# Patient Record
Sex: FEMALE | Race: WHITE | NOT HISPANIC OR LATINO | Employment: OTHER | URBAN - METROPOLITAN AREA
[De-identification: names, ages, dates, MRNs, and addresses within clinical notes are randomized per-mention and may not be internally consistent; named-entity substitution may affect disease eponyms.]

---

## 2017-02-21 ENCOUNTER — HOSPITAL ENCOUNTER (OUTPATIENT)
Dept: RADIOLOGY | Facility: MEDICAL CENTER | Age: 77
End: 2017-02-21
Attending: SURGERY
Payer: MEDICARE

## 2017-02-21 DIAGNOSIS — Z12.31 SCREENING MAMMOGRAM, ENCOUNTER FOR: ICD-10-CM

## 2017-02-21 PROCEDURE — 77063 BREAST TOMOSYNTHESIS BI: CPT

## 2017-02-23 ENCOUNTER — HOSPITAL ENCOUNTER (OUTPATIENT)
Dept: LAB | Facility: MEDICAL CENTER | Age: 77
End: 2017-02-23
Attending: INTERNAL MEDICINE
Payer: MEDICARE

## 2017-02-23 ENCOUNTER — HOSPITAL ENCOUNTER (OUTPATIENT)
Dept: LAB | Facility: MEDICAL CENTER | Age: 77
End: 2017-02-23
Attending: PHYSICIAN ASSISTANT
Payer: MEDICARE

## 2017-02-23 LAB
25(OH)D3 SERPL-MCNC: 32 NG/ML (ref 30–100)
ALBUMIN SERPL BCP-MCNC: 3.5 G/DL (ref 3.2–4.9)
ALBUMIN SERPL BCP-MCNC: 3.5 G/DL (ref 3.2–4.9)
ALBUMIN/GLOB SERPL: 1.4 G/DL
ALBUMIN/GLOB SERPL: 1.5 G/DL
ALP SERPL-CCNC: 55 U/L (ref 30–99)
ALP SERPL-CCNC: 55 U/L (ref 30–99)
ALT SERPL-CCNC: 12 U/L (ref 2–50)
ALT SERPL-CCNC: 12 U/L (ref 2–50)
ANION GAP SERPL CALC-SCNC: 6 MMOL/L (ref 0–11.9)
ANION GAP SERPL CALC-SCNC: 8 MMOL/L (ref 0–11.9)
AST SERPL-CCNC: 15 U/L (ref 12–45)
AST SERPL-CCNC: 17 U/L (ref 12–45)
BASOPHILS # BLD AUTO: 0.6 % (ref 0–1.8)
BASOPHILS # BLD AUTO: 0.8 % (ref 0–1.8)
BASOPHILS # BLD: 0.03 K/UL (ref 0–0.12)
BASOPHILS # BLD: 0.04 K/UL (ref 0–0.12)
BILIRUB SERPL-MCNC: 1 MG/DL (ref 0.1–1.5)
BILIRUB SERPL-MCNC: 1 MG/DL (ref 0.1–1.5)
BUN SERPL-MCNC: 13 MG/DL (ref 8–22)
BUN SERPL-MCNC: 14 MG/DL (ref 8–22)
CALCIUM SERPL-MCNC: 9.6 MG/DL (ref 8.4–10.2)
CALCIUM SERPL-MCNC: 9.6 MG/DL (ref 8.4–10.2)
CHLORIDE SERPL-SCNC: 105 MMOL/L (ref 96–112)
CHLORIDE SERPL-SCNC: 105 MMOL/L (ref 96–112)
CHOLEST SERPL-MCNC: 257 MG/DL (ref 100–199)
CO2 SERPL-SCNC: 27 MMOL/L (ref 20–33)
CO2 SERPL-SCNC: 28 MMOL/L (ref 20–33)
CREAT SERPL-MCNC: 0.66 MG/DL (ref 0.5–1.4)
CREAT SERPL-MCNC: 0.71 MG/DL (ref 0.5–1.4)
EOSINOPHIL # BLD AUTO: 0.08 K/UL (ref 0–0.51)
EOSINOPHIL # BLD AUTO: 0.11 K/UL (ref 0–0.51)
EOSINOPHIL NFR BLD: 1.7 % (ref 0–6.9)
EOSINOPHIL NFR BLD: 2.2 % (ref 0–6.9)
ERYTHROCYTE [DISTWIDTH] IN BLOOD BY AUTOMATED COUNT: 41.2 FL (ref 35.9–50)
ERYTHROCYTE [DISTWIDTH] IN BLOOD BY AUTOMATED COUNT: 42.7 FL (ref 35.9–50)
FOLATE SERPL-MCNC: 10.1 NG/ML
GFR SERPL CREATININE-BSD FRML MDRD: >60 ML/MIN/1.73 M 2
GFR SERPL CREATININE-BSD FRML MDRD: >60 ML/MIN/1.73 M 2
GLOBULIN SER CALC-MCNC: 2.4 G/DL (ref 1.9–3.5)
GLOBULIN SER CALC-MCNC: 2.5 G/DL (ref 1.9–3.5)
GLUCOSE SERPL-MCNC: 95 MG/DL (ref 65–99)
GLUCOSE SERPL-MCNC: 97 MG/DL (ref 65–99)
HCT VFR BLD AUTO: 43.9 % (ref 37–47)
HCT VFR BLD AUTO: 44.5 % (ref 37–47)
HDLC SERPL-MCNC: 56 MG/DL
HGB BLD-MCNC: 14.9 G/DL (ref 12–16)
HGB BLD-MCNC: 15.1 G/DL (ref 12–16)
IMM GRANULOCYTES # BLD AUTO: 0.01 K/UL (ref 0–0.11)
IMM GRANULOCYTES # BLD AUTO: 0.01 K/UL (ref 0–0.11)
IMM GRANULOCYTES NFR BLD AUTO: 0.2 % (ref 0–0.9)
IMM GRANULOCYTES NFR BLD AUTO: 0.2 % (ref 0–0.9)
IRON SATN MFR SERPL: 28 % (ref 15–55)
IRON SERPL-MCNC: 107 UG/DL (ref 40–170)
LDLC SERPL CALC-MCNC: 167 MG/DL
LYMPHOCYTES # BLD AUTO: 1.93 K/UL (ref 1–4.8)
LYMPHOCYTES # BLD AUTO: 2.01 K/UL (ref 1–4.8)
LYMPHOCYTES NFR BLD: 40 % (ref 22–41)
LYMPHOCYTES NFR BLD: 40.3 % (ref 22–41)
MCH RBC QN AUTO: 29.7 PG (ref 27–33)
MCH RBC QN AUTO: 29.9 PG (ref 27–33)
MCHC RBC AUTO-ENTMCNC: 33.9 G/DL (ref 33.6–35)
MCHC RBC AUTO-ENTMCNC: 33.9 G/DL (ref 33.6–35)
MCV RBC AUTO: 87.5 FL (ref 81.4–97.8)
MCV RBC AUTO: 88.1 FL (ref 81.4–97.8)
MONOCYTES # BLD AUTO: 0.35 K/UL (ref 0–0.85)
MONOCYTES # BLD AUTO: 0.38 K/UL (ref 0–0.85)
MONOCYTES NFR BLD AUTO: 7 % (ref 0–13.4)
MONOCYTES NFR BLD AUTO: 7.9 % (ref 0–13.4)
NEUTROPHILS # BLD AUTO: 2.4 K/UL (ref 2–7.15)
NEUTROPHILS # BLD AUTO: 2.47 K/UL (ref 2–7.15)
NEUTROPHILS NFR BLD: 49.5 % (ref 44–72)
NEUTROPHILS NFR BLD: 49.6 % (ref 44–72)
NRBC # BLD AUTO: 0 K/UL
NRBC # BLD AUTO: 0 K/UL
NRBC BLD AUTO-RTO: 0 /100 WBC
NRBC BLD AUTO-RTO: 0 /100 WBC
PLATELET # BLD AUTO: 218 K/UL (ref 164–446)
PLATELET # BLD AUTO: 219 K/UL (ref 164–446)
PMV BLD AUTO: 10 FL (ref 9–12.9)
PMV BLD AUTO: 10 FL (ref 9–12.9)
POTASSIUM SERPL-SCNC: 3.5 MMOL/L (ref 3.6–5.5)
POTASSIUM SERPL-SCNC: 3.6 MMOL/L (ref 3.6–5.5)
PREALB SERPL-MCNC: 22 MG/DL (ref 18–38)
PROT SERPL-MCNC: 5.9 G/DL (ref 6–8.2)
PROT SERPL-MCNC: 6 G/DL (ref 6–8.2)
RBC # BLD AUTO: 5.02 M/UL (ref 4.2–5.4)
RBC # BLD AUTO: 5.05 M/UL (ref 4.2–5.4)
SODIUM SERPL-SCNC: 139 MMOL/L (ref 135–145)
SODIUM SERPL-SCNC: 140 MMOL/L (ref 135–145)
TIBC SERPL-MCNC: 388 UG/DL (ref 250–450)
TRANSFERRIN SERPL-MCNC: 283 MG/DL (ref 200–370)
TRIGL SERPL-MCNC: 171 MG/DL (ref 0–149)
VIT B12 SERPL-MCNC: 362 PG/ML (ref 211–911)
WBC # BLD AUTO: 4.8 K/UL (ref 4.8–10.8)
WBC # BLD AUTO: 5 K/UL (ref 4.8–10.8)

## 2017-02-23 PROCEDURE — 36415 COLL VENOUS BLD VENIPUNCTURE: CPT

## 2017-02-23 PROCEDURE — 80053 COMPREHEN METABOLIC PANEL: CPT

## 2017-02-23 PROCEDURE — 85025 COMPLETE CBC W/AUTO DIFF WBC: CPT

## 2017-02-26 LAB — VIT B1 BLD-MCNC: 86 NMOL/L (ref 70–180)

## 2017-04-10 ENCOUNTER — OFFICE VISIT (OUTPATIENT)
Dept: URGENT CARE | Facility: CLINIC | Age: 77
End: 2017-04-10
Payer: MEDICARE

## 2017-04-10 VITALS
WEIGHT: 177 LBS | OXYGEN SATURATION: 93 % | HEART RATE: 72 BPM | DIASTOLIC BLOOD PRESSURE: 80 MMHG | SYSTOLIC BLOOD PRESSURE: 130 MMHG | BODY MASS INDEX: 30.37 KG/M2 | TEMPERATURE: 98.6 F | RESPIRATION RATE: 20 BRPM

## 2017-04-10 DIAGNOSIS — B34.9 VIRAL ILLNESS: ICD-10-CM

## 2017-04-10 PROCEDURE — 1036F TOBACCO NON-USER: CPT | Performed by: NURSE PRACTITIONER

## 2017-04-10 PROCEDURE — G8432 DEP SCR NOT DOC, RNG: HCPCS | Performed by: NURSE PRACTITIONER

## 2017-04-10 PROCEDURE — 3017F COLORECTAL CA SCREEN DOC REV: CPT | Mod: 8P | Performed by: NURSE PRACTITIONER

## 2017-04-10 PROCEDURE — 1101F PT FALLS ASSESS-DOCD LE1/YR: CPT | Mod: 8P | Performed by: NURSE PRACTITIONER

## 2017-04-10 PROCEDURE — 99213 OFFICE O/P EST LOW 20 MIN: CPT | Performed by: NURSE PRACTITIONER

## 2017-04-10 PROCEDURE — 4040F PNEUMOC VAC/ADMIN/RCVD: CPT | Performed by: NURSE PRACTITIONER

## 2017-04-10 PROCEDURE — G8419 CALC BMI OUT NRM PARAM NOF/U: HCPCS | Performed by: NURSE PRACTITIONER

## 2017-04-10 NOTE — PROGRESS NOTES
Chief Complaint   Patient presents with   • Cough     X 4 days dry cough , few days fever , severe headache       HISTORY OF PRESENT ILLNESS: Patient is a 77 y.o. female who presents today due to symptoms which started four with sudden onset. Pt reports a fever, chills, body aches. Reports associated mild cough, nausea, headache, neck pain, and fatigue. Denies sore throat, abdominal pain, changes in urination, ear pain, blood in sputum, chest pain, shortness of breath, wheezing, vomiting, or diarrhea. Admits to history of asthma and PNA in the past, has not needed her inhaler more often since onset of symptoms. Has tried OTC cold/flu medications without significant relief of symptoms. She took some left over cipro for two doses without improvement. No other aggravating or alleviating factors. Did not receive a flu vaccination this year. Overall her symptoms are improving. She reports that she just returned from [place] where she was for 2 weeks, states she felt fine there. Denies any known ill contacts.    Patient Active Problem List    Diagnosis Date Noted   • Dizziness 03/11/2013     Priority: High   • HTN (hypertension) 03/12/2013     Priority: Medium   • Hypokalemia 03/12/2013     Priority: Medium   • Hyperlipidemia 03/12/2013     Priority: Low   • ULCERATIVE COLITIS 03/12/2013     Priority: Low   • Asthma 07/08/2016   • ROBYN (obstructive sleep apnea) 07/08/2016   • Morbid obesity (CMS-HCC) 05/18/2016   • Murmur 05/09/2013   • Sciatica 05/09/2013   • Vertigo 03/11/2013       Allergies:Sulfa drugs    Current Outpatient Prescriptions Ordered in Psychiatric   Medication Sig Dispense Refill   • B Complex Vitamins (VITAMIN B COMPLEX PO) Take  by mouth.     • methylPREDNISolone (MEDROL, TITI,) 4 MG tablet Take as directed 21 Tab 0   • azithromycin (ZITHROMAX) 250 MG Tab Take as directed 6 Tab 0   • Mesalamine (APRISO PO) Take  by mouth.     • budesonide-formoterol (SYMBICORT) 80-4.5 MCG/ACT Aerosol Inhale 2 Puffs by mouth 2  Times a Day. Use spacer. Rinse mouth after each use. 1 Inhaler 11   • levalbuterol (XOPENEX) 1.25 MG/3ML Nebu Soln 1.25 mg by Nebulization route every four hours as needed for Shortness of Breath.     • valsartan-hydrochlorothiazide (DIOVAN-HCT) 160-25 MG per tablet Take 1 Tab by mouth every day.     • anastrozole (ARIMIDEX) 1 MG Tab Take 1 mg by mouth every day.     • pantoprazole (PROTONIX) 40 MG Tablet Delayed Response Take 40 mg by mouth every day.     • MAGNESIUM PO Take 1 Tab by mouth every day.     • vitamin D (CHOLECALCIFEROL) 1000 UNIT Tab Take 1,000 Units by mouth every day.     • POTASSIUM CITRATE ER PO Take 1 Tab by mouth every day. OTC     • ibuprofen (MOTRIN) 200 MG TABS Take 400 mg by mouth every 6 hours as needed.     • Misc Natural Products (OSTEO BI-FLEX ADV JOINT SHIELD PO) Take 1 Tab by mouth every day.     • gabapentin (NEURONTIN) 100 MG CAPS Take 100 mg by mouth every bedtime. Indications: Hot Flash     • metoprolol SR (TOPROL XL) 25 MG TB24 Take 1 Tab by mouth every day. 90 Tab 3   • aspirin EC (ECOTRIN) 81 MG TBEC Take 162 mg by mouth every day.       • paroxetine (PAXIL) 30 MG TABS Take 30 mg by mouth every day. Take with food      • atorvastatin (LIPITOR) 20 MG TABS Take 20 mg by mouth every evening.       • docosahexanoic acid (FISH OIL) 1000 MG CAPS Take 1,000 mg by mouth every day.       No current [facility]-ordered facility-administered medications on file.       Past Medical History   Diagnosis Date   • Psychiatric disorder      depression   • Hypertension    • Hypercholesterolemia    • Dizziness 3/11/2013   • HTN (hypertension) 3/12/2013   • Hypokalemia 3/12/2013   • Vertigo 3/11/2013   • Hyperlipidemia 3/12/2013   • ULCERATIVE COLITIS 3/12/2013   • Breast cancer (CMS-HCC)    • Asthma    • Arthritis    • Heart murmur    • Indigestion    • Hiatus hernia syndrome    • Snoring    • Bronchitis 2011   • Sleep apnea      uses CPAP   • Pain      back, hips, knees   • Heart burn    • Cancer  (CMS-HCC) 12/2012     right breast       Social History   Substance Use Topics   • Smoking status: Former Smoker -- 1.00 packs/day for 20 years     Types: Cigarettes     Quit date: 05/22/1986   • Smokeless tobacco: Never Used   • Alcohol Use: Yes      Comment: 3 drinks weekly       No family status information on file.     Family History   Problem Relation Age of Onset   • Heart Disease Mother    • Cancer Mother    • Cancer Sister        ROS:  Review of Systems   Constitutional: Positive for subjective fever, chills, fatigue, malaise. Negative for weight loss.  HENT: Positive for neck pain. Negative for ear pain, nosebleeds, and sore throat.    Eyes: Negative for vision changes.   Neuro: Positive for headache.   Cardiovascular: Negative for chest pain, palpitations, orthopnea and leg swelling.   Respiratory: Positive for mild cough without sputum production. Negative for shortness of breath and wheezing.   Gastrointestinal: Positive for nausea. Negative for abdominal pain, vomiting or diarrhea.   : Negative for dysuria, hematuria, urinary frequency or urgency. Negative for flank pain.  Skin: Negative for rash, diaphoresis.     Exam:  Blood pressure 130/80, pulse 72, temperature 37 °C (98.6 °F), resp. rate 20, weight 80.287 kg (177 lb), SpO2 93 %.  General: well-nourished, well-developed female, appears uncomfortable, non-toxic in appearance.   Head: normocephalic, atraumatic.  Eyes: PERRLA, EOM within normal limits, no conjunctival injection, no scleral icterus, visual fields and acuity grossly intact.  Ears: normal shape and symmetry, no tenderness, no discharge. External canals are without any significant edema or erythema. Tympanic membranes are without any inflammation, no effusion. Gross auditory acuity is intact.  Nose: symmetrical without tenderness, mild discharge, erythema present bilateral nares.  Mouth/Throat: reasonable hygiene, no exudates or tonsillar enlargement. Erythema present.   Neck: no masses,  range of motion within normal limits, no tracheal deviation.  Lymph: mild cervical adenopathy. No supraclavicular adenopathy.   Neuro: alert and oriented. Cranial nerves 1-12 grossly intact. No nuchal rigidity, negative Kernig and Brudzinski signs  Cardiovascular: regular rate and rhythm without murmurs, rubs, or gallops. No edema.   Pulmonary: no distress. Chest is symmetrical with respiration, no wheezes, crackles, or rhonchi.   Musculoskeletal: appropriate muscle tone, gait is stable.  Skin: warm, dry, intact, no clubbing, no cyanosis.   Psych: appropriate mood, affect, judgement.         Assessment/Plan:  1. Viral illness  POCT Influenza A/B    POCT Mononucleosis (Mono)       POC flu negative  POC mono negative          The patient is nontoxic in appearance, her vital signs are stable, she is negative meningeal signs, I suspect viral illness. Nonetheless, I'm concerned about her fever, headache, and neck pain. I have discussed this with the patient and have advised ER evaluation within 12 hours for worsening. Rest, increase fluids, hand and respiratory hygiene. May take OTC medications as directed for symptom relief. Honey for cough.   Supportive care, differential diagnoses, and indications for immediate follow-up discussed with patient.   Pathogenesis of diagnosis discussed including typical length and natural progression.  Instructed to return to clinic or nearest emergency department for any change in condition, further concerns, or worsening of symptoms.  Patient states understanding of the plan of care and discharge instructions.  Instructed to make an appointment with their primary care provider in the next 3-7 days if not significantly improving and for further care.         Please note that this dictation was created using voice recognition software. I have made every reasonable attempt to correct obvious errors, but I expect that there are errors of grammar and possibly content that I did not discover  before finalizing the note.      ARMIN Lin.

## 2017-04-11 ENCOUNTER — HOSPITAL ENCOUNTER (EMERGENCY)
Facility: MEDICAL CENTER | Age: 77
End: 2017-04-11
Attending: EMERGENCY MEDICINE
Payer: MEDICARE

## 2017-04-11 ENCOUNTER — APPOINTMENT (OUTPATIENT)
Dept: RADIOLOGY | Facility: MEDICAL CENTER | Age: 77
End: 2017-04-11
Attending: EMERGENCY MEDICINE
Payer: MEDICARE

## 2017-04-11 ENCOUNTER — OFFICE VISIT (OUTPATIENT)
Dept: PULMONOLOGY | Facility: HOSPICE | Age: 77
End: 2017-04-11
Payer: MEDICARE

## 2017-04-11 VITALS
BODY MASS INDEX: 30.75 KG/M2 | WEIGHT: 180.12 LBS | TEMPERATURE: 97.5 F | HEART RATE: 65 BPM | OXYGEN SATURATION: 95 % | DIASTOLIC BLOOD PRESSURE: 91 MMHG | SYSTOLIC BLOOD PRESSURE: 178 MMHG | RESPIRATION RATE: 16 BRPM | HEIGHT: 64 IN

## 2017-04-11 VITALS
TEMPERATURE: 98.2 F | DIASTOLIC BLOOD PRESSURE: 90 MMHG | RESPIRATION RATE: 14 BRPM | BODY MASS INDEX: 30.56 KG/M2 | HEART RATE: 79 BPM | WEIGHT: 179 LBS | HEIGHT: 64 IN | SYSTOLIC BLOOD PRESSURE: 140 MMHG

## 2017-04-11 DIAGNOSIS — G47.33 OSA (OBSTRUCTIVE SLEEP APNEA): ICD-10-CM

## 2017-04-11 DIAGNOSIS — R50.9 FEVER, UNSPECIFIED FEVER CAUSE: ICD-10-CM

## 2017-04-11 DIAGNOSIS — R42 VERTIGO: ICD-10-CM

## 2017-04-11 DIAGNOSIS — R01.1 MURMUR: ICD-10-CM

## 2017-04-11 DIAGNOSIS — R53.83 FATIGUE, UNSPECIFIED TYPE: ICD-10-CM

## 2017-04-11 DIAGNOSIS — B34.9 ACUTE VIRAL SYNDROME: ICD-10-CM

## 2017-04-11 LAB
ALBUMIN SERPL BCP-MCNC: 3.5 G/DL (ref 3.2–4.9)
ALBUMIN/GLOB SERPL: 1 G/DL
ALP SERPL-CCNC: 62 U/L (ref 30–99)
ALT SERPL-CCNC: 15 U/L (ref 2–50)
ANION GAP SERPL CALC-SCNC: 6 MMOL/L (ref 0–11.9)
APPEARANCE UR: CLEAR
AST SERPL-CCNC: 17 U/L (ref 12–45)
BACTERIA #/AREA URNS HPF: ABNORMAL /HPF
BASOPHILS # BLD AUTO: 0.7 % (ref 0–1.8)
BASOPHILS # BLD: 0.03 K/UL (ref 0–0.12)
BILIRUB SERPL-MCNC: 1.2 MG/DL (ref 0.1–1.5)
BILIRUB UR QL STRIP.AUTO: NEGATIVE
BUN SERPL-MCNC: 13 MG/DL (ref 8–22)
CALCIUM SERPL-MCNC: 9.4 MG/DL (ref 8.4–10.2)
CHLORIDE SERPL-SCNC: 102 MMOL/L (ref 96–112)
CO2 SERPL-SCNC: 30 MMOL/L (ref 20–33)
COLOR UR: YELLOW
CREAT SERPL-MCNC: 0.7 MG/DL (ref 0.5–1.4)
CULTURE IF INDICATED INDCX: NO UA CULTURE
EOSINOPHIL # BLD AUTO: 0.09 K/UL (ref 0–0.51)
EOSINOPHIL NFR BLD: 2.1 % (ref 0–6.9)
EPI CELLS #/AREA URNS HPF: ABNORMAL /HPF
ERYTHROCYTE [DISTWIDTH] IN BLOOD BY AUTOMATED COUNT: 40.7 FL (ref 35.9–50)
GFR SERPL CREATININE-BSD FRML MDRD: >60 ML/MIN/1.73 M 2
GLOBULIN SER CALC-MCNC: 3.4 G/DL (ref 1.9–3.5)
GLUCOSE SERPL-MCNC: 113 MG/DL (ref 65–99)
GLUCOSE UR STRIP.AUTO-MCNC: NEGATIVE MG/DL
HCT VFR BLD AUTO: 44.4 % (ref 37–47)
HGB BLD-MCNC: 15.2 G/DL (ref 12–16)
IMM GRANULOCYTES # BLD AUTO: 0.01 K/UL (ref 0–0.11)
IMM GRANULOCYTES NFR BLD AUTO: 0.2 % (ref 0–0.9)
KETONES UR STRIP.AUTO-MCNC: ABNORMAL MG/DL
LEUKOCYTE ESTERASE UR QL STRIP.AUTO: NEGATIVE
LYMPHOCYTES # BLD AUTO: 1.27 K/UL (ref 1–4.8)
LYMPHOCYTES NFR BLD: 29 % (ref 22–41)
MCH RBC QN AUTO: 29.7 PG (ref 27–33)
MCHC RBC AUTO-ENTMCNC: 34.2 G/DL (ref 33.6–35)
MCV RBC AUTO: 86.9 FL (ref 81.4–97.8)
MICRO URNS: ABNORMAL
MONOCYTES # BLD AUTO: 0.5 K/UL (ref 0–0.85)
MONOCYTES NFR BLD AUTO: 11.4 % (ref 0–13.4)
MUCOUS THREADS #/AREA URNS HPF: ABNORMAL /HPF
NEUTROPHILS # BLD AUTO: 2.48 K/UL (ref 2–7.15)
NEUTROPHILS NFR BLD: 56.6 % (ref 44–72)
NITRITE UR QL STRIP.AUTO: NEGATIVE
NRBC # BLD AUTO: 0 K/UL
NRBC BLD AUTO-RTO: 0 /100 WBC
PH UR STRIP.AUTO: 6 [PH]
PLATELET # BLD AUTO: 195 K/UL (ref 164–446)
PMV BLD AUTO: 9.5 FL (ref 9–12.9)
POTASSIUM SERPL-SCNC: 3.8 MMOL/L (ref 3.6–5.5)
PROT SERPL-MCNC: 6.9 G/DL (ref 6–8.2)
PROT UR QL STRIP: NEGATIVE MG/DL
RBC # BLD AUTO: 5.11 M/UL (ref 4.2–5.4)
RBC # URNS HPF: ABNORMAL /HPF
RBC UR QL AUTO: ABNORMAL
SODIUM SERPL-SCNC: 138 MMOL/L (ref 135–145)
SP GR UR STRIP.AUTO: 1.02
UNIDENT CRYS URNS QL MICRO: ABNORMAL /HPF
WBC # BLD AUTO: 4.4 K/UL (ref 4.8–10.8)
WBC #/AREA URNS HPF: ABNORMAL /HPF

## 2017-04-11 PROCEDURE — 96374 THER/PROPH/DIAG INJ IV PUSH: CPT

## 2017-04-11 PROCEDURE — 93005 ELECTROCARDIOGRAM TRACING: CPT | Performed by: EMERGENCY MEDICINE

## 2017-04-11 PROCEDURE — 96361 HYDRATE IV INFUSION ADD-ON: CPT

## 2017-04-11 PROCEDURE — 96375 TX/PRO/DX INJ NEW DRUG ADDON: CPT

## 2017-04-11 PROCEDURE — 700105 HCHG RX REV CODE 258: Performed by: EMERGENCY MEDICINE

## 2017-04-11 PROCEDURE — 85025 COMPLETE CBC W/AUTO DIFF WBC: CPT

## 2017-04-11 PROCEDURE — 71010 DX-CHEST-PORTABLE (1 VIEW): CPT

## 2017-04-11 PROCEDURE — 99284 EMERGENCY DEPT VISIT MOD MDM: CPT

## 2017-04-11 PROCEDURE — 81001 URINALYSIS AUTO W/SCOPE: CPT

## 2017-04-11 PROCEDURE — 96376 TX/PRO/DX INJ SAME DRUG ADON: CPT

## 2017-04-11 PROCEDURE — 80053 COMPREHEN METABOLIC PANEL: CPT

## 2017-04-11 PROCEDURE — 36415 COLL VENOUS BLD VENIPUNCTURE: CPT

## 2017-04-11 PROCEDURE — 700111 HCHG RX REV CODE 636 W/ 250 OVERRIDE (IP): Performed by: EMERGENCY MEDICINE

## 2017-04-11 RX ORDER — HYDROCODONE BITARTRATE AND ACETAMINOPHEN 5; 325 MG/1; MG/1
1-2 TABLET ORAL EVERY 4 HOURS PRN
Qty: 20 TAB | Refills: 0 | Status: SHIPPED | OUTPATIENT
Start: 2017-04-11 | End: 2017-07-18

## 2017-04-11 RX ORDER — ONDANSETRON 4 MG/1
4 TABLET, ORALLY DISINTEGRATING ORAL EVERY 8 HOURS PRN
Qty: 10 TAB | Refills: 0 | Status: SHIPPED | OUTPATIENT
Start: 2017-04-11 | End: 2017-07-18

## 2017-04-11 RX ORDER — SODIUM CHLORIDE 9 MG/ML
INJECTION, SOLUTION INTRAVENOUS CONTINUOUS
Status: DISCONTINUED | OUTPATIENT
Start: 2017-04-11 | End: 2017-04-11 | Stop reason: HOSPADM

## 2017-04-11 RX ORDER — ONDANSETRON 2 MG/ML
4 INJECTION INTRAMUSCULAR; INTRAVENOUS ONCE
Status: COMPLETED | OUTPATIENT
Start: 2017-04-11 | End: 2017-04-11

## 2017-04-11 RX ORDER — MESALAMINE 0.38 G/1
2 CAPSULE, EXTENDED RELEASE ORAL 2 TIMES DAILY
Status: SHIPPED | COMMUNITY
End: 2021-05-10

## 2017-04-11 RX ADMIN — HYDROMORPHONE HYDROCHLORIDE 0.5 MG: 1 INJECTION, SOLUTION INTRAMUSCULAR; INTRAVENOUS; SUBCUTANEOUS at 15:55

## 2017-04-11 RX ADMIN — ONDANSETRON 4 MG: 2 INJECTION, SOLUTION INTRAMUSCULAR; INTRAVENOUS at 15:55

## 2017-04-11 RX ADMIN — ONDANSETRON 4 MG: 2 INJECTION, SOLUTION INTRAMUSCULAR; INTRAVENOUS at 17:14

## 2017-04-11 RX ADMIN — SODIUM CHLORIDE: 9 INJECTION, SOLUTION INTRAVENOUS at 15:55

## 2017-04-11 ASSESSMENT — PAIN SCALES - GENERAL: PAINLEVEL_OUTOF10: 4

## 2017-04-11 NOTE — PATIENT INSTRUCTIONS
Patient presents today for follow-up . She was seen in urgent care complaining of fever, chills, head and neck ache. Recently returned from a long trip to [place]. She was instructed to be seen in the ER for any worsening symptoms. Comes in today complaining of significant fatigue and nausea she had brief episode of jaw pain yesterday which resolved on its own, she feels more short of breath, room air saturations 88% today. I advised follow-up in the ER to rule out cardiac cause for her symptoms. CTA may also be considered given mild hypoxemia, increased dyspnea after return from [place]. She denies pain or swelling in her lower extremities. She declines emergency transport to the ER. She declines assistance to her car. All vital signs are stable. She will follow up after ER.   She would like HSS completed. Patient has lost weight and wants to know if she needs to continue with CPAP for ROBYN. This will be ordered and reviewed at her follow-up visit.

## 2017-04-11 NOTE — ED AVS SNAPSHOT
Home Care Instructions                                                                                                                Erin Hess   MRN: [id number]    Department:  [facility], Emergency Dept   Date of Visit:  4/11/2017            [facility], Emergency Dept    [address]    [place] [address]    Phone:  [phone]      You were seen by     Markell Elizondo M.D.      Your Diagnosis Was     Acute viral syndrome     B34.9       These are the medications you received during your hospitalization from 04/11/2017 1501 to 04/11/2017 1725     Date/Time Order Dose Route Action    04/11/2017 1555 NS infusion   Intravenous New Bag    04/11/2017 1555 HYDROmorphone (DILAUDID) injection 0.5 mg 0.5 mg Intravenous Given    04/11/2017 1555 ondansetron (ZOFRAN) syringe/vial injection 4 mg 4 mg Intravenous Given    04/11/2017 1714 ondansetron (ZOFRAN) syringe/vial injection 4 mg 4 mg Intravenous Given      Follow-up Information     1. Follow up with Terra Zambrano M.D..    Specialty:  Family Medicine    Contact information    [address] [place] [address] [address]  [address]  [place] [address]  [phone]        Medication Information     Review all of your home medications and newly ordered medications with your primary doctor and/or pharmacist as soon as possible. Follow medication instructions as directed by your doctor and/or pharmacist.     Please keep your complete medication list with you and share with your physician. Update the information when medications are discontinued, doses are changed, or new medications (including over-the-counter products) are added; and carry medication information at all times in the event of emergency situations.               Medication List      START taking these medications        Instructions    Morning Afternoon Evening Bedtime    * hydrocodone-acetaminophen 5-325 MG Tabs per tablet   Commonly known as:  NORCO        Take 1-2 Tabs by mouth  every four hours as needed.   Dose:  1-2 Tab                        * hydrocodone-acetaminophen 5-325 MG Tabs per tablet   Commonly known as:  NORCO        Take 1-2 Tabs by mouth every four hours as needed.   Dose:  1-2 Tab                        * ondansetron 4 MG Tbdp   Commonly known as:  ZOFRAN ODT        Take 1 Tab by mouth every 8 hours as needed.   Dose:  4 mg                        * ondansetron 4 MG Tbdp   Commonly known as:  ZOFRAN ODT        Take 1 Tab by mouth every 8 hours as needed.   Dose:  4 mg                        * Notice:  This list has 4 medication(s) that are the same as other medications prescribed for you. Read the directions carefully, and ask your doctor or other care provider to review them with you.      ASK your doctor about these medications        Instructions    Morning Afternoon Evening Bedtime    anastrozole 1 MG Tabs   Commonly known as:  ARIMIDEX        Take 1 mg by mouth every morning.   Dose:  1 mg                        APRISO 0.375 GM Cp24   Generic drug:  Mesalamine        Take 1 Cap by mouth every morning.   Dose:  1 Cap                        aspirin EC 81 MG Tbec   Commonly known as:  ECOTRIN        Take 162 mg by mouth every morning.   Dose:  162 mg                        atorvastatin 20 MG Tabs   Commonly known as:  LIPITOR        Take 20 mg by mouth every morning.   Dose:  20 mg                        docosahexanoic acid 1000 MG Caps   Commonly known as:  OMEGA 3 FA        Take 1,000 mg by mouth every morning.   Dose:  1000 mg                        MAGNESIUM PO        Take 1 Tab by mouth every morning.   Dose:  1 Tab                        metoprolol SR 25 MG Tb24   Commonly known as:  TOPROL XL        Take 1 Tab by mouth every day.   Dose:  25 mg                        pantoprazole 40 MG Tbec   Commonly known as:  PROTONIX        Take 40 mg by mouth every morning.   Dose:  40 mg                        paroxetine 30 MG Tabs   Commonly known as:  PAXIL        Take 30  "mg by mouth every morning. Take with food   Dose:  30 mg                        POTASSIUM PO        Take 1 Tab by mouth every morning. OTC   Dose:  1 Tab                        valsartan-hydrochlorothiazide 160-25 MG per tablet   Commonly known as:  DIOVAN-HCT        Take 1 Tab by mouth every morning.   Dose:  1 Tab                        VITAMIN B COMPLEX PO        Take 1 Tab by mouth every morning.   Dose:  1 Tab                        vitamin D 1000 UNIT Tabs   Commonly known as:  cholecalciferol        Take 1,000 Units by mouth every morning.   Dose:  1000 Units                             Where to Get Your Medications      You can get these medications from any pharmacy     Bring a paper prescription for each of these medications    - hydrocodone-acetaminophen 5-325 MG Tabs per tablet  - hydrocodone-acetaminophen 5-325 MG Tabs per tablet  - ondansetron 4 MG Tbdp  - ondansetron 4 MG Tbdp            Procedures and tests performed during your visit     CBC WITH DIFFERENTIAL    COMP METABOLIC PANEL    DX-CHEST-PORTABLE (1 VIEW)    EKG (NOW)    ESTIMATED GFR    IV Saline Lock    URINALYSIS CULTURE, IF INDICATED    URINE MICROSCOPIC (W/UA)        Discharge Instructions       Antibiotic Nonuse   Your caregiver felt that the infection or problem was not one that would be helped with an antibiotic.  Infections may be caused by viruses or bacteria. Only a caregiver can tell which one of these is the likely cause of an illness. A cold is the most common cause of infection in both adults and children. A cold is a virus. Antibiotic treatment will have no effect on a viral infection. Viruses can lead to many lost days of work caring for sick children and many missed days of school. Children may catch as many as 10 \"colds\" or \"flus\" per year during which they can be tearful, cranky, and uncomfortable. The goal of treating a virus is aimed at keeping the ill person comfortable.  Antibiotics are medications used to help the " body fight bacterial infections. There are relatively few types of bacteria that cause infections but there are hundreds of viruses. While both viruses and bacteria cause infection they are very different types of germs. A viral infection will typically go away by itself within 7 to 10 days. Bacterial infections may spread or get worse without antibiotic treatment.  Examples of bacterial infections are:  · Sore throats (like strep throat or tonsillitis).  · Infection in the lung (pneumonia).  · Ear and skin infections.  Examples of viral infections are:  · Colds or flus.  · Most coughs and bronchitis.  · Sore throats not caused by Strep.  · Runny noses.  It is often best not to take an antibiotic when a viral infection is the cause of the problem. Antibiotics can kill off the helpful bacteria that we have inside our body and allow harmful bacteria to start growing. Antibiotics can cause side effects such as allergies, nausea, and diarrhea without helping to improve the symptoms of the viral infection. Additionally, repeated uses of antibiotics can cause bacteria inside of our body to become resistant. That resistance can be passed onto harmful bacterial. The next time you have an infection it may be harder to treat if antibiotics are used when they are not needed. Not treating with antibiotics allows our own immune system to develop and take care of infections more efficiently. Also, antibiotics will work better for us when they are prescribed for bacterial infections.  Treatments for a child that is ill may include:  · Give extra fluids throughout the day to stay hydrated.  · Get plenty of rest.  · Only give your child over-the-counter or prescription medicines for pain, discomfort, or fever as directed by your caregiver.  · The use of a cool mist humidifier may help stuffy noses.  · Cold medications if suggested by your caregiver.  Your caregiver may decide to start you on an antibiotic if:  · The problem you were  "seen for today continues for a longer length of time than expected.  · You develop a secondary bacterial infection.  SEEK MEDICAL CARE IF:  · Fever lasts longer than 5 days.  · Symptoms continue to get worse after 5 to 7 days or become severe.  · Difficulty in breathing develops.  · Signs of dehydration develop (poor drinking, rare urinating, dark colored urine).  · Changes in behavior or worsening tiredness (listlessness or lethargy).  Document Released: 02/26/2003 Document Revised: 03/11/2013 Document Reviewed: 08/25/2010  [employer]® Patient Information ©2014 [employer].    Viral Syndrome  You or your child has Viral Syndrome. It is the most common infection causing \"colds\" and infections in the nose, throat, sinuses, and breathing tubes. Sometimes the infection causes nausea, vomiting, or diarrhea. The germ that causes the infection is a virus. No antibiotic or other medicine will kill it. There are medicines that you can take to make you or your child more comfortable.   HOME CARE INSTRUCTIONS   · Rest in bed until you start to feel better.   · If you have diarrhea or vomiting, eat small amounts of crackers and toast. Soup is helpful.   · Do not give aspirin or medicine that contains aspirin to children.   · Only take over-the-counter or prescription medicines for pain, discomfort, or fever as directed by your caregiver.   SEEK IMMEDIATE MEDICAL CARE IF:   · You or your child has not improved within one week.   · You or your child has pain that is not at least partially relieved by over-the-counter medicine.   · Thick, colored mucus or blood is coughed up.   · Discharge from the nose becomes thick yellow or green.   · Diarrhea or vomiting gets worse.   · There is any major change in your or your child's condition.   · You or your child develops a skin rash, stiff neck, severe headache, or are unable to hold down food or fluid.   · You or your child has an oral temperature above 102° F (38.9° C), not controlled " by medicine.   · Your baby is older than 3 months with a rectal temperature of 102° F (38.9° C) or higher.   · Your baby is 3 months old or younger with a rectal temperature of 100.4° F (38° C) or higher.   Document Released: 12/03/2007 Document Revised: 03/11/2013 Document Reviewed: 12/03/2008  [employer]® Patient Information ©2013 [employer].          Patient Information     Patient Information    Following emergency treatment: all patient requiring follow-up care must return either to a private physician or a clinic if your condition worsens before you are able to obtain further medical attention, please return to the emergency room.     Billing Information    At [facility], we work to make the billing process streamlined for our patients.  Our Representatives are here to answer any questions you may have regarding your hospital bill.  If you have insurance coverage and have supplied your insurance information to us, we will submit a claim to your insurer on your behalf.  Should you have any questions regarding your bill, we can be reached online or by phone as follows:  Online: You are able pay your bills online or live chat with our representatives about any billing questions you may have. We are here to help Monday - Friday from 8:00am to 7:30pm and 9:00am - 12:00pm on Saturdays.  Please visit [email or web address]  for more information.   Phone:  [phone] or [phone]    Please note that your emergency physician, surgeon, pathologist, radiologist, anesthesiologist, and other specialists are not employed by [facility] and will therefore bill separately for their services.  Please contact them directly for any questions concerning their bills at the numbers below:     Emergency Physician Services:  [phone]  [facility]:  [phone]  [facility]:  [phone]  [facility]:  [phone]    1. Your final bill may  vary from the amount quoted upon discharge if all procedures are not complete at that time, or if your doctor has additional procedures of which we are not aware. You will receive an additional bill if you return to the Emergency Department at [facility] for suture removal regardless of the facility of which the sutures were placed.     2. Please arrange for settlement of this account at the emergency registration.    3. All self-pay accounts are due in full at the time of treatment.  If you are unable to meet this obligation then payment is expected within 4-5 days.     4. If you have had radiology studies (CT, X-ray, Ultrasound, MRI), you have received a preliminary result during your emergency department visit. Please contact the radiology department [phone] to receive a copy of your final result. Please discuss the Final result with your primary physician or with the follow up physician provided.     Crisis Hotline:  [place] Crisis Hotline:  8-543-RCWPOTP or 1-960.279.5918  [place] Crisis Hotline:    1-581.467.7907 or 707-020-1334         ED Discharge Follow Up Questions    1. In order to provide you with very good care, we would like to follow up with a phone call in the next few days.  May we have your permission to contact you?     YES /  NO    2. What is the best phone number to call you? (       )_____-__________    3. What is the best time to call you?      Morning  /  Afternoon  /  Evening                   Patient Signature:  ____________________________________________________________    Date:  ____________________________________________________________

## 2017-04-11 NOTE — PROGRESS NOTES
Patient presents today for follow-up . She was seen in urgent care yesterday complaining of fever, chills, head and neck ache. Recently returned from a long trip to [place]. She was instructed to be seen in the ER for any worsening symptoms. Comes in today complaining of significant fatigue and nausea she had brief episode of jaw pain yesterday which resolved on its own, she feels more short of breath, room air saturations 88% today. I advised follow-up in the ER to rule out cardiac cause for her symptoms. CTA may also be considered given mild hypoxemia, increased dyspnea after return from [place]. She denies pain or swelling in her lower extremities. She has a history of cardiac murmur, heard on exam today. Lung sounds are clear. She declines emergency transport to the ER. She declines assistance to her car. All vital signs are stable.

## 2017-04-11 NOTE — ED PROVIDER NOTES
ED Provider Note    CHIEF COMPLAINT  Chief Complaint   Patient presents with   • Head Ache   • Chills   • Fever       HPI  Erin Hess is a 77 y.o. female who presents for evaluation of headache and fever. The patient is been sick over the past 4 days. She's been having nausea but no vomiting. She's had no cough or sputum production. She's had no chest pain. She denies any abdominal pain she's had no urinary symptoms. She went to urgent care the other day and they tested her for influenza and mononucleosis and they were negative. She is also having generalized body aches and fatigue.    REVIEW OF SYSTEMS  See HPI for further details. All other systems are negative.     PAST MEDICAL HISTORY  Past Medical History   Diagnosis Date   • Psychiatric disorder      depression   • Hypertension    • Hypercholesterolemia    • Dizziness 3/11/2013   • HTN (hypertension) 3/12/2013   • Hypokalemia 3/12/2013   • Vertigo 3/11/2013   • Hyperlipidemia 3/12/2013   • ULCERATIVE COLITIS 3/12/2013   • Breast cancer (CMS-HCC)    • Asthma    • Arthritis    • Heart murmur    • Indigestion    • Hiatus hernia syndrome    • Snoring    • Bronchitis 2011   • Sleep apnea      uses CPAP   • Pain      back, hips, knees   • Heart burn    • Cancer (CMS-[facility]) 12/2012     right breast       FAMILY HISTORY  Family History   Problem Relation Age of Onset   • Heart Disease Mother    • Cancer Mother    • Cancer Sister        SOCIAL HISTORY  Social History     Social History   • Marital Status:      Spouse Name: N/A   • Number of Children: N/A   • Years of Education: N/A     Social History Main Topics   • Smoking status: Former Smoker -- 1.00 packs/day for 20 years     Types: Cigarettes     Quit date: 05/22/1986   • Smokeless tobacco: Never Used   • Alcohol Use: Yes      Comment: 3 drinks weekly   • Drug Use: No   • Sexual Activity: Not on file     Other Topics Concern   • Not on file     Social History Narrative       SURGICAL HISTORY  Past  "Surgical History   Procedure Laterality Date   • Us-needle core bx-breast panel  2013     rt breast, with lumpectomy    • Lumpectomy  left   • Gyn surgery       vag hyster 1990   • Gyn surgery       vaginal cyst removal    • Other        R breast bx X 2& lipoma removal   • Gastric sleeve laparoscopy N/A 5/18/2016     Procedure: GASTRIC SLEEVE LAPAROSCOPY, HIATAL HERNIA AND LIVER BIOPSY;  Surgeon: Mauricio Montes M.D.;  Location: SURGERY [facility];  Service:        CURRENT MEDICATIONS  Home Medications     **Home medications have not yet been reviewed for this encounter**          ALLERGIES  Allergies   Allergen Reactions   • Sulfa Drugs Rash and Swelling     Rash, joint swelling  [id number]       PHYSICAL EXAM  VITAL SIGNS: /91 mmHg  Pulse 65  Temp(Src) 36.4 °C (97.5 °F)  Resp 16  Ht 1.626 m (5' 4\")  Wt 81.7 kg (180 lb 1.9 oz)  BMI 30.90 kg/m2  SpO2 95%    Constitutional: Well developed, Well nourished, No acute distress, Non-toxic appearance.   HENT: Normocephalic, Atraumatic.   Eyes:  EOMI, Conjunctiva normal, No discharge.   Neck: Normal range of motion. No meningismus.  Cardiovascular: Normal heart rate, Normal rhythm, 3/6 systolic murmurs, No rubs, No gallops.   Thorax & Lungs: Lungs clear to auscultation bilaterally without wheezes, rales or rhonchi. No respiratory distress.    Abdomen: Soft nontender. No masses..  Skin: Warm, Dry.   Extremities:  No edema, No cyanosis. No calf tenderness or swelling.  Musculoskeletal: Good range of motion in all major joints.  Neurologic: Awake alert, No focal deficits noted.        EKG  EKG Interpretation    Interpreted by emergency department physician    Rhythm: normal sinus   Rate: normal  Axis: normal  Ectopy: none  Conduction: normal  ST Segments: no acute change  T Waves: no acute change  Q Waves: none    Clinical Impression: no acute changes        RADIOLOGY/PROCEDURES  DX-CHEST-PORTABLE (1 VIEW)   Final Result      Enlarged cardiac silhouette with " vascular congestion without overt pulmonary edema.      Left basilar atelectasis or scarring.            COURSE & MEDICAL DECISION MAKING  Pertinent Labs & Imaging studies reviewed. (See chart for details)  This is a 77-year-old here for evaluation of fevers, headache, and nausea. He is afebrile here. I see no evidence for focal bacterial infection on exam. She has no meningismus. IV is established and she's hydrated with normal saline. Laboratories include urine which is positive for some ketones but negative for evidence of infection. She does have blood in her urine. When discussing that with her she states that she always has blood in her urine. Chemistries are normal with exception of glucose 113. Liver function studies are normal. CBC shows a white count which is slightly low at 4.4 but is otherwise a normal study. Chest x-ray shows no evidence of an acute cardiopulmonary process. Patient is hydrated with normal saline. She is treated with Dilaudid and Zofran. On repeat evaluation she's feeling better she still has some slight nausea and will be given another dose of Zofran. I discussed results of all the tests with her. Based on her symptoms and laboratory findings to include a slightly low WBC I believe this all represents a viral illness. I explained to her that antibiotics will be no benefit. She is fine for discharge home at this point. I'll provide her prescription for Norco and Zofran. I will have her follow-up with Dr. Zambrano her primary care provider. She is given a discharge instruction sheet on viral syndromes. She is discharged home in stable condition in the care of her sister.    FINAL IMPRESSION  1. Acute viral syndrome  2.   3.         Electronically signed by: Markell Elizondo, 4/11/2017 3:27 PM

## 2017-04-11 NOTE — ED NOTES
Ambulatory to BR to attempt urine sample.  Clean catch instructions given and understanding verbalized.

## 2017-04-11 NOTE — ED AVS SNAPSHOT
Gamestaq Access Code: Activation code not generated  Current Gamestaq Status: Active    Striped Sailhart  A secure, online tool to manage your health information     [employer]’s Gamestaq® is a secure, online tool that connects you to your personalized health information from the privacy of your home -- day or night - making it very easy for you to manage your healthcare. Once the activation process is completed, you can even access your medical information using the Gamestaq larisa, which is available for free in the Apple Larisa store or Google Play store.     Gamestaq provides the following levels of access (as shown below):   My Chart Features   [facility] Primary Care Doctor [facility]  Specialists [facility]  Urgent  Care Non-[facility]  Primary Care  Doctor   Email your healthcare team securely and privately 24/7 X X X X   Manage appointments: schedule your next appointment; view details of past/upcoming appointments X      Request prescription refills. X      View recent personal medical records, including lab and immunizations X X X X   View health record, including health history, allergies, medications X X X X   Read reports about your outpatient visits, procedures, consult and ER notes X X X X   See your discharge summary, which is a recap of your hospital and/or ER visit that includes your diagnosis, lab results, and care plan. X X       How to register for Gamestaq:  1. Go to  [email or web address].  2. Click on the Sign Up Now box, which takes you to the New Member Sign Up page. You will need to provide the following information:  a. Enter your Gamestaq Access Code exactly as it appears at the top of this page. (You will not need to use this code after you’ve completed the sign-up process. If you do not sign up before the expiration date, you must request a new code.)   b. Enter your date of birth.   c. Enter your home email address.   d. Click Submit, and follow the next screen’s instructions.  3. Create a Gamestaq ID. This will  be your Lingoda login ID and cannot be changed, so think of one that is secure and easy to remember.  4. Create a Lingoda password. You can change your password at any time.  5. Enter your Password Reset Question and Answer. This can be used at a later time if you forget your password.   6. Enter your e-mail address. This allows you to receive e-mail notifications when new information is available in Lingoda.  7. Click Sign Up. You can now view your health information.    For assistance activating your Lingoda account, call [phone]

## 2017-04-11 NOTE — ED AVS SNAPSHOT
4/11/2017          Erin Hess  [address]  [place] [address]    Dear Erin:    Critical access hospital wants to ensure your discharge home is safe and you or your loved ones have had all your questions answered regarding your care after you leave the hospital.    You may receive a telephone call within two days of your discharge.  This call is to make certain you understand your discharge instructions as well as ensure we provided you with the best care possible during your stay with us.     The call will only last approximately 3-5 minutes and will be done by a nurse.    Once again, we want to ensure your discharge home is safe and that you have a clear understanding of any next steps in your care.  If you have any questions or concerns, please do not hesitate to contact us, we are here for you.  Thank you for choosing [facility] for your healthcare needs.    Sincerely,    Jonh Brooks    [facility]

## 2017-04-11 NOTE — MR AVS SNAPSHOT
"Erin Hess   2017 2:00 PM   Office Visit   MRN: [id number]    Department:  [facility]   Dept Phone:  [phone]    Description:  Female : 1940   Provider:  ESTHER Osborn           Reason for Visit     Follow-Up 6M      Allergies as of 2017     Allergen Noted Reactions    Sulfa Drugs 2017   Rash, Swelling    Rash, joint swelling  [id number]    Codeine 2017   Vomiting, Nausea      Vital Signs     Blood Pressure Pulse Temperature Respirations Height Weight    140/90 mmHg 79 36.8 °C (98.2 °F) (Oral) 14 1.626 m (5' 4.02\") 81.194 kg (179 lb)    Body Mass Index Smoking Status                30.71 kg/m2 Former Smoker          Basic Information     Date Of Birth Sex Race Ethnicity Preferred Language    1940 Female White Non- English      Problem List              ICD-10-CM Priority Class Noted - Resolved    Vertigo R42   3/11/2013 - Present    Dizziness R42 High  3/11/2013 - Present    HTN (hypertension) I10 Medium  3/12/2013 - Present    Hyperlipidemia E78.5 Low  3/12/2013 - Present    Hypokalemia E87.6 Medium  3/12/2013 - Present    ULCERATIVE COLITIS  Low  3/12/2013 - Present    Murmur R01.1   2013 - Present    Sciatica M54.30   2013 - Present    Morbid obesity (CMS-HCC) E66.01   2016 - Present    Asthma J45.909   2016 - Present    ROBYN (obstructive sleep apnea) G47.33   2016 - Present      Health Maintenance        Date Due Completion Dates    IMM DTaP/Tdap/Td Vaccine (1 - Tdap) 1961 ---    PAP SMEAR 1963 ---    COLONOSCOPY 1992 ---    IMM ZOSTER VACCINE 2002 ---    IMM PNEUMOCOCCAL 65+ (ADULT) LOW/MEDIUM RISK SERIES (2 of 2 - PPSV23) 2017    MAMMOGRAM 2018, 2016, 2015, 2014, 2014, 2014, 2014, 2014, 2014, 2014, 2014, 2014, 2/10/2004    BONE DENSITY 2021            Current Immunizations     13-VALENT PCV PREVNAR " 5/19/2016  4:57 PM    Influenza Vaccine Quad Inj (Pf) 2/1/2016    Pneumococcal Vaccine (PCV7) Historical Data 10/1/2011      Below and/or attached are the medications your provider expects you to take. Review all of your home medications and newly ordered medications with your provider and/or pharmacist. Follow medication instructions as directed by your provider and/or pharmacist. Please keep your medication list with you and share with your provider. Update the information when medications are discontinued, doses are changed, or new medications (including over-the-counter products) are added; and carry medication information at all times in the event of emergency situations     Allergies:  SULFA DRUGS - Rash,Swelling     CODEINE - Vomiting,Nausea               Medications  Valid as of: April 11, 2017 -  3:36 PM    Generic Name Brand Name Tablet Size Instructions for use    Anastrozole (Tab) ARIMIDEX 1 MG Take 1 mg by mouth every morning.        Aspirin (Tablet Delayed Response) ECOTRIN 81 MG Take 162 mg by mouth every morning.        Atorvastatin Calcium (Tab) LIPITOR 20 MG Take 20 mg by mouth every morning.        B Complex Vitamins   Take 1 Tab by mouth every morning.        Cholecalciferol (Tab) cholecalciferol 1000 UNIT Take 1,000 Units by mouth every morning.        Magnesium   Take 1 Tab by mouth every morning.        Mesalamine (CAPSULE SR 24 HR) Mesalamine 0.375 GM Take 1 Cap by mouth every morning.        Metoprolol Succinate (TABLET SR 24 HR) TOPROL XL 25 MG Take 1 Tab by mouth every day.        Omega-3 Fatty Acids (Cap) OMEGA 3 FA 1000 MG Take 1,000 mg by mouth every morning.        Pantoprazole Sodium (Tablet Delayed Response) PROTONIX 40 MG Take 40 mg by mouth every morning.        PARoxetine HCl (Tab) PAXIL 30 MG Take 30 mg by mouth every morning. Take with food        Potassium   Take 1 Tab by mouth every morning. OTC        Valsartan-Hydrochlorothiazide (Tab) DIOVAN--25 MG Take 1 Tab by  mouth every morning.        .                 Medicines prescribed today were sent to:     [facility] #[id number] - [place], [place] - [address]    [address] [place] [address]    Phone: [phone] Fax: [phone]    Open 24 Hours?: No    [facility] - [address]    [address] [facility] [address] [facility] [address]    Phone: [phone] Fax: [phone]    Open 24 Hours?: No      Medication refill instructions:       If your prescription bottle indicates you have medication refills left, it is not necessary to call your provider’s office. Please contact your pharmacy and they will refill your medication.    If your prescription bottle indicates you do not have any refills left, you may request refills at any time through one of the following ways: The online [email or web address] system (except Urgent Care), by calling your provider’s office, or by asking your pharmacy to contact your provider’s office with a refill request. Medication refills are processed only during regular business hours and may not be available until the next business day. Your provider may request additional information or to have a follow-up visit with you prior to refilling your medication.   *Please Note: Medication refills are assigned a new Rx number when refilled electronically. Your pharmacy may indicate that no refills were authorized even though a new prescription for the same medication is available at the pharmacy. Please request the medicine by name with the pharmacy before contacting your provider for a refill.           [email or web address] Access Code: Activation code not generated  Current [email or web address] Status: Active

## 2017-04-11 NOTE — ED NOTES
Blood drawn and to lab.  Urine to lab.  Medicated as ordered.  IVF infusing.  EDT at BS for EKG.  Awaits imaging w/ sister at BS and call light in reach.

## 2017-04-12 NOTE — DISCHARGE INSTRUCTIONS
"Antibiotic Nonuse   Your caregiver felt that the infection or problem was not one that would be helped with an antibiotic.  Infections may be caused by viruses or bacteria. Only a caregiver can tell which one of these is the likely cause of an illness. A cold is the most common cause of infection in both adults and children. A cold is a virus. Antibiotic treatment will have no effect on a viral infection. Viruses can lead to many lost days of work caring for sick children and many missed days of school. Children may catch as many as 10 \"colds\" or \"flus\" per year during which they can be tearful, cranky, and uncomfortable. The goal of treating a virus is aimed at keeping the ill person comfortable.  Antibiotics are medications used to help the body fight bacterial infections. There are relatively few types of bacteria that cause infections but there are hundreds of viruses. While both viruses and bacteria cause infection they are very different types of germs. A viral infection will typically go away by itself within 7 to 10 days. Bacterial infections may spread or get worse without antibiotic treatment.  Examples of bacterial infections are:  · Sore throats (like strep throat or tonsillitis).  · Infection in the lung (pneumonia).  · Ear and skin infections.  Examples of viral infections are:  · Colds or flus.  · Most coughs and bronchitis.  · Sore throats not caused by Strep.  · Runny noses.  It is often best not to take an antibiotic when a viral infection is the cause of the problem. Antibiotics can kill off the helpful bacteria that we have inside our body and allow harmful bacteria to start growing. Antibiotics can cause side effects such as allergies, nausea, and diarrhea without helping to improve the symptoms of the viral infection. Additionally, repeated uses of antibiotics can cause bacteria inside of our body to become resistant. That resistance can be passed onto harmful bacterial. The next time you have " "an infection it may be harder to treat if antibiotics are used when they are not needed. Not treating with antibiotics allows our own immune system to develop and take care of infections more efficiently. Also, antibiotics will work better for us when they are prescribed for bacterial infections.  Treatments for a child that is ill may include:  · Give extra fluids throughout the day to stay hydrated.  · Get plenty of rest.  · Only give your child over-the-counter or prescription medicines for pain, discomfort, or fever as directed by your caregiver.  · The use of a cool mist humidifier may help stuffy noses.  · Cold medications if suggested by your caregiver.  Your caregiver may decide to start you on an antibiotic if:  · The problem you were seen for today continues for a longer length of time than expected.  · You develop a secondary bacterial infection.  SEEK MEDICAL CARE IF:  · Fever lasts longer than 5 days.  · Symptoms continue to get worse after 5 to 7 days or become severe.  · Difficulty in breathing develops.  · Signs of dehydration develop (poor drinking, rare urinating, dark colored urine).  · Changes in behavior or worsening tiredness (listlessness or lethargy).  Document Released: 02/26/2003 Document Revised: 03/11/2013 Document Reviewed: 08/25/2010  WeStudy.In® Patient Information ©2014 [employer].    Viral Syndrome  You or your child has Viral Syndrome. It is the most common infection causing \"colds\" and infections in the nose, throat, sinuses, and breathing tubes. Sometimes the infection causes nausea, vomiting, or diarrhea. The germ that causes the infection is a virus. No antibiotic or other medicine will kill it. There are medicines that you can take to make you or your child more comfortable.   HOME CARE INSTRUCTIONS   · Rest in bed until you start to feel better.   · If you have diarrhea or vomiting, eat small amounts of crackers and toast. Soup is helpful.   · Do not give aspirin or medicine " that contains aspirin to children.   · Only take over-the-counter or prescription medicines for pain, discomfort, or fever as directed by your caregiver.   SEEK IMMEDIATE MEDICAL CARE IF:   · You or your child has not improved within one week.   · You or your child has pain that is not at least partially relieved by over-the-counter medicine.   · Thick, colored mucus or blood is coughed up.   · Discharge from the nose becomes thick yellow or green.   · Diarrhea or vomiting gets worse.   · There is any major change in your or your child's condition.   · You or your child develops a skin rash, stiff neck, severe headache, or are unable to hold down food or fluid.   · You or your child has an oral temperature above 102° F (38.9° C), not controlled by medicine.   · Your baby is older than 3 months with a rectal temperature of 102° F (38.9° C) or higher.   · Your baby is 3 months old or younger with a rectal temperature of 100.4° F (38° C) or higher.   Document Released: 12/03/2007 Document Revised: 03/11/2013 Document Reviewed: 12/03/2008  [employer]® Patient Information ©2013 [employer], [employer].

## 2017-05-17 ENCOUNTER — HOSPITAL ENCOUNTER (OUTPATIENT)
Dept: RADIOLOGY | Facility: MEDICAL CENTER | Age: 77
End: 2017-05-17
Attending: FAMILY MEDICINE
Payer: MEDICARE

## 2017-05-17 DIAGNOSIS — R05.9 COUGH: ICD-10-CM

## 2017-05-17 PROCEDURE — 71020 DX-CHEST-2 VIEWS: CPT

## 2017-05-30 ENCOUNTER — OFFICE VISIT (OUTPATIENT)
Dept: CARDIOLOGY | Facility: MEDICAL CENTER | Age: 77
End: 2017-05-30
Payer: MEDICARE

## 2017-05-30 VITALS
BODY MASS INDEX: 30.22 KG/M2 | DIASTOLIC BLOOD PRESSURE: 80 MMHG | WEIGHT: 177 LBS | SYSTOLIC BLOOD PRESSURE: 130 MMHG | HEART RATE: 58 BPM | HEIGHT: 64 IN | OXYGEN SATURATION: 93 %

## 2017-05-30 DIAGNOSIS — I10 ESSENTIAL HYPERTENSION: ICD-10-CM

## 2017-05-30 DIAGNOSIS — E78.2 MIXED HYPERLIPIDEMIA: ICD-10-CM

## 2017-05-30 DIAGNOSIS — R01.1 UNDIAGNOSED CARDIAC MURMURS: ICD-10-CM

## 2017-05-30 PROCEDURE — G8432 DEP SCR NOT DOC, RNG: HCPCS | Performed by: INTERNAL MEDICINE

## 2017-05-30 PROCEDURE — G8419 CALC BMI OUT NRM PARAM NOF/U: HCPCS | Performed by: INTERNAL MEDICINE

## 2017-05-30 PROCEDURE — 1101F PT FALLS ASSESS-DOCD LE1/YR: CPT | Mod: 8P | Performed by: INTERNAL MEDICINE

## 2017-05-30 PROCEDURE — 99204 OFFICE O/P NEW MOD 45 MIN: CPT | Performed by: INTERNAL MEDICINE

## 2017-05-30 PROCEDURE — 93000 ELECTROCARDIOGRAM COMPLETE: CPT | Performed by: INTERNAL MEDICINE

## 2017-05-30 PROCEDURE — 3017F COLORECTAL CA SCREEN DOC REV: CPT | Performed by: INTERNAL MEDICINE

## 2017-05-30 PROCEDURE — 4040F PNEUMOC VAC/ADMIN/RCVD: CPT | Performed by: INTERNAL MEDICINE

## 2017-05-30 PROCEDURE — 1036F TOBACCO NON-USER: CPT | Performed by: INTERNAL MEDICINE

## 2017-05-30 RX ORDER — ROSUVASTATIN CALCIUM 20 MG/1
20 TABLET, COATED ORAL EVERY EVENING
Qty: 30 TAB | Refills: 11 | Status: SHIPPED | OUTPATIENT
Start: 2017-05-30 | End: 2022-08-31 | Stop reason: SDUPTHER

## 2017-05-30 ASSESSMENT — ENCOUNTER SYMPTOMS
COUGH: 1
PALPITATIONS: 0
ORTHOPNEA: 0
BRUISES/BLEEDS EASILY: 0
SHORTNESS OF BREATH: 1
HEADACHES: 0
NERVOUS/ANXIOUS: 0
MYALGIAS: 0
DEPRESSION: 1
ABDOMINAL PAIN: 0
DIZZINESS: 0
PND: 0
SPUTUM PRODUCTION: 1
LOSS OF CONSCIOUSNESS: 0
BLOOD IN STOOL: 0
DOUBLE VISION: 0
CLAUDICATION: 0
FEVER: 0
BLURRED VISION: 0

## 2017-05-30 NOTE — MR AVS SNAPSHOT
"Erin Hess   2017 9:15 AM   Office Visit   MRN: [id number]    Department:  [facility]   Dept Phone:  [phone]    Description:  Female : 1940   Provider:  Jeanine Salmon M.D.           Reason for Visit     New Patient           Allergies as of 2017     Allergen Noted Reactions    Sulfa Drugs 2017   Rash, Swelling    Rash, joint swelling  [id number]    Codeine 2017   Vomiting, Nausea      You were diagnosed with     Essential hypertension   [2670964]       Mixed hyperlipidemia   [272.2.ICD-9-CM]       Undiagnosed cardiac murmurs   [785.2.ICD-9-CM]         Vital Signs     Blood Pressure Pulse Height Weight Body Mass Index Oxygen Saturation    130/80 mmHg 58 1.626 m (5' 4.02\") 80.287 kg (177 lb) 30.37 kg/m2 93%    Smoking Status                   Former Smoker           Basic Information     Date Of Birth Sex Race Ethnicity Preferred Language    1940 Female White Non- English      Your appointments     Jul 10, 2017 10:15 AM   ECHO with [facility]CHO [facility]   ECHOCARDIOLOGY [facility])    [address]  [place] [address]   [phone]           No prep            2017 10:30 AM   FOLLOW UP with Jeanine Salmon M.D.   [facility]    [address]  [address]  [place] [id number] [phone]              Problem List              ICD-10-CM Priority Class Noted - Resolved    Vertigo R42   3/11/2013 - Present    Dizziness R42 High  3/11/2013 - Present    HTN (hypertension) I10 Medium  3/12/2013 - Present    Hyperlipidemia E78.5 Low  3/12/2013 - Present    Hypokalemia E87.6 Medium  3/12/2013 - Present    ULCERATIVE COLITIS  Low  3/12/2013 - Present    Murmur R01.1   2013 - Present    Sciatica M54.30   2013 - Present    Morbid obesity (CMS-HCC) E66.01   2016 - Present    Asthma J45.909   2016 - Present    ROBYN (obstructive sleep apnea) G47.33   2016 - Present   " Fatigue R53.83   4/11/2017 - Present    Fever R50.9   4/11/2017 - Present      Health Maintenance        Date Due Completion Dates    IMM DTaP/Tdap/Td Vaccine (1 - Tdap) 2/28/1961 ---    PAP SMEAR 2/28/1963 ---    COLONOSCOPY 2/28/1992 ---    IMM ZOSTER VACCINE 2/28/2002 ---    IMM PNEUMOCOCCAL 65+ (ADULT) LOW/MEDIUM RISK SERIES (2 of 2 - PPSV23) 5/19/2017 5/19/2016    MAMMOGRAM 2/21/2018 2/21/2017, 2/18/2016, 6/9/2015, 12/18/2014, 9/18/2014, 4/18/2014, 2/10/2004    BONE DENSITY 4/4/2021 4/4/2016            Results       Current Immunizations     13-VALENT PCV PREVNAR 5/19/2016  4:57 PM    Influenza Vaccine Quad Inj (Pf) 2/1/2016    Pneumococcal Vaccine (PCV7) Historical Data 10/1/2011      Below and/or attached are the medications your provider expects you to take. Review all of your home medications and newly ordered medications with your provider and/or pharmacist. Follow medication instructions as directed by your provider and/or pharmacist. Please keep your medication list with you and share with your provider. Update the information when medications are discontinued, doses are changed, or new medications (including over-the-counter products) are added; and carry medication information at all times in the event of emergency situations     Allergies:  SULFA DRUGS - Rash,Swelling     CODEINE - Vomiting,Nausea               Medications  Valid as of: May 30, 2017 -  9:52 AM    Generic Name Brand Name Tablet Size Instructions for use    Anastrozole (Tab) ARIMIDEX 1 MG Take 1 mg by mouth every morning.        Aspirin (Tablet Delayed Response) ECOTRIN 81 MG Take 162 mg by mouth every morning.        B Complex Vitamins   Take 1 Tab by mouth every morning.        Cholecalciferol (Tab) cholecalciferol 1000 UNIT Take 1,000 Units by mouth every morning.        Hydrocodone-Acetaminophen (Tab) NORCO 5-325 MG Take 1-2 Tabs by mouth every four hours as needed.        Hydrocodone-Acetaminophen (Tab) NORCO 5-325 MG Take 1-2 Tabs  by mouth every four hours as needed.        Magnesium   Take 1 Tab by mouth every morning.        Mesalamine (CAPSULE SR 24 HR) Mesalamine 0.375 GM Take 1 Cap by mouth every morning.        Metoprolol Succinate (TABLET SR 24 HR) TOPROL XL 25 MG Take 1 Tab by mouth every day.        Omega-3 Fatty Acids (Cap) OMEGA 3 FA 1000 MG Take 1,000 mg by mouth every morning.        Ondansetron (TABLET DISPERSIBLE) ZOFRAN ODT 4 MG Take 1 Tab by mouth every 8 hours as needed.        Ondansetron (TABLET DISPERSIBLE) ZOFRAN ODT 4 MG Take 1 Tab by mouth every 8 hours as needed.        Ondansetron (TABLET DISPERSIBLE) ZOFRAN ODT 4 MG Take 1 Tab by mouth every 8 hours as needed.        Pantoprazole Sodium (Tablet Delayed Response) PROTONIX 40 MG Take 40 mg by mouth every morning.        PARoxetine HCl (Tab) PAXIL 30 MG Take 10 mg by mouth every morning. Take with food        Potassium   Take 1 Tab by mouth every morning. OTC        Rosuvastatin Calcium (Tab) CRESTOR 20 MG Take 1 Tab by mouth every evening.        Valsartan-Hydrochlorothiazide (Tab) DIOVAN--25 MG Take 1 Tab by mouth every morning.        .                 Medicines prescribed today were sent to:     [facility] #[id number] - [place] - [address]    [address] [place] [address]    Phone: [phone] Fax: [phone]    Open 24 Hours?: No    [facility] - [address] [facility] [address]    Phone: [phone] Fax: [phone]    Open 24 Hours?: No      Medication refill instructions:       If your prescription bottle indicates you have medication refills left, it is not necessary to call your provider’s office. Please contact your pharmacy and they will refill your medication.    If your prescription bottle indicates you do not have any refills left, you may request refills at any time through one of the following ways: The online [employer] system (except Urgent Care), by calling  your provider’s office, or by asking your pharmacy to contact your provider’s office with a refill request. Medication refills are processed only during regular business hours and may not be available until the next business day. Your provider may request additional information or to have a follow-up visit with you prior to refilling your medication.   *Please Note: Medication refills are assigned a new Rx number when refilled electronically. Your pharmacy may indicate that no refills were authorized even though a new prescription for the same medication is available at the pharmacy. Please request the medicine by name with the pharmacy before contacting your provider for a refill.        Your To Do List     Future Labs/Procedures Complete By Expires    COMP METABOLIC PANEL  As directed 5/30/2018    Echocardiogram Comp w/o Cont  As directed 5/30/2018    LIPID PROFILE  As directed 5/30/2018         MyChart Access Code: Activation code not generated  Current [employer] Status: Active

## 2017-05-30 NOTE — PROGRESS NOTES
Subjective:   Erin Hess is a 77 y.o. female with known history of hypertension, dyslipidemia currently not on any statins, breast cancer status post lumpectomy currently on Arimidex (right any history of chemotherapy or radiation), recently had a chest x-ray for follow-up evaluation of pneumonia which showed enlarged heart now here to establish care in our office.    Patient was diagnosed with pneumonia in April 2017 as a follow-up underwent a chest x-ray which showed an enlarged heart. Patient denied any complaints of orthopnea or PND. No complaints of lower extremity edema. Did complain of dyspnea on exertion which is new since pneumonia. Denied any complaints of palpitations orthopnea or PND. No complaints of dizziness lightheadedness or LOC. Prior to [place] trip patient was able to walk about a mile but now since pneumonia minimal exertion is causing her to get short of breath.  Past Medical History   Diagnosis Date   • Psychiatric disorder      depression   • Hypertension    • Hypercholesterolemia    • Dizziness 3/11/2013   • HTN (hypertension) 3/12/2013   • Hypokalemia 3/12/2013   • Vertigo 3/11/2013   • Hyperlipidemia 3/12/2013   • ULCERATIVE COLITIS 3/12/2013   • Breast cancer (CMS-HCC)    • Asthma    • Arthritis    • Heart murmur    • Indigestion    • Hiatus hernia syndrome    • Snoring    • Bronchitis 2011   • Sleep apnea      uses CPAP   • Pain      back, hips, knees   • Heart burn    • Cancer (CMS-HCC) 12/2012     right breast     Past Surgical History   Procedure Laterality Date   • Us-needle core bx-breast panel  2013     rt breast, with lumpectomy    • Lumpectomy  left   • Gyn surgery       vag hyster 1990   • Gyn surgery       vaginal cyst removal    • Other        R breast bx X 2& lipoma removal   • Gastric sleeve laparoscopy N/A 5/18/2016     Procedure: GASTRIC SLEEVE LAPAROSCOPY, HIATAL HERNIA AND LIVER BIOPSY;  Surgeon: Mauricio Montes M.D.;  Location: SURGERY [facility];  Service:       Family History   Problem Relation Age of Onset   • Heart Disease Mother    • Cancer Mother    • Cancer Sister      History   Smoking status   • Former Smoker -- 1.00 packs/day for 20 years   • Types: Cigarettes   • Quit date: 05/22/1986   Smokeless tobacco   • Never Used     Allergies   Allergen Reactions   • Sulfa Drugs Rash and Swelling     Rash, joint swelling  HOS=[id number]   • Codeine Vomiting and Nausea     Outpatient Encounter Prescriptions as of 5/30/2017   Medication Sig Dispense Refill   • Mesalamine (APRISO) 0.375 GM CAPSULE SR 24 HR Take 1 Cap by mouth every morning.     • POTASSIUM PO Take 1 Tab by mouth every morning. OTC     • B Complex Vitamins (VITAMIN B COMPLEX PO) Take 1 Tab by mouth every morning.     • valsartan-hydrochlorothiazide (DIOVAN-HCT) 160-25 MG per tablet Take 1 Tab by mouth every morning.     • anastrozole (ARIMIDEX) 1 MG Tab Take 1 mg by mouth every morning.     • pantoprazole (PROTONIX) 40 MG Tablet Delayed Response Take 40 mg by mouth every morning.     • MAGNESIUM PO Take 1 Tab by mouth every morning.     • vitamin D (CHOLECALCIFEROL) 1000 UNIT Tab Take 1,000 Units by mouth every morning.     • metoprolol SR (TOPROL XL) 25 MG TB24 Take 1 Tab by mouth every day. 90 Tab 3   • aspirin EC (ECOTRIN) 81 MG TBEC Take 162 mg by mouth every morning.     • paroxetine (PAXIL) 30 MG TABS Take 30 mg by mouth every morning. Take with food     • atorvastatin (LIPITOR) 20 MG TABS Take 20 mg by mouth every morning. Indications: not taking     • docosahexanoic acid (FISH OIL) 1000 MG CAPS Take 1,000 mg by mouth every morning.     • hydrocodone-acetaminophen (NORCO) 5-325 MG Tab per tablet Take 1-2 Tabs by mouth every four hours as needed. (Patient not taking: Reported on 5/30/2017) 20 Tab 0   • ondansetron (ZOFRAN ODT) 4 MG TABLET DISPERSIBLE Take 1 Tab by mouth every 8 hours as needed. (Patient not taking: Reported on 5/30/2017) 10 Tab 0   • hydrocodone-acetaminophen (NORCO) 5-325 MG Tab per tablet  "Take 1-2 Tabs by mouth every four hours as needed. 20 Tab 0   • ondansetron (ZOFRAN ODT) 4 MG TABLET DISPERSIBLE Take 1 Tab by mouth every 8 hours as needed. (Patient not taking: Reported on 5/30/2017) 10 Tab 0   • ondansetron (ZOFRAN ODT) 4 MG TABLET DISPERSIBLE Take 1 Tab by mouth every 8 hours as needed. (Patient not taking: Reported on 5/30/2017) 10 Tab 0     No facility-administered encounter medications on file as of 5/30/2017.     Review of Systems   Constitutional: Negative for fever.   Eyes: Negative for blurred vision and double vision.   Respiratory: Positive for cough, sputum production and shortness of breath.    Cardiovascular: Negative for chest pain, palpitations, orthopnea, claudication, leg swelling and PND.   Gastrointestinal: Negative for abdominal pain, blood in stool and melena.   Genitourinary: Negative for dysuria and hematuria.   Musculoskeletal: Positive for joint pain. Negative for myalgias.        History of sciatica   Skin: Negative for rash.   Neurological: Negative for dizziness, loss of consciousness and headaches.   Endo/Heme/Allergies: Does not bruise/bleed easily.   Psychiatric/Behavioral: Positive for depression. The patient is not nervous/anxious.         Objective:   /80 mmHg  Pulse 58  Ht 1.626 m (5' 4.02\")  Wt 80.287 kg (177 lb)  BMI 30.37 kg/m2  SpO2 93%    Physical Exam   Constitutional: She is oriented to person, place, and time. She appears well-developed. No distress.   HENT:   Mouth/Throat: Mucous membranes are normal.   Eyes: Conjunctivae and EOM are normal.   Neck: No JVD present. No tracheal deviation present. No thyroid mass present.   Cardiovascular: Normal rate, regular rhythm and intact distal pulses.    Murmur heard.   Systolic murmur is present with a grade of 2/6   Pulses:       Carotid pulses are 2+ on the right side, and 2+ on the left side with bruit.       Radial pulses are 2+ on the right side, and 2+ on the left side.        Dorsalis pedis " pulses are 2+ on the right side, and 2+ on the left side.        Posterior tibial pulses are 2+ on the right side, and 2+ on the left side.   Early peaking soft ejection systolic murmur best heard in the aortic area   Pulmonary/Chest: Effort normal and breath sounds normal. No respiratory distress. She exhibits no tenderness.   Abdominal: Soft. There is no tenderness.   Musculoskeletal: Normal range of motion. She exhibits no edema.   Neurological: She is alert and oriented to person, place, and time. She has normal strength. She displays no tremor.   Skin: Skin is warm and dry. She is not diaphoretic.   Psychiatric: She has a normal mood and affect. Her behavior is normal.   Vitals reviewed.    Results for ROSIO SHAFER (MRN [id number]) as of 5/30/2017 09:20   2/23/2017 2/23/2017 4/11/2017    Sodium 139 140 138   Potassium 3.5 (L) 3.6 3.8   Chloride 105 105 102   Co2 28 27 30   Anion Gap 6.0 8.0 6.0   Glucose 97 95 113 (H)   Bun 13 14 13   Creatinine 0.66 0.71 0.70   GFR If Non  >60 >60 >60   Calcium 9.6 9.6 9.4   AST(SGOT) 17 15 17   ALT(SGPT) 12 12 15   Alkaline Phosphatase 55 55 62   Cholesterol,Tot 257 (H)     Triglycerides 171 (H)     HDL 56      (H)       Nuclear stress test: 5/23/11   1. The calculated left ventricular ejection fraction is greater than 70%.  2.  No wall motion abnormalities.  3.  No evidence of infarct or reversible ischemia    Assessment:     1. Essential hypertension  EKG   2. Dyslipidemia  3. Atherosclerosis-aorta and coronary arteries  4. Murmur  5. Enlarged heart on chest x-ray    Medical Decision Making:  Today's Assessment / Status / Plan:     1. Discontinue metoprolol secondary to bradycardia and also history of depression.  Continue valsartan/HCTZ 160/25 mg daily if systolic blood pressure goes up more than 140 will titrate valsartan up.  2. Start Crestor 20 mg qhs.  Check labs prior to next visit.  3. Patient will be started on statins.  4. On exam mild  aortic stenosis. Echo to assess gradients across aortic valve.  5. Echo to assess LV function and size.    Follow-up in 6 weeks.  Echo prior to next visit.  Labs prior to next visit.    Thank you for allowing me to participate in taking care of patient.    Jeanine Andrews MD.

## 2017-05-30 NOTE — Clinical Note
[facility]   [address]   [address]  [place] [address]  Phone: [phone]  Fax: [phone]              Erin Hess  1940    Encounter Date: 5/30/2017    Jeanine Salmon M.D.          PROGRESS NOTE:  Subjective:   Erin Hess is a 77 y.o. female with known history of hypertension, dyslipidemia currently not on any statins, breast cancer status post lumpectomy currently on Arimidex (right any history of chemotherapy or radiation), recently had a chest x-ray for follow-up evaluation of pneumonia which showed enlarged heart now here to establish care in our office.    Patient was diagnosed with pneumonia in April 2017 as a follow-up underwent a chest x-ray which showed an enlarged heart. Patient denied any complaints of orthopnea or PND. No complaints of lower extremity edema. Did complain of dyspnea on exertion which is new since pneumonia. Denied any complaints of palpitations orthopnea or PND. No complaints of dizziness lightheadedness or LOC. Prior to [place] trip patient was able to walk about a mile but now since pneumonia minimal exertion is causing her to get short of breath.  Past Medical History   Diagnosis Date   • Psychiatric disorder      depression   • Hypertension    • Hypercholesterolemia    • Dizziness 3/11/2013   • HTN (hypertension) 3/12/2013   • Hypokalemia 3/12/2013   • Vertigo 3/11/2013   • Hyperlipidemia 3/12/2013   • ULCERATIVE COLITIS 3/12/2013   • Breast cancer (CMS-HCC)    • Asthma    • Arthritis    • Heart murmur    • Indigestion    • Hiatus hernia syndrome    • Snoring    • Bronchitis 2011   • Sleep apnea      uses CPAP   • Pain      back, hips, knees   • Heart burn    • Cancer (CMS-HCC) 12/2012     right breast     Past Surgical History   Procedure Laterality Date   • Us-needle core bx-breast panel  2013     rt breast, with lumpectomy    • Lumpectomy  left   • Gyn surgery       vag hyster 1990   •  Gyn surgery       vaginal cyst removal    • Other        R breast bx X 2& lipoma removal   • Gastric sleeve laparoscopy N/A 5/18/2016     Procedure: GASTRIC SLEEVE LAPAROSCOPY, HIATAL HERNIA AND LIVER BIOPSY;  Surgeon: Mauricio Montes M.D.;  Location: SURGERY [facility];  Service:      Family History   Problem Relation Age of Onset   • Heart Disease Mother    • Cancer Mother    • Cancer Sister      History   Smoking status   • Former Smoker -- 1.00 packs/day for 20 years   • Types: Cigarettes   • Quit date: 05/22/1986   Smokeless tobacco   • Never Used     Allergies   Allergen Reactions   • Sulfa Drugs Rash and Swelling     Rash, joint swelling  FOI=[id number]   • Codeine Vomiting and Nausea     Outpatient Encounter Prescriptions as of 5/30/2017   Medication Sig Dispense Refill   • Mesalamine (APRISO) 0.375 GM CAPSULE SR 24 HR Take 1 Cap by mouth every morning.     • POTASSIUM PO Take 1 Tab by mouth every morning. OTC     • B Complex Vitamins (VITAMIN B COMPLEX PO) Take 1 Tab by mouth every morning.     • valsartan-hydrochlorothiazide (DIOVAN-HCT) 160-25 MG per tablet Take 1 Tab by mouth every morning.     • anastrozole (ARIMIDEX) 1 MG Tab Take 1 mg by mouth every morning.     • pantoprazole (PROTONIX) 40 MG Tablet Delayed Response Take 40 mg by mouth every morning.     • MAGNESIUM PO Take 1 Tab by mouth every morning.     • vitamin D (CHOLECALCIFEROL) 1000 UNIT Tab Take 1,000 Units by mouth every morning.     • metoprolol SR (TOPROL XL) 25 MG TB24 Take 1 Tab by mouth every day. 90 Tab 3   • aspirin EC (ECOTRIN) 81 MG TBEC Take 162 mg by mouth every morning.     • paroxetine (PAXIL) 30 MG TABS Take 30 mg by mouth every morning. Take with food     • atorvastatin (LIPITOR) 20 MG TABS Take 20 mg by mouth every morning. Indications: not taking     • docosahexanoic acid (FISH OIL) 1000 MG CAPS Take 1,000 mg by mouth every morning.     • hydrocodone-acetaminophen (NORCO) 5-325 MG Tab per tablet Take 1-2 Tabs by mouth every  "four hours as needed. (Patient not taking: Reported on 5/30/2017) 20 Tab 0   • ondansetron (ZOFRAN ODT) 4 MG TABLET DISPERSIBLE Take 1 Tab by mouth every 8 hours as needed. (Patient not taking: Reported on 5/30/2017) 10 Tab 0   • hydrocodone-acetaminophen (NORCO) 5-325 MG Tab per tablet Take 1-2 Tabs by mouth every four hours as needed. 20 Tab 0   • ondansetron (ZOFRAN ODT) 4 MG TABLET DISPERSIBLE Take 1 Tab by mouth every 8 hours as needed. (Patient not taking: Reported on 5/30/2017) 10 Tab 0   • ondansetron (ZOFRAN ODT) 4 MG TABLET DISPERSIBLE Take 1 Tab by mouth every 8 hours as needed. (Patient not taking: Reported on 5/30/2017) 10 Tab 0     No facility-administered encounter medications on file as of 5/30/2017.     Review of Systems   Constitutional: Negative for fever.   Eyes: Negative for blurred vision and double vision.   Respiratory: Positive for cough, sputum production and shortness of breath.    Cardiovascular: Negative for chest pain, palpitations, orthopnea, claudication, leg swelling and PND.   Gastrointestinal: Negative for abdominal pain, blood in stool and melena.   Genitourinary: Negative for dysuria and hematuria.   Musculoskeletal: Positive for joint pain. Negative for myalgias.        History of sciatica   Skin: Negative for rash.   Neurological: Negative for dizziness, loss of consciousness and headaches.   Endo/Heme/Allergies: Does not bruise/bleed easily.   Psychiatric/Behavioral: Positive for depression. The patient is not nervous/anxious.         Objective:   /80 mmHg  Pulse 58  Ht 1.626 m (5' 4.02\")  Wt 80.287 kg (177 lb)  BMI 30.37 kg/m2  SpO2 93%    Physical Exam   Constitutional: She is oriented to person, place, and time. She appears well-developed. No distress.   HENT:   Mouth/Throat: Mucous membranes are normal.   Eyes: Conjunctivae and EOM are normal.   Neck: No JVD present. No tracheal deviation present. No thyroid mass present.   Cardiovascular: Normal rate, regular " rhythm and intact distal pulses.    Murmur heard.   Systolic murmur is present with a grade of 2/6   Pulses:       Carotid pulses are 2+ on the right side, and 2+ on the left side with bruit.       Radial pulses are 2+ on the right side, and 2+ on the left side.        Dorsalis pedis pulses are 2+ on the right side, and 2+ on the left side.        Posterior tibial pulses are 2+ on the right side, and 2+ on the left side.   Early peaking soft ejection systolic murmur best heard in the aortic area   Pulmonary/Chest: Effort normal and breath sounds normal. No respiratory distress. She exhibits no tenderness.   Abdominal: Soft. There is no tenderness.   Musculoskeletal: Normal range of motion. She exhibits no edema.   Neurological: She is alert and oriented to person, place, and time. She has normal strength. She displays no tremor.   Skin: Skin is warm and dry. She is not diaphoretic.   Psychiatric: She has a normal mood and affect. Her behavior is normal.   Vitals reviewed.    Results for ROSIO SHAFER (MRN [id number]) as of 5/30/2017 09:20   2/23/2017 2/23/2017 4/11/2017    Sodium 139 140 138   Potassium 3.5 (L) 3.6 3.8   Chloride 105 105 102   Co2 28 27 30   Anion Gap 6.0 8.0 6.0   Glucose 97 95 113 (H)   Bun 13 14 13   Creatinine 0.66 0.71 0.70   GFR If Non  >60 >60 >60   Calcium 9.6 9.6 9.4   AST(SGOT) 17 15 17   ALT(SGPT) 12 12 15   Alkaline Phosphatase 55 55 62   Cholesterol,Tot 257 (H)     Triglycerides 171 (H)     HDL 56      (H)       Nuclear stress test: 5/23/11   1. The calculated left ventricular ejection fraction is greater than 70%.  2.  No wall motion abnormalities.  3.  No evidence of infarct or reversible ischemia    Assessment:     1. Essential hypertension  EKG   2. Dyslipidemia  3. Atherosclerosis-aorta and coronary arteries  4. Murmur  5. Enlarged heart on chest x-ray    Medical Decision Making:  Today's Assessment / Status / Plan:     1. Discontinue metoprolol secondary  to bradycardia and also history of depression.  Continue valsartan/HCTZ 160/25 mg daily if systolic blood pressure goes up more than 140 will titrate valsartan up.  2. Start Crestor 20 mg qhs.  Check labs prior to next visit.  3. Patient will be started on statins.  4. On exam mild aortic stenosis. Echo to assess gradients across aortic valve.  5. Echo to assess LV function and size.    Follow-up in 6 weeks.  Echo prior to next visit.  Labs prior to next visit.    Thank you for allowing me to participate in taking care of patient.    Jeanine Salmon. MD.      Terra Zambrano M.D.  [address]  [address]  [place] [address]  VIA Facsimile: [phone]

## 2017-05-31 LAB — EKG IMPRESSION: NORMAL

## 2017-06-14 ENCOUNTER — HOSPITAL ENCOUNTER (OUTPATIENT)
Dept: LAB | Facility: MEDICAL CENTER | Age: 77
End: 2017-06-14
Attending: PHYSICIAN ASSISTANT
Payer: MEDICARE

## 2017-06-14 LAB
25(OH)D3 SERPL-MCNC: 26 NG/ML (ref 30–100)
ALBUMIN SERPL BCP-MCNC: 4.1 G/DL (ref 3.2–4.9)
ALBUMIN/GLOB SERPL: 1.4 G/DL
ALP SERPL-CCNC: 64 U/L (ref 30–99)
ALT SERPL-CCNC: 12 U/L (ref 2–50)
ANION GAP SERPL CALC-SCNC: 7 MMOL/L (ref 0–11.9)
AST SERPL-CCNC: 13 U/L (ref 12–45)
BASOPHILS # BLD AUTO: 0.9 % (ref 0–1.8)
BASOPHILS # BLD: 0.05 K/UL (ref 0–0.12)
BILIRUB SERPL-MCNC: 0.7 MG/DL (ref 0.1–1.5)
BUN SERPL-MCNC: 20 MG/DL (ref 8–22)
CALCIUM SERPL-MCNC: 9.8 MG/DL (ref 8.5–10.5)
CHLORIDE SERPL-SCNC: 105 MMOL/L (ref 96–112)
CHOLEST SERPL-MCNC: 233 MG/DL (ref 100–199)
CO2 SERPL-SCNC: 30 MMOL/L (ref 20–33)
CREAT SERPL-MCNC: 0.77 MG/DL (ref 0.5–1.4)
EOSINOPHIL # BLD AUTO: 0.1 K/UL (ref 0–0.51)
EOSINOPHIL NFR BLD: 1.7 % (ref 0–6.9)
ERYTHROCYTE [DISTWIDTH] IN BLOOD BY AUTOMATED COUNT: 42.5 FL (ref 35.9–50)
FOLATE SERPL-MCNC: 7.5 NG/ML
GFR SERPL CREATININE-BSD FRML MDRD: >60 ML/MIN/1.73 M 2
GLOBULIN SER CALC-MCNC: 2.9 G/DL (ref 1.9–3.5)
GLUCOSE SERPL-MCNC: 91 MG/DL (ref 65–99)
HCT VFR BLD AUTO: 45.4 % (ref 37–47)
HDLC SERPL-MCNC: 63 MG/DL
HGB BLD-MCNC: 15.3 G/DL (ref 12–16)
IMM GRANULOCYTES # BLD AUTO: 0.01 K/UL (ref 0–0.11)
IMM GRANULOCYTES NFR BLD AUTO: 0.2 % (ref 0–0.9)
IRON SATN MFR SERPL: 29 % (ref 15–55)
IRON SERPL-MCNC: 121 UG/DL (ref 40–170)
LDLC SERPL CALC-MCNC: 138 MG/DL
LYMPHOCYTES # BLD AUTO: 2.61 K/UL (ref 1–4.8)
LYMPHOCYTES NFR BLD: 44.9 % (ref 22–41)
MCH RBC QN AUTO: 30 PG (ref 27–33)
MCHC RBC AUTO-ENTMCNC: 33.7 G/DL (ref 33.6–35)
MCV RBC AUTO: 89 FL (ref 81.4–97.8)
MONOCYTES # BLD AUTO: 0.44 K/UL (ref 0–0.85)
MONOCYTES NFR BLD AUTO: 7.6 % (ref 0–13.4)
NEUTROPHILS # BLD AUTO: 2.6 K/UL (ref 2–7.15)
NEUTROPHILS NFR BLD: 44.7 % (ref 44–72)
NRBC # BLD AUTO: 0 K/UL
NRBC BLD AUTO-RTO: 0 /100 WBC
PLATELET # BLD AUTO: 238 K/UL (ref 164–446)
PMV BLD AUTO: 10.2 FL (ref 9–12.9)
POTASSIUM SERPL-SCNC: 3.4 MMOL/L (ref 3.6–5.5)
PREALB SERPL-MCNC: 23 MG/DL (ref 18–38)
PROT SERPL-MCNC: 7 G/DL (ref 6–8.2)
RBC # BLD AUTO: 5.1 M/UL (ref 4.2–5.4)
SODIUM SERPL-SCNC: 142 MMOL/L (ref 135–145)
TIBC SERPL-MCNC: 423 UG/DL (ref 250–450)
TRANSFERRIN SERPL-MCNC: 296 MG/DL (ref 200–370)
TRIGL SERPL-MCNC: 161 MG/DL (ref 0–149)
VIT B12 SERPL-MCNC: 322 PG/ML (ref 211–911)
WBC # BLD AUTO: 5.8 K/UL (ref 4.8–10.8)

## 2017-06-14 PROCEDURE — 82746 ASSAY OF FOLIC ACID SERUM: CPT

## 2017-06-14 PROCEDURE — 82607 VITAMIN B-12: CPT

## 2017-06-14 PROCEDURE — 85025 COMPLETE CBC W/AUTO DIFF WBC: CPT

## 2017-06-14 PROCEDURE — 80061 LIPID PANEL: CPT

## 2017-06-14 PROCEDURE — 36415 COLL VENOUS BLD VENIPUNCTURE: CPT

## 2017-06-14 PROCEDURE — 84425 ASSAY OF VITAMIN B-1: CPT

## 2017-06-14 PROCEDURE — 80053 COMPREHEN METABOLIC PANEL: CPT

## 2017-06-14 PROCEDURE — 82306 VITAMIN D 25 HYDROXY: CPT

## 2017-06-14 PROCEDURE — 83550 IRON BINDING TEST: CPT

## 2017-06-14 PROCEDURE — 84134 ASSAY OF PREALBUMIN: CPT

## 2017-06-14 PROCEDURE — 84466 ASSAY OF TRANSFERRIN: CPT

## 2017-06-14 PROCEDURE — 83540 ASSAY OF IRON: CPT

## 2017-06-16 LAB — VIT B1 BLD-MCNC: 63 NMOL/L (ref 70–180)

## 2017-06-22 LAB — EKG IMPRESSION: NORMAL

## 2017-06-26 ENCOUNTER — TELEPHONE (OUTPATIENT)
Dept: CARDIOLOGY | Facility: MEDICAL CENTER | Age: 77
End: 2017-06-26

## 2017-06-26 DIAGNOSIS — I10 ESSENTIAL HYPERTENSION: ICD-10-CM

## 2017-06-26 RX ORDER — VALSARTAN 80 MG/1
80 TABLET ORAL EVERY MORNING
Qty: 30 TAB | Refills: 0 | OUTPATIENT
Start: 2017-06-26 | End: 2017-07-18 | Stop reason: SDUPTHER

## 2017-06-26 NOTE — TELEPHONE ENCOUNTER
Spoke with patient regarding recent BP readings she dropped off for AM to review - per AM instructed patient to take 80mg diovan in addition to the 160/25 diovan/HCTZ she already takes.  She verbalized understanding and will keep track of BP and bring to her appt on 7/13 - only called in 30 day supply because if it works well patient will want 90 day sent to [facility] - she will let us know at that appt.

## 2017-07-10 ENCOUNTER — HOSPITAL ENCOUNTER (OUTPATIENT)
Dept: CARDIOLOGY | Facility: MEDICAL CENTER | Age: 77
End: 2017-07-10
Attending: INTERNAL MEDICINE
Payer: MEDICARE

## 2017-07-10 ENCOUNTER — HOSPITAL ENCOUNTER (OUTPATIENT)
Dept: LAB | Facility: MEDICAL CENTER | Age: 77
End: 2017-07-10
Attending: INTERNAL MEDICINE
Payer: MEDICARE

## 2017-07-10 DIAGNOSIS — R01.1 UNDIAGNOSED CARDIAC MURMURS: ICD-10-CM

## 2017-07-10 DIAGNOSIS — E78.2 MIXED HYPERLIPIDEMIA: ICD-10-CM

## 2017-07-10 LAB
ALBUMIN SERPL BCP-MCNC: 4.1 G/DL (ref 3.2–4.9)
ALBUMIN/GLOB SERPL: 1.7 G/DL
ALP SERPL-CCNC: 55 U/L (ref 30–99)
ALT SERPL-CCNC: 15 U/L (ref 2–50)
ANION GAP SERPL CALC-SCNC: 6 MMOL/L (ref 0–11.9)
AST SERPL-CCNC: 15 U/L (ref 12–45)
BILIRUB SERPL-MCNC: 0.8 MG/DL (ref 0.1–1.5)
BUN SERPL-MCNC: 12 MG/DL (ref 8–22)
CALCIUM SERPL-MCNC: 9.6 MG/DL (ref 8.5–10.5)
CHLORIDE SERPL-SCNC: 105 MMOL/L (ref 96–112)
CHOLEST SERPL-MCNC: 170 MG/DL (ref 100–199)
CO2 SERPL-SCNC: 31 MMOL/L (ref 20–33)
CREAT SERPL-MCNC: 0.69 MG/DL (ref 0.5–1.4)
GFR SERPL CREATININE-BSD FRML MDRD: >60 ML/MIN/1.73 M 2
GLOBULIN SER CALC-MCNC: 2.4 G/DL (ref 1.9–3.5)
GLUCOSE SERPL-MCNC: 93 MG/DL (ref 65–99)
HDLC SERPL-MCNC: 63 MG/DL
LDLC SERPL CALC-MCNC: 77 MG/DL
POTASSIUM SERPL-SCNC: 4.1 MMOL/L (ref 3.6–5.5)
PROT SERPL-MCNC: 6.5 G/DL (ref 6–8.2)
SODIUM SERPL-SCNC: 142 MMOL/L (ref 135–145)
TRIGL SERPL-MCNC: 149 MG/DL (ref 0–149)

## 2017-07-10 PROCEDURE — 93306 TTE W/DOPPLER COMPLETE: CPT

## 2017-07-11 LAB
LV EJECT FRACT MOD 2C 99903: 63.65
LV EJECT FRACT MOD 4C 99902: 62.18
LV EJECT FRACT MOD BP 99901: 61.66

## 2017-07-13 ENCOUNTER — OFFICE VISIT (OUTPATIENT)
Dept: CARDIOLOGY | Facility: MEDICAL CENTER | Age: 77
End: 2017-07-13
Payer: MEDICARE

## 2017-07-13 VITALS
DIASTOLIC BLOOD PRESSURE: 80 MMHG | BODY MASS INDEX: 30.9 KG/M2 | HEIGHT: 64 IN | OXYGEN SATURATION: 91 % | HEART RATE: 66 BPM | SYSTOLIC BLOOD PRESSURE: 152 MMHG | WEIGHT: 181 LBS

## 2017-07-13 DIAGNOSIS — I10 ESSENTIAL HYPERTENSION: ICD-10-CM

## 2017-07-13 DIAGNOSIS — E78.5 DYSLIPIDEMIA: ICD-10-CM

## 2017-07-13 DIAGNOSIS — I25.810 ATHEROSCLEROSIS OF CORONARY ARTERY BYPASS GRAFT OF NATIVE HEART WITHOUT ANGINA PECTORIS: ICD-10-CM

## 2017-07-13 DIAGNOSIS — I35.0 NONRHEUMATIC AORTIC VALVE STENOSIS: ICD-10-CM

## 2017-07-13 PROCEDURE — 99214 OFFICE O/P EST MOD 30 MIN: CPT | Performed by: INTERNAL MEDICINE

## 2017-07-13 ASSESSMENT — ENCOUNTER SYMPTOMS
PALPITATIONS: 0
MYALGIAS: 0
SHORTNESS OF BREATH: 0
ORTHOPNEA: 0
HEADACHES: 0
DIZZINESS: 0
BRUISES/BLEEDS EASILY: 0
CLAUDICATION: 0
FEVER: 0
BLOOD IN STOOL: 0
ABDOMINAL PAIN: 0
NERVOUS/ANXIOUS: 0
PND: 0
BLURRED VISION: 0
DEPRESSION: 1
LOSS OF CONSCIOUSNESS: 0

## 2017-07-13 NOTE — PROGRESS NOTES
Subjective:   Erin Hess is a 77 y.o. female with known history of hypertension, dyslipidemia currently not on any statins, breast cancer status post lumpectomy currently on Arimidex (right any history of chemotherapy or radiation), recently had a chest x-ray for follow-up evaluation of pneumonia which showed enlarged heart now here to review the echo results with me..    Patient denied any complaints of exertional chest pain pressure or tightness. Since beta blockers were discontinued her blood pressure has been elevated. In spite of restarting beta blockers her systolic blood pressure is high running between 150s to 170s. She is currently on valsartan to 40 mg along with hydrochlorothiazide 12.5 mg daily. No complaints of palpitations orthopnea or PND. Denied any complaints of dizziness lightheadedness or loss of consciousness. No completes of lower extremity edema or claudication.  Past Medical History   Diagnosis Date   • Psychiatric disorder      depression   • Hypertension    • Hypercholesterolemia    • Dizziness 3/11/2013   • HTN (hypertension) 3/12/2013   • Hypokalemia 3/12/2013   • Vertigo 3/11/2013   • Hyperlipidemia 3/12/2013   • ULCERATIVE COLITIS 3/12/2013   • Breast cancer (CMS-HCC)    • Asthma    • Arthritis    • Heart murmur    • Indigestion    • Hiatus hernia syndrome    • Snoring    • Bronchitis 2011   • Pain      back, hips, knees   • Heart burn    • Cancer (CMS-HCC) 12/2012     right breast   • Sleep apnea      h/o gastric sleeve lost 40 lb now not on CPAP     Past Surgical History   Procedure Laterality Date   • Us-needle core bx-breast panel  2013     rt breast, with lumpectomy    • Lumpectomy  left   • Gyn surgery       vag hyster 1990   • Gyn surgery       vaginal cyst removal    • Other        R breast bx X 2& lipoma removal   • Gastric sleeve laparoscopy N/A 5/18/2016     Procedure: GASTRIC SLEEVE LAPAROSCOPY, HIATAL HERNIA AND LIVER BIOPSY;  Surgeon: Mauricio Montes M.D.;  Location:  SURGERY [facility];  Service:      Family History   Problem Relation Age of Onset   • Heart Disease Mother      CAD MI at age 72   • Cancer Mother    • Cancer Sister    • Lung Disease Father      Lung cancer   • Cancer Maternal Grandmother      Pancreatic cancer   • Heart Attack Maternal Grandfather      MI at age 70   • Heart Disease Sister      Nadya cfever- herat murmur     History   Smoking status   • Former Smoker -- 1.00 packs/day for 20 years   • Types: Cigarettes   • Quit date: 05/22/1986   Smokeless tobacco   • Never Used     Allergies   Allergen Reactions   • Sulfa Drugs Rash and Swelling     Rash, joint swelling  [id number]   • Codeine Vomiting and Nausea     Outpatient Encounter Prescriptions as of 7/13/2017   Medication Sig Dispense Refill   • rosuvastatin (CRESTOR) 20 MG Tab Take 1 Tab by mouth every evening. 30 Tab 11   • Mesalamine (APRISO) 0.375 GM CAPSULE SR 24 HR Take 1 Cap by mouth every morning.     • POTASSIUM PO Take 1 Tab by mouth every morning. OTC     • B Complex Vitamins (VITAMIN B COMPLEX PO) Take 1 Tab by mouth every morning.     • valsartan-hydrochlorothiazide (DIOVAN-HCT) 160-25 MG per tablet Take 1 Tab by mouth every morning.     • anastrozole (ARIMIDEX) 1 MG Tab Take 1 mg by mouth every morning.     • pantoprazole (PROTONIX) 40 MG Tablet Delayed Response Take 40 mg by mouth every morning.     • MAGNESIUM PO Take 1 Tab by mouth every morning.     • vitamin D (CHOLECALCIFEROL) 1000 UNIT Tab Take 1,000 Units by mouth every morning.     • metoprolol SR (TOPROL XL) 25 MG TB24 Take 1 Tab by mouth every day. 90 Tab 3   • aspirin EC (ECOTRIN) 81 MG TBEC Take 162 mg by mouth every morning.     • paroxetine (PAXIL) 30 MG TABS Take 10 mg by mouth every morning. Take with food     • docosahexanoic acid (FISH OIL) 1000 MG CAPS Take 1,000 mg by mouth every morning.     • valsartan (DIOVAN) 80 MG Tab Take 1 Tab by mouth every morning. (Patient not taking: Reported on 7/13/2017) 30 Tab 0   •  "hydrocodone-acetaminophen (NORCO) 5-325 MG Tab per tablet Take 1-2 Tabs by mouth every four hours as needed. (Patient not taking: Reported on 5/30/2017) 20 Tab 0   • ondansetron (ZOFRAN ODT) 4 MG TABLET DISPERSIBLE Take 1 Tab by mouth every 8 hours as needed. (Patient not taking: Reported on 5/30/2017) 10 Tab 0   • hydrocodone-acetaminophen (NORCO) 5-325 MG Tab per tablet Take 1-2 Tabs by mouth every four hours as needed. (Patient not taking: Reported on 7/13/2017) 20 Tab 0   • ondansetron (ZOFRAN ODT) 4 MG TABLET DISPERSIBLE Take 1 Tab by mouth every 8 hours as needed. (Patient not taking: Reported on 5/30/2017) 10 Tab 0   • ondansetron (ZOFRAN ODT) 4 MG TABLET DISPERSIBLE Take 1 Tab by mouth every 8 hours as needed. (Patient not taking: Reported on 5/30/2017) 10 Tab 0     No facility-administered encounter medications on file as of 7/13/2017.     Review of Systems   Constitutional: Negative for fever.   Eyes: Negative for blurred vision.   Respiratory: Negative for shortness of breath.    Cardiovascular: Negative for chest pain, palpitations, orthopnea, claudication, leg swelling and PND.   Gastrointestinal: Negative for abdominal pain, blood in stool and melena.   Genitourinary: Negative for dysuria and hematuria.   Musculoskeletal: Positive for joint pain. Negative for myalgias.        History of sciatica   Skin: Negative for rash.   Neurological: Negative for dizziness, loss of consciousness and headaches.   Endo/Heme/Allergies: Does not bruise/bleed easily.   Psychiatric/Behavioral: Positive for depression. The patient is not nervous/anxious.         Objective:   /80 mmHg  Pulse 66  Ht 1.626 m (5' 4.02\")  Wt 82.101 kg (181 lb)  BMI 31.05 kg/m2  SpO2 91%    Physical Exam   Constitutional: She is oriented to person, place, and time. She appears well-developed. No distress.   HENT:   Mouth/Throat: Mucous membranes are normal.   Eyes: Conjunctivae and EOM are normal.   Neck: No JVD present. No tracheal " deviation present. No thyroid mass present.   Cardiovascular: Normal rate, regular rhythm and intact distal pulses.    Murmur heard.   Systolic murmur is present with a grade of 2/6   Pulses:       Carotid pulses are 2+ on the right side, and 2+ on the left side with bruit.       Radial pulses are 2+ on the right side, and 2+ on the left side.        Dorsalis pedis pulses are 2+ on the right side, and 2+ on the left side.        Posterior tibial pulses are 2+ on the right side, and 2+ on the left side.   Early peaking soft ejection systolic murmur best heard in the aortic area   Pulmonary/Chest: Effort normal and breath sounds normal. No respiratory distress. She exhibits no tenderness.   Abdominal: Soft. There is no tenderness.   Musculoskeletal: Normal range of motion. She exhibits no edema.   Neurological: She is alert and oriented to person, place, and time. She has normal strength. She displays no tremor.   Skin: Skin is warm and dry. She is not diaphoretic.   Psychiatric: She has a normal mood and affect. Her behavior is normal.   Vitals reviewed.  Results for ROSIO SHAFER (MRN [id number]) as of 7/13/2017 12:17   6/14/2017  7/10/2017    Sodium 142 142   Potassium 3.4 (L) 4.1   Chloride 105 105   Co2 30 31   Anion Gap 7.0 6.0   Glucose 91 93   Bun 20 12   Creatinine 0.77 0.69   GFR If Non African American >60 >60   Calcium 9.8 9.6   AST(SGOT) 13 15   ALT(SGPT) 12 15   Alkaline Phosphatase 64 55   Cholesterol,Tot 233 (H) 170   Triglycerides 161 (H) 149   HDL 63 63    (H) 77       TTE: 7/10/15  No prior study is available for comparison.   Left ventricular ejection fraction is visually estimated to be 60%.   Normal regional wall motion. Grade I diastolic dysfunction.  Calcification on posterior mitral valve leaflet.   Trace mitral regurgitation.  Moderate aortic stenosis.   Estimated right ventricular systolic pressure is 30 mmHg    Nuclear stress test: 5/23/11   1. The calculated left ventricular  ejection fraction is greater than 70%.  2.  No wall motion abnormalities.  3.  No evidence of infarct or reversible ischemia    Assessment:   1. Hypertension  2. Dyslipidemia  3. Atherosclerosis-aorta and coronary arteries  4. Murmur  5. Enlarged heart on chest x-ray    Medical Decision Making:  Today's Assessment / Status / Plan:     1. Continue metoprolol 25 mg BID.  Increase valsartan to 320 mg along with hydrochlorothiazide 25 mg daily  Call us back in one week with blood pressure recordings.  2. Continue Crestor 20 mg qhs.   LDL less than 100.  3. Continue statins.  4. Repeat echocardiogram in one year  5. Echo showed normal LV function    Follow-up in 6 weeks.  Echo in one year    Thank you for allowing me to participate in taking care of patient.    Jeanine Andrews MD.

## 2017-07-13 NOTE — Clinical Note
[facility]   [address]   [address]  [place] [address]  Phone: [phone]  Fax: [phone]              Erin Hess  1940    Encounter Date: 7/13/2017    Jeanine Salmon M.D.          PROGRESS NOTE:  Subjective:   Erin Hess is a 77 y.o. female with known history of hypertension, dyslipidemia currently not on any statins, breast cancer status post lumpectomy currently on Arimidex (right any history of chemotherapy or radiation), recently had a chest x-ray for follow-up evaluation of pneumonia which showed enlarged heart now here to review the echo results with me..    Patient denied any complaints of exertional chest pain pressure or tightness. Since beta blockers were discontinued her blood pressure has been elevated. In spite of restarting beta blockers her systolic blood pressure is high running between 150s to 170s. She is currently on valsartan to 40 mg along with hydrochlorothiazide 12.5 mg daily. No complaints of palpitations orthopnea or PND. Denied any complaints of dizziness lightheadedness or loss of consciousness. No completes of lower extremity edema or claudication.  Past Medical History   Diagnosis Date   • Psychiatric disorder      depression   • Hypertension    • Hypercholesterolemia    • Dizziness 3/11/2013   • HTN (hypertension) 3/12/2013   • Hypokalemia 3/12/2013   • Vertigo 3/11/2013   • Hyperlipidemia 3/12/2013   • ULCERATIVE COLITIS 3/12/2013   • Breast cancer (CMS-[facility])    • Asthma    • Arthritis    • Heart murmur    • Indigestion    • Hiatus hernia syndrome    • Snoring    • Bronchitis 2011   • Pain      back, hips, knees   • Heart burn    • Cancer (CMS-HCC) 12/2012     right breast   • Sleep apnea      h/o gastric sleeve lost 40 lb now not on CPAP     Past Surgical History   Procedure Laterality Date   • Us-needle core bx-breast panel  2013     rt breast, with lumpectomy    • Lumpectomy  left   • Gyn  surgery       vag hyster 1990   • Gyn surgery       vaginal cyst removal    • Other        R breast bx X 2& lipoma removal   • Gastric sleeve laparoscopy N/A 5/18/2016     Procedure: GASTRIC SLEEVE LAPAROSCOPY, HIATAL HERNIA AND LIVER BIOPSY;  Surgeon: Mauricio Montes M.D.;  Location: SURGERY [facility];  Service:      Family History   Problem Relation Age of Onset   • Heart Disease Mother      CAD MI at age 72   • Cancer Mother    • Cancer Sister    • Lung Disease Father      Lung cancer   • Cancer Maternal Grandmother      Pancreatic cancer   • Heart Attack Maternal Grandfather      MI at age 70   • Heart Disease Sister      Rhuemati cfever- herat murmur     History   Smoking status   • Former Smoker -- 1.00 packs/day for 20 years   • Types: Cigarettes   • Quit date: 05/22/1986   Smokeless tobacco   • Never Used     Allergies   Allergen Reactions   • Sulfa Drugs Rash and Swelling     Rash, joint swelling  [id number]   • Codeine Vomiting and Nausea     Outpatient Encounter Prescriptions as of 7/13/2017   Medication Sig Dispense Refill   • rosuvastatin (CRESTOR) 20 MG Tab Take 1 Tab by mouth every evening. 30 Tab 11   • Mesalamine (APRISO) 0.375 GM CAPSULE SR 24 HR Take 1 Cap by mouth every morning.     • POTASSIUM PO Take 1 Tab by mouth every morning. OTC     • B Complex Vitamins (VITAMIN B COMPLEX PO) Take 1 Tab by mouth every morning.     • valsartan-hydrochlorothiazide (DIOVAN-HCT) 160-25 MG per tablet Take 1 Tab by mouth every morning.     • anastrozole (ARIMIDEX) 1 MG Tab Take 1 mg by mouth every morning.     • pantoprazole (PROTONIX) 40 MG Tablet Delayed Response Take 40 mg by mouth every morning.     • MAGNESIUM PO Take 1 Tab by mouth every morning.     • vitamin D (CHOLECALCIFEROL) 1000 UNIT Tab Take 1,000 Units by mouth every morning.     • metoprolol SR (TOPROL XL) 25 MG TB24 Take 1 Tab by mouth every day. 90 Tab 3   • aspirin EC (ECOTRIN) 81 MG TBEC Take 162 mg by mouth every morning.     • paroxetine  "(PAXIL) 30 MG TABS Take 10 mg by mouth every morning. Take with food     • docosahexanoic acid (FISH OIL) 1000 MG CAPS Take 1,000 mg by mouth every morning.     • valsartan (DIOVAN) 80 MG Tab Take 1 Tab by mouth every morning. (Patient not taking: Reported on 7/13/2017) 30 Tab 0   • hydrocodone-acetaminophen (NORCO) 5-325 MG Tab per tablet Take 1-2 Tabs by mouth every four hours as needed. (Patient not taking: Reported on 5/30/2017) 20 Tab 0   • ondansetron (ZOFRAN ODT) 4 MG TABLET DISPERSIBLE Take 1 Tab by mouth every 8 hours as needed. (Patient not taking: Reported on 5/30/2017) 10 Tab 0   • hydrocodone-acetaminophen (NORCO) 5-325 MG Tab per tablet Take 1-2 Tabs by mouth every four hours as needed. (Patient not taking: Reported on 7/13/2017) 20 Tab 0   • ondansetron (ZOFRAN ODT) 4 MG TABLET DISPERSIBLE Take 1 Tab by mouth every 8 hours as needed. (Patient not taking: Reported on 5/30/2017) 10 Tab 0   • ondansetron (ZOFRAN ODT) 4 MG TABLET DISPERSIBLE Take 1 Tab by mouth every 8 hours as needed. (Patient not taking: Reported on 5/30/2017) 10 Tab 0     No facility-administered encounter medications on file as of 7/13/2017.     Review of Systems   Constitutional: Negative for fever.   Eyes: Negative for blurred vision.   Respiratory: Negative for shortness of breath.    Cardiovascular: Negative for chest pain, palpitations, orthopnea, claudication, leg swelling and PND.   Gastrointestinal: Negative for abdominal pain, blood in stool and melena.   Genitourinary: Negative for dysuria and hematuria.   Musculoskeletal: Positive for joint pain. Negative for myalgias.        History of sciatica   Skin: Negative for rash.   Neurological: Negative for dizziness, loss of consciousness and headaches.   Endo/Heme/Allergies: Does not bruise/bleed easily.   Psychiatric/Behavioral: Positive for depression. The patient is not nervous/anxious.         Objective:   /80 mmHg  Pulse 66  Ht 1.626 m (5' 4.02\")  Wt 82.101 kg (181 " lb)  BMI 31.05 kg/m2  SpO2 91%    Physical Exam   Constitutional: She is oriented to person, place, and time. She appears well-developed. No distress.   HENT:   Mouth/Throat: Mucous membranes are normal.   Eyes: Conjunctivae and EOM are normal.   Neck: No JVD present. No tracheal deviation present. No thyroid mass present.   Cardiovascular: Normal rate, regular rhythm and intact distal pulses.    Murmur heard.   Systolic murmur is present with a grade of 2/6   Pulses:       Carotid pulses are 2+ on the right side, and 2+ on the left side with bruit.       Radial pulses are 2+ on the right side, and 2+ on the left side.        Dorsalis pedis pulses are 2+ on the right side, and 2+ on the left side.        Posterior tibial pulses are 2+ on the right side, and 2+ on the left side.   Early peaking soft ejection systolic murmur best heard in the aortic area   Pulmonary/Chest: Effort normal and breath sounds normal. No respiratory distress. She exhibits no tenderness.   Abdominal: Soft. There is no tenderness.   Musculoskeletal: Normal range of motion. She exhibits no edema.   Neurological: She is alert and oriented to person, place, and time. She has normal strength. She displays no tremor.   Skin: Skin is warm and dry. She is not diaphoretic.   Psychiatric: She has a normal mood and affect. Her behavior is normal.   Vitals reviewed.  Results for ROSIO SHAFER (MRN [id number]) as of 7/13/2017 12:17   6/14/2017  7/10/2017    Sodium 142 142   Potassium 3.4 (L) 4.1   Chloride 105 105   Co2 30 31   Anion Gap 7.0 6.0   Glucose 91 93   Bun 20 12   Creatinine 0.77 0.69   GFR If Non African American >60 >60   Calcium 9.8 9.6   AST(SGOT) 13 15   ALT(SGPT) 12 15   Alkaline Phosphatase 64 55   Cholesterol,Tot 233 (H) 170   Triglycerides 161 (H) 149   HDL 63 63    (H) 77       TTE: 7/10/15  No prior study is available for comparison.   Left ventricular ejection fraction is visually estimated to be 60%.   Normal regional  wall motion. Grade I diastolic dysfunction.  Calcification on posterior mitral valve leaflet.   Trace mitral regurgitation.  Moderate aortic stenosis.   Estimated right ventricular systolic pressure is 30 mmHg    Nuclear stress test: 5/23/11   1. The calculated left ventricular ejection fraction is greater than 70%.  2.  No wall motion abnormalities.  3.  No evidence of infarct or reversible ischemia    Assessment:   1. Hypertension  2. Dyslipidemia  3. Atherosclerosis-aorta and coronary arteries  4. Murmur  5. Enlarged heart on chest x-ray    Medical Decision Making:  Today's Assessment / Status / Plan:     1. Continue metoprolol 25 mg BID.  Increase valsartan to 320 mg along with hydrochlorothiazide 25 mg daily  Call us back in one week with blood pressure recordings.  2. Continue Crestor 20 mg qhs.   LDL less than 100.  3. Continue statins.  4. Repeat echocardiogram in one year  5. Echo showed normal LV function    Follow-up in 6 weeks.  Echo in one year    Thank you for allowing me to participate in taking care of patient.    Jeanine Salmon. MD.      Terra Zambrano M.D.  [address]  [address]  [place] [address]  VIA Facsimile: [phone]

## 2017-07-13 NOTE — MR AVS SNAPSHOT
"Erin Rosa Hess   2017 10:30 AM   Office Visit   MRN: [id number]    Department:  Heart [facility]   Dept Phone:  [phone]    Description:  Female : 1940   Provider:  Jeanine Salmon M.D.           Reason for Visit     Follow-Up           Allergies as of 2017     Allergen Noted Reactions    Sulfa Drugs 2017   Rash, Swelling    Rash, joint swelling  [id number]    Codeine 2017   Vomiting, Nausea      You were diagnosed with     Essential hypertension   [9981698]       Dyslipidemia   [698637]       Nonrheumatic aortic valve stenosis   [053585]         Vital Signs     Blood Pressure Pulse Height Weight Body Mass Index Oxygen Saturation    152/80 mmHg 66 1.626 m (5' 4.02\") 82.101 kg (181 lb) 31.05 kg/m2 91%    Smoking Status                   Former Smoker           Basic Information     Date Of Birth Sex Race Ethnicity Preferred Language    1940 Female White Non- English      Your appointments     Aug 18, 2017 11:45 AM   FOLLOW UP with Jeanine Salmon M.D.   [facility] (--)    [address]  [address]  [facility] [id number] [phone]              Problem List              ICD-10-CM Priority Class Noted - Resolved    Vertigo R42   3/11/2013 - Present    Dizziness R42 High  3/11/2013 - Present    HTN (hypertension) I10 Medium  3/12/2013 - Present    Hyperlipidemia E78.5 Low  3/12/2013 - Present    Hypokalemia E87.6 Medium  3/12/2013 - Present    ULCERATIVE COLITIS  Low  3/12/2013 - Present    Murmur R01.1   2013 - Present    Sciatica M54.30   2013 - Present    Morbid obesity (CMS-HCC) E66.01   2016 - Present    Asthma J45.909   2016 - Present    ROBYN (obstructive sleep apnea) G47.33   2016 - Present    Fatigue R53.83   2017 - Present    Fever R50.9   2017 - Present      Health Maintenance        Date Due Completion Dates    IMM DTaP/Tdap/Td Vaccine (1 - Tdap) 1961 ---   " PAP SMEAR 2/28/1963 ---    COLONOSCOPY 2/28/1992 ---    IMM ZOSTER VACCINE 2/28/2002 ---    IMM PNEUMOCOCCAL 65+ (ADULT) LOW/MEDIUM RISK SERIES (2 of 2 - PPSV23) 5/19/2017 5/19/2016    IMM INFLUENZA (1) 9/1/2017 2/1/2016    MAMMOGRAM 2/21/2018 2/21/2017, 2/18/2016, 6/9/2015, 12/18/2014, 9/18/2014, 4/18/2014, 2/10/2004    BONE DENSITY 4/4/2021 4/4/2016            Current Immunizations     13-VALENT PCV PREVNAR 5/19/2016  4:57 PM    Influenza Vaccine Quad Inj (Pf) 2/1/2016    Pneumococcal Vaccine (PCV7) Historical Data 10/1/2011      Below and/or attached are the medications your provider expects you to take. Review all of your home medications and newly ordered medications with your provider and/or pharmacist. Follow medication instructions as directed by your provider and/or pharmacist. Please keep your medication list with you and share with your provider. Update the information when medications are discontinued, doses are changed, or new medications (including over-the-counter products) are added; and carry medication information at all times in the event of emergency situations     Allergies:  SULFA DRUGS - Rash,Swelling     CODEINE - Vomiting,Nausea               Medications  Valid as of: July 13, 2017 - 10:53 AM    Generic Name Brand Name Tablet Size Instructions for use    Anastrozole (Tab) ARIMIDEX 1 MG Take 1 mg by mouth every morning.        Aspirin (Tablet Delayed Response) ECOTRIN 81 MG Take 162 mg by mouth every morning.        B Complex Vitamins   Take 1 Tab by mouth every morning.        Cholecalciferol (Tab) cholecalciferol 1000 UNIT Take 1,000 Units by mouth every morning.        Hydrocodone-Acetaminophen (Tab) NORCO 5-325 MG Take 1-2 Tabs by mouth every four hours as needed.        Hydrocodone-Acetaminophen (Tab) NORCO 5-325 MG Take 1-2 Tabs by mouth every four hours as needed.        Magnesium   Take 1 Tab by mouth every morning.        Mesalamine (CAPSULE SR 24 HR) Mesalamine 0.375 GM Take 1 Cap  by mouth every morning.        Metoprolol Succinate (TABLET SR 24 HR) TOPROL XL 25 MG Take 1 Tab by mouth every day.        Omega-3 Fatty Acids (Cap) OMEGA 3 FA 1000 MG Take 1,000 mg by mouth every morning.        Ondansetron (TABLET DISPERSIBLE) ZOFRAN ODT 4 MG Take 1 Tab by mouth every 8 hours as needed.        Ondansetron (TABLET DISPERSIBLE) ZOFRAN ODT 4 MG Take 1 Tab by mouth every 8 hours as needed.        Ondansetron (TABLET DISPERSIBLE) ZOFRAN ODT 4 MG Take 1 Tab by mouth every 8 hours as needed.        Pantoprazole Sodium (Tablet Delayed Response) PROTONIX 40 MG Take 40 mg by mouth every morning.        PARoxetine HCl (Tab) PAXIL 30 MG Take 10 mg by mouth every morning. Take with food        Potassium   Take 1 Tab by mouth every morning. OTC        Rosuvastatin Calcium (Tab) CRESTOR 20 MG Take 1 Tab by mouth every evening.        Valsartan (Tab) DIOVAN 80 MG Take 1 Tab by mouth every morning.        Valsartan-Hydrochlorothiazide (Tab) DIOVAN--25 MG Take 1 Tab by mouth every morning.        .                 Medicines prescribed today were sent to:     [facility]/PHARMACY #[id number] - [place], [place] - [address]    [address] [place] [address]    Phone: [phone] Fax: [phone]    Open 24 Hours?: No    [facility] - [address] [facility] [address]    Phone: [phone] Fax: [phone]    Open 24 Hours?: No      Medication refill instructions:       If your prescription bottle indicates you have medication refills left, it is not necessary to call your provider’s office. Please contact your pharmacy and they will refill your medication.    If your prescription bottle indicates you do not have any refills left, you may request refills at any time through one of the following ways: The online [employer] system (except Urgent Care), by calling your provider’s office, or by asking your pharmacy to contact your provider’s office  with a refill request. Medication refills are processed only during regular business hours and may not be available until the next business day. Your provider may request additional information or to have a follow-up visit with you prior to refilling your medication.   *Please Note: Medication refills are assigned a new Rx number when refilled electronically. Your pharmacy may indicate that no refills were authorized even though a new prescription for the same medication is available at the pharmacy. Please request the medicine by name with the pharmacy before contacting your provider for a refill.        Your To Do List     Future Labs/Procedures Complete By Expires    COMP METABOLIC PANEL  As directed 7/13/2018    LIPID PROFILE  As directed 7/13/2018         [employer] Access Code: Activation code not generated  Current [employer] Status: Active

## 2017-07-17 RX ORDER — VALSARTAN 80 MG/1
TABLET ORAL
Refills: 0 | OUTPATIENT
Start: 2017-07-17

## 2017-07-18 DIAGNOSIS — I10 ESSENTIAL HYPERTENSION: ICD-10-CM

## 2017-07-18 RX ORDER — VALSARTAN 80 MG/1
160 TABLET ORAL EVERY MORNING
Qty: 60 TAB | Refills: 0 | OUTPATIENT
Start: 2017-07-18 | End: 2017-07-18 | Stop reason: SDUPTHER

## 2017-07-18 RX ORDER — VALSARTAN 80 MG/1
160 TABLET ORAL EVERY MORNING
Qty: 60 TAB | Refills: 0 | OUTPATIENT
Start: 2017-07-18 | End: 2017-08-10 | Stop reason: SDUPTHER

## 2017-07-18 RX ORDER — VALSARTAN 80 MG/1
TABLET ORAL
Refills: 0 | Status: CANCELLED | OUTPATIENT
Start: 2017-07-18

## 2017-08-10 ENCOUNTER — TELEPHONE (OUTPATIENT)
Dept: CARDIOLOGY | Facility: MEDICAL CENTER | Age: 77
End: 2017-08-10

## 2017-08-10 DIAGNOSIS — I10 ESSENTIAL HYPERTENSION: ICD-10-CM

## 2017-08-10 RX ORDER — VALSARTAN 80 MG/1
160 TABLET ORAL EVERY MORNING
Qty: 180 TAB | Refills: 0 | Status: SHIPPED | OUTPATIENT
Start: 2017-08-10 | End: 2017-09-06 | Stop reason: SDUPTHER

## 2017-08-10 NOTE — TELEPHONE ENCOUNTER
----- Message from Bernarda Quinones, Med Ass't sent at 8/10/2017  4:09 PM PDT -----  Regarding: new RX request  New RX request for a 90 day supply of valsartan  Pharmacy is:  [facility] [address] [place]

## 2017-08-15 ENCOUNTER — HOME STUDY (OUTPATIENT)
Dept: SLEEP MEDICINE | Facility: MEDICAL CENTER | Age: 77
End: 2017-08-15
Attending: NURSE PRACTITIONER
Payer: MEDICARE

## 2017-08-15 DIAGNOSIS — G47.33 OSA (OBSTRUCTIVE SLEEP APNEA): ICD-10-CM

## 2017-08-15 PROCEDURE — G0399 HOME SLEEP TEST/TYPE 3 PORTA: HCPCS | Performed by: INTERNAL MEDICINE

## 2017-08-15 NOTE — MR AVS SNAPSHOT
Erin Rosa Hess   8/15/2017 2:50 PM   Appointment   MRN: [id number]    Department:  [facility]   Dept Phone:  [phone]    Description:  Female : 1940   Provider:  SLEEP CLINIC           Allergies as of 8/15/2017     Allergen Noted Reactions    Sulfa Drugs 2017   Rash, Swelling    Rash, joint swelling  [id number]    Codeine 2017   Vomiting, Nausea      You were diagnosed with     ROBYN (obstructive sleep apnea)   [[id number]]         Vital Signs     Smoking Status                   Former Smoker           Basic Information     Date Of Birth Sex Race Ethnicity Preferred Language    1940 Female White Non- English      Your appointments     Aug 15, 2017  2:50 PM   Sleep Study Home with SLEEP CLINIC   [facility] (--)    [address]  [address]  [place] [id number] [phone]            Aug 18, 2017 11:45 AM   FOLLOW UP with Jeanine Salmon M.D.   [facility] (--)    [address]  [address]  [place] [id number] [phone]            Aug 22, 2017  3:40 PM   Established Patient Pul with ESTHER Osborn   [facility] (--)    [address]  [address]  [place] [id number] [phone]              Problem List              ICD-10-CM Priority Class Noted - Resolved    Vertigo R42   3/11/2013 - Present    Dizziness R42 High  3/11/2013 - Present    HTN (hypertension) I10 Medium  3/12/2013 - Present    Hyperlipidemia E78.5 Low  3/12/2013 - Present    Hypokalemia E87.6 Medium  3/12/2013 - Present    ULCERATIVE COLITIS  Low  3/12/2013 - Present    Murmur R01.1   2013 - Present    Sciatica M54.30   2013 - Present    Morbid obesity (CMS-HCC) E66.01   2016 - Present    Asthma J45.909   2016 - Present    ROBYN (obstructive sleep apnea) G47.33   2016 - Present    Fatigue R53.83   2017 - Present    Fever R50.9   2017 - Present    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.810   7/13/2017 - Present    Nonrheumatic aortic valve stenosis I35.0   7/13/2017 - Present    Dyslipidemia E78.5   7/13/2017 - Present      Health Maintenance        Date Due Completion Dates    IMM DTaP/Tdap/Td Vaccine (1 - Tdap) 2/28/1961 ---    PAP SMEAR 2/28/1963 ---    COLONOSCOPY 2/28/1992 ---    IMM ZOSTER VACCINE 2/28/2002 ---    IMM PNEUMOCOCCAL 65+ (ADULT) LOW/MEDIUM RISK SERIES (2 of 2 - PPSV23) 5/19/2017 5/19/2016    IMM INFLUENZA (1) 9/1/2017 2/1/2016    MAMMOGRAM 2/21/2018 2/21/2017, 2/18/2016, 6/9/2015, 12/18/2014, 9/18/2014, 4/18/2014, 2/10/2004    BONE DENSITY 4/4/2021 4/4/2016            Current Immunizations     13-VALENT PCV PREVNAR 5/19/2016  4:57 PM    Influenza Vaccine Quad Inj (Pf) 2/1/2016    Pneumococcal Vaccine (PCV7) Historical Data 10/1/2011      Below and/or attached are the medications your provider expects you to take. Review all of your home medications and newly ordered medications with your provider and/or pharmacist. Follow medication instructions as directed by your provider and/or pharmacist. Please keep your medication list with you and share with your provider. Update the information when medications are discontinued, doses are changed, or new medications (including over-the-counter products) are added; and carry medication information at all times in the event of emergency situations     Allergies:  SULFA DRUGS - Rash,Swelling     CODEINE - Vomiting,Nausea               Medications  Valid as of: August 15, 2017 -  2:24 PM    Generic Name Brand Name Tablet Size Instructions for use    Anastrozole (Tab) ARIMIDEX 1 MG Take 1 mg by mouth every morning.        Aspirin (Tablet Delayed Response) ECOTRIN 81 MG Take 162 mg by mouth every morning.        B Complex Vitamins   Take 1 Tab by mouth every morning.        Cholecalciferol (Tab) cholecalciferol 1000 UNIT Take 1,000 Units by mouth every  morning.        Magnesium   Take 1 Tab by mouth every morning.        Mesalamine (CAPSULE SR 24 HR) Mesalamine 0.375 GM Take 1 Cap by mouth every morning.        Metoprolol Succinate (TABLET SR 24 HR) TOPROL XL 25 MG Take 1 Tab by mouth every day.        Omega-3 Fatty Acids (Cap) OMEGA 3 FA 1000 MG Take 1,000 mg by mouth every morning.        Pantoprazole Sodium (Tablet Delayed Response) PROTONIX 40 MG Take 40 mg by mouth every morning.        PARoxetine HCl (Tab) PAXIL 30 MG Take 10 mg by mouth every morning. Take with food        Potassium   Take 1 Tab by mouth every morning. OTC        Rosuvastatin Calcium (Tab) CRESTOR 20 MG Take 1 Tab by mouth every evening.        Valsartan (Tab) DIOVAN 80 MG Take 2 Tabs by mouth every morning.        Valsartan-Hydrochlorothiazide (Tab) DIOVAN--25 MG Take 1 Tab by mouth every morning.        .                 Medicines prescribed today were sent to:     [facility]/PHARMACY #[id number] - [place], [place] - [address]    [address] [place] [address]    Phone: [phone] Fax: [phone]    Open 24 Hours?: No    [facility] [place], [place] - [address] [place] [address]    [address] [facility] [address]    Phone: [phone] Fax: [phone]    Open 24 Hours?: No      Medication refill instructions:       If your prescription bottle indicates you have medication refills left, it is not necessary to call your provider’s office. Please contact your pharmacy and they will refill your medication.    If your prescription bottle indicates you do not have any refills left, you may request refills at any time through one of the following ways: The online [employer] system (except Urgent Care), by calling your provider’s office, or by asking your pharmacy to contact your provider’s office with a refill request. Medication refills are processed only during regular business hours and may not be available until the next business day. Your provider may  request additional information or to have a follow-up visit with you prior to refilling your medication.   *Please Note: Medication refills are assigned a new Rx number when refilled electronically. Your pharmacy may indicate that no refills were authorized even though a new prescription for the same medication is available at the pharmacy. Please request the medicine by name with the pharmacy before contacting your provider for a refill.           [employer] Access Code: Activation code not generated  Current [employer] Status: Active

## 2017-08-16 NOTE — PROCEDURES
Ms. Hess is a history of obstructive sleep apnea hypopnea syndrome. Previous polysomnography demonstrated an apnea hypopnea index of 14.9 events per hour. She has been treated with auto titrating CPAP at 8-14 cm water pressure. She recently underwent bariatric surgery and has lost about 35 pounds. This study was designed to restage her sleep-disordered breathing. She does not have medical or sleep diagnoses would contraindicate a home sleep test.    9 hours and 37 minutes of data are available for review and the information appears to be of good quality for analysis.    The apnea hypopnea index is 17.9 events per hour, consisting also exclusively of hypopnea events. The lowest arterial oxygen saturation is 74% on room air with an oxygen desaturation index of 16.9 events per hour. She spends 85% of the study time, or more than 8 hours in total, with a saturation less than 89%.    Assessment:  Moderate obstructive sleep apnea hypopnea with an apnea hypopnea index of 17.9 events per hour. Severe nocturnal hypoxemia with a lowest arterial oxygen saturation of 74% on room air, related in part to the sleep-disordered breathing. The low basal arterial oxygen saturation raises the possibility of cardiac or pulmonary impairment, or a hypoventilation syndrome.    Recommendations:  Continue treatment with auto titrating CPAP pending further weight loss.

## 2017-08-18 ENCOUNTER — OFFICE VISIT (OUTPATIENT)
Dept: CARDIOLOGY | Facility: MEDICAL CENTER | Age: 77
End: 2017-08-18
Payer: MEDICARE

## 2017-08-18 VITALS
SYSTOLIC BLOOD PRESSURE: 180 MMHG | DIASTOLIC BLOOD PRESSURE: 90 MMHG | BODY MASS INDEX: 30.56 KG/M2 | HEIGHT: 64 IN | OXYGEN SATURATION: 91 % | HEART RATE: 54 BPM | WEIGHT: 179 LBS

## 2017-08-18 DIAGNOSIS — I10 ESSENTIAL HYPERTENSION: ICD-10-CM

## 2017-08-18 PROCEDURE — 99214 OFFICE O/P EST MOD 30 MIN: CPT | Performed by: INTERNAL MEDICINE

## 2017-08-18 RX ORDER — SPIRONOLACTONE 50 MG/1
50 TABLET, FILM COATED ORAL DAILY
Qty: 30 TAB | Refills: 3 | Status: SHIPPED | OUTPATIENT
Start: 2017-08-18 | End: 2017-12-14 | Stop reason: SDUPTHER

## 2017-08-18 ASSESSMENT — ENCOUNTER SYMPTOMS
HEADACHES: 0
PND: 0
BLOOD IN STOOL: 0
ABDOMINAL PAIN: 0
PALPITATIONS: 0
SHORTNESS OF BREATH: 0
NERVOUS/ANXIOUS: 0
ORTHOPNEA: 0
FEVER: 0
LOSS OF CONSCIOUSNESS: 0
DIZZINESS: 0
CLAUDICATION: 0
BLURRED VISION: 0
BRUISES/BLEEDS EASILY: 0
DEPRESSION: 1
MYALGIAS: 0

## 2017-08-18 NOTE — PROGRESS NOTES
Subjective:   Erin Hess is a 77 y.o. female with known history of hypertension, dyslipidemia currently not on any statins, breast cancer status post lumpectomy currently on Arimidex (right any history of chemotherapy or radiation), presenting today for follow-up evaluation of hypertension..    In spite of increasing Diovan to 320 mg patient blood pressure persistently in 150s to 170s range. She also has sleep apnea which is being evaluated now. Strongly encourage patient to increase physical activity. Salt intake less than 2 g per day. Denied any complaints of exertional chest pain pressure or tightness. No complaints of palpitations orthopnea or PND. No complaints of lower extremity edema or claudication. Denied any myalgias while on statins  Past Medical History   Diagnosis Date   • Psychiatric disorder      depression   • Hypertension    • Hypercholesterolemia    • Dizziness 3/11/2013   • HTN (hypertension) 3/12/2013   • Hypokalemia 3/12/2013   • Vertigo 3/11/2013   • Hyperlipidemia 3/12/2013   • ULCERATIVE COLITIS 3/12/2013   • Breast cancer (CMS-HCC)    • Asthma    • Arthritis    • Heart murmur    • Indigestion    • Hiatus hernia syndrome    • Snoring    • Bronchitis 2011   • Pain      back, hips, knees   • Heart burn    • Cancer (CMS-HCC) 12/2012     right breast   • Sleep apnea      h/o gastric sleeve lost 40 lb now not on CPAP     Past Surgical History   Procedure Laterality Date   • Us-needle core bx-breast panel  2013     rt breast, with lumpectomy    • Lumpectomy  left   • Gyn surgery       vag hyster 1990   • Gyn surgery       vaginal cyst removal    • Other        R breast bx X 2& lipoma removal   • Gastric sleeve laparoscopy N/A 5/18/2016     Procedure: GASTRIC SLEEVE LAPAROSCOPY, HIATAL HERNIA AND LIVER BIOPSY;  Surgeon: Mauricio Montes M.D.;  [facility]: SURGERY [facility];  Service:      Family History   Problem Relation Age of Onset   • Heart Disease Mother      CAD MI at age 72   • Cancer  Mother    • Cancer Sister    • Lung Disease Father      Lung cancer   • Cancer Maternal Grandmother      Pancreatic cancer   • Heart Attack Maternal Grandfather      MI at age 70   • Heart Disease Sister      Rhestefany cfever- herat murmur     History   Smoking status   • Former Smoker -- 1.00 packs/day for 20 years   • Types: Cigarettes   • Quit date: 05/22/1986   Smokeless tobacco   • Never Used     Allergies   Allergen Reactions   • Sulfa Drugs Rash and Swelling     Rash, joint swelling  [id number]   • Codeine Vomiting and Nausea     Outpatient Encounter Prescriptions as of 8/18/2017   Medication Sig Dispense Refill   • Multiple Vitamins-Minerals (OCUVITE PO) Take  by mouth.     • valsartan (DIOVAN) 80 MG Tab Take 2 Tabs by mouth every morning. 180 Tab 0   • rosuvastatin (CRESTOR) 20 MG Tab Take 1 Tab by mouth every evening. 30 Tab 11   • Mesalamine (APRISO) 0.375 GM CAPSULE SR 24 HR Take 1 Cap by mouth every morning.     • POTASSIUM PO Take 1 Tab by mouth every morning. OTC     • B Complex Vitamins (VITAMIN B COMPLEX PO) Take 1 Tab by mouth every morning.     • valsartan-hydrochlorothiazide (DIOVAN-HCT) 160-25 MG per tablet Take 1 Tab by mouth every morning.     • anastrozole (ARIMIDEX) 1 MG Tab Take 1 mg by mouth every morning.     • pantoprazole (PROTONIX) 40 MG Tablet Delayed Response Take 40 mg by mouth every morning.     • MAGNESIUM PO Take 1 Tab by mouth every morning.     • vitamin D (CHOLECALCIFEROL) 1000 UNIT Tab Take 1,000 Units by mouth every morning.     • metoprolol SR (TOPROL XL) 25 MG TB24 Take 1 Tab by mouth every day. 90 Tab 3   • aspirin EC (ECOTRIN) 81 MG TBEC Take 162 mg by mouth every morning.     • paroxetine (PAXIL) 30 MG TABS Take 10 mg by mouth every morning. Take with food     • docosahexanoic acid (FISH OIL) 1000 MG CAPS Take 1,000 mg by mouth every morning.       No facility-administered encounter medications on file as of 8/18/2017.     Review of Systems   Constitutional: Negative for  "fever.   Eyes: Negative for blurred vision.   Respiratory: Negative for shortness of breath.    Cardiovascular: Negative for chest pain, palpitations, orthopnea, claudication, leg swelling and PND.   Gastrointestinal: Negative for abdominal pain, blood in stool and melena.   Genitourinary: Negative for dysuria and hematuria.   Musculoskeletal: Positive for joint pain. Negative for myalgias.        History of sciatica   Skin: Negative for rash.   Neurological: Negative for dizziness, loss of consciousness and headaches.   Endo/Heme/Allergies: Does not bruise/bleed easily.   Psychiatric/Behavioral: Positive for depression. The patient is not nervous/anxious.         Objective:   /90 mmHg  Pulse 54  Ht 1.638 m (5' 4.49\")  Wt 81.194 kg (179 lb)  BMI 30.26 kg/m2  SpO2 91%    Physical Exam   Constitutional: She is oriented to person, place, and time. She appears well-developed. No distress.   HENT:   Mouth/Throat: Mucous membranes are normal.   Eyes: Conjunctivae and EOM are normal.   Neck: No JVD present. No tracheal deviation present. No thyroid mass present.   Cardiovascular: Normal rate, regular rhythm and intact distal pulses.    Murmur heard.   Systolic murmur is present with a grade of 2/6   Pulses:       Carotid pulses are 2+ on the right side, and 2+ on the left side with bruit.       Radial pulses are 2+ on the right side, and 2+ on the left side.        Dorsalis pedis pulses are 2+ on the right side, and 2+ on the left side.        Posterior tibial pulses are 2+ on the right side, and 2+ on the left side.   Early peaking soft ejection systolic murmur best heard in the aortic area   Pulmonary/Chest: Effort normal and breath sounds normal. No respiratory distress. She exhibits no tenderness.   Abdominal: Soft. There is no tenderness.   Musculoskeletal: Normal range of motion. She exhibits no edema.   Neurological: She is alert and oriented to person, place, and time. She has normal strength. She " displays no tremor.   Skin: Skin is warm and dry. She is not diaphoretic.   Psychiatric: She has a normal mood and affect. Her behavior is normal.   Vitals reviewed.  Results for ROSIO SHAFER (MRN [id number]) as of 7/13/2017 12:17   6/14/2017  7/10/2017    Sodium 142 142   Potassium 3.4 (L) 4.1   Chloride 105 105   Co2 30 31   Anion Gap 7.0 6.0   Glucose 91 93   Bun 20 12   Creatinine 0.77 0.69   GFR If Non African American >60 >60   Calcium 9.8 9.6   AST(SGOT) 13 15   ALT(SGPT) 12 15   Alkaline Phosphatase 64 55   Cholesterol,Tot 233 (H) 170   Triglycerides 161 (H) 149   HDL 63 63    (H) 77       TTE: 7/10/17  No prior study is available for comparison.   Left ventricular ejection fraction is visually estimated to be 60%.   Normal regional wall motion. Grade I diastolic dysfunction.  Calcification on posterior mitral valve leaflet.   Trace mitral regurgitation.  Moderate aortic stenosis.   Estimated right ventricular systolic pressure is 30 mmHg    Nuclear stress test: 5/23/11   1. The calculated left ventricular ejection fraction is greater than 70%.  2.  No wall motion abnormalities.  3.  No evidence of infarct or reversible ischemia    Assessment:   1. Hypertension  2. Dyslipidemia  3. Atherosclerosis-aorta and coronary arteries  4. Aortic stenosis    Medical Decision Making:  Today's Assessment / Status / Plan:     1. Continue metoprolol 25 mg BID.  Continue valsartan to 320 mg along with hydrochlorothiazide 25 mg daily  Start spironolactone 25 mg daily if blood pressure persistently more than 150 increase spironolactone to 50 mg daily.  Check basic metabolic panel in one week and then in 3 weeks.  2. LDL less than 100.  Continue Crestor 20 mg qhs.   3. Continue statins.  4. Repeat echocardiogram in one year    Follow-up in 3 month.  Basic metabolic panel in one week and then in 3 weeks.  Echocardiogram in one year.    Thank you for allowing me to participate in taking care of patient.    Jeanine  Juliana DOHERTY.

## 2017-08-18 NOTE — Clinical Note
[facility]   [address]   [address]  [place] [address]  Phone: [phone]  Fax: [phone]              Erin Hess  1940    Encounter Date: 8/18/2017    Jeanine Salmon M.D.          PROGRESS NOTE:  Subjective:   Erin Hess is a 77 y.o. female with known history of hypertension, dyslipidemia currently not on any statins, breast cancer status post lumpectomy currently on Arimidex (right any history of chemotherapy or radiation), presenting today for follow-up evaluation of hypertension..    In spite of increasing Diovan to 320 mg patient blood pressure persistently in 150s to 170s range. She also has sleep apnea which is being evaluated now. Strongly encourage patient to increase physical activity. Salt intake less than 2 g per day. Denied any complaints of exertional chest pain pressure or tightness. No complaints of palpitations orthopnea or PND. No complaints of lower extremity edema or claudication. Denied any myalgias while on statins  Past Medical History   Diagnosis Date   • Psychiatric disorder      depression   • Hypertension    • Hypercholesterolemia    • Dizziness 3/11/2013   • HTN (hypertension) 3/12/2013   • Hypokalemia 3/12/2013   • Vertigo 3/11/2013   • Hyperlipidemia 3/12/2013   • ULCERATIVE COLITIS 3/12/2013   • Breast cancer (CMS-[facility], [facility])    • Asthma    • Arthritis    • Heart murmur    • Indigestion    • Hiatus hernia syndrome    • Snoring    • Bronchitis 2011   • Pain      back, hips, knees   • Heart burn    • Cancer (CMS-HCC) 12/2012     right breast   • Sleep apnea      h/o gastric sleeve lost 40 lb now not on CPAP     Past Surgical History   Procedure Laterality Date   • Us-needle core bx-breast panel  2013     rt breast, with lumpectomy    • Lumpectomy  left   • Gyn surgery       vag hyster 1990   • Gyn surgery       vaginal cyst removal    • Other        R breast bx X 2& lipoma removal   • Gastric sleeve  laparoscopy N/A 5/18/2016     Procedure: GASTRIC SLEEVE LAPAROSCOPY, HIATAL HERNIA AND LIVER BIOPSY;  Surgeon: Mauricio Montes M.D.;  Location: SURGERY [facility];  Service:      Family History   Problem Relation Age of Onset   • Heart Disease Mother      CAD MI at age 72   • Cancer Mother    • Cancer Sister    • Lung Disease Father      Lung cancer   • Cancer Maternal Grandmother      Pancreatic cancer   • Heart Attack Maternal Grandfather      MI at age 70   • Heart Disease Sister      Rhuemati cfever- herat murmur     History   Smoking status   • Former Smoker -- 1.00 packs/day for 20 years   • Types: Cigarettes   • Quit date: 05/22/1986   Smokeless tobacco   • Never Used     Allergies   Allergen Reactions   • Sulfa Drugs Rash and Swelling     Rash, joint swelling  [id number]   • Codeine Vomiting and Nausea     Outpatient Encounter Prescriptions as of 8/18/2017   Medication Sig Dispense Refill   • Multiple Vitamins-Minerals (OCUVITE PO) Take  by mouth.     • valsartan (DIOVAN) 80 MG Tab Take 2 Tabs by mouth every morning. 180 Tab 0   • rosuvastatin (CRESTOR) 20 MG Tab Take 1 Tab by mouth every evening. 30 Tab 11   • Mesalamine (APRISO) 0.375 GM CAPSULE SR 24 HR Take 1 Cap by mouth every morning.     • POTASSIUM PO Take 1 Tab by mouth every morning. OTC     • B Complex Vitamins (VITAMIN B COMPLEX PO) Take 1 Tab by mouth every morning.     • valsartan-hydrochlorothiazide (DIOVAN-HCT) 160-25 MG per tablet Take 1 Tab by mouth every morning.     • anastrozole (ARIMIDEX) 1 MG Tab Take 1 mg by mouth every morning.     • pantoprazole (PROTONIX) 40 MG Tablet Delayed Response Take 40 mg by mouth every morning.     • MAGNESIUM PO Take 1 Tab by mouth every morning.     • vitamin D (CHOLECALCIFEROL) 1000 UNIT Tab Take 1,000 Units by mouth every morning.     • metoprolol SR (TOPROL XL) 25 MG TB24 Take 1 Tab by mouth every day. 90 Tab 3   • aspirin EC (ECOTRIN) 81 MG TBEC Take 162 mg by mouth every morning.     • paroxetine  "(PAXIL) 30 MG TABS Take 10 mg by mouth every morning. Take with food     • docosahexanoic acid (FISH OIL) 1000 MG CAPS Take 1,000 mg by mouth every morning.       No facility-administered encounter medications on file as of 8/18/2017.     Review of Systems   Constitutional: Negative for fever.   Eyes: Negative for blurred vision.   Respiratory: Negative for shortness of breath.    Cardiovascular: Negative for chest pain, palpitations, orthopnea, claudication, leg swelling and PND.   Gastrointestinal: Negative for abdominal pain, blood in stool and melena.   Genitourinary: Negative for dysuria and hematuria.   Musculoskeletal: Positive for joint pain. Negative for myalgias.        History of sciatica   Skin: Negative for rash.   Neurological: Negative for dizziness, loss of consciousness and headaches.   Endo/Heme/Allergies: Does not bruise/bleed easily.   Psychiatric/Behavioral: Positive for depression. The patient is not nervous/anxious.         Objective:   /90 mmHg  Pulse 54  Ht 1.638 m (5' 4.49\")  Wt 81.194 kg (179 lb)  BMI 30.26 kg/m2  SpO2 91%    Physical Exam   Constitutional: She is oriented to person, place, and time. She appears well-developed. No distress.   HENT:   Mouth/Throat: Mucous membranes are normal.   Eyes: Conjunctivae and EOM are normal.   Neck: No JVD present. No tracheal deviation present. No thyroid mass present.   Cardiovascular: Normal rate, regular rhythm and intact distal pulses.    Murmur heard.   Systolic murmur is present with a grade of 2/6   Pulses:       Carotid pulses are 2+ on the right side, and 2+ on the left side with bruit.       Radial pulses are 2+ on the right side, and 2+ on the left side.        Dorsalis pedis pulses are 2+ on the right side, and 2+ on the left side.        Posterior tibial pulses are 2+ on the right side, and 2+ on the left side.   Early peaking soft ejection systolic murmur best heard in the aortic area   Pulmonary/Chest: Effort normal and " breath sounds normal. No respiratory distress. She exhibits no tenderness.   Abdominal: Soft. There is no tenderness.   Musculoskeletal: Normal range of motion. She exhibits no edema.   Neurological: She is alert and oriented to person, place, and time. She has normal strength. She displays no tremor.   Skin: Skin is warm and dry. She is not diaphoretic.   Psychiatric: She has a normal mood and affect. Her behavior is normal.   Vitals reviewed.  Results for ROSIO SHAFER (MRN [id number]) as of 7/13/2017 12:17   6/14/2017  7/10/2017    Sodium 142 142   Potassium 3.4 (L) 4.1   Chloride 105 105   Co2 30 31   Anion Gap 7.0 6.0   Glucose 91 93   Bun 20 12   Creatinine 0.77 0.69   GFR If Non African American >60 >60   Calcium 9.8 9.6   AST(SGOT) 13 15   ALT(SGPT) 12 15   Alkaline Phosphatase 64 55   Cholesterol,Tot 233 (H) 170   Triglycerides 161 (H) 149   HDL 63 63    (H) 77       TTE: 7/10/17  No prior study is available for comparison.   Left ventricular ejection fraction is visually estimated to be 60%.   Normal regional wall motion. Grade I diastolic dysfunction.  Calcification on posterior mitral valve leaflet.   Trace mitral regurgitation.  Moderate aortic stenosis.   Estimated right ventricular systolic pressure is 30 mmHg    Nuclear stress test: 5/23/11   1. The calculated left ventricular ejection fraction is greater than 70%.  2.  No wall motion abnormalities.  3.  No evidence of infarct or reversible ischemia    Assessment:   1. Hypertension  2. Dyslipidemia  3. Atherosclerosis-aorta and coronary arteries  4. Aortic stenosis    Medical Decision Making:  Today's Assessment / Status / Plan:     1. Continue metoprolol 25 mg BID.  Continue valsartan to 320 mg along with hydrochlorothiazide 25 mg daily  Start spironolactone 25 mg daily if blood pressure persistently more than 150 increase spironolactone to 50 mg daily.  Check basic metabolic panel in one week and then in 3 weeks.  2. LDL less than  100.  Continue Crestor 20 mg qhs.   3. Continue statins.  4. Repeat echocardiogram in one year    Follow-up in 3 month.  Basic metabolic panel in one week and then in 3 weeks.  Echocardiogram in one year.    Thank you for allowing me to participate in taking care of patient.    Jeanine Andrews MD.      Terra Zambrano M.D.  [address] [place] [address] [address]  [address]  [place] [address]  VIA Facsimile: [phone]

## 2017-08-18 NOTE — MR AVS SNAPSHOT
"Erin Hess   2017 11:45 AM   Office Visit   MRN: [id number]    Department:  [facility]   Dept Phone:  [phone]    Description:  Female : 1940   Provider:  Jeanine Salmon M.D.           Allergies as of 2017     Allergen Noted Reactions    Sulfa Drugs 2017   Rash, Swelling    Rash, joint swelling  [id number]    Codeine 2017   Vomiting, Nausea      You were diagnosed with     Essential hypertension   [2453557]         Vital Signs     Blood Pressure Pulse Height Weight Body Mass Index Oxygen Saturation    180/90 mmHg 54 1.638 m (5' 4.49\") 81.194 kg (179 lb) 30.26 kg/m2 91%    Smoking Status                   Former Smoker           Basic Information     Date Of Birth Sex Race Ethnicity Preferred Language    1940 Female White Non- English      Your appointments     Aug 22, 2017  3:40 PM   Established Patient Pul with ESTHER Osborn   [facility] (--)    [address]  [address]  [place] [id number] [phone]            2017 10:30 AM   FOLLOW UP with Jeanine Salmon M.D.   [facility]    [address]  [address]  [place] [id number] [phone]              Problem List              ICD-10-CM Priority Class Noted - Resolved    Vertigo R42   3/11/2013 - Present    Dizziness R42 High  3/11/2013 - Present    HTN (hypertension) I10 Medium  3/12/2013 - Present    Hyperlipidemia E78.5 Low  3/12/2013 - Present    Hypokalemia E87.6 Medium  3/12/2013 - Present    ULCERATIVE COLITIS  Low  3/12/2013 - Present    Murmur R01.1   2013 - Present    Sciatica M54.30   2013 - Present    Morbid obesity (CMS-HCC) E66.01   2016 - Present    Asthma J45.909   2016 - Present    ROBYN (obstructive sleep apnea) G47.33   2016 - Present    Fatigue R53.83   2017 - Present    Fever R50.9   2017 - Present    Atherosclerosis of coronary " artery bypass graft of native heart without angina pectoris I25.810   7/13/2017 - Present    Nonrheumatic aortic valve stenosis I35.0   7/13/2017 - Present    Dyslipidemia E78.5   7/13/2017 - Present      Health Maintenance        Date Due Completion Dates    IMM DTaP/Tdap/Td Vaccine (1 - Tdap) 2/28/1961 ---    PAP SMEAR 2/28/1963 ---    COLONOSCOPY 2/28/1992 ---    IMM ZOSTER VACCINE 2/28/2002 ---    IMM PNEUMOCOCCAL 65+ (ADULT) LOW/MEDIUM RISK SERIES (2 of 2 - PPSV23) 5/19/2017 5/19/2016    IMM INFLUENZA (1) 9/1/2017 2/1/2016    MAMMOGRAM 2/21/2018 2/21/2017, 2/18/2016, 6/9/2015, 12/18/2014, 9/18/2014, 4/18/2014, 2/10/2004    BONE DENSITY 4/4/2021 4/4/2016            Current Immunizations     13-VALENT PCV PREVNAR 5/19/2016  4:57 PM    Influenza Vaccine Quad Inj (Pf) 2/1/2016    Pneumococcal Vaccine (PCV7) Historical Data 10/1/2011      Below and/or attached are the medications your provider expects you to take. Review all of your home medications and newly ordered medications with your provider and/or pharmacist. Follow medication instructions as directed by your provider and/or pharmacist. Please keep your medication list with you and share with your provider. Update the information when medications are discontinued, doses are changed, or new medications (including over-the-counter products) are added; and carry medication information at all times in the event of emergency situations     Allergies:  SULFA DRUGS - Rash,Swelling     CODEINE - Vomiting,Nausea               Medications  Valid as of: August 18, 2017 - 12:14 PM    Generic Name Brand Name Tablet Size Instructions for use    Anastrozole (Tab) ARIMIDEX 1 MG Take 1 mg by mouth every morning.        Aspirin (Tablet Delayed Response) ECOTRIN 81 MG Take 162 mg by mouth every morning.        B Complex Vitamins   Take 1 Tab by mouth every morning.        Cholecalciferol (Tab) cholecalciferol 1000 UNIT Take 1,000 Units by mouth every morning.        Magnesium    Take 1 Tab by mouth every morning.        Mesalamine (CAPSULE SR 24 HR) Mesalamine 0.375 GM Take 1 Cap by mouth every morning.        Metoprolol Succinate (TABLET SR 24 HR) TOPROL XL 25 MG Take 1 Tab by mouth every day.        Multiple Vitamins-Minerals   Take  by mouth.        Omega-3 Fatty Acids (Cap) OMEGA 3 FA 1000 MG Take 1,000 mg by mouth every morning.        Pantoprazole Sodium (Tablet Delayed Response) PROTONIX 40 MG Take 40 mg by mouth every morning.        PARoxetine HCl (Tab) PAXIL 30 MG Take 10 mg by mouth every morning. Take with food        Potassium   Take 1 Tab by mouth every morning. OTC        Rosuvastatin Calcium (Tab) CRESTOR 20 MG Take 1 Tab by mouth every evening.        Spironolactone (Tab) ALDACTONE 50 MG Take 1 Tab by mouth every day.        Valsartan (Tab) DIOVAN 80 MG Take 2 Tabs by mouth every morning.        Valsartan-Hydrochlorothiazide (Tab) DIOVAN--25 MG Take 1 Tab by mouth every morning.        .                 Medicines prescribed today were sent to:     [facility]/PHARMACY #[id number] - [place], [place] - [address]    [address] [place] [address]    Phone: [phone] Fax: [phone]    Open 24 Hours?: No    [facility] [place], [place] - [address] [place] [address]    Phone: [phone] Fax: [phone]    Open 24 Hours?: No      Medication refill instructions:       If your prescription bottle indicates you have medication refills left, it is not necessary to call your provider’s office. Please contact your pharmacy and they will refill your medication.    If your prescription bottle indicates you do not have any refills left, you may request refills at any time through one of the following ways: The online [employer] system (except Urgent Care), by calling your provider’s office, or by asking your pharmacy to contact your provider’s office with a refill request. Medication refills are processed only during  regular business hours and may not be available until the next business day. Your provider may request additional information or to have a follow-up visit with you prior to refilling your medication.   *Please Note: Medication refills are assigned a new Rx number when refilled electronically. Your pharmacy may indicate that no refills were authorized even though a new prescription for the same medication is available at the pharmacy. Please request the medicine by name with the pharmacy before contacting your provider for a refill.        Your To Do List     Future Labs/Procedures Complete By Expires    BASIC METABOLIC PANEL  As directed 8/18/2018    BASIC METABOLIC PANEL  As directed 8/18/2018    US-RENAL ARTERY DUPLEX  As directed 8/18/2018         MyChart Access Code: Activation code not generated  Current [employer] Status: Active

## 2017-08-22 ENCOUNTER — OFFICE VISIT (OUTPATIENT)
Dept: PULMONOLOGY | Facility: HOSPICE | Age: 77
End: 2017-08-22
Payer: MEDICARE

## 2017-08-22 VITALS
DIASTOLIC BLOOD PRESSURE: 72 MMHG | WEIGHT: 178 LBS | RESPIRATION RATE: 15 BRPM | OXYGEN SATURATION: 94 % | SYSTOLIC BLOOD PRESSURE: 132 MMHG | BODY MASS INDEX: 30.39 KG/M2 | HEART RATE: 62 BPM | HEIGHT: 64 IN

## 2017-08-22 DIAGNOSIS — I10 ESSENTIAL HYPERTENSION: ICD-10-CM

## 2017-08-22 DIAGNOSIS — J45.909 UNCOMPLICATED ASTHMA, UNSPECIFIED ASTHMA SEVERITY: ICD-10-CM

## 2017-08-22 DIAGNOSIS — G47.33 OSA (OBSTRUCTIVE SLEEP APNEA): ICD-10-CM

## 2017-08-22 PROCEDURE — 99213 OFFICE O/P EST LOW 20 MIN: CPT | Performed by: NURSE PRACTITIONER

## 2017-08-22 NOTE — MR AVS SNAPSHOT
"Erin Rosa Hess   2017 3:40 PM   Office Visit   MRN: [id number]    Department:  [facility]   Dept Phone:  [phone]    Description:  Female : 1940   Provider:  ESTHER Osborn           Reason for Visit     Results HST      Allergies as of 2017     Allergen Noted Reactions    Sulfa Drugs 2017   Rash, Swelling    Rash, joint swelling  [id number]    Codeine 2017   Vomiting, Nausea      You were diagnosed with     Essential hypertension   [5137316]       Morbid obesity, unspecified obesity type (CMS-[facility])   [1623453]       Uncomplicated asthma, unspecified asthma severity   [5180451]       ROBYN (obstructive sleep apnea)   [[id number]]         Vital Signs     Blood Pressure Pulse Respirations Height Weight Body Mass Index    132/72 mmHg 62 15 1.626 m (5' 4\") 80.74 kg (178 lb) 30.54 kg/m2    Oxygen Saturation Smoking Status                94% Former Smoker          Basic Information     Date Of Birth Sex Race Ethnicity Preferred Language    1940 Female White Non- English      Your appointments     Aug 22, 2017  3:40 PM   Established Patient Pul with ESTHER Osborn   [facility] (--)    [address]  [address]  [place] [id number] [phone]            Aug 30, 2017  8:30 AM   US GEN 90 with [address] US 2   [facility] - ULTRASOUND - [address]    [address]  [place] [address] [phone]            2017 10:30 AM   FOLLOW UP with Jeanine Salmon M.D.   [facility]    [address]  [address]  [place] [address] [phone]              Problem List              ICD-10-CM Priority Class Noted - Resolved    Vertigo R42   3/11/2013 - Present    Dizziness R42 High  3/11/2013 - Present    HTN (hypertension) I10 Medium  3/12/2013 - Present    Hyperlipidemia E78.5 Low  3/12/2013 - Present    Hypokalemia E87.6 Medium  " 3/12/2013 - Present    ULCERATIVE COLITIS  Low  3/12/2013 - Present    Murmur R01.1   5/9/2013 - Present    Sciatica M54.30   5/9/2013 - Present    Morbid obesity (CMS-[facility]) E66.01   5/18/2016 - Present    Asthma J45.909   7/8/2016 - Present    ROBYN (obstructive sleep apnea) G47.33   7/8/2016 - Present    Fatigue R53.83   4/11/2017 - Present    Fever R50.9   4/11/2017 - Present    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.810   7/13/2017 - Present    Nonrheumatic aortic valve stenosis I35.0   7/13/2017 - Present    Dyslipidemia E78.5   7/13/2017 - Present      Health Maintenance        Date Due Completion Dates    IMM DTaP/Tdap/Td Vaccine (1 - Tdap) 2/28/1961 ---    PAP SMEAR 2/28/1963 ---    COLONOSCOPY 2/28/1992 ---    IMM ZOSTER VACCINE 2/28/2002 ---    IMM PNEUMOCOCCAL 65+ (ADULT) LOW/MEDIUM RISK SERIES (2 of 2 - PPSV23) 5/19/2017 5/19/2016    IMM INFLUENZA (1) 9/1/2017 2/1/2016    MAMMOGRAM 2/21/2018 2/21/2017, 2/18/2016, 6/9/2015, 12/18/2014, 9/18/2014, 4/18/2014, 2/10/2004    BONE DENSITY 4/4/2021 4/4/2016            Current Immunizations     13-VALENT PCV PREVNAR 5/19/2016  4:57 PM    Influenza Vaccine Quad Inj (Pf) 2/1/2016    Pneumococcal Vaccine (PCV7) Historical Data 10/1/2011      Below and/or attached are the medications your provider expects you to take. Review all of your home medications and newly ordered medications with your provider and/or pharmacist. Follow medication instructions as directed by your provider and/or pharmacist. Please keep your medication list with you and share with your provider. Update the information when medications are discontinued, doses are changed, or new medications (including over-the-counter products) are added; and carry medication information at all times in the event of emergency situations     Allergies:  SULFA DRUGS - Rash,Swelling     CODEINE - Vomiting,Nausea               Medications  Valid as of: August 22,  2017 -  3:07 PM    Generic Name Brand Name Tablet Size Instructions for use    Anastrozole (Tab) ARIMIDEX 1 MG Take 1 mg by mouth every morning.        Aspirin (Tablet Delayed Response) ECOTRIN 81 MG Take 162 mg by mouth every morning.        B Complex Vitamins   Take 1 Tab by mouth every morning.        Cholecalciferol (Tab) cholecalciferol 1000 UNIT Take 1,000 Units by mouth every morning.        Magnesium   Take 1 Tab by mouth every morning.        Mesalamine (CAPSULE SR 24 HR) Mesalamine 0.375 GM Take 1 Cap by mouth every morning.        Metoprolol Succinate (TABLET SR 24 HR) TOPROL XL 25 MG Take 1 Tab by mouth every day.        Multiple Vitamins-Minerals   Take  by mouth.        Omega-3 Fatty Acids (Cap) OMEGA 3 FA 1000 MG Take 1,000 mg by mouth every morning.        Pantoprazole Sodium (Tablet Delayed Response) PROTONIX 40 MG Take 40 mg by mouth every morning.        PARoxetine HCl (Tab) PAXIL 30 MG Take 10 mg by mouth every morning. Take with food        Potassium   Take 1 Tab by mouth every morning. OTC        Rosuvastatin Calcium (Tab) CRESTOR 20 MG Take 1 Tab by mouth every evening.        Spironolactone (Tab) ALDACTONE 50 MG Take 1 Tab by mouth every day.        Valsartan (Tab) DIOVAN 80 MG Take 2 Tabs by mouth every morning.        Valsartan-Hydrochlorothiazide (Tab) DIOVAN--25 MG Take 1 Tab by mouth every morning.        .                 Medicines prescribed today were sent to:     [facility]/PHARMACY #[id number] - [place], [place] - [address]    [address] [place] [address]    Phone: [phone] Fax: [phone]    Open 24 Hours?: No    [facility] - [place], [place] - [address] [facility] [address]    [address] [facility] [address]    Phone: [phone] Fax: [phone]    Open 24 Hours?: No      Medication refill instructions:       If your prescription bottle indicates you have medication refills left, it is not necessary to call your provider’s office. Please  contact your pharmacy and they will refill your medication.    If your prescription bottle indicates you do not have any refills left, you may request refills at any time through one of the following ways: The online [employer] system (except Urgent Care), by calling your provider’s office, or by asking your pharmacy to contact your provider’s office with a refill request. Medication refills are processed only during regular business hours and may not be available until the next business day. Your provider may request additional information or to have a follow-up visit with you prior to refilling your medication.   *Please Note: Medication refills are assigned a new Rx number when refilled electronically. Your pharmacy may indicate that no refills were authorized even though a new prescription for the same medication is available at the pharmacy. Please request the medicine by name with the pharmacy before contacting your provider for a refill.           [employer] Access Code: Activation code not generated  Current [employer] Status: Active

## 2017-08-22 NOTE — PROGRESS NOTES
Chief Complaint   Patient presents with   • Results     HST         HPI: This patient is a 77 y.o. female, who presents for follow-up asthma and ROBYN. She was seen in April sent to the ER for fever, chills, head and neck pain after a long trip to [place]. Diagnosed with viral syndrome. Other history includes HTN, HLD, cardiac murmur, breast cancer S/P lumpectomy maintained on Arimidex. She is followed by cardiology Dr. Salmon. Gastric sleeve earlier this year with significant weight loss.    In regards to ROBYN, Prior sleep study indicated an AHI of 14.9. She's been on CPAP for many years. New machine was obtained earlier this year. Patient has lost a significant amount of weight since her gastric sleeve procedure and wanted to reassess need for CPAP. Her insurance changed and she had to return her CPAP machine to [facility]. Home sleep study 8/15/17 indicates moderate sleep apnea with AHI of 17.9, minimum saturation 74%. Continued treatment with Auto CPAP is recommended. I will reorder CPAP through [facility]. She's noticed worsening fatigue and restless sleep since discontinuing CPAP earlier this year.    Prior PFT's indicated an FEV1 of 1.90 L 87% predicted, FEV1 FVC ratio 66, DLCO 126% predicted with significant bronchodilator response. She's used Qvar in the past with no subjective benefit. She was started on Symbicort but does not use this consistently. She has albuterol but rarely requires use. She notes occasional wheeze but she is recovering from pneumonia. She denies fever, chills, night sweats, hemoptysis.    Past Medical History   Diagnosis Date   • Psychiatric disorder      depression   • Hypertension    • Hypercholesterolemia    • Dizziness 3/11/2013   • HTN (hypertension) 3/12/2013   • Hypokalemia 3/12/2013   • Vertigo 3/11/2013   • Hyperlipidemia 3/12/2013   • ULCERATIVE COLITIS 3/12/2013   • Breast cancer (CMS-HCC)    • Asthma    • Arthritis    • Heart murmur    • Indigestion    • Hiatus  hernia syndrome    • Snoring    • Bronchitis 2011   • Pain      back, hips, knees   • Heart burn    • Cancer (CMS-[facility]) 12/2012     right breast   • Sleep apnea      h/o gastric sleeve lost 40 lb now not on CPAP       Social History   Substance Use Topics   • Smoking status: Former Smoker -- 1.00 packs/day for 20 years     Types: Cigarettes     Quit date: 05/22/1986   • Smokeless tobacco: Never Used   • Alcohol Use: Yes      Comment: 3 drinks weekly       Family History   Problem Relation Age of Onset   • Heart Disease Mother      CAD MI at age 72   • Cancer Mother    • Cancer Sister    • Lung Disease Father      Lung cancer   • Cancer Maternal Grandmother      Pancreatic cancer   • Heart Attack Maternal Grandfather      MI at age 70   • Heart Disease Sister      Nadya cfever- herat murmur       Current medications as of today   Current Outpatient Prescriptions   Medication Sig Dispense Refill   • Multiple Vitamins-Minerals (OCUVITE PO) Take  by mouth.     • spironolactone (ALDACTONE) 50 MG Tab Take 1 Tab by mouth every day. 30 Tab 3   • valsartan (DIOVAN) 80 MG Tab Take 2 Tabs by mouth every morning. 180 Tab 0   • rosuvastatin (CRESTOR) 20 MG Tab Take 1 Tab by mouth every evening. 30 Tab 11   • Mesalamine (APRISO) 0.375 GM CAPSULE SR 24 HR Take 1 Cap by mouth every morning.     • POTASSIUM PO Take 1 Tab by mouth every morning. OTC     • B Complex Vitamins (VITAMIN B COMPLEX PO) Take 1 Tab by mouth every morning.     • valsartan-hydrochlorothiazide (DIOVAN-HCT) 160-25 MG per tablet Take 1 Tab by mouth every morning.     • anastrozole (ARIMIDEX) 1 MG Tab Take 1 mg by mouth every morning.     • pantoprazole (PROTONIX) 40 MG Tablet Delayed Response Take 40 mg by mouth every morning.     • MAGNESIUM PO Take 1 Tab by mouth every morning.     • vitamin D (CHOLECALCIFEROL) 1000 UNIT Tab Take 1,000 Units by mouth every morning.     • metoprolol SR (TOPROL XL) 25 MG TB24 Take 1 Tab by mouth every day. 90 Tab 3   • aspirin  "EC (ECOTRIN) 81 MG TBEC Take 162 mg by mouth every morning.     • paroxetine (PAXIL) 30 MG TABS Take 10 mg by mouth every morning. Take with food     • docosahexanoic acid (FISH OIL) 1000 MG CAPS Take 1,000 mg by mouth every morning.       No current facility-administered medications for this visit.       Allergies: Sulfa drugs and Codeine    Blood pressure 132/72, pulse 62, resp. rate 15, height 1.626 m (5' 4\"), weight 80.74 kg (178 lb), SpO2 94 %.      ROS:   Constitutional: Denies fevers, chills, night sweats, weight loss or fatigue  Eyes: Denies pain, discharge/drainage  ENT: Denies tinnitus, hearing loss, sinusitis, hoarseness, epistaxis  Allergic: Denies Allergic rhinitis or hayfever  Respiratory: See HPI  Cardiovascular: Denies chest pain, tightness, palpitations, orthopnea or edema  Sleep: Denies snoring, resuscitative gasps, frequent nocturnal awakenings, insomnia  GI/: Denies heartburn, nausea, vomiting, urinary incontinence, hematuria  Musculoskeletal: Denies back pain, painful joints, sore muscles  Neurological: Denies vertigo or headaches  Skin: Denies rashes, lesions  Psychiatric: Denies depression or anxiety      Physical exam:   Constitutional: Well-nourished, well-developed, in no acute distress  Eyes: PERRLA  Neck: supple, no masses  Respiratory: no intercostal retractions or accessory muscle use   Lungs auscultation: Clear to auscultation bilaterally  Cardiovascular: Regular rate rhythm no murmurs, rubs or gallops  Musculoskeletal: Normal gait, no clubbing or cyanosis  Skin: No rashes or lesions  Neuro: No focal deficit, cranial nerves grossly intact  Psychiatric: Oriented to time, person and place.     Diagnosis:  1. Essential hypertension     2. Uncomplicated asthma, unspecified asthma severity     3. ROBYN (obstructive sleep apnea)  DME CPAP   4. BMI 30.0-30.9,adult         Plan:  1. Rx for auto CPAP, new mask and supplies to [facility]  2. Follow-up in 6 weeks to review compliance " download, sooner if needed

## 2017-08-22 NOTE — PATIENT INSTRUCTIONS
1. Initiate auto CPAP, order to [facility]  2. Follow-up in 6 weeks to review compliance download, sooner if needed  3. Follow with PCP and cardiology regularly.

## 2017-08-30 ENCOUNTER — HOSPITAL ENCOUNTER (OUTPATIENT)
Dept: RADIOLOGY | Facility: MEDICAL CENTER | Age: 77
End: 2017-08-30
Attending: INTERNAL MEDICINE
Payer: MEDICARE

## 2017-08-30 DIAGNOSIS — I10 ESSENTIAL HYPERTENSION: ICD-10-CM

## 2017-08-30 PROCEDURE — 93975 VASCULAR STUDY: CPT

## 2017-09-04 NOTE — PROGRESS NOTES
Renal artery doppler: 8/30/17  1. No sonographic evidence of renal artery stenosis.  2. Mild prominence of bilateral renal pelves.  3. Elevated resistive indices, in keeping with medical renal disease.  4. Large post void residual.  5. Mildly complicated right renal cyst

## 2017-09-05 ENCOUNTER — HOSPITAL ENCOUNTER (OUTPATIENT)
Dept: LAB | Facility: MEDICAL CENTER | Age: 77
End: 2017-09-05
Attending: INTERNAL MEDICINE
Payer: MEDICARE

## 2017-09-05 DIAGNOSIS — I10 ESSENTIAL HYPERTENSION: ICD-10-CM

## 2017-09-05 LAB
ANION GAP SERPL CALC-SCNC: 11 MMOL/L (ref 0–11.9)
BUN SERPL-MCNC: 29 MG/DL (ref 8–22)
CALCIUM SERPL-MCNC: 10.4 MG/DL (ref 8.5–10.5)
CHLORIDE SERPL-SCNC: 101 MMOL/L (ref 96–112)
CO2 SERPL-SCNC: 23 MMOL/L (ref 20–33)
CREAT SERPL-MCNC: 1 MG/DL (ref 0.5–1.4)
GFR SERPL CREATININE-BSD FRML MDRD: 54 ML/MIN/1.73 M 2
GLUCOSE SERPL-MCNC: 125 MG/DL (ref 65–99)
POTASSIUM SERPL-SCNC: 3.9 MMOL/L (ref 3.6–5.5)
SODIUM SERPL-SCNC: 135 MMOL/L (ref 135–145)

## 2017-09-05 PROCEDURE — 36415 COLL VENOUS BLD VENIPUNCTURE: CPT

## 2017-09-05 PROCEDURE — 80048 BASIC METABOLIC PNL TOTAL CA: CPT

## 2017-09-06 DIAGNOSIS — I10 ESSENTIAL HYPERTENSION: ICD-10-CM

## 2017-09-06 RX ORDER — VALSARTAN 80 MG/1
160 TABLET ORAL EVERY MORNING
Qty: 180 TAB | Refills: 2 | Status: ON HOLD | OUTPATIENT
Start: 2017-09-06 | End: 2017-10-04

## 2017-09-26 ENCOUNTER — OFFICE VISIT (OUTPATIENT)
Dept: PULMONOLOGY | Facility: HOSPICE | Age: 77
End: 2017-09-26
Payer: MEDICARE

## 2017-09-26 VITALS
DIASTOLIC BLOOD PRESSURE: 70 MMHG | OXYGEN SATURATION: 97 % | RESPIRATION RATE: 15 BRPM | HEIGHT: 64 IN | HEART RATE: 54 BPM | SYSTOLIC BLOOD PRESSURE: 129 MMHG

## 2017-09-26 DIAGNOSIS — J45.909 UNCOMPLICATED ASTHMA, UNSPECIFIED ASTHMA SEVERITY: ICD-10-CM

## 2017-09-26 DIAGNOSIS — G47.33 OSA (OBSTRUCTIVE SLEEP APNEA): ICD-10-CM

## 2017-10-02 ENCOUNTER — RESOLUTE PROFESSIONAL BILLING HOSPITAL PROF FEE (OUTPATIENT)
Dept: HOSPITALIST | Facility: MEDICAL CENTER | Age: 77
End: 2017-10-02
Payer: MEDICARE

## 2017-10-02 ENCOUNTER — HOSPITAL ENCOUNTER (OUTPATIENT)
Facility: MEDICAL CENTER | Age: 77
End: 2017-10-04
Attending: EMERGENCY MEDICINE | Admitting: HOSPITALIST
Payer: MEDICARE

## 2017-10-02 DIAGNOSIS — L03.818 CELLULITIS OF OTHER SPECIFIED SITE: ICD-10-CM

## 2017-10-02 DIAGNOSIS — R55 NEAR SYNCOPE: ICD-10-CM

## 2017-10-02 LAB
ALBUMIN SERPL BCP-MCNC: 4 G/DL (ref 3.2–4.9)
ALBUMIN/GLOB SERPL: 1.3 G/DL
ALP SERPL-CCNC: 57 U/L (ref 30–99)
ALT SERPL-CCNC: 10 U/L (ref 2–50)
ANION GAP SERPL CALC-SCNC: 9 MMOL/L (ref 0–11.9)
AST SERPL-CCNC: 12 U/L (ref 12–45)
BASOPHILS # BLD AUTO: 0.4 % (ref 0–1.8)
BASOPHILS # BLD: 0.03 K/UL (ref 0–0.12)
BILIRUB SERPL-MCNC: 0.6 MG/DL (ref 0.1–1.5)
BUN SERPL-MCNC: 14 MG/DL (ref 8–22)
CALCIUM SERPL-MCNC: 10.3 MG/DL (ref 8.5–10.5)
CHLORIDE SERPL-SCNC: 103 MMOL/L (ref 96–112)
CO2 SERPL-SCNC: 25 MMOL/L (ref 20–33)
CREAT SERPL-MCNC: 0.66 MG/DL (ref 0.5–1.4)
EKG IMPRESSION: NORMAL
EOSINOPHIL # BLD AUTO: 0.11 K/UL (ref 0–0.51)
EOSINOPHIL NFR BLD: 1.4 % (ref 0–6.9)
ERYTHROCYTE [DISTWIDTH] IN BLOOD BY AUTOMATED COUNT: 40.5 FL (ref 35.9–50)
GFR SERPL CREATININE-BSD FRML MDRD: >60 ML/MIN/1.73 M 2
GLOBULIN SER CALC-MCNC: 3 G/DL (ref 1.9–3.5)
GLUCOSE BLD-MCNC: 103 MG/DL (ref 65–99)
GLUCOSE SERPL-MCNC: 91 MG/DL (ref 65–99)
HCT VFR BLD AUTO: 44.1 % (ref 37–47)
HGB BLD-MCNC: 15 G/DL (ref 12–16)
IMM GRANULOCYTES # BLD AUTO: 0.01 K/UL (ref 0–0.11)
IMM GRANULOCYTES NFR BLD AUTO: 0.1 % (ref 0–0.9)
LIPASE SERPL-CCNC: 17 U/L (ref 11–82)
LYMPHOCYTES # BLD AUTO: 2.72 K/UL (ref 1–4.8)
LYMPHOCYTES NFR BLD: 35.2 % (ref 22–41)
MAGNESIUM SERPL-MCNC: 1.8 MG/DL (ref 1.5–2.5)
MCH RBC QN AUTO: 29.5 PG (ref 27–33)
MCHC RBC AUTO-ENTMCNC: 34 G/DL (ref 33.6–35)
MCV RBC AUTO: 86.6 FL (ref 81.4–97.8)
MONOCYTES # BLD AUTO: 0.49 K/UL (ref 0–0.85)
MONOCYTES NFR BLD AUTO: 6.3 % (ref 0–13.4)
NEUTROPHILS # BLD AUTO: 4.36 K/UL (ref 2–7.15)
NEUTROPHILS NFR BLD: 56.6 % (ref 44–72)
NRBC # BLD AUTO: 0 K/UL
NRBC BLD AUTO-RTO: 0 /100 WBC
PLATELET # BLD AUTO: 209 K/UL (ref 164–446)
PMV BLD AUTO: 10.2 FL (ref 9–12.9)
POTASSIUM SERPL-SCNC: 3.8 MMOL/L (ref 3.6–5.5)
PROT SERPL-MCNC: 7 G/DL (ref 6–8.2)
RBC # BLD AUTO: 5.09 M/UL (ref 4.2–5.4)
SODIUM SERPL-SCNC: 137 MMOL/L (ref 135–145)
TROPONIN I SERPL-MCNC: <0.01 NG/ML (ref 0–0.04)
TROPONIN I SERPL-MCNC: <0.01 NG/ML (ref 0–0.04)
WBC # BLD AUTO: 7.7 K/UL (ref 4.8–10.8)

## 2017-10-02 PROCEDURE — G0378 HOSPITAL OBSERVATION PER HR: HCPCS

## 2017-10-02 PROCEDURE — 96365 THER/PROPH/DIAG IV INF INIT: CPT

## 2017-10-02 PROCEDURE — A9270 NON-COVERED ITEM OR SERVICE: HCPCS | Performed by: HOSPITALIST

## 2017-10-02 PROCEDURE — 302128 INFUSION PUMP: Performed by: EMERGENCY MEDICINE

## 2017-10-02 PROCEDURE — 80053 COMPREHEN METABOLIC PANEL: CPT

## 2017-10-02 PROCEDURE — 96367 TX/PROPH/DG ADDL SEQ IV INF: CPT

## 2017-10-02 PROCEDURE — 93005 ELECTROCARDIOGRAM TRACING: CPT

## 2017-10-02 PROCEDURE — 83690 ASSAY OF LIPASE: CPT

## 2017-10-02 PROCEDURE — 99220 PR INITIAL OBSERVATION CARE,LEVL III: CPT | Performed by: HOSPITALIST

## 2017-10-02 PROCEDURE — 82962 GLUCOSE BLOOD TEST: CPT

## 2017-10-02 PROCEDURE — 700102 HCHG RX REV CODE 250 W/ 637 OVERRIDE(OP): Performed by: HOSPITALIST

## 2017-10-02 PROCEDURE — 85025 COMPLETE CBC W/AUTO DIFF WBC: CPT

## 2017-10-02 PROCEDURE — 700111 HCHG RX REV CODE 636 W/ 250 OVERRIDE (IP): Performed by: EMERGENCY MEDICINE

## 2017-10-02 PROCEDURE — 96366 THER/PROPH/DIAG IV INF ADDON: CPT

## 2017-10-02 PROCEDURE — 700111 HCHG RX REV CODE 636 W/ 250 OVERRIDE (IP)

## 2017-10-02 PROCEDURE — 93005 ELECTROCARDIOGRAM TRACING: CPT | Performed by: EMERGENCY MEDICINE

## 2017-10-02 PROCEDURE — 36415 COLL VENOUS BLD VENIPUNCTURE: CPT

## 2017-10-02 PROCEDURE — 700101 HCHG RX REV CODE 250: Performed by: HOSPITALIST

## 2017-10-02 PROCEDURE — 84484 ASSAY OF TROPONIN QUANT: CPT

## 2017-10-02 PROCEDURE — 99285 EMERGENCY DEPT VISIT HI MDM: CPT

## 2017-10-02 PROCEDURE — 83735 ASSAY OF MAGNESIUM: CPT

## 2017-10-02 PROCEDURE — 700105 HCHG RX REV CODE 258

## 2017-10-02 RX ORDER — CHLORAL HYDRATE 500 MG
1000 CAPSULE ORAL EVERY MORNING
Status: DISCONTINUED | OUTPATIENT
Start: 2017-10-03 | End: 2017-10-04 | Stop reason: HOSPADM

## 2017-10-02 RX ORDER — ONDANSETRON 2 MG/ML
4 INJECTION INTRAMUSCULAR; INTRAVENOUS EVERY 4 HOURS PRN
Status: DISCONTINUED | OUTPATIENT
Start: 2017-10-02 | End: 2017-10-04 | Stop reason: HOSPADM

## 2017-10-02 RX ORDER — SPIRONOLACTONE 25 MG/1
50 TABLET ORAL DAILY
Status: DISCONTINUED | OUTPATIENT
Start: 2017-10-03 | End: 2017-10-04 | Stop reason: HOSPADM

## 2017-10-02 RX ORDER — OMEPRAZOLE 20 MG/1
20 CAPSULE, DELAYED RELEASE ORAL DAILY
Status: DISCONTINUED | OUTPATIENT
Start: 2017-10-03 | End: 2017-10-04 | Stop reason: HOSPADM

## 2017-10-02 RX ORDER — DIPHENHYDRAMINE HCL 25 MG
25 TABLET ORAL ONCE
Status: COMPLETED | OUTPATIENT
Start: 2017-10-02 | End: 2017-10-02

## 2017-10-02 RX ORDER — POLYETHYLENE GLYCOL 3350 17 G/17G
1 POWDER, FOR SOLUTION ORAL
Status: DISCONTINUED | OUTPATIENT
Start: 2017-10-02 | End: 2017-10-04 | Stop reason: HOSPADM

## 2017-10-02 RX ORDER — ACETAMINOPHEN 325 MG/1
650 TABLET ORAL EVERY 6 HOURS PRN
Status: DISCONTINUED | OUTPATIENT
Start: 2017-10-02 | End: 2017-10-04 | Stop reason: HOSPADM

## 2017-10-02 RX ORDER — ROSUVASTATIN CALCIUM 20 MG/1
20 TABLET, COATED ORAL EVERY EVENING
Status: DISCONTINUED | OUTPATIENT
Start: 2017-10-02 | End: 2017-10-04 | Stop reason: HOSPADM

## 2017-10-02 RX ORDER — ONDANSETRON 4 MG/1
4 TABLET, ORALLY DISINTEGRATING ORAL EVERY 4 HOURS PRN
Status: DISCONTINUED | OUTPATIENT
Start: 2017-10-02 | End: 2017-10-04 | Stop reason: HOSPADM

## 2017-10-02 RX ORDER — ANASTROZOLE 1 MG/1
1 TABLET ORAL EVERY MORNING
Status: DISCONTINUED | OUTPATIENT
Start: 2017-10-03 | End: 2017-10-04 | Stop reason: HOSPADM

## 2017-10-02 RX ORDER — VALSARTAN 80 MG/1
160 TABLET ORAL
Status: DISCONTINUED | OUTPATIENT
Start: 2017-10-03 | End: 2017-10-03

## 2017-10-02 RX ORDER — AMOXICILLIN 250 MG
2 CAPSULE ORAL 2 TIMES DAILY
Status: DISCONTINUED | OUTPATIENT
Start: 2017-10-02 | End: 2017-10-04 | Stop reason: HOSPADM

## 2017-10-02 RX ORDER — CEFTRIAXONE 1 G/1
1 INJECTION, POWDER, FOR SOLUTION INTRAMUSCULAR; INTRAVENOUS ONCE
Status: COMPLETED | OUTPATIENT
Start: 2017-10-02 | End: 2017-10-02

## 2017-10-02 RX ORDER — PANTOPRAZOLE SODIUM 40 MG/1
40 TABLET, DELAYED RELEASE ORAL EVERY MORNING
Status: DISCONTINUED | OUTPATIENT
Start: 2017-10-02 | End: 2017-10-02

## 2017-10-02 RX ORDER — VALSARTAN AND HYDROCHLOROTHIAZIDE 160; 25 MG/1; MG/1
1 TABLET ORAL EVERY MORNING
Status: DISCONTINUED | OUTPATIENT
Start: 2017-10-02 | End: 2017-10-02

## 2017-10-02 RX ORDER — BISACODYL 10 MG
10 SUPPOSITORY, RECTAL RECTAL
Status: DISCONTINUED | OUTPATIENT
Start: 2017-10-02 | End: 2017-10-04 | Stop reason: HOSPADM

## 2017-10-02 RX ORDER — HYDROCHLOROTHIAZIDE 25 MG/1
25 TABLET ORAL
Status: DISCONTINUED | OUTPATIENT
Start: 2017-10-03 | End: 2017-10-03

## 2017-10-02 RX ORDER — ENALAPRILAT 1.25 MG/ML
1.25 INJECTION INTRAVENOUS EVERY 6 HOURS PRN
Status: DISCONTINUED | OUTPATIENT
Start: 2017-10-02 | End: 2017-10-04 | Stop reason: HOSPADM

## 2017-10-02 RX ORDER — MESALAMINE 0.38 G/1
2 CAPSULE, EXTENDED RELEASE ORAL 2 TIMES DAILY
Status: DISCONTINUED | OUTPATIENT
Start: 2017-10-02 | End: 2017-10-02

## 2017-10-02 RX ORDER — PAROXETINE 10 MG/1
10 TABLET, FILM COATED ORAL DAILY
Status: DISCONTINUED | OUTPATIENT
Start: 2017-10-03 | End: 2017-10-04 | Stop reason: HOSPADM

## 2017-10-02 RX ORDER — HALOPERIDOL 5 MG/ML
5 INJECTION INTRAMUSCULAR EVERY 4 HOURS PRN
Status: DISCONTINUED | OUTPATIENT
Start: 2017-10-02 | End: 2017-10-04 | Stop reason: HOSPADM

## 2017-10-02 RX ORDER — PAROXETINE 10 MG/1
10 TABLET, FILM COATED ORAL DAILY
Status: SHIPPED | COMMUNITY
End: 2022-08-31 | Stop reason: SDUPTHER

## 2017-10-02 RX ORDER — MAGNESIUM OXIDE 400 MG/1
400 TABLET ORAL DAILY
COMMUNITY
End: 2019-02-05

## 2017-10-02 RX ORDER — VALSARTAN 80 MG/1
160 TABLET ORAL EVERY MORNING
Status: DISCONTINUED | OUTPATIENT
Start: 2017-10-03 | End: 2017-10-03

## 2017-10-02 RX ADMIN — ROSUVASTATIN CALCIUM 20 MG: 20 TABLET, FILM COATED ORAL at 22:59

## 2017-10-02 RX ADMIN — CEFTRIAXONE SODIUM 1 G: 1 INJECTION, POWDER, FOR SOLUTION INTRAMUSCULAR; INTRAVENOUS at 17:15

## 2017-10-02 RX ADMIN — METRONIDAZOLE 500 MG: 500 INJECTION, SOLUTION INTRAVENOUS at 23:00

## 2017-10-02 RX ADMIN — DIPHENHYDRAMINE HCL 25 MG: 25 TABLET ORAL at 20:52

## 2017-10-02 RX ADMIN — VANCOMYCIN HYDROCHLORIDE 2000 MG: 100 INJECTION, POWDER, LYOPHILIZED, FOR SOLUTION INTRAVENOUS at 18:11

## 2017-10-02 ASSESSMENT — LIFESTYLE VARIABLES
TOTAL SCORE: 0
EVER FELT BAD OR GUILTY ABOUT YOUR DRINKING: NO
HAVE PEOPLE ANNOYED YOU BY CRITICIZING YOUR DRINKING: NO
TOTAL SCORE: 0
TOTAL SCORE: 0
EVER_SMOKED: YES
AVERAGE NUMBER OF DAYS PER WEEK YOU HAVE A DRINK CONTAINING ALCOHOL: 7
HOW MANY TIMES IN THE PAST YEAR HAVE YOU HAD 5 OR MORE DRINKS IN A DAY: 0
EVER HAD A DRINK FIRST THING IN THE MORNING TO STEADY YOUR NERVES TO GET RID OF A HANGOVER: NO
CONSUMPTION TOTAL: NEGATIVE
ALCOHOL_USE: YES
ON A TYPICAL DAY WHEN YOU DRINK ALCOHOL HOW MANY DRINKS DO YOU HAVE: 1
HAVE YOU EVER FELT YOU SHOULD CUT DOWN ON YOUR DRINKING: NO

## 2017-10-02 ASSESSMENT — COPD QUESTIONNAIRES
COPD SCREENING SCORE: 3
DO YOU EVER COUGH UP ANY MUCUS OR PHLEGM?: NO/ONLY WITH OCCASIONAL COLDS OR INFECTIONS
DURING THE PAST 4 WEEKS HOW MUCH DID YOU FEEL SHORT OF BREATH: SOME OF THE TIME
HAVE YOU SMOKED AT LEAST 100 CIGARETTES IN YOUR ENTIRE LIFE: NO/DON'T KNOW

## 2017-10-02 ASSESSMENT — PAIN SCALES - GENERAL
PAINLEVEL_OUTOF10: 5
PAINLEVEL_OUTOF10: 0

## 2017-10-02 NOTE — ED NOTES
"Pt brought back to room from [facility], ambulatory with steady gait.    Pt states earlier this AM she had sudden onset of dizziness with nausea and feeling like \"I was going to pass out.\" pt denies syncope, pain. Pt states during this time she also felt clammy. Pt states dizziness has decreased and denies nausea at this time but states \"I still don't feel well.\"     Pt also states a \"hard lump\" near vaginal opening that she noticed last night. Denies any drainage from site. MD Burciaga at bedside for exam. Site appears pink. Painful with palpation. Per MD Mariscal site feels \"hard.\"     Pt a/o x4, speaking in full sentences.   "

## 2017-10-02 NOTE — ED NOTES
Chief Complaint   Patient presents with   • Dizziness     onset 20minutes   • Nausea     Pt c/o sudden onset of dizziness ,nausea,feeling cold 20minutes ago. She said she had episodes of Vertigo before but not like this.  Pt denies chest pain or sob.   Called for ekg

## 2017-10-03 PROBLEM — K62.9 LESION OF PERIANAL AREA: Status: ACTIVE | Noted: 2017-10-03

## 2017-10-03 LAB
ANION GAP SERPL CALC-SCNC: 7 MMOL/L (ref 0–11.9)
BUN SERPL-MCNC: 12 MG/DL (ref 8–22)
CALCIUM SERPL-MCNC: 9.4 MG/DL (ref 8.5–10.5)
CHLORIDE SERPL-SCNC: 105 MMOL/L (ref 96–112)
CHOLEST SERPL-MCNC: 208 MG/DL (ref 100–199)
CO2 SERPL-SCNC: 26 MMOL/L (ref 20–33)
CREAT SERPL-MCNC: 0.6 MG/DL (ref 0.5–1.4)
ERYTHROCYTE [DISTWIDTH] IN BLOOD BY AUTOMATED COUNT: 40.5 FL (ref 35.9–50)
GFR SERPL CREATININE-BSD FRML MDRD: >60 ML/MIN/1.73 M 2
GLUCOSE SERPL-MCNC: 93 MG/DL (ref 65–99)
HCT VFR BLD AUTO: 40.7 % (ref 37–47)
HDLC SERPL-MCNC: 48 MG/DL
HGB BLD-MCNC: 13.7 G/DL (ref 12–16)
LDLC SERPL CALC-MCNC: 115 MG/DL
LV EJECT FRACT  99904: 70
LV EJECT FRACT MOD 2C 99903: 69.73
LV EJECT FRACT MOD 4C 99902: 79.37
LV EJECT FRACT MOD BP 99901: 76.49
MCH RBC QN AUTO: 29.3 PG (ref 27–33)
MCHC RBC AUTO-ENTMCNC: 33.7 G/DL (ref 33.6–35)
MCV RBC AUTO: 87 FL (ref 81.4–97.8)
PLATELET # BLD AUTO: 192 K/UL (ref 164–446)
PMV BLD AUTO: 9.7 FL (ref 9–12.9)
POTASSIUM SERPL-SCNC: 3.4 MMOL/L (ref 3.6–5.5)
RBC # BLD AUTO: 4.68 M/UL (ref 4.2–5.4)
SODIUM SERPL-SCNC: 138 MMOL/L (ref 135–145)
TRIGL SERPL-MCNC: 225 MG/DL (ref 0–149)
TSH SERPL DL<=0.005 MIU/L-ACNC: 2.44 UIU/ML (ref 0.3–3.7)
WBC # BLD AUTO: 7.2 K/UL (ref 4.8–10.8)

## 2017-10-03 PROCEDURE — 96372 THER/PROPH/DIAG INJ SC/IM: CPT

## 2017-10-03 PROCEDURE — A9270 NON-COVERED ITEM OR SERVICE: HCPCS

## 2017-10-03 PROCEDURE — 85027 COMPLETE CBC AUTOMATED: CPT

## 2017-10-03 PROCEDURE — 700102 HCHG RX REV CODE 250 W/ 637 OVERRIDE(OP): Performed by: HOSPITALIST

## 2017-10-03 PROCEDURE — 96367 TX/PROPH/DG ADDL SEQ IV INF: CPT

## 2017-10-03 PROCEDURE — 96366 THER/PROPH/DIAG IV INF ADDON: CPT

## 2017-10-03 PROCEDURE — 93880 EXTRACRANIAL BILAT STUDY: CPT

## 2017-10-03 PROCEDURE — A9270 NON-COVERED ITEM OR SERVICE: HCPCS | Performed by: HOSPITALIST

## 2017-10-03 PROCEDURE — 80061 LIPID PANEL: CPT

## 2017-10-03 PROCEDURE — 36415 COLL VENOUS BLD VENIPUNCTURE: CPT

## 2017-10-03 PROCEDURE — 93306 TTE W/DOPPLER COMPLETE: CPT | Mod: 26 | Performed by: INTERNAL MEDICINE

## 2017-10-03 PROCEDURE — 80048 BASIC METABOLIC PNL TOTAL CA: CPT

## 2017-10-03 PROCEDURE — 700102 HCHG RX REV CODE 250 W/ 637 OVERRIDE(OP)

## 2017-10-03 PROCEDURE — 84443 ASSAY THYROID STIM HORMONE: CPT

## 2017-10-03 PROCEDURE — 700101 HCHG RX REV CODE 250: Performed by: HOSPITALIST

## 2017-10-03 PROCEDURE — 700111 HCHG RX REV CODE 636 W/ 250 OVERRIDE (IP): Performed by: HOSPITALIST

## 2017-10-03 PROCEDURE — 93306 TTE W/DOPPLER COMPLETE: CPT

## 2017-10-03 PROCEDURE — 99226 PR SUBSEQUENT OBSERVATION CARE,LEVEL III: CPT | Performed by: HOSPITALIST

## 2017-10-03 PROCEDURE — G0378 HOSPITAL OBSERVATION PER HR: HCPCS

## 2017-10-03 RX ORDER — METRONIDAZOLE 500 MG/1
500 TABLET ORAL EVERY 8 HOURS
Status: DISCONTINUED | OUTPATIENT
Start: 2017-10-03 | End: 2017-10-04 | Stop reason: HOSPADM

## 2017-10-03 RX ORDER — VALSARTAN 80 MG/1
80 TABLET ORAL
Status: DISCONTINUED | OUTPATIENT
Start: 2017-10-04 | End: 2017-10-03

## 2017-10-03 RX ORDER — CALCIUM CARBONATE 500 MG/1
1000 TABLET, CHEWABLE ORAL EVERY 6 HOURS PRN
Status: DISCONTINUED | OUTPATIENT
Start: 2017-10-03 | End: 2017-10-04 | Stop reason: HOSPADM

## 2017-10-03 RX ORDER — ISOSORBIDE DINITRATE 10 MG/1
10 TABLET ORAL 3 TIMES DAILY
Status: DISCONTINUED | OUTPATIENT
Start: 2017-10-03 | End: 2017-10-04

## 2017-10-03 RX ORDER — MAGNESIUM SULFATE HEPTAHYDRATE 40 MG/ML
2 INJECTION, SOLUTION INTRAVENOUS ONCE
Status: COMPLETED | OUTPATIENT
Start: 2017-10-03 | End: 2017-10-03

## 2017-10-03 RX ORDER — VALSARTAN 80 MG/1
160 TABLET ORAL TWICE DAILY
Status: DISCONTINUED | OUTPATIENT
Start: 2017-10-03 | End: 2017-10-04 | Stop reason: HOSPADM

## 2017-10-03 RX ADMIN — SPIRONOLACTONE 50 MG: 25 TABLET, FILM COATED ORAL at 09:03

## 2017-10-03 RX ADMIN — CEFTRIAXONE 1 G: 1 INJECTION, SOLUTION INTRAVENOUS at 00:18

## 2017-10-03 RX ADMIN — MESALAMINE 500 MG: 250 CAPSULE ORAL at 14:37

## 2017-10-03 RX ADMIN — VALSARTAN 160 MG: 80 TABLET ORAL at 09:05

## 2017-10-03 RX ADMIN — ASPIRIN 81 MG: 81 TABLET ORAL at 09:03

## 2017-10-03 RX ADMIN — MESALAMINE 500 MG: 250 CAPSULE ORAL at 22:43

## 2017-10-03 RX ADMIN — MAGNESIUM SULFATE IN WATER 2 G: 40 INJECTION, SOLUTION INTRAVENOUS at 02:21

## 2017-10-03 RX ADMIN — ROSUVASTATIN CALCIUM 20 MG: 20 TABLET, FILM COATED ORAL at 22:44

## 2017-10-03 RX ADMIN — ISOSORBIDE DINITRATE 10 MG: 10 TABLET ORAL at 22:42

## 2017-10-03 RX ADMIN — STANDARDIZED SENNA CONCENTRATE AND DOCUSATE SODIUM 2 TABLET: 8.6; 5 TABLET, FILM COATED ORAL at 09:02

## 2017-10-03 RX ADMIN — CEFTRIAXONE 2 G: 2 INJECTION, SOLUTION INTRAVENOUS at 12:52

## 2017-10-03 RX ADMIN — ENOXAPARIN SODIUM 40 MG: 100 INJECTION SUBCUTANEOUS at 10:58

## 2017-10-03 RX ADMIN — ANTACID TABLETS 1000 MG: 500 TABLET, CHEWABLE ORAL at 22:45

## 2017-10-03 RX ADMIN — MAGNESIUM GLUCONATE 500 MG ORAL TABLET 400 MG: 500 TABLET ORAL at 09:03

## 2017-10-03 RX ADMIN — VALSARTAN 80 MG: 80 TABLET ORAL at 09:05

## 2017-10-03 RX ADMIN — PAROXETINE HYDROCHLORIDE 10 MG: 10 TABLET, FILM COATED ORAL at 09:00

## 2017-10-03 RX ADMIN — OMEPRAZOLE 20 MG: 20 CAPSULE, DELAYED RELEASE ORAL at 09:03

## 2017-10-03 RX ADMIN — METRONIDAZOLE 500 MG: 500 TABLET ORAL at 14:37

## 2017-10-03 RX ADMIN — MESALAMINE 500 MG: 250 CAPSULE ORAL at 09:03

## 2017-10-03 RX ADMIN — OMEGA-3 FATTY ACIDS CAP 1000 MG 1000 MG: 1000 CAP at 09:03

## 2017-10-03 RX ADMIN — VITAMIN D, TAB 1000IU (100/BT) 1000 UNITS: 25 TAB at 09:02

## 2017-10-03 RX ADMIN — ANASTROZOLE 1 MG: 1 TABLET, COATED ORAL at 09:03

## 2017-10-03 RX ADMIN — METRONIDAZOLE 500 MG: 500 TABLET ORAL at 22:44

## 2017-10-03 RX ADMIN — METRONIDAZOLE 500 MG: 500 INJECTION, SOLUTION INTRAVENOUS at 05:48

## 2017-10-03 ASSESSMENT — ENCOUNTER SYMPTOMS
NECK PAIN: 0
SPEECH CHANGE: 0
BACK PAIN: 0
COUGH: 0
MYALGIAS: 0
LOSS OF CONSCIOUSNESS: 0
HEARTBURN: 0
DIZZINESS: 0
ABDOMINAL PAIN: 0
CHILLS: 0
SENSORY CHANGE: 0
NERVOUS/ANXIOUS: 0
PALPITATIONS: 0
DIARRHEA: 0
FOCAL WEAKNESS: 0
HEMOPTYSIS: 0
BLURRED VISION: 0
EYE PAIN: 0
TREMORS: 0
FEVER: 0
SPUTUM PRODUCTION: 0
HEADACHES: 0
MEMORY LOSS: 0
STRIDOR: 0
ORTHOPNEA: 0
CONSTIPATION: 0
CLAUDICATION: 0
PHOTOPHOBIA: 0
DOUBLE VISION: 0
WEAKNESS: 1
SORE THROAT: 0
TINGLING: 0
WEIGHT LOSS: 0
NAUSEA: 0
DEPRESSION: 0
VOMITING: 0
SHORTNESS OF BREATH: 0
WEAKNESS: 0
DIZZINESS: 1
DIAPHORESIS: 0
BLOOD IN STOOL: 0
PND: 0

## 2017-10-03 ASSESSMENT — PAIN SCALES - GENERAL
PAINLEVEL_OUTOF10: 0

## 2017-10-03 ASSESSMENT — PATIENT HEALTH QUESTIONNAIRE - PHQ9
2. FEELING DOWN, DEPRESSED, IRRITABLE, OR HOPELESS: NOT AT ALL
SUM OF ALL RESPONSES TO PHQ9 QUESTIONS 1 AND 2: 0
SUM OF ALL RESPONSES TO PHQ QUESTIONS 1-9: 0
1. LITTLE INTEREST OR PLEASURE IN DOING THINGS: NOT AT ALL

## 2017-10-03 NOTE — PROGRESS NOTES
Pt arrived to floor via gurney, ambulated to bed, with initial c/o itching to head, IV abx stopped. Oriented pt to room, bed, call light, floor. POC discussed, pt verbalized understanding and agreement. A&Ox4, SB on tele monitor, HR-54,  denies pain at this time. Pt's personal belongings and call light within reach.

## 2017-10-03 NOTE — PROGRESS NOTES
Pt resting in bed, watching TV, denies pain, dizziness at rest, or needs at this time. Call light and personal possessions within reach, will continue to monitor.

## 2017-10-03 NOTE — ASSESSMENT & PLAN NOTE
Possibly 2/2 to bartholin cyst, not consolidated .  Continue IV antibiotics, will continue to follow clinically,  Outpatient vs inpatient surgical I&D.

## 2017-10-03 NOTE — ASSESSMENT & PLAN NOTE
Uncontrolled, 180s systolic  Increased Valsartan to 320 mg.  Added Isodril will determine response.

## 2017-10-03 NOTE — PROGRESS NOTES
Pt resting, respirations are regular and non labored.  Slight dizziness at times with movement,  improved when back to bed. Blood pressure much better this afternoon.

## 2017-10-03 NOTE — PROGRESS NOTES
Dr Molina updated r/g pt's c/o itching, order received to stop IX abx Vacomyacin, give benadryl, see MAR

## 2017-10-03 NOTE — ED NOTES
Med rec updated and complete.  Allergies reviewed.  Met with pt at bedside.  Dicussed current medications and   Last doses taken.  Pt is currently taking valsartan/hctz.  Pt also takes additional valsartan.  Total dose of valsartan is 320 mg daily.  Pt denies antibiotic use in last 30 days.

## 2017-10-03 NOTE — H&P
Hospital Medicine History and Physical    Date of Service  Patient seen and examined 10/2/2017    Chief Complaint  Chief Complaint   Patient presents with   • Dizziness     onset 20minutes   • Nausea       History of Presenting Illness  77 y.o. female who presented 10/2/2017 with episode of near syncope at noon while at a meeting.  She became diaphoretic, nauseated, and feeling hot.  She didn't vomit nor did she lose consciousness.  Her speech was clear and she had no loss of neuromuscular function.  She is oriented. She denied palpitations.  Some dizziness upon standing.  She states her complaints started when she was looking toward her left while sitting.  She was not coughing nor was she wearing any tight neck clothing.  She has not had any recent diarrhea nor excessive urination.  She ate earlier that morning and states she was not dehydrated.  She does have hx of gastric sleeve in past few years but no gross weight change recently.  She also mentions an area of irritation by wearing undewear between her anus and vagina that has been present for the past 2-3 days but has not had any discharge nor fevers.    Primary Care Physician  Terra Zambrano M.D.    Code Status  FULL    Review of Systems  Review of Systems   Constitutional: Negative for chills, diaphoresis, fever and weight loss.   Eyes: Negative for blurred vision and double vision.   Respiratory: Negative for shortness of breath.    Cardiovascular: Negative for chest pain, palpitations and leg swelling.   Gastrointestinal: Negative for abdominal pain, blood in stool, diarrhea, nausea and vomiting.   Genitourinary: Negative for dysuria and frequency.   Musculoskeletal: Negative for back pain, myalgias and neck pain.   Skin: Positive for itching (on he head when getting vancomycin infused). Negative for rash.   Neurological: Positive for dizziness. Negative for speech change, focal weakness, loss of consciousness, weakness and headaches.    Psychiatric/Behavioral: Negative for depression. The patient is not nervous/anxious.           Past Medical History  Past Medical History:   Diagnosis Date   • HTN (hypertension) 3/12/2013   • Hypokalemia 3/12/2013   • Hyperlipidemia 3/12/2013   • ULCERATIVE COLITIS 3/12/2013   • Dizziness 3/11/2013   • Vertigo 3/11/2013   • Cancer (CMS-HCC) 12/2012    right breast   • Bronchitis 2011   • Arthritis    • Asthma    • Breast cancer (CMS-HCC)    • Heart burn    • Heart murmur    • Hiatus hernia syndrome    • Hypercholesterolemia    • Hypertension    • Indigestion    • Pain     back, hips, knees   • Psychiatric disorder     depression   • Sleep apnea     h/o gastric sleeve lost 40 lb now not on CPAP   • Snoring        Surgical History  Past Surgical History:   Procedure Laterality Date   • GASTRIC SLEEVE LAPAROSCOPY N/A 5/18/2016    Procedure: GASTRIC SLEEVE LAPAROSCOPY, HIATAL HERNIA AND LIVER BIOPSY;  Surgeon: Mauricio Montes M.D.;  Location: SURGERY [facility];  Service:    • US-NEEDLE CORE BX-BREAST PANEL  2013    rt breast, with lumpectomy    • GYN SURGERY      vag hyster 1990   • GYN SURGERY      vaginal cyst removal    • LUMPECTOMY  left   • OTHER       R breast bx X 2& lipoma removal       Medications  No current facility-administered medications on file prior to encounter.      Current Outpatient Prescriptions on File Prior to Encounter   Medication Sig Dispense Refill   • valsartan (DIOVAN) 80 MG Tab Take 2 Tabs by mouth every morning. 180 Tab 2   • spironolactone (ALDACTONE) 50 MG Tab Take 1 Tab by mouth every day. 30 Tab 3   • rosuvastatin (CRESTOR) 20 MG Tab Take 1 Tab by mouth every evening. 30 Tab 11   • Mesalamine (APRISO) 0.375 GM CAPSULE SR 24 HR Take 2 Caps by mouth 2 Times a Day.     • B Complex Vitamins (VITAMIN B COMPLEX PO) Take 1 Tab by mouth every morning.     • valsartan-hydrochlorothiazide (DIOVAN-HCT) 160-25 MG per tablet Take 1 Tab by mouth every morning.     • anastrozole (ARIMIDEX) 1  MG Tab Take 1 mg by mouth every morning.     • pantoprazole (PROTONIX) 40 MG Tablet Delayed Response Take 40 mg by mouth every morning.     • vitamin D (CHOLECALCIFEROL) 1000 UNIT Tab Take 1,000 Units by mouth every morning.     • metoprolol SR (TOPROL XL) 25 MG TB24 Take 1 Tab by mouth every day. 90 Tab 3   • aspirin EC (ECOTRIN) 81 MG TBEC Take 81 mg by mouth every morning.     • docosahexanoic acid (FISH OIL) 1000 MG CAPS Take 1,000 mg by mouth every morning.         Family History  Family History   Problem Relation Age of Onset   • Heart Disease Mother      CAD MI at age 72   • Cancer Mother      breast cancer  at age 83   • Cancer Sister    • Lung Disease Father 75     Lung cancer   • Cancer Maternal Grandmother      Pancreatic cancer   • Heart Attack Maternal Grandfather      MI at age 70   • Heart Disease Sister      Rhuemati cfever- herat murmur       Social History  Social History   Substance Use Topics   • Smoking status: Former Smoker     Packs/day: 1.00     Years: 20.00     Types: Cigarettes     Quit date: 1986   • Smokeless tobacco: Never Used   • Alcohol use Yes      Comment: 3 drinks weekly       Allergies  Allergies   Allergen Reactions   • Sulfa Drugs Rash and Swelling     Rash, joint swelling  [id number]   • Vancomycin Itching   • Codeine Vomiting and Nausea        Physical Exam  Laboratory   Hemodynamics  Temp (24hrs), Av.4 °C (97.6 °F), Min:36 °C (96.8 °F), Max:37.1 °C (98.8 °F)   Temperature: 36 °C (96.8 °F)  Pulse  Av  Min: 49  Max: 99 Heart Rate (Monitored): (!) 57  Blood Pressure : 155/63, NIBP: (!) 163/60      Respiratory      Respiration: 16, Pulse Oximetry: 96 %        RUL Breath Sounds: Expiratory Wheezes, RML Breath Sounds: Expiratory Wheezes, RLL Breath Sounds: Expiratory Wheezes, KUNAL Breath Sounds: Expiratory Wheezes, LLL Breath Sounds: Expiratory Wheezes    Physical Exam   Constitutional: She is oriented to person, place, and time. She appears well-developed and  well-nourished. No distress.   HENT:   Head: Normocephalic and atraumatic.   Nose: Nose normal.   Mouth/Throat: No oropharyngeal exudate.   Eyes: Conjunctivae and EOM are normal. Right eye exhibits no discharge. Left eye exhibits no discharge. No scleral icterus.   Neck: Carotid bruit is present (louder on left than right but could be cardiac murmur). No tracheal deviation present.   Cardiovascular: Normal rate, regular rhythm and intact distal pulses.    Murmur heard.  Pulses:       Radial pulses are 2+ on the right side, and 2+ on the left side.        Dorsalis pedis pulses are 2+ on the right side, and 2+ on the left side.   Pulmonary/Chest: Effort normal. No stridor. No respiratory distress. She has no wheezes. She has no rales.   Abdominal: Soft. She exhibits no distension. There is no tenderness.   Genitourinary:   Genitourinary Comments: Area between anus and vagina with 1cm area of erythema and induration w/o discharge.  Minimally tender   Musculoskeletal: She exhibits no edema.   Neurological: She is alert and oriented to person, place, and time. No cranial nerve deficit.   Skin: Skin is warm and dry. She is not diaphoretic.   Psychiatric: She has a normal mood and affect. Her behavior is normal. Thought content normal.       Recent Labs      10/02/17   1639   WBC  7.7   RBC  5.09   HEMOGLOBIN  15.0   HEMATOCRIT  44.1   MCV  86.6   MCH  29.5   MCHC  34.0   RDW  40.5   PLATELETCT  209   MPV  10.2     Recent Labs      10/02/17   1639   SODIUM  137   POTASSIUM  3.8   CHLORIDE  103   CO2  25   GLUCOSE  91   BUN  14   CREATININE  0.66   CALCIUM  10.3                   Imaging  No results found.   Assessment/Plan     I anticipate this patient will require at least two midnights for appropriate medical management, necessitating inpatient admission.    * Near syncope   Assessment & Plan    Check echocardiogram  Check US carotids as questionable bruits on exam.  ASA  Possible bradycardia as etiology as EKG showed HR  in the 40's.  Stop metoprolol.    Check TSH  Check orthostatic BP  Do not suspect vertigo        Lesion of perianal area   Assessment & Plan    Initiated on rocephin and vanco in ED but had itching after vancomycin given even after vanco rate was slowed.  Start flagyl and continue rocephin  Question of possible Bartholin cysts.  May need surgical excision.  Monitor vitals.  I do not believe this is etiology of near syncope.        Nonrheumatic aortic valve stenosis- (present on admission)   Assessment & Plan    Last Echo showed moderate stenosis.  F/U with cardiology, Dr Salmon outpatient.        HTN (hypertension)- (present on admission)   Assessment & Plan    Monitor vitals.        Dyslipidemia- (present on admission)   Assessment & Plan    Statin therapy        ROBYN (obstructive sleep apnea)- (present on admission)   Assessment & Plan    Weight loss s/p her gastric sleeve  States she has an APAP at home            VTE prophylaxis lovenox.

## 2017-10-03 NOTE — DISCHARGE PLANNING
Discussed with bedside RN and hospitalist during POC rounds,  pt assessed for any discharge needs.  Pt has support at home and verbalized discharge plans to bedside RN.  No needs identified at this time.  Will continue to follow for any further needs. Appt for f/u made by schedulers.

## 2017-10-03 NOTE — ED NOTES
Received report from Marce CHAWLA. Pt care responsibilities assumed. Pt resting in bed, Monitors in place, VSS, will continue to monitor.

## 2017-10-03 NOTE — ASSESSMENT & PLAN NOTE
Echocardiogram unchanged, preserved LVEF.  Carotid Duplex: grossly normal.  HR normalized after cessation of metoprolol.  TSH, Free T4, WNL  Orthostatic BP +, elevated 180s.  Patient is ambulatory, symptoms resolved

## 2017-10-03 NOTE — ASSESSMENT & PLAN NOTE
Last Echo showed moderate stenosis.  F/U with cardiology, Dr Salmon outpatient.  Repeat echo unchanged

## 2017-10-03 NOTE — ED NOTES
Pt on call light stating her head is itching. Pt has vancomycin infusing. Vanco stopped, no noticeable hives to body. Pt face/chest does NOT appear flushed, maintaining airway.     Spoke to ED pharmacist, states to keep vanco off for 10-15 minutes and than reevaluate. If itching has stopped ok to restart at 71 ml/hr. If symptoms continue notify pharmacist.

## 2017-10-03 NOTE — ED NOTES
Called report to CDU RN. Pt is aware of POC. Pt belongings are on bed. Pt is ready for transport.

## 2017-10-03 NOTE — ED PROVIDER NOTES
ED Provider Note    CHIEF COMPLAINT  Chief Complaint   Patient presents with   • Dizziness     onset 20minutes   • Nausea       HPI  Erin Hess is a 77 y.o. female who presentsTo the emergency department complaining of dizziness, generalized weakness and an episode today where she nearly passed out. The patient says that she had been sitting for about one hour in a meeting when she suddenly started to feel very dizzy and lightheaded and she became very diaphoretic and nauseous and thought that she would pass out so she got up and left the meeting and had to hang onto the wall to present herself from falling. The patient was subsequently brought in by a family member for evaluation. The patient also complains that over the last couple of days she has noted a pimple that is getting larger located in the perineum. There is been no pus or discharge from the area. The patient does have a history of vertigo but she says this does not feel like her usual vertigo and she does not usually become diaphoretic or nauseous with her vertigo.    REVIEW OF SYSTEMS no fever or chills no chest pain no hemoptysis no shortness of breath. All other systems negative    PAST MEDICAL HISTORY  Past Medical History:   Diagnosis Date   • HTN (hypertension) 3/12/2013   • Hypokalemia 3/12/2013   • Hyperlipidemia 3/12/2013   • ULCERATIVE COLITIS 3/12/2013   • Dizziness 3/11/2013   • Vertigo 3/11/2013   • Cancer (CMS-[facility]) 12/2012    right breast   • Bronchitis 2011   • Arthritis    • Asthma    • Breast cancer (CMS-HCC)    • Heart burn    • Heart murmur    • Hiatus hernia syndrome    • Hypercholesterolemia    • Hypertension    • Indigestion    • Pain     back, hips, knees   • Psychiatric disorder     depression   • Sleep apnea     h/o gastric sleeve lost 40 lb now not on CPAP   • Snoring        FAMILY HISTORY  Family History   Problem Relation Age of Onset   • Heart Disease Mother      CAD MI at age 72   • Cancer Mother      breast cancer   at age 83   • Cancer Sister    • Lung Disease Father 75     Lung cancer   • Cancer Maternal Grandmother      Pancreatic cancer   • Heart Attack Maternal Grandfather      MI at age 70   • Heart Disease Sister      Nadya cfever- herat murmur       SOCIAL HISTORY  Social History     Social History   • Marital status:      Spouse name: N/A   • Number of children: N/A   • Years of education: N/A     Social History Main Topics   • Smoking status: Former Smoker     Packs/day: 1.00     Years: 20.00     Types: Cigarettes     Quit date: 1986   • Smokeless tobacco: Never Used   • Alcohol use Yes      Comment: 3 drinks weekly   • Drug use: No   • Sexual activity: Not on file     Other Topics Concern   • Not on file     Social History Narrative   • No narrative on file       SURGICAL HISTORY  Past Surgical History:   Procedure Laterality Date   • GASTRIC SLEEVE LAPAROSCOPY N/A 2016    Procedure: GASTRIC SLEEVE LAPAROSCOPY, HIATAL HERNIA AND LIVER BIOPSY;  Surgeon: Mauricio Montes M.D.;  Location: SURGERY [facility];  Service:    • US-NEEDLE CORE BX-BREAST PANEL      rt breast, with lumpectomy    • GYN SURGERY      vag hyster    • GYN SURGERY      vaginal cyst removal    • LUMPECTOMY  left   • OTHER       R breast bx X 2& lipoma removal       CURRENT MEDICATIONS  Home Medications     Reviewed by Shanice Pryor R.N. (Registered Nurse) on 10/02/17 at 2019  Med List Status: Complete   Medication Last Dose Status   anastrozole (ARIMIDEX) 1 MG Tab 10/2/2017 Active   aspirin EC (ECOTRIN) 81 MG TBEC 10/2/2017 Active   B Complex Vitamins (VITAMIN B COMPLEX PO) 10/2/2017 Active   docosahexanoic acid (FISH OIL) 1000 MG CAPS 10/2/2017 Active   magnesium oxide (MAG-OX) 400 MG Tab 10/2/2017 Active   Mesalamine (APRISO) 0.375 GM CAPSULE SR 24 HR 10/2/2017 Active   metoprolol SR (TOPROL XL) 25 MG TB24 10/2/2017 Active   Multiple Vitamins-Minerals (ICAPS AREDS 2) Cap 10/2/2017 Active   pantoprazole  "(PROTONIX) 40 MG Tablet Delayed Response 10/2/2017 Active   paroxetine (PAXIL) 10 MG Tab 10/2/2017 Active   rosuvastatin (CRESTOR) 20 MG Tab 10/1/2017 Active   spironolactone (ALDACTONE) 50 MG Tab 10/2/2017 Active   valsartan (DIOVAN) 80 MG Tab 10/2/2017 Active   valsartan-hydrochlorothiazide (DIOVAN-HCT) 160-25 MG per tablet 10/2/2017 Active   vitamin D (CHOLECALCIFEROL) 1000 UNIT Tab 10/2/2017 Active                ALLERGIES  Allergies   Allergen Reactions   • Sulfa Drugs Rash and Swelling     Rash, joint swelling  [id number]   • Codeine Vomiting and Nausea       PHYSICAL EXAM  VITAL SIGNS: BP (!) 192/86   Pulse (!) 54   Temp 37.1 °C (98.8 °F)   Resp 16   Ht 1.626 m (5' 4\")   Wt 81.1 kg (178 lb 12.7 oz)   SpO2 96%   BMI 30.69 kg/m²    Oxygen saturation is interpreted asAdequate  Constitutional: Awake and nontoxic appearing  HENT: Mucous membranes are dry  Eyes: Erythema or discharge or jaundice  Neck: Trachea midline no JVD  Cardiovascular: Regular bradycardia   Lungs: Clear and equal bilaterally with no apparent difficulty breathing  Abdomen/Back: Soft nontender nondistended no peritoneal findings, examination of the perineum was done with a female nurse chaperone in the room and there is a area of erythema and induration with a firm nodule about 1 cm in diameter in the area of the perineum. I don't see any vesicles or pustules there is no fluctuance.  Skin: Otherwise warm and dry  Musculoskeletal: No acute bony deformity  Neurologic: Awake verbal moving all extremities    Laboratory  A CBC shows white blood cell count of 7.7 with hemoglobin and hematocrit of 15, complete metabolic panel and lipase are unremarkable troponin is normal less than 0.01    EKG interpretation  A 12-lead EKG shows sinus bradycardia 49 bpm Q waves seen in lead 3 there is no ST elevation or depression findings are similar to a cardiogram from April 11, 2017    MEDICAL DECISION MAKING and DISPOSITION  In the emergency department an IV " was established and the patient was given intravenous ceftriaxone and vancomycin. I reviewed all the findings with the patient and I reviewed the case with the hospitalist and the patient is admitted to the hospitalist service for further evaluation and further treatment    IMPRESSION  1. Cellulitis of the perineum  2. Near syncopal episode         Electronically signed by: Lonny Burciaga, 10/2/2017 9:01 PM

## 2017-10-03 NOTE — PROGRESS NOTES
Pt a&o x4, denies pain and no distress noted.  Respirations regular and non labored.  coratid completed this morning and having echo at this time.  Denies dizziness at this time, remains bradycardic 50's.  Updated with plan of care, call light in reach and encourage to call for assistance

## 2017-10-04 ENCOUNTER — PATIENT OUTREACH (OUTPATIENT)
Dept: HEALTH INFORMATION MANAGEMENT | Facility: OTHER | Age: 77
End: 2017-10-04

## 2017-10-04 VITALS
WEIGHT: 178.79 LBS | RESPIRATION RATE: 16 BRPM | BODY MASS INDEX: 30.52 KG/M2 | DIASTOLIC BLOOD PRESSURE: 55 MMHG | TEMPERATURE: 98.5 F | SYSTOLIC BLOOD PRESSURE: 102 MMHG | HEIGHT: 64 IN | OXYGEN SATURATION: 93 % | HEART RATE: 81 BPM

## 2017-10-04 PROBLEM — K62.9 LESION OF PERIANAL AREA: Status: RESOLVED | Noted: 2017-10-03 | Resolved: 2017-10-04

## 2017-10-04 PROBLEM — R55 NEAR SYNCOPE: Status: RESOLVED | Noted: 2017-10-02 | Resolved: 2017-10-04

## 2017-10-04 LAB
ALBUMIN SERPL BCP-MCNC: 3.5 G/DL (ref 3.2–4.9)
ALBUMIN/GLOB SERPL: 1.3 G/DL
ALP SERPL-CCNC: 52 U/L (ref 30–99)
ALT SERPL-CCNC: 8 U/L (ref 2–50)
ANION GAP SERPL CALC-SCNC: 8 MMOL/L (ref 0–11.9)
AST SERPL-CCNC: 10 U/L (ref 12–45)
BASOPHILS # BLD AUTO: 0.3 % (ref 0–1.8)
BASOPHILS # BLD: 0.02 K/UL (ref 0–0.12)
BILIRUB SERPL-MCNC: 0.5 MG/DL (ref 0.1–1.5)
BUN SERPL-MCNC: 12 MG/DL (ref 8–22)
CALCIUM SERPL-MCNC: 9.5 MG/DL (ref 8.5–10.5)
CHLORIDE SERPL-SCNC: 106 MMOL/L (ref 96–112)
CO2 SERPL-SCNC: 24 MMOL/L (ref 20–33)
CREAT SERPL-MCNC: 0.56 MG/DL (ref 0.5–1.4)
EOSINOPHIL # BLD AUTO: 0.17 K/UL (ref 0–0.51)
EOSINOPHIL NFR BLD: 2.6 % (ref 0–6.9)
ERYTHROCYTE [DISTWIDTH] IN BLOOD BY AUTOMATED COUNT: 42.3 FL (ref 35.9–50)
GFR SERPL CREATININE-BSD FRML MDRD: >60 ML/MIN/1.73 M 2
GLOBULIN SER CALC-MCNC: 2.7 G/DL (ref 1.9–3.5)
GLUCOSE SERPL-MCNC: 105 MG/DL (ref 65–99)
HCT VFR BLD AUTO: 41.8 % (ref 37–47)
HGB BLD-MCNC: 14.1 G/DL (ref 12–16)
IMM GRANULOCYTES # BLD AUTO: 0.01 K/UL (ref 0–0.11)
IMM GRANULOCYTES NFR BLD AUTO: 0.2 % (ref 0–0.9)
LYMPHOCYTES # BLD AUTO: 1.65 K/UL (ref 1–4.8)
LYMPHOCYTES NFR BLD: 25.7 % (ref 22–41)
MAGNESIUM SERPL-MCNC: 1.9 MG/DL (ref 1.5–2.5)
MCH RBC QN AUTO: 30.1 PG (ref 27–33)
MCHC RBC AUTO-ENTMCNC: 33.7 G/DL (ref 33.6–35)
MCV RBC AUTO: 89.1 FL (ref 81.4–97.8)
MONOCYTES # BLD AUTO: 0.59 K/UL (ref 0–0.85)
MONOCYTES NFR BLD AUTO: 9.2 % (ref 0–13.4)
NEUTROPHILS # BLD AUTO: 3.99 K/UL (ref 2–7.15)
NEUTROPHILS NFR BLD: 62 % (ref 44–72)
NRBC # BLD AUTO: 0 K/UL
NRBC BLD AUTO-RTO: 0 /100 WBC
PLATELET # BLD AUTO: 184 K/UL (ref 164–446)
PMV BLD AUTO: 9.8 FL (ref 9–12.9)
POTASSIUM SERPL-SCNC: 3.9 MMOL/L (ref 3.6–5.5)
PROT SERPL-MCNC: 6.2 G/DL (ref 6–8.2)
RBC # BLD AUTO: 4.69 M/UL (ref 4.2–5.4)
SODIUM SERPL-SCNC: 138 MMOL/L (ref 135–145)
WBC # BLD AUTO: 6.4 K/UL (ref 4.8–10.8)

## 2017-10-04 PROCEDURE — 80053 COMPREHEN METABOLIC PANEL: CPT

## 2017-10-04 PROCEDURE — 96372 THER/PROPH/DIAG INJ SC/IM: CPT

## 2017-10-04 PROCEDURE — A9270 NON-COVERED ITEM OR SERVICE: HCPCS | Performed by: FAMILY MEDICINE

## 2017-10-04 PROCEDURE — G0378 HOSPITAL OBSERVATION PER HR: HCPCS

## 2017-10-04 PROCEDURE — A9270 NON-COVERED ITEM OR SERVICE: HCPCS | Performed by: INTERNAL MEDICINE

## 2017-10-04 PROCEDURE — A9270 NON-COVERED ITEM OR SERVICE: HCPCS

## 2017-10-04 PROCEDURE — 700102 HCHG RX REV CODE 250 W/ 637 OVERRIDE(OP): Performed by: HOSPITALIST

## 2017-10-04 PROCEDURE — 700102 HCHG RX REV CODE 250 W/ 637 OVERRIDE(OP)

## 2017-10-04 PROCEDURE — 700102 HCHG RX REV CODE 250 W/ 637 OVERRIDE(OP): Performed by: FAMILY MEDICINE

## 2017-10-04 PROCEDURE — 96366 THER/PROPH/DIAG IV INF ADDON: CPT

## 2017-10-04 PROCEDURE — 700102 HCHG RX REV CODE 250 W/ 637 OVERRIDE(OP): Performed by: INTERNAL MEDICINE

## 2017-10-04 PROCEDURE — 83735 ASSAY OF MAGNESIUM: CPT

## 2017-10-04 PROCEDURE — 85025 COMPLETE CBC W/AUTO DIFF WBC: CPT

## 2017-10-04 PROCEDURE — A9270 NON-COVERED ITEM OR SERVICE: HCPCS | Performed by: HOSPITALIST

## 2017-10-04 PROCEDURE — 700111 HCHG RX REV CODE 636 W/ 250 OVERRIDE (IP): Performed by: HOSPITALIST

## 2017-10-04 PROCEDURE — 99217 PR OBSERVATION CARE DISCHARGE: CPT | Performed by: HOSPITALIST

## 2017-10-04 RX ORDER — VALSARTAN 160 MG/1
160 TABLET ORAL 2 TIMES DAILY
Qty: 30 TAB | Refills: 3 | Status: SHIPPED | OUTPATIENT
Start: 2017-10-04 | End: 2017-10-20

## 2017-10-04 RX ORDER — HYDRALAZINE HYDROCHLORIDE 25 MG/1
25 TABLET, FILM COATED ORAL 2 TIMES DAILY
Qty: 60 TAB | Refills: 1 | Status: SHIPPED | OUTPATIENT
Start: 2017-10-04 | End: 2017-11-21

## 2017-10-04 RX ORDER — HYDRALAZINE HYDROCHLORIDE 25 MG/1
25 TABLET, FILM COATED ORAL 2 TIMES DAILY
Status: DISCONTINUED | OUTPATIENT
Start: 2017-10-04 | End: 2017-10-04 | Stop reason: HOSPADM

## 2017-10-04 RX ORDER — AMOXICILLIN AND CLAVULANATE POTASSIUM 875; 125 MG/1; MG/1
1 TABLET, FILM COATED ORAL 2 TIMES DAILY
Qty: 14 TAB | Refills: 0 | Status: SHIPPED | OUTPATIENT
Start: 2017-10-04 | End: 2017-10-11

## 2017-10-04 RX ORDER — ISOSORBIDE MONONITRATE 30 MG/1
30 TABLET, EXTENDED RELEASE ORAL
Status: DISCONTINUED | OUTPATIENT
Start: 2017-10-04 | End: 2017-10-04

## 2017-10-04 RX ORDER — DIPHENHYDRAMINE HCL 25 MG
25 TABLET ORAL ONCE
Status: COMPLETED | OUTPATIENT
Start: 2017-10-04 | End: 2017-10-04

## 2017-10-04 RX ADMIN — OMEGA-3 FATTY ACIDS CAP 1000 MG 1000 MG: 1000 CAP at 08:15

## 2017-10-04 RX ADMIN — CEFTRIAXONE 2 G: 2 INJECTION, SOLUTION INTRAVENOUS at 12:00

## 2017-10-04 RX ADMIN — ENOXAPARIN SODIUM 40 MG: 100 INJECTION SUBCUTANEOUS at 08:23

## 2017-10-04 RX ADMIN — VALSARTAN 160 MG: 80 TABLET ORAL at 08:15

## 2017-10-04 RX ADMIN — ANASTROZOLE 1 MG: 1 TABLET, COATED ORAL at 08:18

## 2017-10-04 RX ADMIN — ASPIRIN 81 MG: 81 TABLET ORAL at 08:15

## 2017-10-04 RX ADMIN — OMEPRAZOLE 20 MG: 20 CAPSULE, DELAYED RELEASE ORAL at 08:16

## 2017-10-04 RX ADMIN — SPIRONOLACTONE 50 MG: 25 TABLET, FILM COATED ORAL at 08:17

## 2017-10-04 RX ADMIN — PAROXETINE HYDROCHLORIDE 10 MG: 10 TABLET, FILM COATED ORAL at 09:00

## 2017-10-04 RX ADMIN — METRONIDAZOLE 500 MG: 500 TABLET ORAL at 08:15

## 2017-10-04 RX ADMIN — MESALAMINE 500 MG: 250 CAPSULE ORAL at 08:17

## 2017-10-04 RX ADMIN — HYDRALAZINE HYDROCHLORIDE 25 MG: 25 TABLET, FILM COATED ORAL at 13:55

## 2017-10-04 RX ADMIN — MAGNESIUM GLUCONATE 500 MG ORAL TABLET 400 MG: 500 TABLET ORAL at 08:17

## 2017-10-04 RX ADMIN — DIPHENHYDRAMINE HCL 25 MG: 25 TABLET ORAL at 00:26

## 2017-10-04 RX ADMIN — ISOSORBIDE DINITRATE 10 MG: 10 TABLET ORAL at 08:18

## 2017-10-04 RX ADMIN — VITAMIN D, TAB 1000IU (100/BT) 1000 UNITS: 25 TAB at 08:16

## 2017-10-04 ASSESSMENT — PAIN SCALES - GENERAL: PAINLEVEL_OUTOF10: 0

## 2017-10-04 NOTE — DISCHARGE INSTRUCTIONS
Discharge Instructions    Discharged to home by car with relative. Discharged via wheelchair, hospital escort: Yes.  Special equipment needed: Not Applicable    Be sure to schedule a follow-up appointment with your primary care doctor or any specialists as instructed.     Discharge Plan:   Diet Plan: Discussed  Activity Level: Discussed  Confirmed Follow up Appointment: Patient to Call and Schedule Appointment  Confirmed Symptoms Management: Discussed  Medication Reconciliation Updated: Yes  Influenza Vaccine Indication: Not indicated: Previously immunized this influenza season and > 8 years of age    I understand that a diet low in cholesterol, fat, and sodium is recommended for good health. Unless I have been given specific instructions below for another diet, I accept this instruction as my diet prescription.   Other diet: Heart Healthy    Special Instructions: None    · Is patient discharged on Warfarin / Coumadin?   No     · Is patient Post Blood Transfusion?  No    Depression / Suicide Risk    As you are discharged from this [facility] facility, it is important to learn how to keep safe from harming yourself.    Recognize the warning signs:  · Abrupt changes in personality, positive or negative- including increase in energy   · Giving away possessions  · Change in eating patterns- significant weight changes-  positive or negative  · Change in sleeping patterns- unable to sleep or sleeping all the time   · Unwillingness or inability to communicate  · Depression  · Unusual sadness, discouragement and loneliness  · Talk of wanting to die  · Neglect of personal appearance   · Rebelliousness- reckless behavior  · Withdrawal from people/activities they love  · Confusion- inability to concentrate     If you or a loved one observes any of these behaviors or has concerns about self-harm, here's what you can do:  · Talk about it- your feelings and reasons for harming yourself  · Remove any means that you might use to  hurt yourself (examples: pills, rope, extension cords, firearm)  · Get professional help from the community (Mental Health, Substance Abuse, psychological counseling)  · Do not be alone:Call your Safe Contact- someone whom you trust who will be there for you.  · Call your local CRISIS HOTLINE 144-1242 or 908-417-9886  · Call your local Children's Mobile Crisis Response Team [place] (915) 261-7462 or www.[employer].com  · Call the toll free National Suicide Prevention Hotlines   · National Suicide Prevention Lifeline 243-284-TQGX (7361)  · National Hope Line Network 800-SUICIDE (782-0708)        Hypertension  Hypertension, commonly called high blood pressure, is when the force of blood pumping through your arteries is too strong. Your arteries are the blood vessels that carry blood from your heart throughout your body. A blood pressure reading consists of a higher number over a lower number, such as 110/72. The higher number (systolic) is the pressure inside your arteries when your heart pumps. The lower number (diastolic) is the pressure inside your arteries when your heart relaxes. Ideally you want your blood pressure below 120/80.  Hypertension forces your heart to work harder to pump blood. Your arteries may become narrow or stiff. Having untreated or uncontrolled hypertension can cause heart attack, stroke, kidney disease, and other problems.  RISK FACTORS  Some risk factors for high blood pressure are controllable. Others are not.   Risk factors you cannot control include:   · Race. You may be at higher risk if you are .  · Age. Risk increases with age.  · Gender. Men are at higher risk than women before age 45 years. After age 65, women are at higher risk than men.  Risk factors you can control include:  · Not getting enough exercise or physical activity.  · Being overweight.  · Getting too much fat, sugar, calories, or salt in your diet.  · Drinking too much alcohol.  SIGNS AND  SYMPTOMS  Hypertension does not usually cause signs or symptoms. Extremely high blood pressure (hypertensive crisis) may cause headache, anxiety, shortness of breath, and nosebleed.  DIAGNOSIS  To check if you have hypertension, your health care provider will measure your blood pressure while you are seated, with your arm held at the level of your heart. It should be measured at least twice using the same arm. Certain conditions can cause a difference in blood pressure between your right and left arms. A blood pressure reading that is higher than normal on one occasion does not mean that you need treatment. If it is not clear whether you have high blood pressure, you may be asked to return on a different day to have your blood pressure checked again. Or, you may be asked to monitor your blood pressure at home for 1 or more weeks.  TREATMENT  Treating high blood pressure includes making lifestyle changes and possibly taking medicine. Living a healthy lifestyle can help lower high blood pressure. You may need to change some of your habits.  Lifestyle changes may include:  · Following the DASH diet. This diet is high in fruits, vegetables, and whole grains. It is low in salt, red meat, and added sugars.  · Keep your sodium intake below 2,300 mg per day.  · Getting at least 30-45 minutes of aerobic exercise at least 4 times per week.  · Losing weight if necessary.  · Not smoking.  · Limiting alcoholic beverages.  · Learning ways to reduce stress.  Your health care provider may prescribe medicine if lifestyle changes are not enough to get your blood pressure under control, and if one of the following is true:  · You are 18-59 years of age and your systolic blood pressure is above 140.  · You are 60 years of age or older, and your systolic blood pressure is above 150.  · Your diastolic blood pressure is above 90.  · You have diabetes, and your systolic blood pressure is over 140 or your diastolic blood pressure is over  90.  · You have kidney disease and your blood pressure is above 140/90.  · You have heart disease and your blood pressure is above 140/90.  Your personal target blood pressure may vary depending on your medical conditions, your age, and other factors.  HOME CARE INSTRUCTIONS  · Have your blood pressure rechecked as directed by your health care provider.    · Take medicines only as directed by your health care provider. Follow the directions carefully. Blood pressure medicines must be taken as prescribed. The medicine does not work as well when you skip doses. Skipping doses also puts you at risk for problems.  · Do not smoke.    · Monitor your blood pressure at home as directed by your health care provider.   SEEK MEDICAL CARE IF:   · You think you are having a reaction to medicines taken.  · You have recurrent headaches or feel dizzy.  · You have swelling in your ankles.  · You have trouble with your vision.  SEEK IMMEDIATE MEDICAL CARE IF:  · You develop a severe headache or confusion.  · You have unusual weakness, numbness, or feel faint.  · You have severe chest or abdominal pain.  · You vomit repeatedly.  · You have trouble breathing.  MAKE SURE YOU:   · Understand these instructions.  · Will watch your condition.  · Will get help right away if you are not doing well or get worse.     This information is not intended to replace advice given to you by your health care provider. Make sure you discuss any questions you have with your health care provider.     Document Released: 12/18/2006 Document Revised: 05/03/2016 Document Reviewed: 10/10/2014  [employer] Interactive Patient Education ©2016 [employer]        Hydralazine tablets  What is this medicine?  HYDRALAZINE (piter sewell) is a type of vasodilator. It relaxes blood vessels, increasing the blood and oxygen supply to your heart. This medicine is used to treat high blood pressure.  This medicine may be used for other purposes; ask your health care  provider or pharmacist if you have questions.  COMMON BRAND NAME(S): Apresoline  What should I tell my health care provider before I take this medicine?  They need to know if you have any of these conditions:  -blood vessel disease  -heart disease including angina or history of heart attack  -kidney or liver disease  -systemic lupus erythematosus (SLE)  -an unusual or allergic reaction to hydralazine, tartrazine dye, other medicines, foods, dyes, or preservatives  -pregnant or trying to get pregnant  -breast-feeding  How should I use this medicine?  Take this medicine by mouth with a glass of water. Follow the directions on the prescription label. Take your doses at regular intervals. Do not take your medicine more often than directed. Do not stop taking except on the advice of your doctor or health care professional.  Talk to your pediatrician regarding the use of this medicine in children. Special care may be needed. While this drug may be prescribed for children for selected conditions, precautions do apply.  Overdosage: If you think you have taken too much of this medicine contact a poison control center or emergency room at once.  NOTE: This medicine is only for you. Do not share this medicine with others.  What if I miss a dose?  If you miss a dose, take it as soon as you can. If it is almost time for your next dose, take only that dose. Do not take double or extra doses.  What may interact with this medicine?  -medicines for high blood pressure  -medicines for mental depression  This list may not describe all possible interactions. Give your health care provider a list of all the medicines, herbs, non-prescription drugs, or dietary supplements you use. Also tell them if you smoke, drink alcohol, or use illegal drugs. Some items may interact with your medicine.  What should I watch for while using this medicine?  Visit your doctor or health care professional for regular checks on your progress. Check your  blood pressure and pulse rate regularly. Ask your doctor or health care professional what your blood pressure and pulse rate should be and when you should contact him or her.  You may get drowsy or dizzy. Do not drive, use machinery, or do anything that needs mental alertness until you know how this medicine affects you. Do not stand or sit up quickly, especially if you are an older patient. This reduces the risk of dizzy or fainting spells. Alcohol may interfere with the effect of this medicine. Avoid alcoholic drinks.  Do not treat yourself for coughs, colds, or pain while you are taking this medicine without asking your doctor or health care professional for advice. Some ingredients may increase your blood pressure.  What side effects may I notice from receiving this medicine?  Side effects that you should report to your doctor or health care professional as soon as possible:  -chest pain, or fast or irregular heartbeat  -fever, chills, or sore throat  -numbness or tingling in the hands or feet  -shortness of breath  -skin rash, redness, blisters or itching  -stiff or swollen joints  -sudden weight gain  -swelling of the feet or legs  -swollen lymph glands  -unusual weakness  Side effects that usually do not require medical attention (report to your doctor or health care professional if they continue or are bothersome):  -diarrhea, or constipation  -headache  -loss of appetite  -nausea, vomiting  This list may not describe all possible side effects. Call your doctor for medical advice about side effects. You may report side effects to FDA at 6-902-FDA-1250.  Where should I keep my medicine?  Keep out of the reach of children.  Store at room temperature between 15 and 30 degrees C (59 and 86 degrees F). Throw away any unused medicine after the expiration date.  NOTE: This sheet is a summary. It may not cover all possible information. If you have questions about this medicine, talk to your doctor, pharmacist, or  health care provider.  © 2014, [employer]. (5/1/2009 3:44:58 PM)

## 2017-10-04 NOTE — CONSULTS
DATE OF SERVICE:  10/04/2017    PERSON ASKING FOR CONSULTATION:  Irnia Gurrola MD    INDICATION:  Labile blood pressure.    HISTORY OF PRESENT ILLNESS:  The patient is a very pleasant 77-year-old woman   who follows longitudinally with my partner, Dr. Jeanine Salmon.  She lives   locally in [place].  She considers herself healthy and active despite her   multiple medical problems.  She has been dealing with colitis and has had a   very painful cyst on her outer vaginal area.    However, she has been taking her medications as directed.  She has recently   not only been getting a workup for sleep apnea with [facility], but also following with her primary care doctor.    She last saw my partner, Dr. Jeanine Salmon in mid August when her medications   were reviewed.  Her blood pressure in the office was over 160.  She continued   on her metoprolol, valsartan 320 total daily, hydrochlorothiazide 25 daily.    Spironolactone 25 was added in and she has been compliant on this.    In any event, yesterday, she admitted to feeling somewhat woozy, she was tired   all day and began to feel dizzy.  At about lunchtime, she had a more sudden   onset of dizziness associated with nausea.  She admits to having pain in her   perivaginal area at this point as well.  She came to the emergency room.  Her   workup is low risk and unrevealing.  She is admitted to observation.  She had   no recurrent feelings of dizziness.  In the emergency room, when she arrived,   her blood pressure was 192/86, pulse rate was in the low 50s, sinus   bradycardia.  She had no high-grade heart block.    She denies chest pain, current dizziness, any trauma or change in her   emotional stress.  She has been compliant with her medication.  She has not   had fevers, chills, or rashes or difficulty with breathing.    PAST MEDICAL HISTORY:  1.  Labile hypertension, on multiple medications.  2.  Colitis.  3.  Obstructive sleep apnea, with a new  CPAP machine.  4.  Aortic stenosis, mild-to-moderate.  5.  Obesity.  6.  Breast cancer, on chemotherapy.  7.  Hyperlipidemia.    CURRENT MEDICATIONS:  Arimidex 1 mg a day, aspirin 81 a day, ceftriaxone 2 g   x1, enoxaparin 40 mg b.i.d., fish oil 1000 a day, isosorbide dinitrate 10 mg   t.i.d., Pentasa 500 t.i.d., Flagyl 500 t.i.d., omeprazole 20 mg a day, Paxil   10 mg a day, rosuvastatin 20 mg a day, spironolactone 50 mg a day, valsartan   160 mg twice daily.    ALLERGIES AND DRUG INTOLERANCES:  SULFA DRUGS, VANCOMYCIN, CODEINE,   AMLODIPINE, FELODIPINE, CAUSE ANKLE SWELLING.    SOCIAL HISTORY AND FAMILY HISTORY:  Her children are grown.  Her son is an   alcoholic.  She has no first-degree family relations with premature coronary   artery disease or sudden cardiac death.    She is a nonsmoker and she does not drink alcohol.  She does her daily   activities without assist or help.    PHYSICAL EXAMINATION:  VITAL SIGNS:  Blood pressure currently is 90/55, heart rate is in the 80s and   sinus in mechanism.  GENERAL:  Healthy-appearing woman, in no acute distress, joking with the   examiner.  NEUROLOGIC:  Cranial nerves II-XII are grossly intact bilaterally.  No   nystagmus or tremor.  NECK:  Carotid upstrokes are brisk.  There are no audible bruits.  HEART:  She has a harsh, mid-peaking systolic ejection murmur, audible across   the sternum towards the left neck.  There are no audible rubs or diastolic   sounds.  LUNGS:  Breath sounds are clear.  There is no wheezing or coarse crackles   audible.  ABDOMEN:  Soft and nontender.  EXTREMITIES:  Warm and dry.  There is no pitting edema appreciated.  Radial   and dorsalis pedis pulses are 2/2 bilaterally.    DIAGNOSTIC DATA:  A 12-lead ECG done yesterday and reviewed by me shows sinus   mechanism, no ischemic changes.  The OK interval is 180 milliseconds.    Echocardiogram images reviewed by me as well shows normal systolic function of   the left ventricle with ejection  fraction more than 60%, mild left   ventricular hypertrophy.  There is mild-to-moderate aortic stenosis with a   mean gradient of no more than 20 mmHg.  White count is 6.4 with a normal   differential, hemoglobin 14.1 with an MCV of 89, platelets 184.  Potassium is   3.9, sodium 138, creatinine 0.6.  LFTs are normal.  Albumin is 3.5.  Troponin   is negative x2.    ASSESSMENT AND RECOMMENDATIONS:  A 77-year-old woman with labile hypertension.    I do think she had some dizziness and this may well in part been related to   her bradycardia, on metoprolol.  She has no evidence of significant heart   block on her EKG and none has transpired overnight.  She is still sinus in   mechanism on the monitor.  She is very sensitive to the isosorbide and I think   this would not be a reasonable medication to give her as an outpatient.  1.  Hypertension, excessive bradycardia with negative chronotropes.  We will stop these   and initiate low-dose hydralazine 25 mg b.i.d.  This is in addition to the   detailed medications from Dr. Samlon, which are listed at discharge and   include a total of 320 mg of valsartan daily, 50 mg of spironolactone daily.    I do not think she has an intrinsic cardiac pathology that is exacerbating   this.  2.  She will stay hydrated.  I did recommend compression stockings.  3.  She will follow up with her primary care doctor and I will make sure she   has a visit offered to her in our office in the next 2-3 weeks.  This was   discussed with Dr. Gurrola.  I see no evidence of an indication for   pacemaker at this point or additional workup.    Thank you kindly.       ____________________________________     MD TASHA LUI / NURY    DD:  10/04/2017 12:37:42  DT:  10/04/2017 14:06:39    D#:  [id number]  Job#:  [id number]

## 2017-10-04 NOTE — PROGRESS NOTES
Bedside report received 0700. POC discussed with pt; Pt denies pain, dizziness,neuro check intact; No overnight events; Ambulating without assistance;  all questions answered at this time.

## 2017-10-04 NOTE — PROGRESS NOTES
Assumed care at 1900.  Report received from Monalisa CHAWLA. Assessment complete, plan of care discussed and understood. SR on heart monitor.  VS stable.   Pt denies pain at this time.  Pt educated on call light pt verbalizes understanding.  Pt denies any additional needs at this time.  Call light and phone within reach. Will continue to monitor.

## 2017-10-04 NOTE — PROGRESS NOTES
[facility]ist Progress Note    Date of Service: 10/3/2017    Chief Complaint  77 y.o. female admitted 10/2/2017 with dizziness, nausea and hypertensive urgency.    Interval Problem Update  Patient feeling better but still has headache, and BP still not controlled despite titration of medications.   Increased Valsartan to 320 mg.  Added Isodril, will titrate, she cannot tolerate b-blockers or calcium channel blockers due to lower extremity edema.    Consultants/Specialty  None    Disposition  TBD        Review of Systems   Constitutional: Positive for malaise/fatigue. Negative for chills and fever.   HENT: Negative for congestion, hearing loss, sore throat and tinnitus.    Eyes: Negative for blurred vision, double vision, photophobia and pain.   Respiratory: Negative for cough, hemoptysis, sputum production, shortness of breath and stridor.    Cardiovascular: Negative for chest pain, palpitations, orthopnea, claudication and PND.   Gastrointestinal: Negative for blood in stool, constipation, heartburn, melena, nausea and vomiting.   Genitourinary: Negative for dysuria, frequency and urgency.   Musculoskeletal: Negative for back pain, myalgias and neck pain.   Neurological: Positive for weakness. Negative for dizziness, tingling, tremors, sensory change, speech change and headaches.   Psychiatric/Behavioral: Negative for depression, memory loss and suicidal ideas. The patient is not nervous/anxious.       Physical Exam  Laboratory/Imaging   Hemodynamics  Temp (24hrs), Av.5 °C (97.7 °F), Min:36 °C (96.8 °F), Max:37.1 °C (98.8 °F)   Temperature: 36.8 °C (98.2 °F)  Pulse  Av.4  Min: 49  Max: 99 Heart Rate (Monitored): (!) 57  Blood Pressure : (!) 183/94, NIBP: (!) 163/60      Respiratory      Respiration: 14, Pulse Oximetry: 95 %        RML Breath Sounds: Diminished, RLL Breath Sounds: Diminished, LLL Breath Sounds: Diminished    Fluids  No intake or output data in the 24 hours ending 10/03/17  1842    Nutrition  Orders Placed This Encounter   Procedures   • Diet Order     Standing Status:   Standing     Number of Occurrences:   1     Order Specific Question:   Diet:     Answer:   Cardiac [6]     Physical Exam   Constitutional: She is oriented to person, place, and time. She appears well-developed and well-nourished.   HENT:   Head: Normocephalic and atraumatic.   Mouth/Throat: No oropharyngeal exudate.   Eyes: Conjunctivae are normal. Pupils are equal, round, and reactive to light. Right eye exhibits no discharge. No scleral icterus.   Neck: Neck supple. No JVD present. No thyromegaly present.   Cardiovascular: Intact distal pulses.    No murmur heard.  Pulmonary/Chest: Effort normal and breath sounds normal. No stridor. No respiratory distress. She has no wheezes. She has no rales.   Abdominal: Soft. Bowel sounds are normal. She exhibits no distension. There is no tenderness. There is no rebound.   Genitourinary:   Genitourinary Comments: Right 3pm vaginal pimple noted, hard, no erythema or discharge    Musculoskeletal: Normal range of motion.   Neurological: She is alert and oriented to person, place, and time.   Skin: Skin is warm. No erythema.   Psychiatric: She has a normal mood and affect. Her behavior is normal. Thought content normal.       Recent Labs      10/02/17   1639  10/03/17   0220   WBC  7.7  7.2   RBC  5.09  4.68   HEMOGLOBIN  15.0  13.7   HEMATOCRIT  44.1  40.7   MCV  86.6  87.0   MCH  29.5  29.3   MCHC  34.0  33.7   RDW  40.5  40.5   PLATELETCT  209  192   MPV  10.2  9.7     Recent Labs      10/02/17   1639  10/03/17   0220   SODIUM  137  138   POTASSIUM  3.8  3.4*   CHLORIDE  103  105   CO2  25  26   GLUCOSE  91  93   BUN  14  12   CREATININE  0.66  0.60   CALCIUM  10.3  9.4             Recent Labs      10/03/17   0220   TRIGLYCERIDE  225*   HDL  48   LDL  115*          Assessment/Plan     * Near syncope   Assessment & Plan    Echocardiogram unchanged, preserved LVEF.  Carotid Duplex:  grossly normal.  HR normalized after cessation of metoprolol.  TSH, Free T4, WNL  Orthostatic BP +, elevated 180s.  Patient is ambulatory, symptoms resolved         Lesion of perianal area   Assessment & Plan    Possibly 2/2 to bartholin cyst, not consolidated .  Continue IV antibiotics, will continue to follow clinically,  Outpatient vs inpatient surgical I&D.            Nonrheumatic aortic valve stenosis- (present on admission)   Assessment & Plan    Last Echo showed moderate stenosis.  F/U with cardiology, Dr Salmon outpatient.  Repeat echo unchanged         HTN (hypertension)- (present on admission)   Assessment & Plan    Uncontrolled, 180s systolic  Increased Valsartan to 320 mg.  Added Isodril will determine response.         Dyslipidemia- (present on admission)   Assessment & Plan    Continue Crestor        ROBYN (obstructive sleep apnea)- (present on admission)   Assessment & Plan    Continue apap         Hyperlipidemia- (present on admission)   Assessment & Plan    Continue crestor            Reviewed items::  Labs reviewed, Medications reviewed and Radiology images reviewed  Kennedy catheter::  No Kennedy  DVT prophylaxis pharmacological::  Enoxaparin (Lovenox)  DVT prophylaxis - mechanical:  SCDs

## 2017-10-11 ENCOUNTER — TELEPHONE (OUTPATIENT)
Dept: CARDIOLOGY | Facility: MEDICAL CENTER | Age: 77
End: 2017-10-11

## 2017-10-11 NOTE — TELEPHONE ENCOUNTER
Called patient and she states BP has been erratic still and as high as 160 - she says she is following up with her PCP tomorrow and she was agreeable to move her appt up to next week to see AM regarding her recent visit to the ER for uncontrolled HTN and was put on new meds. She will call back if she has any other questions or concerns

## 2017-10-11 NOTE — TELEPHONE ENCOUNTER
----- Message from Jeanine Salmon M.D. sent at 10/11/2017  9:52 AM PDT -----  Pt was in Er recently can u find how her BP is now  Jeanine

## 2017-10-20 ENCOUNTER — OFFICE VISIT (OUTPATIENT)
Dept: CARDIOLOGY | Facility: MEDICAL CENTER | Age: 77
End: 2017-10-20
Payer: MEDICARE

## 2017-10-20 VITALS
DIASTOLIC BLOOD PRESSURE: 60 MMHG | HEIGHT: 64 IN | SYSTOLIC BLOOD PRESSURE: 102 MMHG | BODY MASS INDEX: 30.05 KG/M2 | OXYGEN SATURATION: 91 % | WEIGHT: 176 LBS | HEART RATE: 84 BPM

## 2017-10-20 DIAGNOSIS — M79.602 LEFT ARM PAIN: ICD-10-CM

## 2017-10-20 DIAGNOSIS — I35.0 NONRHEUMATIC AORTIC VALVE STENOSIS: ICD-10-CM

## 2017-10-20 DIAGNOSIS — I10 ESSENTIAL HYPERTENSION: ICD-10-CM

## 2017-10-20 DIAGNOSIS — E78.2 MIXED HYPERLIPIDEMIA: ICD-10-CM

## 2017-10-20 DIAGNOSIS — E78.5 DYSLIPIDEMIA: ICD-10-CM

## 2017-10-20 PROCEDURE — 99214 OFFICE O/P EST MOD 30 MIN: CPT | Performed by: INTERNAL MEDICINE

## 2017-10-20 RX ORDER — VALSARTAN AND HYDROCHLOROTHIAZIDE 160; 12.5 MG/1; MG/1
1 TABLET, FILM COATED ORAL EVERY 12 HOURS
COMMUNITY
End: 2017-11-14 | Stop reason: SDUPTHER

## 2017-10-20 RX ORDER — VALSARTAN AND HYDROCHLOROTHIAZIDE 320; 12.5 MG/1; MG/1
1 TABLET, FILM COATED ORAL DAILY
COMMUNITY
End: 2017-11-14

## 2017-10-20 ASSESSMENT — ENCOUNTER SYMPTOMS
SHORTNESS OF BREATH: 0
BRUISES/BLEEDS EASILY: 0
MYALGIAS: 0
PALPITATIONS: 0
ABDOMINAL PAIN: 0
BLURRED VISION: 1
FEVER: 0
HEADACHES: 0
DIZZINESS: 1
PND: 0
LOSS OF CONSCIOUSNESS: 0
ORTHOPNEA: 0
BLOOD IN STOOL: 0
CLAUDICATION: 0

## 2017-10-20 NOTE — PROGRESS NOTES
Subjective:   Erin Hess is a 77 y.o. female with known history of   1. Hypertension  2. Dyslipidemia currently not on any statins  3. Breast cancer status post lumpectomy currently on Arimidex (right any history of chemotherapy or radiation)  Presenting today for follow-up evaluation of hypertension..    The past couple of days patient is having more episodes of dizziness. A.m. blood pressures are good but p.m. blood pressures are dropping to 70s and 80s. Denied any LOC. No complaints of exertional chest pain pressure or tightness. Denied any complaints of lower extremity edema or claudication. No complaints of orthopnea or PND.Patient is complaining of having intermittent episodes of left arm pain  Past Medical History:   Diagnosis Date   • Arthritis    • Asthma    • Breast cancer (CMS-HCC)    • Bronchitis    • Cancer (CMS-HCC) 2012    right breast   • Dizziness 3/11/2013   • Heart burn    • Heart murmur    • Hiatus hernia syndrome    • HTN (hypertension) 3/12/2013   • Hypercholesterolemia    • Hyperlipidemia 3/12/2013   • Hypertension    • Hypokalemia 3/12/2013   • Indigestion    • Pain     back, hips, knees   • Psychiatric disorder     depression   • Sleep apnea     h/o gastric sleeve lost 40 lb now not on CPAP   • Snoring    • ULCERATIVE COLITIS 3/12/2013   • Vertigo 3/11/2013     Past Surgical History:   Procedure Laterality Date   • GASTRIC SLEEVE LAPAROSCOPY N/A 2016    Procedure: GASTRIC SLEEVE LAPAROSCOPY, HIATAL HERNIA AND LIVER BIOPSY;  Surgeon: Mauricio Montes M.D.;  Location: SURGERY [facility];  Service:    • US-NEEDLE CORE BX-BREAST PANEL      rt breast, with lumpectomy    • GYN SURGERY      vag hyster    • GYN SURGERY      vaginal cyst removal    • LUMPECTOMY  left   • OTHER       R breast bx X 2& lipoma removal     Family History   Problem Relation Age of Onset   • Heart Disease Mother      CAD MI at age 72   • Cancer Mother      breast cancer  at age 83   • Cancer  Sister    • Lung Disease Father 75     Lung cancer   • Cancer Maternal Grandmother      Pancreatic cancer   • Heart Attack Maternal Grandfather      MI at age 70   • Heart Disease Sister      Nadya cfever- herat murmur     History   Smoking Status   • Former Smoker   • Packs/day: 1.00   • Years: 20.00   • Types: Cigarettes   • Quit date: 5/22/1986   Smokeless Tobacco   • Never Used     Allergies   Allergen Reactions   • Sulfa Drugs Rash and Swelling     Rash, joint swelling  [id number]   • Vancomycin Itching   • Codeine Vomiting and Nausea     Outpatient Encounter Prescriptions as of 10/20/2017   Medication Sig Dispense Refill   • valsartan-hydrochlorothiazide (DIOVAN-HCT) 320-12.5 MG per tablet Take 1 Tab by mouth every day.     • valsartan-hydrochlorothiazide (DIOVAN-HCT) 160-12.5 MG per tablet Take 1 Tab by mouth every 12 hours.     • magnesium oxide (MAG-OX) 400 MG Tab Take 400 mg by mouth every day.     • Multiple Vitamins-Minerals (ICAPS AREDS 2) Cap Take 1 Cap by mouth every day.     • paroxetine (PAXIL) 10 MG Tab Take 10 mg by mouth every day.     • spironolactone (ALDACTONE) 50 MG Tab Take 1 Tab by mouth every day. 30 Tab 3   • rosuvastatin (CRESTOR) 20 MG Tab Take 1 Tab by mouth every evening. 30 Tab 11   • Mesalamine (APRISO) 0.375 GM CAPSULE SR 24 HR Take 2 Caps by mouth 2 Times a Day.     • B Complex Vitamins (VITAMIN B COMPLEX PO) Take 1 Tab by mouth every morning.     • anastrozole (ARIMIDEX) 1 MG Tab Take 1 mg by mouth every morning.     • pantoprazole (PROTONIX) 40 MG Tablet Delayed Response Take 40 mg by mouth every morning.     • vitamin D (CHOLECALCIFEROL) 1000 UNIT Tab Take 1,000 Units by mouth every morning.     • aspirin EC (ECOTRIN) 81 MG TBEC Take 81 mg by mouth every morning.     • docosahexanoic acid (FISH OIL) 1000 MG CAPS Take 1,000 mg by mouth every morning.     • hydrALAZINE (APRESOLINE) 25 MG Tab Take 1 Tab by mouth 2 Times a Day. 60 Tab 1   • [DISCONTINUED] valsartan (DIOVAN) 160 MG  "Tab Take 1 Tab by mouth 2 Times a Day. 30 Tab 3     No facility-administered encounter medications on file as of 10/20/2017.      Review of Systems   Constitutional: Negative for fever.   HENT: Positive for tinnitus.    Eyes: Positive for blurred vision.   Respiratory: Negative for shortness of breath.    Cardiovascular: Negative for chest pain, palpitations, orthopnea, claudication, leg swelling and PND.        Left shoulder pain     Gastrointestinal: Negative for abdominal pain, blood in stool and melena.   Genitourinary: Negative for dysuria and hematuria.   Musculoskeletal: Positive for joint pain. Negative for myalgias.        History of sciatica   Skin: Negative for rash.   Neurological: Positive for dizziness (most probably due to low blood pressure). Negative for loss of consciousness and headaches.   Endo/Heme/Allergies: Does not bruise/bleed easily.        Objective:   /60   Pulse 84   Ht 1.626 m (5' 4\")   Wt 79.8 kg (176 lb)   SpO2 91%   BMI 30.21 kg/m²     Physical Exam   Constitutional: She is oriented to person, place, and time. She appears well-developed. No distress.   HENT:   Mouth/Throat: Mucous membranes are normal.   Eyes: Conjunctivae and EOM are normal.   Neck: No JVD present. No tracheal deviation present. No thyroid mass present.   Cardiovascular: Normal rate, regular rhythm and intact distal pulses.    Murmur heard.   Systolic murmur is present with a grade of 2/6   Pulses:       Carotid pulses are 2+ on the right side, and 2+ on the left side with bruit.       Radial pulses are 2+ on the right side, and 2+ on the left side.        Dorsalis pedis pulses are 2+ on the right side, and 2+ on the left side.        Posterior tibial pulses are 2+ on the right side, and 2+ on the left side.   Early peaking soft ejection systolic murmur best heard in the aortic area   Pulmonary/Chest: Effort normal and breath sounds normal. No respiratory distress. She exhibits no tenderness.   Abdominal: " Soft. There is no tenderness.   Musculoskeletal: Normal range of motion. She exhibits no edema.   Neurological: She is alert and oriented to person, place, and time. She has normal strength. She displays no tremor.   Skin: Skin is warm and dry. She is not diaphoretic.   Psychiatric: She has a normal mood and affect. Her behavior is normal.   Vitals reviewed.  Results for ROSIO SHAFER (MRN [id number]) as of 10/20/2017 09:37   10/3/2017  10/4/2017    Sodium 138 138   Potassium 3.4 (L) 3.9   Chloride 105 106   Co2 26 24   Anion Gap 7.0 8.0   Glucose 93 105 (H)   Bun 12 12   Creatinine 0.60 0.56   GFR If Non African American >60 >60   Calcium 9.4 9.5   AST(SGOT)  10 (L)   ALT(SGPT)  8   Alkaline Phosphatase  52   Cholesterol,Tot 208 (H)    Triglycerides 225 (H)    HDL 48     (H)      TTE: 7/10/17  No prior study is available for comparison.   Left ventricular ejection fraction is visually estimated to be 60%.   Normal regional wall motion. Grade I diastolic dysfunction.  Calcification on posterior mitral valve leaflet.   Trace mitral regurgitation.  Moderate aortic stenosis.   Estimated right ventricular systolic pressure is 30 mmHg    Nuclear stress test: 5/23/11   1. The calculated left ventricular ejection fraction is greater than 70%.  2.  No wall motion abnormalities.  3.  No evidence of infarct or reversible ischemia    Assessment:   1. Hypotension associated with dizziness  2. Dyslipidemia  3. Atherosclerosis-aorta and coronary arteries  4. Moderate Aortic stenosis  5. Left arm pain  Medical Decision Making:  Today's Assessment / Status / Plan:     1.Take Diovan HCT half a tablet (160/12.5 mg) in the morning half a tablet in the evening.  Take spironolactone 25 mg in the morning and 25 mg in the evening.  Call me back in one week with blood pressure recordings  2. Continue Crestor 20 mg qhs.   3. Continue statins.  4. Less likely valvular heart disease leading to dizziness personally think it is  hypotension leading to dizziness.   Repeat echocardiogram in one year.  5. Treadmill stress test prior to next visit.    Follow-up in 3 month.  Basic metabolic panel in one week and then in 3 weeks.  Echocardiogram in one year.    Thank you for allowing me to participate in taking care of patient.    Jeanine Andrews MD.

## 2017-10-20 NOTE — LETTER
[facility]   [address]   [address]  [place] [address]  Phone: [phone]  Fax: [phone]              Erin Hess  1940    Encounter Date: 10/20/2017    Jeanine Salmon M.D.          PROGRESS NOTE:  Subjective:   Erin Hess is a 77 y.o. female with known history of   1. Hypertension  2. Dyslipidemia currently not on any statins  3. Breast cancer status post lumpectomy currently on Arimidex (right any history of chemotherapy or radiation)  Presenting today for follow-up evaluation of hypertension..    The past couple of days patient is having more episodes of dizziness. A.m. blood pressures are good but p.m. blood pressures are dropping to 70s and 80s. Denied any LOC. No complaints of exertional chest pain pressure or tightness. Denied any complaints of lower extremity edema or claudication. No complaints of orthopnea or PND.  Past Medical History:   Diagnosis Date   • Arthritis    • Asthma    • Breast cancer (CMS-HCC)    • Bronchitis 2011   • Cancer (CMS-HCC) 12/2012    right breast   • Dizziness 3/11/2013   • Heart burn    • Heart murmur    • Hiatus hernia syndrome    • HTN (hypertension) 3/12/2013   • Hypercholesterolemia    • Hyperlipidemia 3/12/2013   • Hypertension    • Hypokalemia 3/12/2013   • Indigestion    • Pain     back, hips, knees   • Psychiatric disorder     depression   • Sleep apnea     h/o gastric sleeve lost 40 lb now not on CPAP   • Snoring    • ULCERATIVE COLITIS 3/12/2013   • Vertigo 3/11/2013     Past Surgical History:   Procedure Laterality Date   • GASTRIC SLEEVE LAPAROSCOPY N/A 5/18/2016    Procedure: GASTRIC SLEEVE LAPAROSCOPY, HIATAL HERNIA AND LIVER BIOPSY;  Surgeon: Mauricio Montes M.D.;  Location: SURGERY [facility];  Service:    • US-NEEDLE CORE BX-BREAST PANEL  2013    rt breast, with lumpectomy    • GYN SURGERY      vag hyster 1990   • GYN SURGERY      vaginal cyst removal    •  LUMPECTOMY  left   • OTHER       R breast bx X 2& lipoma removal     Family History   Problem Relation Age of Onset   • Heart Disease Mother      CAD MI at age 72   • Cancer Mother      breast cancer  at age 83   • Cancer Sister    • Lung Disease Father 75     Lung cancer   • Cancer Maternal Grandmother      Pancreatic cancer   • Heart Attack Maternal Grandfather      MI at age 70   • Heart Disease Sister      Nadya cfever- herat murmur     History   Smoking Status   • Former Smoker   • Packs/day: 1.00   • Years: 20.00   • Types: Cigarettes   • Quit date: 1986   Smokeless Tobacco   • Never Used     Allergies   Allergen Reactions   • Sulfa Drugs Rash and Swelling     Rash, joint swelling  [id number]   • Vancomycin Itching   • Codeine Vomiting and Nausea     Outpatient Encounter Prescriptions as of 10/20/2017   Medication Sig Dispense Refill   • valsartan-hydrochlorothiazide (DIOVAN-HCT) 320-12.5 MG per tablet Take 1 Tab by mouth every day.     • valsartan-hydrochlorothiazide (DIOVAN-HCT) 160-12.5 MG per tablet Take 1 Tab by mouth every 12 hours.     • magnesium oxide (MAG-OX) 400 MG Tab Take 400 mg by mouth every day.     • Multiple Vitamins-Minerals (ICAPS AREDS 2) Cap Take 1 Cap by mouth every day.     • paroxetine (PAXIL) 10 MG Tab Take 10 mg by mouth every day.     • spironolactone (ALDACTONE) 50 MG Tab Take 1 Tab by mouth every day. 30 Tab 3   • rosuvastatin (CRESTOR) 20 MG Tab Take 1 Tab by mouth every evening. 30 Tab 11   • Mesalamine (APRISO) 0.375 GM CAPSULE SR 24 HR Take 2 Caps by mouth 2 Times a Day.     • B Complex Vitamins (VITAMIN B COMPLEX PO) Take 1 Tab by mouth every morning.     • anastrozole (ARIMIDEX) 1 MG Tab Take 1 mg by mouth every morning.     • pantoprazole (PROTONIX) 40 MG Tablet Delayed Response Take 40 mg by mouth every morning.     • vitamin D (CHOLECALCIFEROL) 1000 UNIT Tab Take 1,000 Units by mouth every morning.     • aspirin EC (ECOTRIN) 81 MG TBEC Take 81 mg by mouth every  "morning.     • docosahexanoic acid (FISH OIL) 1000 MG CAPS Take 1,000 mg by mouth every morning.     • hydrALAZINE (APRESOLINE) 25 MG Tab Take 1 Tab by mouth 2 Times a Day. 60 Tab 1   • [DISCONTINUED] valsartan (DIOVAN) 160 MG Tab Take 1 Tab by mouth 2 Times a Day. 30 Tab 3     No facility-administered encounter medications on file as of 10/20/2017.      Review of Systems   Constitutional: Negative for fever.   HENT: Positive for tinnitus.    Eyes: Positive for blurred vision.   Respiratory: Negative for shortness of breath.    Cardiovascular: Negative for chest pain, palpitations, orthopnea, claudication, leg swelling and PND.        Left shoulder pain     Gastrointestinal: Negative for abdominal pain, blood in stool and melena.   Genitourinary: Negative for dysuria and hematuria.   Musculoskeletal: Positive for joint pain. Negative for myalgias.        History of sciatica   Skin: Negative for rash.   Neurological: Positive for dizziness (most probably due to low blood pressure). Negative for loss of consciousness and headaches.   Endo/Heme/Allergies: Does not bruise/bleed easily.        Objective:   /60   Pulse 84   Ht 1.626 m (5' 4\")   Wt 79.8 kg (176 lb)   SpO2 91%   BMI 30.21 kg/m²      Physical Exam   Constitutional: She is oriented to person, place, and time. She appears well-developed. No distress.   HENT:   Mouth/Throat: Mucous membranes are normal.   Eyes: Conjunctivae and EOM are normal.   Neck: No JVD present. No tracheal deviation present. No thyroid mass present.   Cardiovascular: Normal rate, regular rhythm and intact distal pulses.    Murmur heard.   Systolic murmur is present with a grade of 2/6   Pulses:       Carotid pulses are 2+ on the right side, and 2+ on the left side with bruit.       Radial pulses are 2+ on the right side, and 2+ on the left side.        Dorsalis pedis pulses are 2+ on the right side, and 2+ on the left side.        Posterior tibial pulses are 2+ on the right " side, and 2+ on the left side.   Early peaking soft ejection systolic murmur best heard in the aortic area   Pulmonary/Chest: Effort normal and breath sounds normal. No respiratory distress. She exhibits no tenderness.   Abdominal: Soft. There is no tenderness.   Musculoskeletal: Normal range of motion. She exhibits no edema.   Neurological: She is alert and oriented to person, place, and time. She has normal strength. She displays no tremor.   Skin: Skin is warm and dry. She is not diaphoretic.   Psychiatric: She has a normal mood and affect. Her behavior is normal.   Vitals reviewed.  Results for ROSIO SHAFER (MRN [id number]) as of 10/20/2017 09:37   10/3/2017  10/4/2017    Sodium 138 138   Potassium 3.4 (L) 3.9   Chloride 105 106   Co2 26 24   Anion Gap 7.0 8.0   Glucose 93 105 (H)   Bun 12 12   Creatinine 0.60 0.56   GFR If Non African American >60 >60   Calcium 9.4 9.5   AST(SGOT)  10 (L)   ALT(SGPT)  8   Alkaline Phosphatase  52   Cholesterol,Tot 208 (H)    Triglycerides 225 (H)    HDL 48     (H)      TTE: 7/10/17  No prior study is available for comparison.   Left ventricular ejection fraction is visually estimated to be 60%.   Normal regional wall motion. Grade I diastolic dysfunction.  Calcification on posterior mitral valve leaflet.   Trace mitral regurgitation.  Moderate aortic stenosis.   Estimated right ventricular systolic pressure is 30 mmHg    Nuclear stress test: 5/23/11   1. The calculated left ventricular ejection fraction is greater than 70%.  2.  No wall motion abnormalities.  3.  No evidence of infarct or reversible ischemia    Assessment:   1. Hypotension associated with dizziness  2. Dyslipidemia  3. Atherosclerosis-aorta and coronary arteries  4. Moderate Aortic stenosis    Medical Decision Making:  Today's Assessment / Status / Plan:     1.Take Diovan HCT half a tablet (160/12.5 mg) in the morning half a tablet in the evening.  Take spironolactone 25 mg in the morning and 25 mg in  the evening.  Call me back in one week with blood pressure recordings  2. Continue Crestor 20 mg qhs.   3. Continue statins.  4. Less likely valvular heart disease leading to dizziness personally think it is hypotension leading to dizziness.   Repeat echocardiogram in one year    Follow-up in 3 month.  Basic metabolic panel in one week and then in 3 weeks.  Echocardiogram in one year.    Thank you for allowing me to participate in taking care of patient.    Jeanine Andrews MD.      Terra Zambrano M.D.  [address] [place] [address] [address]  [address] [address]  VIA Facsimile: [phone]

## 2017-10-25 ENCOUNTER — TELEPHONE (OUTPATIENT)
Dept: CARDIOLOGY | Facility: MEDICAL CENTER | Age: 77
End: 2017-10-25

## 2017-10-25 ENCOUNTER — NON-PROVIDER VISIT (OUTPATIENT)
Dept: CARDIOLOGY | Facility: MEDICAL CENTER | Age: 77
End: 2017-10-25
Payer: MEDICARE

## 2017-10-25 VITALS — DIASTOLIC BLOOD PRESSURE: 62 MMHG | SYSTOLIC BLOOD PRESSURE: 112 MMHG

## 2017-10-25 NOTE — PROGRESS NOTES
Subjective:   Erin Hess is a 77 y.o. female who presents today     Past Medical History:   Diagnosis Date   • Arthritis    • Asthma    • Breast cancer (CMS-HCC)    • Bronchitis    • Cancer (CMS-HCC) 2012    right breast   • Dizziness 3/11/2013   • Heart burn    • Heart murmur    • Hiatus hernia syndrome    • HTN (hypertension) 3/12/2013   • Hypercholesterolemia    • Hyperlipidemia 3/12/2013   • Hypertension    • Hypokalemia 3/12/2013   • Indigestion    • Pain     back, hips, knees   • Psychiatric disorder     depression   • Sleep apnea     h/o gastric sleeve lost 40 lb now not on CPAP   • Snoring    • ULCERATIVE COLITIS 3/12/2013   • Vertigo 3/11/2013     Past Surgical History:   Procedure Laterality Date   • GASTRIC SLEEVE LAPAROSCOPY N/A 2016    Procedure: GASTRIC SLEEVE LAPAROSCOPY, HIATAL HERNIA AND LIVER BIOPSY;  Surgeon: Mauricio Montes M.D.;  Location: SURGERY [facility];  Service:    • US-NEEDLE CORE BX-BREAST PANEL      rt breast, with lumpectomy    • GYN SURGERY      vag hyster    • GYN SURGERY      vaginal cyst removal    • LUMPECTOMY  left   • OTHER       R breast bx X 2& lipoma removal     Family History   Problem Relation Age of Onset   • Heart Disease Mother      CAD MI at age 72   • Cancer Mother      breast cancer  at age 83   • Cancer Sister    • Lung Disease Father 75     Lung cancer   • Cancer Maternal Grandmother      Pancreatic cancer   • Heart Attack Maternal Grandfather      MI at age 70   • Heart Disease Sister      Rhuemati cfever- herat murmur     History   Smoking Status   • Former Smoker   • Packs/day: 1.00   • Years: 20.00   • Types: Cigarettes   • Quit date: 1986   Smokeless Tobacco   • Never Used     Allergies   Allergen Reactions   • Sulfa Drugs Rash and Swelling     Rash, joint swelling  [id number]   • Vancomycin Itching   • Codeine Vomiting and Nausea     Outpatient Encounter Prescriptions as of 10/25/2017   Medication Sig Dispense Refill   •  valsartan-hydrochlorothiazide (DIOVAN-HCT) 320-12.5 MG per tablet Take 1 Tab by mouth every day.     • valsartan-hydrochlorothiazide (DIOVAN-HCT) 160-12.5 MG per tablet Take 1 Tab by mouth every 12 hours.     • hydrALAZINE (APRESOLINE) 25 MG Tab Take 1 Tab by mouth 2 Times a Day. 60 Tab 1   • magnesium oxide (MAG-OX) 400 MG Tab Take 400 mg by mouth every day.     • Multiple Vitamins-Minerals (ICAPS AREDS 2) Cap Take 1 Cap by mouth every day.     • paroxetine (PAXIL) 10 MG Tab Take 10 mg by mouth every day.     • spironolactone (ALDACTONE) 50 MG Tab Take 1 Tab by mouth every day. 30 Tab 3   • rosuvastatin (CRESTOR) 20 MG Tab Take 1 Tab by mouth every evening. 30 Tab 11   • Mesalamine (APRISO) 0.375 GM CAPSULE SR 24 HR Take 2 Caps by mouth 2 Times a Day.     • B Complex Vitamins (VITAMIN B COMPLEX PO) Take 1 Tab by mouth every morning.     • anastrozole (ARIMIDEX) 1 MG Tab Take 1 mg by mouth every morning.     • pantoprazole (PROTONIX) 40 MG Tablet Delayed Response Take 40 mg by mouth every morning.     • vitamin D (CHOLECALCIFEROL) 1000 UNIT Tab Take 1,000 Units by mouth every morning.     • aspirin EC (ECOTRIN) 81 MG TBEC Take 81 mg by mouth every morning.     • docosahexanoic acid (FISH OIL) 1000 MG CAPS Take 1,000 mg by mouth every morning.       No facility-administered encounter medications on file as of 10/25/2017.      ROS     Objective:   There were no vitals taken for this visit.    Physical Exam    Assessment:   No diagnosis found.    Medical Decision Making:  Today's Assessment / Status / Plan:

## 2017-10-25 NOTE — TELEPHONE ENCOUNTER
Per Dr. Salmon, pt. came in today for BP check & to check her home devices. Office BP = 110/62, then 112/60. One home device was accurate, the other off by 20-40 points on DBP. She will no longer use that device.

## 2017-11-03 ENCOUNTER — NON-PROVIDER VISIT (OUTPATIENT)
Dept: CARDIOLOGY | Facility: MEDICAL CENTER | Age: 77
End: 2017-11-03
Payer: MEDICARE

## 2017-11-03 VITALS
WEIGHT: 176 LBS | OXYGEN SATURATION: 92 % | BODY MASS INDEX: 30.05 KG/M2 | HEIGHT: 64 IN | SYSTOLIC BLOOD PRESSURE: 136 MMHG | DIASTOLIC BLOOD PRESSURE: 72 MMHG | HEART RATE: 80 BPM

## 2017-11-03 DIAGNOSIS — M79.602 LEFT ARM PAIN: ICD-10-CM

## 2017-11-03 LAB — TREADMILL STRESS: NORMAL

## 2017-11-03 PROCEDURE — 93015 CV STRESS TEST SUPVJ I&R: CPT | Performed by: INTERNAL MEDICINE

## 2017-11-07 ENCOUNTER — OFFICE VISIT (OUTPATIENT)
Dept: PULMONOLOGY | Facility: HOSPICE | Age: 77
End: 2017-11-07
Payer: MEDICARE

## 2017-11-07 VITALS
SYSTOLIC BLOOD PRESSURE: 127 MMHG | RESPIRATION RATE: 15 BRPM | HEIGHT: 64 IN | BODY MASS INDEX: 30.05 KG/M2 | DIASTOLIC BLOOD PRESSURE: 70 MMHG | WEIGHT: 176 LBS | OXYGEN SATURATION: 94 % | HEART RATE: 91 BPM

## 2017-11-07 DIAGNOSIS — J45.909 UNCOMPLICATED ASTHMA, UNSPECIFIED ASTHMA SEVERITY, UNSPECIFIED WHETHER PERSISTENT: ICD-10-CM

## 2017-11-07 DIAGNOSIS — J30.1 CHRONIC ALLERGIC RHINITIS DUE TO POLLEN, UNSPECIFIED SEASONALITY: ICD-10-CM

## 2017-11-07 DIAGNOSIS — G47.33 OSA (OBSTRUCTIVE SLEEP APNEA): ICD-10-CM

## 2017-11-07 PROBLEM — J31.0 CHRONIC RHINITIS: Status: ACTIVE | Noted: 2017-11-07

## 2017-11-07 PROCEDURE — 99214 OFFICE O/P EST MOD 30 MIN: CPT | Performed by: NURSE PRACTITIONER

## 2017-11-07 RX ORDER — FLUTICASONE PROPIONATE 50 MCG
1-2 SPRAY, SUSPENSION (ML) NASAL DAILY
Qty: 1 BOTTLE | Refills: 6 | Status: SHIPPED | OUTPATIENT
Start: 2017-11-07 | End: 2022-12-21

## 2017-11-07 NOTE — PATIENT INSTRUCTIONS
1. Continue CPAP nightly, follow-up with DME company regarding mask fit. [facility]  2. Trial of fluticasone nasal spray, Rx to pharmacy  3. Follow-up in 6 months, sooner if needed

## 2017-11-07 NOTE — PROGRESS NOTES
Chief Complaint   Patient presents with   • Follow-Up     Complience DL         HPI: This patient is a 77 y.o. female, who presents forCompliance download. is a 77 y.o. female, who presents for follow-up asthma and ROBYN. Other history includes HTN, HLD, cardiac murmur, breast cancer S/P lumpectomy maintained on Arimidex. She is followed by cardiology Dr. Salmon. Gastric sleeve earlier this year with significant weight loss. She was admitted to [facility] early October for hypertensive urgency and bradycardia.     In regards to ROBYN, Prior sleep study indicated an AHI of 14.9. She's been on CPAP for many years. New machine was obtained earlier this year. Patient has lost a significant amount of weight since her gastric sleeve procedure and wanted to reassess need for CPAP. Her insurance changed and she had to return her CPAP machine to [facility]. Home sleep study 8/15/17 indicates moderate sleep apnea with AHI of 17.9, minimum saturation 74%. Continued treatment with Auto CPAP is recommended. Compliance report shows 70% compliance over the past 30 days, average use of 5-1/2 hours per night, AHI of 3.4. Patient reports benefiting from therapy. Mask will occasionally cause soreness around her nares. She has dry mouth. Fatigue is improved. She feels she sleeps well through the night, denies a.m. headache.     Prior PFT's indicated an FEV1 of 1.90 L 87% predicted, FEV1 FVC ratio 66, DLCO 126% predicted with significant bronchodilator response. She's used Qvar in the past with no subjective benefit. She was started on Symbicort but admits to inconsistent use. She rarely requires albuterol. She does have a chronic cough productive of yellow sputum. She reports seasonal allergies and chronic rhinitis. She says her nose drips all day. Denies wheeze, dyspnea, fever, chills, night sweats.    Past Medical History:   Diagnosis Date   • Arthritis    • Asthma    • Breast cancer (CMS-HCC)    • Bronchitis 2011   • Cancer (CMS-HCC)  2012    right breast   • Dizziness 3/11/2013   • Heart burn    • Heart murmur    • Hiatus hernia syndrome    • HTN (hypertension) 3/12/2013   • Hypercholesterolemia    • Hyperlipidemia 3/12/2013   • Hypertension    • Hypokalemia 3/12/2013   • Indigestion    • Pain     back, hips, knees   • Psychiatric disorder     depression   • Sleep apnea     h/o gastric sleeve lost 40 lb now not on CPAP   • Snoring    • ULCERATIVE COLITIS 3/12/2013   • Vertigo 3/11/2013       Social History   Substance Use Topics   • Smoking status: Former Smoker     Packs/day: 1.00     Years: 20.00     Types: Cigarettes     Quit date: 1986   • Smokeless tobacco: Never Used   • Alcohol use Yes      Comment: 3 drinks weekly       Family History   Problem Relation Age of Onset   • Heart Disease Mother      CAD MI at age 72   • Cancer Mother      breast cancer  at age 83   • Cancer Sister    • Lung Disease Father 75     Lung cancer   • Cancer Maternal Grandmother      Pancreatic cancer   • Heart Attack Maternal Grandfather      MI at age 70   • Heart Disease Sister      Rhuemati cfever- herat murmur       Current medications as of today   Current Outpatient Prescriptions   Medication Sig Dispense Refill   • fluticasone (FLONASE) 50 MCG/ACT nasal spray Spray 1-2 Sprays in nose every day. Each nostril. 1 Bottle 6   • valsartan-hydrochlorothiazide (DIOVAN-HCT) 320-12.5 MG per tablet Take 1 Tab by mouth every day.     • valsartan-hydrochlorothiazide (DIOVAN-HCT) 160-12.5 MG per tablet Take 1 Tab by mouth every 12 hours.     • hydrALAZINE (APRESOLINE) 25 MG Tab Take 1 Tab by mouth 2 Times a Day. 60 Tab 1   • magnesium oxide (MAG-OX) 400 MG Tab Take 400 mg by mouth every day.     • Multiple Vitamins-Minerals (ICAPS AREDS 2) Cap Take 1 Cap by mouth every day.     • paroxetine (PAXIL) 10 MG Tab Take 10 mg by mouth every day.     • spironolactone (ALDACTONE) 50 MG Tab Take 1 Tab by mouth every day. 30 Tab 3   • rosuvastatin (CRESTOR) 20 MG Tab  "Take 1 Tab by mouth every evening. 30 Tab 11   • Mesalamine (APRISO) 0.375 GM CAPSULE SR 24 HR Take 2 Caps by mouth 2 Times a Day.     • B Complex Vitamins (VITAMIN B COMPLEX PO) Take 1 Tab by mouth every morning.     • anastrozole (ARIMIDEX) 1 MG Tab Take 1 mg by mouth every morning.     • pantoprazole (PROTONIX) 40 MG Tablet Delayed Response Take 40 mg by mouth every morning.     • vitamin D (CHOLECALCIFEROL) 1000 UNIT Tab Take 1,000 Units by mouth every morning.     • aspirin EC (ECOTRIN) 81 MG TBEC Take 81 mg by mouth every morning.     • docosahexanoic acid (FISH OIL) 1000 MG CAPS Take 1,000 mg by mouth every morning.       No current facility-administered medications for this visit.        Allergies: Sulfa drugs; Vancomycin; and Codeine    Blood pressure 127/70, pulse 91, resp. rate 15, height 1.626 m (5' 4\"), weight 79.8 kg (176 lb), SpO2 94 %.      ROS:   Constitutional: Denies fevers, chills, night sweats, weight loss or fatigue  Eyes: Denies pain, discharge/drainage  ENT: Denies tinnitus, hearing loss, sinusitis, hoarseness, epistaxis  Allergic: Positive rhinitis and hayfever  Respiratory: See HPI  Cardiovascular: Denies chest pain, tightness, palpitations, orthopnea or edema  Sleep: Denies snoring, resuscitative gasps, frequent nocturnal awakenings, insomnia  Neurological: Denies vertigo or headaches  Skin: Denies rashes, lesions  Psychiatric: Denies depression or anxiety      Physical exam:   Constitutional: Well-nourished, well-developed, in no acute distress  Eyes: PERRL  Mouth/Throat: Oropharynx is moist, clear, no lesions  Neck: supple, no masses  Respiratory: no intercostal retractions or accessory muscle use   Lungs auscultation: Clear to auscultation bilaterally  Cardiovascular: Regular rate rhythm no murmurs, rubs or gallops  Musculoskeletal: Normal gait, no clubbing or cyanosis  Skin: No rashes or lesions  Neuro: No focal deficit, cranial nerves grossly intact  Psychiatric: Oriented to time, " person and place.     Diagnosis:  1. Uncomplicated asthma, unspecified asthma severity, unspecified whether persistent     2. BMI 30.0-30.9,adult     3. ROBYN (obstructive sleep apnea)     4. Chronic allergic rhinitis due to pollen, unspecified seasonality  fluticasone (FLONASE) 50 MCG/ACT nasal spray       Plan:  1. Continue CPAP nightly, follow-up with [employer] company regarding mask fit. [facility]  2. Trial of fluticasone nasal spray, for rhinitis. Rx to pharmacy  3. Follow-up in 6 months, sooner if cough not resolved

## 2017-11-09 NOTE — PROGRESS NOTES
RTMST: 11/3/17  Patient exercised on Joaquín protocol for 5 minutes of age 9 METs achieved 104% MPHR.  No abnormal ST or T-wave changes suggestive of ischemia.  No property for clinically significant CAD.

## 2017-11-14 DIAGNOSIS — I10 ESSENTIAL HYPERTENSION: ICD-10-CM

## 2017-11-14 RX ORDER — VALSARTAN AND HYDROCHLOROTHIAZIDE 160; 12.5 MG/1; MG/1
0.5 TABLET, FILM COATED ORAL 2 TIMES DAILY
Qty: 90 TAB | Refills: 3 | Status: SHIPPED | OUTPATIENT
Start: 2017-11-14 | End: 2022-08-31 | Stop reason: SDUPTHER

## 2017-11-21 ENCOUNTER — OFFICE VISIT (OUTPATIENT)
Dept: CARDIOLOGY | Facility: MEDICAL CENTER | Age: 77
End: 2017-11-21
Payer: MEDICARE

## 2017-11-21 VITALS
HEIGHT: 64 IN | HEART RATE: 68 BPM | WEIGHT: 181 LBS | BODY MASS INDEX: 30.9 KG/M2 | SYSTOLIC BLOOD PRESSURE: 122 MMHG | OXYGEN SATURATION: 93 % | DIASTOLIC BLOOD PRESSURE: 60 MMHG

## 2017-11-21 DIAGNOSIS — I10 ESSENTIAL HYPERTENSION: ICD-10-CM

## 2017-11-21 DIAGNOSIS — I25.10 ATHEROSCLEROSIS OF NATIVE CORONARY ARTERY OF NATIVE HEART WITHOUT ANGINA PECTORIS: ICD-10-CM

## 2017-11-21 DIAGNOSIS — E78.5 DYSLIPIDEMIA: ICD-10-CM

## 2017-11-21 DIAGNOSIS — I35.0 NONRHEUMATIC AORTIC VALVE STENOSIS: ICD-10-CM

## 2017-11-21 PROCEDURE — 99214 OFFICE O/P EST MOD 30 MIN: CPT | Performed by: INTERNAL MEDICINE

## 2017-11-21 RX ORDER — VALSARTAN 160 MG/1
160 TABLET ORAL
COMMUNITY
End: 2022-03-10

## 2017-11-21 ASSESSMENT — ENCOUNTER SYMPTOMS
CLAUDICATION: 0
BLOOD IN STOOL: 0
LOSS OF CONSCIOUSNESS: 0
BLURRED VISION: 0
PND: 0
DIZZINESS: 0
MYALGIAS: 0
ABDOMINAL PAIN: 0
ORTHOPNEA: 0
PALPITATIONS: 0
FEVER: 0
HEADACHES: 0
BRUISES/BLEEDS EASILY: 0
SHORTNESS OF BREATH: 0

## 2017-11-21 NOTE — LETTER
[facility]   [address]   [address]  [place] [address]  Phone: [phone]  Fax: [phone]              Erin Hess  1940    Encounter Date: 11/21/2017    Jeanine Salmon M.D.          PROGRESS NOTE:  Subjective:   Erin Hess is a 77 y.o. female with known history of   1. Hypertension  2. Dyslipidemia currently not on any statins  3. Breast cancer status post lumpectomy currently on Arimidex   4. Atherosclerosis of aorta and coronary arteries  Presenting today for follow-up evaluation of hypertension, dyslipidemia and atherosclerosis of coronary arteries.    Reviewed patient's home blood pressure records most of the times patient blood pressure is around 120s to 130s systolic intermittently blood pressure is going up to 150 systolic. Denied any complaints of exertional chest pain pressure or tightness. Still continues to have left arm pain. No complaints of dizziness lightheadedness or LOC. No complaints of lower extremity edema or claudication.    Past Medical History:   Diagnosis Date   • Arthritis    • Asthma    • Breast cancer (CMS-HCC)    • Bronchitis 2011   • Cancer (CMS-HCC) 12/2012    right breast   • Dizziness 3/11/2013   • Heart burn    • Heart murmur    • Hiatus hernia syndrome    • HTN (hypertension) 3/12/2013   • Hypercholesterolemia    • Hyperlipidemia 3/12/2013   • Hypertension    • Hypokalemia 3/12/2013   • Indigestion    • Pain     back, hips, knees   • Psychiatric disorder     depression   • Sleep apnea     h/o gastric sleeve lost 40 lb now not on CPAP   • Snoring    • ULCERATIVE COLITIS 3/12/2013   • Vertigo 3/11/2013     Past Surgical History:   Procedure Laterality Date   • GASTRIC SLEEVE LAPAROSCOPY N/A 5/18/2016    Procedure: GASTRIC SLEEVE LAPAROSCOPY, HIATAL HERNIA AND LIVER BIOPSY;  Surgeon: Mauricio Montes M.D.;  Location: SURGERY [facility];  Service:    • US-NEEDLE CORE BX-BREAST PANEL  2013       rt breast, with lumpectomy    • GYN SURGERY      vag hyster    • GYN SURGERY      vaginal cyst removal    • LUMPECTOMY  left   • OTHER       R breast bx X 2& lipoma removal     Family History   Problem Relation Age of Onset   • Heart Disease Mother      CAD MI at age 72   • Cancer Mother      breast cancer  at age 83   • Cancer Sister    • Lung Disease Father 75     Lung cancer   • Cancer Maternal Grandmother      Pancreatic cancer   • Heart Attack Maternal Grandfather      MI at age 70   • Heart Disease Sister      Rhuemati cfever- herat murmur     History   Smoking Status   • Former Smoker   • Packs/day: 1.00   • Years: 20.00   • Types: Cigarettes   • Quit date: 1986   Smokeless Tobacco   • Never Used     Allergies   Allergen Reactions   • Sulfa Drugs Rash and Swelling     Rash, joint swelling  [id number]   • Vancomycin Itching   • Codeine Vomiting and Nausea     Outpatient Encounter Prescriptions as of 2017   Medication Sig Dispense Refill   • valsartan (DIOVAN) 160 MG Tab Take 160 mg by mouth every day.     • valsartan-hydrochlorothiazide (DIOVAN-HCT) 160-12.5 MG per tablet Take 0.5 Tabs by mouth 2 Times a Day. 90 Tab 3   • fluticasone (FLONASE) 50 MCG/ACT nasal spray Spray 1-2 Sprays in nose every day. Each nostril. 1 Bottle 6   • hydrALAZINE (APRESOLINE) 25 MG Tab Take 1 Tab by mouth 2 Times a Day. 60 Tab 1   • magnesium oxide (MAG-OX) 400 MG Tab Take 400 mg by mouth every day.     • Multiple Vitamins-Minerals (ICAPS AREDS 2) Cap Take 1 Cap by mouth every day.     • paroxetine (PAXIL) 10 MG Tab Take 10 mg by mouth every day.     • spironolactone (ALDACTONE) 50 MG Tab Take 1 Tab by mouth every day. 30 Tab 3   • rosuvastatin (CRESTOR) 20 MG Tab Take 1 Tab by mouth every evening. 30 Tab 11   • Mesalamine (APRISO) 0.375 GM CAPSULE SR 24 HR Take 2 Caps by mouth 2 Times a Day.     • B Complex Vitamins (VITAMIN B COMPLEX PO) Take 1 Tab by mouth every morning.     • anastrozole (ARIMIDEX) 1 MG Tab  "Take 1 mg by mouth every morning.     • pantoprazole (PROTONIX) 40 MG Tablet Delayed Response Take 40 mg by mouth every morning.     • vitamin D (CHOLECALCIFEROL) 1000 UNIT Tab Take 1,000 Units by mouth every morning.     • aspirin EC (ECOTRIN) 81 MG TBEC Take 81 mg by mouth every morning.     • docosahexanoic acid (FISH OIL) 1000 MG CAPS Take 1,000 mg by mouth every morning.       No facility-administered encounter medications on file as of 11/21/2017.      Review of Systems   Constitutional: Negative for fever.   HENT: Positive for tinnitus.    Eyes: Negative for blurred vision.   Respiratory: Negative for shortness of breath.    Cardiovascular: Negative for chest pain, palpitations, orthopnea, claudication, leg swelling and PND.        Left arm pain      Gastrointestinal: Negative for abdominal pain, blood in stool and melena.   Genitourinary: Negative for dysuria and hematuria.   Musculoskeletal: Positive for joint pain. Negative for myalgias.        History of sciatica   Skin: Negative for rash.   Neurological: Negative for dizziness (most probably due to low blood pressure), loss of consciousness and headaches.   Endo/Heme/Allergies: Does not bruise/bleed easily.        Objective:   /60   Pulse 68   Ht 1.626 m (5' 4\")   Wt 82.1 kg (181 lb)   SpO2 93%   BMI 31.07 kg/m²      Physical Exam   Constitutional: She is oriented to person, place, and time. She appears well-developed. No distress.   HENT:   Mouth/Throat: Mucous membranes are normal.   Eyes: Conjunctivae and EOM are normal.   Neck: No JVD present. No tracheal deviation present. No thyroid mass present.   Cardiovascular: Normal rate, regular rhythm and intact distal pulses.    Murmur heard.   Systolic murmur is present with a grade of 2/6   Pulses:       Carotid pulses are 2+ on the right side, and 2+ on the left side with bruit.       Radial pulses are 2+ on the right side, and 2+ on the left side.        Dorsalis pedis pulses are 2+ on the " right side, and 2+ on the left side.        Posterior tibial pulses are 2+ on the right side, and 2+ on the left side.   Early peaking soft ejection systolic murmur best heard in the aortic area   Pulmonary/Chest: Effort normal and breath sounds normal. No respiratory distress. She exhibits no tenderness.   Abdominal: Soft. There is no tenderness.   Musculoskeletal: Normal range of motion. She exhibits no edema.   Neurological: She is alert and oriented to person, place, and time. She has normal strength. She displays no tremor.   Skin: Skin is warm and dry. She is not diaphoretic.   Psychiatric: She has a normal mood and affect. Her behavior is normal.   Vitals reviewed.  RTMST: 11/3/17  Patient exercised on Joaquín protocol for 5 minutes of age 9 METs achieved 104% MPHR.  No abnormal ST or T-wave changes suggestive of ischemia.  No property for clinically significant CAD.    Results for ROSIO SHAFER (MRN [id number]) as of 10/20/2017 09:37   10/3/2017  10/4/2017    Sodium 138 138   Potassium 3.4 (L) 3.9   Chloride 105 106   Co2 26 24   Anion Gap 7.0 8.0   Glucose 93 105 (H)   Bun 12 12   Creatinine 0.60 0.56   GFR If Non African American >60 >60   Calcium 9.4 9.5   AST(SGOT)  10 (L)   ALT(SGPT)  8   Alkaline Phosphatase  52   Cholesterol,Tot 208 (H)    Triglycerides 225 (H)    HDL 48     (H)      TTE: 7/10/17  No prior study is available for comparison.   Left ventricular ejection fraction is visually estimated to be 60%.   Normal regional wall motion. Grade I diastolic dysfunction.  Calcification on posterior mitral valve leaflet.   Trace mitral regurgitation.  Moderate aortic stenosis.   Estimated right ventricular systolic pressure is 30 mmHg    Nuclear stress test: 5/23/11   1. The calculated left ventricular ejection fraction is greater than 70%.  2.  No wall motion abnormalities.  3.  No evidence of infarct or reversible ischemia    Assessment:   1. Hypertension  2. Dyslipidemia  3.  Atherosclerosis-aorta and coronary arteries  4. Moderate Aortic stenosis  5. Left arm pain  Medical Decision Making:  Today's Assessment / Status / Plan:     1.Most of the time patient blood pressure is well controlled.   Intermittently her blood pressures going up to 150 systolic advised her to monitor salt intake.   Continue Diovan/HCT (160/12.5 mg) in the morning   Continue Diovan 160 mg in evening  Continue spironolactone 25 mg BID.  2. Patient is scheduled for blood work will review the results when available.  Interim continue Crestor 20 mg qhs.   3. Continue statins.  Regular treadmill stress test negative for ischemia.  4. Repeat echocardiogram in one year.  5. Stress test negative for ischemia noncardiac left arm pain.    Follow-up in one year.  Labs and echo prior to next visit.    Thank you for allowing me to participate in taking care of patient.    Jeanine Salmon. MD.      Terra Zambrano M.D.  [address] [place] [address] [address]  [address]  [place] [id number]  VIA Facsimile: [phone]

## 2017-11-21 NOTE — PROGRESS NOTES
Subjective:   Erin Hess is a 77 y.o. female with known history of   1. Hypertension  2. Dyslipidemia currently not on any statins  3. Breast cancer status post lumpectomy currently on Arimidex   4. Atherosclerosis of aorta and coronary arteries  Presenting today for follow-up evaluation of hypertension, dyslipidemia and atherosclerosis of coronary arteries.    Reviewed patient's home blood pressure records most of the times patient blood pressure is around 120s to 130s systolic intermittently blood pressure is going up to 150 systolic. Denied any complaints of exertional chest pain pressure or tightness. Still continues to have left arm pain. No complaints of dizziness lightheadedness or LOC. No complaints of lower extremity edema or claudication.    Past Medical History:   Diagnosis Date   • Arthritis    • Asthma    • Breast cancer (CMS-HCC)    • Bronchitis 2011   • Cancer (CMS-HCC) 12/2012    right breast   • Dizziness 3/11/2013   • Heart burn    • Heart murmur    • Hiatus hernia syndrome    • HTN (hypertension) 3/12/2013   • Hypercholesterolemia    • Hyperlipidemia 3/12/2013   • Hypertension    • Hypokalemia 3/12/2013   • Indigestion    • Pain     back, hips, knees   • Psychiatric disorder     depression   • Sleep apnea     h/o gastric sleeve lost 40 lb now not on CPAP   • Snoring    • ULCERATIVE COLITIS 3/12/2013   • Vertigo 3/11/2013     Past Surgical History:   Procedure Laterality Date   • GASTRIC SLEEVE LAPAROSCOPY N/A 5/18/2016    Procedure: GASTRIC SLEEVE LAPAROSCOPY, HIATAL HERNIA AND LIVER BIOPSY;  Surgeon: Mauricio Montes M.D.;  Location: SURGERY [facility];  Service:    • US-NEEDLE CORE BX-BREAST PANEL  2013    rt breast, with lumpectomy    • GYN SURGERY      vag hyster 1990   • GYN SURGERY      vaginal cyst removal    • LUMPECTOMY  left   • OTHER       R breast bx X 2& lipoma removal     Family History   Problem Relation Age of Onset   • Heart Disease Mother      CAD MI at age 72   • Cancer  Mother      breast cancer  at age 83   • Cancer Sister    • Lung Disease Father 75     Lung cancer   • Cancer Maternal Grandmother      Pancreatic cancer   • Heart Attack Maternal Grandfather      MI at age 70   • Heart Disease Sister      Nadya cfever- herat murmur     History   Smoking Status   • Former Smoker   • Packs/day: 1.00   • Years: 20.00   • Types: Cigarettes   • Quit date: 1986   Smokeless Tobacco   • Never Used     Allergies   Allergen Reactions   • Sulfa Drugs Rash and Swelling     Rash, joint swelling  [id number]   • Vancomycin Itching   • Codeine Vomiting and Nausea     Outpatient Encounter Prescriptions as of 2017   Medication Sig Dispense Refill   • valsartan (DIOVAN) 160 MG Tab Take 160 mg by mouth every day.     • valsartan-hydrochlorothiazide (DIOVAN-HCT) 160-12.5 MG per tablet Take 0.5 Tabs by mouth 2 Times a Day. 90 Tab 3   • fluticasone (FLONASE) 50 MCG/ACT nasal spray Spray 1-2 Sprays in nose every day. Each nostril. 1 Bottle 6   • hydrALAZINE (APRESOLINE) 25 MG Tab Take 1 Tab by mouth 2 Times a Day. 60 Tab 1   • magnesium oxide (MAG-OX) 400 MG Tab Take 400 mg by mouth every day.     • Multiple Vitamins-Minerals (ICAPS AREDS 2) Cap Take 1 Cap by mouth every day.     • paroxetine (PAXIL) 10 MG Tab Take 10 mg by mouth every day.     • spironolactone (ALDACTONE) 50 MG Tab Take 1 Tab by mouth every day. 30 Tab 3   • rosuvastatin (CRESTOR) 20 MG Tab Take 1 Tab by mouth every evening. 30 Tab 11   • Mesalamine (APRISO) 0.375 GM CAPSULE SR 24 HR Take 2 Caps by mouth 2 Times a Day.     • B Complex Vitamins (VITAMIN B COMPLEX PO) Take 1 Tab by mouth every morning.     • anastrozole (ARIMIDEX) 1 MG Tab Take 1 mg by mouth every morning.     • pantoprazole (PROTONIX) 40 MG Tablet Delayed Response Take 40 mg by mouth every morning.     • vitamin D (CHOLECALCIFEROL) 1000 UNIT Tab Take 1,000 Units by mouth every morning.     • aspirin EC (ECOTRIN) 81 MG TBEC Take 81 mg by mouth every  "morning.     • docosahexanoic acid (FISH OIL) 1000 MG CAPS Take 1,000 mg by mouth every morning.       No facility-administered encounter medications on file as of 11/21/2017.      Review of Systems   Constitutional: Negative for fever.   HENT: Positive for tinnitus.    Eyes: Negative for blurred vision.   Respiratory: Negative for shortness of breath.    Cardiovascular: Negative for chest pain, palpitations, orthopnea, claudication, leg swelling and PND.        Left arm pain      Gastrointestinal: Negative for abdominal pain, blood in stool and melena.   Genitourinary: Negative for dysuria and hematuria.   Musculoskeletal: Positive for joint pain. Negative for myalgias.        History of sciatica   Skin: Negative for rash.   Neurological: Negative for dizziness (most probably due to low blood pressure), loss of consciousness and headaches.   Endo/Heme/Allergies: Does not bruise/bleed easily.        Objective:   /60   Pulse 68   Ht 1.626 m (5' 4\")   Wt 82.1 kg (181 lb)   SpO2 93%   BMI 31.07 kg/m²     Physical Exam   Constitutional: She is oriented to person, place, and time. She appears well-developed. No distress.   HENT:   Mouth/Throat: Mucous membranes are normal.   Eyes: Conjunctivae and EOM are normal.   Neck: No JVD present. No tracheal deviation present. No thyroid mass present.   Cardiovascular: Normal rate, regular rhythm and intact distal pulses.    Murmur heard.   Systolic murmur is present with a grade of 2/6   Pulses:       Carotid pulses are 2+ on the right side, and 2+ on the left side with bruit.       Radial pulses are 2+ on the right side, and 2+ on the left side.        Dorsalis pedis pulses are 2+ on the right side, and 2+ on the left side.        Posterior tibial pulses are 2+ on the right side, and 2+ on the left side.   Early peaking soft ejection systolic murmur best heard in the aortic area   Pulmonary/Chest: Effort normal and breath sounds normal. No respiratory distress. She " exhibits no tenderness.   Abdominal: Soft. There is no tenderness.   Musculoskeletal: Normal range of motion. She exhibits no edema.   Neurological: She is alert and oriented to person, place, and time. She has normal strength. She displays no tremor.   Skin: Skin is warm and dry. She is not diaphoretic.   Psychiatric: She has a normal mood and affect. Her behavior is normal.   Vitals reviewed.  RTMST: 11/3/17  Patient exercised on Joaquín protocol for 5 minutes of age 9 METs achieved 104% MPHR.  No abnormal ST or T-wave changes suggestive of ischemia.  No property for clinically significant CAD.    Results for ROSIO SHAFER (MRN [id number]) as of 10/20/2017 09:37   10/3/2017  10/4/2017    Sodium 138 138   Potassium 3.4 (L) 3.9   Chloride 105 106   Co2 26 24   Anion Gap 7.0 8.0   Glucose 93 105 (H)   Bun 12 12   Creatinine 0.60 0.56   GFR If Non African American >60 >60   Calcium 9.4 9.5   AST(SGOT)  10 (L)   ALT(SGPT)  8   Alkaline Phosphatase  52   Cholesterol,Tot 208 (H)    Triglycerides 225 (H)    HDL 48     (H)      TTE: 7/10/17  No prior study is available for comparison.   Left ventricular ejection fraction is visually estimated to be 60%.   Normal regional wall motion. Grade I diastolic dysfunction.  Calcification on posterior mitral valve leaflet.   Trace mitral regurgitation.  Moderate aortic stenosis.   Estimated right ventricular systolic pressure is 30 mmHg    Nuclear stress test: 5/23/11   1. The calculated left ventricular ejection fraction is greater than 70%.  2.  No wall motion abnormalities.  3.  No evidence of infarct or reversible ischemia    Assessment:   1. Hypertension  2. Dyslipidemia  3. Atherosclerosis-aorta and coronary arteries  4. Moderate Aortic stenosis  5. Left arm pain  Medical Decision Making:  Today's Assessment / Status / Plan:     1.Most of the time patient blood pressure is well controlled.   Intermittently her blood pressures going up to 150 systolic advised her to  monitor salt intake.   Continue Diovan/HCT (160/12.5 mg) in the morning   Continue Diovan 160 mg in evening  Continue spironolactone 25 mg BID.  2. Patient is scheduled for blood work will review the results when available.  Interim continue Crestor 20 mg qhs.   3. Continue statins.  Regular treadmill stress test negative for ischemia.  4. Repeat echocardiogram in one year.  5. Stress test negative for ischemia noncardiac left arm pain.    Follow-up in one year.  Labs and echo prior to next visit.    Thank you for allowing me to participate in taking care of patient.    Jeanine Andrews MD.

## 2017-11-28 ENCOUNTER — HOSPITAL ENCOUNTER (OUTPATIENT)
Dept: LAB | Facility: MEDICAL CENTER | Age: 77
End: 2017-11-28
Attending: COLON & RECTAL SURGERY
Payer: MEDICARE

## 2017-11-28 LAB
25(OH)D3 SERPL-MCNC: 32 NG/ML (ref 30–100)
ALBUMIN SERPL BCP-MCNC: 4 G/DL (ref 3.2–4.9)
ALBUMIN/GLOB SERPL: 1.5 G/DL
ALP SERPL-CCNC: 53 U/L (ref 30–99)
ALT SERPL-CCNC: 16 U/L (ref 2–50)
ANION GAP SERPL CALC-SCNC: 4 MMOL/L (ref 0–11.9)
AST SERPL-CCNC: 16 U/L (ref 12–45)
BASOPHILS # BLD AUTO: 0.6 % (ref 0–1.8)
BASOPHILS # BLD: 0.03 K/UL (ref 0–0.12)
BILIRUB SERPL-MCNC: 0.7 MG/DL (ref 0.1–1.5)
BUN SERPL-MCNC: 19 MG/DL (ref 8–22)
CALCIUM SERPL-MCNC: 10 MG/DL (ref 8.5–10.5)
CHLORIDE SERPL-SCNC: 107 MMOL/L (ref 96–112)
CHOLEST SERPL-MCNC: 154 MG/DL (ref 100–199)
CO2 SERPL-SCNC: 27 MMOL/L (ref 20–33)
CREAT SERPL-MCNC: 0.72 MG/DL (ref 0.5–1.4)
EOSINOPHIL # BLD AUTO: 0.12 K/UL (ref 0–0.51)
EOSINOPHIL NFR BLD: 2.6 % (ref 0–6.9)
ERYTHROCYTE [DISTWIDTH] IN BLOOD BY AUTOMATED COUNT: 46 FL (ref 35.9–50)
FOLATE SERPL-MCNC: 8.3 NG/ML
GFR SERPL CREATININE-BSD FRML MDRD: >60 ML/MIN/1.73 M 2
GLOBULIN SER CALC-MCNC: 2.7 G/DL (ref 1.9–3.5)
GLUCOSE SERPL-MCNC: 91 MG/DL (ref 65–99)
HCT VFR BLD AUTO: 43.2 % (ref 37–47)
HDLC SERPL-MCNC: 62 MG/DL
HGB BLD-MCNC: 14.4 G/DL (ref 12–16)
IMM GRANULOCYTES # BLD AUTO: 0.01 K/UL (ref 0–0.11)
IMM GRANULOCYTES NFR BLD AUTO: 0.2 % (ref 0–0.9)
IRON SATN MFR SERPL: 28 % (ref 15–55)
IRON SERPL-MCNC: 124 UG/DL (ref 40–170)
LDLC SERPL CALC-MCNC: 73 MG/DL
LYMPHOCYTES # BLD AUTO: 1.67 K/UL (ref 1–4.8)
LYMPHOCYTES NFR BLD: 35.5 % (ref 22–41)
MCH RBC QN AUTO: 30.4 PG (ref 27–33)
MCHC RBC AUTO-ENTMCNC: 33.3 G/DL (ref 33.6–35)
MCV RBC AUTO: 91.1 FL (ref 81.4–97.8)
MONOCYTES # BLD AUTO: 0.38 K/UL (ref 0–0.85)
MONOCYTES NFR BLD AUTO: 8.1 % (ref 0–13.4)
NEUTROPHILS # BLD AUTO: 2.49 K/UL (ref 2–7.15)
NEUTROPHILS NFR BLD: 53 % (ref 44–72)
NRBC # BLD AUTO: 0 K/UL
NRBC BLD AUTO-RTO: 0 /100 WBC
PLATELET # BLD AUTO: 189 K/UL (ref 164–446)
PMV BLD AUTO: 10.3 FL (ref 9–12.9)
POTASSIUM SERPL-SCNC: 4 MMOL/L (ref 3.6–5.5)
PREALB SERPL-MCNC: 27 MG/DL (ref 18–38)
PROT SERPL-MCNC: 6.7 G/DL (ref 6–8.2)
PTH-INTACT SERPL-MCNC: 45.1 PG/ML (ref 14–72)
RBC # BLD AUTO: 4.74 M/UL (ref 4.2–5.4)
SODIUM SERPL-SCNC: 138 MMOL/L (ref 135–145)
TIBC SERPL-MCNC: 448 UG/DL (ref 250–450)
TRANSFERRIN SERPL-MCNC: 318 MG/DL (ref 200–370)
TRIGL SERPL-MCNC: 94 MG/DL (ref 0–149)
VIT B12 SERPL-MCNC: 410 PG/ML (ref 211–911)
WBC # BLD AUTO: 4.7 K/UL (ref 4.8–10.8)

## 2017-11-28 PROCEDURE — 82607 VITAMIN B-12: CPT

## 2017-11-28 PROCEDURE — 84134 ASSAY OF PREALBUMIN: CPT

## 2017-11-28 PROCEDURE — 80061 LIPID PANEL: CPT

## 2017-11-28 PROCEDURE — 82746 ASSAY OF FOLIC ACID SERUM: CPT

## 2017-11-28 PROCEDURE — 83540 ASSAY OF IRON: CPT

## 2017-11-28 PROCEDURE — 85025 COMPLETE CBC W/AUTO DIFF WBC: CPT

## 2017-11-28 PROCEDURE — 36415 COLL VENOUS BLD VENIPUNCTURE: CPT

## 2017-11-28 PROCEDURE — 80053 COMPREHEN METABOLIC PANEL: CPT

## 2017-11-28 PROCEDURE — 84425 ASSAY OF VITAMIN B-1: CPT

## 2017-11-28 PROCEDURE — 84466 ASSAY OF TRANSFERRIN: CPT

## 2017-11-28 PROCEDURE — 83550 IRON BINDING TEST: CPT

## 2017-11-28 PROCEDURE — 83970 ASSAY OF PARATHORMONE: CPT

## 2017-11-28 PROCEDURE — 82306 VITAMIN D 25 HYDROXY: CPT

## 2017-11-30 LAB — VIT B1 BLD-MCNC: 129 NMOL/L (ref 70–180)

## 2017-12-14 DIAGNOSIS — I10 ESSENTIAL HYPERTENSION: ICD-10-CM

## 2017-12-15 RX ORDER — SPIRONOLACTONE 50 MG/1
50 TABLET, FILM COATED ORAL DAILY
Qty: 90 TAB | Refills: 3 | Status: SHIPPED | OUTPATIENT
Start: 2017-12-15 | End: 2017-12-22 | Stop reason: SDUPTHER

## 2017-12-22 DIAGNOSIS — I10 ESSENTIAL HYPERTENSION: ICD-10-CM

## 2017-12-22 RX ORDER — SPIRONOLACTONE 50 MG/1
50 TABLET, FILM COATED ORAL DAILY
Qty: 90 TAB | Refills: 3 | Status: CANCELLED | OUTPATIENT
Start: 2017-12-22

## 2017-12-22 RX ORDER — SPIRONOLACTONE 50 MG/1
50 TABLET, FILM COATED ORAL DAILY
Qty: 90 TAB | Refills: 3 | Status: SHIPPED | OUTPATIENT
Start: 2017-12-22 | End: 2022-08-31 | Stop reason: SDUPTHER

## 2017-12-29 ENCOUNTER — TELEPHONE (OUTPATIENT)
Dept: CARDIOLOGY | Facility: MEDICAL CENTER | Age: 77
End: 2017-12-29

## 2017-12-29 NOTE — TELEPHONE ENCOUNTER
I personally spoken with patient she is taking spironolactone 25 mg in the morning and 25 mg in the evening. Pharmacy can go ahead and fill the prescription for 50 mg a day and patient is going to break it into half a tablet in the morning and half in the evening

## 2017-12-29 NOTE — TELEPHONE ENCOUNTER
----- Message from Silvia Roy sent at 12/29/2017  8:45 AM PST -----  Regarding: Question about prescription for Spironolactone  DEBORAH/Cyn    Please call [facility] at [phone]. She said that a different doctor sent in a prescription for Spironolactone, but it has different directions.

## 2017-12-29 NOTE — TELEPHONE ENCOUNTER
Pharmacy needs clarification on spironolactone.  50mg daily was ordered, but chart note says continue 25mg BID and patient's PCP has patient on 50mg BID. To AM MD.   Do you want her on 50mg BID as well?  Thanks.

## 2018-01-04 ENCOUNTER — TELEPHONE (OUTPATIENT)
Dept: CARDIOLOGY | Facility: MEDICAL CENTER | Age: 78
End: 2018-01-04

## 2018-01-04 NOTE — TELEPHONE ENCOUNTER
Patient's wife states he was admitted following a ICR session after which he fainted.  She was told this is most likely due to dehydration.  Reassured that our associates will see him if cardiology is needed during hospitalization and that he should keep his FV next month with KOTA DOHERTY.  LIZET to [facility]OTA DOHERTY.

## 2018-01-04 NOTE — TELEPHONE ENCOUNTER
----- Message from Silvia Roy sent at 12/29/2017  8:45 AM PST -----  Regarding: Question about prescription for Spironolactone  DEBORAH/Cyn    Please call Laura [facility] at [phone]. She said that a different doctor sent in a prescription for Spironolactone, but it has different directions.

## 2018-01-10 ENCOUNTER — OFFICE VISIT (OUTPATIENT)
Dept: NEPHROLOGY | Facility: MEDICAL CENTER | Age: 78
End: 2018-01-10
Payer: MEDICARE

## 2018-01-10 VITALS
SYSTOLIC BLOOD PRESSURE: 112 MMHG | OXYGEN SATURATION: 93 % | DIASTOLIC BLOOD PRESSURE: 50 MMHG | HEIGHT: 64 IN | WEIGHT: 182 LBS | HEART RATE: 93 BPM | BODY MASS INDEX: 31.07 KG/M2 | TEMPERATURE: 99.2 F | RESPIRATION RATE: 14 BRPM

## 2018-01-10 DIAGNOSIS — N28.1 COMPLEX RENAL CYST: ICD-10-CM

## 2018-01-10 DIAGNOSIS — N18.2 CKD (CHRONIC KIDNEY DISEASE), STAGE II: ICD-10-CM

## 2018-01-10 DIAGNOSIS — I10 ESSENTIAL HYPERTENSION: ICD-10-CM

## 2018-01-10 PROCEDURE — 99203 OFFICE O/P NEW LOW 30 MIN: CPT | Performed by: INTERNAL MEDICINE

## 2018-01-10 RX ORDER — MELATONIN
1000 DAILY
COMMUNITY
End: 2024-02-09

## 2018-01-10 ASSESSMENT — ENCOUNTER SYMPTOMS
HEMOPTYSIS: 0
EYES NEGATIVE: 1
MYALGIAS: 0
PALPITATIONS: 0
SHORTNESS OF BREATH: 0
WEIGHT LOSS: 0
FOCAL WEAKNESS: 0
COUGH: 0
SENSORY CHANGE: 0
DIZZINESS: 0
CHILLS: 0
FEVER: 0
NAUSEA: 0
ORTHOPNEA: 0
WHEEZING: 0
BACK PAIN: 0
ABDOMINAL PAIN: 0
SPUTUM PRODUCTION: 0
VOMITING: 0
HEADACHES: 0
FLANK PAIN: 0

## 2018-01-11 NOTE — PROGRESS NOTES
"Subjective:      Erin Hess is a 77 y.o. female who presents with New Patient and Chronic Kidney Disease            HPI  78 y/o female with CKD II, stable creat level at 0.7, HTN -well controlled  Found on renal doppler US large PVR, right renal complex cyst  Already referred to Urology.  Doing well , no complaints  No dysuria/hematuria  BP well controlled    Review of Systems   Constitutional: Negative for chills, fever, malaise/fatigue and weight loss.   HENT: Negative.    Eyes: Negative.    Respiratory: Negative for cough, hemoptysis, sputum production, shortness of breath and wheezing.    Cardiovascular: Negative for chest pain, palpitations, orthopnea and leg swelling.   Gastrointestinal: Negative for abdominal pain, nausea and vomiting.   Genitourinary: Negative for dysuria, flank pain, frequency, hematuria and urgency.   Musculoskeletal: Negative for back pain, joint pain and myalgias.   Skin: Negative.    Neurological: Negative for dizziness, sensory change, focal weakness and headaches.          Objective:     /50   Pulse 93   Temp 37.3 °C (99.2 °F)   Resp 14   Ht 1.626 m (5' 4\")   Wt 82.6 kg (182 lb)   SpO2 93%   BMI 31.24 kg/m²      Physical Exam   Constitutional: She is oriented to person, place, and time. She appears well-developed and well-nourished.   HENT:   Head: Normocephalic and atraumatic.   Nose: Nose normal.   Eyes: Conjunctivae and EOM are normal. Pupils are equal, round, and reactive to light.   Neck: Normal range of motion. Neck supple.   Cardiovascular: Normal rate, regular rhythm and normal heart sounds.  Exam reveals no gallop and no friction rub.    Pulmonary/Chest: Effort normal and breath sounds normal. No respiratory distress. She has no wheezes. She has no rales.   Abdominal: Soft. Bowel sounds are normal. She exhibits no distension. There is no tenderness.   Musculoskeletal: She exhibits no edema.   Neurological: She is alert and oriented to person, place, and " time. No cranial nerve deficit. Coordination normal.   Skin: Skin is warm. No rash noted. No erythema.   Nursing note and vitals reviewed.            Laboratory and US  results reviewed: d/w Pt  Lab Results   Component Value Date/Time    CREATININE 0.72 11/28/2017 10:36 AM    POTASSIUM 4.0 11/28/2017 10:36 AM       Assessment/Plan:     1. Complex renal cyst      F/u with Urology    2. Essential hypertension      BP well controlled    3. CKD (chronic kidney disease), stage II      Stable    Recs: f/u in Urology Clinic              Continue current treatment             F/u here as needed    THank you for the consult

## 2018-01-31 DIAGNOSIS — J45.909 UNCOMPLICATED ASTHMA, UNSPECIFIED ASTHMA SEVERITY, UNSPECIFIED WHETHER PERSISTENT: ICD-10-CM

## 2018-02-01 RX ORDER — DILTIAZEM HYDROCHLORIDE 60 MG/1
TABLET, FILM COATED ORAL
Qty: 1 INHALER | Refills: 5 | Status: SHIPPED | OUTPATIENT
Start: 2018-02-01 | End: 2022-08-17

## 2018-02-01 NOTE — TELEPHONE ENCOUNTER
Pt is requesting an rx for Symbicort be sent to the pharmacy on file.    Last OV: 11/7/17- Olivia    Next OV: 5/1/18    DX: Asthma

## 2018-03-03 ENCOUNTER — HOSPITAL ENCOUNTER (OUTPATIENT)
Dept: RADIOLOGY | Facility: MEDICAL CENTER | Age: 78
End: 2018-03-03
Attending: FAMILY MEDICINE
Payer: MEDICARE

## 2018-03-03 DIAGNOSIS — Z12.31 VISIT FOR SCREENING MAMMOGRAM: ICD-10-CM

## 2018-03-03 PROCEDURE — 77063 BREAST TOMOSYNTHESIS BI: CPT

## 2018-09-12 ENCOUNTER — APPOINTMENT (RX ONLY)
Dept: URBAN - METROPOLITAN AREA CLINIC 4 | Facility: CLINIC | Age: 78
Setting detail: DERMATOLOGY
End: 2018-09-12

## 2018-09-12 DIAGNOSIS — L81.4 OTHER MELANIN HYPERPIGMENTATION: ICD-10-CM

## 2018-09-12 DIAGNOSIS — L91.8 OTHER HYPERTROPHIC DISORDERS OF THE SKIN: ICD-10-CM

## 2018-09-12 DIAGNOSIS — L82.1 OTHER SEBORRHEIC KERATOSIS: ICD-10-CM

## 2018-09-12 DIAGNOSIS — Z71.89 OTHER SPECIFIED COUNSELING: ICD-10-CM

## 2018-09-12 DIAGNOSIS — L57.0 ACTINIC KERATOSIS: ICD-10-CM

## 2018-09-12 DIAGNOSIS — D18.0 HEMANGIOMA: ICD-10-CM

## 2018-09-12 DIAGNOSIS — D22 MELANOCYTIC NEVI: ICD-10-CM

## 2018-09-12 PROBLEM — D22.5 MELANOCYTIC NEVI OF TRUNK: Status: ACTIVE | Noted: 2018-09-12

## 2018-09-12 PROBLEM — D18.01 HEMANGIOMA OF SKIN AND SUBCUTANEOUS TISSUE: Status: ACTIVE | Noted: 2018-09-12

## 2018-09-12 PROBLEM — I10 ESSENTIAL (PRIMARY) HYPERTENSION: Status: ACTIVE | Noted: 2018-09-12

## 2018-09-12 PROCEDURE — ? LIQUID NITROGEN

## 2018-09-12 PROCEDURE — 99214 OFFICE O/P EST MOD 30 MIN: CPT | Mod: 25

## 2018-09-12 PROCEDURE — 17003 DESTRUCT PREMALG LES 2-14: CPT

## 2018-09-12 PROCEDURE — 17000 DESTRUCT PREMALG LESION: CPT

## 2018-09-12 PROCEDURE — ? COUNSELING

## 2018-09-12 ASSESSMENT — LOCATION DETAILED DESCRIPTION DERM
LOCATION DETAILED: RIGHT INGUINAL CREASE
LOCATION DETAILED: LEFT SUPERIOR VERMILION BORDER
LOCATION DETAILED: LEFT MEDIAL SUPERIOR CHEST
LOCATION DETAILED: LEFT LATERAL SUPERIOR CHEST
LOCATION DETAILED: PHILTRUM
LOCATION DETAILED: MONS PUBIS
LOCATION DETAILED: GENITALIA
LOCATION DETAILED: LEFT MEDIAL BREAST 10-11:00 REGION
LOCATION DETAILED: EPIGASTRIC SKIN
LOCATION DETAILED: LEFT MEDIAL BREAST 7-8:00 REGION

## 2018-09-12 ASSESSMENT — LOCATION SIMPLE DESCRIPTION DERM
LOCATION SIMPLE: GROIN
LOCATION SIMPLE: UPPER LIP
LOCATION SIMPLE: LEFT BREAST
LOCATION SIMPLE: CHEST
LOCATION SIMPLE: ABDOMEN
LOCATION SIMPLE: GENITALIA
LOCATION SIMPLE: LEFT UPPER LIP

## 2018-09-12 ASSESSMENT — LOCATION ZONE DERM
LOCATION ZONE: LIP
LOCATION ZONE: TRUNK
LOCATION ZONE: VULVA

## 2018-09-12 NOTE — PROCEDURE: LIQUID NITROGEN
Bill Insurance (You Assume Risk Of Denial Or Audit By Selecting Yes): No
Post-Care Instructions: I reviewed with the patient in detail post-care instructions. Patient is to wear sunprotection, and avoid picking at any of the treated lesions. Pt may apply Vaseline to crusted or scabbing areas.
Consent: The patient's consent was obtained including but not limited to risks of crusting, scabbing, blistering, scarring, darker or lighter pigmentary change, recurrence, incomplete removal and infection.
Medical Necessity Information: It is in your best interest to select a reason for this procedure from the list below. All of these items fulfill various CMS LCD requirements except the new and changing color options.
Duration Of Freeze Thaw-Cycle (Seconds): 0
Medical Necessity Clause: This procedure was medically necessary because the lesions that were treated were:
Detail Level: Detailed
Duration Of Freeze Thaw-Cycle (Seconds): 3
Number Of Freeze-Thaw Cycles: 2 freeze-thaw cycles

## 2019-01-08 ENCOUNTER — HOSPITAL ENCOUNTER (OUTPATIENT)
Dept: LAB | Facility: MEDICAL CENTER | Age: 79
End: 2019-01-08
Attending: PHYSICIAN ASSISTANT
Payer: MEDICARE

## 2019-01-08 LAB
25(OH)D3 SERPL-MCNC: 39 NG/ML (ref 30–100)
ALBUMIN SERPL BCP-MCNC: 4.1 G/DL (ref 3.2–4.9)
ALBUMIN/GLOB SERPL: 1.4 G/DL
ALP SERPL-CCNC: 44 U/L (ref 30–99)
ALT SERPL-CCNC: 13 U/L (ref 2–50)
ANION GAP SERPL CALC-SCNC: 8 MMOL/L (ref 0–11.9)
AST SERPL-CCNC: 15 U/L (ref 12–45)
BASOPHILS # BLD AUTO: 0.5 % (ref 0–1.8)
BASOPHILS # BLD: 0.03 K/UL (ref 0–0.12)
BILIRUB SERPL-MCNC: 0.6 MG/DL (ref 0.1–1.5)
BUN SERPL-MCNC: 20 MG/DL (ref 8–22)
CALCIUM SERPL-MCNC: 10 MG/DL (ref 8.5–10.5)
CHLORIDE SERPL-SCNC: 103 MMOL/L (ref 96–112)
CHOLEST SERPL-MCNC: 185 MG/DL (ref 100–199)
CO2 SERPL-SCNC: 28 MMOL/L (ref 20–33)
CREAT SERPL-MCNC: 0.93 MG/DL (ref 0.5–1.4)
EOSINOPHIL # BLD AUTO: 0.13 K/UL (ref 0–0.51)
EOSINOPHIL NFR BLD: 2.3 % (ref 0–6.9)
ERYTHROCYTE [DISTWIDTH] IN BLOOD BY AUTOMATED COUNT: 40.3 FL (ref 35.9–50)
FASTING STATUS PATIENT QL REPORTED: NORMAL
FOLATE SERPL-MCNC: 12.5 NG/ML
GLOBULIN SER CALC-MCNC: 3 G/DL (ref 1.9–3.5)
GLUCOSE SERPL-MCNC: 89 MG/DL (ref 65–99)
HCT VFR BLD AUTO: 45.4 % (ref 37–47)
HDLC SERPL-MCNC: 57 MG/DL
HGB BLD-MCNC: 15.1 G/DL (ref 12–16)
IMM GRANULOCYTES # BLD AUTO: 0.01 K/UL (ref 0–0.11)
IMM GRANULOCYTES NFR BLD AUTO: 0.2 % (ref 0–0.9)
IRON SATN MFR SERPL: 25 % (ref 15–55)
IRON SERPL-MCNC: 117 UG/DL (ref 40–170)
LDLC SERPL CALC-MCNC: 86 MG/DL
LYMPHOCYTES # BLD AUTO: 1.83 K/UL (ref 1–4.8)
LYMPHOCYTES NFR BLD: 31.9 % (ref 22–41)
MCH RBC QN AUTO: 30.3 PG (ref 27–33)
MCHC RBC AUTO-ENTMCNC: 33.3 G/DL (ref 33.6–35)
MCV RBC AUTO: 91 FL (ref 81.4–97.8)
MONOCYTES # BLD AUTO: 0.42 K/UL (ref 0–0.85)
MONOCYTES NFR BLD AUTO: 7.3 % (ref 0–13.4)
NEUTROPHILS # BLD AUTO: 3.31 K/UL (ref 2–7.15)
NEUTROPHILS NFR BLD: 57.8 % (ref 44–72)
NRBC # BLD AUTO: 0 K/UL
NRBC BLD-RTO: 0 /100 WBC
PLATELET # BLD AUTO: 163 K/UL (ref 164–446)
PMV BLD AUTO: 10.4 FL (ref 9–12.9)
POTASSIUM SERPL-SCNC: 3.8 MMOL/L (ref 3.6–5.5)
PREALB SERPL-MCNC: 29 MG/DL (ref 18–38)
PROT SERPL-MCNC: 7.1 G/DL (ref 6–8.2)
RBC # BLD AUTO: 4.99 M/UL (ref 4.2–5.4)
SODIUM SERPL-SCNC: 139 MMOL/L (ref 135–145)
TIBC SERPL-MCNC: 463 UG/DL (ref 250–450)
TRANSFERRIN SERPL-MCNC: 335 MG/DL (ref 200–370)
TRIGL SERPL-MCNC: 211 MG/DL (ref 0–149)
VIT B12 SERPL-MCNC: 695 PG/ML (ref 211–911)
WBC # BLD AUTO: 5.7 K/UL (ref 4.8–10.8)

## 2019-01-08 PROCEDURE — 83540 ASSAY OF IRON: CPT

## 2019-01-08 PROCEDURE — 83550 IRON BINDING TEST: CPT

## 2019-01-08 PROCEDURE — 80053 COMPREHEN METABOLIC PANEL: CPT

## 2019-01-08 PROCEDURE — 82607 VITAMIN B-12: CPT

## 2019-01-08 PROCEDURE — 85025 COMPLETE CBC W/AUTO DIFF WBC: CPT

## 2019-01-08 PROCEDURE — 84466 ASSAY OF TRANSFERRIN: CPT

## 2019-01-08 PROCEDURE — 84425 ASSAY OF VITAMIN B-1: CPT

## 2019-01-08 PROCEDURE — 82306 VITAMIN D 25 HYDROXY: CPT

## 2019-01-08 PROCEDURE — 84134 ASSAY OF PREALBUMIN: CPT

## 2019-01-08 PROCEDURE — 80061 LIPID PANEL: CPT

## 2019-01-08 PROCEDURE — 36415 COLL VENOUS BLD VENIPUNCTURE: CPT

## 2019-01-08 PROCEDURE — 82746 ASSAY OF FOLIC ACID SERUM: CPT

## 2019-01-11 LAB — VIT B1 BLD-MCNC: 122 NMOL/L (ref 70–180)

## 2019-02-05 ENCOUNTER — OFFICE VISIT (OUTPATIENT)
Dept: CARDIOLOGY | Facility: MEDICAL CENTER | Age: 79
End: 2019-02-05
Payer: MEDICARE

## 2019-02-05 VITALS
WEIGHT: 189 LBS | HEIGHT: 64 IN | BODY MASS INDEX: 32.27 KG/M2 | HEART RATE: 94 BPM | OXYGEN SATURATION: 92 % | DIASTOLIC BLOOD PRESSURE: 58 MMHG | SYSTOLIC BLOOD PRESSURE: 100 MMHG

## 2019-02-05 DIAGNOSIS — E78.5 DYSLIPIDEMIA: ICD-10-CM

## 2019-02-05 DIAGNOSIS — I10 ESSENTIAL HYPERTENSION, BENIGN: ICD-10-CM

## 2019-02-05 DIAGNOSIS — I25.83 CORONARY ARTERY DISEASE DUE TO LIPID RICH PLAQUE: ICD-10-CM

## 2019-02-05 DIAGNOSIS — I25.10 CORONARY ARTERY DISEASE DUE TO LIPID RICH PLAQUE: ICD-10-CM

## 2019-02-05 DIAGNOSIS — E66.9 OBESITY (BMI 30.0-34.9): ICD-10-CM

## 2019-02-05 DIAGNOSIS — I35.0 MODERATE AORTIC STENOSIS: ICD-10-CM

## 2019-02-05 LAB — EKG IMPRESSION: NORMAL

## 2019-02-05 PROCEDURE — 99214 OFFICE O/P EST MOD 30 MIN: CPT | Performed by: INTERNAL MEDICINE

## 2019-02-05 PROCEDURE — 93000 ELECTROCARDIOGRAM COMPLETE: CPT | Performed by: INTERNAL MEDICINE

## 2019-02-05 RX ORDER — PAROXETINE HYDROCHLORIDE 20 MG/1
20 TABLET, FILM COATED ORAL
Refills: 1 | COMMUNITY
Start: 2018-12-24 | End: 2019-02-05

## 2019-02-05 RX ORDER — ATORVASTATIN CALCIUM 10 MG/1
10 TABLET, FILM COATED ORAL
Refills: 3 | COMMUNITY
Start: 2018-12-12 | End: 2019-02-05

## 2019-02-05 ASSESSMENT — ENCOUNTER SYMPTOMS
FEVER: 0
DIZZINESS: 0
FALLS: 0
WEAKNESS: 0
CHILLS: 0
SORE THROAT: 0
PND: 0
BLURRED VISION: 0
PALPITATIONS: 0
FOCAL WEAKNESS: 0
ABDOMINAL PAIN: 0
SHORTNESS OF BREATH: 0
NAUSEA: 0
COUGH: 0
CLAUDICATION: 0
BRUISES/BLEEDS EASILY: 0

## 2019-02-05 NOTE — PROGRESS NOTES
Chief Complaint   Patient presents with   • Hyperlipidemia       Subjective:   Erin Hess is a 78 y.o. female who presents today for follow-up of her history of moderate aortic stenosis hypertension previous gastric sleeve with significant weight loss    She is been doing well no new cardiac symptoms she is tolerating her medications well    She has had mild weight gain after losing significant weight in 2016 from the surgery she is monitoring closely    Past Medical History:   Diagnosis Date   • Arthritis    • Asthma    • Breast cancer (HCC)    • Bronchitis    • Cancer (HCC) 2012    right breast   • Dizziness 3/11/2013   • Heart burn    • Heart murmur    • Hiatus hernia syndrome    • HTN (hypertension) 3/12/2013   • Hypercholesterolemia    • Hyperlipidemia 3/12/2013   • Hypertension    • Hypokalemia 3/12/2013   • Indigestion    • Pain     back, hips, knees   • Psychiatric disorder     depression   • Sleep apnea     h/o gastric sleeve lost 40 lb now not on CPAP   • Snoring    • ULCERATIVE COLITIS 3/12/2013   • Vertigo 3/11/2013     Past Surgical History:   Procedure Laterality Date   • GASTRIC SLEEVE LAPAROSCOPY N/A 2016    Procedure: GASTRIC SLEEVE LAPAROSCOPY, HIATAL HERNIA AND LIVER BIOPSY;  Surgeon: Mauricio Montes M.D.;  Location: SURGERY [facility];  Service:    • US-NEEDLE CORE BX-BREAST PANEL      rt breast, with lumpectomy    • GYN SURGERY      vag hyster    • GYN SURGERY      vaginal cyst removal    • LUMPECTOMY  left   • OTHER       R breast bx X 2& lipoma removal     Family History   Problem Relation Age of Onset   • Heart Disease Mother         CAD MI at age 72   • Cancer Mother         breast cancer  at age 83   • Cancer Sister    • Lung Disease Father 75        Lung cancer   • Cancer Maternal Grandmother         Pancreatic cancer   • Heart Attack Maternal Grandfather         MI at age 70   • Heart Disease Sister         Rhuemati cfever- herat murmur     Social History      Social History   • Marital status:      Spouse name: N/A   • Number of children: N/A   • Years of education: N/A     Occupational History   • Not on file.     Social History Main Topics   • Smoking status: Former Smoker     Packs/day: 1.00     Years: 20.00     Types: Cigarettes     Quit date: 5/22/1986   • Smokeless tobacco: Never Used   • Alcohol use Yes      Comment: 4 drinks weekly   • Drug use: No   • Sexual activity: Not on file     Other Topics Concern   • Not on file     Social History Narrative   • No narrative on file     Allergies   Allergen Reactions   • Sulfa Drugs Rash and Swelling     Rash, joint swelling  [id number]   • Vancomycin Itching   • Codeine Vomiting and Nausea     Outpatient Encounter Prescriptions as of 2/5/2019   Medication Sig Dispense Refill   • SYMBICORT 80-4.5 MCG/ACT Aerosol INHALE 2 PUFFS ORALLY TWICE A DAY.USE SPACER AND RINSE MOUTH AFTER EACH USE 1 Inhaler 5   • Potassium 99 MG Tab Take  by mouth.     • Cholecalciferol (VITAMIN D3) 58803 UNIT Cap Take  by mouth.     • spironolactone (ALDACTONE) 50 MG Tab Take 1 Tab by mouth every day. 90 Tab 3   • valsartan (DIOVAN) 160 MG Tab Take 160 mg by mouth every bedtime.     • valsartan-hydrochlorothiazide (DIOVAN-HCT) 160-12.5 MG per tablet Take 0.5 Tabs by mouth 2 Times a Day. (Patient taking differently: Take 0.5 Tabs by mouth every day.) 90 Tab 3   • fluticasone (FLONASE) 50 MCG/ACT nasal spray Spray 1-2 Sprays in nose every day. Each nostril. 1 Bottle 6   • paroxetine (PAXIL) 10 MG Tab Take 10 mg by mouth every day.     • rosuvastatin (CRESTOR) 20 MG Tab Take 1 Tab by mouth every evening. 30 Tab 11   • Mesalamine (APRISO) 0.375 GM CAPSULE SR 24 HR Take 2 Caps by mouth 2 Times a Day.     • B Complex Vitamins (VITAMIN B COMPLEX PO) Take 1 Tab by mouth every morning.     • anastrozole (ARIMIDEX) 1 MG Tab Take 1 mg by mouth every morning.     • pantoprazole (PROTONIX) 40 MG Tablet Delayed Response Take 40 mg by mouth every  "morning.     • aspirin EC (ECOTRIN) 81 MG TBEC Take 81 mg by mouth every morning.     • docosahexanoic acid (FISH OIL) 1000 MG CAPS Take 1,000 mg by mouth every morning.     • [DISCONTINUED] atorvastatin (LIPITOR) 10 MG Tab Take 10 mg by mouth every day.  3   • [DISCONTINUED] PARoxetine (PAXIL) 20 MG Tab Take 20 mg by mouth every day. TAKE 1 TAB BY MOUTH EVERY DAY.  1   • [DISCONTINUED] magnesium oxide (MAG-OX) 400 MG Tab Take 400 mg by mouth every day.     • [DISCONTINUED] Multiple Vitamins-Minerals (ICAPS AREDS 2) Cap Take 1 Cap by mouth every day.     • [DISCONTINUED] vitamin D (CHOLECALCIFEROL) 1000 UNIT Tab Take 1,000 Units by mouth every morning.       No facility-administered encounter medications on file as of 2/5/2019.      Review of Systems   Constitutional: Negative for chills and fever.   HENT: Negative for sore throat.    Eyes: Negative for blurred vision.   Respiratory: Negative for cough and shortness of breath.    Cardiovascular: Negative for chest pain, palpitations, claudication, leg swelling and PND.   Gastrointestinal: Negative for abdominal pain and nausea.   Musculoskeletal: Negative for falls and joint pain.   Skin: Negative for rash.   Neurological: Negative for dizziness, focal weakness and weakness.   Endo/Heme/Allergies: Does not bruise/bleed easily.        Objective:   /58 (BP Location: Right arm, Patient Position: Sitting, BP Cuff Size: Adult)   Pulse 94   Ht 1.626 m (5' 4\")   Wt 85.7 kg (189 lb)   SpO2 92%   BMI 32.44 kg/m²     Physical Exam   Constitutional: No distress.   HENT:   Mouth/Throat: Oropharynx is clear and moist. No oropharyngeal exudate.   Eyes: No scleral icterus.   Neck: No JVD present.   Cardiovascular: Normal rate.  Exam reveals no gallop and no friction rub.    Murmur heard.  Pulmonary/Chest: No respiratory distress. She has no wheezes. She has no rales.   Abdominal: Soft. Bowel sounds are normal.   Musculoskeletal: She exhibits no edema.   Neurological: " She is alert.   Skin: No rash noted. She is not diaphoretic.   Psychiatric: She has a normal mood and affect.     We reviewed in person the recent labs  Recent Results (from the past 4032 hour(s))   FASTING STATUS    Collection Time: 01/08/19 10:22 AM   Result Value Ref Range    Fasting Status Fasting    CBC WITH DIFFERENTIAL    Collection Time: 01/08/19 10:22 AM   Result Value Ref Range    WBC 5.7 4.8 - 10.8 K/uL    RBC 4.99 4.20 - 5.40 M/uL    Hemoglobin 15.1 12.0 - 16.0 g/dL    Hematocrit 45.4 37.0 - 47.0 %    MCV 91.0 81.4 - 97.8 fL    MCH 30.3 27.0 - 33.0 pg    MCHC 33.3 (L) 33.6 - 35.0 g/dL    RDW 40.3 35.9 - 50.0 fL    Platelet Count 163 (L) 164 - 446 K/uL    MPV 10.4 9.0 - 12.9 fL    Neutrophils-Polys 57.80 44.00 - 72.00 %    Lymphocytes 31.90 22.00 - 41.00 %    Monocytes 7.30 0.00 - 13.40 %    Eosinophils 2.30 0.00 - 6.90 %    Basophils 0.50 0.00 - 1.80 %    Immature Granulocytes 0.20 0.00 - 0.90 %    Nucleated RBC 0.00 /100 WBC    Neutrophils (Absolute) 3.31 2.00 - 7.15 K/uL    Lymphs (Absolute) 1.83 1.00 - 4.80 K/uL    Monos (Absolute) 0.42 0.00 - 0.85 K/uL    Eos (Absolute) 0.13 0.00 - 0.51 K/uL    Baso (Absolute) 0.03 0.00 - 0.12 K/uL    Immature Granulocytes (abs) 0.01 0.00 - 0.11 K/uL    NRBC (Absolute) 0.00 K/uL   IRON/TOTAL IRON BIND    Collection Time: 01/08/19 10:22 AM   Result Value Ref Range    Iron 117 40 - 170 ug/dL    Total Iron Binding 463 (H) 250 - 450 ug/dL    % Saturation 25 15 - 55 %   VITAMIN D,25 HYDROXY    Collection Time: 01/08/19 10:22 AM   Result Value Ref Range    25-Hydroxy   Vitamin D 25 39 30 - 100 ng/mL   ESTIMATED GFR    Collection Time: 01/08/19 10:22 AM   Result Value Ref Range    GFR If African American >60 >60 mL/min/1.73 m 2    GFR If Non African American 58 (A) >60 mL/min/1.73 m 2   Lipid Profile    Collection Time: 01/08/19 10:22 AM   Result Value Ref Range    Cholesterol,Tot 185 100 - 199 mg/dL    Triglycerides 211 (H) 0 - 149 mg/dL    HDL 57 >=40 mg/dL    LDL 86 <100  mg/dL   TRANSFERRIN    Collection Time: 19 10:22 AM   Result Value Ref Range    Transferrin 335 200 - 370 mg/dL   COMP METABOLIC PANEL    Collection Time: 19 10:22 AM   Result Value Ref Range    Sodium 139 135 - 145 mmol/L    Potassium 3.8 3.6 - 5.5 mmol/L    Chloride 103 96 - 112 mmol/L    Co2 28 20 - 33 mmol/L    Anion Gap 8.0 0.0 - 11.9    Glucose 89 65 - 99 mg/dL    Bun 20 8 - 22 mg/dL    Creatinine 0.93 0.50 - 1.40 mg/dL    Calcium 10.0 8.5 - 10.5 mg/dL    AST(SGOT) 15 12 - 45 U/L    ALT(SGPT) 13 2 - 50 U/L    Alkaline Phosphatase 44 30 - 99 U/L    Total Bilirubin 0.6 0.1 - 1.5 mg/dL    Albumin 4.1 3.2 - 4.9 g/dL    Total Protein 7.1 6.0 - 8.2 g/dL    Globulin 3.0 1.9 - 3.5 g/dL    A-G Ratio 1.4 g/dL   PREALBUMIN    Collection Time: 19 10:22 AM   Result Value Ref Range    Pre-Albumin 29.0 18.0 - 38.0 mg/dL   VITAMIN B12    Collection Time: 19 10:22 AM   Result Value Ref Range    Vitamin B12 -True Cobalamin 695 211 - 911 pg/mL   FOLATE    Collection Time: 19 10:22 AM   Result Value Ref Range    Folate -Folic Acid 12.5 >4.0 ng/mL   VITAMIN B1    Collection Time: 19 10:22 AM   Result Value Ref Range    Vitamin B1 122 70 - 180 nmol/L   EKG    Collection Time: 19 10:44 AM   Result Value Ref Range    Report       [facility] Cardiology [facility]    Test Date:  2019  Pt Name:    ROSIO SHAFER                  Department: [facility]  MRN:        [id number]                      Room:  Gender:     Female                       Technician: ÁLVARO  :        1940                   Requested By:BROOKE MERINO  Order #:    [id number]                    Reading MD:    Measurements  Intervals                                Axis  Rate:       82                           P:          74  DE:         168                          QRS:        53  QRSD:       84                           T:          33  QT:         360  QTc:        421    Interpretive Statements  SINUS RHYTHM  Compared to  ECG 10/02/2017 11:42:32  Sinus bradycardia no longer present       Reviewed the report of her echocardiogram from 2017  Assessment:     1. Essential hypertension, benign  EKG   2. Dyslipidemia     3. Coronary artery disease due to lipid rich plaque     4. Moderate aortic stenosis     5. Obesity (BMI 30.0-34.9)         Medical Decision Making:  Today's Assessment / Status / Plan:     It was my pleasure to meet with Ms. Hess.    Blood pressure is well controlled.      She is on appropriate statin.    We started the conversation about Celia for aortic stenosis likely check an echocardiogram in 2 years or so    I will see Ms. Hess back in 1 year time and encouraged her to follow up with us over the phone or e-mail using my MyChart as issues arise.    It is my pleasure to participate in the care of Ms. Hess.  Please do not hesitate to contact me with questions or concerns.    Sharan Tran MD PhD FACC  Cardiologist [facility]

## 2019-02-05 NOTE — LETTER
[facility]   [address]   [address]  [place] [address]  Phone: [phone]  Fax: [phone]              Erin Hess  1940    Encounter Date: 2/5/2019    Sharan Tran M.D.          PROGRESS NOTE:  Chief Complaint   Patient presents with   • Hyperlipidemia       Subjective:   Erin Hess is a 78 y.o. female who presents today for follow-up of her history of moderate aortic stenosis hypertension previous gastric sleeve with significant weight loss    She is been doing well no new cardiac symptoms she is tolerating her medications well    She has had mild weight gain after losing significant weight in 2016 from the surgery she is monitoring closely    Past Medical History:   Diagnosis Date   • Arthritis    • Asthma    • Breast cancer (HCC)    • Bronchitis 2011   • Cancer (HCC) 12/2012    right breast   • Dizziness 3/11/2013   • Heart burn    • Heart murmur    • Hiatus hernia syndrome    • HTN (hypertension) 3/12/2013   • Hypercholesterolemia    • Hyperlipidemia 3/12/2013   • Hypertension    • Hypokalemia 3/12/2013   • Indigestion    • Pain     back, hips, knees   • Psychiatric disorder     depression   • Sleep apnea     h/o gastric sleeve lost 40 lb now not on CPAP   • Snoring    • ULCERATIVE COLITIS 3/12/2013   • Vertigo 3/11/2013     Past Surgical History:   Procedure Laterality Date   • GASTRIC SLEEVE LAPAROSCOPY N/A 5/18/2016    Procedure: GASTRIC SLEEVE LAPAROSCOPY, HIATAL HERNIA AND LIVER BIOPSY;  Surgeon: Mauricio Montes M.D.;  Location: SURGERY [facility];  Service:    • US-NEEDLE CORE BX-BREAST PANEL  2013    rt breast, with lumpectomy    • GYN SURGERY      vag hyster 1990   • GYN SURGERY      vaginal cyst removal    • LUMPECTOMY  left   • OTHER       R breast bx X 2& lipoma removal     Family History   Problem Relation Age of Onset   • Heart Disease Mother         CAD MI at age 72   • Cancer Mother         breast  cancer  at age 83   • Cancer Sister    • Lung Disease Father 75        Lung cancer   • Cancer Maternal Grandmother         Pancreatic cancer   • Heart Attack Maternal Grandfather         MI at age 70   • Heart Disease Sister         Nadya weinsteinever- herat murmur     Social History     Social History   • Marital status:      Spouse name: N/A   • Number of children: N/A   • Years of education: N/A     Occupational History   • Not on file.     Social History Main Topics   • Smoking status: Former Smoker     Packs/day: 1.00     Years: 20.00     Types: Cigarettes     Quit date: 1986   • Smokeless tobacco: Never Used   • Alcohol use Yes      Comment: 4 drinks weekly   • Drug use: No   • Sexual activity: Not on file     Other Topics Concern   • Not on file     Social History Narrative   • No narrative on file     Allergies   Allergen Reactions   • Sulfa Drugs Rash and Swelling     Rash, joint swelling  [id number]   • Vancomycin Itching   • Codeine Vomiting and Nausea     Outpatient Encounter Prescriptions as of 2019   Medication Sig Dispense Refill   • SYMBICORT 80-4.5 MCG/ACT Aerosol INHALE 2 PUFFS ORALLY TWICE A DAY.USE SPACER AND RINSE MOUTH AFTER EACH USE 1 Inhaler 5   • Potassium 99 MG Tab Take  by mouth.     • Cholecalciferol (VITAMIN D3) 17445 UNIT Cap Take  by mouth.     • spironolactone (ALDACTONE) 50 MG Tab Take 1 Tab by mouth every day. 90 Tab 3   • valsartan (DIOVAN) 160 MG Tab Take 160 mg by mouth every bedtime.     • valsartan-hydrochlorothiazide (DIOVAN-HCT) 160-12.5 MG per tablet Take 0.5 Tabs by mouth 2 Times a Day. (Patient taking differently: Take 0.5 Tabs by mouth every day.) 90 Tab 3   • fluticasone (FLONASE) 50 MCG/ACT nasal spray Spray 1-2 Sprays in nose every day. Each nostril. 1 Bottle 6   • paroxetine (PAXIL) 10 MG Tab Take 10 mg by mouth every day.     • rosuvastatin (CRESTOR) 20 MG Tab Take 1 Tab by mouth every evening. 30 Tab 11   • Mesalamine (APRISO) 0.375 GM CAPSULE SR 24  "HR Take 2 Caps by mouth 2 Times a Day.     • B Complex Vitamins (VITAMIN B COMPLEX PO) Take 1 Tab by mouth every morning.     • anastrozole (ARIMIDEX) 1 MG Tab Take 1 mg by mouth every morning.     • pantoprazole (PROTONIX) 40 MG Tablet Delayed Response Take 40 mg by mouth every morning.     • aspirin EC (ECOTRIN) 81 MG TBEC Take 81 mg by mouth every morning.     • docosahexanoic acid (FISH OIL) 1000 MG CAPS Take 1,000 mg by mouth every morning.     • [DISCONTINUED] atorvastatin (LIPITOR) 10 MG Tab Take 10 mg by mouth every day.  3   • [DISCONTINUED] PARoxetine (PAXIL) 20 MG Tab Take 20 mg by mouth every day. TAKE 1 TAB BY MOUTH EVERY DAY.  1   • [DISCONTINUED] magnesium oxide (MAG-OX) 400 MG Tab Take 400 mg by mouth every day.     • [DISCONTINUED] Multiple Vitamins-Minerals (ICAPS AREDS 2) Cap Take 1 Cap by mouth every day.     • [DISCONTINUED] vitamin D (CHOLECALCIFEROL) 1000 UNIT Tab Take 1,000 Units by mouth every morning.       No facility-administered encounter medications on file as of 2/5/2019.      Review of Systems   Constitutional: Negative for chills and fever.   HENT: Negative for sore throat.    Eyes: Negative for blurred vision.   Respiratory: Negative for cough and shortness of breath.    Cardiovascular: Negative for chest pain, palpitations, claudication, leg swelling and PND.   Gastrointestinal: Negative for abdominal pain and nausea.   Musculoskeletal: Negative for falls and joint pain.   Skin: Negative for rash.   Neurological: Negative for dizziness, focal weakness and weakness.   Endo/Heme/Allergies: Does not bruise/bleed easily.        Objective:   /58 (BP Location: Right arm, Patient Position: Sitting, BP Cuff Size: Adult)   Pulse 94   Ht 1.626 m (5' 4\")   Wt 85.7 kg (189 lb)   SpO2 92%   BMI 32.44 kg/m²      Physical Exam   Constitutional: No distress.   HENT:   Mouth/Throat: Oropharynx is clear and moist. No oropharyngeal exudate.   Eyes: No scleral icterus.   Neck: No JVD " present.   Cardiovascular: Normal rate.  Exam reveals no gallop and no friction rub.    Murmur heard.  Pulmonary/Chest: No respiratory distress. She has no wheezes. She has no rales.   Abdominal: Soft. Bowel sounds are normal.   Musculoskeletal: She exhibits no edema.   Neurological: She is alert.   Skin: No rash noted. She is not diaphoretic.   Psychiatric: She has a normal mood and affect.     We reviewed in person the recent labs  Recent Results (from the past 4032 hour(s))   FASTING STATUS    Collection Time: 01/08/19 10:22 AM   Result Value Ref Range    Fasting Status Fasting    CBC WITH DIFFERENTIAL    Collection Time: 01/08/19 10:22 AM   Result Value Ref Range    WBC 5.7 4.8 - 10.8 K/uL    RBC 4.99 4.20 - 5.40 M/uL    Hemoglobin 15.1 12.0 - 16.0 g/dL    Hematocrit 45.4 37.0 - 47.0 %    MCV 91.0 81.4 - 97.8 fL    MCH 30.3 27.0 - 33.0 pg    MCHC 33.3 (L) 33.6 - 35.0 g/dL    RDW 40.3 35.9 - 50.0 fL    Platelet Count 163 (L) 164 - 446 K/uL    MPV 10.4 9.0 - 12.9 fL    Neutrophils-Polys 57.80 44.00 - 72.00 %    Lymphocytes 31.90 22.00 - 41.00 %    Monocytes 7.30 0.00 - 13.40 %    Eosinophils 2.30 0.00 - 6.90 %    Basophils 0.50 0.00 - 1.80 %    Immature Granulocytes 0.20 0.00 - 0.90 %    Nucleated RBC 0.00 /100 WBC    Neutrophils (Absolute) 3.31 2.00 - 7.15 K/uL    Lymphs (Absolute) 1.83 1.00 - 4.80 K/uL    Monos (Absolute) 0.42 0.00 - 0.85 K/uL    Eos (Absolute) 0.13 0.00 - 0.51 K/uL    Baso (Absolute) 0.03 0.00 - 0.12 K/uL    Immature Granulocytes (abs) 0.01 0.00 - 0.11 K/uL    NRBC (Absolute) 0.00 K/uL   IRON/TOTAL IRON BIND    Collection Time: 01/08/19 10:22 AM   Result Value Ref Range    Iron 117 40 - 170 ug/dL    Total Iron Binding 463 (H) 250 - 450 ug/dL    % Saturation 25 15 - 55 %   VITAMIN D,25 HYDROXY    Collection Time: 01/08/19 10:22 AM   Result Value Ref Range    25-Hydroxy   Vitamin D 25 39 30 - 100 ng/mL   ESTIMATED GFR    Collection Time: 01/08/19 10:22 AM   Result Value Ref Range    GFR If  African American >60 >60 mL/min/1.73 m 2    GFR If Non African American 58 (A) >60 mL/min/1.73 m 2   Lipid Profile    Collection Time: 19 10:22 AM   Result Value Ref Range    Cholesterol,Tot 185 100 - 199 mg/dL    Triglycerides 211 (H) 0 - 149 mg/dL    HDL 57 >=40 mg/dL    LDL 86 <100 mg/dL   TRANSFERRIN    Collection Time: 19 10:22 AM   Result Value Ref Range    Transferrin 335 200 - 370 mg/dL   COMP METABOLIC PANEL    Collection Time: 19 10:22 AM   Result Value Ref Range    Sodium 139 135 - 145 mmol/L    Potassium 3.8 3.6 - 5.5 mmol/L    Chloride 103 96 - 112 mmol/L    Co2 28 20 - 33 mmol/L    Anion Gap 8.0 0.0 - 11.9    Glucose 89 65 - 99 mg/dL    Bun 20 8 - 22 mg/dL    Creatinine 0.93 0.50 - 1.40 mg/dL    Calcium 10.0 8.5 - 10.5 mg/dL    AST(SGOT) 15 12 - 45 U/L    ALT(SGPT) 13 2 - 50 U/L    Alkaline Phosphatase 44 30 - 99 U/L    Total Bilirubin 0.6 0.1 - 1.5 mg/dL    Albumin 4.1 3.2 - 4.9 g/dL    Total Protein 7.1 6.0 - 8.2 g/dL    Globulin 3.0 1.9 - 3.5 g/dL    A-G Ratio 1.4 g/dL   PREALBUMIN    Collection Time: 19 10:22 AM   Result Value Ref Range    Pre-Albumin 29.0 18.0 - 38.0 mg/dL   VITAMIN B12    Collection Time: 19 10:22 AM   Result Value Ref Range    Vitamin B12 -True Cobalamin 695 211 - 911 pg/mL   FOLATE    Collection Time: 19 10:22 AM   Result Value Ref Range    Folate -Folic Acid 12.5 >4.0 ng/mL   VITAMIN B1    Collection Time: 19 10:22 AM   Result Value Ref Range    Vitamin B1 122 70 - 180 nmol/L   EKG    Collection Time: 19 10:44 AM   Result Value Ref Range    Report       [facility]    Test Date:  2019  Pt Name:    ROSIO SHAFER                  Department: [facility]  MRN:        [id number]                      Room:  Gender:     Female                       Technician: ÁLVARO  :        1940                   Requested By:BROOKE MERINO  Order #:    [id number]                    Mariel DOHERTY:    Measurements  Intervals                                 Axis  Rate:       82                           P:          74  SD:         168                          QRS:        53  QRSD:       84                           T:          33  QT:         360  QTc:        421    Interpretive Statements  SINUS RHYTHM  Compared to ECG 10/02/2017 11:42:32  Sinus bradycardia no longer present       Reviewed the report of her echocardiogram from 2017  Assessment:     1. Essential hypertension, benign  EKG   2. Dyslipidemia     3. Coronary artery disease due to lipid rich plaque     4. Moderate aortic stenosis     5. Obesity (BMI 30.0-34.9)         Medical Decision Making:  Today's Assessment / Status / Plan:     It was my pleasure to meet with Ms. Hess.    Blood pressure is well controlled.      She is on appropriate statin.    We started the conversation about Celia for aortic stenosis likely check an echocardiogram in 2 years or so    I will see Ms. Hess back in 1 year time and encouraged her to follow up with us over the phone or e-mail using my MyChart as issues arise.    It is my pleasure to participate in the care of Ms. Hess.  Please do not hesitate to contact me with questions or concerns.    Sharan Tran MD PhD FAC  Cardiologist [facility]        Terra Zambrano M.D.  [address]  [place] [address]  VIA Facsimile: [phone]     Mauricio Montes M.D.  [address]  [place] [address]  VIA Facsimile: [phone]

## 2019-03-05 ENCOUNTER — HOSPITAL ENCOUNTER (OUTPATIENT)
Dept: RADIOLOGY | Facility: MEDICAL CENTER | Age: 79
End: 2019-03-05
Attending: SURGERY
Payer: MEDICARE

## 2019-03-05 DIAGNOSIS — Z12.31 VISIT FOR SCREENING MAMMOGRAM: ICD-10-CM

## 2019-03-05 PROCEDURE — 77063 BREAST TOMOSYNTHESIS BI: CPT

## 2019-04-05 ENCOUNTER — APPOINTMENT (OUTPATIENT)
Dept: DERMATOLOGY | Facility: IMAGING CENTER | Age: 79
End: 2019-04-05

## 2019-04-10 ENCOUNTER — APPOINTMENT (OUTPATIENT)
Dept: DERMATOLOGY | Facility: IMAGING CENTER | Age: 79
End: 2019-04-10

## 2019-06-05 ENCOUNTER — OFFICE VISIT (OUTPATIENT)
Dept: DERMATOLOGY | Facility: IMAGING CENTER | Age: 79
End: 2019-06-05

## 2019-06-05 DIAGNOSIS — L73.8 SEBACEOUS HYPERPLASIA: ICD-10-CM

## 2019-09-02 ENCOUNTER — OFFICE VISIT (OUTPATIENT)
Dept: URGENT CARE | Facility: MEDICAL CENTER | Age: 79
End: 2019-09-02
Payer: MEDICARE

## 2019-09-02 VITALS
BODY MASS INDEX: 30.39 KG/M2 | HEIGHT: 64 IN | DIASTOLIC BLOOD PRESSURE: 66 MMHG | TEMPERATURE: 97.8 F | SYSTOLIC BLOOD PRESSURE: 108 MMHG | OXYGEN SATURATION: 94 % | HEART RATE: 75 BPM | WEIGHT: 178 LBS
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
## 2019-09-02 DIAGNOSIS — T07.XXXA MULTIPLE ABRASIONS: ICD-10-CM

## 2019-09-02 PROCEDURE — 90471 IMMUNIZATION ADMIN: CPT | Performed by: FAMILY MEDICINE

## 2019-09-02 PROCEDURE — 99214 OFFICE O/P EST MOD 30 MIN: CPT | Mod: 25 | Performed by: FAMILY MEDICINE

## 2019-09-02 PROCEDURE — 90715 TDAP VACCINE 7 YRS/> IM: CPT | Performed by: FAMILY MEDICINE

## 2019-09-02 RX ORDER — DOXYCYCLINE HYCLATE 100 MG
100 TABLET ORAL EVERY 12 HOURS
Qty: 10 TAB | Refills: 0 | Status: SHIPPED | OUTPATIENT
Start: 2019-09-02 | End: 2019-09-07

## 2019-09-02 ASSESSMENT — ENCOUNTER SYMPTOMS
FALLS: 1
ROS SKIN COMMENTS: WOUNDS

## 2019-09-02 NOTE — PROGRESS NOTES
Subjective:      Erin Hess is a 79 y.o. female who presents with Laceration (fell and cut right thumb around 4pm yesterday, other cuts not as bad)      - This is a pleasant and non toxic appearing 79 y.o. female with c/o trip/fall yesterday and suffered some superficial abrasions to hands (mainly a deeper small lac on Rt thumb she is worried about). Minimal if any pain, unsure of last tetanus booster. No head trauma/LOC.             ALLERGIES:  Sulfa drugs; Vancomycin; and Codeine     PMH:  Past Medical History:   Diagnosis Date   • Arthritis    • Asthma    • Breast cancer (HCC)    • Bronchitis 2011   • Cancer (HCC) 12/2012    right breast   • Dizziness 3/11/2013   • Heart burn    • Heart murmur    • Hiatus hernia syndrome    • HTN (hypertension) 3/12/2013   • Hypercholesterolemia    • Hyperlipidemia 3/12/2013   • Hypertension    • Hypokalemia 3/12/2013   • Indigestion    • Pain     back, hips, knees   • Psychiatric disorder     depression   • Sleep apnea     h/o gastric sleeve lost 40 lb now not on CPAP   • Snoring    • ULCERATIVE COLITIS 3/12/2013   • Vertigo 3/11/2013        PSH:  Past Surgical History:   Procedure Laterality Date   • GASTRIC SLEEVE LAPAROSCOPY N/A 5/18/2016    Procedure: GASTRIC SLEEVE LAPAROSCOPY, HIATAL HERNIA AND LIVER BIOPSY;  Surgeon: Mauricio Montes M.D.;  Location: SURGERY [facility];  Service:    • US-NEEDLE CORE BX-BREAST PANEL  2013    rt breast, with lumpectomy    • GYN SURGERY      vag hyster 1990   • GYN SURGERY      vaginal cyst removal    • LUMPECTOMY  left   • OTHER       R breast bx X 2& lipoma removal       MEDS:    Current Outpatient Medications:   •  mupirocin (BACTROBAN) 2 % Ointment, Apply 1 Application to affected area(s) 2 times a day for 7 days., Disp: 30 g, Rfl: 1  •  doxycycline (VIBRAMYCIN) 100 MG Tab, Take 1 Tab by mouth every 12 hours for 5 days., Disp: 10 Tab, Rfl: 0  •  SYMBICORT 80-4.5 MCG/ACT Aerosol, INHALE 2 PUFFS ORALLY TWICE A DAY.USE SPACER AND  "RINSE MOUTH AFTER EACH USE, Disp: 1 Inhaler, Rfl: 5  •  Potassium 99 MG Tab, Take  by mouth., Disp: , Rfl:   •  Cholecalciferol (VITAMIN D3) 87173 UNIT Cap, Take  by mouth., Disp: , Rfl:   •  spironolactone (ALDACTONE) 50 MG Tab, Take 1 Tab by mouth every day., Disp: 90 Tab, Rfl: 3  •  valsartan (DIOVAN) 160 MG Tab, Take 160 mg by mouth every bedtime., Disp: , Rfl:   •  valsartan-hydrochlorothiazide (DIOVAN-HCT) 160-12.5 MG per tablet, Take 0.5 Tabs by mouth 2 Times a Day. (Patient taking differently: Take 0.5 Tabs by mouth every day.), Disp: 90 Tab, Rfl: 3  •  fluticasone (FLONASE) 50 MCG/ACT nasal spray, Spray 1-2 Sprays in nose every day. Each nostril., Disp: 1 Bottle, Rfl: 6  •  paroxetine (PAXIL) 10 MG Tab, Take 10 mg by mouth every day., Disp: , Rfl:   •  rosuvastatin (CRESTOR) 20 MG Tab, Take 1 Tab by mouth every evening., Disp: 30 Tab, Rfl: 11  •  Mesalamine (APRISO) 0.375 GM CAPSULE SR 24 HR, Take 2 Caps by mouth 2 Times a Day., Disp: , Rfl:   •  B Complex Vitamins (VITAMIN B COMPLEX PO), Take 1 Tab by mouth every morning., Disp: , Rfl:   •  anastrozole (ARIMIDEX) 1 MG Tab, Take 1 mg by mouth every morning., Disp: , Rfl:   •  pantoprazole (PROTONIX) 40 MG Tablet Delayed Response, Take 40 mg by mouth every morning., Disp: , Rfl:   •  aspirin EC (ECOTRIN) 81 MG TBEC, Take 81 mg by mouth every morning., Disp: , Rfl:   •  docosahexanoic acid (FISH OIL) 1000 MG CAPS, Take 1,000 mg by mouth every morning., Disp: , Rfl:     ** I have documented what I find to be significant in regards to past medical, social, family and surgical history  in my HPI or under PMH/PSH/FH review section, otherwise it is contributory **           HPI    Review of Systems   Musculoskeletal: Positive for falls.   Skin:        Wounds    All other systems reviewed and are negative.         Objective:     /66   Pulse 75   Temp 36.6 °C (97.8 °F) (Temporal)   Ht 1.626 m (5' 4\")   Wt 80.7 kg (178 lb)   SpO2 94%   BMI 30.55 kg/m²  "     Physical Exam   Constitutional: She appears well-developed and well-nourished. No distress.   HENT:   Head: Normocephalic and atraumatic.   Eyes: Conjunctivae are normal.   Cardiovascular: Normal heart sounds.   No murmur heard.  Pulmonary/Chest: Effort normal and breath sounds normal. No respiratory distress.   Neurological: She is alert. She exhibits normal muscle tone.   Skin: Skin is warm and dry. She is not diaphoretic.   Psychiatric: She has a normal mood and affect. Judgment normal.   Nursing note and vitals reviewed.    Hands w/ a few superficial abrasions, a deeper 0.5cm well approximated non bleeding lac Rt thumb, good srom thumb, no bone TTP            Assessment/Plan:         1. Fall from ground level     2. Multiple abrasions  mupirocin (BACTROBAN) 2 % Ointment    doxycycline (VIBRAMYCIN) 100 MG Tab    Tdap =>6yo IM       - rest/hydrate   - wound care discussed  - 2-3 day wound check advised       Dx & d/c instructions discussed w/ patient and/or family members.     Follow up with PCP (or here if PCP unavailable) in 2-3 days, ER if feeling/getting worse.    Any realistic and/or common medication side effects that may have been given today(i.e. Rash, GI upset/constipation, sedation, elevation of BP or blood sugars) reviewed.     Patient left in stable condition

## 2020-03-13 ENCOUNTER — HOSPITAL ENCOUNTER (OUTPATIENT)
Dept: RADIOLOGY | Facility: MEDICAL CENTER | Age: 80
End: 2020-03-13
Attending: FAMILY MEDICINE
Payer: MEDICARE

## 2020-03-13 DIAGNOSIS — Z12.31 VISIT FOR SCREENING MAMMOGRAM: ICD-10-CM

## 2020-03-13 PROCEDURE — 77067 SCR MAMMO BI INCL CAD: CPT

## 2020-05-13 ENCOUNTER — HOSPITAL ENCOUNTER (OUTPATIENT)
Dept: LAB | Facility: MEDICAL CENTER | Age: 80
End: 2020-05-13
Attending: FAMILY MEDICINE
Payer: MEDICARE

## 2020-05-20 ENCOUNTER — HOSPITAL ENCOUNTER (OUTPATIENT)
Dept: LAB | Facility: MEDICAL CENTER | Age: 80
End: 2020-05-20
Attending: FAMILY MEDICINE
Payer: MEDICARE

## 2020-05-20 LAB
ALBUMIN SERPL BCP-MCNC: 4.5 G/DL (ref 3.2–4.9)
ALBUMIN/GLOB SERPL: 1.7 G/DL
ALP SERPL-CCNC: 49 U/L (ref 30–99)
ALT SERPL-CCNC: 14 U/L (ref 2–50)
ANION GAP SERPL CALC-SCNC: 13 MMOL/L (ref 7–16)
APPEARANCE UR: CLEAR
AST SERPL-CCNC: 17 U/L (ref 12–45)
BACTERIA #/AREA URNS HPF: ABNORMAL /HPF
BASOPHILS # BLD AUTO: 0.4 % (ref 0–1.8)
BASOPHILS # BLD: 0.02 K/UL (ref 0–0.12)
BILIRUB SERPL-MCNC: 0.6 MG/DL (ref 0.1–1.5)
BILIRUB UR QL STRIP.AUTO: NEGATIVE
BUN SERPL-MCNC: 28 MG/DL (ref 8–22)
CALCIUM SERPL-MCNC: 10.1 MG/DL (ref 8.4–10.2)
CHLORIDE SERPL-SCNC: 100 MMOL/L (ref 96–112)
CHOLEST SERPL-MCNC: 154 MG/DL (ref 100–199)
CO2 SERPL-SCNC: 25 MMOL/L (ref 20–33)
COLOR UR: YELLOW
CREAT SERPL-MCNC: 1.26 MG/DL (ref 0.5–1.4)
EOSINOPHIL # BLD AUTO: 0.11 K/UL (ref 0–0.51)
EOSINOPHIL NFR BLD: 2.4 % (ref 0–6.9)
EPI CELLS #/AREA URNS HPF: ABNORMAL /HPF
ERYTHROCYTE [DISTWIDTH] IN BLOOD BY AUTOMATED COUNT: 45.8 FL (ref 35.9–50)
EST. AVERAGE GLUCOSE BLD GHB EST-MCNC: 114 MG/DL
FASTING STATUS PATIENT QL REPORTED: NORMAL
GLOBULIN SER CALC-MCNC: 2.6 G/DL (ref 1.9–3.5)
GLUCOSE SERPL-MCNC: 105 MG/DL (ref 65–99)
GLUCOSE UR STRIP.AUTO-MCNC: NEGATIVE MG/DL
HBA1C MFR BLD: 5.6 % (ref 0–5.6)
HCT VFR BLD AUTO: 42.9 % (ref 37–47)
HDLC SERPL-MCNC: 63 MG/DL
HGB BLD-MCNC: 14.3 G/DL (ref 12–16)
IMM GRANULOCYTES # BLD AUTO: 0.01 K/UL (ref 0–0.11)
IMM GRANULOCYTES NFR BLD AUTO: 0.2 % (ref 0–0.9)
KETONES UR STRIP.AUTO-MCNC: NEGATIVE MG/DL
LDLC SERPL CALC-MCNC: 64 MG/DL
LEUKOCYTE ESTERASE UR QL STRIP.AUTO: ABNORMAL
LYMPHOCYTES # BLD AUTO: 1.48 K/UL (ref 1–4.8)
LYMPHOCYTES NFR BLD: 31.7 % (ref 22–41)
MCH RBC QN AUTO: 30.4 PG (ref 27–33)
MCHC RBC AUTO-ENTMCNC: 33.3 G/DL (ref 33.6–35)
MCV RBC AUTO: 91.3 FL (ref 81.4–97.8)
MICRO URNS: ABNORMAL
MONOCYTES # BLD AUTO: 0.36 K/UL (ref 0–0.85)
MONOCYTES NFR BLD AUTO: 7.7 % (ref 0–13.4)
NEUTROPHILS # BLD AUTO: 2.69 K/UL (ref 2–7.15)
NEUTROPHILS NFR BLD: 57.6 % (ref 44–72)
NITRITE UR QL STRIP.AUTO: NEGATIVE
NRBC # BLD AUTO: 0 K/UL
NRBC BLD-RTO: 0 /100 WBC
PH UR STRIP.AUTO: 5 [PH] (ref 5–8)
PLATELET # BLD AUTO: 194 K/UL (ref 164–446)
PMV BLD AUTO: 9.5 FL (ref 9–12.9)
POTASSIUM SERPL-SCNC: 4 MMOL/L (ref 3.6–5.5)
PROT SERPL-MCNC: 7.1 G/DL (ref 6–8.2)
PROT UR QL STRIP: NEGATIVE MG/DL
RBC # BLD AUTO: 4.7 M/UL (ref 4.2–5.4)
RBC # URNS HPF: ABNORMAL /HPF
RBC UR QL AUTO: NEGATIVE
SODIUM SERPL-SCNC: 138 MMOL/L (ref 135–145)
SP GR UR STRIP.AUTO: 1.02
TRIGL SERPL-MCNC: 134 MG/DL (ref 0–149)
WBC # BLD AUTO: 4.7 K/UL (ref 4.8–10.8)
WBC #/AREA URNS HPF: ABNORMAL /HPF

## 2020-05-20 PROCEDURE — 36415 COLL VENOUS BLD VENIPUNCTURE: CPT

## 2020-05-20 PROCEDURE — 80053 COMPREHEN METABOLIC PANEL: CPT

## 2020-05-20 PROCEDURE — 81001 URINALYSIS AUTO W/SCOPE: CPT

## 2020-05-20 PROCEDURE — 80061 LIPID PANEL: CPT

## 2020-05-20 PROCEDURE — 85025 COMPLETE CBC W/AUTO DIFF WBC: CPT

## 2020-05-20 PROCEDURE — 83036 HEMOGLOBIN GLYCOSYLATED A1C: CPT

## 2020-06-01 ENCOUNTER — OFFICE VISIT (OUTPATIENT)
Dept: CARDIOLOGY | Facility: MEDICAL CENTER | Age: 80
End: 2020-06-01
Payer: MEDICARE

## 2020-06-01 VITALS
BODY MASS INDEX: 31.92 KG/M2 | WEIGHT: 187 LBS | DIASTOLIC BLOOD PRESSURE: 70 MMHG | HEART RATE: 72 BPM | SYSTOLIC BLOOD PRESSURE: 112 MMHG | HEIGHT: 64 IN | OXYGEN SATURATION: 92 %

## 2020-06-01 DIAGNOSIS — I25.83 CORONARY ARTERY DISEASE DUE TO LIPID RICH PLAQUE: ICD-10-CM

## 2020-06-01 DIAGNOSIS — I25.10 CORONARY ARTERY DISEASE DUE TO LIPID RICH PLAQUE: ICD-10-CM

## 2020-06-01 DIAGNOSIS — E78.5 DYSLIPIDEMIA: ICD-10-CM

## 2020-06-01 DIAGNOSIS — I10 ESSENTIAL HYPERTENSION: ICD-10-CM

## 2020-06-01 DIAGNOSIS — I35.0 MODERATE AORTIC STENOSIS: ICD-10-CM

## 2020-06-01 PROCEDURE — 99214 OFFICE O/P EST MOD 30 MIN: CPT | Performed by: INTERNAL MEDICINE

## 2020-06-01 ASSESSMENT — ENCOUNTER SYMPTOMS
FALLS: 0
COUGH: 0
SHORTNESS OF BREATH: 0
NAUSEA: 0
FEVER: 0
CLAUDICATION: 0
SORE THROAT: 0
PND: 0
FOCAL WEAKNESS: 0
DIZZINESS: 0
WEAKNESS: 0
BRUISES/BLEEDS EASILY: 0
ABDOMINAL PAIN: 0
CHILLS: 0
PALPITATIONS: 0
BLURRED VISION: 0

## 2020-06-01 ASSESSMENT — FIBROSIS 4 INDEX: FIB4 SCORE: 1.83

## 2020-06-01 NOTE — PROGRESS NOTES
Chief Complaint   Patient presents with   • HTN (Controlled)       Subjective:   Erin Hess is a 80 y.o. female who presents today for follow-up of her history of aortic stenosis    She is been doing well over the past year with no major issues she opened her cabin at [place]    Past Medical History:   Diagnosis Date   • Arthritis    • Asthma    • Breast cancer (HCC)    • Bronchitis    • Cancer (HCC) 2012    right breast   • Dizziness 3/11/2013   • Heart burn    • Heart murmur    • Hiatus hernia syndrome    • HTN (hypertension) 3/12/2013   • Hypercholesterolemia    • Hyperlipidemia 3/12/2013   • Hypertension    • Hypokalemia 3/12/2013   • Indigestion    • Pain     back, hips, knees   • Psychiatric disorder     depression   • Sleep apnea     h/o gastric sleeve lost 40 lb now not on CPAP   • Snoring    • ULCERATIVE COLITIS 3/12/2013   • Vertigo 3/11/2013     Past Surgical History:   Procedure Laterality Date   • GASTRIC SLEEVE LAPAROSCOPY N/A 2016    Procedure: GASTRIC SLEEVE LAPAROSCOPY, HIATAL HERNIA AND LIVER BIOPSY;  Surgeon: Mauricio Montes M.D.;  Location: SURGERY [facility];  Service:    • US-NEEDLE CORE BX-BREAST PANEL      rt breast, with lumpectomy    • GYN SURGERY      vag hyster    • GYN SURGERY      vaginal cyst removal    • LUMPECTOMY  left   • OTHER       R breast bx X 2& lipoma removal     Family History   Problem Relation Age of Onset   • Heart Disease Mother         CAD MI at age 72   • Cancer Mother         breast cancer  at age 83   • Cancer Sister    • Lung Disease Father 75        Lung cancer   • Cancer Maternal Grandmother         Pancreatic cancer   • Heart Attack Maternal Grandfather         MI at age 70   • Heart Disease Sister         Rhuemati cfever- herat murmur     Social History     Socioeconomic History   • Marital status:      Spouse name: Not on file   • Number of children: Not on file   • Years of education: Not on file   • Highest  education level: Not on file   Occupational History   • Not on file   Social Needs   • Financial resource strain: Not on file   • Food insecurity     Worry: Not on file     Inability: Not on file   • Transportation needs     Medical: Not on file     Non-medical: Not on file   Tobacco Use   • Smoking status: Former Smoker     Packs/day: 1.00     Years: 20.00     Pack years: 20.00     Types: Cigarettes     Last attempt to quit: 1986     Years since quittin.0   • Smokeless tobacco: Never Used   Substance and Sexual Activity   • Alcohol use: Yes     Comment: 4 drinks weekly   • Drug use: No   • Sexual activity: Not on file   Lifestyle   • Physical activity     Days per week: Not on file     Minutes per session: Not on file   • Stress: Not on file   Relationships   • Social connections     Talks on phone: Not on file     Gets together: Not on file     Attends L[employer]-day service: Not on file     Active member of club or organization: Not on file     Attends meetings of clubs or organizations: Not on file     Relationship status: Not on file   • Intimate partner violence     Fear of current or ex partner: Not on file     Emotionally abused: Not on file     Physically abused: Not on file     Forced sexual activity: Not on file   Other Topics Concern   • Not on file   Social History Narrative   • Not on file     Allergies   Allergen Reactions   • Sulfa Drugs Rash and Swelling     Rash, joint swelling  [id number]   • Vancomycin Itching   • Codeine Vomiting and Nausea     Outpatient Encounter Medications as of 2020   Medication Sig Dispense Refill   • SYMBICORT 80-4.5 MCG/ACT Aerosol INHALE 2 PUFFS ORALLY TWICE A DAY.USE SPACER AND RINSE MOUTH AFTER EACH USE 1 Inhaler 5   • Potassium 99 MG Tab Take  by mouth.     • Cholecalciferol (VITAMIN D3) 55497 UNIT Cap Take  by mouth.     • spironolactone (ALDACTONE) 50 MG Tab Take 1 Tab by mouth every day. 90 Tab 3   • valsartan (DIOVAN) 160 MG Tab Take 160 mg by mouth every  "bedtime.     • valsartan-hydrochlorothiazide (DIOVAN-HCT) 160-12.5 MG per tablet Take 0.5 Tabs by mouth 2 Times a Day. (Patient taking differently: Take 0.5 Tabs by mouth every day.) 90 Tab 3   • fluticasone (FLONASE) 50 MCG/ACT nasal spray Spray 1-2 Sprays in nose every day. Each nostril. 1 Bottle 6   • paroxetine (PAXIL) 10 MG Tab Take 10 mg by mouth every day.     • rosuvastatin (CRESTOR) 20 MG Tab Take 1 Tab by mouth every evening. 30 Tab 11   • Mesalamine (APRISO) 0.375 GM CAPSULE SR 24 HR Take 2 Caps by mouth 2 Times a Day.     • anastrozole (ARIMIDEX) 1 MG Tab Take 1 mg by mouth every morning.     • pantoprazole (PROTONIX) 40 MG Tablet Delayed Response Take 40 mg by mouth every morning.     • aspirin EC (ECOTRIN) 81 MG TBEC Take 81 mg by mouth every morning.     • [DISCONTINUED] B Complex Vitamins (VITAMIN B COMPLEX PO) Take 1 Tab by mouth every morning.     • [DISCONTINUED] docosahexanoic acid (FISH OIL) 1000 MG CAPS Take 1,000 mg by mouth every morning.       No facility-administered encounter medications on file as of 6/1/2020.      Review of Systems   Constitutional: Negative for chills and fever.   HENT: Negative for sore throat.    Eyes: Negative for blurred vision.   Respiratory: Negative for cough and shortness of breath.    Cardiovascular: Negative for chest pain, palpitations, claudication, leg swelling and PND.   Gastrointestinal: Negative for abdominal pain and nausea.   Musculoskeletal: Negative for falls and joint pain.   Skin: Negative for rash.   Neurological: Negative for dizziness, focal weakness and weakness.   Endo/Heme/Allergies: Does not bruise/bleed easily.        Objective:   /70 (BP Location: Left arm, Patient Position: Sitting, BP Cuff Size: Adult)   Pulse 72   Ht 1.626 m (5' 4\")   Wt 84.8 kg (187 lb)   SpO2 92%   BMI 32.10 kg/m²     Physical Exam   Constitutional: No distress.   HENT:   Mouth/Throat: Oropharynx is clear and moist. No oropharyngeal exudate.   Eyes: No " scleral icterus.   Neck: No JVD present.   Cardiovascular: Normal rate. Exam reveals no gallop and no friction rub.   Murmur heard.  Pulmonary/Chest: No respiratory distress. She has no wheezes. She has no rales.   Abdominal: Soft. Bowel sounds are normal.   Musculoskeletal:         General: No edema.   Neurological: She is alert.   Skin: No rash noted. She is not diaphoretic.   Psychiatric: She has a normal mood and affect.     We reviewed in person the most recent labs  Recent Results (from the past 4032 hour(s))   CBC WITH DIFFERENTIAL    Collection Time: 05/20/20  9:28 AM   Result Value Ref Range    WBC 4.7 (L) 4.8 - 10.8 K/uL    RBC 4.70 4.20 - 5.40 M/uL    Hemoglobin 14.3 12.0 - 16.0 g/dL    Hematocrit 42.9 37.0 - 47.0 %    MCV 91.3 81.4 - 97.8 fL    MCH 30.4 27.0 - 33.0 pg    MCHC 33.3 (L) 33.6 - 35.0 g/dL    RDW 45.8 35.9 - 50.0 fL    Platelet Count 194 164 - 446 K/uL    MPV 9.5 9.0 - 12.9 fL    Neutrophils-Polys 57.60 44.00 - 72.00 %    Lymphocytes 31.70 22.00 - 41.00 %    Monocytes 7.70 0.00 - 13.40 %    Eosinophils 2.40 0.00 - 6.90 %    Basophils 0.40 0.00 - 1.80 %    Immature Granulocytes 0.20 0.00 - 0.90 %    Nucleated RBC 0.00 /100 WBC    Neutrophils (Absolute) 2.69 2.00 - 7.15 K/uL    Lymphs (Absolute) 1.48 1.00 - 4.80 K/uL    Monos (Absolute) 0.36 0.00 - 0.85 K/uL    Eos (Absolute) 0.11 0.00 - 0.51 K/uL    Baso (Absolute) 0.02 0.00 - 0.12 K/uL    Immature Granulocytes (abs) 0.01 0.00 - 0.11 K/uL    NRBC (Absolute) 0.00 K/uL   ESTIMATED GFR    Collection Time: 05/20/20  9:28 AM   Result Value Ref Range    GFR If African American 50 (A) >60 mL/min/1.73 m 2    GFR If Non  41 (A) >60 mL/min/1.73 m 2   Comp Metabolic Panel    Collection Time: 05/20/20  9:28 AM   Result Value Ref Range    Sodium 138 135 - 145 mmol/L    Potassium 4.0 3.6 - 5.5 mmol/L    Chloride 100 96 - 112 mmol/L    Co2 25 20 - 33 mmol/L    Anion Gap 13.0 7.0 - 16.0    Glucose 105 (H) 65 - 99 mg/dL    Bun 28 (H) 8 - 22  mg/dL    Creatinine 1.26 0.50 - 1.40 mg/dL    Calcium 10.1 8.4 - 10.2 mg/dL    AST(SGOT) 17 12 - 45 U/L    ALT(SGPT) 14 2 - 50 U/L    Alkaline Phosphatase 49 30 - 99 U/L    Total Bilirubin 0.6 0.1 - 1.5 mg/dL    Albumin 4.5 3.2 - 4.9 g/dL    Total Protein 7.1 6.0 - 8.2 g/dL    Globulin 2.6 1.9 - 3.5 g/dL    A-G Ratio 1.7 g/dL   Lipid Profile    Collection Time: 05/20/20  9:28 AM   Result Value Ref Range    Cholesterol,Tot 154 100 - 199 mg/dL    Triglycerides 134 0 - 149 mg/dL    HDL 63 >=40 mg/dL    LDL 64 <100 mg/dL   URINE MICROSCOPIC (W/UA)    Collection Time: 05/20/20  9:28 AM   Result Value Ref Range    WBC 5-10 (A) /hpf    RBC 5-10 (A) /hpf    Bacteria Few (A) None /hpf    Epithelial Cells Moderate (A) Few /hpf   URINALYSIS    Collection Time: 05/20/20  9:28 AM   Result Value Ref Range    Color Yellow     Character Clear     Specific Gravity 1.025 <1.035    Ph 5.0 5.0 - 8.0    Glucose Negative Negative mg/dL    Ketones Negative Negative mg/dL    Protein Negative Negative mg/dL    Bilirubin Negative Negative    Nitrite Negative Negative    Leukocyte Esterase Small (A) Negative    Occult Blood Negative Negative    Micro Urine Req Microscopic    HEMOGLOBIN A1C    Collection Time: 05/20/20  9:28 AM   Result Value Ref Range    Glycohemoglobin 5.6 0.0 - 5.6 %    Est Avg Glucose 114 mg/dL   FASTING STATUS    Collection Time: 05/20/20  9:30 AM   Result Value Ref Range    Fasting Status Fasting          Assessment:     1. Essential hypertension     2. Moderate aortic stenosis  EC-ECHOCARDIOGRAM COMPLETE W/O CONT   3. Coronary artery disease due to lipid rich plaque     4. Dyslipidemia         Medical Decision Making:  Today's Assessment / Status / Plan:     It was my pleasure to meet with Ms. Hess.    Blood pressure is well controlled.  We specifically assessed the labs on hypertension treatment    She is on appropriate statin.      We will check her echocardiogram before next appointment    I will see Ms. Hess back  in 1 year time and encouraged her to follow up with us over the phone or electronically using my MyChart as issues arise.    It is my pleasure to participate in the care of Ms. Hess.  Please do not hesitate to contact me with questions or concerns.    Sharan Tran MD PhD [facility]  Cardiologist [facility]    Please note that this dictation was created using voice recognition software. There may be errors I did not discover before finalizing the note.

## 2020-11-19 ENCOUNTER — APPOINTMENT (RX ONLY)
Dept: URBAN - METROPOLITAN AREA CLINIC 4 | Facility: CLINIC | Age: 80
Setting detail: DERMATOLOGY
End: 2020-11-19

## 2020-11-19 DIAGNOSIS — L30.4 ERYTHEMA INTERTRIGO: ICD-10-CM

## 2020-11-19 DIAGNOSIS — L82.1 OTHER SEBORRHEIC KERATOSIS: ICD-10-CM

## 2020-11-19 DIAGNOSIS — D22 MELANOCYTIC NEVI: ICD-10-CM

## 2020-11-19 DIAGNOSIS — L73.8 OTHER SPECIFIED FOLLICULAR DISORDERS: ICD-10-CM

## 2020-11-19 DIAGNOSIS — D18.0 HEMANGIOMA: ICD-10-CM

## 2020-11-19 DIAGNOSIS — Z71.89 OTHER SPECIFIED COUNSELING: ICD-10-CM

## 2020-11-19 DIAGNOSIS — L81.4 OTHER MELANIN HYPERPIGMENTATION: ICD-10-CM

## 2020-11-19 DIAGNOSIS — L21.8 OTHER SEBORRHEIC DERMATITIS: ICD-10-CM

## 2020-11-19 PROBLEM — D22.72 MELANOCYTIC NEVI OF LEFT LOWER LIMB, INCLUDING HIP: Status: ACTIVE | Noted: 2020-11-19

## 2020-11-19 PROBLEM — D22.61 MELANOCYTIC NEVI OF RIGHT UPPER LIMB, INCLUDING SHOULDER: Status: ACTIVE | Noted: 2020-11-19

## 2020-11-19 PROBLEM — D22.62 MELANOCYTIC NEVI OF LEFT UPPER LIMB, INCLUDING SHOULDER: Status: ACTIVE | Noted: 2020-11-19

## 2020-11-19 PROBLEM — D22.39 MELANOCYTIC NEVI OF OTHER PARTS OF FACE: Status: ACTIVE | Noted: 2020-11-19

## 2020-11-19 PROBLEM — D22.5 MELANOCYTIC NEVI OF TRUNK: Status: ACTIVE | Noted: 2020-11-19

## 2020-11-19 PROBLEM — D18.01 HEMANGIOMA OF SKIN AND SUBCUTANEOUS TISSUE: Status: ACTIVE | Noted: 2020-11-19

## 2020-11-19 PROBLEM — D22.71 MELANOCYTIC NEVI OF RIGHT LOWER LIMB, INCLUDING HIP: Status: ACTIVE | Noted: 2020-11-19

## 2020-11-19 PROCEDURE — ? ADDITIONAL NOTES

## 2020-11-19 PROCEDURE — ? PRESCRIPTION

## 2020-11-19 PROCEDURE — ? PHOTO-DOCUMENTATION

## 2020-11-19 PROCEDURE — 99214 OFFICE O/P EST MOD 30 MIN: CPT

## 2020-11-19 PROCEDURE — ? BENIGN DESTRUCTION COSMETIC

## 2020-11-19 PROCEDURE — ? COUNSELING

## 2020-11-19 RX ORDER — KETOCONAZOLE 20 MG/ML
SHAMPOO, SUSPENSION TOPICAL BIW
Qty: 1 | Refills: 6 | Status: ERX | COMMUNITY
Start: 2020-11-19

## 2020-11-19 RX ADMIN — KETOCONAZOLE: 20 SHAMPOO, SUSPENSION TOPICAL at 00:00

## 2020-11-19 ASSESSMENT — LOCATION DETAILED DESCRIPTION DERM
LOCATION DETAILED: LEFT ANTERIOR DISTAL UPPER ARM
LOCATION DETAILED: LEFT INFERIOR CENTRAL MALAR CHEEK
LOCATION DETAILED: INFERIOR THORACIC SPINE
LOCATION DETAILED: LEFT MEDIAL UPPER BACK
LOCATION DETAILED: EPIGASTRIC SKIN
LOCATION DETAILED: SUPERIOR THORACIC SPINE
LOCATION DETAILED: LEFT CENTRAL MALAR CHEEK
LOCATION DETAILED: LEFT SUPERIOR MEDIAL UPPER BACK
LOCATION DETAILED: LOWER STERNUM
LOCATION DETAILED: NASAL DORSUM
LOCATION DETAILED: LEFT DISTAL ULNAR DORSAL FOREARM
LOCATION DETAILED: INFERIOR MID FOREHEAD
LOCATION DETAILED: RIGHT ANTERIOR PROXIMAL THIGH
LOCATION DETAILED: LEFT ANTERIOR PROXIMAL THIGH
LOCATION DETAILED: LEFT MEDIAL BREAST 7-8:00 REGION
LOCATION DETAILED: RIGHT MEDIAL MALAR CHEEK
LOCATION DETAILED: LEFT INFERIOR MEDIAL FOREHEAD
LOCATION DETAILED: RIGHT MEDIAL BREAST 5-6:00 REGION
LOCATION DETAILED: LEFT PROXIMAL DORSAL FOREARM
LOCATION DETAILED: MIDDLE STERNUM
LOCATION DETAILED: RIGHT ANTERIOR PROXIMAL UPPER ARM
LOCATION DETAILED: RIGHT INFERIOR MEDIAL MALAR CHEEK
LOCATION DETAILED: RIGHT ANTERIOR DISTAL UPPER ARM
LOCATION DETAILED: LEFT MEDIAL FRONTAL SCALP

## 2020-11-19 ASSESSMENT — LOCATION SIMPLE DESCRIPTION DERM
LOCATION SIMPLE: RIGHT UPPER ARM
LOCATION SIMPLE: LEFT BREAST
LOCATION SIMPLE: UPPER BACK
LOCATION SIMPLE: LEFT SCALP
LOCATION SIMPLE: LEFT CHEEK
LOCATION SIMPLE: LEFT UPPER BACK
LOCATION SIMPLE: NOSE
LOCATION SIMPLE: RIGHT BREAST
LOCATION SIMPLE: RIGHT THIGH
LOCATION SIMPLE: LEFT THIGH
LOCATION SIMPLE: LEFT UPPER ARM
LOCATION SIMPLE: INFERIOR FOREHEAD
LOCATION SIMPLE: CHEST
LOCATION SIMPLE: RIGHT CHEEK
LOCATION SIMPLE: LEFT FOREARM
LOCATION SIMPLE: ABDOMEN
LOCATION SIMPLE: LEFT FOREHEAD

## 2020-11-19 ASSESSMENT — LOCATION ZONE DERM
LOCATION ZONE: SCALP
LOCATION ZONE: NOSE
LOCATION ZONE: LEG
LOCATION ZONE: TRUNK
LOCATION ZONE: ARM
LOCATION ZONE: FACE

## 2020-11-19 NOTE — HPI: SKIN LESION
Is This A New Presentation, Or A Follow-Up?: Skin Lesion
What Type Of Note Output Would You Prefer (Optional)?: Bullet Format
How Severe Is Your Skin Lesion?: mild
Has Your Skin Lesion Been Treated?: not been treated
Which Family Member (Optional)?: Sisters

## 2020-11-19 NOTE — PROCEDURE: ADDITIONAL NOTES
Additional Notes: Lesion of concern on the nose is clinically consistent with Lentigo.
Detail Level: Simple
Additional Notes: Discussed all treatment options including referral to our cosmetic department vs retinol \\nRisk vs benefits were discussed
Additional Notes: No active rash. Recommend OTC zinc oxide barrier cream

## 2020-12-12 ENCOUNTER — HOSPITAL ENCOUNTER (OUTPATIENT)
Dept: LAB | Facility: MEDICAL CENTER | Age: 80
End: 2020-12-12
Attending: FAMILY MEDICINE
Payer: MEDICARE

## 2020-12-12 PROCEDURE — U0003 INFECTIOUS AGENT DETECTION BY NUCLEIC ACID (DNA OR RNA); SEVERE ACUTE RESPIRATORY SYNDROME CORONAVIRUS 2 (SARS-COV-2) (CORONAVIRUS DISEASE [COVID-19]), AMPLIFIED PROBE TECHNIQUE, MAKING USE OF HIGH THROUGHPUT TECHNOLOGIES AS DESCRIBED BY CMS-2020-01-R: HCPCS

## 2020-12-12 PROCEDURE — C9803 HOPD COVID-19 SPEC COLLECT: HCPCS

## 2020-12-13 LAB
COVID ORDER STATUS COVID19: NORMAL
SARS-COV-2 RNA RESP QL NAA+PROBE: DETECTED
SPECIMEN SOURCE: ABNORMAL

## 2021-01-11 DIAGNOSIS — Z23 NEED FOR VACCINATION: ICD-10-CM

## 2021-02-06 ENCOUNTER — IMMUNIZATION (OUTPATIENT)
Dept: FAMILY PLANNING/WOMEN'S HEALTH CLINIC | Facility: IMMUNIZATION CENTER | Age: 81
End: 2021-02-06
Attending: INTERNAL MEDICINE
Payer: MEDICARE

## 2021-02-06 DIAGNOSIS — Z23 NEED FOR VACCINATION: ICD-10-CM

## 2021-02-06 DIAGNOSIS — Z23 ENCOUNTER FOR VACCINATION: Primary | ICD-10-CM

## 2021-02-06 PROCEDURE — 91301 MODERNA SARS-COV-2 VACCINE: CPT

## 2021-02-06 PROCEDURE — 0011A MODERNA SARS-COV-2 VACCINE: CPT

## 2021-03-05 ENCOUNTER — IMMUNIZATION (OUTPATIENT)
Dept: FAMILY PLANNING/WOMEN'S HEALTH CLINIC | Facility: IMMUNIZATION CENTER | Age: 81
End: 2021-03-05
Attending: INTERNAL MEDICINE
Payer: MEDICARE

## 2021-03-05 DIAGNOSIS — Z23 ENCOUNTER FOR VACCINATION: Primary | ICD-10-CM

## 2021-03-05 PROCEDURE — 91301 MODERNA SARS-COV-2 VACCINE: CPT

## 2021-03-05 PROCEDURE — 0012A MODERNA SARS-COV-2 VACCINE: CPT

## 2021-03-23 ENCOUNTER — HOSPITAL ENCOUNTER (OUTPATIENT)
Dept: RADIOLOGY | Facility: MEDICAL CENTER | Age: 81
End: 2021-03-23
Attending: FAMILY MEDICINE
Payer: MEDICARE

## 2021-03-23 DIAGNOSIS — Z12.31 VISIT FOR SCREENING MAMMOGRAM: ICD-10-CM

## 2021-03-23 PROCEDURE — 77063 BREAST TOMOSYNTHESIS BI: CPT

## 2021-03-24 ENCOUNTER — APPOINTMENT (OUTPATIENT)
Dept: RADIOLOGY | Facility: MEDICAL CENTER | Age: 81
End: 2021-03-24
Attending: FAMILY MEDICINE
Payer: MEDICARE

## 2021-03-24 DIAGNOSIS — Z12.31 VISIT FOR SCREENING MAMMOGRAM: ICD-10-CM

## 2021-03-30 ENCOUNTER — HOSPITAL ENCOUNTER (OUTPATIENT)
Dept: RADIOLOGY | Facility: MEDICAL CENTER | Age: 81
End: 2021-03-30
Attending: PHYSICIAN ASSISTANT
Payer: MEDICARE

## 2021-03-30 DIAGNOSIS — K21.9 GASTROESOPHAGEAL REFLUX DISEASE, UNSPECIFIED WHETHER ESOPHAGITIS PRESENT: ICD-10-CM

## 2021-03-30 DIAGNOSIS — R14.0 BLOATING: ICD-10-CM

## 2021-03-30 DIAGNOSIS — R10.13 ABDOMINAL PAIN, EPIGASTRIC: ICD-10-CM

## 2021-03-30 PROCEDURE — 76700 US EXAM ABDOM COMPLETE: CPT

## 2021-04-05 ENCOUNTER — HOSPITAL ENCOUNTER (OUTPATIENT)
Dept: LAB | Facility: MEDICAL CENTER | Age: 81
End: 2021-04-05
Attending: PHYSICIAN ASSISTANT
Payer: MEDICARE

## 2021-04-05 LAB
ALBUMIN SERPL BCP-MCNC: 4.3 G/DL (ref 3.2–4.9)
ALBUMIN/GLOB SERPL: 1.7 G/DL
ALP SERPL-CCNC: 59 U/L (ref 30–99)
ALT SERPL-CCNC: 11 U/L (ref 2–50)
ANION GAP SERPL CALC-SCNC: 9 MMOL/L (ref 7–16)
AST SERPL-CCNC: 14 U/L (ref 12–45)
BASOPHILS # BLD AUTO: 1 % (ref 0–1.8)
BASOPHILS # BLD: 0.05 K/UL (ref 0–0.12)
BILIRUB SERPL-MCNC: 0.5 MG/DL (ref 0.1–1.5)
BUN SERPL-MCNC: 23 MG/DL (ref 8–22)
CALCIUM SERPL-MCNC: 10 MG/DL (ref 8.4–10.2)
CHLORIDE SERPL-SCNC: 103 MMOL/L (ref 96–112)
CHOLEST SERPL-MCNC: 165 MG/DL (ref 100–199)
CO2 SERPL-SCNC: 29 MMOL/L (ref 20–33)
CREAT SERPL-MCNC: 1.04 MG/DL (ref 0.5–1.4)
EOSINOPHIL # BLD AUTO: 0.16 K/UL (ref 0–0.51)
EOSINOPHIL NFR BLD: 3.3 % (ref 0–6.9)
ERYTHROCYTE [DISTWIDTH] IN BLOOD BY AUTOMATED COUNT: 44.1 FL (ref 35.9–50)
FERRITIN SERPL-MCNC: 13.6 NG/ML (ref 10–291)
FOLATE SERPL-MCNC: 4 NG/ML
GLOBULIN SER CALC-MCNC: 2.6 G/DL (ref 1.9–3.5)
GLUCOSE SERPL-MCNC: 95 MG/DL (ref 65–99)
HCT VFR BLD AUTO: 43.6 % (ref 37–47)
HDLC SERPL-MCNC: 72 MG/DL
HGB BLD-MCNC: 13.9 G/DL (ref 12–16)
IMM GRANULOCYTES # BLD AUTO: 0.01 K/UL (ref 0–0.11)
IMM GRANULOCYTES NFR BLD AUTO: 0.2 % (ref 0–0.9)
IRON SATN MFR SERPL: 23 % (ref 15–55)
IRON SERPL-MCNC: 90 UG/DL (ref 40–170)
LDLC SERPL CALC-MCNC: 67 MG/DL
LYMPHOCYTES # BLD AUTO: 1.52 K/UL (ref 1–4.8)
LYMPHOCYTES NFR BLD: 31.1 % (ref 22–41)
MCH RBC QN AUTO: 29.4 PG (ref 27–33)
MCHC RBC AUTO-ENTMCNC: 31.9 G/DL (ref 33.6–35)
MCV RBC AUTO: 92.4 FL (ref 81.4–97.8)
MONOCYTES # BLD AUTO: 0.37 K/UL (ref 0–0.85)
MONOCYTES NFR BLD AUTO: 7.6 % (ref 0–13.4)
NEUTROPHILS # BLD AUTO: 2.77 K/UL (ref 2–7.15)
NEUTROPHILS NFR BLD: 56.8 % (ref 44–72)
NRBC # BLD AUTO: 0 K/UL
NRBC BLD-RTO: 0 /100 WBC
PLATELET # BLD AUTO: 178 K/UL (ref 164–446)
PMV BLD AUTO: 10.1 FL (ref 9–12.9)
POTASSIUM SERPL-SCNC: 4.1 MMOL/L (ref 3.6–5.5)
PREALB SERPL-MCNC: 28.3 MG/DL (ref 18–38)
PROT SERPL-MCNC: 6.9 G/DL (ref 6–8.2)
RBC # BLD AUTO: 4.72 M/UL (ref 4.2–5.4)
SODIUM SERPL-SCNC: 141 MMOL/L (ref 135–145)
TIBC SERPL-MCNC: 386 UG/DL (ref 250–450)
TRANSFERRIN SERPL-MCNC: 322 MG/DL (ref 200–370)
TRIGL SERPL-MCNC: 132 MG/DL (ref 0–149)
UIBC SERPL-MCNC: 296 UG/DL (ref 110–370)
VIT B12 SERPL-MCNC: 348 PG/ML (ref 211–911)
WBC # BLD AUTO: 4.9 K/UL (ref 4.8–10.8)

## 2021-04-05 PROCEDURE — 80053 COMPREHEN METABOLIC PANEL: CPT

## 2021-04-05 PROCEDURE — 82607 VITAMIN B-12: CPT

## 2021-04-05 PROCEDURE — 84134 ASSAY OF PREALBUMIN: CPT

## 2021-04-05 PROCEDURE — 80061 LIPID PANEL: CPT

## 2021-04-05 PROCEDURE — 84425 ASSAY OF VITAMIN B-1: CPT

## 2021-04-05 PROCEDURE — 83550 IRON BINDING TEST: CPT

## 2021-04-05 PROCEDURE — 84630 ASSAY OF ZINC: CPT

## 2021-04-05 PROCEDURE — 36415 COLL VENOUS BLD VENIPUNCTURE: CPT

## 2021-04-05 PROCEDURE — 82306 VITAMIN D 25 HYDROXY: CPT

## 2021-04-05 PROCEDURE — 85025 COMPLETE CBC W/AUTO DIFF WBC: CPT

## 2021-04-05 PROCEDURE — 84252 ASSAY OF VITAMIN B-2: CPT

## 2021-04-05 PROCEDURE — 82728 ASSAY OF FERRITIN: CPT

## 2021-04-05 PROCEDURE — 84466 ASSAY OF TRANSFERRIN: CPT

## 2021-04-05 PROCEDURE — 84207 ASSAY OF VITAMIN B-6: CPT

## 2021-04-05 PROCEDURE — 83540 ASSAY OF IRON: CPT

## 2021-04-05 PROCEDURE — 82746 ASSAY OF FOLIC ACID SERUM: CPT

## 2021-04-07 LAB
25(OH)D3 SERPL-MCNC: 36 NG/ML (ref 30–80)
ZINC BLD-MCNC: 768.2 UG/DL (ref 440–860)

## 2021-04-08 LAB
VIT B1 BLD-MCNC: 126 NMOL/L (ref 70–180)
VIT B2 SERPL-SCNC: 4 NMOL/L (ref 5–50)
VIT B6 SERPL-MCNC: 26.3 NMOL/L (ref 20–125)

## 2021-04-15 ENCOUNTER — HOSPITAL ENCOUNTER (OUTPATIENT)
Dept: CARDIOLOGY | Facility: MEDICAL CENTER | Age: 81
End: 2021-04-15
Attending: INTERNAL MEDICINE
Payer: MEDICARE

## 2021-04-15 DIAGNOSIS — I35.0 MODERATE AORTIC STENOSIS: ICD-10-CM

## 2021-04-15 LAB
LV EJECT FRACT  99904: 70
LV EJECT FRACT MOD 2C 99903: 54.74
LV EJECT FRACT MOD 4C 99902: 63.53
LV EJECT FRACT MOD BP 99901: 61.38

## 2021-04-15 PROCEDURE — 93306 TTE W/DOPPLER COMPLETE: CPT

## 2021-04-15 PROCEDURE — 93306 TTE W/DOPPLER COMPLETE: CPT | Mod: 26 | Performed by: INTERNAL MEDICINE

## 2021-04-27 ENCOUNTER — PATIENT MESSAGE (OUTPATIENT)
Dept: HEALTH INFORMATION MANAGEMENT | Facility: OTHER | Age: 81
End: 2021-04-27

## 2021-04-27 ENCOUNTER — PATIENT MESSAGE (OUTPATIENT)
Dept: CARDIOLOGY | Facility: MEDICAL CENTER | Age: 81
End: 2021-04-27

## 2021-04-27 NOTE — TELEPHONE ENCOUNTER
----- Message from Sharan Tran M.D. sent at 4/15/2021  2:23 PM PDT -----  The echo looks stable which is good, please let her know     Thank you     History of Present Illness:  47 year old male here today for complete physical and discuss stopping drinking and smoking.  Patient has been long-term drinker and smoker.  One pack per day for over 30 years, has never tried to quit in the past and has never had any success quitting.  He has friends who abuse patches and gum and he also has friends of use medication.  He is more concerned about his drinking which has escalated recently.  He has been drinking for years, however now drinks about 16 beers to a 24 every other day.  He has stopped for 6 months in the past, but has had difficulty trying to stop on his own recently.  He denies any tremors or seizures when stopping for prolonged periods of time.  His last drink was yesterday has never had withdrawals in the past.  Otherwise health has been good, strong family history of high blood pressure but does not interested in taking medication at this time as he wants to try lifestyle adjustments.  Her interested in getting back into shape and exercising more regularly to try and lower his blood pressure.  He denies any depressed mood or lack of enjoyment things.  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  He believes he had his tetanus shot within 10 years as he has taken stay on top of it for work and he will get his records and let us know.  He does not want flu shot or pneumonia shot this time.    History reviewed. No pertinent past medical history.  Past Surgical History:   Procedure Laterality Date   • Ankle guard     • Appendectomy  2003     Family History   Problem Relation Age of Onset   • Hypertension Father      Social History     Tobacco Use   • Smoking status: Current Every Day Smoker     Packs/day: 0.30     Years: 25.00     Pack years: 7.50   • Smokeless tobacco: Never Used   Substance Use Topics   • Alcohol use: Yes     Alcohol/week: 12.0 - 18.0 standard drinks     Types: 12 - 18 Cans of beer per week      Frequency: 2-3 times a week     Drinks per session: 10 or more     Binge frequency: Weekly   • Drug use: Not Currently       Patient's medications were reviewed and updated as noted in the chart.    Review of Systems:  Review of Systems   Constitutional: Negative for chills, diaphoresis, fatigue and fever.   HENT: Negative for ear pain, hearing loss, rhinorrhea, sore throat and voice change.    Eyes: Negative for pain and redness.   Respiratory: Negative for cough and shortness of breath.    Cardiovascular: Negative for chest pain, palpitations and leg swelling.   Gastrointestinal: Negative for abdominal pain, constipation, diarrhea, nausea and vomiting.   Endocrine: Negative for cold intolerance and heat intolerance.   Genitourinary: Negative for dysuria, frequency and urgency.   Musculoskeletal: Negative for joint swelling.   Skin: Negative for rash.        Small areas of concern   Allergic/Immunologic: Negative for environmental allergies.   Neurological: Negative for dizziness, light-headedness and headaches.   Hematological: Does not bruise/bleed easily.   Psychiatric/Behavioral: Negative for agitation and confusion.   All other systems reviewed and are negative.      Objective:  Vital Signs Reviewed per chart  Physical Exam  Vitals signs reviewed.   Constitutional:       General: He is not in acute distress.     Appearance: Normal appearance. He is well-developed. He is not ill-appearing.   HENT:      Head: Normocephalic and atraumatic.      Right Ear: Tympanic membrane, ear canal and external ear normal. There is no impacted cerumen.      Left Ear: Tympanic membrane, ear canal and external ear normal. There is no impacted cerumen.      Nose: Nose normal. No congestion or rhinorrhea.      Mouth/Throat:      Mouth: Mucous membranes are moist.      Pharynx: Oropharynx is clear. No oropharyngeal exudate or posterior oropharyngeal erythema.   Eyes:      General: No scleral icterus.        Right eye: No discharge.          Left eye: No discharge.      Extraocular Movements: Extraocular movements intact.      Conjunctiva/sclera: Conjunctivae normal.      Pupils: Pupils are equal, round, and reactive to light.   Neck:      Musculoskeletal: Normal range of motion and neck supple. No neck rigidity.      Thyroid: No thyromegaly.   Cardiovascular:      Rate and Rhythm: Normal rate and regular rhythm.      Heart sounds: Normal heart sounds. No murmur. No friction rub. No gallop.    Pulmonary:      Effort: Pulmonary effort is normal. No respiratory distress.      Breath sounds: Normal breath sounds. No wheezing or rales.   Abdominal:      General: Bowel sounds are normal. There is no distension.      Palpations: Abdomen is soft. There is no mass.      Tenderness: There is no tenderness. There is no guarding.   Musculoskeletal: Normal range of motion.         General: No swelling, tenderness or deformity.      Right lower leg: No edema.      Left lower leg: No edema.   Lymphadenopathy:      Cervical: No cervical adenopathy.   Skin:     General: Skin is warm and dry.      Capillary Refill: Capillary refill takes less than 2 seconds.      Findings: No erythema or rash.             Comments: Area on R arm, assymetrical with irregular borders, small amount of dark pigment, diameter >5mm, but no elevation   Neurological:      General: No focal deficit present.      Mental Status: He is alert and oriented to person, place, and time.      Cranial Nerves: No cranial nerve deficit.      Sensory: No sensory deficit.      Motor: No weakness or abnormal muscle tone.      Coordination: Coordination normal.      Gait: Gait normal.      Deep Tendon Reflexes: Reflexes normal.   Psychiatric:         Mood and Affect: Mood normal.         Behavior: Behavior normal.         Thought Content: Thought content normal.         Judgment: Judgment normal.         Health Maintenance Summary     Pneumococcal Vaccine 0-64 (1 of 1 - PPSV23)  Overdue since 12/1/1980     DTaP/Tdap/Td Vaccine (1 - Tdap)  Overdue since 12/1/1993    Influenza Vaccine (1)  Overdue since 9/1/2019    Depression Screening (Yearly)  Next due on 12/6/2020    Meningococcal Vaccine   Aged Out          Assessment/Plan:  Problem List Items Addressed This Visit     Health care maintenance     Up to date on screenings as above. Immunizations declined.  Discussed maintaining healthy diet and exercise (150 mins/week)  No difficulty with stress or sleep, mood stable  Smoking and drinking cessation as below  Screening labs ordered  No age appropriate cancer screening, fam hx reviewed, discussed PSA and without symptoms and only risk factor of smoking patient agrees to defer at this time             Tobacco dependence     Discussed smoking cessation and patient wanting to stop. Has not tried things in the past. Wants to focus on quitting drinking first and medication prescribed. Will consider addiction specialist or psych for addiction. While starting naltrexone, will try cold turkey         Alcoholism /alcohol abuse (CMS/HCC)     Worsening, discussed options including therapy and counseling which the patient declined. Will look into addiction specialist in the area. In the interim, patient to start naltrexone 50mg daily for cravings and try to stop drinking. Follow-up in 3 months         Relevant Medications    nalTREXone (REVIA) 50 MG tablet    Elevated blood pressure reading without diagnosis of hypertension     Discussed medications and patient not interested in taking maintenance medication. Wants to try and get in shape, cut out cigarettes and drinking and then if still high will consider. Follow-up in 3 months           Other Visit Diagnoses     Annual physical exam    -  Primary    Relevant Orders    CBC WITH DIFFERENTIAL    COMPREHENSIVE METABOLIC PANEL    LIPID PANEL WITH REFLEX    THYROID STIMULATING HORMONE REFLEX    Skin lesion of right arm        Low suspicion of melanoma, but will send to Derm for  potential biopsy    Relevant Orders    SERVICE TO DERMATOLOGY        Recent Labs and Imaging reviewed with patient to ensure understanding.  Patient educated on medications; timing and dosages reviewed.    Arthur Gray DO

## 2021-05-10 ENCOUNTER — OFFICE VISIT (OUTPATIENT)
Dept: CARDIOLOGY | Facility: MEDICAL CENTER | Age: 81
End: 2021-05-10
Payer: MEDICARE

## 2021-05-10 VITALS
SYSTOLIC BLOOD PRESSURE: 112 MMHG | OXYGEN SATURATION: 94 % | DIASTOLIC BLOOD PRESSURE: 52 MMHG | WEIGHT: 187 LBS | HEART RATE: 78 BPM | HEIGHT: 64 IN | BODY MASS INDEX: 31.92 KG/M2

## 2021-05-10 DIAGNOSIS — I25.10 CORONARY ARTERY DISEASE DUE TO LIPID RICH PLAQUE: ICD-10-CM

## 2021-05-10 DIAGNOSIS — I35.0 MODERATE AORTIC STENOSIS: ICD-10-CM

## 2021-05-10 DIAGNOSIS — I10 ESSENTIAL HYPERTENSION: ICD-10-CM

## 2021-05-10 DIAGNOSIS — I34.81 MITRAL ANNULAR CALCIFICATION: Chronic | ICD-10-CM

## 2021-05-10 DIAGNOSIS — I25.83 CORONARY ARTERY DISEASE DUE TO LIPID RICH PLAQUE: ICD-10-CM

## 2021-05-10 PROCEDURE — 99214 OFFICE O/P EST MOD 30 MIN: CPT | Performed by: INTERNAL MEDICINE

## 2021-05-10 RX ORDER — MESALAMINE 400 MG/1
CAPSULE, DELAYED RELEASE ORAL
COMMUNITY
Start: 2021-05-10 | End: 2022-03-10

## 2021-05-10 ASSESSMENT — ENCOUNTER SYMPTOMS
NAUSEA: 0
WEAKNESS: 0
BLURRED VISION: 0
ABDOMINAL PAIN: 0
CHILLS: 0
CLAUDICATION: 0
FOCAL WEAKNESS: 0
DIZZINESS: 0
SHORTNESS OF BREATH: 0
BRUISES/BLEEDS EASILY: 0
FALLS: 0
SORE THROAT: 0
PALPITATIONS: 0
PND: 0
FEVER: 0
COUGH: 0

## 2021-05-10 ASSESSMENT — FIBROSIS 4 INDEX: FIB4 SCORE: 1.87

## 2021-05-10 NOTE — PROGRESS NOTES
Chief Complaint   Patient presents with   • Coronary Artery Disease     F/V Dx: Coronary artery disease due to lipid rich plaque   • HTN (Controlled)   • Dyslipidemia       Subjective:   Erin Hess is a 81 y.o. female who presents today for follow-up of her history of moderate aortic stenosis    Doing well going to [place] for wedding        Past Medical History:   Diagnosis Date   • Arthritis    • Asthma    • Breast cancer (HCC)    • Bronchitis    • Cancer (HCC) 2012    right breast   • Dizziness 3/11/2013   • Heart burn    • Heart murmur    • Hiatus hernia syndrome    • HTN (hypertension) 3/12/2013   • Hypercholesterolemia    • Hyperlipidemia 3/12/2013   • Hypertension    • Hypokalemia 3/12/2013   • Indigestion    • Pain     back, hips, knees   • Psychiatric disorder     depression   • Sleep apnea     h/o gastric sleeve lost 40 lb now not on CPAP   • Snoring    • ULCERATIVE COLITIS 3/12/2013   • Vertigo 3/11/2013     Past Surgical History:   Procedure Laterality Date   • GASTRIC SLEEVE LAPAROSCOPY N/A 2016    Procedure: GASTRIC SLEEVE LAPAROSCOPY, HIATAL HERNIA AND LIVER BIOPSY;  Surgeon: Mauricio Montes M.D.;  Location: SURGERY [facility];  Service:    • US-NEEDLE CORE BX-BREAST PANEL      rt breast, with lumpectomy    • GYN SURGERY      vag hyster    • GYN SURGERY      vaginal cyst removal    • LUMPECTOMY  left   • OTHER       R breast bx X 2& lipoma removal     Family History   Problem Relation Age of Onset   • Heart Disease Mother         CAD MI at age 72   • Cancer Mother         breast cancer  at age 83   • Cancer Sister    • Lung Disease Father 75        Lung cancer   • Cancer Maternal Grandmother         Pancreatic cancer   • Heart Attack Maternal Grandfather         MI at age 70   • Heart Disease Sister         Rhuemati cfever- herat murmur     Social History     Socioeconomic History   • Marital status:      Spouse name: Not on file   • Number of children: Not on file    • Years of education: Not on file   • Highest education level: Not on file   Occupational History   • Not on file   Tobacco Use   • Smoking status: Former Smoker     Packs/day: 1.00     Years: 20.00     Pack years: 20.00     Types: Cigarettes     Quit date: 1986     Years since quittin.9   • Smokeless tobacco: Never Used   Substance and Sexual Activity   • Alcohol use: Yes     Alcohol/week: 4.2 oz     Types: 7 Standard drinks or equivalent per week     Comment: 1 drink a night   • Drug use: No   • Sexual activity: Not on file   Other Topics Concern   • Not on file   Social History Narrative   • Not on file     Social Determinants of Health     Financial Resource Strain:    • Difficulty of Paying Living Expenses:    Food Insecurity:    • Worried About Running Out of Food in the Last Year:    • Ran Out of Food in the Last Year:    Transportation Needs:    • Lack of Transportation (Medical):    • Lack of Transportation (Non-Medical):    Physical Activity:    • Days of Exercise per Week:    • Minutes of Exercise per Session:    Stress:    • Feeling of Stress :    Social Connections:    • Frequency of Communication with Friends and Family:    • Frequency of Social Gatherings with Friends and Family:    • Attends Gn[employer] Services:    • Active Member of Clubs or Organizations:    • Attends Club or Organization Meetings:    • Marital Status:    Intimate Partner Violence:    • Fear of Current or Ex-Partner:    • Emotionally Abused:    • Physically Abused:    • Sexually Abused:      Allergies   Allergen Reactions   • Sulfa Drugs Rash and Swelling     Rash, joint swelling  [id number]   • Vancomycin Itching   • Codeine Vomiting and Nausea     Outpatient Encounter Medications as of 5/10/2021   Medication Sig Dispense Refill   • cyanocobalamin (VITAMIN B-12) 100 MCG Tab Take 100 mcg by mouth every day.     • SYMBICORT 80-4.5 MCG/ACT Aerosol INHALE 2 PUFFS ORALLY TWICE A DAY.USE SPACER AND RINSE MOUTH AFTER EACH USE 1  "Inhaler 5   • Cholecalciferol (VITAMIN D3) 01933 UNIT Cap Take  by mouth.     • spironolactone (ALDACTONE) 50 MG Tab Take 1 Tab by mouth every day. 90 Tab 3   • valsartan (DIOVAN) 160 MG Tab Take 160 mg by mouth every bedtime.     • valsartan-hydrochlorothiazide (DIOVAN-HCT) 160-12.5 MG per tablet Take 0.5 Tabs by mouth 2 Times a Day. (Patient taking differently: Take 0.5 Tabs by mouth every day.) 90 Tab 3   • fluticasone (FLONASE) 50 MCG/ACT nasal spray Spray 1-2 Sprays in nose every day. Each nostril. 1 Bottle 6   • paroxetine (PAXIL) 10 MG Tab Take 10 mg by mouth every day.     • rosuvastatin (CRESTOR) 20 MG Tab Take 1 Tab by mouth every evening. 30 Tab 11   • anastrozole (ARIMIDEX) 1 MG Tab Take 1 mg by mouth every morning.     • pantoprazole (PROTONIX) 40 MG Tablet Delayed Response Take 40 mg by mouth every morning.     • aspirin EC (ECOTRIN) 81 MG TBEC Take 81 mg by mouth every morning.     • Potassium 99 MG Tab Take  by mouth.     • Mesalamine (APRISO) 0.375 GM CAPSULE SR 24 HR Take 2 Caps by mouth 2 Times a Day.       No facility-administered encounter medications on file as of 5/10/2021.     Review of Systems   Constitutional: Negative for chills and fever.   HENT: Negative for sore throat.    Eyes: Negative for blurred vision.   Respiratory: Negative for cough and shortness of breath.    Cardiovascular: Negative for chest pain, palpitations, claudication, leg swelling and PND.   Gastrointestinal: Negative for abdominal pain and nausea.   Musculoskeletal: Negative for falls and joint pain.   Skin: Negative for rash.   Neurological: Negative for dizziness, focal weakness and weakness.   Endo/Heme/Allergies: Does not bruise/bleed easily.        Objective:   /52 (BP Location: Left arm, Patient Position: Sitting, BP Cuff Size: Adult)   Pulse 78   Ht 1.626 m (5' 4\")   Wt 84.8 kg (187 lb)   SpO2 94%   BMI 32.10 kg/m²     Physical Exam   Constitutional: No distress.   HENT:   Patient wearing a mask due " to COVID precautions   Eyes: No scleral icterus.   Neck: No JVD present.   Cardiovascular: Normal rate. Exam reveals no gallop and no friction rub.   Murmur heard.  Pulmonary/Chest: No respiratory distress. She has no wheezes. She has no rales.   Abdominal: Soft. Bowel sounds are normal.   Musculoskeletal:         General: No edema.   Neurological: She is alert.   Skin: No rash noted. She is not diaphoretic.   Psychiatric: She has a normal mood and affect.     We reviewed the images of her echocardiogram moderate aortic stenosis    We reviewed in person the most recent labs  Recent Results (from the past 4032 hour(s))   COVID/SARS CoV-2 PCR    Collection Time: 12/12/20  9:32 AM   Result Value Ref Range    COVID Order Status Received    SARS-CoV-2, PCR (In-House)    Collection Time: 12/12/20  9:32 AM   Result Value Ref Range    SARS-CoV-2 Source NP Swab     SARS-CoV-2 by PCR DETECTED (AA)    VITAMIN B1    Collection Time: 04/05/21 10:00 AM   Result Value Ref Range    Vitamin B1 126 70 - 180 nmol/L   TRANSFERRIN    Collection Time: 04/05/21 10:00 AM   Result Value Ref Range    Transferrin 322 200 - 370 mg/dL   PREALBUMIN    Collection Time: 04/05/21 10:00 AM   Result Value Ref Range    Pre-Albumin 28.3 18.0 - 38.0 mg/dL   VITAMIN B2 (RIBOFLAVIN)    Collection Time: 04/05/21 10:00 AM   Result Value Ref Range    Vitamin B2 (Riboflavin) 4 (L) 5 - 50 nmol/L   Comp Metabolic Panel    Collection Time: 04/05/21 10:00 AM   Result Value Ref Range    Sodium 141 135 - 145 mmol/L    Potassium 4.1 3.6 - 5.5 mmol/L    Chloride 103 96 - 112 mmol/L    Co2 29 20 - 33 mmol/L    Anion Gap 9.0 7.0 - 16.0    Glucose 95 65 - 99 mg/dL    Bun 23 (H) 8 - 22 mg/dL    Creatinine 1.04 0.50 - 1.40 mg/dL    Calcium 10.0 8.4 - 10.2 mg/dL    AST(SGOT) 14 12 - 45 U/L    ALT(SGPT) 11 2 - 50 U/L    Alkaline Phosphatase 59 30 - 99 U/L    Total Bilirubin 0.5 0.1 - 1.5 mg/dL    Albumin 4.3 3.2 - 4.9 g/dL    Total Protein 6.9 6.0 - 8.2 g/dL    Globulin 2.6  1.9 - 3.5 g/dL    A-G Ratio 1.7 g/dL   VITAMIN B12    Collection Time: 04/05/21 10:00 AM   Result Value Ref Range    Vitamin B12 -True Cobalamin 348 211 - 911 pg/mL   FOLATE    Collection Time: 04/05/21 10:00 AM   Result Value Ref Range    Folate -Folic Acid 4.0 >4.0 ng/mL   Lipid Profile    Collection Time: 04/05/21 10:00 AM   Result Value Ref Range    Cholesterol,Tot 165 100 - 199 mg/dL    Triglycerides 132 0 - 149 mg/dL    HDL 72 >=40 mg/dL    LDL 67 <100 mg/dL   VITAMIN D,25 HYDROXY    Collection Time: 04/05/21 10:00 AM   Result Value Ref Range    25-Hydroxy   Vitamin D 25 36 30 - 80 ng/mL   CBC WITH DIFFERENTIAL    Collection Time: 04/05/21 10:00 AM   Result Value Ref Range    WBC 4.9 4.8 - 10.8 K/uL    RBC 4.72 4.20 - 5.40 M/uL    Hemoglobin 13.9 12.0 - 16.0 g/dL    Hematocrit 43.6 37.0 - 47.0 %    MCV 92.4 81.4 - 97.8 fL    MCH 29.4 27.0 - 33.0 pg    MCHC 31.9 (L) 33.6 - 35.0 g/dL    RDW 44.1 35.9 - 50.0 fL    Platelet Count 178 164 - 446 K/uL    MPV 10.1 9.0 - 12.9 fL    Neutrophils-Polys 56.80 44.00 - 72.00 %    Lymphocytes 31.10 22.00 - 41.00 %    Monocytes 7.60 0.00 - 13.40 %    Eosinophils 3.30 0.00 - 6.90 %    Basophils 1.00 0.00 - 1.80 %    Immature Granulocytes 0.20 0.00 - 0.90 %    Nucleated RBC 0.00 /100 WBC    Neutrophils (Absolute) 2.77 2.00 - 7.15 K/uL    Lymphs (Absolute) 1.52 1.00 - 4.80 K/uL    Monos (Absolute) 0.37 0.00 - 0.85 K/uL    Eos (Absolute) 0.16 0.00 - 0.51 K/uL    Baso (Absolute) 0.05 0.00 - 0.12 K/uL    Immature Granulocytes (abs) 0.01 0.00 - 0.11 K/uL    NRBC (Absolute) 0.00 K/uL   FERRITIN    Collection Time: 04/05/21 10:00 AM   Result Value Ref Range    Ferritin 13.6 10.0 - 291.0 ng/mL   VITAMIN B6    Collection Time: 04/05/21 10:00 AM   Result Value Ref Range    Vitamin B6 26.3 20.0 - 125.0 nmol/L   Zinc, Whole Blood    Collection Time: 04/05/21 10:00 AM   Result Value Ref Range    Zinc, Whole Blood 768.2 440.0 - 860.0 ug/dL   IRON/TOTAL IRON BIND    Collection Time: 04/05/21  10:00 AM   Result Value Ref Range    Iron 90 40 - 170 ug/dL    Total Iron Binding 386 250 - 450 ug/dL    Unsat Iron Binding 296 110 - 370 ug/dL    % Saturation 23 15 - 55 %   ESTIMATED GFR    Collection Time: 04/05/21 10:00 AM   Result Value Ref Range    GFR If African American >60 >60 mL/min/1.73 m 2    GFR If Non  51 (A) >60 mL/min/1.73 m 2   EC-ECHOCARDIOGRAM COMPLETE W/O CONT    Collection Time: 04/15/21  3:51 PM   Result Value Ref Range    Eject.Frac. MOD BP 61.38     Eject.Frac. MOD 4C 63.53     Eject.Frac. MOD 2C 54.74     Left Ventrical Ejection Fraction 70        Assessment:     1. Coronary artery disease due to lipid rich plaque     2. Moderate aortic stenosis     3. Essential hypertension         Medical Decision Making:  Today's Assessment / Status / Plan:     It was my pleasure to meet with Ms. Hess.    Blood pressure is well controlled.  We specifically assessed the labs on hypertension treatment    She is on appropriate statin.    Continue surveliance of moderate aortic stenosis    Aspirin for MAC    I will see Ms. Hess back in 1 year time and encouraged her to follow up with us over the phone or electronically using my MyChart as issues arise.    It is my pleasure to participate in the care of Ms. Hess.  Please do not hesitate to contact me with questions or concerns.    Sharan Tran MD PhD [facility]  Cardiologist [facility]    Please note that this dictation was created using voice recognition software. There may be errors I did not discover before finalizing the note.

## 2021-06-03 ENCOUNTER — PATIENT OUTREACH (OUTPATIENT)
Dept: HEALTH INFORMATION MANAGEMENT | Facility: OTHER | Age: 81
End: 2021-06-03

## 2021-06-03 NOTE — PROGRESS NOTES
Outcome: Unable leave voice mail  for Comprehensive health Assessment      Please transfer to Patient Outreach Team at [phone] when patient returns call.      Attempt #2

## 2022-01-20 ENCOUNTER — HOSPITAL ENCOUNTER (OUTPATIENT)
Dept: RADIOLOGY | Facility: MEDICAL CENTER | Age: 82
End: 2022-01-20
Attending: FAMILY MEDICINE
Payer: MEDICARE

## 2022-01-20 DIAGNOSIS — R05.1 ACUTE COUGH: ICD-10-CM

## 2022-01-20 PROCEDURE — 71046 X-RAY EXAM CHEST 2 VIEWS: CPT

## 2022-01-25 ENCOUNTER — PATIENT MESSAGE (OUTPATIENT)
Dept: HEALTH INFORMATION MANAGEMENT | Facility: OTHER | Age: 82
End: 2022-01-25
Payer: MEDICARE

## 2022-02-04 ENCOUNTER — HOSPITAL ENCOUNTER (OUTPATIENT)
Dept: RADIOLOGY | Facility: MEDICAL CENTER | Age: 82
End: 2022-02-04
Attending: FAMILY MEDICINE
Payer: MEDICARE

## 2022-02-04 DIAGNOSIS — R05.1 ACUTE COUGH: ICD-10-CM

## 2022-02-04 PROCEDURE — 71046 X-RAY EXAM CHEST 2 VIEWS: CPT

## 2022-02-14 ENCOUNTER — TELEPHONE (OUTPATIENT)
Dept: HEALTH INFORMATION MANAGEMENT | Facility: OTHER | Age: 82
End: 2022-02-14

## 2022-02-14 NOTE — LETTER
[facility]  Terra Zambrano M.D.  [id number]  [place] [address]  Fax: [phone]   Authorization for Release/Disclosure of   Protected Health Information   Name: ERIN SHAFER : 1940 SSN: [id number]   Address: [address]  [place] [address] Phone:    [phone] (home) [phone] (work)   I authorize the entity listed below to release/disclose the PHI below to:   [facility]/Terra Zambrano M.D. and Karla Lopez M.D.   Provider or Entity Name:  Terra Zambrano    Address   City, [facility], [facility]   Phone:      Fax:  [phone]   Reason for request: continuity of care   Information to be released:    [  ] LAST COLONOSCOPY,  including any PATH REPORT and follow-up  [  ] LAST FIT/COLOGUARD RESULT [  ] LAST DEXA  [  ] LAST MAMMOGRAM  [  ] LAST PAP  [  ] LAST LABS [  ] RETINA EXAM REPORT  [  ] IMMUNIZATION RECORDS  [ X ] Release all info      [  ] Check here and initial the line next to each item to release ALL health information INCLUDING  _____ Care and treatment for drug and / or alcohol abuse  _____ HIV testing, infection status, or AIDS  _____ Genetic Testing    DATES OF SERVICE OR TIME PERIOD TO BE DISCLOSED: _____________  I understand and acknowledge that:  * This Authorization may be revoked at any time by you in writing, except if your health information has already been used or disclosed.  * Your health information that will be used or disclosed as a result of you signing this authorization could be re-disclosed by the recipient. If this occurs, your re-disclosed health information may no longer be protected by State or Federal laws.  * You may refuse to sign this Authorization. Your refusal will not affect your ability to obtain treatment.  * This Authorization becomes effective upon signing and will  on (date) __________.      If no date is indicated, this Authorization will  one (1) year from the signature date.    Name: Erin Shafer    CONTINUITY OF CARE     Date:     2/14/2022       PLEASE FAX REQUESTED RECORDS BACK TO: [phone]

## 2022-02-24 ENCOUNTER — HOSPITAL ENCOUNTER (OUTPATIENT)
Dept: LAB | Facility: MEDICAL CENTER | Age: 82
End: 2022-02-24
Attending: FAMILY MEDICINE
Payer: MEDICARE

## 2022-02-24 LAB
ALBUMIN SERPL BCP-MCNC: 3.9 G/DL (ref 3.2–4.9)
ALBUMIN/GLOB SERPL: 1.3 G/DL
ALP SERPL-CCNC: 53 U/L (ref 30–99)
ALT SERPL-CCNC: 10 U/L (ref 2–50)
ANION GAP SERPL CALC-SCNC: 10 MMOL/L (ref 7–16)
AST SERPL-CCNC: 14 U/L (ref 12–45)
BASOPHILS # BLD AUTO: 0.4 % (ref 0–1.8)
BASOPHILS # BLD: 0.03 K/UL (ref 0–0.12)
BILIRUB SERPL-MCNC: 0.6 MG/DL (ref 0.1–1.5)
BUN SERPL-MCNC: 18 MG/DL (ref 8–22)
CALCIUM SERPL-MCNC: 9.8 MG/DL (ref 8.4–10.2)
CHLORIDE SERPL-SCNC: 102 MMOL/L (ref 96–112)
CHOLEST SERPL-MCNC: 158 MG/DL (ref 100–199)
CO2 SERPL-SCNC: 26 MMOL/L (ref 20–33)
CREAT SERPL-MCNC: 1.05 MG/DL (ref 0.5–1.4)
EOSINOPHIL # BLD AUTO: 0.17 K/UL (ref 0–0.51)
EOSINOPHIL NFR BLD: 2.4 % (ref 0–6.9)
ERYTHROCYTE [DISTWIDTH] IN BLOOD BY AUTOMATED COUNT: 43.9 FL (ref 35.9–50)
FASTING STATUS PATIENT QL REPORTED: NORMAL
GLOBULIN SER CALC-MCNC: 3 G/DL (ref 1.9–3.5)
GLUCOSE SERPL-MCNC: 92 MG/DL (ref 65–99)
HCT VFR BLD AUTO: 43.6 % (ref 37–47)
HDLC SERPL-MCNC: 61 MG/DL
HGB BLD-MCNC: 14 G/DL (ref 12–16)
IMM GRANULOCYTES # BLD AUTO: 0.02 K/UL (ref 0–0.11)
IMM GRANULOCYTES NFR BLD AUTO: 0.3 % (ref 0–0.9)
LDLC SERPL CALC-MCNC: 69 MG/DL
LYMPHOCYTES # BLD AUTO: 1.74 K/UL (ref 1–4.8)
LYMPHOCYTES NFR BLD: 24.2 % (ref 22–41)
MCH RBC QN AUTO: 29.5 PG (ref 27–33)
MCHC RBC AUTO-ENTMCNC: 32.1 G/DL (ref 33.6–35)
MCV RBC AUTO: 92 FL (ref 81.4–97.8)
MONOCYTES # BLD AUTO: 0.62 K/UL (ref 0–0.85)
MONOCYTES NFR BLD AUTO: 8.6 % (ref 0–13.4)
NEUTROPHILS # BLD AUTO: 4.61 K/UL (ref 2–7.15)
NEUTROPHILS NFR BLD: 64.1 % (ref 44–72)
NRBC # BLD AUTO: 0 K/UL
NRBC BLD-RTO: 0 /100 WBC
PLATELET # BLD AUTO: 188 K/UL (ref 164–446)
PMV BLD AUTO: 9.7 FL (ref 9–12.9)
POTASSIUM SERPL-SCNC: 4 MMOL/L (ref 3.6–5.5)
PROT SERPL-MCNC: 6.9 G/DL (ref 6–8.2)
RBC # BLD AUTO: 4.74 M/UL (ref 4.2–5.4)
SODIUM SERPL-SCNC: 138 MMOL/L (ref 135–145)
TRIGL SERPL-MCNC: 138 MG/DL (ref 0–149)
WBC # BLD AUTO: 7.2 K/UL (ref 4.8–10.8)

## 2022-02-24 PROCEDURE — 85025 COMPLETE CBC W/AUTO DIFF WBC: CPT

## 2022-02-24 PROCEDURE — 36415 COLL VENOUS BLD VENIPUNCTURE: CPT

## 2022-02-24 PROCEDURE — 83036 HEMOGLOBIN GLYCOSYLATED A1C: CPT

## 2022-02-24 PROCEDURE — 80061 LIPID PANEL: CPT

## 2022-02-24 PROCEDURE — 80053 COMPREHEN METABOLIC PANEL: CPT

## 2022-02-25 LAB
EST. AVERAGE GLUCOSE BLD GHB EST-MCNC: 123 MG/DL
HBA1C MFR BLD: 5.9 % (ref 4–5.6)

## 2022-03-07 ENCOUNTER — HOSPITAL ENCOUNTER (EMERGENCY)
Facility: MEDICAL CENTER | Age: 82
End: 2022-03-07
Attending: EMERGENCY MEDICINE
Payer: MEDICARE

## 2022-03-07 ENCOUNTER — APPOINTMENT (OUTPATIENT)
Dept: RADIOLOGY | Facility: MEDICAL CENTER | Age: 82
End: 2022-03-07
Attending: EMERGENCY MEDICINE
Payer: MEDICARE

## 2022-03-07 VITALS
RESPIRATION RATE: 16 BRPM | OXYGEN SATURATION: 92 % | SYSTOLIC BLOOD PRESSURE: 134 MMHG | HEIGHT: 64 IN | BODY MASS INDEX: 31.73 KG/M2 | WEIGHT: 185.85 LBS | HEART RATE: 64 BPM | TEMPERATURE: 97 F | DIASTOLIC BLOOD PRESSURE: 66 MMHG

## 2022-03-07 DIAGNOSIS — T14.8XXA MUSCLE STRAIN: ICD-10-CM

## 2022-03-07 DIAGNOSIS — M79.605 LEFT LEG PAIN: ICD-10-CM

## 2022-03-07 PROCEDURE — 302875 HCHG BANDAGE ACE 4 OR 6""

## 2022-03-07 PROCEDURE — 93971 EXTREMITY STUDY: CPT | Mod: 26,LT | Performed by: INTERNAL MEDICINE

## 2022-03-07 PROCEDURE — 99284 EMERGENCY DEPT VISIT MOD MDM: CPT

## 2022-03-07 PROCEDURE — 93971 EXTREMITY STUDY: CPT | Mod: LT

## 2022-03-07 ASSESSMENT — FIBROSIS 4 INDEX: FIB4 SCORE: 1.88

## 2022-03-07 NOTE — ED NOTES
Left leg pain x 6 days, no known COLLEEN, has appointment  3/10 but did not want to wait. Patient is ambulatory

## 2022-03-07 NOTE — ED TRIAGE NOTES
"Chief Complaint   Patient presents with   • Leg Pain     LEFT LEG PAIN BEHIND THE KNEE.     /74   Pulse 80   Temp (!) 35.7 °C (96.3 °F) (Temporal)   Resp 16   Ht 1.626 m (5' 4\")   Wt 84.3 kg (185 lb 13.6 oz)   SpO2 95%   BMI 31.90 kg/m²       Walk in ER with c/o left leg pain located behind the knee x 1 week.      "

## 2022-03-07 NOTE — DISCHARGE INSTRUCTIONS
Please rest the leg and use the Ace wrap for compression.  Your symptoms should be improving over the next week, however if your symptoms are worsening or if you develop fever, redness or other concerns you need to seek additional medical attention

## 2022-03-07 NOTE — ED PROVIDER NOTES
ED Provider Note    ER PROVIDER NOTE      CHIEF COMPLAINT  Chief Complaint   Patient presents with   • Leg Pain     LEFT LEG PAIN BEHIND THE KNEE.         HPI  Erin Hess is a 82 y.o. female who presents to the emergency department complaining of left lower leg/calf pain.  Patient reports that she began having pain a few days ago after she woke up with the pain.  She reports no trauma to the area.  She has tried NSAIDs and massage without any real relief.  She reports no fevers or chills.  No chest pain or shortness of breath.  She reports she is recovering from pneumonia so was less mobile than usual.  However recently has been doing some remodeling and potentially stressing her leg.  No prior history of DVT, no recent travel.    REVIEW OF SYSTEMS  Pertinent positives include calf pain. Pertinent negatives include no fever. See HPI for details. All other systems reviewed and are negative.    PAST MEDICAL HISTORY   has a past medical history of Arthritis, Asthma, Breast cancer (), Bronchitis (), Cancer (HCC) (2012), Dizziness (3/11/2013), Heart burn, Heart murmur, Hiatus hernia syndrome, HTN (hypertension) (3/12/2013), Hypercholesterolemia, Hyperlipidemia (3/12/2013), Hypertension, Hypokalemia (3/12/2013), Indigestion, Mitral annular calcification, Pain, Psychiatric disorder, Sleep apnea, Snoring, ULCERATIVE COLITIS (3/12/2013), and Vertigo (3/11/2013).    SURGICAL HISTORY   has a past surgical history that includes us-needle core bx-breast panel (); lumpectomy (left); gyn surgery; gyn surgery; other; and gastric sleeve laparoscopy (N/A, 2016).    FAMILY HISTORY  Family History   Problem Relation Age of Onset   • Heart Disease Mother         CAD MI at age 72   • Cancer Mother         breast cancer  at age 83   • Cancer Sister    • Lung Disease Father 75        Lung cancer   • Cancer Maternal Grandmother         Pancreatic cancer   • Heart Attack Maternal Grandfather         MI at age 70   •  "Heart Disease Sister         Nadya cfever- herat murmur       SOCIAL HISTORY  Social History     Socioeconomic History   • Marital status:    Tobacco Use   • Smoking status: Former Smoker     Packs/day: 1.00     Years: 20.00     Pack years: 20.00     Types: Cigarettes     Quit date: 1986     Years since quittin.8   • Smokeless tobacco: Never Used   Vaping Use   • Vaping Use: Unknown   Substance and Sexual Activity   • Alcohol use: Yes     Alcohol/week: 4.2 oz     Types: 7 Standard drinks or equivalent per week     Comment: 1 drink a night   • Drug use: No      Social History     Substance and Sexual Activity   Drug Use No       CURRENT MEDICATIONS  Home Medications    **Home medications have not yet been reviewed for this encounter**         ALLERGIES  Allergies   Allergen Reactions   • Sulfa Drugs Rash and Swelling     Rash, joint swelling  CSM=[id number]   • Vancomycin Itching   • Codeine Vomiting and Nausea       PHYSICAL EXAM  VITAL SIGNS: /64   Pulse 80   Temp (!) 35.7 °C (96.3 °F) (Temporal)   Resp 16   Ht 1.626 m (5' 4\")   Wt 84.3 kg (185 lb 13.6 oz)   SpO2 95%   BMI 31.90 kg/m²   Pulse ox interpretation: I interpret this pulse ox as normal.    Constitutional: Alert in no apparent distress.  HENT: No signs of trauma, Bilateral external ears normal, Nose normal.   Eyes: Pupils are equal and reactive, Conjunctiva normal, Non-icteric.   Neck: Normal range of motion, No tenderness, Supple, No stridor.   Cardiovascular: Regular rate and rhythm, no murmurs.   Thorax & Lungs: Normal breath sounds, No respiratory distress, No wheezing, No chest tenderness.   Abdomen: Bowel sounds normal, Soft, No tenderness, No masses, No pulsatile masses. No peritoneal signs.  Skin: Warm, Dry, No erythema, No rash.   Back: No bony tenderness, No CVA tenderness.   Extremities: Intact distal pulses, No edema,No cyanosis  Musculoskeletal: Mild tenderness to the posterior calf on the left as well as in the " popliteal fossa without any swelling, nontender throughout the rest of the left lower extremity, distal capillary refills less than 2 seconds, distal pulses are intact, distal sensation is intact, otherwise good range of motion in all major joints. No tenderness to palpation or major deformities noted.   Neurologic: Alert , Normal motor function, Normal sensory function, No focal deficits noted.   Psychiatric: Affect normal, Judgment normal, Mood normal.     DIAGNOSTIC STUDIES / PROCEDURES      RADIOLOGY  US-EXTREMITY VENOUS LOWER UNILAT LEFT   Final Result        The radiologist's interpretation of all radiological studies have been reviewed and images independently viewed by me.    COURSE & MEDICAL DECISION MAKING  Nursing notes, VS, PMSFHx reviewed in chart.    9:40 AM Patient seen and examined at bedside. Ordered for US to evaluate her symptoms.     11:42 AM  Patient is reevaluated, updated on all results, she is comfortable, will plan for discharge      Decision Making:  This is a 82 y.o. female presented with leg pain.  Given her symptoms I did obtain an ultrasound which shows no evidence of DVT.  There was some findings suggestive of muscular strain which is consistent with her presentation as well.  She is provided with an Ace wrap for compression, NSAIDs and rest at home.  No findings on exam suggestive of cellulitis or deep space infection.  The extremity has no findings of neurovascular compromise    The patient will return for new or worsening symptoms and is stable at the time of discharge.    The patient is referred to a primary physician for blood pressure management, diabetic screening, and for all other preventative health concerns.    DISPOSITION:  Patient will be discharged home in stable condition.    FOLLOW UP:  Terra Zambrano M.D.  [address] [id number]  [address]  [facility] [address]  [phone]    In 1 week        OUTPATIENT MEDICATIONS:  New Prescriptions    No medications on file         FINAL  IMPRESSION  1. Left leg pain    2. Muscle strain          The note accurately reflects work and decisions made by me.  Saw Hancock M.D.  3/7/2022  11:45 AM

## 2022-03-10 ENCOUNTER — APPOINTMENT (RX ONLY)
Dept: URBAN - METROPOLITAN AREA CLINIC 4 | Facility: CLINIC | Age: 82
Setting detail: DERMATOLOGY
End: 2022-03-10

## 2022-03-10 ENCOUNTER — OFFICE VISIT (OUTPATIENT)
Dept: MEDICAL GROUP | Facility: PHYSICIAN GROUP | Age: 82
End: 2022-03-10
Payer: MEDICARE

## 2022-03-10 ENCOUNTER — HOSPITAL ENCOUNTER (OUTPATIENT)
Facility: MEDICAL CENTER | Age: 82
End: 2022-03-10
Attending: FAMILY MEDICINE
Payer: MEDICARE

## 2022-03-10 ENCOUNTER — NON-PROVIDER VISIT (OUTPATIENT)
Dept: MEDICAL GROUP | Facility: PHYSICIAN GROUP | Age: 82
End: 2022-03-10

## 2022-03-10 VITALS
HEIGHT: 64 IN | TEMPERATURE: 97 F | RESPIRATION RATE: 16 BRPM | BODY MASS INDEX: 31.8 KG/M2 | WEIGHT: 186.3 LBS | OXYGEN SATURATION: 91 % | HEART RATE: 81 BPM | DIASTOLIC BLOOD PRESSURE: 64 MMHG | SYSTOLIC BLOOD PRESSURE: 118 MMHG

## 2022-03-10 DIAGNOSIS — F41.1 GENERALIZED ANXIETY DISORDER: ICD-10-CM

## 2022-03-10 DIAGNOSIS — L29.89 OTHER PRURITUS: ICD-10-CM

## 2022-03-10 DIAGNOSIS — R09.89 HOMANS SIGN PRESENT: ICD-10-CM

## 2022-03-10 DIAGNOSIS — K21.9 GASTROESOPHAGEAL REFLUX DISEASE WITHOUT ESOPHAGITIS: ICD-10-CM

## 2022-03-10 DIAGNOSIS — Z12.11 COLON CANCER SCREENING: ICD-10-CM

## 2022-03-10 DIAGNOSIS — E66.9 OBESITY (BMI 30.0-34.9): ICD-10-CM

## 2022-03-10 DIAGNOSIS — I35.0 MODERATE AORTIC STENOSIS: ICD-10-CM

## 2022-03-10 DIAGNOSIS — Z85.3 HISTORY OF BREAST CANCER: ICD-10-CM

## 2022-03-10 DIAGNOSIS — R60.0 LEG EDEMA, LEFT: ICD-10-CM

## 2022-03-10 DIAGNOSIS — Z86.16 HISTORY OF 2019 NOVEL CORONAVIRUS DISEASE (COVID-19): ICD-10-CM

## 2022-03-10 DIAGNOSIS — J45.909 UNCOMPLICATED ASTHMA, UNSPECIFIED ASTHMA SEVERITY, UNSPECIFIED WHETHER PERSISTENT: ICD-10-CM

## 2022-03-10 DIAGNOSIS — E78.5 DYSLIPIDEMIA: ICD-10-CM

## 2022-03-10 DIAGNOSIS — I10 ESSENTIAL HYPERTENSION: ICD-10-CM

## 2022-03-10 DIAGNOSIS — M79.605 PAIN OF LEFT LOWER EXTREMITY: ICD-10-CM

## 2022-03-10 DIAGNOSIS — D22 MELANOCYTIC NEVI: ICD-10-CM

## 2022-03-10 DIAGNOSIS — Z71.89 ADVANCE DIRECTIVE DISCUSSED WITH PATIENT: ICD-10-CM

## 2022-03-10 DIAGNOSIS — K51.00 ULCERATIVE PANCOLITIS WITHOUT COMPLICATION (HCC): ICD-10-CM

## 2022-03-10 DIAGNOSIS — Z90.3 H/O GASTRIC SLEEVE: ICD-10-CM

## 2022-03-10 DIAGNOSIS — L29.8 OTHER PRURITUS: ICD-10-CM

## 2022-03-10 DIAGNOSIS — Z78.0 ASYMPTOMATIC MENOPAUSAL STATE: ICD-10-CM

## 2022-03-10 DIAGNOSIS — L72.0 EPIDERMAL CYST: ICD-10-CM

## 2022-03-10 DIAGNOSIS — D18.0 HEMANGIOMA: ICD-10-CM

## 2022-03-10 DIAGNOSIS — G47.33 OSA (OBSTRUCTIVE SLEEP APNEA): ICD-10-CM

## 2022-03-10 DIAGNOSIS — N18.31 STAGE 3A CHRONIC KIDNEY DISEASE: ICD-10-CM

## 2022-03-10 DIAGNOSIS — L82.1 OTHER SEBORRHEIC KERATOSIS: ICD-10-CM

## 2022-03-10 DIAGNOSIS — R53.83 FATIGUE, UNSPECIFIED TYPE: ICD-10-CM

## 2022-03-10 DIAGNOSIS — L81.4 OTHER MELANIN HYPERPIGMENTATION: ICD-10-CM

## 2022-03-10 PROBLEM — D22.72 MELANOCYTIC NEVI OF LEFT LOWER LIMB, INCLUDING HIP: Status: ACTIVE | Noted: 2022-03-10

## 2022-03-10 PROBLEM — D22.61 MELANOCYTIC NEVI OF RIGHT UPPER LIMB, INCLUDING SHOULDER: Status: ACTIVE | Noted: 2022-03-10

## 2022-03-10 PROBLEM — D22.71 MELANOCYTIC NEVI OF RIGHT LOWER LIMB, INCLUDING HIP: Status: ACTIVE | Noted: 2022-03-10

## 2022-03-10 PROBLEM — D22.5 MELANOCYTIC NEVI OF TRUNK: Status: ACTIVE | Noted: 2022-03-10

## 2022-03-10 PROBLEM — R50.9 FEVER: Status: RESOLVED | Noted: 2017-04-11 | Resolved: 2022-03-10

## 2022-03-10 PROBLEM — D22.62 MELANOCYTIC NEVI OF LEFT UPPER LIMB, INCLUDING SHOULDER: Status: ACTIVE | Noted: 2022-03-10

## 2022-03-10 PROBLEM — D18.01 HEMANGIOMA OF SKIN AND SUBCUTANEOUS TISSUE: Status: ACTIVE | Noted: 2022-03-10

## 2022-03-10 LAB — D DIMER PPP IA.FEU-MCNC: 0.46 UG/ML (FEU) (ref 0–0.5)

## 2022-03-10 PROCEDURE — ? BENIGN DESTRUCTION COSMETIC

## 2022-03-10 PROCEDURE — 36415 COLL VENOUS BLD VENIPUNCTURE: CPT | Performed by: FAMILY MEDICINE

## 2022-03-10 PROCEDURE — 99213 OFFICE O/P EST LOW 20 MIN: CPT

## 2022-03-10 PROCEDURE — ? COUNSELING

## 2022-03-10 PROCEDURE — 85379 FIBRIN DEGRADATION QUANT: CPT

## 2022-03-10 PROCEDURE — 99214 OFFICE O/P EST MOD 30 MIN: CPT | Performed by: FAMILY MEDICINE

## 2022-03-10 RX ORDER — IBUPROFEN 200 MG
200 TABLET ORAL EVERY 6 HOURS PRN
COMMUNITY
End: 2022-05-25

## 2022-03-10 RX ORDER — PAROXETINE 10 MG/1
TABLET, FILM COATED ORAL
COMMUNITY
End: 2022-03-10

## 2022-03-10 RX ORDER — PANTOPRAZOLE SODIUM 20 MG/1
TABLET, DELAYED RELEASE ORAL
COMMUNITY
Start: 2022-02-15 | End: 2022-08-31 | Stop reason: SDUPTHER

## 2022-03-10 RX ORDER — PANTOPRAZOLE SODIUM 40 MG/1
TABLET, DELAYED RELEASE ORAL
COMMUNITY
Start: 2022-03-04 | End: 2022-03-10

## 2022-03-10 RX ORDER — METHYLPREDNISOLONE 4 MG/1
TABLET ORAL
COMMUNITY
Start: 2022-02-09 | End: 2022-03-10

## 2022-03-10 RX ORDER — VALSARTAN 160 MG/1
TABLET ORAL
COMMUNITY
End: 2022-03-10

## 2022-03-10 RX ORDER — VALSARTAN AND HYDROCHLOROTHIAZIDE 160; 25 MG/1; MG/1
TABLET ORAL
COMMUNITY
End: 2022-03-10

## 2022-03-10 RX ORDER — CHLORAL HYDRATE 500 MG
CAPSULE ORAL
COMMUNITY
End: 2022-03-10

## 2022-03-10 RX ORDER — VALSARTAN AND HYDROCHLOROTHIAZIDE 160; 25 MG/1; MG/1
1 TABLET ORAL
COMMUNITY
Start: 2021-12-13 | End: 2022-03-10

## 2022-03-10 RX ORDER — BENZALKONIUM CHLORIDE 1.3 MG/ML
CLOTH TOPICAL
COMMUNITY
End: 2022-03-10

## 2022-03-10 RX ORDER — AZITHROMYCIN 250 MG/1
TABLET, FILM COATED ORAL
COMMUNITY
Start: 2022-01-19 | End: 2022-03-10

## 2022-03-10 RX ORDER — ACETAMINOPHEN 160 MG
TABLET,DISINTEGRATING ORAL
COMMUNITY
End: 2022-03-10

## 2022-03-10 RX ORDER — MESALAMINE 1.2 G/1
TABLET, DELAYED RELEASE ORAL
COMMUNITY
End: 2022-03-10

## 2022-03-10 RX ORDER — HYDROCHLOROTHIAZIDE 12.5 MG/1
CAPSULE, GELATIN COATED ORAL
COMMUNITY
End: 2022-03-10

## 2022-03-10 RX ORDER — PAROXETINE HYDROCHLORIDE 20 MG/1
TABLET, FILM COATED ORAL
COMMUNITY
End: 2022-03-10

## 2022-03-10 RX ORDER — LEVOFLOXACIN 500 MG/1
500 TABLET, FILM COATED ORAL
COMMUNITY
Start: 2022-01-24 | End: 2022-03-10

## 2022-03-10 RX ORDER — MESALAMINE 1.2 G/1
1.2 TABLET, DELAYED RELEASE ORAL DAILY
COMMUNITY
Start: 2021-12-16 | End: 2023-09-21

## 2022-03-10 RX ORDER — ROSUVASTATIN CALCIUM 10 MG/1
TABLET, COATED ORAL
COMMUNITY
End: 2022-03-10

## 2022-03-10 ASSESSMENT — LOCATION SIMPLE DESCRIPTION DERM
LOCATION SIMPLE: RIGHT POSTERIOR THIGH
LOCATION SIMPLE: RIGHT FOREARM
LOCATION SIMPLE: LEFT LOWER BACK
LOCATION SIMPLE: LEFT UPPER ARM
LOCATION SIMPLE: SCALP
LOCATION SIMPLE: RIGHT UPPER ARM
LOCATION SIMPLE: LEFT UPPER BACK
LOCATION SIMPLE: LEFT POPLITEAL SKIN
LOCATION SIMPLE: RIGHT FOREHEAD
LOCATION SIMPLE: LEFT PRETIBIAL REGION
LOCATION SIMPLE: RIGHT CHEEK
LOCATION SIMPLE: CHEST
LOCATION SIMPLE: UPPER BACK
LOCATION SIMPLE: RIGHT PRETIBIAL REGION
LOCATION SIMPLE: ABDOMEN
LOCATION SIMPLE: RIGHT POPLITEAL SKIN
LOCATION SIMPLE: LEFT FOREARM
LOCATION SIMPLE: LEFT POSTERIOR THIGH

## 2022-03-10 ASSESSMENT — LOCATION DETAILED DESCRIPTION DERM
LOCATION DETAILED: RIGHT DISTAL POSTERIOR THIGH
LOCATION DETAILED: MIDDLE STERNUM
LOCATION DETAILED: RIGHT VENTRAL PROXIMAL FOREARM
LOCATION DETAILED: RIGHT PROXIMAL PRETIBIAL REGION
LOCATION DETAILED: XIPHOID
LOCATION DETAILED: SUBXIPHOID
LOCATION DETAILED: RIGHT CENTRAL PARIETAL SCALP
LOCATION DETAILED: RIGHT INFERIOR CENTRAL MALAR CHEEK
LOCATION DETAILED: LEFT LATERAL PROXIMAL PRETIBIAL REGION
LOCATION DETAILED: LEFT INFERIOR MEDIAL UPPER BACK
LOCATION DETAILED: RIGHT SUPERIOR PARIETAL SCALP
LOCATION DETAILED: INFERIOR THORACIC SPINE
LOCATION DETAILED: LEFT PROXIMAL PRETIBIAL REGION
LOCATION DETAILED: RIGHT ANTERIOR PROXIMAL UPPER ARM
LOCATION DETAILED: LEFT SUPERIOR MEDIAL MIDBACK
LOCATION DETAILED: LEFT VENTRAL PROXIMAL FOREARM
LOCATION DETAILED: RIGHT ANTECUBITAL SKIN
LOCATION DETAILED: LEFT VENTRAL LATERAL PROXIMAL FOREARM
LOCATION DETAILED: RIGHT INFERIOR PARIETAL SCALP
LOCATION DETAILED: RIGHT INFERIOR MEDIAL FOREHEAD
LOCATION DETAILED: LEFT ANTERIOR PROXIMAL UPPER ARM
LOCATION DETAILED: LEFT ANTERIOR DISTAL UPPER ARM
LOCATION DETAILED: RIGHT ANTERIOR DISTAL UPPER ARM
LOCATION DETAILED: RIGHT POPLITEAL SKIN
LOCATION DETAILED: RIGHT MEDIAL SUPERIOR CHEST
LOCATION DETAILED: LEFT POPLITEAL SKIN
LOCATION DETAILED: LEFT INFERIOR LATERAL MIDBACK
LOCATION DETAILED: LEFT DISTAL POSTERIOR THIGH

## 2022-03-10 ASSESSMENT — LOCATION ZONE DERM
LOCATION ZONE: ARM
LOCATION ZONE: SCALP
LOCATION ZONE: TRUNK
LOCATION ZONE: LEG
LOCATION ZONE: FACE

## 2022-03-10 ASSESSMENT — FIBROSIS 4 INDEX: FIB4 SCORE: 1.88

## 2022-03-10 NOTE — PROGRESS NOTES
Subjective:     CC:    Chief Complaint   Patient presents with   • Establish Care     Dr. Zambrano PCP   • Leg Cramps     L Leg, calf in back below,  2 weeks ago, 5 out of 10 pain, ED visit to check for blood clot, negative, volteran, advil, some relief, causes sleep disturbances   • Pneumonia     Risidual cough, a couple weeks ago       HISTORY OF THE PRESENT ILLNESS: Patient is a 82 y.o. female. This pleasant patient is here today to establish care and discuss routine care, resolving leg cramp. Her prior PCP was at [facility].    Is noting some abd cramps this am, knows she is about due for cscope, followed by [facility] for UC. Denies blood in stool or diarrhea.   Was seen in ED 3/7 for leg cramp L, neg u/s for DVT, better yesterday, but worked in the [employer] and was on her feet all day which triggered some discomfort today.   12/21-29 had a cough while vacationing in [place], worsened and developed pna, first took prednisone w/ zpak then had a CXR + for pna and was rx'd levaquin 500mg/d x7d. Overall she improved but is still feeling a little tired and achy in her joints. Has appt w/ pulm in 7/22 to f/u her persistent cough plus the pna.   Saw cards 5/21 to f/u moderate aortic stenosis, asymptomatic.   Last labs 2/22 gfr 50 (stable) and a1c 5.9. has a small ice cream sandwich most nights or a drumstick.   Problem   Generalized Anxiety Disorder    Well managed for decades on paxil     Stage 3a Chronic Kidney Disease (Hcc)    rec to avoid nsaids. Stable, continue current plan of care with current medications.        History of 2019 Novel Coronavirus Disease (Covid-19)    12/20     H/O Gastric Sleeve   Gerd (Gastroesophageal Reflux Disease)    Takes protonix 20mg bid, has had issues since her gastric sleeve. Sees GI.      History of Breast Cancer    S/p R lumpectomy age 67, told to stay on arimidex for 5y but at her f/u appt told to stay on it for 10y. She still sees him, I suggested she discuss with her oncologist  regarding how long she should stay on the Arimidex as it has now been 15 years.     Advance Directive Discussed With Patient    Full code     Dyslipidemia    Well controlled on crestor 20     Fatigue    Since her pneumonia in the early winter 2022.     Asthma    Diagnosed around age 67. Well controlled on symbicort, uses alb mdi rarely     Scott (Obstructive Sleep Apnea)    No longer able to use CPAP, can't tolerate it. Has appt later in 3/22 w/ pulm/sleep for this as weel.      Obesity (Bmi 30.0-34.9)    We reviewed cutting back some on her sugar intake especially at night.     Essential Hypertension    Well-controlled on spironolactone 50 mg daily and Diovan hydrochlorothiazide 160/12.5 mg half tab daily.     Ulcerative Pancolitis Without Complication (Hcc)    Stable, continue current plan of care with current medications.        Fever (Resolved)   Vertigo (Resolved)   Dizziness (Resolved)       Allergies: Sulfa drugs, Vancomycin, and Codeine    Current Outpatient Medications Ordered in Epic   Medication Sig Dispense Refill   • ASPIRIN 81 PO      • Multiple Vitamins-Minerals (PRESERVISION AREDS 2+MULTI VIT PO)      • diclofenac sodium (VOLTAREN) 1 % Gel      • mesalamine (LIALDA) 1.2 GM Tablet Delayed Response Take 1.2 g by mouth every day.     • pantoprazole (PROTONIX) 20 MG tablet 1 tablet     • ibuprofen (MOTRIN) 200 MG Tab Take 200 mg by mouth every 6 hours as needed.     • cyanocobalamin (VITAMIN B-12) 100 MCG Tab Take 100 mcg by mouth every day.     • SYMBICORT 80-4.5 MCG/ACT Aerosol INHALE 2 PUFFS ORALLY TWICE A DAY.USE SPACER AND RINSE MOUTH AFTER EACH USE 1 Inhaler 5   • Cholecalciferol (VITAMIN D3) 02233 UNIT Cap Take  by mouth.     • spironolactone (ALDACTONE) 50 MG Tab Take 1 Tab by mouth every day. 90 Tab 3   • valsartan-hydrochlorothiazide (DIOVAN-HCT) 160-12.5 MG per tablet Take 0.5 Tabs by mouth 2 Times a Day. (Patient taking differently: Take 0.5 Tablets by mouth every day.) 90 Tab 3   • fluticasone  (FLONASE) 50 MCG/ACT nasal spray Spray 1-2 Sprays in nose every day. Each nostril. 1 Bottle 6   • paroxetine (PAXIL) 10 MG Tab Take 10 mg by mouth every day.     • rosuvastatin (CRESTOR) 20 MG Tab Take 1 Tab by mouth every evening. 30 Tab 11   • anastrozole (ARIMIDEX) 1 MG Tab Take 1 mg by mouth every morning.       No current [facility]-ordered facility-administered medications on file.       Past Medical History:   Diagnosis Date   • Arthritis    • Asthma    • Breast cancer (HCC)    • Bronchitis    • Cancer (HCC) 2012    right breast   • Dizziness 3/11/2013   • Heart burn    • Heart murmur    • Hiatus hernia syndrome    • HTN (hypertension) 3/12/2013   • Hypercholesterolemia    • Hyperlipidemia 3/12/2013   • Hypertension    • Hypokalemia 3/12/2013   • Indigestion    • Mitral annular calcification    • Pain     back, hips, knees   • Psychiatric disorder     depression   • Sleep apnea     h/o gastric sleeve lost 40 lb now not on CPAP   • Snoring    • ULCERATIVE COLITIS 3/12/2013   • Vertigo 3/11/2013       Past Surgical History:   Procedure Laterality Date   • GASTRIC SLEEVE LAPAROSCOPY N/A 2016    Procedure: GASTRIC SLEEVE LAPAROSCOPY, HIATAL HERNIA AND LIVER BIOPSY;  Surgeon: Mauricio Montes M.D.;  Location: SURGERY [facility];  Service:    • US-NEEDLE CORE BX-BREAST PANEL      rt breast, with lumpectomy    • GYN SURGERY      vag hyster    • GYN SURGERY      vaginal cyst removal    • LUMPECTOMY  left   • OTHER       R breast bx X 2& lipoma removal       Social History     Tobacco Use   • Smoking status: Former Smoker     Packs/day: 1.00     Years: 20.00     Pack years: 20.00     Types: Cigarettes     Quit date: 1986     Years since quittin.8   • Smokeless tobacco: Never Used   Vaping Use   • Vaping Use: Never used   Substance Use Topics   • Alcohol use: Yes     Alcohol/week: 4.2 oz     Types: 7 Standard drinks or equivalent per week     Comment: 1 drink a night   • Drug use: No  "      Social History     Social History Narrative    She lives alone but has a female roommate who is 60, Leonora Rosa who has a brother in town she is estranged from. Leonora Rosa had Covid  and now has long-haulers and is on medicaid     She has 2 children and 4 stepchildren, her   '. She has a son near [place], [place] and another in [place]. She relies on her 2 grandkids who live in [place] and her 8y younger sister lives in [facility] [place], her older sister lives in a skilled nursing in [place]. 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 Her ex  her cousin, met her 2nd  and was  for 25y.     Her son supports her keeping Leonora Rosa at her home. She does not feel taken advantage of despite no longer receiving rent.        Family History   Problem Relation Age of Onset   • Heart Disease Mother         CAD MI at age 72   • Cancer Mother         breast cancer  at age 83   • Cancer Sister    • Lung Disease Father 75        Lung cancer   • Cancer Maternal Grandmother         Pancreatic cancer   • Heart Attack Maternal Grandfather         MI at age 70   • Heart Disease Sister         Nadya cfever- herat murmur       Health Maintenance: Completed    ROS:   See HPI      Objective:     Exam:   /64 (BP Location: Right arm, Patient Position: Sitting, BP Cuff Size: Adult)   Pulse 81   Temp 36.1 °C (97 °F) (Temporal)   Resp 16   Ht 1.626 m (5' 4\")   Wt 84.5 kg (186 lb 4.8 oz)   SpO2 91%   BMI 31.98 kg/m²   Body mass index is 31.98 kg/m².  Wt Readings from Last 4 Encounters:   03/10/22 84.5 kg (186 lb 4.8 oz)   22 84.3 kg (185 lb 13.6 oz)   05/10/21 84.8 kg (187 lb)   20 84.8 kg (187 lb)     General: Normal appearing. No distress. Appropriately groomed.  HEENT: Normocephalic. Eyes conjunctiva clear lids without ptosis, pupils equal and reactive to light accommodation, ears normal shape and contour, canals are clear bilaterally, tympanic membranes are benign, nasal " mucosa benign, oropharynx is without erythema, edema or exudates.   Neck: Supple without JVD or bruit. Thyroid is not enlarged.   Pulmonary: Clear to ausculation.  Normal effort. No rales, ronchi, or wheezing.  Cardiovascular: Regular rate and rhythm without murmur. Carotid and radial pulses are intact and equal bilaterally.  Abdomen: Soft, nontender, nondistended. Normal bowel sounds. Liver and spleen are not palpable  Neurologic: Grossly nonfocal  Lymph: No cervical or supraclavicular lymph nodes are palpable  Skin: Warm and dry.  No obvious lesions.  Musculoskeletal: Normal gait. No extremity cyanosis, clubbing, + !+ pitting L LE edema a/w +marie's, cord noted at area of pain in L medial upper calf  Psych: Normal mood and affect. Alert and oriented x3. Judgment and insight is normal.      Assessment & Plan:   82 y.o. female with the following -  Stat u/s to repeat bc of persistent sxs and check ddimer, call to f/u next week if u/s and ddimer are neg and leg pain persists. Cont RICE  rec might change ice cream treat to a low sugar version eg sugar free fudgesicle or skinny cow.   1. Asymptomatic menopausal state  - DS-BONE DENSITY STUDY (DEXA); Future    2. Colon cancer screening  - Referral to GI for Colonoscopy    3. Moderate aortic stenosis    4. Ulcerative pancolitis without complication (HCC)    5. Generalized anxiety disorder    6. Stage 3a chronic kidney disease (HCC)    7. Uncomplicated asthma, unspecified asthma severity, unspecified whether persistent    8. History of 2019 novel coronavirus disease (COVID-19)    9. H/O gastric sleeve    10. Gastroesophageal reflux disease without esophagitis    11. Dyslipidemia    12. Essential hypertension    13. Fatigue, unspecified type    14. Obesity (BMI 30.0-34.9)    15. ROBYN (obstructive sleep apnea)    16. History of breast cancer    17. Leg edema, left  - D-DIMER; Future  - US-EXTREMITY VENOUS LOWER UNILAT LEFT; Future    18. Pain of left lower extremity  -  D-DIMER; Future  - US-EXTREMITY VENOUS LOWER UNILAT LEFT; Future    19. Homans sign present  - D-DIMER; Future  - US-EXTREMITY VENOUS LOWER UNILAT LEFT; Future    20. Advance directive discussed with patient    Other orders  - ASPIRIN 81 PO  - Multiple Vitamins-Minerals (PRESERVISION AREDS 2+MULTI VIT PO)  - diclofenac sodium (VOLTAREN) 1 % Gel  - mesalamine (LIALDA) 1.2 GM Tablet Delayed Response; Take 1.2 g by mouth every day.  - pantoprazole (PROTONIX) 20 MG tablet; 1 tablet  - ibuprofen (MOTRIN) 200 MG Tab; Take 200 mg by mouth every 6 hours as needed.       Return in about 6 months (around 9/10/2022) for med review.    Please note that this dictation was created using voice recognition software. I have made every reasonable attempt to correct obvious errors, but I expect that there are errors of grammar and possibly content that I did not discover before finalizing the note.

## 2022-03-10 NOTE — PROGRESS NOTES
Annual Health Assessment Questions:    1.  Are you currently engaging in any exercise or physical activity? Yes Volunteer work,     2.  How would you describe your mood or emotional well-being today? fair    3.  Have you had any falls in the last year? No    4.  Have you noticed any problems with your balance or had difficulty walking? Yes slight    5.  In the last six months have you experienced any leakage of urine? No    6. DPA/Advanced Directive: Patient has Advanced Directive on file.

## 2022-03-10 NOTE — PROGRESS NOTES
Pt was seated, confirmed pt name and , procedure explained to pt, venipuncture performed in LAC. using aseptic technique, 2 blue tubes collected, gauze placed with pressure on venipuncture site until hemostasis observed, site clean and dry, no redness or swelling observed, bandage placed, pt tolerated well and voiced no concerns.

## 2022-03-10 NOTE — PROCEDURE: COUNSELING
Detail Level: Zone
Detail Level: Detailed
Patient Specific Counseling (Will Not Stick From Patient To Patient): Recommended V05 hot oil treatment and Scalpicin.

## 2022-03-11 ENCOUNTER — HOSPITAL ENCOUNTER (OUTPATIENT)
Dept: RADIOLOGY | Facility: MEDICAL CENTER | Age: 82
End: 2022-03-11
Attending: FAMILY MEDICINE
Payer: MEDICARE

## 2022-03-11 DIAGNOSIS — R09.89 HOMANS SIGN PRESENT: ICD-10-CM

## 2022-03-11 DIAGNOSIS — R60.0 LEG EDEMA, LEFT: ICD-10-CM

## 2022-03-11 DIAGNOSIS — M79.605 PAIN OF LEFT LOWER EXTREMITY: ICD-10-CM

## 2022-03-11 PROCEDURE — 93971 EXTREMITY STUDY: CPT | Mod: LT

## 2022-03-11 PROCEDURE — 93971 EXTREMITY STUDY: CPT | Mod: 26,LT | Performed by: INTERNAL MEDICINE

## 2022-03-23 ENCOUNTER — HOSPITAL ENCOUNTER (OUTPATIENT)
Dept: RADIOLOGY | Facility: MEDICAL CENTER | Age: 82
End: 2022-03-23
Attending: FAMILY MEDICINE
Payer: MEDICARE

## 2022-03-23 DIAGNOSIS — N95.8 OTHER SPECIFIED MENOPAUSAL AND PERIMENOPAUSAL DISORDERS: ICD-10-CM

## 2022-03-23 PROCEDURE — 77080 DXA BONE DENSITY AXIAL: CPT

## 2022-03-24 ENCOUNTER — HOSPITAL ENCOUNTER (OUTPATIENT)
Dept: RADIOLOGY | Facility: MEDICAL CENTER | Age: 82
End: 2022-03-24
Attending: FAMILY MEDICINE
Payer: MEDICARE

## 2022-03-24 DIAGNOSIS — Z12.31 VISIT FOR SCREENING MAMMOGRAM: ICD-10-CM

## 2022-03-24 PROCEDURE — 77063 BREAST TOMOSYNTHESIS BI: CPT

## 2022-05-04 ENCOUNTER — TELEPHONE (OUTPATIENT)
Dept: HEALTH INFORMATION MANAGEMENT | Facility: OTHER | Age: 82
End: 2022-05-04
Payer: MEDICARE

## 2022-05-25 ENCOUNTER — OFFICE VISIT (OUTPATIENT)
Dept: MEDICAL GROUP | Facility: PHYSICIAN GROUP | Age: 82
End: 2022-05-25
Payer: MEDICARE

## 2022-05-25 VITALS
DIASTOLIC BLOOD PRESSURE: 60 MMHG | SYSTOLIC BLOOD PRESSURE: 112 MMHG | BODY MASS INDEX: 30.56 KG/M2 | HEART RATE: 63 BPM | RESPIRATION RATE: 16 BRPM | HEIGHT: 64 IN | TEMPERATURE: 97.1 F | OXYGEN SATURATION: 92 % | WEIGHT: 179 LBS

## 2022-05-25 DIAGNOSIS — G89.29 CHRONIC LOW BACK PAIN WITHOUT SCIATICA, UNSPECIFIED BACK PAIN LATERALITY: ICD-10-CM

## 2022-05-25 DIAGNOSIS — R05.9 COUGH: ICD-10-CM

## 2022-05-25 DIAGNOSIS — G47.33 OSA (OBSTRUCTIVE SLEEP APNEA): ICD-10-CM

## 2022-05-25 DIAGNOSIS — K51.00 ULCERATIVE PANCOLITIS WITHOUT COMPLICATION (HCC): ICD-10-CM

## 2022-05-25 DIAGNOSIS — M54.50 CHRONIC LOW BACK PAIN WITHOUT SCIATICA, UNSPECIFIED BACK PAIN LATERALITY: ICD-10-CM

## 2022-05-25 DIAGNOSIS — Z12.11 COLON CANCER SCREENING: ICD-10-CM

## 2022-05-25 PROBLEM — F39 MOOD DISORDER (HCC): Status: ACTIVE | Noted: 2022-05-25

## 2022-05-25 PROBLEM — N18.30 STAGE 3 CHRONIC KIDNEY DISEASE: Status: ACTIVE | Noted: 2018-01-10

## 2022-05-25 PROBLEM — M85.852 OSTEOPENIA OF LEFT THIGH: Status: ACTIVE | Noted: 2022-05-25

## 2022-05-25 PROBLEM — N18.30 STAGE 3 CHRONIC KIDNEY DISEASE: Status: RESOLVED | Noted: 2018-01-10 | Resolved: 2022-05-25

## 2022-05-25 PROBLEM — R73.09 ELEVATED GLYCOHEMOGLOBIN: Status: ACTIVE | Noted: 2022-05-25

## 2022-05-25 PROBLEM — R20.0 NUMBNESS IN FEET: Status: ACTIVE | Noted: 2022-05-25

## 2022-05-25 PROCEDURE — 99213 OFFICE O/P EST LOW 20 MIN: CPT | Performed by: FAMILY MEDICINE

## 2022-05-25 ASSESSMENT — PATIENT HEALTH QUESTIONNAIRE - PHQ9: CLINICAL INTERPRETATION OF PHQ2 SCORE: 0

## 2022-05-25 ASSESSMENT — FIBROSIS 4 INDEX: FIB4 SCORE: 1.88

## 2022-05-25 NOTE — PROGRESS NOTES
"Subjective:     CC:   Chief Complaint   Patient presents with   • Numbness     In feet, usually at night.  Going on for about a year.         HPI:   Erin presents today with :  She last saw me March 10 to establish, since then she had a bone DEXA scan March 23 which showed a T score of -1.0 on her proximal left femur and -0.5 in her lumbar spine, she takes daily vitamin D, drinks milk w/ cereal in the am and drinks almond milk.  She had a normal mammogram.  I had also ordered a left leg ultrasound which was negative for DVT.  She is not due for her routine labs until February.  05/25/22 states she was tx'd w/ an abx for a pneumonia (wasn't admitted) in 1/22 and ever since has had a persistent annoying cough w/ mostly clear and sometimes yellow sputum. Her 1/20/22 cxr showed a LLL patchy opacity, the f/u 2/4 was neg other than atelectasis.  She can't get an appt w/ GI for her f/u cscope.   Uses advil pm to sleep, gfr 50, 51 over past year.   Notes mild numbness in feet at bedtime.   Problem   Chronic Lower Back Pain    Triggered by planting flowers and other activities. Had a negative w/u for her hips about a year ago. The back has bothered her \"forever.\" will refer PT.      Scott (Obstructive Sleep Apnea)    No longer able to use CPAP, can't tolerate it. Has appt later in 3/22 w/ pulm/sleep for this as well. Wants to consider inspire device.          Current Outpatient Medications Ordered in Epic   Medication Sig Dispense Refill   • melatonin 5 mg Tab Take  by mouth at bedtime.     • Guaifenesin 400 MG Tab Take 1 Tablet by mouth 1 time a day as needed.     • Multiple Vitamins-Minerals (PRESERVISION AREDS 2+MULTI VIT PO) 1 Tablet every day.     • diclofenac sodium (VOLTAREN) 1 % Gel      • mesalamine (LIALDA) 1.2 GM Tablet Delayed Response Take 1.2 g by mouth every day.     • pantoprazole (PROTONIX) 20 MG tablet 1 tablet     • cyanocobalamin (VITAMIN B-12) 100 MCG Tab Take 100 mcg by mouth every day.     • SYMBICORT " 80-4.5 MCG/ACT Aerosol INHALE 2 PUFFS ORALLY TWICE A DAY.USE SPACER AND RINSE MOUTH AFTER EACH USE 1 Inhaler 5   • Cholecalciferol (VITAMIN D3) 59347 UNIT Cap Take  by mouth.     • spironolactone (ALDACTONE) 50 MG Tab Take 1 Tab by mouth every day. 90 Tab 3   • valsartan-hydrochlorothiazide (DIOVAN-HCT) 160-12.5 MG per tablet Take 0.5 Tabs by mouth 2 Times a Day. (Patient taking differently: Take 0.5 Tablets by mouth every day.) 90 Tab 3   • fluticasone (FLONASE) 50 MCG/ACT nasal spray Spray 1-2 Sprays in nose every day. Each nostril. 1 Bottle 6   • paroxetine (PAXIL) 10 MG Tab Take 10 mg by mouth every day.     • rosuvastatin (CRESTOR) 20 MG Tab Take 1 Tab by mouth every evening. 30 Tab 11   • anastrozole (ARIMIDEX) 1 MG Tab Take 1 mg by mouth every morning.       No current [facility]-ordered facility-administered medications on file.       Past Medical History:   Diagnosis Date   • Arthritis    • Asthma    • Breast cancer (HCC)    • Bronchitis    • Cancer (HCC) 2012    right breast   • Dizziness 3/11/2013   • Heart burn    • Heart murmur    • Hiatus hernia syndrome    • HTN (hypertension) 3/12/2013   • Hypercholesterolemia    • Hyperlipidemia 3/12/2013   • Hypertension    • Hypokalemia 3/12/2013   • Indigestion    • Mitral annular calcification    • Pain     back, hips, knees   • Psychiatric disorder     depression   • Sleep apnea     h/o gastric sleeve lost 40 lb now not on CPAP   • Snoring    • ULCERATIVE COLITIS 3/12/2013   • Vertigo 3/11/2013       Social History     Tobacco Use   • Smoking status: Former Smoker     Packs/day: 1.00     Years: 20.00     Pack years: 20.00     Types: Cigarettes     Quit date: 1986     Years since quittin.0   • Smokeless tobacco: Never Used   Vaping Use   • Vaping Use: Never used   Substance Use Topics   • Alcohol use: Yes     Alcohol/week: 4.2 oz     Types: 7 Standard drinks or equivalent per week     Comment: 1 drink a night   • Drug use: No       Allergies:  Sulfa  "drugs, Vancomycin, and Codeine    Health Maintenance: Completed    ROS:  Per HPI      Objective:       Exam:  /60 (BP Location: Right arm, Patient Position: Sitting, BP Cuff Size: Adult)   Pulse 63   Temp 36.2 °C (97.1 °F) (Temporal)   Resp 16   Ht 1.613 m (5' 3.5\")   Wt 81.2 kg (179 lb)   SpO2 92%   BMI 31.21 kg/m²   Body mass index is 31.21 kg/m².  Wt Readings from Last 4 Encounters:   05/25/22 81.2 kg (179 lb)   05/25/22 81.1 kg (178 lb 14.4 oz)   03/10/22 84.5 kg (186 lb 4.8 oz)   03/07/22 84.3 kg (185 lb 13.6 oz)       Gen: Alert and oriented, No apparent distress. Appropriately groomed.  Neck: Neck is supple without lymphadenopathy.No thyromegaly.   Lungs: Normal effort, CTA bilaterally, no wheezes, rhonchi, or rales.  CV: Regular rate and rhythm. No  rubs, or gallops. + sys murmur 2/6  ABD:  Soft, nontender, nondistended, NABSx4, no HSM or RBT or guarding or masses.  Ext: No clubbing, cyanosis, edema.  Skin: No rash noted.  Monofilament testing with a 10 gram force: sensation intact: intact bilaterally  Visual Inspection: Feet without maceration, ulcers, fissures.  Pedal pulses: intact bilaterally      Labs:     Assessment & Plan:     82 y.o. female with the following -   Change advil pm to tylenol and melatonin for sleep  Plans to see onc soon and will d/w him re: continuing the arimidex, has been on for about 15y.  Can stop asa 81, no h/o mi/cva  Sees dr avelar for mod aortic stenosis  Sees pulm 7/19/22  1. Cough  - DX-CHEST-2 VIEWS; Future    2. Colon cancer screening  - Referral to GI for Colonoscopy    3. Ulcerative pancolitis without complication (HCC)  - Referral to GI for Colonoscopy    4. Chronic low back pain without sciatica, unspecified back pain laterality  - Referral to Physical Therapy    5. ROBYN (obstructive sleep apnea)  - Referral to ENT        I spent a total of 21 minutes with record review, exam, communication with the patient, communication with other providers, and " documentation of this encounter.      No follow-ups on file.    Please note that this dictation was created using voice recognition software. I have made every reasonable attempt to correct obvious errors, but I expect that there are errors of grammar and possibly content that I did not discover before finalizing the note.

## 2022-07-18 ENCOUNTER — HOSPITAL ENCOUNTER (OUTPATIENT)
Dept: RADIOLOGY | Facility: MEDICAL CENTER | Age: 82
End: 2022-07-18
Attending: FAMILY MEDICINE
Payer: MEDICARE

## 2022-07-18 DIAGNOSIS — R05.9 COUGH: ICD-10-CM

## 2022-07-18 PROCEDURE — 71046 X-RAY EXAM CHEST 2 VIEWS: CPT

## 2022-07-19 ENCOUNTER — APPOINTMENT (OUTPATIENT)
Dept: SLEEP MEDICINE | Facility: MEDICAL CENTER | Age: 82
End: 2022-07-19
Payer: MEDICARE

## 2022-07-27 ENCOUNTER — PHYSICAL THERAPY (OUTPATIENT)
Dept: PHYSICAL THERAPY | Facility: MEDICAL CENTER | Age: 82
End: 2022-07-27
Attending: FAMILY MEDICINE
Payer: MEDICARE

## 2022-07-27 DIAGNOSIS — G89.29 CHRONIC LOW BACK PAIN WITHOUT SCIATICA, UNSPECIFIED BACK PAIN LATERALITY: ICD-10-CM

## 2022-07-27 DIAGNOSIS — M54.50 CHRONIC LOW BACK PAIN WITHOUT SCIATICA, UNSPECIFIED BACK PAIN LATERALITY: ICD-10-CM

## 2022-07-27 DIAGNOSIS — M25.559 HIP PAIN: ICD-10-CM

## 2022-07-27 PROCEDURE — 97162 PT EVAL MOD COMPLEX 30 MIN: CPT

## 2022-07-27 ASSESSMENT — ENCOUNTER SYMPTOMS
PAIN LOCATION: MIDLINE LOW BACK
PAIN SCALE: 5
PAIN SCALE AT HIGHEST: 9
AWAKENINGS PER NIGHT: 3

## 2022-07-27 NOTE — OP THERAPY EVALUATION
Outpatient Physical Therapy  INITIAL EVALUATION    [facility] Outpatient Physical Therapy  [address]  [place] [address]  Phone:  [phone]  Fax:  [phone]    Date of Evaluation: 2022    Patient: Erin Hess  YOB: 1940  MRN: [id number]     Referring Provider: MAYA Valadez   [address]  [place] [address]   Referring Diagnosis Chronic low back pain without sciatica, unspecified back pain laterality [M54.50, G89.29]     Time Calculation                 Chief Complaint: No chief complaint on file.    Visit Diagnoses     ICD-10-CM   1. Chronic low back pain without sciatica, unspecified back pain laterality  M54.50    G89.29       Date of onset of impairment: No data found    Subjective:   History of Present Illness:     Mechanism of injury:  For the past couple of years, patient reports she is having intermittent low back with turning in bed and low back (without radicular symptoms) and lateral hip pain with walking around grocery store (especially [employer]). She also reports feeling unsteady and leg weakness with walking on uneven ground. She denies history of falls. She has distant history of similar back pain but it was when she was doing heavier activity as a home . She reports using Voltarin and Tylenol for back pain and they are helping. She also reports new onset of some neck pain, however, low back and hip are her primary concerns.    Other medical history - early stage renal failure, frequent coughing since COVID, asthma, R knee pain, vertigo, HTN, hyperlipidemia, depression, sleep apnea  Sleep disturbance:  Interrupted sleep  # Times/Night awakened:  3  Pain:     Current pain ratin    At worst pain ratin    Location:  Midline low back   Social Support:     Lives in:  One-story house    Lives with: lives with roomate   Hand dominance:  Right  Activities of Daily Living:     Patient reported ADL  status: Works at [employer] 1x/wk   urturn, [employer], does own house cleaning (vacuuming increases back pain)   Patient Goals:     Other patient goals:  Relieve pain, strengthen legs      Past Medical History:   Diagnosis Date   • Arthritis    • Asthma    • Breast cancer (HCC)    • Bronchitis    • Cancer (HCC) 2012    right breast   • Dizziness 3/11/2013   • Heart burn    • Heart murmur    • Hiatus hernia syndrome    • HTN (hypertension) 3/12/2013   • Hypercholesterolemia    • Hyperlipidemia 3/12/2013   • Hypertension    • Hypokalemia 3/12/2013   • Indigestion    • Mitral annular calcification    • Pain     back, hips, knees   • Psychiatric disorder     depression   • Sleep apnea     h/o gastric sleeve lost 40 lb now not on CPAP   • Snoring    • ULCERATIVE COLITIS 3/12/2013   • Vertigo 3/11/2013     Past Surgical History:   Procedure Laterality Date   • GASTRIC SLEEVE LAPAROSCOPY N/A 2016    Procedure: GASTRIC SLEEVE LAPAROSCOPY, HIATAL HERNIA AND LIVER BIOPSY;  Surgeon: Mauricio Montes M.D.;  Location: SURGERY [facility];  Service:    • US-NEEDLE CORE BX-BREAST PANEL      rt breast, with lumpectomy    • GYN SURGERY      vag hyster    • GYN SURGERY      vaginal cyst removal    • LUMPECTOMY  left   • OTHER       R breast bx X 2& lipoma removal     Social History     Tobacco Use   • Smoking status: Former Smoker     Packs/day: 1.00     Years: 20.00     Pack years: 20.00     Types: Cigarettes     Quit date: 1986     Years since quittin.2   • Smokeless tobacco: Never Used   Substance Use Topics   • Alcohol use: Yes     Alcohol/week: 4.2 oz     Types: 7 Standard drinks or equivalent per week     Comment: 1 drink a night     Family and Occupational History     Socioeconomic History   • Marital status:      Spouse name: Not on file   • Number of children: Not on file   • Years of education: Not on file   • Highest education level: Not on file   Occupational History   • Not on  file       Objective     Static Posture     Head  Forward.    Shoulders  Rounded.    Thoracic Spine  Hyperkyphosis.    Lumbar Spine   Increased lordosis.     Hip Screen   Hip range of motion within functional limits.  Hip strength within functional limits with the following exceptions: R hip abduction 4-/5  L hip abduction 4-/5    Decreased hip extension strength evidenced by decreased height with bridges (5 reps)    Active Range of Motion     Lumbar   Flexion: within functional limits  Extension: within functional limits  Left lateral flexion: within functional limits  Right lateral flexion: decreased (pain)  Left rotation: within functional limits  Right rotation: decreased (pain)    Tests     Left Hip   Negative KD Arana: Positive.   SLR: Negative.     Right Hip   Positive KD Arana: Positive.   SLR: Negative.       Exercises/Treatment  Time-based treatments/modalities:           Assessment, Response and Plan:   Impairments: abnormal or restricted ROM, impaired physical strength and pain with function    Assessment details:  Patient is an 82 year old female who presents to physical therapy with low back and bilat hip pain impacting function (RMQ score 29.17). She demonstrates impaired posture, trunk mobility, and lower body strength. She would benefit from skilled therapy to address impairments and restore function (restful sleep, walking community distances, gardening, house cleaning).   Barriers to therapy:  None  Prognosis: good    Goals:   Short Term Goals:   Patient will be able to sleep with < 1 disruption due to low back pain.   Short term goal time span:  2-4 weeks      Long Term Goals:    Patient will be able to walk for 15 minutes with < 2/10 bilateral hip pain.  Patient will be able to perform house cleaning with < 2/10 low back pain.   Pt will have a sig improvement in RMQ >5 pts or 30% from baseline (29.17)    Long term goal time span:  6-8 weeks    Plan:   Therapy options:  Physical  therapy treatment to continue  Planned therapy interventions:  Neuromuscular Re-education (CPT 95685), Manual Therapy (CPT 01837), Therapeutic Exercise (CPT 96388), Therapeutic Activities (CPT 15918) and Mechanical Traction (CPT 50097)  Frequency:  2x week  Duration in weeks:  5  Duration in visits:  10  Discussed with:  Patient  Plan details:  Precautions: sleep apnea, persistent cough, occasional R knee pain       Functional Assessment Used  Elmer Klaus Low Back Pain and Disability Score: 29.17     Referring provider co-signature:  I have reviewed this plan of care and my co-signature certifies the need for services.    Certification Period: 07/27/2022 to  10/26/22    Physician Signature: ________________________________ Date: ______________

## 2022-08-03 ENCOUNTER — HOSPITAL ENCOUNTER (OUTPATIENT)
Facility: MEDICAL CENTER | Age: 82
End: 2022-08-03
Attending: PHYSICIAN ASSISTANT
Payer: MEDICARE

## 2022-08-03 ENCOUNTER — OFFICE VISIT (OUTPATIENT)
Dept: URGENT CARE | Facility: CLINIC | Age: 82
End: 2022-08-03
Payer: MEDICARE

## 2022-08-03 ENCOUNTER — PHYSICAL THERAPY (OUTPATIENT)
Dept: PHYSICAL THERAPY | Facility: MEDICAL CENTER | Age: 82
End: 2022-08-03
Attending: FAMILY MEDICINE
Payer: MEDICARE

## 2022-08-03 VITALS
HEIGHT: 65 IN | BODY MASS INDEX: 29.99 KG/M2 | WEIGHT: 180 LBS | SYSTOLIC BLOOD PRESSURE: 110 MMHG | HEART RATE: 76 BPM | OXYGEN SATURATION: 98 % | RESPIRATION RATE: 16 BRPM | DIASTOLIC BLOOD PRESSURE: 72 MMHG | TEMPERATURE: 98.2 F

## 2022-08-03 DIAGNOSIS — G89.29 CHRONIC LOW BACK PAIN WITHOUT SCIATICA, UNSPECIFIED BACK PAIN LATERALITY: ICD-10-CM

## 2022-08-03 DIAGNOSIS — R10.2 PELVIC PAIN: ICD-10-CM

## 2022-08-03 DIAGNOSIS — M54.6 ACUTE LEFT-SIDED THORACIC BACK PAIN: ICD-10-CM

## 2022-08-03 DIAGNOSIS — M25.559 HIP PAIN: ICD-10-CM

## 2022-08-03 DIAGNOSIS — M54.50 CHRONIC LOW BACK PAIN WITHOUT SCIATICA, UNSPECIFIED BACK PAIN LATERALITY: ICD-10-CM

## 2022-08-03 LAB
APPEARANCE UR: CLEAR
BILIRUB UR STRIP-MCNC: NEGATIVE MG/DL
COLOR UR AUTO: NORMAL
GLUCOSE UR STRIP.AUTO-MCNC: NEGATIVE MG/DL
KETONES UR STRIP.AUTO-MCNC: NEGATIVE MG/DL
LEUKOCYTE ESTERASE UR QL STRIP.AUTO: NEGATIVE
NITRITE UR QL STRIP.AUTO: NEGATIVE
PH UR STRIP.AUTO: 5 [PH] (ref 5–8)
PROT UR QL STRIP: NEGATIVE MG/DL
RBC UR QL AUTO: NEGATIVE
SP GR UR STRIP.AUTO: 1.02
UROBILINOGEN UR STRIP-MCNC: 0.2 MG/DL

## 2022-08-03 PROCEDURE — 87086 URINE CULTURE/COLONY COUNT: CPT

## 2022-08-03 PROCEDURE — 99214 OFFICE O/P EST MOD 30 MIN: CPT | Performed by: PHYSICIAN ASSISTANT

## 2022-08-03 PROCEDURE — 97110 THERAPEUTIC EXERCISES: CPT

## 2022-08-03 PROCEDURE — 81002 URINALYSIS NONAUTO W/O SCOPE: CPT | Performed by: PHYSICIAN ASSISTANT

## 2022-08-03 PROCEDURE — 97140 MANUAL THERAPY 1/> REGIONS: CPT

## 2022-08-03 ASSESSMENT — ENCOUNTER SYMPTOMS
CONSTIPATION: 0
FEVER: 0
DIARRHEA: 0
VOMITING: 0
NAUSEA: 0
BLOOD IN STOOL: 0
CHILLS: 0
FLANK PAIN: 1

## 2022-08-03 ASSESSMENT — FIBROSIS 4 INDEX: FIB4 SCORE: 1.88

## 2022-08-03 NOTE — PROGRESS NOTES
Subjective:   Erin Hess is a 82 y.o. female who presents for Flank Pain (Started yesterday )        Patient presents with concerns of left-sided flank pain that began yesterday.  Symptoms were gradual in onset.  Pain is aching and constant.  Does not radiate.  Patient states that she had pain in this area with a prior kidney infection.  She denies urinary urgency, urinary frequency, pain with urination, hematuria, malaise, fatigue, nausea, vomiting, fevers, chills.  She endorses some mild left-sided pelvic pain.  Pain does not radiate.  No aggravating or alleviating factors.  Symptoms are not worsening but they are also not improving.  She does have history of chronic low back pain and is beginning physical therapy this afternoon to address this.  States that she was recently diagnosed with decreased kidney functions and is quite concerned about this.  She has been trying to decrease her alcohol intake and has been drinking more water.  Denies history of diverticulitis.  Denies history of kidney stones.  She does have history of colitis and lately this has been well controlled.  She is unsure if she has history of diverticulosis.  Has colonoscopy scheduled for September.  Past surgical history significant for hysterectomy.    Review of Systems   Constitutional: Negative for chills, fever and malaise/fatigue.   Gastrointestinal: Negative for blood in stool, constipation, diarrhea, melena, nausea and vomiting.   Genitourinary: Positive for flank pain. Negative for dysuria, frequency, hematuria and urgency.       PMH:  has a past medical history of Arthritis, Asthma, Breast cancer (HCC), Bronchitis (2011), Cancer (HCC) (12/2012), Dizziness (3/11/2013), Heart burn, Heart murmur, Hiatus hernia syndrome, HTN (hypertension) (3/12/2013), Hypercholesterolemia, Hyperlipidemia (3/12/2013), Hypertension, Hypokalemia (3/12/2013), Indigestion, Mitral annular calcification, Pain, Psychiatric disorder, Sleep apnea, Snoring,  ULCERATIVE COLITIS (3/12/2013), and Vertigo (3/11/2013).  MEDS:   Current Outpatient Medications:   •  melatonin 5 mg Tab, Take  by mouth at bedtime., Disp: , Rfl:   •  Guaifenesin 400 MG Tab, Take 1 Tablet by mouth 1 time a day as needed., Disp: , Rfl:   •  Multiple Vitamins-Minerals (PRESERVISION AREDS 2+MULTI VIT PO), 1 Tablet every day., Disp: , Rfl:   •  diclofenac sodium (VOLTAREN) 1 % Gel, , Disp: , Rfl:   •  mesalamine (LIALDA) 1.2 GM Tablet Delayed Response, Take 1.2 g by mouth every day., Disp: , Rfl:   •  pantoprazole (PROTONIX) 20 MG tablet, 1 tablet, Disp: , Rfl:   •  cyanocobalamin (VITAMIN B-12) 100 MCG Tab, Take 100 mcg by mouth every day., Disp: , Rfl:   •  SYMBICORT 80-4.5 MCG/ACT Aerosol, INHALE 2 PUFFS ORALLY TWICE A DAY.USE SPACER AND RINSE MOUTH AFTER EACH USE, Disp: 1 Inhaler, Rfl: 5  •  Cholecalciferol (VITAMIN D3) 45222 UNIT Cap, Take  by mouth., Disp: , Rfl:   •  spironolactone (ALDACTONE) 50 MG Tab, Take 1 Tab by mouth every day., Disp: 90 Tab, Rfl: 3  •  valsartan-hydrochlorothiazide (DIOVAN-HCT) 160-12.5 MG per tablet, Take 0.5 Tabs by mouth 2 Times a Day. (Patient taking differently: Take 0.5 Tablets by mouth every day.), Disp: 90 Tab, Rfl: 3  •  fluticasone (FLONASE) 50 MCG/ACT nasal spray, Spray 1-2 Sprays in nose every day. Each nostril., Disp: 1 Bottle, Rfl: 6  •  paroxetine (PAXIL) 10 MG Tab, Take 10 mg by mouth every day., Disp: , Rfl:   •  rosuvastatin (CRESTOR) 20 MG Tab, Take 1 Tab by mouth every evening., Disp: 30 Tab, Rfl: 11  •  anastrozole (ARIMIDEX) 1 MG Tab, Take 1 mg by mouth every morning., Disp: , Rfl:   ALLERGIES:   Allergies   Allergen Reactions   • Sulfa Drugs Rash and Swelling     Rash, joint swelling  ADS=[id number]   • Vancomycin Itching   • Codeine Vomiting and Nausea     SURGHX:   Past Surgical History:   Procedure Laterality Date   • GASTRIC SLEEVE LAPAROSCOPY N/A 5/18/2016    Procedure: GASTRIC SLEEVE LAPAROSCOPY, HIATAL HERNIA AND LIVER BIOPSY;  Surgeon: Mauricio DELACRUZ  "MAYA Montes;  Location: SURGERY [facility];  Service:    • US-NEEDLE CORE BX-BREAST PANEL  2013    rt breast, with lumpectomy    • GYN SURGERY      vag hyster 1990   • GYN SURGERY      vaginal cyst removal    • LUMPECTOMY  left   • OTHER       R breast bx X 2& lipoma removal     SOCHX:  reports that she quit smoking about 36 years ago. Her smoking use included cigarettes. She has a 20.00 pack-year smoking history. She has never used smokeless tobacco. She reports current alcohol use of about 4.2 oz of alcohol per week. She reports that she does not use drugs.  FH: Family history was reviewed, no pertinent findings to report   Objective:   /72   Pulse 76   Temp 36.8 °C (98.2 °F) (Temporal)   Resp 16   Ht 1.638 m (5' 4.5\")   Wt 81.6 kg (180 lb)   SpO2 98%   BMI 30.42 kg/m²   Physical Exam  Vitals reviewed.   Constitutional:       General: She is not in acute distress.     Appearance: Normal appearance. She is well-developed. She is not toxic-appearing.   HENT:      Head: Normocephalic and atraumatic.      Right Ear: External ear normal.      Left Ear: External ear normal.      Nose: Nose normal.   Cardiovascular:      Rate and Rhythm: Normal rate and regular rhythm.   Pulmonary:      Effort: Pulmonary effort is normal. No respiratory distress.      Breath sounds: No stridor.   Abdominal:          Comments: Minimally tender in the left pelvic region.  No rebound tenderness, no guarding.  Otherwise abdomen is soft and nontender to palpation.  There is no CVA tenderness bilaterally.   Musculoskeletal:        Back:       Comments: Patient mildly tender to palpation along left thoracic paraspinal muscles.  No midline tenderness with palpation.  No step-offs or deformities.   Skin:     General: Skin is dry.   Neurological:      Comments: Alert and oriented.    Psychiatric:         Speech: Speech normal.         Behavior: Behavior normal.               Assessment/Plan:   1. Acute left-sided thoracic back " pain  - POCT Urinalysis  - URINE CULTURE(NEW); Future    2. Pelvic pain    1. UA within normal limits and patient is not experiencing any urinary symptoms.  Additionally patient's pain seems to be more along the thoracic spine as opposed to the CVA area.  Clinical suspicion for UTI, pyelonephritis, or nephrolithiasis low at this time. As a precaution I will send urine for cx.    I'm not convinced that back pain is related to pelvic pain, but consideration discussed.  Back symptoms more consistent with musculoskeletal etiology.  I recommend that we have patient follow-up with physical therapy as scheduled this afternoon to have this further evaluated and treated.  However if she develops any new or worsening symptoms I would like her to see her PCP or return to clinic immediately for reevaluation.    2.  Patient records reviewed.  Prior imaging demonstrates the patient does have diverticulosis.  Sx today could possibly represent early diverticulitis or other intra-abdominal pathological process that has not yet fully manifested.  Differential and etiology and disease course discussed.      Patient well-appearing vital signs are stable.  Minimally tender to palpation on exam today.  Discussed obtaining labs and imaging to further evaluate versus watchful waiting.  Through shared decision making patient decided to closely monitor her symptoms and agrees to return to clinic or see her PCP immediately if symptoms persist for another 48 hours, symptoms worsen, or new symptoms develop.  Recommend patient avoid consuming nuts and seeds.  If patient develops severe pain, fevers, nausea/vomiting or other severe and concerning symptoms I would like her to go to the emergency room for reevaluation.    Differential diagnosis, natural history, supportive care, and indications for immediate follow-up discussed.    My total time spent caring for the patient on the day of the encounter was 30 minutes.   This does not include time  spent on separately billable procedures/tests.

## 2022-08-03 NOTE — OP THERAPY DAILY TREATMENT
Outpatient Physical Therapy  DAILY TREATMENT     [facility] Outpatient Physical Therapy  [address]  [place] [address]  Phone:  [phone]  Fax:  [phone]    Date: 08/03/2022    Patient: Erin Hess  YOB: 1940  MRN: [id number]     Time Calculation    Start time: 1500  Stop time: 1545 Time Calculation (min): 45 minutes         Chief Complaint: Back Problem and Hip Problem    Visit #: 2    SUBJECTIVE: Patient reports she went to urgent care earlier due to L low back pain. She was concerned it was kidney problem.     OBJECTIVE:  Current objective measures: NA          Therapeutic Exercises (CPT 83641):     1. NuStep S9/A10, 5 min, L3 (2 min), L1 3 min , bilat knee pain with inc resistance    3. Pelvic tilts, 10x    4. SKTC stretch, 3 x 30 sec B, mild ant hip pain on R    5. DKTC w/ gym ball , 15x    6. LTR , 10x    7. Standing trunk flexion stretch at counter, 3 x 20 sec       Therapeutic Exercise Summary: HEP: SKTC stretch 3 x 30 sec daily          , trunk flexion stretch at counter 3 x 20 sec daily    Therapeutic Treatments and Modalities:     1. Manual Therapy (CPT 89847)    Therapeutic Treatment and Modalities Summary: STW L lumbar paraspinals  Gr II UPAs L L2-L5 10x ea    Time-based treatments/modalities:    Physical Therapy Timed Treatment Charges  Manual therapy minutes (CPT 94478): 15 minutes  Therapeutic exercise minutes (CPT 01962): 30 minutes    ASSESSMENT:   Response to treatment: Decreased L low back soreness with manual and trunk flexion stretching. Added trunk flexion stretching exercises to HEP. Initiate core stability exs next visit     PLAN/RECOMMENDATIONS:   Plan for treatment: therapy treatment to continue next visit.  Planned interventions for next visit: continue with current treatment.

## 2022-08-06 LAB
BACTERIA UR CULT: NORMAL
SIGNIFICANT IND 70042: NORMAL
SITE SITE: NORMAL
SOURCE SOURCE: NORMAL

## 2022-08-10 ENCOUNTER — PHYSICAL THERAPY (OUTPATIENT)
Dept: PHYSICAL THERAPY | Facility: MEDICAL CENTER | Age: 82
End: 2022-08-10
Attending: FAMILY MEDICINE
Payer: MEDICARE

## 2022-08-10 DIAGNOSIS — G89.29 CHRONIC LOW BACK PAIN WITHOUT SCIATICA, UNSPECIFIED BACK PAIN LATERALITY: ICD-10-CM

## 2022-08-10 DIAGNOSIS — M54.50 CHRONIC LOW BACK PAIN WITHOUT SCIATICA, UNSPECIFIED BACK PAIN LATERALITY: ICD-10-CM

## 2022-08-10 DIAGNOSIS — M25.559 HIP PAIN: ICD-10-CM

## 2022-08-10 PROCEDURE — 97110 THERAPEUTIC EXERCISES: CPT

## 2022-08-10 PROCEDURE — 97140 MANUAL THERAPY 1/> REGIONS: CPT

## 2022-08-10 NOTE — OP THERAPY DAILY TREATMENT
Outpatient Physical Therapy  DAILY TREATMENT     [facility]  [address]  [place] [address]  Phone:  [phone]  Fax:  [phone]    Date: 08/10/2022    Patient: Erin Hess  YOB: 1940  MRN: [id number]     Time Calculation    Start time: 1503  Stop time: 1545 Time Calculation (min): 42 minutes         Chief Complaint: Back Problem and Hip Problem    Visit #: 3    SUBJECTIVE: Patient reports she's been having intermittent restless leg symptoms. She reports less back pain with turning in bed, has some R lateral hip pain with sleeping R side    OBJECTIVE:  Current objective measures: NA          Therapeutic Exercises (CPT 26202):     1. NuStep S9/A10, 6 min, L5    3. pelvic tilts, 10x    4. SKTC stretch, 3 x 30 sec B, mild ant hip pain on R    5. DKTC w/ gym ball , 15x    6. LTR , 2 x 10    7. lat step, 2 x 10    8. supine hamstring stretch, 1 min B    9. bridges, 2x 5    10. side lying hip abduction, 5x (R), compensates with hip flexion, disco'ed      Therapeutic Exercise Summary: HEP: SKTC stretch 3 x 30 sec daily, trunk flexion stretch at counter 3 x 20 sec daily  Access Code: [email or web address]  URL: [email or web address]  Date: 08/10/2022  Prepared by: Bhumi Taiwanese    Exercises  Sideways Walking - 1 x daily - 3 sets - 10 reps  Hooklying Hamstring Stretch with Strap - 1 x daily - 1-2 minute hold      Therapeutic Treatments and Modalities:     1. Manual Therapy (CPT 57947)    Therapeutic Treatment and Modalities Summary: STW R lumbar paraspinals  Gr II R UPAs L L4-L5, CPA L4-L5  Time-based treatments/modalities:    Physical Therapy Timed Treatment Charges  Manual therapy minutes (CPT 86701): 10 minutes  Therapeutic exercise minutes (CPT 18046): 33 minutes        ASSESSMENT:   Response to treatment: TTP with UPAs @ L4-L5 segment, this decreased with manual. She demo's lateral hip weakness, added new exercises to address this.      PLAN/RECOMMENDATIONS:   Plan for treatment: therapy treatment to continue next visit.  Planned interventions for next visit: continue with current treatment.

## 2022-08-12 ENCOUNTER — OFFICE VISIT (OUTPATIENT)
Dept: SLEEP MEDICINE | Facility: MEDICAL CENTER | Age: 82
End: 2022-08-12
Payer: MEDICARE

## 2022-08-12 VITALS
HEIGHT: 65 IN | WEIGHT: 174 LBS | HEART RATE: 70 BPM | OXYGEN SATURATION: 93 % | DIASTOLIC BLOOD PRESSURE: 66 MMHG | SYSTOLIC BLOOD PRESSURE: 112 MMHG | BODY MASS INDEX: 28.99 KG/M2 | RESPIRATION RATE: 16 BRPM

## 2022-08-12 DIAGNOSIS — G47.33 OSA (OBSTRUCTIVE SLEEP APNEA): Primary | ICD-10-CM

## 2022-08-12 PROCEDURE — 99203 OFFICE O/P NEW LOW 30 MIN: CPT | Performed by: STUDENT IN AN ORGANIZED HEALTH CARE EDUCATION/TRAINING PROGRAM

## 2022-08-12 ASSESSMENT — FIBROSIS 4 INDEX: FIB4 SCORE: 1.88

## 2022-08-12 NOTE — PROGRESS NOTES
[facility] Consult Note     Date: 8/12/2022 / Time: 1:03 PM      Thank you for requesting a sleep medicine consultation on Erin Hess at the sleep center. Presents today with the chief complaints of wanting to discuss inspire therapy. She is referred by Yisel Zuñiga M.D.  [address] [address] for evaluation and treatment of obstructive sleep apnea.     HISTORY OF PRESENT ILLNESS:     Erin Hess is a 82 y.o. female with Hypertension, hyperlipidemia, CAD, moderate aortic stenosis, asthma, and moderate obstructive sleep apnea.  Presents to Sleep Clinic for discussion on ROBYN management.     She was diagnosed with obstructive sleep apnea over 15 to 20 years ago.  Following diagnosis she has used CPAP off and on but has had trouble tolerating it.  She last used CPAP approximately 4 to 5 years ago.  She was recently seen by ENT Dr. Yisel Zuñiga who suggested she repeat a sleep study to see if she qualifies for inspire device.    During the day she is sleepy at times.  She does have fatigue at times.  She does have some trouble with memory with forgetting things at times however she attributes this due to age.  She has experienced drowsiness no driving on long drives.  She has woken up gasping for air at times.  She does wake up with a dry mouth.    As per supplemental questionnaire to be scanned or imported into chart:      Sleep Schedule  Bedtime: Weekday & Weekend 830 PM lays in bed 11 pm  Wake time: Weekday & Weekend 730-830am  Sleep-onset latency: 10-15min   Awakenings from sleep: 2-3   Difficulty falling back asleep: 2-3 times a month  Bedroom partner: dog  Naps: No     DAYTIME SYMPTOMS:   Excessive daytime sleepiness: Yes  Daytime fatigue: Yes  Difficulty concentrating: No   Memory problems: Yes  Irritability:No   Work/school performance issues: No   Sleepiness with driving: Yes on long drives   Caffeine/stimulant use: Yes 2 cups   Alcohol use:Yes, How Many? 1 drink a  "day      SLEEP RELATED SYMPTOMS  Snoring: No   Witnessed apnea or gasping/choking: Yes gasping   Dry mouth or mouth breathing: Yes  Sweating: No   Teeth grinding/biting: Yes  Morning headaches: No   Refreshed Upon Awakening: Yes     SLEEP RELATED BEHAVIORS:  Parasomnias (walking, talking, eating, violence): No   Leg kicking: No   Restless legs - \"urge to move\": Yes  Nightmares: No  Recurrent: No   Dream enactment: No      NARCOLEPSY:  Cataplexy: No   Sleep paralysis: No   Sleep attacks: No   Hypnagogic/hypnopompic hallucinations: No     MEDICAL HISTORY  Past Medical History:   Diagnosis Date    Arthritis     Asthma     Breast cancer (HCC)     Bronchitis     Cancer (HCC) 2012    right breast    Dizziness 3/11/2013    Heart burn     Heart murmur     Hiatus hernia syndrome     HTN (hypertension) 3/12/2013    Hypercholesterolemia     Hyperlipidemia 3/12/2013    Hypertension     Hypokalemia 3/12/2013    Indigestion     Mitral annular calcification     Pain     back, hips, knees    Psychiatric disorder     depression    Sleep apnea     h/o gastric sleeve lost 40 lb now not on CPAP    Snoring     ULCERATIVE COLITIS 3/12/2013    Vertigo 3/11/2013        SURGICAL HISTORY  Past Surgical History:   Procedure Laterality Date    GASTRIC SLEEVE LAPAROSCOPY N/A 2016    Procedure: GASTRIC SLEEVE LAPAROSCOPY, HIATAL HERNIA AND LIVER BIOPSY;  Surgeon: Mauricio Montes M.D.;  Location: SURGERY [facility];  Service:     US-NEEDLE CORE BX-BREAST PANEL      rt breast, with lumpectomy     GYN SURGERY      vag hyster     GYN SURGERY      vaginal cyst removal     LUMPECTOMY  left    OTHER       R breast bx X 2& lipoma removal        FAMILY HISTORY  Family History   Problem Relation Age of Onset    Heart Disease Mother         CAD MI at age 72    Cancer Mother         breast cancer  at age 83    Cancer Sister     Lung Disease Father 75        Lung cancer    Cancer Maternal Grandmother         Pancreatic cancer    " Heart Attack Maternal Grandfather         MI at age 70    Heart Disease Sister         Nadya cfever- herat murmur       SOCIAL HISTORY  Social History     Socioeconomic History    Marital status:    Tobacco Use    Smoking status: Former     Packs/day: 1.00     Years: 20.00     Pack years: 20.00     Types: Cigarettes     Quit date: 1986     Years since quittin.2    Smokeless tobacco: Never   Vaping Use    Vaping Use: Never used   Substance and Sexual Activity    Alcohol use: Yes     Alcohol/week: 4.2 oz     Types: 7 Standard drinks or equivalent per week     Comment: 1 drink a night    Drug use: No   Social History Narrative    She lives alone but has a female roommate who is 60, Leonora Rosa who has a brother in town she is estranged from. Leonora Rosa had Covid  and now has long-haulers and is on medicaid     She has 2 children and 4 stepchildren, her   '. She has a son near [place], [place] and another in [place]. She relies on her 2 grandkids who live in [place] and her 8y younger sister lives in [facility], her older sister lives in a [place] in [place]. 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 Her ex  her cousin, met her 2nd  and was  for 25y.     Her son supports her keeping Leonora Rosa at her home. She does not feel taken advantage of despite no longer receiving rent.         Occupation: Retired     CURRENT MEDICATIONS  Current Outpatient Medications   Medication Sig Dispense Refill    melatonin 5 mg Tab Take  by mouth at bedtime.      Guaifenesin 400 MG Tab Take 1 Tablet by mouth 1 time a day as needed.      Multiple Vitamins-Minerals (PRESERVISION AREDS 2+MULTI VIT PO) 1 Tablet every day.      diclofenac sodium (VOLTAREN) 1 % Gel       mesalamine (LIALDA) 1.2 GM Tablet Delayed Response Take 1.2 g by mouth every day.      pantoprazole (PROTONIX) 20 MG tablet 1 tablet      cyanocobalamin (VITAMIN B-12) 100 MCG Tab Take 100 mcg by mouth every day.    "   SYMBICORT 80-4.5 MCG/ACT Aerosol INHALE 2 PUFFS ORALLY TWICE A DAY.USE SPACER AND RINSE MOUTH AFTER EACH USE 1 Inhaler 5    Cholecalciferol (VITAMIN D3) 71202 UNIT Cap Take  by mouth.      spironolactone (ALDACTONE) 50 MG Tab Take 1 Tab by mouth every day. 90 Tab 3    valsartan-hydrochlorothiazide (DIOVAN-HCT) 160-12.5 MG per tablet Take 0.5 Tabs by mouth 2 Times a Day. (Patient taking differently: Take 0.5 Tablets by mouth every day.) 90 Tab 3    fluticasone (FLONASE) 50 MCG/ACT nasal spray Spray 1-2 Sprays in nose every day. Each nostril. 1 Bottle 6    paroxetine (PAXIL) 10 MG Tab Take 10 mg by mouth every day.      rosuvastatin (CRESTOR) 20 MG Tab Take 1 Tab by mouth every evening. 30 Tab 11    anastrozole (ARIMIDEX) 1 MG Tab Take 1 mg by mouth every morning.       No current facility-administered medications for this visit.       REVIEW OF SYSTEMS  Constitutional: Denies fevers, Denies weight changes  Ears/Nose/Throat/Mouth: Denies nasal congestion or sore throat   Cardiovascular: Denies chest pain  Respiratory: Denies shortness of breath, Denies cough  Gastrointestinal/Hepatic: Denies nausea, vomiting  Sleep: see HPI    Physical Examination:  Vitals/ General Appearance:   Weight/BMI: Body mass index is 29.41 kg/m².  /66 (BP Location: Left arm, Patient Position: Sitting, BP Cuff Size: Adult)   Pulse 70   Resp 16   Ht 1.638 m (5' 4.5\")   Wt 78.9 kg (174 lb)   SpO2 93%   Vitals:    08/12/22 1302   BP: 112/66   BP Location: Left arm   Patient Position: Sitting   BP Cuff Size: Adult   Pulse: 70   Resp: 16   SpO2: 93%   Weight: 78.9 kg (174 lb)   Height: 1.638 m (5' 4.5\")       Pt. is alert and oriented to time, place and person. Cooperative and in no apparent distress.     Constitutional: Alert, no distress, well-groomed.  Skin: No rashes in visible areas.  Eye: Round. Conjunctiva clear, lids normal. No icterus.   ENT EXAM  Nasal alae/valves collapsible: No   Nasal septum deviation: No   Nasal " turbinate hypertrophy: Left: Grade 1   Right: Grade 1  Hard palate narrow: No   Hard palate high: No   Soft palate/uvula (Mallampati score): 4  Tongue Scalloping: Yes  Retrognathia: No   Micrognathia: No   Cardiovascular:normal S1 and S2 heart sounds, regular rate and rhythm, systolic murmur IV/VI heard best at at 2nd right intercostal space  Pulmonary:Clear to auscultation, No wheezes, No crackles.  Neurologic:Awake, alert and oriented x 3, Normal age appropriate gait, No involuntary motions.  Extremities: No clubbing, cyanosis, or edema       ASSESSMENT AND PLAN   Erin Hess is a 82 y.o. female with Hypertension, hyperlipidemia, CAD, moderate aortic stenosis, asthma, and moderate obstructive sleep apnea.  Presents to Sleep Clinic for discussion on ROBYN management.     1. Erin Hess  has  Obstructive Sleep Apnea (ROBYN). Erin Hess has choking/gasping during sleep,  dry mouth, daytime tiredness, daytime sleepiness. These  interfere with activities of daily living.     Pt has risk factors for ROBYN include overweight, age, and crowded oropharynx.     The pathophysiology of ROBYN and the increased risk of cardiovascular morbidity from untreated ROBYN is discussed in detail with the patient. She  also has HTN, heart disease which can be worsened by ROBYN.      We have discussed diagnostic options including in-laboratory, attended polysomnography and home sleep testing. We have also discussed treatment options including airway pressurization, reconstructive otolaryngologic surgery, dental appliances and weight management.     Subsequently,treatment options will be discussed based on the diagnostic study. Meanwhile, She is urged to avoid supine sleep, weight gain and alcoholic beverages since all of these can worsen ROBYN. She is cautioned against drowsy driving. If She feels sleepy while driving, advised must pull over for a break/nap, rather than persist on the road, in the interest of Pt's own safety and that of  others on the road.    Inspire Device Candidate list   History of moderate to severe sleep apnea (AHI 15-65): Yes, How Many? 17  Has tried CPAP: Yes  No hx of central sleep apnea: Yes  BMI (<32 for commercial and <35 for Medicare): Yes  Sleep study in last 2 years: No      Discussed inspire process.  Discussed fall vacations and checklist for inspire therapy.    Plan  -  She  will be scheduled for an overnight  HST to assess obstructive sleep apnea and see if she is a candidate for inspire  -We will message in my chart pending sleep study results if candidate for inspire we will refer to ENT.  -Advised to reach out via [employer] or by phone with any questions or concerns.     2.  Regarding treatment of other past medical problems and general health maintenance,  Pt is urged to follow up with PCP.      Please note portions of this record was created using voice recognition software. I have made every reasonable attempt to correct obvious errors, but I expect that there are errors of grammar and possibly content I did not discover before finalizing the note.

## 2022-08-17 ENCOUNTER — PHYSICAL THERAPY (OUTPATIENT)
Dept: PHYSICAL THERAPY | Facility: MEDICAL CENTER | Age: 82
End: 2022-08-17
Attending: FAMILY MEDICINE
Payer: MEDICARE

## 2022-08-17 ENCOUNTER — OFFICE VISIT (OUTPATIENT)
Dept: SLEEP MEDICINE | Facility: MEDICAL CENTER | Age: 82
End: 2022-08-17
Payer: MEDICARE

## 2022-08-17 VITALS
DIASTOLIC BLOOD PRESSURE: 62 MMHG | OXYGEN SATURATION: 93 % | BODY MASS INDEX: 30.05 KG/M2 | HEIGHT: 64 IN | WEIGHT: 176 LBS | HEART RATE: 63 BPM | SYSTOLIC BLOOD PRESSURE: 115 MMHG

## 2022-08-17 DIAGNOSIS — G89.29 CHRONIC LOW BACK PAIN WITHOUT SCIATICA, UNSPECIFIED BACK PAIN LATERALITY: ICD-10-CM

## 2022-08-17 DIAGNOSIS — J45.40 MODERATE PERSISTENT ASTHMA, UNSPECIFIED WHETHER COMPLICATED: ICD-10-CM

## 2022-08-17 DIAGNOSIS — M54.50 CHRONIC LOW BACK PAIN WITHOUT SCIATICA, UNSPECIFIED BACK PAIN LATERALITY: ICD-10-CM

## 2022-08-17 DIAGNOSIS — M25.559 HIP PAIN: ICD-10-CM

## 2022-08-17 PROCEDURE — 97110 THERAPEUTIC EXERCISES: CPT

## 2022-08-17 PROCEDURE — 97140 MANUAL THERAPY 1/> REGIONS: CPT

## 2022-08-17 PROCEDURE — 99205 OFFICE O/P NEW HI 60 MIN: CPT | Performed by: INTERNAL MEDICINE

## 2022-08-17 RX ORDER — BUDESONIDE AND FORMOTEROL FUMARATE DIHYDRATE 160; 4.5 UG/1; UG/1
2 AEROSOL RESPIRATORY (INHALATION) 2 TIMES DAILY
Qty: 3 EACH | Refills: 3 | Status: SHIPPED | OUTPATIENT
Start: 2022-08-17 | End: 2024-02-09

## 2022-08-17 ASSESSMENT — FIBROSIS 4 INDEX: FIB4 SCORE: 1.88

## 2022-08-17 NOTE — PROGRESS NOTES
"Pulmonary Clinic Note    Chief Complaint:  Chief Complaint   Patient presents with    Establish Care     Referred by Juan Manuel Ellison for cough     Results     Dx chext 07/18/22, prev pma      HPI:   Erin Hess is a very pleasant 82 y.o. female seen in pulmonary clinic in August 2022.  She is being seen for complaint of cough.  She had COVID-19 pneumonia in December 2020.  She has had work-ups in the past that show allergies to pollens and spirometry with mild obstruction that resolved with bronchodilators.  She was diagnosed with asthma and is currently on low-dose Symbicort. She is also on Flonase. She is also former smoker.  Smoked 1 pack a day for 20 years and quit in 1986.      Patient has not been seen by pulmonary in the past.  she has no history of childhood respiratory disease.  she has no history of sinus disease.  she has + history of chronic rhinitis/conjuctivitis  she has no history of eczema  she has no history of peripheral eosinophilia >150  Personally reviewed her chest x-ray in July 2022 with cardiomegaly but no lung findings.    I personally reviewed her spirometry from July 2016.  She had a mild obstruction which disappeared with a greater than 200 cc improvement with bronchodilator.  This did just missed the 12% cut off to be called to statistically significant.    she has 0 ED visits in the past year for respiratory issues.  she has never been hospitalized for respiratory issues.   she has never been intubated.  she is on pantoprazole 20 mg daily for GERD.   she has pets: 2 dogs    She has also had an allergy work-up in 2015 which suggested allergies to [place] grass and [place] grass pollens.     Today: She states that she had pneumonia in December 2021 while she was visiting [place].  She was given antibiotics and nebulizer.  She has had a residual cough since.  She stated that she coughs \"all the time\".  She is not using her Symbicort.  She does use the nebulizer and occasional " albuterol.  She stated that she never really fully understood that she had asthma.  She had been skeptical of the diagnosis and she did not have as a child.    Past Medical History:   Diagnosis Date    Arthritis     Asthma     Breast cancer (HCC)     Bronchitis     Cancer (HCC) 2012    right breast    Dizziness 3/11/2013    Heart burn     Heart murmur     Hiatus hernia syndrome     HTN (hypertension) 3/12/2013    Hypercholesterolemia     Hyperlipidemia 3/12/2013    Hypertension     Hypokalemia 3/12/2013    Indigestion     Mitral annular calcification     Pain     back, hips, knees    Psychiatric disorder     depression    Sleep apnea     h/o gastric sleeve lost 40 lb now not on CPAP    Snoring     ULCERATIVE COLITIS 3/12/2013    Vertigo 3/11/2013       Past Surgical History:   Procedure Laterality Date    GASTRIC SLEEVE LAPAROSCOPY N/A 2016    Procedure: GASTRIC SLEEVE LAPAROSCOPY, HIATAL HERNIA AND LIVER BIOPSY;  Surgeon: Mauricio Montes M.D.;  Location: SURGERY [facility];  Service:     US-NEEDLE CORE BX-BREAST PANEL      rt breast, with lumpectomy     GYN SURGERY      vag hyster     GYN SURGERY      vaginal cyst removal     LUMPECTOMY  left    OTHER       R breast bx X 2& lipoma removal       Social History     Socioeconomic History    Marital status:      Spouse name: Not on file    Number of children: Not on file    Years of education: Not on file    Highest education level: Not on file   Occupational History    Not on file   Tobacco Use    Smoking status: Former     Packs/day: 1.00     Years: 20.00     Pack years: 20.00     Types: Cigarettes     Quit date: 1986     Years since quittin.2    Smokeless tobacco: Never   Vaping Use    Vaping Use: Never used   Substance and Sexual Activity    Alcohol use: Yes     Alcohol/week: 4.2 oz     Types: 7 Standard drinks or equivalent per week     Comment: 1 drink a night    Drug use: No    Sexual activity: Not on file   Other Topics  Concern    Not on file   Social History Narrative    She lives alone but has a female roommate who is 60, Leonora Rosa who has a brother in town she is estranged from. Leonora Rosa had Covid  and now has long-haulers and is on medicaid     She has 2 children and 4 stepchildren, her   '. She has a son near [place], [place] and another in [place]. She relies on her 2 grandkids who live in [place] and her 8y younger sister lives in [place], her older sister lives in a [facility] in [place]. 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 Her ex  her cousin, met her 2nd  and was  for 25y.     Her son supports her keeping Leonora Rosa at her home. She does not feel taken advantage of despite no longer receiving rent.      Social Determinants of Health     Financial Resource Strain: Not on file   Food Insecurity: Not on file   Transportation Needs: Not on file   Physical Activity: Not on file   Stress: Not on file   Social Connections: Not on file   Intimate Partner Violence: Not on file   Housing Stability: Not on file          Family History   Problem Relation Age of Onset    Heart Disease Mother         CAD MI at age 72    Cancer Mother         breast cancer  at age 83    Cancer Sister     Lung Disease Father 75        Lung cancer    Cancer Maternal Grandmother         Pancreatic cancer    Heart Attack Maternal Grandfather         MI at age 70    Heart Disease Sister         Rhuemati cfever- herat murmur       Current Outpatient Medications on File Prior to Visit   Medication Sig Dispense Refill    melatonin 5 mg Tab Take  by mouth at bedtime.      Guaifenesin 400 MG Tab Take 1 Tablet by mouth 1 time a day as needed.      Multiple Vitamins-Minerals (PRESERVISION AREDS 2+MULTI VIT PO) 1 Tablet every day.      diclofenac sodium (VOLTAREN) 1 % Gel       mesalamine (LIALDA) 1.2 GM Tablet Delayed Response Take 1.2 g by mouth every day.      pantoprazole (PROTONIX) 20 MG tablet 1 tablet   "    cyanocobalamin (VITAMIN B-12) 100 MCG Tab Take 100 mcg by mouth every day.      SYMBICORT 80-4.5 MCG/ACT Aerosol INHALE 2 PUFFS ORALLY TWICE A DAY.USE SPACER AND RINSE MOUTH AFTER EACH USE 1 Inhaler 5    Cholecalciferol (VITAMIN D3) 98308 UNIT Cap Take  by mouth.      spironolactone (ALDACTONE) 50 MG Tab Take 1 Tab by mouth every day. 90 Tab 3    valsartan-hydrochlorothiazide (DIOVAN-HCT) 160-12.5 MG per tablet Take 0.5 Tabs by mouth 2 Times a Day. (Patient taking differently: Take 0.5 Tablets by mouth every day.) 90 Tab 3    fluticasone (FLONASE) 50 MCG/ACT nasal spray Spray 1-2 Sprays in nose every day. Each nostril. 1 Bottle 6    paroxetine (PAXIL) 10 MG Tab Take 10 mg by mouth every day.      rosuvastatin (CRESTOR) 20 MG Tab Take 1 Tab by mouth every evening. 30 Tab 11    anastrozole (ARIMIDEX) 1 MG Tab Take 1 mg by mouth every morning.       No current facility-administered medications on file prior to visit.       Allergies: Sulfa drugs, Vancomycin, and Codeine      ROS: A 12 point ROS was performed on intake and during my interview. ROS negative unless specifically noted in HPI.     Vitals:  /62 (BP Location: Right arm, Patient Position: Sitting, BP Cuff Size: Adult)   Pulse 63   Ht 1.626 m (5' 4\")   Wt 79.8 kg (176 lb)   SpO2 93%     Physical Exam:  Physical Exam  Vitals reviewed.   Constitutional:       General: She is not in acute distress.     Appearance: Normal appearance. She is normal weight. She is not ill-appearing, toxic-appearing or diaphoretic.   HENT:      Head: Normocephalic.   Eyes:      Conjunctiva/sclera: Conjunctivae normal.      Pupils: Pupils are equal, round, and reactive to light.   Cardiovascular:      Rate and Rhythm: Normal rate and regular rhythm.      Heart sounds: No murmur heard.    No gallop.   Pulmonary:      Effort: Pulmonary effort is normal. No respiratory distress.      Breath sounds: Wheezing present. No rales.   Skin:     General: Skin is warm and dry. "   Neurological:      General: No focal deficit present.      Mental Status: She is alert and oriented to person, place, and time.   Psychiatric:         Mood and Affect: Mood normal.         Behavior: Behavior normal.       Laboratory Data: I personally reviewed labs including historical lab trends.       Assessment/Plan:    Problem List Items Addressed This Visit       Asthma    Relevant Medications    budesonide-formoterol (SYMBICORT) 160-4.5 MCG/ACT Aerosol    Other Relevant Orders    Coccidioides Antibodies Panel, Serum     She has wheezing on exam today which clears quickly with coughing.  I spent 40 minutes with her today educating her on asthma.  I gave her some resources to educate herself further.  I also spoke about the [place] guidelines for Symbicort use.  She is up-to-date with vaccines.  She does know that pollens exacerbate her breathing but is not sure of other triggers.  We went over proper inhaler technique and indications.  I also told her I would like to test her for cocci given that she was in [place] when this all started.    Plan:  -Coccidiomycosis titers  -Symbicort 2 puffs twice daily.  To be used every 6 hours while in an exacerbation.  -As needed albuterol nebs  -Vaccines up-to-date      Return in about 3 months (around 11/17/2022), or if symptoms worsen or fail to improve.       Total consult time was 60 min. This includes reviewing previous provider notes, personally reviewing previous imaging and spirometry, educating the patient about their disease process, and inhaler education.   __________  Marty Taylor, DO  Pulmonary and Critical Care Medicine  [facility]    Please note that this dictation was created using voice recognition software. The accuracy of the dictation is limited to the abilities of the software. I have made every reasonable attempt to correct obvious errors, but I expect that there are errors of grammar and possibly content that I did not discover before  finalizing the note.

## 2022-08-17 NOTE — PATIENT INSTRUCTIONS
Please look up asthma education on YouTube and watch one of the tutorials.   Please look up the [employer] asthma guidelines on YouTube regarding Symbicort.  Please use your Symbicort every day, at least 2 puffs every morning and night. Rinse your mouth after each use.   Please see us every 3 months until your symptoms are better controlled.   Coccidiomycosis blood test

## 2022-08-17 NOTE — OP THERAPY DAILY TREATMENT
Outpatient Physical Therapy  DAILY TREATMENT     [facility]  [address]  [place] [address]  Phone:  [phone]  Fax:  [phone]    Date: 08/17/2022    Patient: Erin Hess  YOB: 1940  MRN: [id number]     Time Calculation    Start time: 1419  Stop time: 1458 Time Calculation (min): 39 minutes         Chief Complaint: Back Problem    Visit #: 4    SUBJECTIVE:  Patient reports her back was sore from helping family cut down trees and preparing meals while at her cabin over the weekend. Symptoms went away with good night of sleep.     OBJECTIVE:  Current objective measures: NA          Therapeutic Exercises (CPT 20013):     1. NuStep S9/A10, 6 min, L5    4. SKTC stretch, 3 x 30 sec B, NT    5. DKTC w/ gym ball , 15x    6. LTR w/ ball, 2 x 10    7. lat step, 2 x 10    8. supine hamstring stretch, 1 min B    9. bridges, 2x 5, home    10. supine clams, 3 x 10 (pink)    11. gym ball:    12. pelvic tilts, 2 x10    13. pelvic rocking side to side, 2 x10    14. pelvic circles, 2 x 10    15. marching, 2 x 10    16. bilat shoulder ER, 3 x 10 (pink)    17. hip flexor stretch off table, 30 sec (L), 15 sec (R)      Therapeutic Exercise Summary: HEP: SKTC stretch 3 x 30 sec daily, trunk flexion stretch at counter 3 x 20 sec daily  Access Code: [email or web address]  URL: [email or web address]  Date: 08/10/2022  Prepared by: Bhumi Maltese    Exercises  Sideways Walking - 1 x daily - 3 sets - 10 reps  Hooklying Hamstring Stretch with Strap - 1 x daily - 1-2 minute hold  Supine clams 3 x 10 (pink)      Therapeutic Treatments and Modalities:     1. Manual Therapy (CPT 62518)    Therapeutic Treatment and Modalities Summary: R LE long axis traction 10x, 10 sec hold   Time-based treatments/modalities:    Physical Therapy Timed Treatment Charges  Manual therapy minutes (CPT 38441): 8 minutes  Therapeutic exercise minutes (CPT 88661): 31  Past Medical History:   Diagnosis Date    Alzheimer disease     Anemia     Atrial fibrillation     Cataract     CHF (congestive heart failure)     Chronic kidney disease     Coronary artery disease     Dementia     Depression     Diabetes mellitus     GERD (gastroesophageal reflux disease)     Hyperlipidemia     Hypertension     Hypothyroidism     Osteoarthritis     Renal disorder     Spinal stenosis        Past Surgical History:   Procedure Laterality Date    CARDIAC SURGERY      pci x 8    CHOLECYSTECTOMY      LEFT HEART CATHETERIZATION Left 10/6/2019    Procedure: Left heart cath;  Surgeon: Maikel Maradiaga MD;  Location: [facility] CATH/EP LAB;  Service: Cardiology;  Laterality: Left;       Review of patient's allergies indicates:  No Known Allergies    No current facility-administered medications on file prior to encounter.      Current Outpatient Medications on File Prior to Encounter   Medication Sig    albuterol (PROVENTIL) 2.5 mg /3 mL (0.083 %) nebulizer solution Inhale 1 vial into the lungs every 6 (six) hours as needed.     amino acids/protein hydrolys (PRO-STAT SUGAR FREE ORAL) Take 30 mLs by mouth once daily.    aspirin (ECOTRIN) 81 MG EC tablet Take 1 tablet (81 mg total) by mouth once daily.    atorvastatin (LIPITOR) 20 MG tablet Take 20 mg by mouth every evening.     bacitracin-polymyxin b (POLYSPORIN) ophthalmic ointment Place 1 application into both eyes every evening. APPLY A TINY STRIP TO FINGERTIP THEN RUB ALONG EYELASH MARGIN OF BOTH EYES    calcium carbonate/vitamin D3 (CALTRATE WITH VITAMIN D3 ORAL) Take 1 tablet by mouth once daily.     clopidogrel (PLAVIX) 75 mg tablet Take 1 tablet (75 mg total) by mouth once daily.    coenzyme Q10 (COQ-10) 100 mg capsule Take 200 mg by mouth once daily.    cyanocobalamin 1,000 mcg/mL injection Inject 1,000 mcg into the muscle every 30 days.     donepezil (ARICEPT) 10 MG tablet Take 10 mg by mouth every evening.    escitalopram  minutes      ASSESSMENT:   Response to treatment: Impaired lumbopelvic control with non weight bearing exs. Better control with weight bearing on gym ball. Progress core stability and hip strengthening as tolerated.     PLAN/RECOMMENDATIONS:   Plan for treatment: therapy treatment to continue next visit.  Planned interventions for next visit: continue with current treatment.          oxalate (LEXAPRO) 20 MG tablet Take 20 mg by mouth every evening.     fluticasone propionate (FLONASE) 50 mcg/actuation nasal spray 1 spray by Each Nostril route daily as needed for Rhinitis.    fluticasone-umeclidin-vilanter (TRELEGY ELLIPTA) 100-62.5-25 mcg DsDv Inhale 1 puff into the lungs once daily.    guaifenesin 100 mg/5 ml (ROBITUSSIN) 100 mg/5 mL syrup Take 200 mg by mouth every 4 (four) hours as needed for Cough.     levalbuterol (XOPENEX HFA) 45 mcg/actuation inhaler Inhale 2 puffs into the lungs 2 (two) times daily. Rescue    levothyroxine (SYNTHROID) 125 MCG tablet Take 125 mcg by mouth before breakfast.     lisinopril (PRINIVIL,ZESTRIL) 2.5 MG tablet Take 1 tablet (2.5 mg total) by mouth once daily.    loratadine (CLARITIN) 10 mg tablet Take 10 mg by mouth daily as needed for Allergies.     memantine (NAMENDA) 5 MG Tab Take 5 mg by mouth 2 (two) times daily.     multivitamin Tab Take 1 tablet by mouth once daily.    polyvinyl alcohol-povidon,PF, (REFRESH CLASSIC, PF,) 1.4-0.6 % Dpet Place 1 drop into both eyes 4 (four) times daily.    potassium chloride (KLOR-CON M20 ORAL) Take 20 mEq by mouth once daily.     SITagliptin (JANUVIA) 50 MG Tab Take 100 mg by mouth once daily.    sodium bicarbonate 650 MG tablet Take 650 mg by mouth 3 (three) times daily.    sotalol (BETAPACE) 80 MG tablet Take 40 mg by mouth 2 (two) times daily.    torsemide (DEMADEX) 20 MG Tab Take 30 mg by mouth once daily.     [DISCONTINUED] fluticasone propionate (FLOVENT DISKUS) 50 mcg/actuation DsDv Inhale 1 spray into the lungs daily as needed. Controller    [DISCONTINUED] tetrahydrozoline/polyethyl gly (EYE DROPS OPHT) Apply 1 drop to eye 4 (four) times daily.     Family History     Problem Relation (Age of Onset)    COPD Mother    Cancer Sister    Diabetes Mother    Heart disease Mother, Father    Hyperlipidemia Mother    Hypertension Mother        Tobacco Use    Smoking status: Former Smoker    Tobacco  comment: Right in 1986   Substance and Sexual Activity    Alcohol use: Never     Frequency: Never    Drug use: Never    Sexual activity: Not Currently     Review of Systems   Constitutional: Negative for chills, fatigue and fever.   HENT: Negative for sinus pressure, sinus pain, sore throat and trouble swallowing.    Respiratory: Positive for cough. Negative for chest tightness and shortness of breath.    Cardiovascular: Negative for chest pain, palpitations and leg swelling.   Gastrointestinal: Negative for abdominal pain, constipation, diarrhea, nausea and vomiting.   Genitourinary: Negative for dysuria and frequency.   Musculoskeletal: Negative for back pain and neck pain.   Skin: Negative for rash.   Neurological: Positive for weakness. Negative for seizures, syncope and headaches.   Psychiatric/Behavioral: Positive for confusion. The patient is not nervous/anxious.      Objective:     Vital Signs (Most Recent):  Temp: 99.8 °F (37.7 °C) (12/14/19 2030)  Pulse: 68 (12/14/19 2000)  Resp: (!) 24 (12/14/19 1753)  BP: (!) 106/53 (12/14/19 2000)  SpO2: 95 % (12/14/19 2000) Vital Signs (24h Range):  Temp:  [99.7 °F (37.6 °C)-99.8 °F (37.7 °C)] 99.8 °F (37.7 °C)  Pulse:  [68-70] 68  Resp:  [24] 24  SpO2:  [95 %-96 %] 95 %  BP: (106)/(53-57) 106/53     Weight: 113.4 kg (250 lb)  Body mass index is 41.6 kg/m².    Physical Exam   Constitutional: She appears well-developed and well-nourished.   Daughter is at bedside  Patient is lying in a gurney in the emergency department appears comfortable   Eyes: Pupils are equal, round, and reactive to light. Conjunctivae and EOM are normal. No scleral icterus.   Sclera non icteric   Neck: Neck supple.   Cardiovascular: Normal rate and normal heart sounds.   Pulmonary/Chest: Effort normal and breath sounds normal.   Abdominal: Soft. Bowel sounds are normal. There is no tenderness.   Musculoskeletal: Normal range of motion.   1+ edema bilateral lower extremities   Neurological: She  is alert.   Patient is alert she is oriented to name and daughter  Moves all extremities equally   Skin: Skin is warm. No rash noted.   Psychiatric:   Pleasant cooperative   Nursing note and vitals reviewed.         Significant Labs:   BMP:   Recent Labs   Lab 12/14/19 1927   *   *   K 3.6   CL 90*   CO2 28   BUN 42*   CREATININE 3.5*   CALCIUM 8.9     CBC:   Recent Labs   Lab 12/14/19 1927   WBC 17.06*   HGB 11.6*   HCT 35.4*        CMP:   Recent Labs   Lab 12/14/19 1927   *   K 3.6   CL 90*   CO2 28   *   BUN 42*   CREATININE 3.5*   CALCIUM 8.9   PROT 7.4   ALBUMIN 3.1*   BILITOT 1.3*   ALKPHOS 329*   *   *   ANIONGAP 12   EGFRNONAA 11.9*     Troponin:   Recent Labs   Lab 12/14/19 1927   TROPONINI 0.036     TSH:   Recent Labs   Lab 12/14/19 1927   TSH 0.460     Urine Studies:   Recent Labs   Lab 12/14/19 1954   COLORU Yellow   APPEARANCEUA Hazy*   PHUR 6.0   SPECGRAV 1.015   PROTEINUA Trace*   GLUCUA Negative   KETONESU Negative   BILIRUBINUA Negative   OCCULTUA Trace*   NITRITE Negative   UROBILINOGEN Negative   LEUKOCYTESUR 1+*   RBCUA 2   WBCUA 3   BACTERIA Few*   SQUAMEPITHEL 10   HYALINECASTS 115*       Significant Imaging:  Chest x-ray read by me shows no acute process  ECG is pending  CT that shows:  Impression       1. No evidence of acute intracranial findings.  2. Generalized cortical and central involutional changes with evidence of background deep white matter ischemic changes.  3. Prominent cephalohematoma formation projecting over the right parietooccipital convexity extending cephalad perhaps related to recent traumatic episode.

## 2022-08-24 ENCOUNTER — PHYSICAL THERAPY (OUTPATIENT)
Dept: PHYSICAL THERAPY | Facility: MEDICAL CENTER | Age: 82
End: 2022-08-24
Attending: FAMILY MEDICINE
Payer: MEDICARE

## 2022-08-24 DIAGNOSIS — G89.29 CHRONIC LOW BACK PAIN WITHOUT SCIATICA, UNSPECIFIED BACK PAIN LATERALITY: ICD-10-CM

## 2022-08-24 DIAGNOSIS — M54.50 CHRONIC LOW BACK PAIN WITHOUT SCIATICA, UNSPECIFIED BACK PAIN LATERALITY: ICD-10-CM

## 2022-08-24 DIAGNOSIS — M25.559 HIP PAIN: ICD-10-CM

## 2022-08-24 PROCEDURE — 97110 THERAPEUTIC EXERCISES: CPT

## 2022-08-24 NOTE — OP THERAPY DAILY TREATMENT
Outpatient Physical Therapy  DAILY TREATMENT     [facility] Outpatient Physical Therapy  [address]  [place] [address]  Phone:  [phone]  Fax:  [phone]    Date: 08/24/2022    Patient: Erin Hess  YOB: 1940  MRN: [id number]     Time Calculation    Start time: 1505  Stop time: 1545 Time Calculation (min): 40 minutes         Chief Complaint: Hip Problem and Back Problem    Visit #: 5    SUBJECTIVE: Patient reports her back is doing better, she's not having pain with sleeping   OBJECTIVE:  Current objective measures:       Therapeutic Exercises (CPT 63912):     1. NuStep S9/A10, 6 min, L5    6. shoulder extensions, next visit    7. lat step, 2 x 10    10. wall push up, 2 x 10    11. gym ball:    12. pelvic tilts, 2 x10    13. pelvic rocking side to side, 2 x10    14. pelvic circles, 2 x 10    15. marching, 2 x 10    16. trunk rotation, 2 x 10 reps    17. alt UE elevation, 2 x 10 reps    18. trunk flexion stretch, 10x (standing), 10x (seated)    19. seated bicep curls, 2 x 10 5#    20. seated OH press, 2 x 10 3#      Therapeutic Exercise Summary: HEP: SKTC stretch 3 x 30 sec daily, trunk flexion stretch at counter 3 x 20 sec daily  Access Code: [email or web address]  URL: [email or web address]  Date: 08/10/2022  Prepared by: Bhumi Danish    Exercises  Sideways Walking - 1 x daily - 3 sets - 10 reps  Hooklying Hamstring Stretch with Strap - 1 x daily - 1-2 minute hold  Supine clams 3 x 10 (pink)  Wall push up 2 x 10   Seated on gym ball: Bicep curls 2 x 10 5# and shoulder OH press 2 x 10 3#        Time-based treatments/modalities:    Physical Therapy Timed Treatment Charges  Therapeutic exercise minutes (CPT 41376): 40 minutes        ASSESSMENT:   Response to treatment: Progressed core stabilization exercises with patient demonstrating appropriate core stabilization. Cues initially with wall push up to maintain neutral spine and inhibit over using  shoulders. Progress core stabilization exercises as tolerated      PLAN/RECOMMENDATIONS:   Plan for treatment: therapy treatment to continue next visit.  Planned interventions for next visit: continue with current treatment.

## 2022-08-31 ENCOUNTER — RESEARCH ENCOUNTER (OUTPATIENT)
Dept: MEDICAL GROUP | Facility: PHYSICIAN GROUP | Age: 82
End: 2022-08-31
Payer: MEDICARE

## 2022-08-31 ENCOUNTER — PHYSICAL THERAPY (OUTPATIENT)
Dept: PHYSICAL THERAPY | Facility: MEDICAL CENTER | Age: 82
End: 2022-08-31
Attending: FAMILY MEDICINE
Payer: MEDICARE

## 2022-08-31 ENCOUNTER — OFFICE VISIT (OUTPATIENT)
Dept: MEDICAL GROUP | Facility: PHYSICIAN GROUP | Age: 82
End: 2022-08-31
Payer: MEDICARE

## 2022-08-31 VITALS
BODY MASS INDEX: 30.22 KG/M2 | TEMPERATURE: 97.4 F | OXYGEN SATURATION: 94 % | DIASTOLIC BLOOD PRESSURE: 70 MMHG | RESPIRATION RATE: 14 BRPM | HEIGHT: 64 IN | HEART RATE: 75 BPM | SYSTOLIC BLOOD PRESSURE: 122 MMHG | WEIGHT: 177 LBS

## 2022-08-31 DIAGNOSIS — E78.5 DYSLIPIDEMIA: ICD-10-CM

## 2022-08-31 DIAGNOSIS — Z00.6 RESEARCH STUDY PATIENT: ICD-10-CM

## 2022-08-31 DIAGNOSIS — G89.29 CHRONIC LOW BACK PAIN WITHOUT SCIATICA, UNSPECIFIED BACK PAIN LATERALITY: ICD-10-CM

## 2022-08-31 DIAGNOSIS — M54.50 CHRONIC LOW BACK PAIN WITHOUT SCIATICA, UNSPECIFIED BACK PAIN LATERALITY: ICD-10-CM

## 2022-08-31 DIAGNOSIS — I35.0 MODERATE AORTIC STENOSIS: ICD-10-CM

## 2022-08-31 DIAGNOSIS — M54.30 SCIATICA, UNSPECIFIED LATERALITY: ICD-10-CM

## 2022-08-31 DIAGNOSIS — J45.40 MODERATE PERSISTENT ASTHMA, UNSPECIFIED WHETHER COMPLICATED: ICD-10-CM

## 2022-08-31 DIAGNOSIS — N18.31 STAGE 3A CHRONIC KIDNEY DISEASE: ICD-10-CM

## 2022-08-31 DIAGNOSIS — R73.09 ELEVATED GLYCOHEMOGLOBIN: ICD-10-CM

## 2022-08-31 DIAGNOSIS — Z85.3 HISTORY OF BREAST CANCER: ICD-10-CM

## 2022-08-31 DIAGNOSIS — K51.00 ULCERATIVE PANCOLITIS WITHOUT COMPLICATION (HCC): ICD-10-CM

## 2022-08-31 DIAGNOSIS — Z90.3 H/O GASTRIC SLEEVE: ICD-10-CM

## 2022-08-31 DIAGNOSIS — I10 ESSENTIAL HYPERTENSION: ICD-10-CM

## 2022-08-31 DIAGNOSIS — F41.1 GENERALIZED ANXIETY DISORDER: ICD-10-CM

## 2022-08-31 DIAGNOSIS — E78.2 MIXED HYPERLIPIDEMIA: ICD-10-CM

## 2022-08-31 DIAGNOSIS — G47.33 OSA (OBSTRUCTIVE SLEEP APNEA): ICD-10-CM

## 2022-08-31 DIAGNOSIS — Z00.00 HEALTHCARE MAINTENANCE: ICD-10-CM

## 2022-08-31 DIAGNOSIS — M25.559 HIP PAIN: ICD-10-CM

## 2022-08-31 PROCEDURE — 99214 OFFICE O/P EST MOD 30 MIN: CPT | Performed by: FAMILY MEDICINE

## 2022-08-31 PROCEDURE — 97140 MANUAL THERAPY 1/> REGIONS: CPT

## 2022-08-31 PROCEDURE — 97110 THERAPEUTIC EXERCISES: CPT

## 2022-08-31 RX ORDER — ROSUVASTATIN CALCIUM 20 MG/1
20 TABLET, COATED ORAL EVERY EVENING
Qty: 100 TABLET | Refills: 0 | Status: SHIPPED | OUTPATIENT
Start: 2022-08-31

## 2022-08-31 RX ORDER — SPIRONOLACTONE 50 MG/1
50 TABLET, FILM COATED ORAL DAILY
Qty: 100 TABLET | Refills: 0 | Status: SHIPPED | OUTPATIENT
Start: 2022-08-31 | End: 2022-12-21

## 2022-08-31 RX ORDER — VALSARTAN AND HYDROCHLOROTHIAZIDE 160; 12.5 MG/1; MG/1
0.5 TABLET, FILM COATED ORAL 2 TIMES DAILY
Qty: 90 TABLET | Refills: 3 | Status: SHIPPED | OUTPATIENT
Start: 2022-08-31 | End: 2024-02-09

## 2022-08-31 RX ORDER — ANASTROZOLE 1 MG/1
1 TABLET ORAL EVERY MORNING
Qty: 100 TABLET | Refills: 0 | Status: SHIPPED | OUTPATIENT
Start: 2022-08-31

## 2022-08-31 RX ORDER — PAROXETINE 10 MG/1
10 TABLET, FILM COATED ORAL DAILY
Qty: 100 TABLET | Refills: 0 | Status: SHIPPED | OUTPATIENT
Start: 2022-08-31

## 2022-08-31 RX ORDER — PANTOPRAZOLE SODIUM 20 MG/1
TABLET, DELAYED RELEASE ORAL
Qty: 100 TABLET | Refills: 0 | Status: SHIPPED | OUTPATIENT
Start: 2022-08-31 | End: 2022-09-06 | Stop reason: SDUPTHER

## 2022-08-31 RX ORDER — VALSARTAN AND HYDROCHLOROTHIAZIDE 160; 25 MG/1; MG/1
TABLET ORAL
COMMUNITY
Start: 2022-08-22 | End: 2022-08-31

## 2022-08-31 ASSESSMENT — FIBROSIS 4 INDEX: FIB4 SCORE: 1.88

## 2022-08-31 NOTE — ASSESSMENT & PLAN NOTE
Chronic, diagnosed around 67.  Continues on Symbicort and albuterol as needed.  States that she really needs the albuterol nebulizer only when she is sick with respiratory infections.  Followed by pulmonary

## 2022-08-31 NOTE — ASSESSMENT & PLAN NOTE
Chronic, is currently doing physical therapy to help with core strengthening, balance which has also been helping her lower back pain.

## 2022-08-31 NOTE — PROGRESS NOTES
Subjective:     CC:   Chief Complaint   Patient presents with    Establish Care     New Pt     Medication Refill       HISTORY OF THE PRESENT ILLNESS: Patient is a 82 y.o. female. This pleasant patient is here today to establish care and discuss needing medication refills. Her prior PCP was Dr. Lopez.      Ulcerative pancolitis without complication (HCC)  Chronic, stable. Occ flare up that might last a day or two. Pending Colonoscopy sep 9th      ROBYN (obstructive sleep apnea)  Chronic, scheduled for a home sleep test in October. Is followed by pulm, possible candidate for inspire.     Elevated glycohemoglobin  Chronic, Has had prediabetes for the last 7 years.  Most recent A1c at 5.9, will recheck A1c in February.    Chronic lower back pain  Chronic, is currently doing physical therapy to help with core strengthening, balance which has also been helping her lower back pain.    History of breast cancer  Chronic, diagnosed with breast cancer 4 to 5 years ago, is currently on Arimidex 1 mg daily.  Currently still seeing oncology and has been told that she should be on Arimidex for a little bit longer than 5 years.  We will continue to follow with Oncology    H/O gastric sleeve  Chronic, hx of thsi done 8 years ago. Occ feeling like she gets nauseated if she eats too fast. Denies vomiting or abdominal pain.     Stage 3a chronic kidney disease (HCC)  Chronic, avoids NSAIDs.  Discussed increasing her hydration, we will recheck her CMP in February.    Generalized anxiety disorder  Chronic, managed well with Paxil 10 mg daily.    Dyslipidemia  Chronic, controlled on rosuvastatin 20 mg daily.    Moderate aortic stenosis  Chronic, most recent echocardiogram was done a few years ago which showed moderate stenosis.  Denies any shortness of breath with activity, orthopnea, chest pains, or palpitations.  On exam she does have an aortic murmur    Asthma  Chronic, diagnosed around 67.  Continues on Symbicort and albuterol as  "needed.  States that she really needs the albuterol nebulizer only when she is sick with respiratory infections.  Followed by pulmonary    Sciatica  Occ, improved and currently getting PT once weekly.     Essential hypertension  Well-controlled on spironolactone 50 mg daily and Diovan hydrochlorothiazide 160/12.5 mg half tab twice daily.      Current Outpatient Medications Ordered in Epic   Medication Sig Dispense Refill    pantoprazole (PROTONIX) 20 MG tablet 1 tablet 100 Tablet 0    spironolactone (ALDACTONE) 50 MG Tab Take 1 Tablet by mouth every day. 100 Tablet 0    valsartan-hydrochlorothiazide (DIOVAN-HCT) 160-12.5 MG per tablet Take 0.5 Tablets by mouth 2 times a day. 90 Tablet 3    PARoxetine (PAXIL) 10 MG Tab Take 1 Tablet by mouth every day. 100 Tablet 0    rosuvastatin (CRESTOR) 20 MG Tab Take 1 Tablet by mouth every evening. 100 Tablet 0    anastrozole (ARIMIDEX) 1 MG Tab Take 1 Tablet by mouth every morning. 100 Tablet 0    budesonide-formoterol (SYMBICORT) 160-4.5 MCG/ACT Aerosol Inhale 2 Puffs 2 times a day. 3 Each 3    melatonin 5 mg Tab Take  by mouth at bedtime.      Guaifenesin 400 MG Tab Take 1 Tablet by mouth 1 time a day as needed.      Multiple Vitamins-Minerals (PRESERVISION AREDS 2+MULTI VIT PO) 1 Tablet every day.      diclofenac sodium (VOLTAREN) 1 % Gel       mesalamine (LIALDA) 1.2 GM Tablet Delayed Response Take 1.2 g by mouth every day.      cyanocobalamin (VITAMIN B-12) 100 MCG Tab Take 100 mcg by mouth every day.      Cholecalciferol (VITAMIN D3) 20510 UNIT Cap Take  by mouth.      fluticasone (FLONASE) 50 MCG/ACT nasal spray Spray 1-2 Sprays in nose every day. Each nostril. 1 Bottle 6     No current Epic-ordered facility-administered medications on file.       Health Maintenance: Completed          Objective:     Exam: /70 (BP Location: Right arm, Patient Position: Sitting, BP Cuff Size: Adult)   Pulse 75   Temp 36.3 °C (97.4 °F) (Temporal)   Resp 14   Ht 1.626 m (5' 4\")  "  Wt 80.3 kg (177 lb)   SpO2 94%  Body mass index is 30.38 kg/m².    Physical Exam  Vitals reviewed.   Constitutional:       General: She is not in acute distress.     Appearance: Normal appearance.   Eyes:      Conjunctiva/sclera: Conjunctivae normal.      Pupils: Pupils are equal, round, and reactive to light.   Cardiovascular:      Rate and Rhythm: Normal rate and regular rhythm.      Pulses: Normal pulses.      Heart sounds: Normal heart sounds. No murmur heard.  Pulmonary:      Effort: Pulmonary effort is normal. No respiratory distress.      Breath sounds: Normal breath sounds. No stridor. No wheezing, rhonchi or rales.   Chest:      Chest wall: No tenderness.   Abdominal:      General: Abdomen is flat. Bowel sounds are normal.   Musculoskeletal:      Right lower leg: No edema.      Left lower leg: No edema.   Neurological:      Mental Status: She is alert and oriented to person, place, and time.   Psychiatric:         Mood and Affect: Mood normal.         Behavior: Behavior normal.        A chaperone was offered to the patient during today's exam. Patient declined chaperone.        Assessment & Plan:   82 y.o. female with the following -    1. Essential hypertension  Chronic, stable on current medication regimen  - spironolactone (ALDACTONE) 50 MG Tab; Take 1 Tablet by mouth every day.  Dispense: 100 Tablet; Refill: 0  - valsartan-hydrochlorothiazide (DIOVAN-HCT) 160-12.5 MG per tablet; Take 0.5 Tablets by mouth 2 times a day.  Dispense: 90 Tablet; Refill: 3    2. Mixed hyperlipidemia  Chronic, stable on current medication regimen  - rosuvastatin (CRESTOR) 20 MG Tab; Take 1 Tablet by mouth every evening.  Dispense: 100 Tablet; Refill: 0    3. Ulcerative pancolitis without complication (HCC)  Chronic, stable and pending colonoscopy September 9  4. Sciatica, unspecified laterality  Chronic, improvement with physical therapy.  5. ROBYN (obstructive sleep apnea)  Chronic, continues to be followed by pulmonary and  is getting a work-up to see if she qualifies for inspire  6. H/O gastric sleeve    7. History of breast cancer  - Referral to Genetic Research Studies    8. Healthcare maintenance  - CBC WITH DIFFERENTIAL; Future  - Comp Metabolic Panel; Future  - Lipid Profile; Future  - VITAMIN D,25 HYDROXY (DEFICIENCY); Future  - HEMOGLOBIN A1C; Future  - TSH WITH REFLEX TO FT4; Future    9. Elevated glycohemoglobin    10. Chronic low back pain without sciatica, unspecified back pain laterality  Chronic, improved with physical therapy  11. Stage 3a chronic kidney disease (HCC)  , Avoiding NSAIDs and increasing water intake  12. Generalized anxiety disorder  Chronic, stable on Paxil  13. Dyslipidemia  Chronic, stable current medication regimen  14. Moderate aortic stenosis  Chronic, stable and asymptomatic  15. Moderate persistent asthma, unspecified whether complicated  Chronic, stable and followed by pulmonary   Other orders  - pantoprazole (PROTONIX) 20 MG tablet; 1 tablet  Dispense: 100 Tablet; Refill: 0  - PARoxetine (PAXIL) 10 MG Tab; Take 1 Tablet by mouth every day.  Dispense: 100 Tablet; Refill: 0  - anastrozole (ARIMIDEX) 1 MG Tab; Take 1 Tablet by mouth every morning.  Dispense: 100 Tablet; Refill: 0       I spent a total of 31 minutes with record review, exam, communication with the patient, communication with other providers, and documentation of this encounter.    Return in about 6 months (around 2/20/2023) for F/U labs.    Please note that this dictation was created using voice recognition software. I have made every reasonable attempt to correct obvious errors, but I expect that there are errors of grammar and possibly content that I did not discover before finalizing the note.

## 2022-08-31 NOTE — ASSESSMENT & PLAN NOTE
Well-controlled on spironolactone 50 mg daily and Diovan hydrochlorothiazide 160/12.5 mg half tab twice daily.

## 2022-08-31 NOTE — ASSESSMENT & PLAN NOTE
Chronic, scheduled for a home sleep test in October. Is followed by pulm, possible candidate for inspire.

## 2022-08-31 NOTE — OP THERAPY DAILY TREATMENT
Outpatient Physical Therapy  DAILY TREATMENT     [facility] Outpatient Physical Therapy  [address]  [place] [address]  Phone:  [phone]  Fax:  [phone]    Date: 08/31/2022    Patient: Erin Hess  YOB: 1940  MRN: [id number]     Time Calculation    Start time: 1500  Stop time: 1545 Time Calculation (min): 45 minutes         Chief Complaint: Back Problem and Hip Problem    Visit #: 6    SUBJECTIVE: Patient reports her back was sore after a lot of errands yesterday.     OBJECTIVE:  Current objective measures: NA          Therapeutic Exercises (CPT 80318):     1. NuStep S9/A10, 6 min, L5    3. ham curls w/ Tra, 2 x 10    4. bridge w/ gym ball, 2 x 10    5. hooklying alt bent knee fall outs, 3 x 5 reps (pink)    6. shoulder extensions, next visit    7. lat step, 2 x 10    8. side lying clams, 3 x 10 B    10. wall push up, 2 x 10    11. wall squats with gym ball, 2 x 10      Therapeutic Exercise Summary: HEP: SKTC stretch 3 x 30 sec daily, trunk flexion stretch at counter 3 x 20 sec daily  Access Code: [email or web address]  URL: [email or web address]  Date: 08/10/2022  Prepared by: Bhumi Citizen of [place]    Exercises  Sideways Walking - 1 x daily - 3 sets - 10 reps  Hooklying Hamstring Stretch with Strap - 1 x daily - 1-2 minute hold  Wall push up 2 x 10   Seated on gym ball: Bicep curls 2 x 10 5# and shoulder OH press 2 x 10 3#        Therapeutic Treatments and Modalities:     1. Manual Therapy (CPT 24331), STW R lumbar paraspinals  Time-based treatments/modalities:    Physical Therapy Timed Treatment Charges  Manual therapy minutes (CPT 03163): 10 minutes  Therapeutic exercise minutes (CPT 25900): 35 minutes        ASSESSMENT:   Response to treatment: Patient tolerated progression of weight bearing core stabilization exercises. She demonstrates impaired lumbopelvic control with core stabilization exercises. Mild R knee pain with wall squats, this decreased with  cues to increase hip hinge. Continue to address this to enhance spinal stability with function.     PLAN/RECOMMENDATIONS:   Plan for treatment: therapy treatment to continue next visit.  Planned interventions for next visit: continue with current treatment.

## 2022-08-31 NOTE — ASSESSMENT & PLAN NOTE
Chronic, hx of thsi done 8 years ago. Occ feeling like she gets nauseated if she eats too fast. Denies vomiting or abdominal pain.

## 2022-08-31 NOTE — ASSESSMENT & PLAN NOTE
Chronic, Has had prediabetes for the last 7 years.  Most recent A1c at 5.9, will recheck A1c in February.

## 2022-08-31 NOTE — ASSESSMENT & PLAN NOTE
Chronic, most recent echocardiogram was done a few years ago which showed moderate stenosis.  Denies any shortness of breath with activity, orthopnea, chest pains, or palpitations.  On exam she does have an aortic murmur

## 2022-08-31 NOTE — ASSESSMENT & PLAN NOTE
Chronic, diagnosed with breast cancer 4 to 5 years ago, is currently on Arimidex 1 mg daily.  Currently still seeing oncology and has been told that she should be on Arimidex for a little bit longer than 5 years.  We will continue to follow with Oncology

## 2022-09-06 RX ORDER — PANTOPRAZOLE SODIUM 20 MG/1
TABLET, DELAYED RELEASE ORAL
Qty: 100 TABLET | Refills: 2 | Status: SHIPPED | OUTPATIENT
Start: 2022-09-06 | End: 2022-11-28

## 2022-09-06 NOTE — TELEPHONE ENCOUNTER
Received request via: Patient PHARMACY WONT FILL WITHOUT DIRECTIONS.     Was the patient seen in the last year in this department? Yes    Does the patient have an active prescription (recently filled or refills available) for medication(s) requested? No

## 2022-09-07 ENCOUNTER — PHYSICAL THERAPY (OUTPATIENT)
Dept: PHYSICAL THERAPY | Facility: MEDICAL CENTER | Age: 82
End: 2022-09-07
Attending: FAMILY MEDICINE
Payer: MEDICARE

## 2022-09-07 DIAGNOSIS — M25.559 HIP PAIN: ICD-10-CM

## 2022-09-07 DIAGNOSIS — M54.50 CHRONIC LOW BACK PAIN WITHOUT SCIATICA, UNSPECIFIED BACK PAIN LATERALITY: ICD-10-CM

## 2022-09-07 DIAGNOSIS — G89.29 CHRONIC LOW BACK PAIN WITHOUT SCIATICA, UNSPECIFIED BACK PAIN LATERALITY: ICD-10-CM

## 2022-09-07 PROCEDURE — 97110 THERAPEUTIC EXERCISES: CPT
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
## 2022-09-14 ENCOUNTER — PHYSICAL THERAPY (OUTPATIENT)
Dept: PHYSICAL THERAPY | Facility: MEDICAL CENTER | Age: 82
End: 2022-09-14
Attending: FAMILY MEDICINE
Payer: MEDICARE

## 2022-09-14 DIAGNOSIS — M54.50 CHRONIC LOW BACK PAIN WITHOUT SCIATICA, UNSPECIFIED BACK PAIN LATERALITY: ICD-10-CM

## 2022-09-14 DIAGNOSIS — G89.29 CHRONIC LOW BACK PAIN WITHOUT SCIATICA, UNSPECIFIED BACK PAIN LATERALITY: ICD-10-CM

## 2022-09-14 DIAGNOSIS — M25.559 HIP PAIN: ICD-10-CM

## 2022-09-14 PROCEDURE — 97110 THERAPEUTIC EXERCISES: CPT

## 2022-09-14 NOTE — OP THERAPY DAILY TREATMENT
Outpatient Physical Therapy  DAILY TREATMENT     [facility] Outpatient Physical Therapy  [address]  [place] [address]  Phone:  [phone]  Fax:  [phone]    Date: 09/14/2022    Patient: Erin Hess  YOB: 1940  MRN: [id number]     Time Calculation    Start time: 1500  Stop time: 1542 Time Calculation (min): 42 minutes         Chief Complaint: Hip Problem and Back Problem    Visit #: 8    SUBJECTIVE: Patient reports her R wrist is better. She is not having any back pain either. Only concern is lateral hip pain with prolonged sleeping on either side (R >L)    OBJECTIVE:  Current objective measures:     Rolando's neg bilat (hip flexor tightness present)  Hip abduction MMT = 2+/5 bilat           Therapeutic Exercises (CPT 22560):     1. NuStep S9/A10, 6 min, L5    6. lat step down (6 in), 2 x 10, bilat UE support, occasional tactile cues to enhance glut med activation    7. lat step, 3 x 10 (orange band above knees)    8. side lying clams, 3 x 10 B (pink), NT    11. wall squats with gym ball, 2 x 10 (orange band above knees)    12. True cage hip flexor stretch, 3 x 30 sec B      Therapeutic Exercise Summary: HEP: SKTC stretch 3 x 30 sec daily, trunk flexion stretch at counter 3 x 20 sec daily  Access Code: [email or web address]  URL: [email or web address]  Date: 08/10/2022  Prepared by: Bhumi [place] citizen    Exercises  Sideways Walking - 1 x daily - 3 sets - 10 reps (w/ band above knees)  Hooklying Hamstring Stretch with Strap - 1 x daily - 1-2 minute hold  Wall push up 2 x 10   Seated on gym ball: Bicep curls 2 x 10 5# and shoulder OH press 2 x 10 3#  Lateral step down 2 x 10   Wall squats w/ band above knees = 2 x 10         Time-based treatments/modalities:    Physical Therapy Timed Treatment Charges  Therapeutic exercise minutes (CPT 55273): 42 minutes        ASSESSMENT:   Response to treatment: Focused on lateral hip and quad strengthening to address lateral hip  pain with increased walking distance (> 1/2 mile). Muscle fatigue present with glut med exercises. Progressed HEP.     PLAN/RECOMMENDATIONS:   Plan for treatment: therapy treatment to continue next visit.  Planned interventions for next visit: continue with current treatment.

## 2022-09-26 ENCOUNTER — TELEPHONE (OUTPATIENT)
Dept: MEDICAL GROUP | Facility: PHYSICIAN GROUP | Age: 82
End: 2022-09-26
Payer: MEDICARE

## 2022-09-26 RX ORDER — BENZONATATE 100 MG/1
100 CAPSULE ORAL 3 TIMES DAILY PRN
Qty: 60 CAPSULE | Refills: 0 | Status: SHIPPED | OUTPATIENT
Start: 2022-09-26 | End: 2022-11-28

## 2022-09-26 NOTE — TELEPHONE ENCOUNTER
Phone Number Called: [phone] (home) [phone] (work)      Call outcome: Spoke to patient regarding message below.    Message: Informed pt of message. No other questions at this time.

## 2022-09-26 NOTE — TELEPHONE ENCOUNTER
Phone Number Called: [phone] (home) [phone] (work)      Call outcome: Spoke to patient regarding message below.    Message: Pt is requesting medication for a cough she has had since last week Wednesday. Pt has been having mild headaches, slight runny nose, no fever. Pt spent 2-3 days in bed. Pt tested negative for Covid 09/25/22. Was with her sister last week who tested positive for covid 09/25/22. Pt has been taking otc Aleve. Please Advise.

## 2022-10-03 ENCOUNTER — HOME STUDY (OUTPATIENT)
Dept: SLEEP MEDICINE | Facility: MEDICAL CENTER | Age: 82
End: 2022-10-03
Attending: STUDENT IN AN ORGANIZED HEALTH CARE EDUCATION/TRAINING PROGRAM
Payer: MEDICARE

## 2022-10-03 DIAGNOSIS — G47.33 OSA (OBSTRUCTIVE SLEEP APNEA): ICD-10-CM

## 2022-10-03 PROCEDURE — 95806 SLEEP STUDY UNATT&RESP EFFT: CPT | Performed by: STUDENT IN AN ORGANIZED HEALTH CARE EDUCATION/TRAINING PROGRAM

## 2022-10-05 ENCOUNTER — APPOINTMENT (OUTPATIENT)
Dept: PHYSICAL THERAPY | Facility: MEDICAL CENTER | Age: 82
End: 2022-10-05
Attending: FAMILY MEDICINE
Payer: MEDICARE

## 2022-10-07 NOTE — PROCEDURES
DIAGNOSTIC HOME SLEEP TEST (HST) REPORT       PATIENT ID:  NAME:  Erin Hess  MRN:               [id number]  YOB: 1940  DATE OF STUDY: 10/3/2022      Impression:     This study shows evidence of:     1. Mild obstructive sleep apnea with 4% Respiratory Event Index (LUCIEN) of 11.8 per hour. These findings are based on the recording time (flow evaluation time). It is not possible with this device to determine a traditional apnea+hypopnea index (AHI) for total sleep time since EEG channels are not available.     2. Oxygenation O2 Sat. holden was 77% and mean O2 sat was 85% and baseline O2 at 90 %. O2 sat was below 88% for 10hr 57 min of the flow evaluation time. Oxygen Desaturation (>= 4%) Index was elevated at 10.9/hr. AVG HR was 67 BPM.      TECHNICAL DESCRIPTION: [employer] apnea link air device used was a type-III home study device. Home sleep study recording included: Airflow recording by nasal pressure transducer; Respiratory Effort by 1 RIP Band; Oxygen Saturation and heart rate by finger oximetry. A position sensor and a snore channel was also used.    Scoring Criteria: A modification of the the AASM Manual for the Scoring of Sleep and Associated Events, 2012, was used.   Obstructive apnea was scored by cessation of airflow for at least 10 seconds with continuing respiratory effort.  Central apnea was scored by cessation of airflow for at least 10 seconds with no effort.  Hypopnea was scored by a 30% or more reduction in airflow for at least 10 seconds accompanied by an arterial oxygen desaturation of 3% or more.  (For Medicare patients, hypopneas were scored by a 30% or more reduction in airflow for at least 10 seconds accompanied by an arterial oxygen saturation of 4% or more, as required by their insurance, CMS.        General sleep summary: . Total recording time is 11 hours and 54 minutes and total flow evaluation time is 10 hours and 20 minutes. The patient spent 10 hours and 17  minutes in the supine position and 0 hours and 0 minutes in the nonsupine position.    Respiratory events:    Apneas: Total 3 (Obstructive apnea index 0.2/hr, Central apnea index 0.1 /hr, mixed 0 /hour)  Hypopneas: Total 119 with a Hypopnea index of 11.5 /hr    Recommendations:    1.  Meets criteria for mild obstructive sleep apnea with a overall LUCIEN of 11.8.  In order to be a candidate for inspire respiratory events per hour need to be between 15 and 65.  2.  Significant nocturnal hypoxia out of proportion to sleep apnea.  Due to this would recommend undergoing a lab titration study.    3.  If reluctant to resume PAP therapy may consider alternatives such as oral appliance therapy with supplemental oxygen if needed once sleep apnea is controlled.  4. In general patients with sleep apnea are advised to avoid alcohol and sedatives and to not operate a motor vehicle while drowsy. In some cases alternative treatment options may prove effective in resolving sleep apnea in these options include upper airway surgery, the use of a dental orthotic or weight loss and positional therapy. Clinical correlation is required.         Yash Alba MD

## 2022-10-11 ENCOUNTER — PHYSICAL THERAPY (OUTPATIENT)
Dept: PHYSICAL THERAPY | Facility: MEDICAL CENTER | Age: 82
End: 2022-10-11
Attending: FAMILY MEDICINE
Payer: MEDICARE

## 2022-10-11 DIAGNOSIS — M25.559 HIP PAIN: ICD-10-CM

## 2022-10-11 DIAGNOSIS — G89.29 CHRONIC LOW BACK PAIN WITHOUT SCIATICA, UNSPECIFIED BACK PAIN LATERALITY: ICD-10-CM

## 2022-10-11 DIAGNOSIS — M54.50 CHRONIC LOW BACK PAIN WITHOUT SCIATICA, UNSPECIFIED BACK PAIN LATERALITY: ICD-10-CM

## 2022-10-11 PROCEDURE — 999999 HB NO CHARGE

## 2022-10-11 PROCEDURE — 97110 THERAPEUTIC EXERCISES: CPT
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
## 2022-10-18 ENCOUNTER — OFFICE VISIT (OUTPATIENT)
Dept: SLEEP MEDICINE | Facility: MEDICAL CENTER | Age: 82
End: 2022-10-18
Payer: MEDICARE

## 2022-10-18 VITALS
RESPIRATION RATE: 14 BRPM | SYSTOLIC BLOOD PRESSURE: 100 MMHG | HEIGHT: 65 IN | BODY MASS INDEX: 29.32 KG/M2 | HEART RATE: 68 BPM | OXYGEN SATURATION: 92 % | DIASTOLIC BLOOD PRESSURE: 60 MMHG | WEIGHT: 176 LBS

## 2022-10-18 DIAGNOSIS — G47.33 OSA (OBSTRUCTIVE SLEEP APNEA): Primary | ICD-10-CM

## 2022-10-18 DIAGNOSIS — G47.34 NOCTURNAL HYPOXIA: ICD-10-CM

## 2022-10-18 PROCEDURE — 99213 OFFICE O/P EST LOW 20 MIN: CPT | Performed by: STUDENT IN AN ORGANIZED HEALTH CARE EDUCATION/TRAINING PROGRAM

## 2022-10-18 ASSESSMENT — FIBROSIS 4 INDEX: FIB4 SCORE: 1.88

## 2022-10-18 NOTE — PROGRESS NOTES
[facility] Follow-up Visit    CC: Follow-up to discuss sleep study results      HPI:  Erin Hess is a 82 y.o.female  with hypertension, hyperlipidemia, CAD, moderate persistent asthma, moderate aortic stenosis, and obstructive sleep apnea.  Presents today to follow-up regarding obstructive sleep apnea management and sleep study results.    She reports neither sleep study went well.  She does not recall anything specific that stood out at night.  She feels she got a typical night sleep for her.  She has known history of obstructive sleep apnea.  She previously tried CPAP and did not do well with it.  Last use approximately 4 to 5 years ago.  She was initially sent to our office to see if would be a candidate for inspire.    Patient Active Problem List    Diagnosis Date Noted    Numbness in feet 05/25/2022    Chronic lower back pain 05/25/2022    Mood disorder (HCC) 05/25/2022    Cough 05/25/2022    Osteopenia of left thigh 05/25/2022    Elevated glycohemoglobin 05/25/2022    Generalized anxiety disorder 03/10/2022    Stage 3a chronic kidney disease (HCC) 03/10/2022    History of 2019 novel coronavirus disease (COVID-19) 03/10/2022    H/O gastric sleeve 03/10/2022    GERD (gastroesophageal reflux disease) 03/10/2022    History of breast cancer 03/10/2022    Advance directive discussed with patient 03/10/2022    Mitral annular calcification     Complex renal cyst 01/10/2018    Coronary artery disease due to lipid rich plaque     Chronic rhinitis 11/07/2017    Moderate aortic stenosis 07/13/2017    Dyslipidemia 07/13/2017    Fatigue 04/11/2017    Asthma 07/08/2016    ROBYN (obstructive sleep apnea) 07/08/2016    Obesity (BMI 30.0-34.9) 05/18/2016    Sciatica 05/09/2013    Essential hypertension 03/12/2013    Ulcerative pancolitis without complication (HCC) 03/12/2013       Past Medical History:   Diagnosis Date    Arthritis     Asthma     Breast cancer (HCC)     Bronchitis 2011    Cancer (HCC) 12/2012     right breast    Cough     Dizziness 2013    Heart burn     Heart murmur     Hiatus hernia syndrome     HTN (hypertension) 2013    Hypercholesterolemia     Hyperlipidemia 2013    Hypertension     Hypokalemia 2013    Indigestion     Mitral annular calcification     Pain     back, hips, knees    Psychiatric disorder     depression    Shortness of breath     Sleep apnea     h/o gastric sleeve lost 40 lb now not on CPAP    Snoring     Sputum production     ULCERATIVE COLITIS 2013    Vertigo 2013    Wheezing         Past Surgical History:   Procedure Laterality Date    GASTRIC SLEEVE LAPAROSCOPY N/A 2016    Procedure: GASTRIC SLEEVE LAPAROSCOPY, HIATAL HERNIA AND LIVER BIOPSY;  Surgeon: Mauricio Montes M.D.;  Location: SURGERY [facility];  Service:     US-NEEDLE CORE BX-BREAST PANEL      rt breast, with lumpectomy     GYN SURGERY      vag hyster     GYN SURGERY      vaginal cyst removal     LUMPECTOMY  left    OTHER       R breast bx X 2& lipoma removal       Family History   Problem Relation Age of Onset    Heart Disease Mother         CAD MI at age 72    Cancer Mother         breast cancer  at age 83    Cancer Sister     Lung Disease Father 75        Lung cancer    Cancer Maternal Grandmother         Pancreatic cancer    Heart Attack Maternal Grandfather         MI at age 70    Heart Disease Sister         Rhuemati cfever- herat murmur       Social History     Socioeconomic History    Marital status:      Spouse name: Not on file    Number of children: Not on file    Years of education: Not on file    Highest education level: Not on file   Occupational History    Not on file   Tobacco Use    Smoking status: Former     Packs/day: 1.00     Years: 20.00     Pack years: 20.00     Types: Cigarettes     Quit date: 1986     Years since quittin.4    Smokeless tobacco: Never    Tobacco comments:     Quit in    Vaping Use    Vaping Use: Never used    Substance and Sexual Activity    Alcohol use: Yes     Alcohol/week: 4.2 oz     Types: 7 Standard drinks or equivalent per week     Comment: 1 drink a night    Drug use: No    Sexual activity: Not on file   Other Topics Concern    Not on file   Social History Narrative    She lives alone but has a female roommate who is 60, Leonora Rosa who has a brother in town she is estranged from. Leonora Rosa had Covid  and now has long-haulers and is on medicaid     She has 2 children and 4 stepchildren, her   '. She has a son near [place], [place] and another in [place]. She relies on her 2 grandkids who live in [place] and her 8y younger sister lives in [facility], her older sister lives in a intermediate in [place]. 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  then  after 27y. Her ex  her cousin, met her 2nd  and was  for 25y.     Her son supports her keeping Leonora Rosa at her home. She does not feel taken advantage of despite no longer receiving rent.      Social Determinants of Health     Financial Resource Strain: Not on file   Food Insecurity: Not on file   Transportation Needs: Not on file   Physical Activity: Not on file   Stress: Not on file   Social Connections: Not on file   Intimate Partner Violence: Not on file   Housing Stability: Not on file       Current Outpatient Medications   Medication Sig Dispense Refill    benzonatate (TESSALON) 100 MG Cap Take 1 Capsule by mouth 3 times a day as needed for Cough. 60 Capsule 0    pantoprazole (PROTONIX) 20 MG tablet 1 tablet 100 Tablet 2    spironolactone (ALDACTONE) 50 MG Tab Take 1 Tablet by mouth every day. 100 Tablet 0    valsartan-hydrochlorothiazide (DIOVAN-HCT) 160-12.5 MG per tablet Take 0.5 Tablets by mouth 2 times a day. 90 Tablet 3    PARoxetine (PAXIL) 10 MG Tab Take 1 Tablet by mouth every day. 100 Tablet 0    rosuvastatin (CRESTOR) 20 MG Tab Take 1 Tablet by mouth every evening. 100 Tablet 0     "anastrozole (ARIMIDEX) 1 MG Tab Take 1 Tablet by mouth every morning. 100 Tablet 0    budesonide-formoterol (SYMBICORT) 160-4.5 MCG/ACT Aerosol Inhale 2 Puffs 2 times a day. 3 Each 3    melatonin 5 mg Tab Take  by mouth at bedtime.      Guaifenesin 400 MG Tab Take 1 Tablet by mouth 1 time a day as needed.      Multiple Vitamins-Minerals (PRESERVISION AREDS 2+MULTI VIT PO) 1 Tablet every day.      diclofenac sodium (VOLTAREN) 1 % Gel       mesalamine (LIALDA) 1.2 GM Tablet Delayed Response Take 1.2 g by mouth every day.      cyanocobalamin (VITAMIN B-12) 100 MCG Tab Take 100 mcg by mouth every day.      Cholecalciferol (VITAMIN D3) 90738 UNIT Cap Take  by mouth.      fluticasone (FLONASE) 50 MCG/ACT nasal spray Spray 1-2 Sprays in nose every day. Each nostril. 1 Bottle 6     No current facility-administered medications for this visit.        ALLERGIES: Sulfa drugs, Vancomycin, and Codeine    ROS  Constitutional: Denies fevers, Denies weight changes  Ears/Nose/Throat/Mouth: Denies nasal congestion or sore throat   Cardiovascular: Denies chest pain  Respiratory: Denies shortness of breath, Denies cough  Gastrointestinal/Hepatic: Denies nausea, vomiting  Sleep: see HPI      PHYSICAL EXAM  /60 (BP Location: Left arm, Patient Position: Sitting, BP Cuff Size: Large adult)   Pulse 68   Resp 14   Ht 1.638 m (5' 4.5\")   Wt 79.8 kg (176 lb)   SpO2 92%   BMI 29.74 kg/m²   Appearance: Well-nourished, well-developed, no acute distress  Eyes:  No scleral icterus , EOMI  ENMT: masked  Musculoskeletal:  Grossly normal; gait and station normal; digits and nails normal  Skin:  No rashes, petechiae, cyanosis  Neurologic: without focal signs; oriented to person, time, place, and purpose; judgement intact      Medical Decision Making   Assessment and Plan  Erin Hess is a 82 y.o.female  with hypertension, hyperlipidemia, CAD, moderate persistent asthma, moderate aortic stenosis, and obstructive sleep apnea.  Presents " today to follow-up regarding obstructive sleep apnea management and sleep study results.    The medical record was reviewed.    Obstructive sleep apnea   Reviewed recent HST with patient showing an AHI of 11.8, and Min Oxygen saturation of 77%.  Time at or below 88% saturation of 10 hours 57 minutes  Based on sleep study and symptoms meets criteria for mild obstructive sleep apnea.   Patient has severe nocturnal hypoxia with minimal oxygen saturation 77%, time at or below 88% saturation of 11 hours.  This is out of proportion to the level of sleep apnea.  We discussed the pathophysiology of obstructive sleep apnea (ROBYN) and risk factors for the disease. We also discussed possible consequences of untreated ROBYN, including excessive daytime sleepiness and fatigue, cognitive dysfunction, cardiovascular complications such as elevated blood pressure, heart attacks, cardiac arrhythmias, and strokes.  Given severity of nocturnal hypoxia out of proportion to patient's sleep apnea patient would benefit from undergoing a in lab sleep study.  There is question as to whether patient would need PAP therapy with supplemental oxygen in addition patient is still considering inspire implant.  Given the severity of hypoxia home sleep study may have underestimated severity of sleep apnea.    RECOMMENDATIONS  -She will be scheduled for a PSG  -Patient counseled to avoid driving when sleepy. Encouraged to anticipate sleepiness, consider taking a 10 min nap prior to driving, alternate with another , or pull over if sleepy while driving  -Advised to contact our office or myself with any questions via MyChart    Positive airway pressure will favorably impact many of the adverse conditions and effects provoked by ROBYN.    Have advised the patient to follow up with the appropriate healthcare practitioners for all other medical problems and issues.    Return for after sleep study.      Please note portions of this record was created using  voice recognition software. I have made every reasonable attempt to correct obvious errors, but I expect that there are errors of grammar and possibly content I did not discover before finalizing the note.

## 2022-10-19 ENCOUNTER — PHYSICAL THERAPY (OUTPATIENT)
Dept: PHYSICAL THERAPY | Facility: MEDICAL CENTER | Age: 82
End: 2022-10-19
Attending: FAMILY MEDICINE
Payer: MEDICARE

## 2022-10-19 DIAGNOSIS — M54.50 CHRONIC LOW BACK PAIN WITHOUT SCIATICA, UNSPECIFIED BACK PAIN LATERALITY: ICD-10-CM

## 2022-10-19 DIAGNOSIS — G89.29 CHRONIC LOW BACK PAIN WITHOUT SCIATICA, UNSPECIFIED BACK PAIN LATERALITY: ICD-10-CM

## 2022-10-19 DIAGNOSIS — M25.559 HIP PAIN: ICD-10-CM

## 2022-10-19 PROCEDURE — 97110 THERAPEUTIC EXERCISES: CPT

## 2022-10-19 PROCEDURE — 97140 MANUAL THERAPY 1/> REGIONS: CPT

## 2022-10-19 NOTE — OP THERAPY DAILY TREATMENT
Outpatient Physical Therapy  DAILY TREATMENT     [facility] Outpatient Physical Therapy  [address]  [place] [address]  Phone:  [phone]  Fax:  [phone]    Date: 10/19/2022    Patient: Erin Hess  YOB: 1940  MRN: [id number]     Time Calculation    Start time: 1000  Stop time: 1043 Time Calculation (min): 43 minutes         Chief Complaint: Back Problem    Visit #: 10    SUBJECTIVE: Patient reports her R wrist is better. She is not having any back pain either. Only concern is lateral hip pain with prolonged sleeping on either side (R >L)    OBJECTIVE:  Current objective measures:     Rloando's neg bilat (hip flexor tightness present)  Hip abduction MMT = 2+/5 bilat           Therapeutic Exercises (CPT 71685):     1. NuStep S9/A10, 6 min, L5    8. side lying clams, 3 x 10 B    9. bridges w/ hip abd, 3 x 10 (blue loop)    10. ham curls + TVA, 2 x 10      Therapeutic Exercise Summary: HEP: SKTC stretch 3 x 30 sec daily, trunk flexion stretch at counter 3 x 20 sec daily  Access Code: [email or web address]  URL: [email or web address]  Date: 08/10/2022  Prepared by: Bhumi Czech    Exercises  Sideways Walking - 1 x daily - 3 sets - 10 reps (w/ band above knees)  Hooklying Hamstring Stretch with Strap - 1 x daily - 1-2 minute hold  Wall push up 2 x 10   Seated on gym ball: Bicep curls 2 x 10 5# and shoulder OH press 2 x 10 3#  Lateral step down 2 x 10   Wall squats w/ band above knees = 2 x 10   Ham curls + TVA 2 x 10         Therapeutic Treatments and Modalities:     1. Manual Therapy (CPT 71954)    Therapeutic Treatment and Modalities Summary: Manual: STW bilat IT band   Time-based treatments/modalities:    Physical Therapy Timed Treatment Charges  Manual therapy minutes (CPT 69687): 20 minutes  Therapeutic exercise minutes (CPT 75172): 23 minutes        ASSESSMENT:   Response to treatment: Decreased TTP bilat lateral hip with manual. Focused on core  stabilization and hip strengthening. Challenging to perform some of the core exercises and R hip fatigue with hip strengthening. Continue to address this to decrease back/ hip symptoms with function.     PLAN/RECOMMENDATIONS:   Plan for treatment: therapy treatment to continue next visit.  Planned interventions for next visit: continue with current treatment.

## 2022-11-02 LAB
APOB+LDLR+PCSK9 GENE MUT ANL BLD/T: NOT DETECTED
BRCA1+BRCA2 DEL+DUP + FULL MUT ANL BLD/T: NOT DETECTED
MLH1+MSH2+MSH6+PMS2 GN DEL+DUP+FUL M: NOT DETECTED

## 2022-11-06 ENCOUNTER — SLEEP STUDY (OUTPATIENT)
Dept: SLEEP MEDICINE | Facility: MEDICAL CENTER | Age: 82
End: 2022-11-06
Attending: STUDENT IN AN ORGANIZED HEALTH CARE EDUCATION/TRAINING PROGRAM
Payer: MEDICARE

## 2022-11-06 DIAGNOSIS — G47.34 NOCTURNAL HYPOXIA: ICD-10-CM

## 2022-11-06 DIAGNOSIS — G47.33 OSA (OBSTRUCTIVE SLEEP APNEA): ICD-10-CM

## 2022-11-06 PROCEDURE — 95811 POLYSOM 6/>YRS CPAP 4/> PARM: CPT | Performed by: STUDENT IN AN ORGANIZED HEALTH CARE EDUCATION/TRAINING PROGRAM

## 2022-11-09 ENCOUNTER — OFFICE VISIT (OUTPATIENT)
Dept: SLEEP MEDICINE | Facility: MEDICAL CENTER | Age: 82
End: 2022-11-09
Payer: MEDICARE

## 2022-11-09 VITALS
OXYGEN SATURATION: 91 % | HEART RATE: 82 BPM | RESPIRATION RATE: 14 BRPM | DIASTOLIC BLOOD PRESSURE: 64 MMHG | WEIGHT: 176 LBS | HEIGHT: 65 IN | SYSTOLIC BLOOD PRESSURE: 110 MMHG | BODY MASS INDEX: 29.32 KG/M2

## 2022-11-09 DIAGNOSIS — G47.33 OSA (OBSTRUCTIVE SLEEP APNEA): Primary | ICD-10-CM

## 2022-11-09 PROCEDURE — 99213 OFFICE O/P EST LOW 20 MIN: CPT | Performed by: STUDENT IN AN ORGANIZED HEALTH CARE EDUCATION/TRAINING PROGRAM

## 2022-11-09 ASSESSMENT — FIBROSIS 4 INDEX: FIB4 SCORE: 1.88

## 2022-11-09 NOTE — PROGRESS NOTES
[facility] Follow-up Visit    CC: Follow-up to discuss sleep study results      HPI:  Erin Hess is a 82 y.o.female  with hypertension, hyperlipidemia, CAD, moderate persistent asthma, moderate aortic stenosis, and moderate obstructive sleep apnea with nocturnal hypoxia.  Presents today to follow-up to discuss sleep study results.    She reports neither sleep study went well.  She was surprised to place a CPAP machine or on the night of the study.  She continues to be resistant to using CPAP machine as overall she finds it cumbersome and it does dry out her nose and mouth.  Due to this she would like to undergo evaluation for inspire.    Patient Active Problem List    Diagnosis Date Noted    Numbness in feet 05/25/2022    Chronic lower back pain 05/25/2022    Mood disorder (HCC) 05/25/2022    Cough 05/25/2022    Osteopenia of left thigh 05/25/2022    Elevated glycohemoglobin 05/25/2022    Generalized anxiety disorder 03/10/2022    Stage 3a chronic kidney disease (HCC) 03/10/2022    History of 2019 novel coronavirus disease (COVID-19) 03/10/2022    H/O gastric sleeve 03/10/2022    GERD (gastroesophageal reflux disease) 03/10/2022    History of breast cancer 03/10/2022    Advance directive discussed with patient 03/10/2022    Mitral annular calcification     Complex renal cyst 01/10/2018    Coronary artery disease due to lipid rich plaque     Chronic rhinitis 11/07/2017    Moderate aortic stenosis 07/13/2017    Dyslipidemia 07/13/2017    Fatigue 04/11/2017    Asthma 07/08/2016    ROBYN (obstructive sleep apnea) 07/08/2016    Obesity (BMI 30.0-34.9) 05/18/2016    Sciatica 05/09/2013    Essential hypertension 03/12/2013    Ulcerative pancolitis without complication (HCC) 03/12/2013       Past Medical History:   Diagnosis Date    Arthritis     Asthma     Breast cancer (HCC)     Bronchitis 2011    Cancer (HCC) 12/2012    right breast    Cough     Dizziness 03/11/2013    Heart burn     Heart murmur     Hiatus  hernia syndrome     HTN (hypertension) 2013    Hypercholesterolemia     Hyperlipidemia 2013    Hypertension     Hypokalemia 2013    Indigestion     Mitral annular calcification     Pain     back, hips, knees    Psychiatric disorder     depression    Shortness of breath     Sleep apnea     h/o gastric sleeve lost 40 lb now not on CPAP    Snoring     Sputum production     ULCERATIVE COLITIS 2013    Vertigo 2013    Wheezing         Past Surgical History:   Procedure Laterality Date    GASTRIC SLEEVE LAPAROSCOPY N/A 2016    Procedure: GASTRIC SLEEVE LAPAROSCOPY, HIATAL HERNIA AND LIVER BIOPSY;  Surgeon: Mauricio Montes M.D.;  Location: SURGERY [facility];  Service:     US-NEEDLE CORE BX-BREAST PANEL      rt breast, with lumpectomy     GYN SURGERY      vag hyster     GYN SURGERY      vaginal cyst removal     LUMPECTOMY  left    OTHER       R breast bx X 2& lipoma removal       Family History   Problem Relation Age of Onset    Heart Disease Mother         CAD MI at age 72    Cancer Mother         breast cancer  at age 83    Cancer Sister     Lung Disease Father 75        Lung cancer    Cancer Maternal Grandmother         Pancreatic cancer    Heart Attack Maternal Grandfather         MI at age 70    Heart Disease Sister         Rhuemati cfever- herat murmur       Social History     Socioeconomic History    Marital status:      Spouse name: Not on file    Number of children: Not on file    Years of education: Not on file    Highest education level: Not on file   Occupational History    Not on file   Tobacco Use    Smoking status: Former     Packs/day: 1.00     Years: 20.00     Pack years: 20.00     Types: Cigarettes     Quit date: 1986     Years since quittin.4    Smokeless tobacco: Never    Tobacco comments:     Quit in    Vaping Use    Vaping Use: Never used   Substance and Sexual Activity    Alcohol use: Yes     Alcohol/week: 4.2 oz     Types: 7  Standard drinks or equivalent per week     Comment: 1 drink a night    Drug use: No    Sexual activity: Not on file   Other Topics Concern    Not on file   Social History Narrative    She lives alone but has a female roommate who is 60, Leonora Rosa who has a brother in town she is estranged from. Leonora Rosa had Covid  and now has long-haulers and is on medicaid     She has 2 children and 4 stepchildren, her   '. She has a son near [place], [place] and another in [place]. She relies on her 2 grandkids who live in [place] and her 8y younger sister lives in [facility], her older sister lives in a [place] in [place]. 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  then  after 27y. Her ex  her cousin, met her 2nd  and was  for 25y.     Her son supports her keeping Leonora Rosa at her home. She does not feel taken advantage of despite no longer receiving rent.      Social Determinants of Health     Financial Resource Strain: Not on file   Food Insecurity: Not on file   Transportation Needs: Not on file   Physical Activity: Not on file   Stress: Not on file   Social Connections: Not on file   Intimate Partner Violence: Not on file   Housing Stability: Not on file       Current Outpatient Medications   Medication Sig Dispense Refill    benzonatate (TESSALON) 100 MG Cap Take 1 Capsule by mouth 3 times a day as needed for Cough. 60 Capsule 0    pantoprazole (PROTONIX) 20 MG tablet 1 tablet 100 Tablet 2    spironolactone (ALDACTONE) 50 MG Tab Take 1 Tablet by mouth every day. 100 Tablet 0    valsartan-hydrochlorothiazide (DIOVAN-HCT) 160-12.5 MG per tablet Take 0.5 Tablets by mouth 2 times a day. 90 Tablet 3    PARoxetine (PAXIL) 10 MG Tab Take 1 Tablet by mouth every day. 100 Tablet 0    rosuvastatin (CRESTOR) 20 MG Tab Take 1 Tablet by mouth every evening. 100 Tablet 0    anastrozole (ARIMIDEX) 1 MG Tab Take 1 Tablet by mouth every morning. 100 Tablet 0     "budesonide-formoterol (SYMBICORT) 160-4.5 MCG/ACT Aerosol Inhale 2 Puffs 2 times a day. 3 Each 3    melatonin 5 mg Tab Take  by mouth at bedtime.      Guaifenesin 400 MG Tab Take 1 Tablet by mouth 1 time a day as needed.      Multiple Vitamins-Minerals (PRESERVISION AREDS 2+MULTI VIT PO) 1 Tablet every day.      diclofenac sodium (VOLTAREN) 1 % Gel       mesalamine (LIALDA) 1.2 GM Tablet Delayed Response Take 1 Tablet by mouth every day.      cyanocobalamin (VITAMIN B-12) 100 MCG Tab Take 1 Tablet by mouth every day.      Cholecalciferol (VITAMIN D3) 21464 UNIT Cap Take  by mouth.      fluticasone (FLONASE) 50 MCG/ACT nasal spray Spray 1-2 Sprays in nose every day. Each nostril. 1 Bottle 6     No current facility-administered medications for this visit.        ALLERGIES: Sulfa drugs, Vancomycin, and Codeine    ROS  Constitutional: Denies fevers, Denies weight changes  Ears/Nose/Throat/Mouth: Denies nasal congestion or sore throat   Cardiovascular: Denies chest pain  Respiratory: Denies shortness of breath, Denies cough  Gastrointestinal/Hepatic: Denies nausea, vomiting  Sleep: see HPI      PHYSICAL EXAM  /64 (BP Location: Left arm, Patient Position: Sitting, BP Cuff Size: Large adult)   Pulse 82   Resp 14   Ht 1.638 m (5' 4.5\")   Wt 79.8 kg (176 lb)   SpO2 91%   BMI 29.74 kg/m²   Appearance: Well-nourished, well-developed, no acute distress  Eyes:  No scleral icterus , EOMI  ENMT: masked  Musculoskeletal:  Grossly normal; gait and station normal; digits and nails normal  Skin:  No rashes, petechiae, cyanosis  Neurologic: without focal signs; oriented to person, time, place, and purpose; judgement intact      Medical Decision Making   Assessment and Plan  Erin Hess is a 82 y.o.female  with hypertension, hyperlipidemia, CAD, moderate persistent asthma, moderate aortic stenosis, and moderate obstructive sleep apnea with nocturnal hypoxia.  Presents today to follow-up to discuss sleep study " results.    The medical record was reviewed.    Obstructive sleep apnea  Reviewed recent PSG split-night study with patient showing an AHI of 21.7 and Min Oxygen saturation of 77%.  Time spent at or below 88% saturation during diagnostic portion of 152 minutes.  Patient does have some improvement in respiratory events with CPAP therapy and oxygenation did improve.  Based on sleep study and symptoms meets criteria for moderate obstructive sleep apnea.   Nocturnal hypoxia seen potentially secondary to untreated sleep apnea.  We discussed the pathophysiology of obstructive sleep apnea (ROBYN) and risk factors for the disease. We also discussed possible consequences of untreated ROBYN, including excessive daytime sleepiness and fatigue, cognitive dysfunction, cardiovascular complications such as elevated blood pressure, heart attacks, cardiac arrhythmias, and strokes. We discussed how ROBYN typically gets worse with age. We discussed treatment options for ROBYN, including the gold standard therapy (PAP), alternative options such as a mandibular advancement device (custom-made oral appliances) and surgeries.     After discussion she would like to proceed with inspire device evaluation.  Discussed drug-induced sleep endoscopy along with protocol of having device implanted and activated.  Referral placed to ENT as her previous ENT who performed inspire is no longer in [place].    RECOMMENDATIONS  -Referral placed to ENT Dr Galvez   -Patient counseled to avoid driving when sleepy. Encouraged to anticipate sleepiness, consider taking a 10 min nap prior to driving, alternate with another , or pull over if sleepy while driving  -Advised to contact our office or myself with any questions via [employer]    Have advised the patient to follow up with the appropriate healthcare practitioners for all other medical problems and issues.    Return for 30 days after inspire implantation if candidate otherwise return if fails DISE.      Please  note portions of this record was created using voice recognition software. I have made every reasonable attempt to correct obvious errors, but I expect that there are errors of grammar and possibly content I did not discover before finalizing the note.

## 2022-11-09 NOTE — PROCEDURES
MONTAGE: Standard  STUDY TYPE: Split Night  RECORDING TECHNIQUE:   After the scalp was prepared, gold plated electrodes were applied to the scalp according to the International 10-20 System. EEG (electroencephalogram) was continuously monitored from the O1-M2, O2-M1, C3-M2, C4-M1, F3-M2, and F4-M1. EOGs (electrooculograms) were monitored by electrodes placed at the left and right outer canthi. Chin EMG (electromyogram) was monitored by electrodes placed on the mentalis and sub-mentalis muscles. Nasal and oral airflow were monitored using a triple port thermocouple as well as oronasal pressure transducer. Respiratory effort was measured by inductive plethysmography technology employing abdominal and thoracic belts. Blood oxygen saturation and pulse were monitored by pulse oximetry. Heart rhythm was monitored by surface electrocardiogram. Leg EMG was studied using surface electrodes placed on left and right anterior tibialis. A microphone was used to monitor tracheal sounds and snoring. Body position was monitored and documented by technician observation.   SCORING CRITERIA:   A modification of the AASM manual for scoring of sleep and associated events was used. Obstructive apneas were scored by cessation of airflow for at least 10 seconds with continuing respiratory effort. Central apneas were scored by cessation of airflow for at least 10 seconds with no respiratory effort. Hypopneas were scored by a 30% or more reduction in airflow for at least 10 seconds accompanied by arterial oxygen desaturation of 3% or an arousal. For CMS (Medicare) patients, per AASM rule 1B, hypopneas are scored by 30% with mild reduction in airflow for at least 10 seconds accompanied by arterial saturation decreased at 4%.    DIAGNOSTIC  Study start time was 09:05:38 PM. Diagnostic recording time was 286 minutes with a total sleep time of 155 minutes resulting in a sleep efficiency of 54.20%%. Sleep latency from the start of the study was  73 minutes and the latency from sleep to REM was 00 minutes. In total,52 arousals were scored for an arousal index of 20.1.  Respiratory:  There were a total of 2 apneas consisting of 2 obstructive apneas, 0 mixed apneas, and 0 central apneas. A total of 54 hypopneas were scored. The apnea index was 0.77 per hour and the hypopnea index was 20.90 per hour resulting in an overall AHI of 21.68. AHI during rem was 0.0 and AHI while supine was 0.00.  Oximetry:  There was a mean oxygen saturation of 86.0%. The minimum oxygen saturation during NREM sleep was77.0% and in REM was --. Time spent during sleep with oxygen saturations <88% was 152.1 minutes.   Cardiac:  The highest heart rate seen while awake was 82 BPM while the highest heart rate during sleep was 80 BPM with an average sleeping heart rate of 67 BPM.  Limb Movements:  There were a total of 0 PLMs during sleep, which resulted in a PLM index of 0.0. There were 3 PLMs associated with arousals which resulted in a PLMS arousal index of 1.2.    TREATMENT:  Treatment recording time was 3h 9.5m (189 minutes) with a total sleep time of 2h 2.0m (122 minutes) resulting in a sleep efficiency of 64.4%. Sleep latency from the start of treatment was 26 minutes and REM latency from sleep onset was 1h 7.5m. The patient had 47 arousals in total for an arousal index of 23.1.  Respiratory:   There were 7 apneas in total consisting of 0 obstructive apneas, 7 central apneas, and 0 mixed apneas for an apnea index of 3.44. The patient had 24 hypopneas in total, which resulted in a hypopnea index of 11.80. The overall AHI was 15.25, with a REM AHI of 13.33, and a supine AHI of 68.57.   Oximetry:  The mean SaO2 during treatment was 91.0%. The minimum oxygen saturation in NREM was84.0 % and in REM was 82.0%. Patient spent 22.6 minutes of TST with SaO2 <88%.  Cardiac:  The highest heart rate during sleep was 72 BPM with an average sleeping heart rate of 62BPM.  Limb Movements:  There were  a total of 0 PLMS during titration sleep time that resulted in an index of 0.0. There were 1 PLMS associated with arousals. This resulted in a PLM arousal index of 0.5.  Titration:   CPAP was tried from 5 to 9cm H2O.  This was a fully attended sleep study. This test was technically adequate. This patient was titrated on CPAP starting at 5 cm of water pressure. Patient was titrated up to 9 cm of water pressure. Patient did best at 6 cm of water pressure. Patient spent 20 minutes at that pressure and their AHI was 8.8 which is considered Mild obstructive sleep apnea.     Impression:  1.  Moderate obstructive sleep apnea with overall AHI 21.7  2.  Nocturnal hypoxia likely secondary to untreated sleep apnea  3.  Did have some improvement with CPAP  4.  No supine REM sleep was seen      Recommendations:  I recommend auto CPAP of 5-10 cm .      However patient is reluctant to use CPAP therapy.  Patient may be a candidate for inspire device with moderate obstructive sleep apnea and no central sleep apnea present.     I also recommend 30 day compliance download to assess the efficacy to the recommended pressure, measure leak, apnea hypopnea index and compliance for further outpatient monitoring and management of PAP therapy. In some cases alternative treatment options may be proven effective in resolving sleep apnea. These options include upper airway surgery, the use of a dental orthotic, weight loss, or positional therapy. Clinical correlation is required. In general patients with sleep apnea are advised to avoid alcohol, sedatives and not to operate a motor vehicle while drowsy.  Untreated sleep apnea increases the risk for cardiovascular and neurovascular disease.

## 2022-11-17 ENCOUNTER — APPOINTMENT (OUTPATIENT)
Dept: SLEEP MEDICINE | Facility: MEDICAL CENTER | Age: 82
End: 2022-11-17
Payer: MEDICARE

## 2022-11-22 RX ORDER — PANTOPRAZOLE SODIUM 40 MG/1
40 TABLET, DELAYED RELEASE ORAL DAILY
Qty: 100 TABLET | Refills: 2 | Status: SHIPPED | OUTPATIENT
Start: 2022-11-22 | End: 2022-12-21

## 2022-11-22 RX ORDER — PANTOPRAZOLE SODIUM 40 MG/1
40 TABLET, DELAYED RELEASE ORAL DAILY
COMMUNITY
End: 2022-11-22 | Stop reason: SDUPTHER

## 2022-11-22 NOTE — TELEPHONE ENCOUNTER
Received request via: Patient    Was the patient seen in the last year in this department? Yes    Does the patient have an active prescription (recently filled or refills available) for medication(s) requested? No    Does the patient have residential Plus and need 100 day supply (blood pressure, diabetes and cholesterol meds only)? Yes, quantity updated to 100 days      Patient said taking 40mg and cutting in half  two 20mg a day

## 2022-11-28 ENCOUNTER — HOSPITAL ENCOUNTER (OUTPATIENT)
Dept: RADIOLOGY | Facility: MEDICAL CENTER | Age: 82
End: 2022-11-28
Attending: FAMILY MEDICINE
Payer: MEDICARE

## 2022-11-28 ENCOUNTER — OFFICE VISIT (OUTPATIENT)
Dept: MEDICAL GROUP | Facility: PHYSICIAN GROUP | Age: 82
End: 2022-11-28
Payer: MEDICARE

## 2022-11-28 VITALS
WEIGHT: 176 LBS | TEMPERATURE: 97.9 F | HEART RATE: 66 BPM | HEIGHT: 65 IN | BODY MASS INDEX: 29.32 KG/M2 | SYSTOLIC BLOOD PRESSURE: 124 MMHG | RESPIRATION RATE: 18 BRPM | DIASTOLIC BLOOD PRESSURE: 78 MMHG | OXYGEN SATURATION: 95 %

## 2022-11-28 DIAGNOSIS — R05.1 ACUTE COUGH: ICD-10-CM

## 2022-11-28 DIAGNOSIS — J06.9 ACUTE URI: ICD-10-CM

## 2022-11-28 PROCEDURE — 99213 OFFICE O/P EST LOW 20 MIN: CPT | Performed by: FAMILY MEDICINE

## 2022-11-28 PROCEDURE — 71046 X-RAY EXAM CHEST 2 VIEWS: CPT

## 2022-11-28 RX ORDER — ALBUTEROL SULFATE 2.5 MG/3ML
2.5 SOLUTION RESPIRATORY (INHALATION) ONCE
OUTPATIENT
Start: 2022-11-28 | End: 2022-12-01

## 2022-11-28 RX ORDER — PREDNISONE 20 MG/1
40 TABLET ORAL DAILY
Qty: 10 TABLET | Refills: 0 | Status: SHIPPED | OUTPATIENT
Start: 2022-11-28 | End: 2022-12-27

## 2022-11-28 ASSESSMENT — FIBROSIS 4 INDEX: FIB4 SCORE: 1.88

## 2022-11-28 NOTE — PROGRESS NOTES
"Subjective:     CC:   Chief Complaint   Patient presents with    Cough     X 2 weeks, \"bloody stuff\" started 1 day ago    Headache       HPI:   Erin presents today with worsening cough x 2 weeks. She does have moderate persistent asthma and ROBYN with nocturnal hypoxia.  States when this started it started it.  Viral in nature where she had myalgias, fatigue, fever however now she has a residual cough over the last 2 weeks.  + for SOB with coughing with mucous production that is green/yellow and sometimes has blood in it.  Occ has chest heaviness.  She does have a significant amount of fatigue as well.  Denies fevers, chills, chest pains, thoracic back pain.   Roommate and sister are both sick, states her sister has PNA    No problem-specific Assessment & Plan notes found for this encounter.      Current Outpatient Medications Ordered in Epic   Medication Sig Dispense Refill    predniSONE (DELTASONE) 20 MG Tab Take 2 Tablets by mouth every day. 10 Tablet 0    albuterol (PROVENTIL) 2.5mg/0.5ml Nebu Soln Take 0.5 mL by nebulization every four hours as needed for Shortness of Breath. 20 mL 0    pantoprazole (PROTONIX) 40 MG Tablet Delayed Response Take 1 Tablet by mouth every day. 100 Tablet 2    spironolactone (ALDACTONE) 50 MG Tab Take 1 Tablet by mouth every day. 100 Tablet 0    valsartan-hydrochlorothiazide (DIOVAN-HCT) 160-12.5 MG per tablet Take 0.5 Tablets by mouth 2 times a day. 90 Tablet 3    PARoxetine (PAXIL) 10 MG Tab Take 1 Tablet by mouth every day. 100 Tablet 0    rosuvastatin (CRESTOR) 20 MG Tab Take 1 Tablet by mouth every evening. 100 Tablet 0    anastrozole (ARIMIDEX) 1 MG Tab Take 1 Tablet by mouth every morning. 100 Tablet 0    budesonide-formoterol (SYMBICORT) 160-4.5 MCG/ACT Aerosol Inhale 2 Puffs 2 times a day. 3 Each 3    melatonin 5 mg Tab Take  by mouth at bedtime.      Guaifenesin 400 MG Tab Take 1 Tablet by mouth 1 time a day as needed.      Multiple Vitamins-Minerals (PRESERVISION AREDS " "2+MULTI VIT PO) 1 Tablet every day.      diclofenac sodium (VOLTAREN) 1 % Gel       mesalamine (LIALDA) 1.2 GM Tablet Delayed Response Take 1 Tablet by mouth every day.      cyanocobalamin (VITAMIN B-12) 100 MCG Tab Take 1 Tablet by mouth every day.      Cholecalciferol (VITAMIN D3) 95806 UNIT Cap Take  by mouth.      fluticasone (FLONASE) 50 MCG/ACT nasal spray Spray 1-2 Sprays in nose every day. Each nostril. 1 Bottle 6     Current Facility-Administered Medications Ordered in Epic   Medication Dose Route Frequency Provider Last Rate Last Admin    albuterol (PROVENTIL) 2.5mg/3ml nebulizer solution 2.5 mg  2.5 mg Nebulization Once ESTHER Muñoz           Health Maintenance: Completed      Objective:     Exam:  /78 (BP Location: Left arm, Patient Position: Sitting, BP Cuff Size: Adult)   Pulse 66   Temp 36.6 °C (97.9 °F) (Temporal)   Resp 18   Ht 1.638 m (5' 4.5\")   Wt 79.8 kg (176 lb)   SpO2 90%   BMI 29.74 kg/m²  Body mass index is 29.74 kg/m².    Physical Exam  Vitals reviewed.   Constitutional:       General: She is not in acute distress.     Appearance: Normal appearance.   HENT:      Nose: No rhinorrhea.   Eyes:      Conjunctiva/sclera: Conjunctivae normal.      Pupils: Pupils are equal, round, and reactive to light.   Cardiovascular:      Rate and Rhythm: Normal rate.      Pulses: Normal pulses.   Pulmonary:      Effort: No respiratory distress.      Breath sounds: No stridor. Wheezing and rhonchi present. No rales.   Chest:      Chest wall: No tenderness.   Musculoskeletal:      Right lower leg: No edema.      Left lower leg: No edema.   Skin:     General: Skin is warm.   Neurological:      Mental Status: She is alert and oriented to person, place, and time.   Psychiatric:         Mood and Affect: Mood normal.         Behavior: Behavior normal.        A chaperone was offered to the patient during today's exam. Patient declined chaperone.        Assessment & Plan:     82 y.o. female " with the following -     1. Acute cough  - DX-CHEST-2 VIEWS; Future    2. Acute URI  Acute problem x2 weeks, discussed this started out viral by her symptomology however now she continues to have ongoing cough and productive cough with mixed mucus and blood in her sputum.    She is currently hemodynamically stable, oxygen levels on examination are low end of normal, will recheck after nebulizer treatment in clinic.  She does have wheezing and coarse lung sounds, albuterol nebulizer treatment done today in clinic.  ED precautions given.  Script sent for short burst of prednisone to her pharmacy as I feel like this acute URI has caused an asthma exacerbation. Discussed this is likely bronchial irritation due to her ongoing cough however we will obtain a chest x-ray for further evaluation.  Other orders  - predniSONE (DELTASONE) 20 MG Tab; Take 2 Tablets by mouth every day.  Dispense: 10 Tablet; Refill: 0  - albuterol (PROVENTIL) 2.5mg/0.5ml Nebu Soln; Take 0.5 mL by nebulization every four hours as needed for Shortness of Breath.  Dispense: 20 mL; Refill: 0  - albuterol (PROVENTIL) 2.5mg/3ml nebulizer solution 2.5 mg     I spent a total of 26 minutes with record review, exam, communication with the patient, communication with other providers, and documentation of this encounter.      No follow-ups on file.    Please note that this dictation was created using voice recognition software. I have made every reasonable attempt to correct obvious errors, but I expect that there are errors of grammar and possibly content that I did not discover before finalizing the note.

## 2022-11-29 DIAGNOSIS — J06.9 ACUTE URI: ICD-10-CM

## 2022-11-29 RX ORDER — AMOXICILLIN AND CLAVULANATE POTASSIUM 875; 125 MG/1; MG/1
1 TABLET, FILM COATED ORAL 2 TIMES DAILY
Qty: 10 TABLET | Refills: 0 | Status: SHIPPED | OUTPATIENT
Start: 2022-11-29 | End: 2022-12-04

## 2022-12-06 ENCOUNTER — OFFICE VISIT (OUTPATIENT)
Dept: SLEEP MEDICINE | Facility: MEDICAL CENTER | Age: 82
End: 2022-12-06
Payer: MEDICARE

## 2022-12-06 VITALS
SYSTOLIC BLOOD PRESSURE: 122 MMHG | OXYGEN SATURATION: 92 % | WEIGHT: 176 LBS | DIASTOLIC BLOOD PRESSURE: 70 MMHG | BODY MASS INDEX: 29.32 KG/M2 | RESPIRATION RATE: 16 BRPM | HEIGHT: 65 IN | HEART RATE: 81 BPM

## 2022-12-06 DIAGNOSIS — J45.40 MODERATE PERSISTENT ASTHMA, UNSPECIFIED WHETHER COMPLICATED: ICD-10-CM

## 2022-12-06 DIAGNOSIS — R05.3 CHRONIC COUGH: ICD-10-CM

## 2022-12-06 DIAGNOSIS — G47.01 INSOMNIA DUE TO MEDICAL CONDITION: ICD-10-CM

## 2022-12-06 PROCEDURE — 99215 OFFICE O/P EST HI 40 MIN: CPT | Performed by: INTERNAL MEDICINE

## 2022-12-06 RX ORDER — ZOLPIDEM TARTRATE 5 MG/1
5 TABLET ORAL NIGHTLY PRN
Qty: 30 TABLET | Refills: 0 | Status: SHIPPED | OUTPATIENT
Start: 2022-12-06 | End: 2023-01-05

## 2022-12-06 RX ORDER — PREDNISONE 10 MG/1
TABLET ORAL
Qty: 18 TABLET | Refills: 2 | Status: SHIPPED | OUTPATIENT
Start: 2022-12-06 | End: 2022-12-27

## 2022-12-06 RX ORDER — BUDESONIDE 0.5 MG/2ML
500 INHALANT ORAL 4 TIMES DAILY
Qty: 120 ML | Refills: 3 | Status: SHIPPED | OUTPATIENT
Start: 2022-12-06 | End: 2023-02-23

## 2022-12-06 RX ORDER — ALBUTEROL SULFATE 2.5 MG/3ML
2.5 SOLUTION RESPIRATORY (INHALATION) EVERY 4 HOURS PRN
Qty: 360 EACH | Refills: 3 | Status: SHIPPED | OUTPATIENT
Start: 2022-12-06

## 2022-12-06 ASSESSMENT — FIBROSIS 4 INDEX: FIB4 SCORE: 1.88

## 2022-12-06 NOTE — PROGRESS NOTES
"Pulmonary Clinic Note    Chief Complaint:  Chief Complaint   Patient presents with    Follow-Up     Last seen 8/17/22     Apnea     Last seen 11/9/22 Dr. Alba      HPI:   Erin Hess is a very pleasant 82 y.o. female seen in pulmonary clinic in August 2022.  She is being seen for complaint of cough.  She had COVID-19 pneumonia in December 2020.  She has had work-ups in the past that show allergies to pollens and spirometry with mild obstruction that resolved with bronchodilators.  She was diagnosed with asthma and is currently on low-dose Symbicort. She is also on Flonase. She is also former smoker.  Smoked 1 pack a day for 20 years and quit in 1986.      she has no history of childhood respiratory disease.  she has no history of sinus disease.  she has + history of chronic rhinitis/conjuctivitis  she has no history of eczema  she has no history of peripheral eosinophilia >150  Personally reviewed her chest x-ray in July 2022 with cardiomegaly but no lung findings.    I personally reviewed her spirometry from July 2016.  She had a mild obstruction which disappeared with a greater than 200 cc improvement with bronchodilator.  This did just missed the 12% cut off to be called to statistically significant.    she has 0 ED visits in the past year for respiratory issues.  she has never been hospitalized for respiratory issues.   she has never been intubated.  she is on pantoprazole 20 mg daily for GERD.   she has pets: 2 dogs    She has also had an allergy work-up in 2015 which suggested allergies to [place] grass and [place] grass pollens.     Aug 2022: She states that she had pneumonia in December 2021 while she was visiting [place].  She was given antibiotics and nebulizer.  She has had a residual cough since.  She stated that she coughs \"all the time\".  She is not using her Symbicort.  She does use the nebulizer and occasional albuterol.  She stated that she never really fully understood that she had asthma.  " She had been skeptical of the diagnosis and she did not have as a child.    TODAY: She still has a very persistent cough.  She states she is coughing to the point that she cannot sleep some nights.  She has been compliant with her Symbicort and states she is using it every 6 hours.  She was recently prescribed prednisone and an antibiotic which seemed to help somewhat.  Her roommate is also having a cough.  She denies any infectious symptoms.  She was not able to fill her albuterol nebs because the pharmacy said they did not have it.    Past Medical History:   Diagnosis Date    Arthritis     Asthma     Breast cancer (HCC)     Bronchitis 2011    Cancer (HCC) 12/2012    right breast    Cough     Dizziness 03/11/2013    Heart burn     Heart murmur     Hiatus hernia syndrome     HTN (hypertension) 03/12/2013    Hypercholesterolemia     Hyperlipidemia 03/12/2013    Hypertension     Hypokalemia 03/12/2013    Indigestion     Mitral annular calcification     Pain     back, hips, knees    Psychiatric disorder     depression    Shortness of breath     Sleep apnea     h/o gastric sleeve lost 40 lb now not on CPAP    Snoring     Sputum production     ULCERATIVE COLITIS 03/12/2013    Vertigo 03/11/2013    Wheezing        Past Surgical History:   Procedure Laterality Date    GASTRIC SLEEVE LAPAROSCOPY N/A 5/18/2016    Procedure: GASTRIC SLEEVE LAPAROSCOPY, HIATAL HERNIA AND LIVER BIOPSY;  Surgeon: Mauricio Montes M.D.;  Location: SURGERY [facility];  Service:     US-NEEDLE CORE BX-BREAST PANEL  2013    rt breast, with lumpectomy     GYN SURGERY      vag hyster 1990    GYN SURGERY      vaginal cyst removal     LUMPECTOMY  left    OTHER       R breast bx X 2& lipoma removal       Social History     Socioeconomic History    Marital status:      Spouse name: Not on file    Number of children: Not on file    Years of education: Not on file    Highest education level: Not on file   Occupational History    Not on file    Tobacco Use    Smoking status: Former     Packs/day: 1.00     Years: 20.00     Pack years: 20.00     Types: Cigarettes     Quit date: 1986     Years since quittin.5    Smokeless tobacco: Never    Tobacco comments:     Quit in    Vaping Use    Vaping Use: Never used   Substance and Sexual Activity    Alcohol use: Yes     Alcohol/week: 4.2 oz     Types: 7 Standard drinks or equivalent per week     Comment: 1 drink a night    Drug use: No    Sexual activity: Not on file   Other Topics Concern    Not on file   Social History Narrative    She lives alone but has a female roommate who is 60, Leonora Rosa who has a brother in town she is estranged from. Leonora Rosa had Covid  and now has long-haulers and is on medicaid     She has 2 children and 4 stepchildren, her   . She has a son near [place], [place] and another in [place]. She relies on her 2 grandkids who live in [place] and her 8y younger sister lives in [place], her older sister lives in a [facility] in [place]. 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  then  after 29y. Her ex  her cousin, met her 2nd  and was  for 25y.     Her son supports her keeping Leonora Rosa at her home. She does not feel taken advantage of despite no longer receiving rent.      Social Determinants of Health     Financial Resource Strain: Not on file   Food Insecurity: Not on file   Transportation Needs: Not on file   Physical Activity: Not on file   Stress: Not on file   Social Connections: Not on file   Intimate Partner Violence: Not on file   Housing Stability: Not on file          Family History   Problem Relation Age of Onset    Heart Disease Mother         CAD MI at age 72    Cancer Mother         breast cancer  at age 83    Cancer Sister     Lung Disease Father 75        Lung cancer    Cancer Maternal Grandmother         Pancreatic cancer    Heart Attack Maternal Grandfather         MI at age 70     Heart Disease Sister         Nadya cfever- herat murmur       Current Outpatient Medications on File Prior to Visit   Medication Sig Dispense Refill    predniSONE (DELTASONE) 20 MG Tab Take 2 Tablets by mouth every day. 10 Tablet 0    albuterol (PROVENTIL) 2.5mg/0.5ml Nebu Soln Take 0.5 mL by nebulization every four hours as needed for Shortness of Breath. 20 mL 0    pantoprazole (PROTONIX) 40 MG Tablet Delayed Response Take 1 Tablet by mouth every day. 100 Tablet 2    spironolactone (ALDACTONE) 50 MG Tab Take 1 Tablet by mouth every day. 100 Tablet 0    valsartan-hydrochlorothiazide (DIOVAN-HCT) 160-12.5 MG per tablet Take 0.5 Tablets by mouth 2 times a day. 90 Tablet 3    PARoxetine (PAXIL) 10 MG Tab Take 1 Tablet by mouth every day. 100 Tablet 0    rosuvastatin (CRESTOR) 20 MG Tab Take 1 Tablet by mouth every evening. 100 Tablet 0    anastrozole (ARIMIDEX) 1 MG Tab Take 1 Tablet by mouth every morning. 100 Tablet 0    budesonide-formoterol (SYMBICORT) 160-4.5 MCG/ACT Aerosol Inhale 2 Puffs 2 times a day. 3 Each 3    melatonin 5 mg Tab Take  by mouth at bedtime.      Guaifenesin 400 MG Tab Take 1 Tablet by mouth 1 time a day as needed.      Multiple Vitamins-Minerals (PRESERVISION AREDS 2+MULTI VIT PO) 1 Tablet every day.      diclofenac sodium (VOLTAREN) 1 % Gel       mesalamine (LIALDA) 1.2 GM Tablet Delayed Response Take 1 Tablet by mouth every day.      cyanocobalamin (VITAMIN B-12) 100 MCG Tab Take 1 Tablet by mouth every day.      Cholecalciferol (VITAMIN D3) 66175 UNIT Cap Take  by mouth.      fluticasone (FLONASE) 50 MCG/ACT nasal spray Spray 1-2 Sprays in nose every day. Each nostril. 1 Bottle 6     No current facility-administered medications on file prior to visit.       Allergies: Sulfa drugs, Vancomycin, and Codeine      ROS: A 12 point ROS was performed on intake and during my interview. ROS negative unless specifically noted in HPI.     Vitals:  /70 (BP Location: Left arm, Patient  "Position: Sitting, BP Cuff Size: Adult)   Pulse 81   Resp 16   Ht 1.638 m (5' 4.5\")   Wt 79.8 kg (176 lb)   SpO2 92%     Physical Exam:  Physical Exam  Vitals reviewed.   Constitutional:       General: She is not in acute distress.     Appearance: Normal appearance. She is normal weight. She is not ill-appearing, toxic-appearing or diaphoretic.   HENT:      Head: Normocephalic.   Eyes:      Conjunctiva/sclera: Conjunctivae normal.      Pupils: Pupils are equal, round, and reactive to light.   Cardiovascular:      Rate and Rhythm: Normal rate and regular rhythm.      Heart sounds: No murmur heard.    No gallop.   Pulmonary:      Effort: Pulmonary effort is normal. No respiratory distress.      Breath sounds: Wheezing present. No rales.   Skin:     General: Skin is warm and dry.   Neurological:      General: No focal deficit present.      Mental Status: She is alert and oriented to person, place, and time.   Psychiatric:         Mood and Affect: Mood normal.         Behavior: Behavior normal.       Laboratory Data: I personally reviewed labs including historical lab trends.       Assessment/Plan:    #Chronic cough  #Severe persistent asthma  #Insomnia secondary to coughing    She has wheezing on exam today which clears quickly with coughing.  She is up-to-date with vaccines.  She does know that pollens exacerbate her breathing but is not sure of other triggers.  We went over proper inhaler technique and indications.      Plan:  -Symbicort 2 puffs twice daily.  To be used every 6 hours while in an exacerbation.  -As needed albuterol nebs  -Pulmicort nebs  -9-day tapering prednisone  -As needed Ambien.  Caution given on risks with Ambien use including but not limited to: Sleepwalking, sleep driving, sleep eating, falls.  Instructed to take without alcohol use.  Instructed to take immediately before going to bed.  She should have her alarms set and doors locked before taking this medication.  I did also caution that " although this medication is marketed as being on habit-forming, some patients do develop dependence on it.  -She states she cannot take codeine due to severe nightmares.  She states that Tessalon Perles have not helped her in the past.  -I discussed gabapentin as a cough suppressant if her coughing persists.  -Vaccines up-to-date    Follow-up in 1 to 3 months.    Total consult time was 50 min. This includes reviewing previous provider notes, personally reviewing previous imaging and spirometry, educating the patient about their disease process, and inhaler education.   __________  Marty Taylor, DO  Pulmonary and Critical Care Medicine  [facility]    Please note that this dictation was created using voice recognition software. The accuracy of the dictation is limited to the abilities of the software. I have made every reasonable attempt to correct obvious errors, but I expect that there are errors of grammar and possibly content that I did not discover before finalizing the note.

## 2022-12-21 ENCOUNTER — OFFICE VISIT (OUTPATIENT)
Dept: URGENT CARE | Facility: CLINIC | Age: 82
End: 2022-12-21
Payer: MEDICARE

## 2022-12-21 ENCOUNTER — HOSPITAL ENCOUNTER (EMERGENCY)
Facility: MEDICAL CENTER | Age: 82
End: 2022-12-29
Payer: MEDICARE

## 2022-12-21 ENCOUNTER — APPOINTMENT (OUTPATIENT)
Dept: RADIOLOGY | Facility: MEDICAL CENTER | Age: 82
End: 2022-12-21
Attending: EMERGENCY MEDICINE
Payer: MEDICARE

## 2022-12-21 ENCOUNTER — HOSPITAL ENCOUNTER (EMERGENCY)
Facility: MEDICAL CENTER | Age: 82
End: 2022-12-21
Attending: EMERGENCY MEDICINE
Payer: MEDICARE

## 2022-12-21 VITALS
DIASTOLIC BLOOD PRESSURE: 65 MMHG | HEART RATE: 68 BPM | SYSTOLIC BLOOD PRESSURE: 123 MMHG | TEMPERATURE: 97 F | RESPIRATION RATE: 16 BRPM | WEIGHT: 172.4 LBS | OXYGEN SATURATION: 95 % | BODY MASS INDEX: 29.43 KG/M2 | HEIGHT: 64 IN

## 2022-12-21 VITALS
DIASTOLIC BLOOD PRESSURE: 60 MMHG | HEIGHT: 65 IN | RESPIRATION RATE: 16 BRPM | SYSTOLIC BLOOD PRESSURE: 122 MMHG | OXYGEN SATURATION: 96 % | BODY MASS INDEX: 29.32 KG/M2 | TEMPERATURE: 97.6 F | HEART RATE: 68 BPM | WEIGHT: 176 LBS

## 2022-12-21 DIAGNOSIS — R10.84 GENERALIZED ABDOMINAL PAIN: ICD-10-CM

## 2022-12-21 DIAGNOSIS — R19.7 DIARRHEA OF PRESUMED INFECTIOUS ORIGIN: ICD-10-CM

## 2022-12-21 DIAGNOSIS — R10.9 ABDOMINAL PAIN, UNSPECIFIED ABDOMINAL LOCATION: ICD-10-CM

## 2022-12-21 LAB
ALBUMIN SERPL BCP-MCNC: 4 G/DL (ref 3.2–4.9)
ALBUMIN/GLOB SERPL: 1.3 G/DL
ALP SERPL-CCNC: 59 U/L (ref 30–99)
ALT SERPL-CCNC: 9 U/L (ref 2–50)
ANION GAP SERPL CALC-SCNC: 12 MMOL/L (ref 7–16)
AST SERPL-CCNC: 11 U/L (ref 12–45)
BASOPHILS # BLD AUTO: 0.5 % (ref 0–1.8)
BASOPHILS # BLD: 0.03 K/UL (ref 0–0.12)
BILIRUB SERPL-MCNC: 0.6 MG/DL (ref 0.1–1.5)
BUN SERPL-MCNC: 20 MG/DL (ref 8–22)
CALCIUM ALBUM COR SERPL-MCNC: 10 MG/DL (ref 8.5–10.5)
CALCIUM SERPL-MCNC: 10 MG/DL (ref 8.4–10.2)
CHLORIDE SERPL-SCNC: 99 MMOL/L (ref 96–112)
CO2 SERPL-SCNC: 26 MMOL/L (ref 20–33)
CREAT SERPL-MCNC: 1.15 MG/DL (ref 0.5–1.4)
EOSINOPHIL # BLD AUTO: 0.11 K/UL (ref 0–0.51)
EOSINOPHIL NFR BLD: 1.7 % (ref 0–6.9)
ERYTHROCYTE [DISTWIDTH] IN BLOOD BY AUTOMATED COUNT: 46.5 FL (ref 35.9–50)
GFR SERPLBLD CREATININE-BSD FMLA CKD-EPI: 48 ML/MIN/1.73 M 2
GLOBULIN SER CALC-MCNC: 3.2 G/DL (ref 1.9–3.5)
GLUCOSE SERPL-MCNC: 102 MG/DL (ref 65–99)
HCT VFR BLD AUTO: 44.9 % (ref 37–47)
HGB BLD-MCNC: 14.7 G/DL (ref 12–16)
IMM GRANULOCYTES # BLD AUTO: 0.03 K/UL (ref 0–0.11)
IMM GRANULOCYTES NFR BLD AUTO: 0.5 % (ref 0–0.9)
LIPASE SERPL-CCNC: 31 U/L (ref 7–58)
LYMPHOCYTES # BLD AUTO: 1.34 K/UL (ref 1–4.8)
LYMPHOCYTES NFR BLD: 21.1 % (ref 22–41)
MCH RBC QN AUTO: 29.5 PG (ref 27–33)
MCHC RBC AUTO-ENTMCNC: 32.7 G/DL (ref 33.6–35)
MCV RBC AUTO: 90.2 FL (ref 81.4–97.8)
MONOCYTES # BLD AUTO: 0.69 K/UL (ref 0–0.85)
MONOCYTES NFR BLD AUTO: 10.8 % (ref 0–13.4)
NEUTROPHILS # BLD AUTO: 4.16 K/UL (ref 2–7.15)
NEUTROPHILS NFR BLD: 65.4 % (ref 44–72)
NRBC # BLD AUTO: 0 K/UL
NRBC BLD-RTO: 0 /100 WBC
PLATELET # BLD AUTO: 196 K/UL (ref 164–446)
PMV BLD AUTO: 9.7 FL (ref 9–12.9)
POTASSIUM SERPL-SCNC: 4 MMOL/L (ref 3.6–5.5)
PROT SERPL-MCNC: 7.2 G/DL (ref 6–8.2)
RBC # BLD AUTO: 4.98 M/UL (ref 4.2–5.4)
SODIUM SERPL-SCNC: 137 MMOL/L (ref 135–145)
WBC # BLD AUTO: 6.4 K/UL (ref 4.8–10.8)

## 2022-12-21 PROCEDURE — 36415 COLL VENOUS BLD VENIPUNCTURE: CPT

## 2022-12-21 PROCEDURE — 83690 ASSAY OF LIPASE: CPT

## 2022-12-21 PROCEDURE — 74177 CT ABD & PELVIS W/CONTRAST: CPT

## 2022-12-21 PROCEDURE — 700117 HCHG RX CONTRAST REV CODE 255: Performed by: EMERGENCY MEDICINE

## 2022-12-21 PROCEDURE — 99285 EMERGENCY DEPT VISIT HI MDM: CPT

## 2022-12-21 PROCEDURE — 80053 COMPREHEN METABOLIC PANEL: CPT

## 2022-12-21 PROCEDURE — 99214 OFFICE O/P EST MOD 30 MIN: CPT | Performed by: NURSE PRACTITIONER

## 2022-12-21 PROCEDURE — 85025 COMPLETE CBC W/AUTO DIFF WBC: CPT

## 2022-12-21 RX ORDER — AMOXICILLIN AND CLAVULANATE POTASSIUM 875; 125 MG/1; MG/1
1 TABLET, FILM COATED ORAL 2 TIMES DAILY
Status: SHIPPED | COMMUNITY
End: 2023-02-23

## 2022-12-21 RX ORDER — SPIRONOLACTONE 25 MG/1
12.5 TABLET ORAL EVERY EVENING
Status: SHIPPED | COMMUNITY
End: 2024-02-09

## 2022-12-21 RX ORDER — PANTOPRAZOLE SODIUM 40 MG/1
20 TABLET, DELAYED RELEASE ORAL 2 TIMES DAILY
COMMUNITY

## 2022-12-21 RX ORDER — DIPHENOXYLATE HYDROCHLORIDE AND ATROPINE SULFATE 2.5; .025 MG/1; MG/1
1 TABLET ORAL 4 TIMES DAILY PRN
Qty: 25 TABLET | Refills: 0 | Status: SHIPPED | OUTPATIENT
Start: 2022-12-21 | End: 2022-12-28

## 2022-12-21 RX ADMIN — IOHEXOL 100 ML: 350 INJECTION, SOLUTION INTRAVENOUS at 17:09

## 2022-12-21 ASSESSMENT — FIBROSIS 4 INDEX
FIB4 SCORE: 1.88
FIB4 SCORE: 1.88

## 2022-12-21 ASSESSMENT — ENCOUNTER SYMPTOMS
DIARRHEA: 1
SORE THROAT: 0
ABDOMINAL PAIN: 1
SHORTNESS OF BREATH: 0
FEVER: 0
COUGH: 1
BLOOD IN STOOL: 0
VOMITING: 1
NAUSEA: 1
WHEEZING: 1

## 2022-12-21 NOTE — PROGRESS NOTES
"  Subjective:     Erin Hess is a 82 y.o. female who presents for Diarrhea (\"Started Friday, Stomach cramping, nausea. I have been taking Imodium, not really helping.\" )      Started Friday, initially thought is was colitis. Stating it does not feel like it currently. Chronic cough from asthma. No diarrhea today. Has tried Imodium and pepto. Last vomited the day before yesterday. Decreased appetite. Eating a bland diet, rice and egg. Tolerating fluids. Lower abdominal pain, intermittent, cramping 10/10. Has continued sensation of diarrhea. Has been gasey. Overall weakness. Was on an antibiotic for a respiratory infection. No recent travel or hospitalization.     Diarrhea   Associated symptoms include abdominal pain, coughing and vomiting. Pertinent negatives include no fever.   Abdominal Pain  This is a new problem. The current episode started in the past 7 days. Associated symptoms include diarrhea, nausea and vomiting. Pertinent negatives include no dysuria or fever.     Past Medical History:   Diagnosis Date    Arthritis     Asthma     Breast cancer (HCC)     Bronchitis 2011    Cancer (HCC) 12/2012    right breast    Cough     Dizziness 03/11/2013    Heart burn     Heart murmur     Hiatus hernia syndrome     HTN (hypertension) 03/12/2013    Hypercholesterolemia     Hyperlipidemia 03/12/2013    Hypertension     Hypokalemia 03/12/2013    Indigestion     Mitral annular calcification     Pain     back, hips, knees    Psychiatric disorder     depression    Shortness of breath     Sleep apnea     h/o gastric sleeve lost 40 lb now not on CPAP    Snoring     Sputum production     ULCERATIVE COLITIS 03/12/2013    Vertigo 03/11/2013    Wheezing        Past Surgical History:   Procedure Laterality Date    GASTRIC SLEEVE LAPAROSCOPY N/A 5/18/2016    Procedure: GASTRIC SLEEVE LAPAROSCOPY, HIATAL HERNIA AND LIVER BIOPSY;  Surgeon: Mauricio Montes M.D.;  Location: SURGERY [facility];  Service:     US-NEEDLE CORE " BX-BREAST PANEL      rt breast, with lumpectomy     GYN SURGERY      vag hyster     GYN SURGERY      vaginal cyst removal     LUMPECTOMY  left    OTHER       R breast bx X 2& lipoma removal       Social History     Socioeconomic History    Marital status:      Spouse name: Not on file    Number of children: Not on file    Years of education: Not on file    Highest education level: 12th grade   Occupational History    Not on file   Tobacco Use    Smoking status: Former     Packs/day: 1.00     Years: 20.00     Pack years: 20.00     Types: Cigarettes     Quit date: 1986     Years since quittin.6     Passive exposure: Past    Smokeless tobacco: Never    Tobacco comments:     Quit in    Vaping Use    Vaping Use: Never used   Substance and Sexual Activity    Alcohol use: Yes     Alcohol/week: 4.2 oz     Types: 7 Standard drinks or equivalent per week     Comment: 1 drink a night    Drug use: No    Sexual activity: Not on file   Other Topics Concern    Not on file   Social History Narrative    She lives alone but has a female roommate who is 60, Leonora Rosa who has a brother in town she is estranged from. Leonora Rosa had Covid  and now has long-haulers and is on medicaid     She has 2 children and 4 stepchildren, her   '. She has a son near [place], [place] and another in [place]. She relies on her 2 grandkids who live in [place] and her 8y younger sister lives in [facility], her older sister lives in a [facility] in [place]. 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  then  after 29y. Her ex  her cousin, met her 2nd  and was  for 25y.     Her son supports her keeping Leonora Rosa at her home. She does not feel taken advantage of despite no longer receiving rent.      Social Determinants of Health     Financial Resource Strain: Medium Risk    Difficulty of Paying Living Expenses: Somewhat hard   Food Insecurity: No Food  Insecurity    Worried About Running Out of Food in the Last Year: Never true    Ran Out of Food in the Last Year: Never true   Transportation Needs: No Transportation Needs    Lack of Transportation (Medical): No    Lack of Transportation (Non-Medical): No   Physical Activity: Inactive    Days of Exercise per Week: 0 days    Minutes of Exercise per Session: 0 min   Stress: Stress Concern Present    Feeling of Stress : To some extent   Social Connections: Moderately Isolated    Frequency of Communication with Friends and Family: More than three times a week    Frequency of Social Gatherings with Friends and Family: Once a week    Attends Tenri[employer] Services: Never    Active Member of Clubs or Organizations: Yes    Attends Club or Organization Meetings: More than 4 times per year    Marital Status:    Intimate Partner Violence: Not on file   Housing Stability: Low Risk     Unable to Pay for Housing in the Last Year: No    Number of Places Lived in the Last Year: 1    Unstable Housing in the Last Year: No        Family History   Problem Relation Age of Onset    Heart Disease Mother         CAD MI at age 72    Cancer Mother         breast cancer  at age 83    Cancer Sister     Lung Disease Father 75        Lung cancer    Cancer Maternal Grandmother         Pancreatic cancer    Heart Attack Maternal Grandfather         MI at age 70    Heart Disease Sister         Rhuemati cfever- herat murmur        Allergies   Allergen Reactions    Sulfa Drugs Rash and Swelling     Rash, joint swelling  [id number]    Vancomycin Itching    Codeine Vomiting and Nausea       Review of Systems   Constitutional:  Positive for malaise/fatigue. Negative for fever.   HENT:  Negative for sore throat.    Respiratory:  Positive for cough and wheezing. Negative for shortness of breath.    Gastrointestinal:  Positive for abdominal pain, diarrhea, nausea and vomiting. Negative for blood in stool.   Genitourinary:  Negative for dysuria.   All  "other systems reviewed and are negative.     Objective:   /60 (BP Location: Left arm, Patient Position: Sitting, BP Cuff Size: Adult)   Pulse 68   Temp 36.4 °C (97.6 °F) (Temporal)   Resp 16   Ht 1.638 m (5' 4.5\")   Wt 79.8 kg (176 lb)   SpO2 96%   BMI 29.74 kg/m²     Physical Exam  Vitals reviewed.   Constitutional:       Appearance: She is ill-appearing. She is not toxic-appearing.      Comments: Laying on exam table for comfort. Grimacing with movement.    Pulmonary:      Effort: Pulmonary effort is normal. No respiratory distress.   Abdominal:      General: Bowel sounds are increased.      Tenderness: There is generalized abdominal tenderness. There is guarding. There is no right CVA tenderness or left CVA tenderness.   Skin:     General: Skin is warm and dry.   Neurological:      Mental Status: She is alert and oriented to person, place, and time.       Assessment/Plan:   1. Abdominal pain, unspecified abdominal location    -Guarded abdominal pain. Reported  fatigue and weakness. Stable vitals. Referred to the ED for further evaluation of abdominal pain. Differential to include colitis. Discussed level of abdominal pain is inconsistent with viral gastroenteritis. Transfer center called.     Differential diagnosis, natural history, supportive care, and indications for immediate follow-up discussed.  "

## 2022-12-21 NOTE — ED TRIAGE NOTES
"Chief Complaint   Patient presents with    Abdominal Cramping     Since Friday evening   Pt states cramping worsens with eating and drinking     Nausea/Vomiting/Diarrhea     Since Friday   Pt was recently on amoxacillin          /60   Pulse 65   Temp 36.2 °C (97.1 °F) (Temporal)   Resp 16   Ht 1.626 m (5' 4\")   Wt 78.2 kg (172 lb 6.4 oz)   SpO2 95%   BMI 29.59 kg/m²     "

## 2022-12-22 NOTE — ED PROVIDER NOTES
ED Provider Note    CHIEF COMPLAINT  Chief Complaint   Patient presents with    Abdominal Cramping     Since Friday evening   Pt states cramping worsens with eating and drinking     Nausea/Vomiting/Diarrhea     Since Friday   Pt was recently on amoxacillin            HPI  Erin Hess is a 82 y.o. female who presents with report of diffuse abdominal cramping that gets worse after eating and drinking.  She has had some occasional vomiting but lots of diarrhea since Friday and apparently was recently on amoxicillin finishing that 1 week covers about a week ago.  Denies any current fever and feels a little nauseous but not bad.  She has been making good amounts of urine.  She denies any bloody stool or other complaints and says that she drove the hospital has been getting around without functional decline    REVIEW OF SYSTEMS  See HPI for further details. All other systems are negative.     PAST MEDICAL HISTORY  Past Medical History:   Diagnosis Date    Arthritis     Asthma     Breast cancer (HCC)     Bronchitis     Cancer (HCC) 2012    right breast    Cough     Dizziness 2013    Heart burn     Heart murmur     Hiatus hernia syndrome     HTN (hypertension) 2013    Hypercholesterolemia     Hyperlipidemia 2013    Hypertension     Hypokalemia 2013    Indigestion     Mitral annular calcification     Pain     back, hips, knees    Psychiatric disorder     depression    Shortness of breath     Sleep apnea     h/o gastric sleeve lost 40 lb now not on CPAP    Snoring     Sputum production     ULCERATIVE COLITIS 2013    Vertigo 2013    Wheezing        FAMILY HISTORY  Family History   Problem Relation Age of Onset    Heart Disease Mother         CAD MI at age 72    Cancer Mother         breast cancer  at age 83    Cancer Sister     Lung Disease Father 75        Lung cancer    Cancer Maternal Grandmother         Pancreatic cancer    Heart Attack Maternal Grandfather         MI at  age 70    Heart Disease Sister         Nadya cfever- herat murmur       SOCIAL HISTORY   reports that she quit smoking about 36 years ago. Her smoking use included cigarettes. She has a 20.00 pack-year smoking history. She has been exposed to tobacco smoke. She has never used smokeless tobacco. She reports current alcohol use of about 4.2 oz per week. She reports that she does not use drugs.    SURGICAL HISTORY  Past Surgical History:   Procedure Laterality Date    GASTRIC SLEEVE LAPAROSCOPY N/A 5/18/2016    Procedure: GASTRIC SLEEVE LAPAROSCOPY, HIATAL HERNIA AND LIVER BIOPSY;  Surgeon: Mauricio Montes M.D.;  Location: SURGERY [facility];  Service:     US-NEEDLE CORE BX-BREAST PANEL  2013    rt breast, with lumpectomy     GYN SURGERY      vag hyster 1990    GYN SURGERY      vaginal cyst removal     LUMPECTOMY  left    OTHER       R breast bx X 2& lipoma removal       CURRENT MEDICATIONS  Home Medications       Reviewed by Tam Mccormick (Pharmacy Tech) on 12/21/22 at 1649  Med List Status: Complete     Medication Last Dose Status   albuterol (PROVENTIL) 2.5mg/3ml Nebu Soln solution for nebulization > 1 week Active   amoxicillin-clavulanate (AUGMENTIN) 875-125 MG Tab 12/3/2022 Active   anastrozole (ARIMIDEX) 1 MG Tab 12/21/2022 Active   budesonide (PULMICORT) 0.5 MG/2ML Suspension > 1 week Active   budesonide-formoterol (SYMBICORT) 160-4.5 MCG/ACT Aerosol 12/21/2022 Active   Cyanocobalamin (VITAMIN B-12 PO) 12/21/2022 Active   melatonin 5 mg Tab 12/20/2022 Active   mesalamine (LIALDA) 1.2 GM Tablet Delayed Response 12/21/2022 Active   Multiple Vitamins-Minerals (PRESERVISION AREDS 2+MULTI VIT PO) 12/21/2022 Active   pantoprazole (PROTONIX) 40 MG Tablet Delayed Response 12/21/2022 Active   PARoxetine (PAXIL) 10 MG Tab 12/21/2022 Active   predniSONE (DELTASONE) 10 MG Tab 12/21/2022 Active   predniSONE (DELTASONE) 20 MG Tab 12/2/2022 Active   rosuvastatin (CRESTOR) 20 MG Tab 12/20/2022 Active  "  spironolactone (ALDACTONE) 25 MG Tab 12/20/2022 Active   valsartan-hydrochlorothiazide (DIOVAN-HCT) 160-12.5 MG per tablet 12/21/2022 Active   vitamin D (CHOLECALCIFEROL) 1000 Unit Tab 12/21/2022 Active   zolpidem (AMBIEN) 5 MG Tab 12/20/2022 Active                    ALLERGIES  Allergies   Allergen Reactions    Sulfa Drugs Rash and Swelling     Rash, joint swelling  [id number]    Vancomycin Itching    Codeine Vomiting and Nausea       PHYSICAL EXAM  VITAL SIGNS: /65   Pulse 68   Temp 36.1 °C (97 °F) (Temporal)   Resp 16   Ht 1.626 m (5' 4\")   Wt 78.2 kg (172 lb 6.4 oz)   SpO2 95%   BMI 29.59 kg/m²    Constitutional: Well developed, Well nourished, No acute distress, Non-toxic appearance.   HENT: Normocephalic, Atraumatic, Bilateral external ears normal, Oropharynx is clear mucous membranes are moist. No oral exudates or nasal discharge.   Eyes: Pupils are equal round and reactive, EOMI, Conjunctiva normal, No discharge.   Neck: Normal range of motion, No tenderness, Supple, No stridor. No meningismus.  Lymphatic: No lymphadenopathy noted.   Cardiovascular: Regular rate and rhythm without murmur rub or gallop.  Thorax & Lungs: Clear breath sounds bilaterally without wheezes, rhonchi or rales. There is no chest wall tenderness.   Abdomen: Soft with diffuse low-grade abdominal tenderness, the abdomen is otherwise non-distended. There is no rebound or guarding. No organomegaly is appreciated. Bowel sounds are hyperactive.  Skin: Normal without rash.   Back: No CVA or spinal tenderness.   Extremities: Intact distal pulses, No edema, No tenderness, No cyanosis, No clubbing. Capillary refill is less than 2 seconds.  Musculoskeletal: Good range of motion in all major joints. No tenderness to palpation or major deformities noted.   Neurologic: Alert & oriented x 3, Normal motor function, Normal sensory function, No focal deficits noted. Reflexes are normal.  Psychiatric: Affect normal, Judgment normal, Mood " normal. There is no suicidal ideation or patient reported hallucinations.     RADIOLOGY/PROCEDURES  CT-ABDOMEN-PELVIS WITH   Final Result         1. No acute inflammatory change in the abdomen or pelvis.      2. Cholelithiasis.      3. Sigmoid diverticulosis.      4. Hiatal hernia.            COURSE & MEDICAL DECISION MAKING  Pertinent Labs & Imaging studies reviewed. (See chart for details)  I was concerned about the patient's diffuse tenderness and chose to do imaging and lab work.  She likely has an antibiotic associated diarrhea or possibly gastroenteritis that is likely viral given that she lacks fever or tachycardia.    Laboratory evaluation reveals no leukocytosis, shift, anemia, electrolyte derangements or acidosis.  Patient responded well to oral fluid challenge.    Patient did not have any episodes of vomiting in the emergency department.  We attempted to get stool sample multiple times but she was unable to deliver.  I am writing for outpatient collection of the specimen with results to report to her primary doctor as she may need treatment for C. difficile colitis if positive    She is given Lomotil by prescription for cramping and diarrhea and discharged in stable condition understands her plan of care and is ambulatory out of the emergency department without issue    FINAL IMPRESSION  1. Diarrhea of presumed infectious origin    2. Generalized abdominal pain             Electronically signed by: Tobi Lynch M.D., 12/21/2022 6:39 PM

## 2022-12-23 ENCOUNTER — HOSPITAL ENCOUNTER (OUTPATIENT)
Facility: MEDICAL CENTER | Age: 82
End: 2022-12-23
Attending: NURSE PRACTITIONER
Payer: MEDICARE

## 2022-12-23 LAB — LACTOFERRIN STL QL IA: POSITIVE

## 2022-12-23 PROCEDURE — 83630 LACTOFERRIN FECAL (QUAL): CPT

## 2022-12-27 ENCOUNTER — OFFICE VISIT (OUTPATIENT)
Dept: SLEEP MEDICINE | Facility: MEDICAL CENTER | Age: 82
End: 2022-12-27
Payer: MEDICARE

## 2022-12-27 ENCOUNTER — TELEPHONE (OUTPATIENT)
Dept: SCHEDULING | Facility: IMAGING CENTER | Age: 82
End: 2022-12-27

## 2022-12-27 VITALS
HEIGHT: 64 IN | DIASTOLIC BLOOD PRESSURE: 58 MMHG | SYSTOLIC BLOOD PRESSURE: 108 MMHG | BODY MASS INDEX: 30.05 KG/M2 | WEIGHT: 176 LBS | OXYGEN SATURATION: 93 % | HEART RATE: 74 BPM

## 2022-12-27 DIAGNOSIS — R05.3 CHRONIC COUGH: ICD-10-CM

## 2022-12-27 DIAGNOSIS — J45.20 MILD INTERMITTENT ASTHMA, UNSPECIFIED WHETHER COMPLICATED: ICD-10-CM

## 2022-12-27 PROCEDURE — 99214 OFFICE O/P EST MOD 30 MIN: CPT | Performed by: STUDENT IN AN ORGANIZED HEALTH CARE EDUCATION/TRAINING PROGRAM

## 2022-12-27 RX ORDER — PANTOPRAZOLE SODIUM 40 MG/1
TABLET, DELAYED RELEASE ORAL
COMMUNITY
End: 2023-02-23

## 2022-12-27 ASSESSMENT — FIBROSIS 4 INDEX: FIB4 SCORE: 1.5

## 2022-12-28 NOTE — PROGRESS NOTES
Pulmonary Clinic Note    Chief Complaint:  Chief Complaint   Patient presents with    Follow-Up     Last seen 12/6/22 with Dr. Taylor      HPI:   Erin Hess is a very pleasant 82 y.o. female with history of tobacco smoking 20 pkyr, quit 1986, asthma currently on Symbicort, GERD on omeprazole, allergies who presents to pulmonary clinic for cough.     She was last seen in clinic on 12/6/2022 was noted to have some wheezing so she was given a prednisone taper.   Today, patient states that she feels better after the prednisone taper and her cough is improved however she still has a cough that is productive.  No significant postnasal drip symptoms.  She does report history of multiple respiratory infection/bronchitis episodes in the past.      Past Medical History:   Diagnosis Date    Arthritis     Asthma     Breast cancer (HCC)     Bronchitis 2011    Cancer (HCC) 12/2012    right breast    Cough     Dizziness 03/11/2013    Heart burn     Heart murmur     Hiatus hernia syndrome     HTN (hypertension) 03/12/2013    Hypercholesterolemia     Hyperlipidemia 03/12/2013    Hypertension     Hypokalemia 03/12/2013    Indigestion     Mitral annular calcification     Pain     back, hips, knees    Psychiatric disorder     depression    Shortness of breath     Sleep apnea     h/o gastric sleeve lost 40 lb now not on CPAP    Snoring     Sputum production     ULCERATIVE COLITIS 03/12/2013    Vertigo 03/11/2013    Wheezing        Past Surgical History:   Procedure Laterality Date    GASTRIC SLEEVE LAPAROSCOPY N/A 5/18/2016    Procedure: GASTRIC SLEEVE LAPAROSCOPY, HIATAL HERNIA AND LIVER BIOPSY;  Surgeon: Mauricio Montes M.D.;  Location: SURGERY [facility];  Service:     US-NEEDLE CORE BX-BREAST PANEL  2013    rt breast, with lumpectomy     GYN SURGERY      vag hyster 1990    GYN SURGERY      vaginal cyst removal     LUMPECTOMY  left    OTHER       R breast bx X 2& lipoma removal       Social History     Socioeconomic  History    Marital status:      Spouse name: Not on file    Number of children: Not on file    Years of education: Not on file    Highest education level: 12th grade   Occupational History    Not on file   Tobacco Use    Smoking status: Former     Packs/day: 1.00     Years: 20.00     Pack years: 20.00     Types: Cigarettes     Quit date: 1986     Years since quittin.6     Passive exposure: Past    Smokeless tobacco: Never    Tobacco comments:     Quit in    Vaping Use    Vaping Use: Never used   Substance and Sexual Activity    Alcohol use: Yes     Alcohol/week: 4.2 oz     Types: 7 Standard drinks or equivalent per week     Comment: 1 drink a night    Drug use: No    Sexual activity: Not on file   Other Topics Concern    Not on file   Social History Narrative    She lives alone but has a female roommate who is 60, Leonora Rosa who has a brother in town she is estranged from. Leonora Rosa had Covid  and now has long-haulers and is on medicaid     She has 2 children and 4 stepchildren, her   . She has a son near [place], [place] and another in [place]. She relies on her 2 grandkids who live in [place] and her 8y younger sister lives in [facility], her older sister lives in a [place] in [place]. 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  then  after 27y. Her ex  her cousin, met her 2nd  and was  for 25y.     Her son supports her keeping Leonora Rosa at her home. She does not feel taken advantage of despite no longer receiving rent.      Social Determinants of Health     Financial Resource Strain: Medium Risk    Difficulty of Paying Living Expenses: Somewhat hard   Food Insecurity: No Food Insecurity    Worried About Running Out of Food in the Last Year: Never true    Ran Out of Food in the Last Year: Never true   Transportation Needs: No Transportation Needs    Lack of Transportation (Medical): No    Lack of Transportation  (Non-Medical): No   Physical Activity: Inactive    Days of Exercise per Week: 0 days    Minutes of Exercise per Session: 0 min   Stress: Stress Concern Present    Feeling of Stress : To some extent   Social Connections: Moderately Isolated    Frequency of Communication with Friends and Family: More than three times a week    Frequency of Social Gatherings with Friends and Family: Once a week    Attends Rastafari Services: Never    Active Member of Clubs or Organizations: Yes    Attends Club or Organization Meetings: More than 4 times per year    Marital Status:    Intimate Partner Violence: Not on file   Housing Stability: Low Risk     Unable to Pay for Housing in the Last Year: No    Number of Places Lived in the Last Year: 1    Unstable Housing in the Last Year: No          Family History   Problem Relation Age of Onset    Heart Disease Mother         CAD MI at age 72    Cancer Mother         breast cancer  at age 83    Cancer Sister     Lung Disease Father 75        Lung cancer    Cancer Maternal Grandmother         Pancreatic cancer    Heart Attack Maternal Grandfather         MI at age 70    Heart Disease Sister         Rhuemati cfever- herat murmur       Current Outpatient Medications on File Prior to Visit   Medication Sig Dispense Refill    pantoprazole (PROTONIX) 40 MG Tablet Delayed Response Take 20 mg by mouth 2 times a day.      spironolactone (ALDACTONE) 25 MG Tab Take 12.5 mg by mouth every evening.      amoxicillin-clavulanate (AUGMENTIN) 875-125 MG Tab Take 1 Tablet by mouth 2 times a day. Pt started on 2022 for 5 day course      budesonide (PULMICORT) 0.5 MG/2ML Suspension Take 2 mL by nebulization 4 times a day. Inhale the contents of 1 vial via nebulizer 2 to 4 times daily. Rinse mouth after use. 120 mL 3    albuterol (PROVENTIL) 2.5mg/3ml Nebu Soln solution for nebulization Take 3 mL by nebulization every four hours as needed for Shortness of Breath. 360 Each 3    zolpidem  "(AMBIEN) 5 MG Tab Take 1 Tablet by mouth at bedtime as needed for Sleep (insomnia) for up to 30 days. Take 1 tab at bedtime as needed for insomnia. 30 Tablet 0    valsartan-hydrochlorothiazide (DIOVAN-HCT) 160-12.5 MG per tablet Take 0.5 Tablets by mouth 2 times a day. 90 Tablet 3    PARoxetine (PAXIL) 10 MG Tab Take 1 Tablet by mouth every day. 100 Tablet 0    rosuvastatin (CRESTOR) 20 MG Tab Take 1 Tablet by mouth every evening. 100 Tablet 0    anastrozole (ARIMIDEX) 1 MG Tab Take 1 Tablet by mouth every morning. 100 Tablet 0    budesonide-formoterol (SYMBICORT) 160-4.5 MCG/ACT Aerosol Inhale 2 Puffs 2 times a day. 3 Each 3    melatonin 5 mg Tab Take 5 mg by mouth at bedtime.      Multiple Vitamins-Minerals (PRESERVISION AREDS 2+MULTI VIT PO) Take 1 Tablet by mouth every day.      mesalamine (LIALDA) 1.2 GM Tablet Delayed Response Take 1 Tablet by mouth every day.      Cyanocobalamin (VITAMIN B-12 PO) Take 1 Tablet by mouth every day.      vitamin D (CHOLECALCIFEROL) 1000 Unit Tab Take 1,000 Units by mouth every day.      pantoprazole (PROTONIX) 40 MG Tablet Delayed Response 1/2 tablet Oral BID for 30 days       No current facility-administered medications on file prior to visit.       Allergies: Sulfa drugs, Vancomycin, and Codeine      ROS:   Review of Systems   Constitutional:  Negative for chills and fever.   HENT:  Positive for congestion. Negative for hearing loss.    Eyes:  Negative for discharge.   Respiratory:  Positive for cough and sputum production. Negative for hemoptysis and shortness of breath.    Cardiovascular:  Negative for chest pain.   Gastrointestinal:  Negative for nausea and vomiting.   Musculoskeletal:  Negative for falls.   Skin:  Negative for rash.   Neurological:  Negative for focal weakness.     Vitals:  /58 (BP Location: Left arm, Patient Position: Sitting, BP Cuff Size: Adult)   Pulse 74   Ht 1.626 m (5' 4\")   Wt 79.8 kg (176 lb)   SpO2 93%     Physical Exam:  Physical " Exam  Vitals and nursing note reviewed.   Constitutional:       General: She is not in acute distress.     Appearance: Normal appearance. She is not ill-appearing or toxic-appearing.   HENT:      Head: Normocephalic and atraumatic.      Nose: Nose normal.      Mouth/Throat:      Mouth: Mucous membranes are moist.   Eyes:      General: No scleral icterus.     Conjunctiva/sclera: Conjunctivae normal.   Cardiovascular:      Rate and Rhythm: Normal rate and regular rhythm.   Pulmonary:      Effort: Pulmonary effort is normal. No respiratory distress.      Breath sounds: No wheezing, rhonchi or rales.   Abdominal:      Palpations: Abdomen is soft.   Musculoskeletal:         General: No deformity or signs of injury. Normal range of motion.      Cervical back: Normal range of motion.   Skin:     General: Skin is warm and dry.   Neurological:      General: No focal deficit present.      Mental Status: She is alert. Mental status is at baseline.   Psychiatric:         Mood and Affect: Mood normal.         Behavior: Behavior normal.       Data:  PFT 7/12/16: mild obstruction w/o a significant BD response. There is reduction in DTU98-60% c/f small airways disease w/a significant BD response in these midflows. Increased diffusion capacity.     Assessment/Plan:    Problem List Items Addressed This Visit       Asthma     - symbicort BID  - albuterol PRN         Cough     Likely due to her asthma since it improved w/steroids and use of her current inhaler but still has a productive cough. Review of her prior CXR shows some atelectasis and potential airway thickening - she might have a component of bronchiectasis from her prior infections that's now resulting in this productive cough.    - CT chest to further evaluate  - flutter valve w/nebs for airway clearance  - symbicort BID  - albuterol PRN           Relevant Orders    CT-CHEST, HIGH RESOLUTION LUNG    PULMONARY FUNCTION TESTS -Test requested: Complete Pulmonary Function Test     DME Flutter Valve       Return in about 3 months (around 3/27/2023).     This note was generated using voice recognition software which has a chance of producing errors of grammar and possibly content.  I have made every reasonable attempt to find and correct any obvious errors, but it should be expected that some may not be found prior to finalization of this note.    Time spent in record review prior to patient arrival, reviewing results, and in face-to-face encounter totaled 35 min, excluding any procedures if performed.    Hazel Alcala MD  Pulmonary and Critical Care Medicine  [facility]

## 2023-01-01 ASSESSMENT — ENCOUNTER SYMPTOMS
SPUTUM PRODUCTION: 1
NAUSEA: 0
COUGH: 1
FALLS: 0
FEVER: 0
FOCAL WEAKNESS: 0
VOMITING: 0
SHORTNESS OF BREATH: 0
EYE DISCHARGE: 0
CHILLS: 0
HEMOPTYSIS: 0

## 2023-01-01 NOTE — ASSESSMENT & PLAN NOTE
Likely due to her asthma since it improved w/steroids and use of her current inhaler but still has a productive cough. Review of her prior CXR shows some atelectasis and potential airway thickening - she might have a component of bronchiectasis from her prior infections that's now resulting in this productive cough.    - CT chest to further evaluate  - flutter valve w/nebs for airway clearance  - symbicort BID  - albuterol PRN

## 2023-01-03 ENCOUNTER — APPOINTMENT (OUTPATIENT)
Dept: MEDICAL GROUP | Facility: PHYSICIAN GROUP | Age: 83
End: 2023-01-03
Payer: MEDICARE

## 2023-01-04 ENCOUNTER — HOSPITAL ENCOUNTER (OUTPATIENT)
Dept: RADIOLOGY | Facility: MEDICAL CENTER | Age: 83
End: 2023-01-04
Attending: STUDENT IN AN ORGANIZED HEALTH CARE EDUCATION/TRAINING PROGRAM
Payer: MEDICARE

## 2023-01-04 DIAGNOSIS — R05.3 CHRONIC COUGH: ICD-10-CM

## 2023-01-04 PROCEDURE — 71250 CT THORAX DX C-: CPT

## 2023-01-23 ENCOUNTER — HOSPITAL ENCOUNTER (OUTPATIENT)
Facility: MEDICAL CENTER | Age: 83
End: 2023-01-23
Attending: OTOLARYNGOLOGY | Admitting: OTOLARYNGOLOGY
Payer: MEDICARE

## 2023-01-25 ENCOUNTER — HOSPITAL ENCOUNTER (OUTPATIENT)
Dept: LAB | Facility: MEDICAL CENTER | Age: 83
End: 2023-01-25
Attending: FAMILY MEDICINE
Payer: MEDICARE

## 2023-01-25 LAB
25(OH)D3 SERPL-MCNC: 40 NG/ML (ref 30–100)
ALBUMIN SERPL BCP-MCNC: 4.1 G/DL (ref 3.2–4.9)
ALBUMIN/GLOB SERPL: 1.6 G/DL
ALP SERPL-CCNC: 57 U/L (ref 30–99)
ALT SERPL-CCNC: 12 U/L (ref 2–50)
ANION GAP SERPL CALC-SCNC: 7 MMOL/L (ref 7–16)
APPEARANCE UR: CLEAR
AST SERPL-CCNC: 15 U/L (ref 12–45)
BACTERIA #/AREA URNS HPF: ABNORMAL /HPF
BASOPHILS # BLD AUTO: 0.8 % (ref 0–1.8)
BASOPHILS # BLD: 0.04 K/UL (ref 0–0.12)
BILIRUB SERPL-MCNC: 0.4 MG/DL (ref 0.1–1.5)
BILIRUB UR QL STRIP.AUTO: NEGATIVE
BUN SERPL-MCNC: 21 MG/DL (ref 8–22)
CALCIUM ALBUM COR SERPL-MCNC: 9.6 MG/DL (ref 8.5–10.5)
CALCIUM SERPL-MCNC: 9.7 MG/DL (ref 8.4–10.2)
CHLORIDE SERPL-SCNC: 105 MMOL/L (ref 96–112)
CHOLEST SERPL-MCNC: 165 MG/DL (ref 100–199)
CO2 SERPL-SCNC: 28 MMOL/L (ref 20–33)
COLOR UR: YELLOW
CREAT SERPL-MCNC: 1.1 MG/DL (ref 0.5–1.4)
EOSINOPHIL # BLD AUTO: 0.17 K/UL (ref 0–0.51)
EOSINOPHIL NFR BLD: 3.2 % (ref 0–6.9)
EPI CELLS #/AREA URNS HPF: ABNORMAL /HPF
ERYTHROCYTE [DISTWIDTH] IN BLOOD BY AUTOMATED COUNT: 46.1 FL (ref 35.9–50)
EST. AVERAGE GLUCOSE BLD GHB EST-MCNC: 123 MG/DL
FASTING STATUS PATIENT QL REPORTED: NORMAL
FERRITIN SERPL-MCNC: 13.7 NG/ML (ref 10–291)
GFR SERPLBLD CREATININE-BSD FMLA CKD-EPI: 51 ML/MIN/1.73 M 2
GLOBULIN SER CALC-MCNC: 2.6 G/DL (ref 1.9–3.5)
GLUCOSE SERPL-MCNC: 91 MG/DL (ref 65–99)
GLUCOSE UR STRIP.AUTO-MCNC: NEGATIVE MG/DL
HBA1C MFR BLD: 5.9 % (ref 4–5.6)
HCT VFR BLD AUTO: 42.4 % (ref 37–47)
HDLC SERPL-MCNC: 64 MG/DL
HGB BLD-MCNC: 14 G/DL (ref 12–16)
IMM GRANULOCYTES # BLD AUTO: 0.01 K/UL (ref 0–0.11)
IMM GRANULOCYTES NFR BLD AUTO: 0.2 % (ref 0–0.9)
IRON SATN MFR SERPL: 17 % (ref 15–55)
IRON SERPL-MCNC: 64 UG/DL (ref 40–170)
KETONES UR STRIP.AUTO-MCNC: NEGATIVE MG/DL
LDLC SERPL CALC-MCNC: 71 MG/DL
LEUKOCYTE ESTERASE UR QL STRIP.AUTO: ABNORMAL
LYMPHOCYTES # BLD AUTO: 1.79 K/UL (ref 1–4.8)
LYMPHOCYTES NFR BLD: 33.7 % (ref 22–41)
MAGNESIUM SERPL-MCNC: 2.1 MG/DL (ref 1.5–2.5)
MCH RBC QN AUTO: 30.2 PG (ref 27–33)
MCHC RBC AUTO-ENTMCNC: 33 G/DL (ref 33.6–35)
MCV RBC AUTO: 91.4 FL (ref 81.4–97.8)
MICRO URNS: ABNORMAL
MONOCYTES # BLD AUTO: 0.45 K/UL (ref 0–0.85)
MONOCYTES NFR BLD AUTO: 8.5 % (ref 0–13.4)
MUCOUS THREADS #/AREA URNS HPF: ABNORMAL /HPF
NEUTROPHILS # BLD AUTO: 2.85 K/UL (ref 2–7.15)
NEUTROPHILS NFR BLD: 53.6 % (ref 44–72)
NITRITE UR QL STRIP.AUTO: NEGATIVE
NRBC # BLD AUTO: 0 K/UL
NRBC BLD-RTO: 0 /100 WBC
PH UR STRIP.AUTO: 5.5 [PH] (ref 5–8)
PLATELET # BLD AUTO: 151 K/UL (ref 164–446)
PMV BLD AUTO: 10.1 FL (ref 9–12.9)
POTASSIUM SERPL-SCNC: 4.1 MMOL/L (ref 3.6–5.5)
PROT SERPL-MCNC: 6.7 G/DL (ref 6–8.2)
PROT UR QL STRIP: NEGATIVE MG/DL
RBC # BLD AUTO: 4.64 M/UL (ref 4.2–5.4)
RBC # URNS HPF: ABNORMAL /HPF
RBC UR QL AUTO: NEGATIVE
SODIUM SERPL-SCNC: 140 MMOL/L (ref 135–145)
SP GR UR STRIP.AUTO: 1.02
TIBC SERPL-MCNC: 378 UG/DL (ref 250–450)
TRIGL SERPL-MCNC: 150 MG/DL (ref 0–149)
UIBC SERPL-MCNC: 314 UG/DL (ref 110–370)
VIT B12 SERPL-MCNC: 466 PG/ML (ref 211–911)
WBC # BLD AUTO: 5.3 K/UL (ref 4.8–10.8)
WBC #/AREA URNS HPF: ABNORMAL /HPF

## 2023-01-25 PROCEDURE — 82607 VITAMIN B-12: CPT

## 2023-01-25 PROCEDURE — 85025 COMPLETE CBC W/AUTO DIFF WBC: CPT

## 2023-01-25 PROCEDURE — 83036 HEMOGLOBIN GLYCOSYLATED A1C: CPT

## 2023-01-25 PROCEDURE — 83735 ASSAY OF MAGNESIUM: CPT

## 2023-01-25 PROCEDURE — 83540 ASSAY OF IRON: CPT

## 2023-01-25 PROCEDURE — 80061 LIPID PANEL: CPT

## 2023-01-25 PROCEDURE — 80053 COMPREHEN METABOLIC PANEL: CPT

## 2023-01-25 PROCEDURE — 83550 IRON BINDING TEST: CPT

## 2023-01-25 PROCEDURE — 81001 URINALYSIS AUTO W/SCOPE: CPT

## 2023-01-25 PROCEDURE — 82728 ASSAY OF FERRITIN: CPT

## 2023-01-25 PROCEDURE — 82306 VITAMIN D 25 HYDROXY: CPT

## 2023-01-25 PROCEDURE — 36415 COLL VENOUS BLD VENIPUNCTURE: CPT

## 2023-02-07 ENCOUNTER — NON-PROVIDER VISIT (OUTPATIENT)
Dept: SLEEP MEDICINE | Facility: MEDICAL CENTER | Age: 83
End: 2023-02-07
Attending: STUDENT IN AN ORGANIZED HEALTH CARE EDUCATION/TRAINING PROGRAM
Payer: MEDICARE

## 2023-02-07 VITALS — WEIGHT: 177 LBS | HEIGHT: 65 IN | BODY MASS INDEX: 29.49 KG/M2

## 2023-02-07 DIAGNOSIS — R05.3 CHRONIC COUGH: ICD-10-CM

## 2023-02-07 PROCEDURE — 94729 DIFFUSING CAPACITY: CPT | Performed by: STUDENT IN AN ORGANIZED HEALTH CARE EDUCATION/TRAINING PROGRAM

## 2023-02-07 PROCEDURE — 94726 PLETHYSMOGRAPHY LUNG VOLUMES: CPT | Performed by: STUDENT IN AN ORGANIZED HEALTH CARE EDUCATION/TRAINING PROGRAM

## 2023-02-07 PROCEDURE — 94060 EVALUATION OF WHEEZING: CPT | Performed by: STUDENT IN AN ORGANIZED HEALTH CARE EDUCATION/TRAINING PROGRAM

## 2023-02-07 ASSESSMENT — PULMONARY FUNCTION TESTS
FEV1_PREDICTED: 1.97
FEV1_PERCENT_CHANGE: 0
FEV1/FVC: 63
FEV1: 1.88
FEV1_PERCENT_CHANGE: 0
FEV1/FVC_PERCENT_LLN: 64
FEV1/FVC_PERCENT_PREDICTED: 82
FEV1/FVC_PREDICTED: 77
FVC_PREDICTED: 2.59
FVC: 2.97
FVC: 2.99
FEV1/FVC_PERCENT_PREDICTED: 82
FEV1: 1.89
FVC_LLN: 2.16
FEV1/FVC: 63.3
FEV1_LLN: 1.64
FVC_PERCENT_PREDICTED: 115
FEV1_PERCENT_PREDICTED: 95
FEV1/FVC_PERCENT_PREDICTED: 83
FEV1_PERCENT_PREDICTED: 96
FVC_PERCENT_PREDICTED: 114
FEV1/FVC_PERCENT_PREDICTED: 76
FEV1/FVC: 63
FEV1/FVC_PERCENT_CHANGE: 0
FEV1/FVC_PERCENT_PREDICTED: 83
FEV1/FVC: 63

## 2023-02-07 ASSESSMENT — FIBROSIS 4 INDEX: FIB4 SCORE: 2.29

## 2023-02-07 NOTE — PROCEDURES
Technician: Priyanka Staples RRT, CPFT  Good patient effort & cooperation.  The results of this test meet the ATS/ERS standards for acceptability & reproducibility.  Test was performed on the [employer] Body Plethysmograph-Elite DX system.  Predicted equations for Spirometry are GLI-2012, ITS for lung volumes, and GLI-2017 for DLCO.  The DLCO was uncorrected for Hgb.  A bronchodilator of Ventolin HFA -2puffs via spacer administered.  DLCO performed during dilation period. IVC is less than 90%.    Interpretation;      There is mild obstruction.  Significant bronchodilator response.  Normal diffusion capacity.

## 2023-02-09 ENCOUNTER — OFFICE VISIT (OUTPATIENT)
Dept: SLEEP MEDICINE | Facility: MEDICAL CENTER | Age: 83
End: 2023-02-09
Payer: MEDICARE

## 2023-02-09 VITALS
BODY MASS INDEX: 30.22 KG/M2 | HEART RATE: 78 BPM | HEIGHT: 64 IN | OXYGEN SATURATION: 93 % | DIASTOLIC BLOOD PRESSURE: 56 MMHG | WEIGHT: 177 LBS | SYSTOLIC BLOOD PRESSURE: 102 MMHG

## 2023-02-09 DIAGNOSIS — J45.20 MILD INTERMITTENT ASTHMA, UNSPECIFIED WHETHER COMPLICATED: ICD-10-CM

## 2023-02-09 DIAGNOSIS — R09.82 POST-NASAL DRIP: ICD-10-CM

## 2023-02-09 DIAGNOSIS — J44.89 ASTHMA-COPD OVERLAP SYNDROME (HCC): ICD-10-CM

## 2023-02-09 PROCEDURE — 99214 OFFICE O/P EST MOD 30 MIN: CPT | Performed by: STUDENT IN AN ORGANIZED HEALTH CARE EDUCATION/TRAINING PROGRAM

## 2023-02-09 ASSESSMENT — ENCOUNTER SYMPTOMS
FOCAL WEAKNESS: 0
SPUTUM PRODUCTION: 1
EYE DISCHARGE: 0
FEVER: 0
VOMITING: 0
CHILLS: 0
HEMOPTYSIS: 0
FALLS: 0
SHORTNESS OF BREATH: 0
COUGH: 1
NAUSEA: 0

## 2023-02-09 ASSESSMENT — FIBROSIS 4 INDEX: FIB4 SCORE: 2.29

## 2023-02-10 NOTE — PROGRESS NOTES
Pulmonary Clinic Note    Chief Complaint:  Chief Complaint   Patient presents with    Follow-Up     Last seen 12/27/22 with Dr. Alcala     Results     CT-Chest 1/4/23     HPI:   Erin Hess is a very pleasant 82 y.o. female with history of tobacco smoking 20 pkyr, quit 1986, asthma currently on Symbicort, GERD on omeprazole, allergies who presents to pulmonary clinic for cough.     12/27/22, patient states that she feels better after the prednisone taper at last visit on 12/6/22 and her cough is improved however she still has a cough that is productive.  No significant postnasal drip symptoms.  She does report history of multiple respiratory infection/bronchitis episodes in the past.    2/9/23: feels well overall, cough improved after antibiotics and prednisone. Symbicort seems to be helping her symptoms - hasn't used her albuterol in a while. Has not received a flutter valve.     Past Medical History:   Diagnosis Date    Arthritis     Asthma     Breast cancer (HCC)     Bronchitis 2011    Cancer (HCC) 12/2012    right breast    Cough     Dizziness 03/11/2013    Heart burn     Heart murmur     Hiatus hernia syndrome     HTN (hypertension) 03/12/2013    Hypercholesterolemia     Hyperlipidemia 03/12/2013    Hypertension     Hypokalemia 03/12/2013    Indigestion     Mitral annular calcification     Pain     back, hips, knees    Psychiatric disorder     depression    Sleep apnea     h/o gastric sleeve lost 40 lb now not on CPAP    Snoring     Sputum production     ULCERATIVE COLITIS 03/12/2013    Vertigo 03/11/2013       Past Surgical History:   Procedure Laterality Date    GASTRIC SLEEVE LAPAROSCOPY N/A 5/18/2016    Procedure: GASTRIC SLEEVE LAPAROSCOPY, HIATAL HERNIA AND LIVER BIOPSY;  Surgeon: Mauricio Montes M.D.;  Location: SURGERY [facility];  Service:     US-NEEDLE CORE BX-BREAST PANEL  2013    rt breast, with lumpectomy     GYN SURGERY      vag hyster 1990    GYN SURGERY      vaginal cyst removal      LUMPECTOMY  left    OTHER       R breast bx X 2& lipoma removal       Social History     Socioeconomic History    Marital status:      Spouse name: Not on file    Number of children: Not on file    Years of education: Not on file    Highest education level: 12th grade   Occupational History    Not on file   Tobacco Use    Smoking status: Former     Packs/day: 1.00     Years: 20.00     Pack years: 20.00     Types: Cigarettes     Quit date: 1986     Years since quittin.7     Passive exposure: Past    Smokeless tobacco: Never    Tobacco comments:     Quit in    Vaping Use    Vaping Use: Never used   Substance and Sexual Activity    Alcohol use: Yes     Alcohol/week: 4.2 oz     Types: 7 Standard drinks or equivalent per week     Comment: 1 drink a night    Drug use: No    Sexual activity: Not on file   Other Topics Concern    Not on file   Social History Narrative    She lives alone but has a female roommate who is 60, Leonora Rosa who has a brother in town she is estranged from. Leonora Rosa had Covid  and now has long-haulers and is on medicaid     She has 2 children and 4 stepchildren, her   . She has a son near [place], [place] and another in [place]. She relies on her 2 grandkids who live in [place] and her 8y younger sister lives in [facility], her older sister lives in a [place] in [place]. 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  then  after 27y. Her ex  her cousin, met her 2nd  and was  for 25y.     Her son supports her keeping Leonora Rosa at her home. She does not feel taken advantage of despite no longer receiving rent.      Social Determinants of Health     Financial Resource Strain: Medium Risk    Difficulty of Paying Living Expenses: Somewhat hard   Food Insecurity: No Food Insecurity    Worried About Running Out of Food in the Last Year: Never true    Ran Out of Food in the Last Year: Never true   Transportation  Needs: No Transportation Needs    Lack of Transportation (Medical): No    Lack of Transportation (Non-Medical): No   Physical Activity: Inactive    Days of Exercise per Week: 0 days    Minutes of Exercise per Session: 0 min   Stress: Stress Concern Present    Feeling of Stress : To some extent   Social Connections: Moderately Isolated    Frequency of Communication with Friends and Family: More than three times a week    Frequency of Social Gatherings with Friends and Family: Once a week    Attends Anglican Services: Never    Active Member of Clubs or Organizations: Yes    Attends Club or Organization Meetings: More than 4 times per year    Marital Status:    Intimate Partner Violence: Not on file   Housing Stability: Low Risk     Unable to Pay for Housing in the Last Year: No    Number of Places Lived in the Last Year: 1    Unstable Housing in the Last Year: No          Family History   Problem Relation Age of Onset    Heart Disease Mother         CAD MI at age 72    Cancer Mother         breast cancer  at age 83    Cancer Sister     Lung Disease Father 75        Lung cancer    Cancer Maternal Grandmother         Pancreatic cancer    Heart Attack Maternal Grandfather         MI at age 70    Heart Disease Sister         Rhuemati cfever- herat murmur       Current Outpatient Medications on File Prior to Visit   Medication Sig Dispense Refill    pantoprazole (PROTONIX) 40 MG Tablet Delayed Response 1/2 tablet Oral BID for 30 days      pantoprazole (PROTONIX) 40 MG Tablet Delayed Response Take 20 mg by mouth 2 times a day.      spironolactone (ALDACTONE) 25 MG Tab Take 12.5 mg by mouth every evening.      amoxicillin-clavulanate (AUGMENTIN) 875-125 MG Tab Take 1 Tablet by mouth 2 times a day. Pt started on 2022 for 5 day course      budesonide (PULMICORT) 0.5 MG/2ML Suspension Take 2 mL by nebulization 4 times a day. Inhale the contents of 1 vial via nebulizer 2 to 4 times daily. Rinse mouth after use.  "120 mL 3    albuterol (PROVENTIL) 2.5mg/3ml Nebu Soln solution for nebulization Take 3 mL by nebulization every four hours as needed for Shortness of Breath. 360 Each 3    valsartan-hydrochlorothiazide (DIOVAN-HCT) 160-12.5 MG per tablet Take 0.5 Tablets by mouth 2 times a day. 90 Tablet 3    PARoxetine (PAXIL) 10 MG Tab Take 1 Tablet by mouth every day. 100 Tablet 0    rosuvastatin (CRESTOR) 20 MG Tab Take 1 Tablet by mouth every evening. 100 Tablet 0    anastrozole (ARIMIDEX) 1 MG Tab Take 1 Tablet by mouth every morning. 100 Tablet 0    budesonide-formoterol (SYMBICORT) 160-4.5 MCG/ACT Aerosol Inhale 2 Puffs 2 times a day. 3 Each 3    melatonin 5 mg Tab Take 5 mg by mouth at bedtime.      Multiple Vitamins-Minerals (PRESERVISION AREDS 2+MULTI VIT PO) Take 1 Tablet by mouth every day.      mesalamine (LIALDA) 1.2 GM Tablet Delayed Response Take 1 Tablet by mouth every day.      Cyanocobalamin (VITAMIN B-12 PO) Take 1 Tablet by mouth every day.      vitamin D (CHOLECALCIFEROL) 1000 Unit Tab Take 1,000 Units by mouth every day.       No current facility-administered medications on file prior to visit.       Allergies: Sulfa drugs, Vancomycin, and Codeine      ROS:   Review of Systems   Constitutional:  Negative for chills and fever.   HENT:  Positive for congestion. Negative for hearing loss.    Eyes:  Negative for discharge.   Respiratory:  Positive for cough and sputum production. Negative for hemoptysis and shortness of breath.    Cardiovascular:  Negative for chest pain.   Gastrointestinal:  Negative for nausea and vomiting.   Musculoskeletal:  Negative for falls.   Skin:  Negative for rash.   Neurological:  Negative for focal weakness.     Vitals:  /56 (BP Location: Right arm, Patient Position: Sitting, BP Cuff Size: Adult)   Pulse 78   Ht 1.626 m (5' 4\")   Wt 80.3 kg (177 lb)   SpO2 93%     Physical Exam:  Physical Exam  Vitals and nursing note reviewed.   Constitutional:       General: She is not in " acute distress.     Appearance: Normal appearance. She is not ill-appearing or toxic-appearing.   HENT:      Head: Normocephalic and atraumatic.      Nose: Nose normal.      Mouth/Throat:      Mouth: Mucous membranes are moist.   Eyes:      General: No scleral icterus.     Conjunctiva/sclera: Conjunctivae normal.   Cardiovascular:      Rate and Rhythm: Normal rate and regular rhythm.   Pulmonary:      Effort: Pulmonary effort is normal. No respiratory distress.      Breath sounds: No wheezing, rhonchi or rales.   Abdominal:      Palpations: Abdomen is soft.   Musculoskeletal:         General: No deformity or signs of injury. Normal range of motion.      Cervical back: Normal range of motion.   Skin:     General: Skin is warm and dry.   Neurological:      General: No focal deficit present.      Mental Status: She is alert. Mental status is at baseline.   Psychiatric:         Mood and Affect: Mood normal.         Behavior: Behavior normal.       Data:  PFT 7/12/16: mild obstruction w/o a significant BD response. There is reduction in NIP21-22% c/f small airways disease w/a significant BD response in these midflows. Increased diffusion capacity.     PFT 2/7/23: mild obstruction w/o a significant BD response. Normal diffusion.    Assessment/Plan:    Problem List Items Addressed This Visit       Asthma-COPD overlap syndrome (HCC)     Also with some mild RLL bronchiectasis, likely from her multiple infections in the past, that's contributing to her cough.     - flutter valve for airway clearance  - symbicort BID  - albuterol PRN  - encouraged to remain active/exercise routinely         Post-nasal drip     - saline nasal spray +/- flonase            Return in about 6 months (around 8/9/2023).     This note was generated using voice recognition software which has a chance of producing errors of grammar and possibly content.  I have made every reasonable attempt to find and correct any obvious errors, but it should be  expected that some may not be found prior to finalization of this note.    Time spent in record review prior to patient arrival, reviewing results, and in face-to-face encounter totaled 35 min, excluding any procedures if performed.    Hazel Alcala MD  Pulmonary and Critical Care Medicine  [facility]

## 2023-02-10 NOTE — ASSESSMENT & PLAN NOTE
Also with some mild RLL bronchiectasis, likely from her multiple infections in the past, that's contributing to her cough.     - flutter valve for airway clearance  - symbicort BID  - albuterol PRN  - encouraged to remain active/exercise routinely

## 2023-02-23 ENCOUNTER — PRE-ADMISSION TESTING (OUTPATIENT)
Dept: ADMISSIONS | Facility: MEDICAL CENTER | Age: 83
End: 2023-02-23
Attending: OTOLARYNGOLOGY
Payer: MEDICARE

## 2023-02-23 VITALS — BODY MASS INDEX: 30.41 KG/M2 | HEIGHT: 64 IN | WEIGHT: 178.13 LBS

## 2023-02-23 DIAGNOSIS — Z01.812 PRE-OPERATIVE LABORATORY EXAMINATION: ICD-10-CM

## 2023-02-23 DIAGNOSIS — Z01.810 PRE-OPERATIVE CARDIOVASCULAR EXAMINATION: ICD-10-CM

## 2023-02-23 LAB
ANION GAP SERPL CALC-SCNC: 10 MMOL/L (ref 7–16)
BUN SERPL-MCNC: 28 MG/DL (ref 8–22)
CALCIUM SERPL-MCNC: 9.9 MG/DL (ref 8.5–10.5)
CHLORIDE SERPL-SCNC: 102 MMOL/L (ref 96–112)
CO2 SERPL-SCNC: 27 MMOL/L (ref 20–33)
CREAT SERPL-MCNC: 1.15 MG/DL (ref 0.5–1.4)
EKG IMPRESSION: NORMAL
GFR SERPLBLD CREATININE-BSD FMLA CKD-EPI: 48 ML/MIN/1.73 M 2
GLUCOSE SERPL-MCNC: 79 MG/DL (ref 65–99)
POTASSIUM SERPL-SCNC: 3.8 MMOL/L (ref 3.6–5.5)
SODIUM SERPL-SCNC: 139 MMOL/L (ref 135–145)

## 2023-02-23 PROCEDURE — 93005 ELECTROCARDIOGRAM TRACING: CPT

## 2023-02-23 PROCEDURE — 80048 BASIC METABOLIC PNL TOTAL CA: CPT

## 2023-02-23 PROCEDURE — 36415 COLL VENOUS BLD VENIPUNCTURE: CPT

## 2023-02-23 PROCEDURE — 93010 ELECTROCARDIOGRAM REPORT: CPT | Performed by: INTERNAL MEDICINE

## 2023-02-23 ASSESSMENT — FIBROSIS 4 INDEX: FIB4 SCORE: 2.29

## 2023-03-06 RX ORDER — SODIUM CHLORIDE, SODIUM LACTATE, POTASSIUM CHLORIDE, CALCIUM CHLORIDE 600; 310; 30; 20 MG/100ML; MG/100ML; MG/100ML; MG/100ML
INJECTION, SOLUTION INTRAVENOUS CONTINUOUS
Status: CANCELLED | OUTPATIENT
Start: 2023-03-06 | End: 2023-03-06

## 2023-03-27 ENCOUNTER — HOSPITAL ENCOUNTER (OUTPATIENT)
Dept: LAB | Facility: MEDICAL CENTER | Age: 83
End: 2023-03-27
Attending: FAMILY MEDICINE
Payer: MEDICARE

## 2023-03-27 LAB
BASOPHILS # BLD AUTO: 0.5 % (ref 0–1.8)
BASOPHILS # BLD: 0.04 K/UL (ref 0–0.12)
EOSINOPHIL # BLD AUTO: 0.15 K/UL (ref 0–0.51)
EOSINOPHIL NFR BLD: 1.9 % (ref 0–6.9)
ERYTHROCYTE [DISTWIDTH] IN BLOOD BY AUTOMATED COUNT: 42.2 FL (ref 35.9–50)
HCT VFR BLD AUTO: 44.5 % (ref 37–47)
HGB BLD-MCNC: 14.7 G/DL (ref 12–16)
IMM GRANULOCYTES # BLD AUTO: 0.03 K/UL (ref 0–0.11)
IMM GRANULOCYTES NFR BLD AUTO: 0.4 % (ref 0–0.9)
LYMPHOCYTES # BLD AUTO: 2.19 K/UL (ref 1–4.8)
LYMPHOCYTES NFR BLD: 28.1 % (ref 22–41)
MCH RBC QN AUTO: 29.9 PG (ref 27–33)
MCHC RBC AUTO-ENTMCNC: 33 G/DL (ref 33.6–35)
MCV RBC AUTO: 90.6 FL (ref 81.4–97.8)
MONOCYTES # BLD AUTO: 0.62 K/UL (ref 0–0.85)
MONOCYTES NFR BLD AUTO: 8 % (ref 0–13.4)
NEUTROPHILS # BLD AUTO: 4.76 K/UL (ref 2–7.15)
NEUTROPHILS NFR BLD: 61.1 % (ref 44–72)
NRBC # BLD AUTO: 0 K/UL
NRBC BLD-RTO: 0 /100 WBC
PLATELET # BLD AUTO: 205 K/UL (ref 164–446)
PMV BLD AUTO: 10.2 FL (ref 9–12.9)
RBC # BLD AUTO: 4.91 M/UL (ref 4.2–5.4)
WBC # BLD AUTO: 7.8 K/UL (ref 4.8–10.8)

## 2023-03-27 PROCEDURE — 36415 COLL VENOUS BLD VENIPUNCTURE: CPT

## 2023-03-27 PROCEDURE — 85025 COMPLETE CBC W/AUTO DIFF WBC: CPT

## 2023-04-06 ENCOUNTER — APPOINTMENT (RX ONLY)
Dept: URBAN - METROPOLITAN AREA CLINIC 15 | Facility: CLINIC | Age: 83
Setting detail: DERMATOLOGY
End: 2023-04-06

## 2023-04-06 DIAGNOSIS — L73.8 OTHER SPECIFIED FOLLICULAR DISORDERS: ICD-10-CM

## 2023-04-06 DIAGNOSIS — D22 MELANOCYTIC NEVI: ICD-10-CM

## 2023-04-06 DIAGNOSIS — L81.4 OTHER MELANIN HYPERPIGMENTATION: ICD-10-CM

## 2023-04-06 DIAGNOSIS — L82.1 OTHER SEBORRHEIC KERATOSIS: ICD-10-CM

## 2023-04-06 DIAGNOSIS — D18.0 HEMANGIOMA: ICD-10-CM

## 2023-04-06 PROBLEM — D18.01 HEMANGIOMA OF SKIN AND SUBCUTANEOUS TISSUE: Status: ACTIVE | Noted: 2023-04-06

## 2023-04-06 PROBLEM — D22.5 MELANOCYTIC NEVI OF TRUNK: Status: ACTIVE | Noted: 2023-04-06

## 2023-04-06 PROBLEM — D22.62 MELANOCYTIC NEVI OF LEFT UPPER LIMB, INCLUDING SHOULDER: Status: ACTIVE | Noted: 2023-04-06

## 2023-04-06 PROBLEM — D22.71 MELANOCYTIC NEVI OF RIGHT LOWER LIMB, INCLUDING HIP: Status: ACTIVE | Noted: 2023-04-06

## 2023-04-06 PROBLEM — D22.72 MELANOCYTIC NEVI OF LEFT LOWER LIMB, INCLUDING HIP: Status: ACTIVE | Noted: 2023-04-06

## 2023-04-06 PROBLEM — D22.61 MELANOCYTIC NEVI OF RIGHT UPPER LIMB, INCLUDING SHOULDER: Status: ACTIVE | Noted: 2023-04-06

## 2023-04-06 PROCEDURE — ? COUNSELING

## 2023-04-06 PROCEDURE — 99213 OFFICE O/P EST LOW 20 MIN: CPT

## 2023-04-06 PROCEDURE — ? LIQUID NITROGEN

## 2023-04-06 ASSESSMENT — LOCATION ZONE DERM
LOCATION ZONE: HAND
LOCATION ZONE: TRUNK
LOCATION ZONE: LEG
LOCATION ZONE: FACE
LOCATION ZONE: ARM

## 2023-04-06 ASSESSMENT — LOCATION DETAILED DESCRIPTION DERM
LOCATION DETAILED: LEFT POPLITEAL SKIN
LOCATION DETAILED: XIPHOID
LOCATION DETAILED: LEFT ANTERIOR DISTAL UPPER ARM
LOCATION DETAILED: LEFT ANTERIOR PROXIMAL UPPER ARM
LOCATION DETAILED: RIGHT INFERIOR MEDIAL FOREHEAD
LOCATION DETAILED: LEFT ANTERIOR SHOULDER
LOCATION DETAILED: RIGHT PROXIMAL PRETIBIAL REGION
LOCATION DETAILED: LEFT VENTRAL PROXIMAL FOREARM
LOCATION DETAILED: LEFT SUPERIOR MEDIAL MIDBACK
LOCATION DETAILED: RIGHT VENTRAL PROXIMAL FOREARM
LOCATION DETAILED: INFERIOR THORACIC SPINE
LOCATION DETAILED: RIGHT POPLITEAL SKIN
LOCATION DETAILED: LEFT VENTRAL LATERAL PROXIMAL FOREARM
LOCATION DETAILED: RIGHT INFERIOR CENTRAL MALAR CHEEK
LOCATION DETAILED: RIGHT ANTERIOR PROXIMAL UPPER ARM
LOCATION DETAILED: RIGHT DISTAL POSTERIOR THIGH
LOCATION DETAILED: LEFT MEDIAL MALAR CHEEK
LOCATION DETAILED: MIDDLE STERNUM
LOCATION DETAILED: LEFT INFERIOR MEDIAL UPPER BACK
LOCATION DETAILED: RIGHT PROXIMAL CALF
LOCATION DETAILED: RIGHT MEDIAL SUPERIOR CHEST
LOCATION DETAILED: LEFT LATERAL PROXIMAL PRETIBIAL REGION
LOCATION DETAILED: LEFT PROXIMAL PRETIBIAL REGION
LOCATION DETAILED: RIGHT ANTERIOR DISTAL UPPER ARM
LOCATION DETAILED: LEFT DISTAL POSTERIOR THIGH
LOCATION DETAILED: SUBXIPHOID
LOCATION DETAILED: RIGHT MEDIAL MALAR CHEEK
LOCATION DETAILED: RIGHT ANTECUBITAL SKIN
LOCATION DETAILED: RIGHT RADIAL DORSAL HAND
LOCATION DETAILED: LEFT CENTRAL MALAR CHEEK
LOCATION DETAILED: LEFT INFERIOR LATERAL MIDBACK
LOCATION DETAILED: LEFT POSTERIOR SHOULDER

## 2023-04-06 ASSESSMENT — LOCATION SIMPLE DESCRIPTION DERM
LOCATION SIMPLE: UPPER BACK
LOCATION SIMPLE: CHEST
LOCATION SIMPLE: RIGHT CHEEK
LOCATION SIMPLE: LEFT POPLITEAL SKIN
LOCATION SIMPLE: RIGHT FOREARM
LOCATION SIMPLE: LEFT SHOULDER
LOCATION SIMPLE: RIGHT CALF
LOCATION SIMPLE: RIGHT POSTERIOR THIGH
LOCATION SIMPLE: RIGHT PRETIBIAL REGION
LOCATION SIMPLE: LEFT CHEEK
LOCATION SIMPLE: RIGHT POPLITEAL SKIN
LOCATION SIMPLE: ABDOMEN
LOCATION SIMPLE: LEFT UPPER ARM
LOCATION SIMPLE: RIGHT FOREHEAD
LOCATION SIMPLE: LEFT POSTERIOR THIGH
LOCATION SIMPLE: LEFT LOWER BACK
LOCATION SIMPLE: RIGHT UPPER ARM
LOCATION SIMPLE: LEFT PRETIBIAL REGION
LOCATION SIMPLE: LEFT FOREARM
LOCATION SIMPLE: RIGHT HAND
LOCATION SIMPLE: LEFT UPPER BACK

## 2023-04-06 NOTE — PROCEDURE: LIQUID NITROGEN
Spray Paint Technique: No
Post-Care Instructions: I reviewed with the patient in detail post-care instructions. Patient is to wear sunprotection, and avoid picking at any of the treated lesions. Pt may apply Vaseline to crusted or scabbing areas.
Medical Necessity Information: It is in your best interest to select a reason for this procedure from the list below. All of these items fulfill various CMS LCD requirements except the new and changing color options.
Medical Necessity Clause: This procedure was medically necessary because the lesions that were treated were:  If lesion does not resolve, bx is needed.
Show Spray Paint Technique Variable?: Yes
Detail Level: Detailed
Spray Paint Text: The liquid nitrogen was applied to the skin utilizing a spray paint frosting technique.
Consent: The patient's consent was obtained including but not limited to risks of crusting, scabbing, blistering, scarring, darker or lighter pigmentary change, recurrence, incomplete removal and infection.
Duration Of Freeze Thaw-Cycle (Seconds): 0

## 2023-05-22 ENCOUNTER — OFFICE VISIT (OUTPATIENT)
Dept: CARDIOLOGY | Facility: MEDICAL CENTER | Age: 83
End: 2023-05-22
Attending: INTERNAL MEDICINE
Payer: MEDICARE

## 2023-05-22 VITALS
RESPIRATION RATE: 16 BRPM | DIASTOLIC BLOOD PRESSURE: 70 MMHG | HEART RATE: 75 BPM | HEIGHT: 64 IN | BODY MASS INDEX: 30.9 KG/M2 | OXYGEN SATURATION: 95 % | SYSTOLIC BLOOD PRESSURE: 110 MMHG | WEIGHT: 181 LBS

## 2023-05-22 DIAGNOSIS — N18.31 STAGE 3A CHRONIC KIDNEY DISEASE: ICD-10-CM

## 2023-05-22 DIAGNOSIS — E78.5 DYSLIPIDEMIA: ICD-10-CM

## 2023-05-22 DIAGNOSIS — I35.0 MODERATE AORTIC STENOSIS: ICD-10-CM

## 2023-05-22 DIAGNOSIS — I25.10 CORONARY ARTERY DISEASE DUE TO LIPID RICH PLAQUE: ICD-10-CM

## 2023-05-22 DIAGNOSIS — I10 ESSENTIAL HYPERTENSION: ICD-10-CM

## 2023-05-22 DIAGNOSIS — I25.83 CORONARY ARTERY DISEASE DUE TO LIPID RICH PLAQUE: ICD-10-CM

## 2023-05-22 PROCEDURE — 99213 OFFICE O/P EST LOW 20 MIN: CPT | Performed by: INTERNAL MEDICINE

## 2023-05-22 PROCEDURE — 3074F SYST BP LT 130 MM HG: CPT | Performed by: INTERNAL MEDICINE

## 2023-05-22 PROCEDURE — 3078F DIAST BP <80 MM HG: CPT | Performed by: INTERNAL MEDICINE

## 2023-05-22 PROCEDURE — 99214 OFFICE O/P EST MOD 30 MIN: CPT | Performed by: INTERNAL MEDICINE

## 2023-05-22 ASSESSMENT — FIBROSIS 4 INDEX: FIB4 SCORE: 1.71

## 2023-05-22 NOTE — PATIENT INSTRUCTIONS
Work on at least 1.5 - 5 hours a week of moderate exercise (typical brisk walking or similar activity)    If you have had a heart attack, stent, bypass or reduced heart strength (EF <35%): cardiac rehab may reduce your risk of dying by 13-24% and need to go to the hospital by 30% within the first year (1)    Please look into the following diets and incorporate them into your diet    LOW SALT DIET   KEEP YOUR SODIUM EQUAL TO CALORIES AND NO MORE THAN DOUBLE THE CALORIES FOR A LOW SALT DIET    Cardiosmart.org - great resource for American College of Cardiology on heart disease prevention and treatment    FOR TREATMENT OR PREVENTION OF CORONARY ARTERY DISEASE  These three programs are approved by Medicare/Insurers for those with heart disease  [facility] - [facility]  Dr. Foote's Program for Reversing Heart Disease - Jose Maria Man's Cardiologist vegetarian-based  [facility] - [place]-based mind-body Program    This is a commonly referenced Program  Dr Quinteros - [place] [facility] [place] (book and documentary) - vegetarian-based    FOR TREATMENT OF BLOOD PRESSURE  DASH DIET - American Heart Association for treatment of HYPERTENSION    FOR TREATMENT OF BAD CHOLESTEROL/FATS  REDUCE PROCESSED SUGAR AS MUCH AS POSSIBLE  INCREASE WHOLE GRAINS/VEGETABLES  INCREASE FIBER    Lowering total cholesterol and LDL (bad) cholesterol:  - Eat leaner cuts of meat, or eliminate altogether if possible red meat, and frequently substitute fish or chicken.  - Limit saturated fat to no more than 7-10% of total calories no more than 10 g per day is recommended. Some sources of saturated fat include butter, animal fats, hydrogenated vegetable fats and oils, many desserts, whole milk dairy products.  - Replaced saturated fats with polyunsaturated fats and monounsaturated fats. Foods high in monounsaturated fat include nuts, canola oil, avocados, and olives.  - Limit trans fat (processed foods)  and replaced with fresh fruits and vegetables  - Recommend nonfat dairy products  - Increase substantially the amount of soluble fiber intake (legumes such as beans, fruit, whole grains).  - Consider nutritional supplements: plant sterile spreads such as Benecol, fish oil,  flaxseed oil, omega-3 acids capsules 1000 mg twice a day, or viscous fiber such as Metamucil  - Attain ideal weight and regular exercise (at least 30 minutes per day of moderate exercise)  ASK ABOUT STATIN OR NON STATIN MEDICATION TO REDUCE YOUR LDL AND HEART RISK    Lowering triglycerides:  - Reduce intake of simple sugar: Desserts, candy, pastries, honey, sodas, sugared cereals, yogurt, Gatorade, sports bars, canned fruit, smoothies, fruit juice, coffee drinks  - Reduced intake of refined starches: Refined Pasta, most bread  - Reduce or abstain from alcohol  - Increase omega-3 fatty acids: [place], Trout, Mackerel, [place]ring, [place] tuna and supplements  - Attain ideal weight and regular exercise (at least 30 minutes per day of moderate exercise)  ASK ABOUT PURIFIED OMEGA 3 EPA or FISH OIL TO REDUCE YOUR TG AND HEART RISK    Elevating HDL (good) cholesterol:  - Increase physical activity  - Increase omega-3 fatty acids and supplements as listed above  - Incorporating appropriate amounts of monounsaturated fats such as nuts, olive oil, canola oil, avocados, olives  - Stop smoking  - Attain ideal weight and regular exercise (at least 30 minutes per day of moderate exercise)

## 2023-05-22 NOTE — PROGRESS NOTES
Chief Complaint   Patient presents with    Coronary Artery Disease    Hypertension    Dyslipidemia       Subjective     Erin Hess is a 83 y.o. female who presents today with moderate AS    Recent trip to [place] did 3 flights    Main is fatigue    Not able to wear CPAP unsure if she wants to do [employer]    Past Medical History:   Diagnosis Date    Arthritis     Asthma     Breast cancer (HCC)     Bronchitis     Cancer (HCC) 2012    right breast    Cough     Dizziness 2013    Heart burn     Heart murmur     Hiatus hernia syndrome     HTN (hypertension) 2013    Hypercholesterolemia     Hyperlipidemia 2013    Hypertension     Hypokalemia 2013    Indigestion     Mitral annular calcification     Pain     back, hips, knees    Psychiatric disorder     depression    Sleep apnea     h/o gastric sleeve lost 40 lb now not on CPAP    Snoring     Sputum production     ULCERATIVE COLITIS 2013    Vertigo 2013     Past Surgical History:   Procedure Laterality Date    GASTRIC SLEEVE LAPAROSCOPY N/A 2016    Procedure: GASTRIC SLEEVE LAPAROSCOPY, HIATAL HERNIA AND LIVER BIOPSY;  Surgeon: Mauricio Montes M.D.;  Location: SURGERY [facility];  Service:     US-NEEDLE CORE BX-BREAST PANEL      rt breast, with lumpectomy     GYN SURGERY      vag hyster     GYN SURGERY      vaginal cyst removal     LUMPECTOMY  left    OTHER       R breast bx X 2& lipoma removal     Family History   Problem Relation Age of Onset    Heart Disease Mother         CAD MI at age 72    Cancer Mother         breast cancer  at age 83    Cancer Sister     Lung Disease Father 75        Lung cancer    Cancer Maternal Grandmother         Pancreatic cancer    Heart Attack Maternal Grandfather         MI at age 70    Heart Disease Sister         Rhuemati cfever- herat murmur     Social History     Socioeconomic History    Marital status:      Spouse name: Not on file    Number of children: Not on file     Years of education: Not on file    Highest education level: 12th grade   Occupational History    Not on file   Tobacco Use    Smoking status: Former     Packs/day: 1.00     Years: 20.00     Pack years: 20.00     Types: Cigarettes     Quit date: 1986     Years since quittin.0     Passive exposure: Past    Smokeless tobacco: Never    Tobacco comments:     Quit in    Vaping Use    Vaping Use: Never used   Substance and Sexual Activity    Alcohol use: Yes     Alcohol/week: 4.2 oz     Types: 7 Standard drinks or equivalent per week     Comment: 1 drink a night    Drug use: No    Sexual activity: Not on file   Other Topics Concern    Not on file   Social History Narrative    She lives alone but has a female roommate who is 60, Leonora Rosa who has a brother in town she is estranged from. Leonora Rosa had Covid  and now has long-haulers and is on medicaid     She has 2 children and 4 stepchildren, her   '. She has a son near [place], [place] and another in [place]. She relies on her 2 grandkids who live in [place] and her 8y younger sister lives in [facility], her older sister lives in a [facility] in [place]. 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  then  after 27y. Her ex  her cousin, met her 2nd  and was  for 25y.     Her son supports her keeping Leonora Rosa at her home. She does not feel taken advantage of despite no longer receiving rent.      Social Determinants of Health     Financial Resource Strain: Medium Risk (2022)    Overall Financial Resource Strain (CARDIA)     Difficulty of Paying Living Expenses: Somewhat hard   Food Insecurity: No Food Insecurity (2022)    Hunger Vital Sign     Worried About Running Out of Food in the Last Year: Never true     Ran Out of Food in the Last Year: Never true   Transportation Needs: No Transportation Needs (2022)    PRAPARE - Transportation     Lack of Transportation  (Medical): No     Lack of Transportation (Non-Medical): No   Physical Activity: Inactive (12/27/2022)    Exercise Vital Sign     Days of Exercise per Week: 0 days     Minutes of Exercise per Session: 0 min   Stress: Stress Concern Present (12/27/2022)    [employer] - Occupational Stress Questionnaire     Feeling of Stress : To some extent   Social Connections: Moderately Isolated (12/27/2022)    Social Connection and Isolation Panel [NHANES]     Frequency of Communication with Friends and Family: More than three times a week     Frequency of Social Gatherings with Friends and Family: Once a week     Attends Confucianism Services: Never     Active Member of Clubs or Organizations: Yes     Attends Club or Organization Meetings: More than 4 times per year     Marital Status:    Intimate Partner Violence: Not on file   Housing Stability: Low Risk  (12/27/2022)    Housing Stability Vital Sign     Unable to Pay for Housing in the Last Year: No     Number of Places Lived in the Last Year: 1     Unstable Housing in the Last Year: No     Allergies   Allergen Reactions    Sulfa Drugs Rash and Swelling     Rash, joint swelling  [id number]    Vancomycin Itching    Codeine Vomiting and Nausea     Outpatient Encounter Medications as of 5/22/2023   Medication Sig Dispense Refill    pantoprazole (PROTONIX) 40 MG Tablet Delayed Response Take 20 mg by mouth 2 times a day.      spironolactone (ALDACTONE) 25 MG Tab Take 12.5 mg by mouth every evening.      albuterol (PROVENTIL) 2.5mg/3ml Nebu Soln solution for nebulization Take 3 mL by nebulization every four hours as needed for Shortness of Breath. 360 Each 3    valsartan-hydrochlorothiazide (DIOVAN-HCT) 160-12.5 MG per tablet Take 0.5 Tablets by mouth 2 times a day. 90 Tablet 3    PARoxetine (PAXIL) 10 MG Tab Take 1 Tablet by mouth every day. 100 Tablet 0    rosuvastatin (CRESTOR) 20 MG Tab Take 1 Tablet by mouth every evening. 100 Tablet 0     "anastrozole (ARIMIDEX) 1 MG Tab Take 1 Tablet by mouth every morning. 100 Tablet 0    budesonide-formoterol (SYMBICORT) 160-4.5 MCG/ACT Aerosol Inhale 2 Puffs 2 times a day. 3 Each 3    melatonin 5 mg Tab Take 5 mg by mouth at bedtime.      Multiple Vitamins-Minerals (PRESERVISION AREDS 2+MULTI VIT PO) Take 1 Tablet by mouth every day.      Cyanocobalamin (VITAMIN B-12 PO) Take 1 Tablet by mouth every day.      vitamin D (CHOLECALCIFEROL) 1000 Unit Tab Take 1,000 Units by mouth every day.      mesalamine (LIALDA) 1.2 GM Tablet Delayed Response Take 1 Tablet by mouth every day. (Patient not taking: Reported on 2/23/2023)       No facility-administered encounter medications on file as of 5/22/2023.     ROS           Objective     /70 (BP Location: Left arm, Patient Position: Sitting, BP Cuff Size: Adult)   Pulse 75   Resp 16   Ht 1.626 m (5' 4\")   Wt 82.1 kg (181 lb)   SpO2 95%   BMI 31.07 kg/m²     Physical Exam  Constitutional:       General: She is not in acute distress.     Appearance: She is not diaphoretic.   Eyes:      General: No scleral icterus.  Neck:      Vascular: No JVD.   Cardiovascular:      Rate and Rhythm: Normal rate.      Heart sounds: Murmur heard.      No friction rub. No gallop.   Pulmonary:      Effort: No respiratory distress.      Breath sounds: No wheezing or rales.   Abdominal:      General: Bowel sounds are normal.      Palpations: Abdomen is soft.   Musculoskeletal:      Right lower leg: No edema.      Left lower leg: No edema.   Skin:     Findings: No rash.   Neurological:      Mental Status: She is alert. Mental status is at baseline.   Psychiatric:         Mood and Affect: Mood normal.            We reviewed in person the most recent labs  Recent Results (from the past 5040 hour(s))   CBC with Differential    Collection Time: 12/21/22  4:07 PM   Result Value Ref Range    WBC 6.4 4.8 - 10.8 K/uL    RBC 4.98 4.20 - 5.40 M/uL    Hemoglobin 14.7 12.0 - 16.0 g/dL    Hematocrit " 44.9 37.0 - 47.0 %    MCV 90.2 81.4 - 97.8 fL    MCH 29.5 27.0 - 33.0 pg    MCHC 32.7 (L) 33.6 - 35.0 g/dL    RDW 46.5 35.9 - 50.0 fL    Platelet Count 196 164 - 446 K/uL    MPV 9.7 9.0 - 12.9 fL    Neutrophils-Polys 65.40 44.00 - 72.00 %    Lymphocytes 21.10 (L) 22.00 - 41.00 %    Monocytes 10.80 0.00 - 13.40 %    Eosinophils 1.70 0.00 - 6.90 %    Basophils 0.50 0.00 - 1.80 %    Immature Granulocytes 0.50 0.00 - 0.90 %    Nucleated RBC 0.00 /100 WBC    Neutrophils (Absolute) 4.16 2.00 - 7.15 K/uL    Lymphs (Absolute) 1.34 1.00 - 4.80 K/uL    Monos (Absolute) 0.69 0.00 - 0.85 K/uL    Eos (Absolute) 0.11 0.00 - 0.51 K/uL    Baso (Absolute) 0.03 0.00 - 0.12 K/uL    Immature Granulocytes (abs) 0.03 0.00 - 0.11 K/uL    NRBC (Absolute) 0.00 K/uL   Complete Metabolic Panel    Collection Time: 12/21/22  4:07 PM   Result Value Ref Range    Sodium 137 135 - 145 mmol/L    Potassium 4.0 3.6 - 5.5 mmol/L    Chloride 99 96 - 112 mmol/L    Co2 26 20 - 33 mmol/L    Anion Gap 12.0 7.0 - 16.0    Glucose 102 (H) 65 - 99 mg/dL    Bun 20 8 - 22 mg/dL    Creatinine 1.15 0.50 - 1.40 mg/dL    Calcium 10.0 8.4 - 10.2 mg/dL    AST(SGOT) 11 (L) 12 - 45 U/L    ALT(SGPT) 9 2 - 50 U/L    Alkaline Phosphatase 59 30 - 99 U/L    Total Bilirubin 0.6 0.1 - 1.5 mg/dL    Albumin 4.0 3.2 - 4.9 g/dL    Total Protein 7.2 6.0 - 8.2 g/dL    Globulin 3.2 1.9 - 3.5 g/dL    A-G Ratio 1.3 g/dL   Lipase    Collection Time: 12/21/22  4:07 PM   Result Value Ref Range    Lipase 31 7 - 58 U/L   CORRECTED CALCIUM    Collection Time: 12/21/22  4:07 PM   Result Value Ref Range    Correct Calcium 10.0 8.5 - 10.5 mg/dL   ESTIMATED GFR    Collection Time: 12/21/22  4:07 PM   Result Value Ref Range    GFR (CKD-EPI) 48 (A) >60 mL/min/1.73 m 2   FECAL LACTOFERRIN QUALITATIVE    Collection Time: 12/23/22 11:20 AM   Result Value Ref Range    Lactoferrin, Fecal by NIKOLAI Positive (A) Negative   VITAMIN B12    Collection Time: 01/25/23  8:51 AM   Result Value Ref Range     Vitamin B12 -True Cobalamin 466 211 - 911 pg/mL   CBC WITH DIFFERENTIAL    Collection Time: 01/25/23  8:51 AM   Result Value Ref Range    WBC 5.3 4.8 - 10.8 K/uL    RBC 4.64 4.20 - 5.40 M/uL    Hemoglobin 14.0 12.0 - 16.0 g/dL    Hematocrit 42.4 37.0 - 47.0 %    MCV 91.4 81.4 - 97.8 fL    MCH 30.2 27.0 - 33.0 pg    MCHC 33.0 (L) 33.6 - 35.0 g/dL    RDW 46.1 35.9 - 50.0 fL    Platelet Count 151 (L) 164 - 446 K/uL    MPV 10.1 9.0 - 12.9 fL    Neutrophils-Polys 53.60 44.00 - 72.00 %    Lymphocytes 33.70 22.00 - 41.00 %    Monocytes 8.50 0.00 - 13.40 %    Eosinophils 3.20 0.00 - 6.90 %    Basophils 0.80 0.00 - 1.80 %    Immature Granulocytes 0.20 0.00 - 0.90 %    Nucleated RBC 0.00 /100 WBC    Neutrophils (Absolute) 2.85 2.00 - 7.15 K/uL    Lymphs (Absolute) 1.79 1.00 - 4.80 K/uL    Monos (Absolute) 0.45 0.00 - 0.85 K/uL    Eos (Absolute) 0.17 0.00 - 0.51 K/uL    Baso (Absolute) 0.04 0.00 - 0.12 K/uL    Immature Granulocytes (abs) 0.01 0.00 - 0.11 K/uL    NRBC (Absolute) 0.00 K/uL   Comp Metabolic Panel    Collection Time: 01/25/23  8:51 AM   Result Value Ref Range    Sodium 140 135 - 145 mmol/L    Potassium 4.1 3.6 - 5.5 mmol/L    Chloride 105 96 - 112 mmol/L    Co2 28 20 - 33 mmol/L    Anion Gap 7.0 7.0 - 16.0    Glucose 91 65 - 99 mg/dL    Bun 21 8 - 22 mg/dL    Creatinine 1.10 0.50 - 1.40 mg/dL    Calcium 9.7 8.4 - 10.2 mg/dL    AST(SGOT) 15 12 - 45 U/L    ALT(SGPT) 12 2 - 50 U/L    Alkaline Phosphatase 57 30 - 99 U/L    Total Bilirubin 0.4 0.1 - 1.5 mg/dL    Albumin 4.1 3.2 - 4.9 g/dL    Total Protein 6.7 6.0 - 8.2 g/dL    Globulin 2.6 1.9 - 3.5 g/dL    A-G Ratio 1.6 g/dL   FERRITIN    Collection Time: 01/25/23  8:51 AM   Result Value Ref Range    Ferritin 13.7 10.0 - 291.0 ng/mL   IRON/TOTAL IRON BIND    Collection Time: 01/25/23  8:51 AM   Result Value Ref Range    Iron 64 40 - 170 ug/dL    Total Iron Binding 378 250 - 450 ug/dL    Unsat Iron Binding 314 110 - 370 ug/dL    % Saturation 17 15 - 55 %   HEMOGLOBIN  A1C    Collection Time: 01/25/23  8:51 AM   Result Value Ref Range    Glycohemoglobin 5.9 (H) 4.0 - 5.6 %    Est Avg Glucose 123 mg/dL   Lipid Profile    Collection Time: 01/25/23  8:51 AM   Result Value Ref Range    Cholesterol,Tot 165 100 - 199 mg/dL    Triglycerides 150 (H) 0 - 149 mg/dL    HDL 64 >=40 mg/dL    LDL 71 <100 mg/dL   URINALYSIS    Collection Time: 01/25/23  8:51 AM   Result Value Ref Range    Color Yellow     Character Clear     Specific Gravity 1.020 <1.035    Ph 5.5 5.0 - 8.0    Glucose Negative Negative mg/dL    Ketones Negative Negative mg/dL    Protein Negative Negative mg/dL    Bilirubin Negative Negative    Nitrite Negative Negative    Leukocyte Esterase Small (A) Negative    Occult Blood Negative Negative    Micro Urine Req Microscopic    VITAMIN D,25 HYDROXY (DEFICIENCY)    Collection Time: 01/25/23  8:51 AM   Result Value Ref Range    25-Hydroxy   Vitamin D 25 40 30 - 100 ng/mL   MAGNESIUM    Collection Time: 01/25/23  8:51 AM   Result Value Ref Range    Magnesium 2.1 1.5 - 2.5 mg/dL   FASTING STATUS    Collection Time: 01/25/23  8:51 AM   Result Value Ref Range    Fasting Status Fasting    CORRECTED CALCIUM    Collection Time: 01/25/23  8:51 AM   Result Value Ref Range    Correct Calcium 9.6 8.5 - 10.5 mg/dL   ESTIMATED GFR    Collection Time: 01/25/23  8:51 AM   Result Value Ref Range    GFR (CKD-EPI) 51 (A) >60 mL/min/1.73 m 2   URINE MICROSCOPIC (W/UA)    Collection Time: 01/25/23  8:51 AM   Result Value Ref Range    WBC 5-10 (A) /hpf    RBC 0-2 /hpf    Bacteria Few (A) None /hpf    Epithelial Cells Few Few /hpf    Mucous Threads Rare /hpf   Basic Metabolic Panel    Collection Time: 02/23/23  9:27 AM   Result Value Ref Range    Sodium 139 135 - 145 mmol/L    Potassium 3.8 3.6 - 5.5 mmol/L    Chloride 102 96 - 112 mmol/L    Co2 27 20 - 33 mmol/L    Glucose 79 65 - 99 mg/dL    Bun 28 (H) 8 - 22 mg/dL    Creatinine 1.15 0.50 - 1.40 mg/dL    Calcium 9.9 8.5 - 10.5 mg/dL    Anion Gap 10.0  7.0 - 16.0   ESTIMATED GFR    Collection Time: 23  9:27 AM   Result Value Ref Range    GFR (CKD-EPI) 48 (A) >60 mL/min/1.73 m 2   ECG    Collection Time: 23  9:36 AM   Result Value Ref Range    Report       [facility]    Test Date:  2023  Pt Name:    ROSIO SHAFER                  Department: [facility]  MRN:        [id number]                      Room:  Gender:     Female                       Technician: EMILEE  :        [id number]                   Requested By:CHRISTINA BARRY  Order #:    [id number]                    Reading MD: Gordon Bingham MD    Measurements  Intervals                                Axis  Rate:       65                           P:          75  [place]:         170                          QRS:        41  QRSD:       90                           T:          54  QT:         402  QTc:        418    Interpretive Statements  Sinus rhythm  Probable left atrial enlargement  Compared to ECG 2019 10:44:53  No significant changes  Electronically Signed On 2023 14:37:32 PST by Gordon Bingham MD     CBC WITH DIFFERENTIAL    Collection Time: 23  3:15 PM   Result Value Ref Range    WBC 7.8 4.8 - 10.8 K/uL    RBC 4.91 4.20 - 5.40 M/uL    Hemoglobin 14.7 12.0 - 16.0 g/dL    Hematocrit 44.5 37.0 - 47.0 %    MCV 90.6 81.4 - 97.8 fL    MCH 29.9 27.0 - 33.0 pg    MCHC 33.0 (L) 33.6 - 35.0 g/dL    RDW 42.2 35.9 - 50.0 fL    Platelet Count 205 164 - 446 K/uL    MPV 10.2 9.0 - 12.9 fL    Neutrophils-Polys 61.10 44.00 - 72.00 %    Lymphocytes 28.10 22.00 - 41.00 %    Monocytes 8.00 0.00 - 13.40 %    Eosinophils 1.90 0.00 - 6.90 %    Basophils 0.50 0.00 - 1.80 %    Immature Granulocytes 0.40 0.00 - 0.90 %    Nucleated RBC 0.00 /100 WBC    Neutrophils (Absolute) 4.76 2.00 - 7.15 K/uL    Lymphs (Absolute) 2.19 1.00 - 4.80 K/uL    Monos (Absolute) 0.62 0.00 - 0.85 K/uL    Eos (Absolute) 0.15 0.00 - 0.51 K/uL    Baso (Absolute) 0.04 0.00 - 0.12 K/uL    Immature Granulocytes (abs) 0.03  0.00 - 0.11 K/uL    NRBC (Absolute) 0.00 K/uL         Assessment & Plan     1. Moderate aortic stenosis  EC-ECHOCARDIOGRAM COMPLETE W/O CONT      2. Essential hypertension        3. Coronary artery disease due to lipid rich plaque        4. Dyslipidemia        5. Stage 3a chronic kidney disease (HCC)            Medical Decision Making: Today's Assessment/Status/Plan:        It was my pleasure to meet with Ms. Hess.    We addressed the management of hypertension at today's visit. Blood pressure is well controlled.  We specifically assessed the labs on hypertension treatment    We addressed the management of dyslipidemia and atherosclerosis at today's visit. She is on appropriate statin.    We addressed the management of atherosclerotic artery disease.  She is on proper antiplatelet, cholesterol management and beta-blockers as appropriate.  We addressed the potential side effects and laboratory follow-up for these medications.    I will see Ms. Hess back in 1 year time and encouraged her to follow up with us over the phone or electronically using my MyChart as issues arise.    It is my pleasure to participate in the care of Ms. Hess.  Please do not hesitate to contact me with questions or concerns.    Sharan Tran MD PhD FACC  Cardiologist [facility]    Please note that this dictation was created using voice recognition software. There may be errors I did not discover before finalizing the note.

## 2023-05-25 ENCOUNTER — HOSPITAL ENCOUNTER (OUTPATIENT)
Dept: CARDIOLOGY | Facility: MEDICAL CENTER | Age: 83
End: 2023-05-25
Attending: INTERNAL MEDICINE
Payer: MEDICARE

## 2023-05-25 DIAGNOSIS — I35.0 MODERATE AORTIC STENOSIS: ICD-10-CM

## 2023-05-25 LAB
LV EJECT FRACT  99904: 70
LV EJECT FRACT MOD 2C 99903: 74.17
LV EJECT FRACT MOD 4C 99902: 63.01
LV EJECT FRACT MOD BP 99901: 69.5

## 2023-05-25 PROCEDURE — 93306 TTE W/DOPPLER COMPLETE: CPT

## 2023-05-25 PROCEDURE — 93306 TTE W/DOPPLER COMPLETE: CPT | Mod: 26 | Performed by: INTERNAL MEDICINE

## 2023-05-30 ENCOUNTER — DOCUMENTATION (OUTPATIENT)
Dept: CARDIOLOGY | Facility: MEDICAL CENTER | Age: 83
End: 2023-05-30
Payer: MEDICARE

## 2023-05-30 NOTE — PROGRESS NOTES
This patient has been flagged through our echocardiogram surveillance with the following echocardiogram results:    Moderate Aortic Stenosis    Cardiologist: Dr. Tran    Current Plan of Care for Structural Heart Disease: Added to surveillance tracking list    Next Echo date needed by: 05/25/2024    Next Follow up appointment needed by: May 2024

## 2023-06-23 ENCOUNTER — HOSPITAL ENCOUNTER (OUTPATIENT)
Dept: LAB | Facility: MEDICAL CENTER | Age: 83
End: 2023-06-23
Attending: FAMILY MEDICINE
Payer: MEDICARE

## 2023-06-23 LAB
BASOPHILS # BLD AUTO: 0.5 % (ref 0–1.8)
BASOPHILS # BLD: 0.03 K/UL (ref 0–0.12)
EOSINOPHIL # BLD AUTO: 0.12 K/UL (ref 0–0.51)
EOSINOPHIL NFR BLD: 2.1 % (ref 0–6.9)
ERYTHROCYTE [DISTWIDTH] IN BLOOD BY AUTOMATED COUNT: 46 FL (ref 35.9–50)
HCT VFR BLD AUTO: 41.4 % (ref 37–47)
HGB BLD-MCNC: 13.7 G/DL (ref 12–16)
IMM GRANULOCYTES # BLD AUTO: 0.01 K/UL (ref 0–0.11)
IMM GRANULOCYTES NFR BLD AUTO: 0.2 % (ref 0–0.9)
LYMPHOCYTES # BLD AUTO: 1.37 K/UL (ref 1–4.8)
LYMPHOCYTES NFR BLD: 24.4 % (ref 22–41)
MCH RBC QN AUTO: 30 PG (ref 27–33)
MCHC RBC AUTO-ENTMCNC: 33.1 G/DL (ref 32.2–35.5)
MCV RBC AUTO: 90.8 FL (ref 81.4–97.8)
MONOCYTES # BLD AUTO: 0.48 K/UL (ref 0–0.85)
MONOCYTES NFR BLD AUTO: 8.5 % (ref 0–13.4)
NEUTROPHILS # BLD AUTO: 3.61 K/UL (ref 1.82–7.42)
NEUTROPHILS NFR BLD: 64.3 % (ref 44–72)
NRBC # BLD AUTO: 0 K/UL
NRBC BLD-RTO: 0 /100 WBC (ref 0–0.2)
PLATELET # BLD AUTO: 182 K/UL (ref 164–446)
PMV BLD AUTO: 10.4 FL (ref 9–12.9)
RBC # BLD AUTO: 4.56 M/UL (ref 4.2–5.4)
T4 FREE SERPL-MCNC: 1.16 NG/DL (ref 0.93–1.7)
TSH SERPL DL<=0.005 MIU/L-ACNC: 1.87 UIU/ML (ref 0.38–5.33)
WBC # BLD AUTO: 5.6 K/UL (ref 4.8–10.8)

## 2023-06-23 PROCEDURE — 82728 ASSAY OF FERRITIN: CPT

## 2023-06-23 PROCEDURE — 84481 FREE ASSAY (FT-3): CPT

## 2023-06-23 PROCEDURE — 84439 ASSAY OF FREE THYROXINE: CPT

## 2023-06-23 PROCEDURE — 83550 IRON BINDING TEST: CPT

## 2023-06-23 PROCEDURE — 84443 ASSAY THYROID STIM HORMONE: CPT

## 2023-06-23 PROCEDURE — 85025 COMPLETE CBC W/AUTO DIFF WBC: CPT

## 2023-06-23 PROCEDURE — 82607 VITAMIN B-12: CPT

## 2023-06-23 PROCEDURE — 82306 VITAMIN D 25 HYDROXY: CPT

## 2023-06-23 PROCEDURE — 83036 HEMOGLOBIN GLYCOSYLATED A1C: CPT

## 2023-06-23 PROCEDURE — 83540 ASSAY OF IRON: CPT

## 2023-06-23 PROCEDURE — 36415 COLL VENOUS BLD VENIPUNCTURE: CPT

## 2023-06-24 LAB
25(OH)D3 SERPL-MCNC: 42 NG/ML (ref 30–100)
EST. AVERAGE GLUCOSE BLD GHB EST-MCNC: 120 MG/DL
FERRITIN SERPL-MCNC: 18.5 NG/ML (ref 10–291)
HBA1C MFR BLD: 5.8 % (ref 4–5.6)
IRON SATN MFR SERPL: 18 % (ref 15–55)
IRON SERPL-MCNC: 67 UG/DL (ref 40–170)
T3FREE SERPL-MCNC: 2.41 PG/ML (ref 2–4.4)
TIBC SERPL-MCNC: 367 UG/DL (ref 250–450)
UIBC SERPL-MCNC: 300 UG/DL (ref 110–370)
VIT B12 SERPL-MCNC: 400 PG/ML (ref 211–911)

## 2023-08-26 ENCOUNTER — OFFICE VISIT (OUTPATIENT)
Dept: URGENT CARE | Facility: CLINIC | Age: 83
End: 2023-08-26
Payer: MEDICARE

## 2023-08-26 VITALS
HEIGHT: 65 IN | BODY MASS INDEX: 29.16 KG/M2 | DIASTOLIC BLOOD PRESSURE: 58 MMHG | WEIGHT: 175 LBS | HEART RATE: 86 BPM | SYSTOLIC BLOOD PRESSURE: 100 MMHG | OXYGEN SATURATION: 91 % | TEMPERATURE: 98.1 F

## 2023-08-26 DIAGNOSIS — J44.89 ASTHMA-COPD OVERLAP SYNDROME (HCC): ICD-10-CM

## 2023-08-26 DIAGNOSIS — R05.1 ACUTE COUGH: ICD-10-CM

## 2023-08-26 DIAGNOSIS — J44.1 COPD WITH ACUTE EXACERBATION (HCC): ICD-10-CM

## 2023-08-26 LAB
FLUAV RNA SPEC QL NAA+PROBE: NEGATIVE
FLUBV RNA SPEC QL NAA+PROBE: NEGATIVE
RSV RNA SPEC QL NAA+PROBE: NEGATIVE
SARS-COV-2 RNA RESP QL NAA+PROBE: NEGATIVE

## 2023-08-26 PROCEDURE — 3078F DIAST BP <80 MM HG: CPT | Performed by: PHYSICIAN ASSISTANT

## 2023-08-26 PROCEDURE — 3074F SYST BP LT 130 MM HG: CPT | Performed by: PHYSICIAN ASSISTANT

## 2023-08-26 PROCEDURE — 99214 OFFICE O/P EST MOD 30 MIN: CPT | Performed by: PHYSICIAN ASSISTANT

## 2023-08-26 PROCEDURE — 0241U POCT CEPHEID COV-2, FLU A/B, RSV - PCR: CPT | Performed by: PHYSICIAN ASSISTANT

## 2023-08-26 RX ORDER — PREDNISONE 10 MG/1
TABLET ORAL
Qty: 21 TABLET | Refills: 0 | Status: SHIPPED | OUTPATIENT
Start: 2023-08-26 | End: 2023-09-04

## 2023-08-26 RX ORDER — GUAIFENESIN 600 MG/1
600 TABLET, EXTENDED RELEASE ORAL EVERY 12 HOURS
Qty: 28 TABLET | Refills: 0 | Status: SHIPPED | OUTPATIENT
Start: 2023-08-26 | End: 2023-09-21

## 2023-08-26 RX ORDER — DOXYCYCLINE HYCLATE 100 MG
100 TABLET ORAL 2 TIMES DAILY
Qty: 10 TABLET | Refills: 0 | Status: SHIPPED | OUTPATIENT
Start: 2023-08-26 | End: 2023-08-31

## 2023-08-26 ASSESSMENT — ENCOUNTER SYMPTOMS
COUGH: 1
SORE THROAT: 1
SPUTUM PRODUCTION: 1
CHILLS: 0
FEVER: 0
PALPITATIONS: 0
WHEEZING: 1
SHORTNESS OF BREATH: 1

## 2023-08-26 ASSESSMENT — FIBROSIS 4 INDEX: FIB4 SCORE: 1.93
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
## 2023-09-06 ENCOUNTER — DOCUMENTATION (OUTPATIENT)
Dept: HEALTH INFORMATION MANAGEMENT | Facility: OTHER | Age: 83
End: 2023-09-06
Payer: MEDICARE

## 2023-09-21 ENCOUNTER — APPOINTMENT (OUTPATIENT)
Dept: RADIOLOGY | Facility: MEDICAL CENTER | Age: 83
End: 2023-09-21
Payer: MEDICARE

## 2023-09-21 ENCOUNTER — APPOINTMENT (OUTPATIENT)
Dept: RADIOLOGY | Facility: MEDICAL CENTER | Age: 83
End: 2023-09-21
Attending: EMERGENCY MEDICINE
Payer: MEDICARE

## 2023-09-21 ENCOUNTER — HOSPITAL ENCOUNTER (EMERGENCY)
Facility: MEDICAL CENTER | Age: 83
End: 2023-09-21
Attending: EMERGENCY MEDICINE
Payer: MEDICARE

## 2023-09-21 VITALS
BODY MASS INDEX: 29.68 KG/M2 | HEART RATE: 76 BPM | WEIGHT: 178.13 LBS | RESPIRATION RATE: 20 BRPM | TEMPERATURE: 98 F | HEIGHT: 65 IN | SYSTOLIC BLOOD PRESSURE: 136 MMHG | DIASTOLIC BLOOD PRESSURE: 67 MMHG | OXYGEN SATURATION: 95 %

## 2023-09-21 DIAGNOSIS — J45.21 MILD INTERMITTENT REACTIVE AIRWAY DISEASE WITH ACUTE EXACERBATION: ICD-10-CM

## 2023-09-21 DIAGNOSIS — J22 LOWER RESPIRATORY INFECTION: ICD-10-CM

## 2023-09-21 LAB
ALBUMIN SERPL BCP-MCNC: 4.1 G/DL (ref 3.2–4.9)
ALBUMIN/GLOB SERPL: 1.3 G/DL
ALP SERPL-CCNC: 66 U/L (ref 30–99)
ALT SERPL-CCNC: 12 U/L (ref 2–50)
ANION GAP SERPL CALC-SCNC: 13 MMOL/L (ref 7–16)
AST SERPL-CCNC: 17 U/L (ref 12–45)
BASOPHILS # BLD AUTO: 0.6 % (ref 0–1.8)
BASOPHILS # BLD: 0.04 K/UL (ref 0–0.12)
BILIRUB SERPL-MCNC: 0.5 MG/DL (ref 0.1–1.5)
BUN SERPL-MCNC: 24 MG/DL (ref 8–22)
CALCIUM ALBUM COR SERPL-MCNC: 9.6 MG/DL (ref 8.5–10.5)
CALCIUM SERPL-MCNC: 9.7 MG/DL (ref 8.4–10.2)
CHLORIDE SERPL-SCNC: 104 MMOL/L (ref 96–112)
CO2 SERPL-SCNC: 24 MMOL/L (ref 20–33)
CREAT SERPL-MCNC: 1.3 MG/DL (ref 0.5–1.4)
EKG IMPRESSION: NORMAL
EOSINOPHIL # BLD AUTO: 0.15 K/UL (ref 0–0.51)
EOSINOPHIL NFR BLD: 2.3 % (ref 0–6.9)
ERYTHROCYTE [DISTWIDTH] IN BLOOD BY AUTOMATED COUNT: 44 FL (ref 35.9–50)
FLUAV RNA SPEC QL NAA+PROBE: NEGATIVE
FLUBV RNA SPEC QL NAA+PROBE: NEGATIVE
GFR SERPLBLD CREATININE-BSD FMLA CKD-EPI: 41 ML/MIN/1.73 M 2
GLOBULIN SER CALC-MCNC: 3.2 G/DL (ref 1.9–3.5)
GLUCOSE SERPL-MCNC: 92 MG/DL (ref 65–99)
HCT VFR BLD AUTO: 44.3 % (ref 37–47)
HGB BLD-MCNC: 14.1 G/DL (ref 12–16)
IMM GRANULOCYTES # BLD AUTO: 0.02 K/UL (ref 0–0.11)
IMM GRANULOCYTES NFR BLD AUTO: 0.3 % (ref 0–0.9)
LACTATE SERPL-SCNC: 1 MMOL/L (ref 0.5–2)
LYMPHOCYTES # BLD AUTO: 1.15 K/UL (ref 1–4.8)
LYMPHOCYTES NFR BLD: 17.6 % (ref 22–41)
MCH RBC QN AUTO: 28.8 PG (ref 27–33)
MCHC RBC AUTO-ENTMCNC: 31.8 G/DL (ref 32.2–35.5)
MCV RBC AUTO: 90.4 FL (ref 81.4–97.8)
MONOCYTES # BLD AUTO: 0.57 K/UL (ref 0–0.85)
MONOCYTES NFR BLD AUTO: 8.7 % (ref 0–13.4)
NEUTROPHILS # BLD AUTO: 4.62 K/UL (ref 1.82–7.42)
NEUTROPHILS NFR BLD: 70.5 % (ref 44–72)
NRBC # BLD AUTO: 0 K/UL
NRBC BLD-RTO: 0 /100 WBC (ref 0–0.2)
PLATELET # BLD AUTO: 183 K/UL (ref 164–446)
PMV BLD AUTO: 10.3 FL (ref 9–12.9)
POTASSIUM SERPL-SCNC: 4 MMOL/L (ref 3.6–5.5)
PROT SERPL-MCNC: 7.3 G/DL (ref 6–8.2)
RBC # BLD AUTO: 4.9 M/UL (ref 4.2–5.4)
RSV RNA SPEC QL NAA+PROBE: NEGATIVE
SARS-COV-2 RNA RESP QL NAA+PROBE: NOTDETECTED
SODIUM SERPL-SCNC: 141 MMOL/L (ref 135–145)
SPECIMEN SOURCE: NORMAL
TROPONIN T SERPL-MCNC: 15 NG/L (ref 6–19)
WBC # BLD AUTO: 6.6 K/UL (ref 4.8–10.8)

## 2023-09-21 PROCEDURE — 80053 COMPREHEN METABOLIC PANEL: CPT

## 2023-09-21 PROCEDURE — 0241U HCHG SARS-COV-2 COVID-19 NFCT DS RESP RNA 4 TRGT MIC: CPT

## 2023-09-21 PROCEDURE — 700111 HCHG RX REV CODE 636 W/ 250 OVERRIDE (IP): Performed by: EMERGENCY MEDICINE

## 2023-09-21 PROCEDURE — 71045 X-RAY EXAM CHEST 1 VIEW: CPT

## 2023-09-21 PROCEDURE — 99284 EMERGENCY DEPT VISIT MOD MDM: CPT

## 2023-09-21 PROCEDURE — 93005 ELECTROCARDIOGRAM TRACING: CPT

## 2023-09-21 PROCEDURE — 84484 ASSAY OF TROPONIN QUANT: CPT

## 2023-09-21 PROCEDURE — 94640 AIRWAY INHALATION TREATMENT: CPT

## 2023-09-21 PROCEDURE — 700101 HCHG RX REV CODE 250: Performed by: EMERGENCY MEDICINE

## 2023-09-21 PROCEDURE — 93005 ELECTROCARDIOGRAM TRACING: CPT | Performed by: EMERGENCY MEDICINE

## 2023-09-21 PROCEDURE — C9803 HOPD COVID-19 SPEC COLLECT: HCPCS | Performed by: EMERGENCY MEDICINE

## 2023-09-21 PROCEDURE — 85025 COMPLETE CBC W/AUTO DIFF WBC: CPT

## 2023-09-21 PROCEDURE — 87040 BLOOD CULTURE FOR BACTERIA: CPT

## 2023-09-21 PROCEDURE — 36415 COLL VENOUS BLD VENIPUNCTURE: CPT

## 2023-09-21 PROCEDURE — 94760 N-INVAS EAR/PLS OXIMETRY 1: CPT

## 2023-09-21 PROCEDURE — 83605 ASSAY OF LACTIC ACID: CPT

## 2023-09-21 RX ORDER — IPRATROPIUM BROMIDE AND ALBUTEROL SULFATE 2.5; .5 MG/3ML; MG/3ML
3 SOLUTION RESPIRATORY (INHALATION) ONCE
Status: COMPLETED | OUTPATIENT
Start: 2023-09-21 | End: 2023-09-21

## 2023-09-21 RX ORDER — DOXYCYCLINE HYCLATE 100 MG
100 TABLET ORAL 2 TIMES DAILY
Status: SHIPPED | COMMUNITY
Start: 2023-08-26 | End: 2023-12-18

## 2023-09-21 RX ORDER — PREDNISONE 10 MG/1
10-40 TABLET ORAL DAILY
Status: SHIPPED | COMMUNITY
Start: 2023-08-26 | End: 2023-12-18

## 2023-09-21 RX ORDER — PREDNISONE 20 MG/1
60 TABLET ORAL DAILY
Qty: 12 TABLET | Refills: 0 | Status: SHIPPED | OUTPATIENT
Start: 2023-09-21 | End: 2023-09-25

## 2023-09-21 RX ORDER — ALBUTEROL SULFATE 90 UG/1
2 AEROSOL, METERED RESPIRATORY (INHALATION) EVERY 6 HOURS PRN
Qty: 8.5 G | Refills: 3 | Status: SHIPPED | OUTPATIENT
Start: 2023-09-21

## 2023-09-21 RX ORDER — GUAIFENESIN 600 MG/1
600 TABLET, EXTENDED RELEASE ORAL EVERY EVENING
COMMUNITY

## 2023-09-21 RX ORDER — AZITHROMYCIN 250 MG/1
TABLET, FILM COATED ORAL
Qty: 6 TABLET | Refills: 0 | Status: ACTIVE | OUTPATIENT
Start: 2023-09-21 | End: 2023-09-26

## 2023-09-21 RX ADMIN — IPRATROPIUM BROMIDE AND ALBUTEROL SULFATE 3 ML: .5; 3 SOLUTION RESPIRATORY (INHALATION) at 16:01

## 2023-09-21 RX ADMIN — PREDNISONE 60 MG: 10 TABLET ORAL at 16:23

## 2023-09-21 ASSESSMENT — FIBROSIS 4 INDEX: FIB4 SCORE: 1.93

## 2023-09-21 NOTE — DISCHARGE INSTRUCTIONS
You were seen in the Emergency Department for cough.    EKG, labs, chest xray were completed without significant acute abnormalities.    Please use 1,000mg of tylenol or 600mg of ibuprofen every 6 hours as needed for pain.    Take steroids and antibiotics as directed.  Use albuterol inhaler every 6 hours as needed for shortness of breath or cough.    Please follow up with your primary care physician.    Return to the Emergency Department with fevers, worsening chest pain, trouble breathing, or other concerns.

## 2023-09-21 NOTE — ED PROVIDER NOTES
ED Provider Note    CHIEF COMPLAINT  Chief Complaint   Patient presents with    Cough    Shortness of Breath     82 yo female ambulates to triage with reports of sent by her doctor to rule out pneumonia.  Patient reports she has had a cough for the past 30 days.  Reports low oxygen saturation per the office at 85% RA while at rest.  Currently at triage 92% while sitting.  Patient reports increasing shortness of breath and feeling dizziness.  + productive cough with yellow sputum.  Denies fever, Denies N/V and reports decreased energy.     Dizziness     EXTERNAL RECORDS REVIEWED  Patient was last seen in urgent care August 26, 2023 for COPD exacerbation.  Negative viral testing at that time.  Treated with prednisone and doxycycline.  She was last seen by cardiology in May 2023.  She has a history of CAD, hypertension, aortic stenosis, CKD.  Echo at that time with a normal EF.    HPI/ROS  LIMITATION TO HISTORY   Select: : None  OUTSIDE HISTORIAN(S):  none    Erin Hess is a 83 y.o. female who presents to the Emergency Department with cough.  Patient reports she has had an ongoing cough and some mild shortness of breath for the last month.  She was ill at the end of August and seen in urgent care.  She took a course of prednisone and doxycycline without significant improvement.  She has not had any fevers.  She describes a heaviness in her chest.  She has been using Symbicort in the morning and evenings however has not been using her rescue inhaler.  She she has had some mild swelling in her feet.  She was seen in her primary care office today with a concern for low oxygen saturations in the mid 80s therefore sent here for further evaluation    PAST MEDICAL HISTORY  Past Medical History:   Diagnosis Date    Arthritis     Asthma     Breast cancer (HCC)     Bronchitis 2011    Cancer (HCC) 12/2012    right breast    Cough     Dizziness 03/11/2013    Heart burn     Heart murmur     Hiatus hernia syndrome     HTN  (hypertension) 2013    Hypercholesterolemia     Hyperlipidemia 2013    Hypertension     Hypokalemia 2013    Indigestion     Mitral annular calcification     Pain     back, hips, knees    Psychiatric disorder     depression    Sleep apnea     h/o gastric sleeve lost 40 lb now not on CPAP    Snoring     Sputum production     ULCERATIVE COLITIS 2013    Vertigo 2013        SURGICAL HISTORY  Past Surgical History:   Procedure Laterality Date    GASTRIC SLEEVE LAPAROSCOPY N/A 2016    Procedure: GASTRIC SLEEVE LAPAROSCOPY, HIATAL HERNIA AND LIVER BIOPSY;  Surgeon: Mauricio Montes M.D.;  Location: SURGERY [facility];  Service:     US-NEEDLE CORE BX-BREAST PANEL      rt breast, with lumpectomy     GYN SURGERY      vag hyster     GYN SURGERY      vaginal cyst removal     LUMPECTOMY  left    OTHER       R breast bx X 2& lipoma removal        FAMILY HISTORY  Family History   Problem Relation Age of Onset    Heart Disease Mother         CAD MI at age 72    Cancer Mother         breast cancer  at age 83    Cancer Sister     Lung Disease Father 75        Lung cancer    Cancer Maternal Grandmother         Pancreatic cancer    Heart Attack Maternal Grandfather         MI at age 70    Heart Disease Sister         Rhuemati cfever- herat murmur       SOCIAL HISTORY   reports that she quit smoking about 37 years ago. Her smoking use included cigarettes. She started smoking about 57 years ago. She has a 20.0 pack-year smoking history. She has been exposed to tobacco smoke. She has never used smokeless tobacco. She reports current alcohol use of about 4.2 oz of alcohol per week. She reports that she does not use drugs.    CURRENT MEDICATIONS  Discharge Medication List as of 2023  4:30 PM        CONTINUE these medications which have NOT CHANGED    Details   !! predniSONE (DELTASONE) 10 MG Tab Take 10-40 mg by mouth every day. Tapering course   Take 40 mg by mouth every day for 3 days,  "THEN 20 mg every day for 3 days, THEN 10 mg every day for 3 days., Historical Med      guaiFENesin ER (MUCINEX) 600 MG TABLET SR 12 HR Take 600 mg by mouth every evening., Historical Med      doxycycline (VIBRAMYCIN) 100 MG Tab Take 100 mg by mouth 2 times a day. 5 day course, Historical Med      pantoprazole (PROTONIX) 40 MG Tablet Delayed Response Take 20 mg by mouth 2 times a day., Historical Med      spironolactone (ALDACTONE) 25 MG Tab Take 12.5 mg by mouth every evening., Historical Med      albuterol (PROVENTIL) 2.5mg/3ml Nebu Soln solution for nebulization Take 3 mL by nebulization every four hours as needed for Shortness of Breath., Disp-360 Each, R-3, Normal      valsartan-hydrochlorothiazide (DIOVAN-HCT) 160-12.5 MG per tablet Take 0.5 Tablets by mouth 2 times a day., Disp-90 Tablet, R-3, Normal      PARoxetine (PAXIL) 10 MG Tab Take 1 Tablet by mouth every day., Disp-100 Tablet, R-0, Normal      rosuvastatin (CRESTOR) 20 MG Tab Take 1 Tablet by mouth every evening., Disp-100 Tablet, R-0, Normal      anastrozole (ARIMIDEX) 1 MG Tab Take 1 Tablet by mouth every morning., Disp-100 Tablet, R-0, Normal      budesonide-formoterol (SYMBICORT) 160-4.5 MCG/ACT Aerosol Inhale 2 Puffs 2 times a day., Disp-3 Each, R-3, Normal      melatonin 5 mg Tab Take 5 mg by mouth at bedtime., Historical Med      Multiple Vitamins-Minerals (PRESERVISION AREDS 2+MULTI VIT PO) Take 1 Tablet by mouth every day., Historical Med      Cyanocobalamin (VITAMIN B-12 PO) Take 1 Tablet by mouth every day., Historical Med      vitamin D (CHOLECALCIFEROL) 1000 Unit Tab Take 1,000 Units by mouth every day., Historical Med       !! - Potential duplicate medications found. Please discuss with provider.          ALLERGIES  Sulfa drugs, Vancomycin, and Codeine    PHYSICAL EXAM  /67   Pulse 76   Temp 36.7 °C (98 °F) (Temporal)   Resp 20   Ht 1.638 m (5' 4.5\")   Wt 80.8 kg (178 lb 2.1 oz)   SpO2 95%      Constitutional: Nontoxic " appearing. Alert in no apparent distress.  HENT: Normocephalic, Atraumatic. Bilateral external ears normal. Nose normal.  Moist mucous membranes.  Oropharynx clear.  Eyes: Pupils are equal and reactive. Conjunctiva normal.   Neck: Supple, full range of motion  Heart: Regular rate and rhythm.  No murmurs.    Lungs: No respiratory distress, normal work of breathing.  Mild expiratory wheezing, lungs clear to auscultation bilaterally.  Abdomen Soft, no distention.  No tenderness to palpation.  Musculoskeletal: Atraumatic. No obvious deformities noted.  No lower extremity edema.  Skin: Warm, Dry.  No erythema, No rash.   Neurologic: Alert and oriented x3. Moving all extremities spontaneously without focal deficits.  Psychiatric: Affect normal, Mood normal, Appears appropriate and not intoxicated.      DIAGNOSTIC STUDIES / PROCEDURES    EKG  I have independently interpreted this EKG  Results for orders placed or performed during the hospital encounter of 23   EKG   Result Value Ref Range    Report       [facility] Emergency Dept.    Test Date:  2023  Pt Name:    ROSIO SHAFER                  Department: [facility]  MRN:        [id number]                      [facility]  Gender:     Female                       Technician: [id number]  :        [id number]                   Requested By:ER TRIAGE PROTOCOL  Order #:    [id number]                    Reading MD: Love Charlton MD    Measurements  Intervals                                Axis  Rate:       67                           P:          69  MA:         172                          QRS:        -2  QRSD:       89                           T:          45  QT:         390  QTc:        412    Interpretive Statements  Sinus rhythm  Normal intervals, no ectopy  Minimal ST elevation, anterolateral leads  Compared to ECG 2023 09:36:29  No significant change from prior  Electronically Signed On 2023 15:19:20 PDT by Love Charlton  "MD         LABS  Labs Reviewed   CBC WITH DIFFERENTIAL - Abnormal; Notable for the following components:       Result Value    MCHC 31.8 (*)     Lymphocytes 17.60 (*)     All other components within normal limits    Narrative:     Biotin intake of greater than 5 mg per day may interfere with  troponin levels, causing false low values.   COMP METABOLIC PANEL - Abnormal; Notable for the following components:    Bun 24 (*)     All other components within normal limits    Narrative:     Biotin intake of greater than 5 mg per day may interfere with  troponin levels, causing false low values.   ESTIMATED GFR - Abnormal; Notable for the following components:    GFR (CKD-EPI) 41 (*)     All other components within normal limits    Narrative:     Biotin intake of greater than 5 mg per day may interfere with  troponin levels, causing false low values.   COV-2, FLU A/B, AND RSV BY PCR ([facility])    Narrative:     Release to patient->Immediate   TROPONIN    Narrative:     Biotin intake of greater than 5 mg per day may interfere with  troponin levels, causing false low values.   LACTIC ACID    Narrative:     Biotin intake of greater than 5 mg per day may interfere with  troponin levels, causing false low values.   BLOOD CULTURE    Narrative:     Per Hospital Policy: Only change Specimen Src: to \"Line\" if  specified by physician order.  Release to patient->Immediate         RADIOLOGY  I have independently interpreted the diagnostic imaging associated with this visit and am waiting the final reading from the radiologist.   My preliminary interpretation is a follows: no infiltrate  Radiologist interpretation:   DX-CHEST-PORTABLE (1 VIEW)   Final Result      Mild cardiomegaly.            COURSE & MEDICAL DECISION MAKING    ED Observation Status? No; Patient does not meet criteria for ED Observation.     INITIAL ASSESSMENT, COURSE AND PLAN  Care Narrative: Elderly patient with history of asthma who presents with ongoing cough for the " last month following viral illness.  She is afebrile with normal vital signs on arrival.  Concern for hypoxia at PCP however she was monitored here for a few hours with normal oxygen saturations and no respiratory distress.  She does have some wheezing on exam suggestive of reactive airways disease.  Her EKG does not show ischemia or arrhythmia.  Troponin is normal, doubt ACS.  No leukocytosis or signs of infectious process.  COVID and flu testing are negative.  Chest x-ray shows some mild cardiomegaly however no pneumonia or pulmonary edema.  Plan to treat for asthma exacerbation as well as antibiotics in case of atypical infection.    4:35 PM - Upon reassessment, patient is resting comfortably with normal vital signs.  No new complaints at this time.  Lung exam improved however still mild wheezing.  Discussed results with patient and/or family as well as importance of primary care follow up.  Patient understands plan of care and strict return precautions for new or changing symptoms.     ADDITIONAL PROBLEM LIST  Problem #1: Acute reactive airways exacerbation -discharge home with steroids and albuterol inhaler as needed    Problem #2: Lower respiratory infection -discharge with Z-Pritesh      DISPOSITION AND DISCUSSIONS  Escalation of care considered, and ultimately not performed:acute inpatient care management, however at this time, the patient is most appropriate for outpatient management      DISPOSITION:  Patient will be discharged home in stable condition.    FOLLOW UP:  Terra Zambrano M.D.  [address]  [address]  [place] [address]  [phone]    Schedule an appointment as soon as possible for a visit       [facility], Emergency Dept  [address]  [facility] [id number] [phone]    If symptoms worsen      OUTPATIENT MEDICATIONS:  Discharge Medication List as of 9/21/2023  4:30 PM        START taking these medications    Details   albuterol 108 (90 Base) MCG/ACT Aero Soln  inhalation aerosol Inhale 2 Puffs every 6 hours as needed for Shortness of Breath., Disp-8.5 g, R-3, Normal      !! predniSONE (DELTASONE) 20 MG Tab Take 3 Tablets by mouth every day for 4 days., Disp-12 Tablet, R-0, Normal      azithromycin (ZITHROMAX) 250 MG Tab Take 2 Tablets by mouth every day for 1 day, THEN 1 Tablet every day for 4 days., Disp-6 Tablet, R-0, Normal       !! - Potential duplicate medications found. Please discuss with provider.            FINAL DIAGNOSIS  1. Mild intermittent reactive airway disease with acute exacerbation    2. Lower respiratory infection

## 2023-09-21 NOTE — ED TRIAGE NOTES
"Chief Complaint   Patient presents with    Cough    Shortness of Breath     84 yo female ambulates to triage with reports of sent by her doctor to rule out pneumonia.  Patient reports she has had a cough for the past 30 days.  Reports low oxygen saturation per the office at 85% RA while at rest.  Currently at triage 92% while sitting.  Patient reports increasing shortness of breath and feeling dizziness.  + productive cough with yellow sputum.  Denies fever, Denies N/V and reports decreased energy.     Dizziness    BP (!) 141/70   Pulse 70   Temp 36.6 °C (97.8 °F) (Temporal)   Resp 20   Ht 1.638 m (5' 4.5\")   Wt 80.8 kg (178 lb 2.1 oz)   SpO2 92%   BMI 30.10 kg/m²     "

## 2023-09-21 NOTE — ED NOTES
Med rec completed per pt   Allergies reviewed     Pt completed a tapering course of Prednisone on 9/4/2023    Pt completed a 5 day course of Doxycycline on 8/31/2023

## 2023-09-26 LAB
BACTERIA BLD CULT: NORMAL
SIGNIFICANT IND 70042: NORMAL
SITE SITE: NORMAL
SOURCE SOURCE: NORMAL

## 2023-09-27 ENCOUNTER — TELEPHONE (OUTPATIENT)
Dept: SLEEP MEDICINE | Facility: MEDICAL CENTER | Age: 83
End: 2023-09-27
Payer: MEDICARE

## 2023-10-18 ENCOUNTER — OFFICE VISIT (OUTPATIENT)
Dept: SLEEP MEDICINE | Facility: MEDICAL CENTER | Age: 83
End: 2023-10-18
Attending: PREVENTIVE MEDICINE
Payer: MEDICARE

## 2023-10-18 VITALS
WEIGHT: 176 LBS | BODY MASS INDEX: 29.32 KG/M2 | OXYGEN SATURATION: 92 % | HEIGHT: 65 IN | DIASTOLIC BLOOD PRESSURE: 60 MMHG | RESPIRATION RATE: 16 BRPM | HEART RATE: 91 BPM | SYSTOLIC BLOOD PRESSURE: 110 MMHG

## 2023-10-18 DIAGNOSIS — G47.33 OSA ON CPAP: ICD-10-CM

## 2023-10-18 PROCEDURE — 3078F DIAST BP <80 MM HG: CPT | Performed by: PREVENTIVE MEDICINE

## 2023-10-18 PROCEDURE — 99214 OFFICE O/P EST MOD 30 MIN: CPT | Performed by: PREVENTIVE MEDICINE

## 2023-10-18 PROCEDURE — 3074F SYST BP LT 130 MM HG: CPT | Performed by: PREVENTIVE MEDICINE

## 2023-10-18 PROCEDURE — 99212 OFFICE O/P EST SF 10 MIN: CPT | Performed by: PREVENTIVE MEDICINE

## 2023-10-18 ASSESSMENT — FIBROSIS 4 INDEX: FIB4 SCORE: 2.17

## 2023-10-18 NOTE — PROGRESS NOTES
CHIEF COMPLAINT: Compliance check/Annual Visit/First Compliance  LAST SEEN: Dr. Alba on 11/09/22  HISTORY OF PRESENT ILLNESS:  Erin Hess is a 83 y.o.female  who is here today to follow-up  for ROBYN.  Was last seen in Nov. Of 2022 and at that time she was interested in [employer].  However, after reconsideration she decided that she would prefer to try to use PAP again.  She has not been able to get new supplies for several years through her insurance.  She has resumed using her PAP therapy but did not bring her machine today for a compliance download.  Also the patient reports worsening shortness of breath throughout the day.  She is wondering if this is related to her inconsistent use of her CPAP or to some other medical problem.      COMPLIANCE DATA greater than 4 hours:  % PENDING  Machine type:   Date range:  AHI:  TIME USED:  PRESSURE SETTINGS:  LEAK:    This patient is using PAP therapy consistently and is benefiting from it .        Significant comorbidities and modifying factors: see below    PAST MEDICAL HISTORY:  Past Medical History:   Diagnosis Date    Arthritis     Asthma     Breast cancer (HCC)     Bronchitis 2011    Cancer (HCC) 12/2012    right breast    Cough     Dizziness 03/11/2013    Heart burn     Heart murmur     Hiatus hernia syndrome     HTN (hypertension) 03/12/2013    Hypercholesterolemia     Hyperlipidemia 03/12/2013    Hypertension     Hypokalemia 03/12/2013    Indigestion     Mitral annular calcification     Pain     back, hips, knees    Psychiatric disorder     depression    Sleep apnea     h/o gastric sleeve lost 40 lb now not on CPAP    Snoring     Sputum production     ULCERATIVE COLITIS 03/12/2013    Vertigo 03/11/2013      PROBLEM LIST:  Patient Active Problem List    Diagnosis Date Noted    Post-nasal drip 02/09/2023    Numbness in feet 05/25/2022    Chronic lower back pain 05/25/2022    Mood disorder (HCC) 05/25/2022    Cough 05/25/2022    Osteopenia of left thigh 05/25/2022     Elevated glycohemoglobin 2022    Generalized anxiety disorder 03/10/2022    Stage 3a chronic kidney disease (HCC) 03/10/2022    History of 2019 novel coronavirus disease (COVID-19) 03/10/2022    H/O gastric sleeve 03/10/2022    GERD (gastroesophageal reflux disease) 03/10/2022    History of breast cancer 03/10/2022    Advance directive discussed with patient 03/10/2022    Mitral annular calcification     Complex renal cyst 01/10/2018    Coronary artery disease due to lipid rich plaque     Chronic rhinitis 2017    Moderate aortic stenosis 2017    Dyslipidemia 2017    Fatigue 2017    Asthma-COPD overlap syndrome 2016    ROBYN (obstructive sleep apnea) 2016    Obesity (BMI 30.0-34.9) 2016    Sciatica 2013    Essential hypertension 2013    Ulcerative pancolitis without complication (HCC) 2013     PAST SOCIAL HISTORY:  Past Surgical History:   Procedure Laterality Date    GASTRIC SLEEVE LAPAROSCOPY N/A 2016    Procedure: GASTRIC SLEEVE LAPAROSCOPY, HIATAL HERNIA AND LIVER BIOPSY;  Surgeon: Mauricio Montes M.D.;  Location: SURGERY [facility];  Service:     US-NEEDLE CORE BX-BREAST PANEL      rt breast, with lumpectomy     GYN SURGERY      vag hyster 1990    GYN SURGERY      vaginal cyst removal     LUMPECTOMY  left    OTHER       R breast bx X 2& lipoma removal     PAST FAMILY HISTORY:  Family History   Problem Relation Age of Onset    Heart Disease Mother         CAD MI at age 72    Cancer Mother         breast cancer  at age 83    Cancer Sister     Lung Disease Father 75        Lung cancer    Cancer Maternal Grandmother         Pancreatic cancer    Heart Attack Maternal Grandfather         MI at age 70    Heart Disease Sister         Rhuemati cfever- herat murmur     SOCIAL HISTORY:  Social History     Socioeconomic History    Marital status:      Spouse name: Not on file    Number of children: Not on file    Years of education: Not  on file    Highest education level: 12th grade   Occupational History    Not on file   Tobacco Use    Smoking status: Former     Current packs/day: 0.00     Average packs/day: 1 pack/day for 20.0 years (20.0 ttl pk-yrs)     Types: Cigarettes     Start date: 1966     Quit date: 1986     Years since quittin.4     Passive exposure: Past    Smokeless tobacco: Never    Tobacco comments:     Quit in    Vaping Use    Vaping Use: Never used   Substance and Sexual Activity    Alcohol use: Yes     Alcohol/week: 4.2 oz     Types: 7 Standard drinks or equivalent per week     Comment: 1 drink a night    Drug use: No    Sexual activity: Not on file   Other Topics Concern    Not on file   Social History Narrative    She lives alone but has a female roommate who is 60, Leonora Rosa who has a brother in town she is estranged from. Leonora Rosa had Covid  and now has long-haulers and is on medicaid     She has 2 children and 4 stepchildren, her   . She has a son near [place], [place] and another in [place]. She relies on her 2 grandkids who live in [place] and her 8y younger sister lives in [facility], her older sister lives in a skilled nursing in [place]. 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  then  after 27y. Her ex  her cousin, met her 2nd  and was  for 25y.     Her son supports her keeping Leonora Rosa at her home. She does not feel taken advantage of despite no longer receiving rent.      Social Determinants of Health     Financial Resource Strain: Medium Risk (2022)    Overall Financial Resource Strain (CARDIA)     Difficulty of Paying Living Expenses: Somewhat hard   Food Insecurity: No Food Insecurity (2022)    Hunger Vital Sign     Worried About Running Out of Food in the Last Year: Never true     Ran Out of Food in the Last Year: Never true   Transportation Needs: No Transportation Needs (2022)    PRAPARE - Transportation      Lack of Transportation (Medical): No     Lack of Transportation (Non-Medical): No   Physical Activity: Inactive (12/27/2022)    Exercise Vital Sign     Days of Exercise per Week: 0 days     Minutes of Exercise per Session: 0 min   Stress: Stress Concern Present (12/27/2022)    [employer] - Occupational Stress Questionnaire     Feeling of Stress : To some extent   Social Connections: Moderately Isolated (12/27/2022)    Social Connection and Isolation Panel [NHANES]     Frequency of Communication with Friends and Family: More than three times a week     Frequency of Social Gatherings with Friends and Family: Once a week     Attends Mormon Services: Never     Active Member of Clubs or Organizations: Yes     Attends Club or Organization Meetings: More than 4 times per year     Marital Status:    Intimate Partner Violence: Not on file   Housing Stability: Low Risk  (12/27/2022)    Housing Stability Vital Sign     Unable to Pay for Housing in the Last Year: No     Number of Places Lived in the Last Year: 1     Unstable Housing in the Last Year: No     ALLERGIES: Sulfa drugs, Vancomycin, and Codeine  MEDICATIONS:  Current Outpatient Medications   Medication Sig Dispense Refill    guaiFENesin ER (MUCINEX) 600 MG TABLET SR 12 HR Take 600 mg by mouth every evening.      albuterol 108 (90 Base) MCG/ACT Aero Soln inhalation aerosol Inhale 2 Puffs every 6 hours as needed for Shortness of Breath. 8.5 g 3    pantoprazole (PROTONIX) 40 MG Tablet Delayed Response Take 20 mg by mouth 2 times a day.      spironolactone (ALDACTONE) 25 MG Tab Take 12.5 mg by mouth every evening.      albuterol (PROVENTIL) 2.5mg/3ml Nebu Soln solution for nebulization Take 3 mL by nebulization every four hours as needed for Shortness of Breath. 360 Each 3    valsartan-hydrochlorothiazide (DIOVAN-HCT) 160-12.5 MG per tablet Take 0.5 Tablets by mouth 2 times a day. 90 Tablet 3    PARoxetine (PAXIL) 10 MG Tab Take  "1 Tablet by mouth every day. 100 Tablet 0    rosuvastatin (CRESTOR) 20 MG Tab Take 1 Tablet by mouth every evening. 100 Tablet 0    anastrozole (ARIMIDEX) 1 MG Tab Take 1 Tablet by mouth every morning. 100 Tablet 0    budesonide-formoterol (SYMBICORT) 160-4.5 MCG/ACT Aerosol Inhale 2 Puffs 2 times a day. 3 Each 3    melatonin 5 mg Tab Take 5 mg by mouth at bedtime.      Multiple Vitamins-Minerals (PRESERVISION AREDS 2+MULTI VIT PO) Take 1 Tablet by mouth every day.      Cyanocobalamin (VITAMIN B-12 PO) Take 1 Tablet by mouth every day.      vitamin D (CHOLECALCIFEROL) 1000 Unit Tab Take 1,000 Units by mouth every day.      predniSONE (DELTASONE) 10 MG Tab Take 10-40 mg by mouth every day. Tapering course   Take 40 mg by mouth every day for 3 days, THEN 20 mg every day for 3 days, THEN 10 mg every day for 3 days. (Patient not taking: Reported on 10/18/2023)      doxycycline (VIBRAMYCIN) 100 MG Tab Take 100 mg by mouth 2 times a day. 5 day course (Patient not taking: Reported on 10/18/2023)       No current facility-administered medications for this visit.    \"CURRENT RX\"    REVIEW OF SYSTEMS:  SEE HPI      PHYSICAL EXAM/VITALS:  /60 (BP Location: Left arm, Patient Position: Sitting, BP Cuff Size: Adult)   Pulse 91   Resp 16   Ht 1.638 m (5' 4.5\")   Wt 79.8 kg (176 lb)   SpO2 92%   BMI 29.74 kg/m²   Appearance: Well-nourished, well-developed,  looks stated age, no acute distress  Eyes:   EOMI  ENMT:  WNL  Neck: Supple, trachea midline  Respiratory effort:  No intercostal retractions or use of accessory muscles  Musculoskeletal:  Grossly normal; gait and station normal  Neurologic:  oriented to person, time, place, and purpose; judgement intact  Psychiatric:  No depression, anxiety, agitation    MEDICAL DECISION MAKING:  The medical record was reviewed as it pertains to this referral. This includes records from primary care,consultants notes, referral request, hospital records, labs and imaging. Any " available diagnostic and titration nocturnal polysomnograms, home sleep apnea tests, continuous nocturnal oximetry results, multiple sleep latency tests, and recent compliance reports were reviewed with the patient.    ASSESSMENT/PLAN:  Erin Hess is a 83 y.o.female who is doing well on PAP therapy. Patient will be assigned a new DME today and will have a DME mask and supplies order submitted on her behalf.  She will also be referred to pulmonology for a general pulmonology evaluation.  A decision regarding retesting will be made no later than her November follow-up appt.        DIAGNOSES :    1. ROBYN on CPAP  - DME Mask and Supplies        The risks of untreated sleep apnea were discussed with the patient at length. Patients with ROBYN are at increased risk of cardiovascular disease including coronary artery disease, systemic arterial hypertension, pulmonary arterial hypertension, cardiac arrhythmias, and stroke. ROBYN patients have an increased risk of motor vehicle accidents, type 2 diabetes, chronic kidney disease, and non-alcoholic liver disease. The patient was advised to avoid driving a motor vehicle when drowsy.  Have advised the patient to follow up with the appropriate healthcare practitioners for all other medical problems and issues.    RETURN TO CLINIC: Return in about 5 weeks (around 11/22/2023) for Compliance check, order new test or order 11.22/23..    My total time spent caring for the patient on the day of the encounter was 40 minutes. This includes time spent on a thorough chart review including other physician notes, all sleep studies, as well as critical labs and pulmonary and cardiac studies.  Additionally, it includes a thorough discussion of good sleep hygiene and stimulus control, as well as  the need for consistency in terms of sleep preparation and practice.    Please note that this dictation was created using voice recognition software.  I have made every reasonable attempt to correct  obvious errors, I expect that there are errors of grammar and possibly content that I did not discover before finalizing this note.

## 2023-10-20 ENCOUNTER — OFFICE VISIT (OUTPATIENT)
Dept: SLEEP MEDICINE | Facility: MEDICAL CENTER | Age: 83
End: 2023-10-20
Attending: NURSE PRACTITIONER
Payer: MEDICARE

## 2023-10-20 VITALS
HEART RATE: 88 BPM | DIASTOLIC BLOOD PRESSURE: 60 MMHG | SYSTOLIC BLOOD PRESSURE: 122 MMHG | BODY MASS INDEX: 30.63 KG/M2 | RESPIRATION RATE: 12 BRPM | HEIGHT: 64 IN | OXYGEN SATURATION: 92 % | WEIGHT: 179.4 LBS

## 2023-10-20 DIAGNOSIS — J45.20 MILD INTERMITTENT ASTHMA, UNSPECIFIED WHETHER COMPLICATED: ICD-10-CM

## 2023-10-20 DIAGNOSIS — J44.89 ASTHMA-COPD OVERLAP SYNDROME (HCC): ICD-10-CM

## 2023-10-20 DIAGNOSIS — Z23 NEED FOR IMMUNIZATION AGAINST INFLUENZA: ICD-10-CM

## 2023-10-20 DIAGNOSIS — J96.11 CHRONIC RESPIRATORY FAILURE WITH HYPOXIA (HCC): ICD-10-CM

## 2023-10-20 PROCEDURE — 99212 OFFICE O/P EST SF 10 MIN: CPT | Mod: 25 | Performed by: NURSE PRACTITIONER

## 2023-10-20 PROCEDURE — 90662 IIV NO PRSV INCREASED AG IM: CPT | Performed by: NURSE PRACTITIONER

## 2023-10-20 PROCEDURE — 90471 IMMUNIZATION ADMIN: CPT

## 2023-10-20 PROCEDURE — 99214 OFFICE O/P EST MOD 30 MIN: CPT | Mod: 25 | Performed by: NURSE PRACTITIONER

## 2023-10-20 PROCEDURE — G0008 ADMIN INFLUENZA VIRUS VAC: HCPCS | Performed by: NURSE PRACTITIONER

## 2023-10-20 PROCEDURE — 3074F SYST BP LT 130 MM HG: CPT | Performed by: NURSE PRACTITIONER

## 2023-10-20 PROCEDURE — 3078F DIAST BP <80 MM HG: CPT | Performed by: NURSE PRACTITIONER

## 2023-10-20 ASSESSMENT — FIBROSIS 4 INDEX: FIB4 SCORE: 2.17

## 2023-10-20 ASSESSMENT — PATIENT HEALTH QUESTIONNAIRE - PHQ9: CLINICAL INTERPRETATION OF PHQ2 SCORE: 0
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
## 2023-10-23 NOTE — PROCEDURES
Multi-Ox Readings  Multi Ox #1 Room air   O2 sat % at rest 93   O2 sat % on exertion 85   O2 sat average on exertion     Multi Ox #2 2 LPM   O2 sat % at rest 93   O2 sat % on exertion 92   O2 sat average on exertion       Oxygen Use     Oxygen Frequency     Duration of need     Is the patient mobile within the home?     CPAP Use?     BIPAP Use?     Servo Titration

## 2023-10-31 ENCOUNTER — TELEPHONE (OUTPATIENT)
Dept: PHYSICAL THERAPY | Facility: REHABILITATION | Age: 83
End: 2023-10-31
Payer: MEDICARE

## 2023-10-31 NOTE — OP THERAPY DISCHARGE SUMMARY
Outpatient Physical Therapy  DISCHARGE SUMMARY NOTE      [facility] [address]  [address]  [place] [address]  Phone:  [phone]  Fax:  [phone]    Date of Visit: 10/31/2023    Patient: Erin Hess  YOB: 1940  MRN: [id number]     Referring Provider: Karla Lopez M.D.  [address] [place] [id number]   Referring Diagnosis Chronic low back pain without sciatica, unspecified back pain laterality [M54.50, G89.29]            Functional Assessment Used        Your patient is being discharged from Physical Therapy with the following comments:   Pt last seen 10/19/2022. Appropriate to close this POC.     Girish Cavazos, PT, DPT    Date: 10/31/2023

## 2023-11-22 ENCOUNTER — TELEPHONE (OUTPATIENT)
Dept: SLEEP MEDICINE | Facility: MEDICAL CENTER | Age: 83
End: 2023-11-22
Payer: MEDICARE

## 2023-11-23 NOTE — TELEPHONE ENCOUNTER
11-22-23  NO SHOW  Date of No Show: 11-22-23  Provider: Eduardo  Reason For Visit: EST Sleep 40 min  Outcome of call:      No call made, pt already R/S appt    Reason Missed: N/A  ACM

## 2023-12-18 ENCOUNTER — OFFICE VISIT (OUTPATIENT)
Dept: CARDIOLOGY | Facility: MEDICAL CENTER | Age: 83
End: 2023-12-18
Attending: INTERNAL MEDICINE
Payer: MEDICARE

## 2023-12-18 ENCOUNTER — TELEPHONE (OUTPATIENT)
Dept: SCHEDULING | Facility: IMAGING CENTER | Age: 83
End: 2023-12-18
Payer: MEDICARE

## 2023-12-18 VITALS
DIASTOLIC BLOOD PRESSURE: 66 MMHG | WEIGHT: 180 LBS | HEART RATE: 84 BPM | SYSTOLIC BLOOD PRESSURE: 124 MMHG | HEIGHT: 64 IN | BODY MASS INDEX: 30.73 KG/M2 | OXYGEN SATURATION: 96 %

## 2023-12-18 DIAGNOSIS — E78.5 DYSLIPIDEMIA: ICD-10-CM

## 2023-12-18 DIAGNOSIS — I25.83 CORONARY ARTERY DISEASE DUE TO LIPID RICH PLAQUE: ICD-10-CM

## 2023-12-18 DIAGNOSIS — I35.0 MODERATE AORTIC STENOSIS: ICD-10-CM

## 2023-12-18 DIAGNOSIS — J96.11 CHRONIC RESPIRATORY FAILURE WITH HYPOXIA (HCC): ICD-10-CM

## 2023-12-18 DIAGNOSIS — I25.10 CORONARY ARTERY DISEASE DUE TO LIPID RICH PLAQUE: ICD-10-CM

## 2023-12-18 DIAGNOSIS — I10 ESSENTIAL HYPERTENSION: ICD-10-CM

## 2023-12-18 PROCEDURE — 99214 OFFICE O/P EST MOD 30 MIN: CPT | Performed by: INTERNAL MEDICINE

## 2023-12-18 PROCEDURE — 99213 OFFICE O/P EST LOW 20 MIN: CPT | Performed by: INTERNAL MEDICINE

## 2023-12-18 PROCEDURE — 3074F SYST BP LT 130 MM HG: CPT | Performed by: INTERNAL MEDICINE

## 2023-12-18 PROCEDURE — 3078F DIAST BP <80 MM HG: CPT | Performed by: INTERNAL MEDICINE

## 2023-12-18 RX ORDER — VALSARTAN AND HYDROCHLOROTHIAZIDE 80; 12.5 MG/1; MG/1
1 TABLET, FILM COATED ORAL DAILY
Qty: 100 TABLET | Refills: 3 | Status: SHIPPED | OUTPATIENT
Start: 2023-12-18

## 2023-12-18 ASSESSMENT — FIBROSIS 4 INDEX: FIB4 SCORE: 2.17

## 2023-12-18 NOTE — TELEPHONE ENCOUNTER
Erin Dubois wants to know if a Portal Oxygen Concentrator can be ordered as she is experiencing bad SOB, please advise with Erin.    Thanks.

## 2023-12-19 NOTE — PROGRESS NOTES
Chief Complaint   Patient presents with    Aortic Stenosis     F/v dx: Moderate aortic stenosis    Hypertension    Coronary Artery Disease     F/v dx: Coronary artery disease due to lipid rich plaque       Subjective     Erin Hess is a 83 y.o. female who presents today with moderate AS     Has had prolonged cough and URI since 2023 took empiric COPD course    Past Medical History:   Diagnosis Date    Arthritis     Asthma     Breast cancer (HCC)     Bronchitis 2011    Cancer (HCC) 2012    right breast    Cough     Dizziness 2013    Heart burn     Heart murmur     Hiatus hernia syndrome     HTN (hypertension) 2013    Hypercholesterolemia     Hyperlipidemia 2013    Hypertension     Hypokalemia 2013    Indigestion     Mitral annular calcification     Pain     back, hips, knees    Psychiatric disorder     depression    Sleep apnea     h/o gastric sleeve lost 40 lb now not on CPAP    Snoring     Sputum production     ULCERATIVE COLITIS 2013    Vertigo 2013     Past Surgical History:   Procedure Laterality Date    GASTRIC SLEEVE LAPAROSCOPY N/A 2016    Procedure: GASTRIC SLEEVE LAPAROSCOPY, HIATAL HERNIA AND LIVER BIOPSY;  Surgeon: Mauricio Montes M.D.;  Location: SURGERY [facility];  Service:     US-NEEDLE CORE BX-BREAST PANEL      rt breast, with lumpectomy     GYN SURGERY      vag hyster     GYN SURGERY      vaginal cyst removal     LUMPECTOMY  left    OTHER       R breast bx X 2& lipoma removal     Family History   Problem Relation Age of Onset    Heart Disease Mother         CAD MI at age 72    Cancer Mother         breast cancer  at age 83    Cancer Sister     Lung Disease Father 75        Lung cancer    Cancer Maternal Grandmother         Pancreatic cancer    Heart Attack Maternal Grandfather         MI at age 70    Heart Disease Sister         Rhuemati cfever- herat murmur     Social History     Socioeconomic History    Marital status:       Spouse name: Not on file    Number of children: Not on file    Years of education: Not on file    Highest education level: 12th grade   Occupational History    Not on file   Tobacco Use    Smoking status: Former     Current packs/day: 0.00     Average packs/day: 1 pack/day for 20.0 years (20.0 ttl pk-yrs)     Types: Cigarettes     Start date: 1966     Quit date: 1986     Years since quittin.6     Passive exposure: Past    Smokeless tobacco: Never    Tobacco comments:     Quit in    Vaping Use    Vaping Use: Never used   Substance and Sexual Activity    Alcohol use: Yes     Alcohol/week: 4.2 oz     Types: 7 Standard drinks or equivalent per week     Comment: 1 drink a night    Drug use: No    Sexual activity: Not on file   Other Topics Concern    Not on file   Social History Narrative    She lives alone but has a female roommate who is 60, Leonora Rosa who has a brother in town she is estranged from. Leonora Rosa had Covid  and now has long-haulers and is on medicaid     She has 2 children and 4 stepchildren, her   . She has a son near [place], [place] and another in [place]. She relies on her 2 grandkids who live in [place] and her 8y younger sister lives in [facility], her older sister lives in a [place] in [place]. 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  then  after 27y. Her ex  her cousin, met her 2nd  and was  for 25y.     Her son supports her keeping Leonora Rosa at her home. She does not feel taken advantage of despite no longer receiving rent.      Social Determinants of Health     Financial Resource Strain: Medium Risk (2022)    Overall Financial Resource Strain (CARDIA)     Difficulty of Paying Living Expenses: Somewhat hard   Food Insecurity: No Food Insecurity (2022)    Hunger Vital Sign     Worried About Running Out of Food in the Last Year: Never true     Ran Out of Food in the Last Year: Never true    Transportation Needs: No Transportation Needs (12/27/2022)    PRAPARE - Transportation     Lack of Transportation (Medical): No     Lack of Transportation (Non-Medical): No   Physical Activity: Inactive (12/27/2022)    Exercise Vital Sign     Days of Exercise per Week: 0 days     Minutes of Exercise per Session: 0 min   Stress: Stress Concern Present (12/27/2022)    [employer] - Occupational Stress Questionnaire     Feeling of Stress : To some extent   Social Connections: Moderately Isolated (12/27/2022)    Social Connection and Isolation Panel [NHANES]     Frequency of Communication with Friends and Family: More than three times a week     Frequency of Social Gatherings with Friends and Family: Once a week     Attends Mosque Services: Never     Active Member of Clubs or Organizations: Yes     Attends Club or Organization Meetings: More than 4 times per year     Marital Status:    Intimate Partner Violence: Not on file   Housing Stability: Low Risk  (12/27/2022)    Housing Stability Vital Sign     Unable to Pay for Housing in the Last Year: No     Number of Places Lived in the Last Year: 1     Unstable Housing in the Last Year: No     Allergies   Allergen Reactions    Sulfa Drugs Rash and Swelling     Rash, joint swelling  [id number]    Vancomycin Itching    Codeine Vomiting and Nausea     Outpatient Encounter Medications as of 12/18/2023   Medication Sig Dispense Refill    valsartan-hydrochlorothiazide (DIOVAN-HCT) 80-12.5 MG per tablet Take 1 Tablet by mouth every day. 100 Tablet 3    guaiFENesin ER (MUCINEX) 600 MG TABLET SR 12 HR Take 600 mg by mouth every evening.      albuterol 108 (90 Base) MCG/ACT Aero Soln inhalation aerosol Inhale 2 Puffs every 6 hours as needed for Shortness of Breath. 8.5 g 3    pantoprazole (PROTONIX) 40 MG Tablet Delayed Response Take 20 mg by mouth 2 times a day.      spironolactone (ALDACTONE) 25 MG Tab Take 12.5 mg by mouth every evening.       "albuterol (PROVENTIL) 2.5mg/3ml Nebu Soln solution for nebulization Take 3 mL by nebulization every four hours as needed for Shortness of Breath. 360 Each 3    valsartan-hydrochlorothiazide (DIOVAN-HCT) 160-12.5 MG per tablet Take 0.5 Tablets by mouth 2 times a day. 90 Tablet 3    PARoxetine (PAXIL) 10 MG Tab Take 1 Tablet by mouth every day. 100 Tablet 0    rosuvastatin (CRESTOR) 20 MG Tab Take 1 Tablet by mouth every evening. 100 Tablet 0    anastrozole (ARIMIDEX) 1 MG Tab Take 1 Tablet by mouth every morning. 100 Tablet 0    budesonide-formoterol (SYMBICORT) 160-4.5 MCG/ACT Aerosol Inhale 2 Puffs 2 times a day. 3 Each 3    melatonin 5 mg Tab Take 5 mg by mouth at bedtime.      [DISCONTINUED] predniSONE (DELTASONE) 10 MG Tab Take 10-40 mg by mouth every day. Tapering course   Take 40 mg by mouth every day for 3 days, THEN 20 mg every day for 3 days, THEN 10 mg every day for 3 days. (Patient not taking: Reported on 10/18/2023)      [DISCONTINUED] doxycycline (VIBRAMYCIN) 100 MG Tab Take 100 mg by mouth 2 times a day. 5 day course (Patient not taking: Reported on 10/18/2023)      [DISCONTINUED] Multiple Vitamins-Minerals (PRESERVISION AREDS 2+MULTI VIT PO) Take 1 Tablet by mouth every day. (Patient not taking: Reported on 12/18/2023)      [DISCONTINUED] Cyanocobalamin (VITAMIN B-12 PO) Take 1 Tablet by mouth every day. (Patient not taking: Reported on 12/18/2023)      vitamin D (CHOLECALCIFEROL) 1000 Unit Tab Take 1,000 Units by mouth every day. (Patient not taking: Reported on 10/20/2023)       No facility-administered encounter medications on file as of 12/18/2023.     ROS           Objective     /66 (BP Location: Left arm, Patient Position: Sitting, BP Cuff Size: Adult)   Pulse 84   Ht 1.626 m (5' 4\")   Wt 81.6 kg (180 lb)   SpO2 96%   BMI 30.90 kg/m²     Physical Exam  Constitutional:       General: She is not in acute distress.     Appearance: She is not diaphoretic.   Eyes:      General: No scleral " icterus.  Neck:      Vascular: No JVD.   Cardiovascular:      Rate and Rhythm: Normal rate.      Heart sounds: Normal heart sounds. No murmur heard.     No friction rub. No gallop.   Pulmonary:      Effort: No respiratory distress.      Breath sounds: No wheezing or rales.   Abdominal:      General: Bowel sounds are normal.      Palpations: Abdomen is soft.   Musculoskeletal:      Right lower leg: No edema.      Left lower leg: No edema.   Skin:     Findings: No rash.   Neurological:      Mental Status: She is alert. Mental status is at baseline.   Psychiatric:         Mood and Affect: Mood normal.     Has some bronchiolar sounds in left upper filed improved with cough           Assessment & Plan     1. Essential hypertension  valsartan-hydrochlorothiazide (DIOVAN-HCT) 80-12.5 MG per tablet      2. Moderate aortic stenosis  EC-ECHOCARDIOGRAM COMPLETE W/O CONT      3. Coronary artery disease due to lipid rich plaque        4. Dyslipidemia            Medical Decision Making: Today's Assessment/Status/Plan:          It was my pleasure to meet with Ms. Hess.    We addressed the management of hypertension at today's visit. Blood pressure is well controlled.  We specifically assessed the labs on hypertension treatment    We addressed the management of dyslipidemia and atherosclerosis at today's visit. She is on appropriate statin.    Echo every year    I will see Ms. Hess back in 1 year time and encouraged her to follow up with us over the phone or electronically using my MyChart as issues arise.    It is my pleasure to participate in the care of Ms. Hess.  Please do not hesitate to contact me with questions or concerns.    Sharan Tran MD PhD [facility]  Cardiologist [facility]    Please note that this dictation was created using voice recognition software. There may be errors I did not discover before finalizing the note.

## 2023-12-21 ENCOUNTER — OFFICE VISIT (OUTPATIENT)
Dept: URGENT CARE | Facility: CLINIC | Age: 83
End: 2023-12-21
Payer: MEDICARE

## 2023-12-21 VITALS
SYSTOLIC BLOOD PRESSURE: 122 MMHG | BODY MASS INDEX: 31.31 KG/M2 | TEMPERATURE: 97.3 F | HEIGHT: 64 IN | DIASTOLIC BLOOD PRESSURE: 60 MMHG | HEART RATE: 88 BPM | WEIGHT: 183.4 LBS | OXYGEN SATURATION: 91 % | RESPIRATION RATE: 14 BRPM

## 2023-12-21 DIAGNOSIS — B96.89 BACTERIAL LOWER RESPIRATORY INFECTION: ICD-10-CM

## 2023-12-21 DIAGNOSIS — J22 BACTERIAL LOWER RESPIRATORY INFECTION: ICD-10-CM

## 2023-12-21 PROCEDURE — 99214 OFFICE O/P EST MOD 30 MIN: CPT | Performed by: REGISTERED NURSE

## 2023-12-21 PROCEDURE — 3074F SYST BP LT 130 MM HG: CPT | Performed by: REGISTERED NURSE

## 2023-12-21 PROCEDURE — 3078F DIAST BP <80 MM HG: CPT | Performed by: REGISTERED NURSE

## 2023-12-21 RX ORDER — DOXYCYCLINE HYCLATE 100 MG
100 TABLET ORAL 2 TIMES DAILY
Qty: 20 TABLET | Refills: 0 | Status: SHIPPED | OUTPATIENT
Start: 2023-12-21 | End: 2023-12-31

## 2023-12-21 RX ORDER — FLUTICASONE FUROATE, UMECLIDINIUM BROMIDE AND VILANTEROL TRIFENATATE 200; 62.5; 25 UG/1; UG/1; UG/1
1 POWDER RESPIRATORY (INHALATION)
COMMUNITY
Start: 2023-12-11

## 2023-12-21 ASSESSMENT — ENCOUNTER SYMPTOMS
FEVER: 0
SHORTNESS OF BREATH: 0
CHILLS: 0
DIZZINESS: 0

## 2023-12-21 ASSESSMENT — FIBROSIS 4 INDEX: FIB4 SCORE: 2.17

## 2023-12-21 NOTE — TELEPHONE ENCOUNTER
LVM  Informed order has been send to DME:  [facility] / ph [phone] / fx [phone] and to cb and schedule sooner appt

## 2023-12-22 NOTE — PROGRESS NOTES
Subjective:   Erin Hess is a 83 y.o. female who presents for Cough (X 1 month, cough, history of COPD, uses home oxygen.)    HPI  One month ago was in [place] and developed a cold. 2-3 weeks ago was seen by mobile urgent care and started on prednisone and amoxicillin. Had slight improvement in symptoms but now they are worsening again. Her cough is productive with yellow sputum. Hx of COPD and uses home oxygen 2L as needed via [place]. Concerned for ongoing infection. Tolerating PO. Immunizations are current.    Review of Systems   Constitutional:  Negative for chills and fever.   Respiratory:  Negative for shortness of breath.    Cardiovascular:  Negative for chest pain.   Skin:  Negative for rash.   Neurological:  Negative for dizziness.       Current Outpatient Medications Ordered in Epic   Medication Sig Dispense Refill    TRELEGY ELLIPTA 200-62.5-25 MCG/ACT inhaler Inhale 1 Puff every day.      doxycycline (VIBRAMYCIN) 100 MG Tab Take 1 Tablet by mouth 2 times a day for 10 days. 20 Tablet 0    valsartan-hydrochlorothiazide (DIOVAN-HCT) 80-12.5 MG per tablet Take 1 Tablet by mouth every day. 100 Tablet 3    guaiFENesin ER (MUCINEX) 600 MG TABLET SR 12 HR Take 600 mg by mouth every evening.      albuterol 108 (90 Base) MCG/ACT Aero Soln inhalation aerosol Inhale 2 Puffs every 6 hours as needed for Shortness of Breath. 8.5 g 3    pantoprazole (PROTONIX) 40 MG Tablet Delayed Response Take 20 mg by mouth 2 times a day.      spironolactone (ALDACTONE) 25 MG Tab Take 12.5 mg by mouth every evening.      albuterol (PROVENTIL) 2.5mg/3ml Nebu Soln solution for nebulization Take 3 mL by nebulization every four hours as needed for Shortness of Breath. 360 Each 3    PARoxetine (PAXIL) 10 MG Tab Take 1 Tablet by mouth every day. 100 Tablet 0    rosuvastatin (CRESTOR) 20 MG Tab Take 1 Tablet by mouth every evening. 100 Tablet 0    anastrozole (ARIMIDEX) 1 MG Tab Take 1 Tablet by mouth every morning. 100 Tablet 0    melatonin 5  "mg Tab Take 5 mg by mouth at bedtime.      valsartan-hydrochlorothiazide (DIOVAN-HCT) 160-12.5 MG per tablet Take 0.5 Tablets by mouth 2 times a day. (Patient not taking: Reported on 2023) 90 Tablet 3    budesonide-formoterol (SYMBICORT) 160-4.5 MCG/ACT Aerosol Inhale 2 Puffs 2 times a day. (Patient not taking: Reported on 2023) 3 Each 3    vitamin D (CHOLECALCIFEROL) 1000 Unit Tab Take 1,000 Units by mouth every day. (Patient not taking: Reported on 10/20/2023)       No current [facility]-ordered facility-administered medications on file.       Past Surgical History:   Procedure Laterality Date    GASTRIC SLEEVE LAPAROSCOPY N/A 2016    Procedure: GASTRIC SLEEVE LAPAROSCOPY, HIATAL HERNIA AND LIVER BIOPSY;  Surgeon: Mauricio Montes M.D.;  Location: SURGERY [facility];  Service:     US-NEEDLE CORE BX-BREAST PANEL      rt breast, with lumpectomy     GYN SURGERY      vag hyster     GYN SURGERY      vaginal cyst removal     LUMPECTOMY  left    OTHER       R breast bx X 2& lipoma removal       Social History     Tobacco Use    Smoking status: Former     Current packs/day: 0.00     Average packs/day: 1 pack/day for 20.0 years (20.0 ttl pk-yrs)     Types: Cigarettes     Start date: 1966     Quit date: 1986     Years since quittin.6     Passive exposure: Past    Smokeless tobacco: Never    Tobacco comments:     Quit in    Vaping Use    Vaping Use: Never used   Substance Use Topics    Alcohol use: Yes     Alcohol/week: 4.2 oz     Types: 7 Standard drinks or equivalent per week     Comment: 1 drink a night    Drug use: No       family history includes Cancer in her maternal grandmother, mother, and sister; Heart Attack in her maternal grandfather; Heart Disease in her mother and sister; Lung Disease (age of onset: 75) in her father.        Objective:     /60   Pulse 88   Temp 36.3 °C (97.3 °F) (Temporal)   Resp 14   Ht 1.626 m (5' 4\")   Wt 83.2 kg (183 lb 6.4 oz)   SpO2 91% "     Physical Exam  Vitals and nursing note reviewed.   Constitutional:       Appearance: Normal appearance. She is not ill-appearing or toxic-appearing.   HENT:      Right Ear: Tympanic membrane normal.      Left Ear: Tympanic membrane normal.      Nose: Nose normal. No congestion.      Mouth/Throat:      Mouth: Mucous membranes are moist.   Eyes:      General: No scleral icterus.     Pupils: Pupils are equal, round, and reactive to light.   Cardiovascular:      Rate and Rhythm: Normal rate and regular rhythm.      Heart sounds: Murmur heard.   Pulmonary:      Effort: Pulmonary effort is normal. No respiratory distress.      Breath sounds: Rhonchi present. No wheezing.   Abdominal:      General: Abdomen is flat.      Palpations: Abdomen is soft.   Musculoskeletal:         General: Normal range of motion.      Cervical back: Normal range of motion.   Skin:     General: Skin is warm and dry.      Capillary Refill: Capillary refill takes less than 2 seconds.      Findings: No rash.   Neurological:      General: No focal deficit present.      Mental Status: She is alert and oriented to person, place, and time.      Cranial Nerves: No cranial nerve deficit.   Psychiatric:         Mood and Affect: Mood normal.       Creatinine Clearance (Cockcroft-Gault Equation) from [employer]  on 12/21/2023  ** All calculations should be rechecked by clinician prior to use **  RESULT SUMMARY:  45 mL/min  Creatinine clearance, original Cockcroft-Gault  INPUTS:  Sex --> 1 = Female  Age --> 83 years  Weight --> 83.2 kg  Creatinine --> 1.3 mg/dL    Assessment/Plan:     Differential diagnosis discussed     1. Bacterial lower respiratory infection  doxycycline (VIBRAMYCIN) 100 MG Tab        1 month ago developed cold symptoms has progressed into the lungs with harsh congested cough with thick green mucus.  Does have history of COPD and asthma uses 2 L of oxygen at night and throughout the day as needed.  Was on amoxicillin a few weeks ago  did not fully resolve symptoms.  Thankfully vital signs are reassuring in clinic.  She does appear well and nontoxic no increased work of breathing some slight congestion but otherwise normal ENT findings.  Does have a harsh congested cough with rhonchi no wheezes appreciated.  Murmur at baseline. Remainder of exam benign.  Given her history will place on doxycycline x 10 days.  Discussed using albuterol nebulizer or inhaler every 4-6 hours, deep breathing and coughing, ambulation as tolerated, adequate hydration.  Recommend close follow-up.  Reviewed return precautions at length.    Return to clinic or go to ED if symptoms worsen or persist. Indications for ED discussed at length. Red flag symptoms discussed. All side effects of medication discussed including allergic response, GI upset, tendon injury, rash, sedation etc. Patient and/or guardian voices understanding.     I personally reviewed prior external notes and test results pertinent to today's visit as well as additional imaging and testing completed in clinic today.     Please note that this dictation was created using voice recognition software. I have made every reasonable attempt to correct obvious errors, but I expect that there are errors of grammar and possibly content that I did not discover before finalizing the note.    This note was electronically signed by ESTHER Weber

## 2024-01-02 ENCOUNTER — TELEPHONE (OUTPATIENT)
Dept: SLEEP MEDICINE | Facility: MEDICAL CENTER | Age: 84
End: 2024-01-02
Payer: MEDICARE

## 2024-01-24 ENCOUNTER — APPOINTMENT (OUTPATIENT)
Dept: SLEEP MEDICINE | Facility: MEDICAL CENTER | Age: 84
End: 2024-01-24
Attending: NURSE PRACTITIONER
Payer: MEDICARE

## 2024-02-09 ENCOUNTER — OFFICE VISIT (OUTPATIENT)
Dept: SLEEP MEDICINE | Facility: MEDICAL CENTER | Age: 84
End: 2024-02-09
Attending: STUDENT IN AN ORGANIZED HEALTH CARE EDUCATION/TRAINING PROGRAM
Payer: MEDICARE

## 2024-02-09 VITALS
DIASTOLIC BLOOD PRESSURE: 62 MMHG | OXYGEN SATURATION: 93 % | WEIGHT: 178 LBS | HEART RATE: 73 BPM | RESPIRATION RATE: 16 BRPM | BODY MASS INDEX: 30.39 KG/M2 | HEIGHT: 64 IN | SYSTOLIC BLOOD PRESSURE: 116 MMHG

## 2024-02-09 DIAGNOSIS — J44.89 ASTHMA-COPD OVERLAP SYNDROME (HCC): ICD-10-CM

## 2024-02-09 DIAGNOSIS — R09.82 POST-NASAL DRIP: ICD-10-CM

## 2024-02-09 PROCEDURE — 99213 OFFICE O/P EST LOW 20 MIN: CPT | Performed by: STUDENT IN AN ORGANIZED HEALTH CARE EDUCATION/TRAINING PROGRAM

## 2024-02-09 PROCEDURE — 3078F DIAST BP <80 MM HG: CPT | Performed by: STUDENT IN AN ORGANIZED HEALTH CARE EDUCATION/TRAINING PROGRAM

## 2024-02-09 PROCEDURE — 3074F SYST BP LT 130 MM HG: CPT | Performed by: STUDENT IN AN ORGANIZED HEALTH CARE EDUCATION/TRAINING PROGRAM

## 2024-02-09 PROCEDURE — 99215 OFFICE O/P EST HI 40 MIN: CPT | Performed by: STUDENT IN AN ORGANIZED HEALTH CARE EDUCATION/TRAINING PROGRAM

## 2024-02-09 RX ORDER — IPRATROPIUM BROMIDE AND ALBUTEROL SULFATE 2.5; .5 MG/3ML; MG/3ML
3 SOLUTION RESPIRATORY (INHALATION) EVERY 4 HOURS PRN
Qty: 30 ML | Refills: 3 | Status: SHIPPED | OUTPATIENT
Start: 2024-02-09

## 2024-02-09 ASSESSMENT — ENCOUNTER SYMPTOMS
FALLS: 0
COUGH: 1
HEMOPTYSIS: 0
SPUTUM PRODUCTION: 1
EYE DISCHARGE: 0
CHILLS: 0
NAUSEA: 0
VOMITING: 0
SHORTNESS OF BREATH: 0
FOCAL WEAKNESS: 0
FEVER: 0

## 2024-02-09 ASSESSMENT — FIBROSIS 4 INDEX: FIB4 SCORE: 2.17

## 2024-02-09 ASSESSMENT — PATIENT HEALTH QUESTIONNAIRE - PHQ9: CLINICAL INTERPRETATION OF PHQ2 SCORE: 0

## 2024-02-09 NOTE — ASSESSMENT & PLAN NOTE
Multiple exacerbations in the last few months but now improved on Trelegy.  Also with some mild RLL bronchiectasis, likely from her multiple infections in the past, that's contributing to her cough as well as PND.     -Continue Trelegy 200  -Has nebulizer at home-start DuoNebs twice daily with flutter valve (will Rx flutter valve) -once symptoms improve, can decrease to daily versus as needed  -As needed albuterol

## 2024-02-09 NOTE — PROGRESS NOTES
Pulmonary Clinic Note    Chief Complaint:  Chief Complaint   Patient presents with    Asthma     Last seen 10/20/23     HPI:   Erin Hess is a very pleasant 82 y.o. female with history of tobacco smoking 20 pkyr, quit 1986, asthma currently on Symbicort, GERD on omeprazole, allergies who presents to pulmonary clinic for cough.     12/27/22, patient states that she feels better after the prednisone taper at last visit on 12/6/22 and her cough is improved however she still has a cough that is productive.  No significant postnasal drip symptoms.  She does report history of multiple respiratory infection/bronchitis episodes in the past.    2/9/23: feels well overall, cough improved after antibiotics and prednisone. Symbicort seems to be helping her symptoms - hasn't used her albuterol in a while. Has not received a flutter valve.     2/92/24: patient has had multiple exacerbations since last visit and recently a course of steroids and doxycycline.  She states that her symptoms are overall getting better managed now that she is on Trelegy.  Has nebulizer but has not used it.    Past Medical History:   Diagnosis Date    Arthritis     Asthma     Breast cancer (HCC)     Bronchitis 2011    Cancer (HCC) 12/2012    right breast    Cough     Dizziness 03/11/2013    Heart burn     Heart murmur     Hiatus hernia syndrome     HTN (hypertension) 03/12/2013    Hypercholesterolemia     Hyperlipidemia 03/12/2013    Hypertension     Hypokalemia 03/12/2013    Indigestion     Mitral annular calcification     Pain     back, hips, knees    Psychiatric disorder     depression    Sleep apnea     h/o gastric sleeve lost 40 lb now not on CPAP    Snoring     Sputum production     ULCERATIVE COLITIS 03/12/2013    Vertigo 03/11/2013       Past Surgical History:   Procedure Laterality Date    GASTRIC SLEEVE LAPAROSCOPY N/A 5/18/2016    Procedure: GASTRIC SLEEVE LAPAROSCOPY, HIATAL HERNIA AND LIVER BIOPSY;  Surgeon: Mauricio Montes M.D.;   Location: SURGERY [facility];  Service:     US-NEEDLE CORE BX-BREAST PANEL      rt breast, with lumpectomy     GYN SURGERY      vag hyster     GYN SURGERY      vaginal cyst removal     LUMPECTOMY  left    OTHER       R breast bx X 2& lipoma removal       Social History     Socioeconomic History    Marital status:      Spouse name: Not on file    Number of children: Not on file    Years of education: Not on file    Highest education level: 12th grade   Occupational History    Not on file   Tobacco Use    Smoking status: Former     Current packs/day: 0.00     Average packs/day: 1 pack/day for 20.0 years (20.0 ttl pk-yrs)     Types: Cigarettes     Start date: 1966     Quit date: 1986     Years since quittin.7     Passive exposure: Past    Smokeless tobacco: Never    Tobacco comments:     Quit in    Vaping Use    Vaping Use: Never used   Substance and Sexual Activity    Alcohol use: Yes     Alcohol/week: 4.2 oz     Types: 7 Standard drinks or equivalent per week     Comment: 1 drink a night    Drug use: No    Sexual activity: Not on file   Other Topics Concern    Not on file   Social History Narrative    She lives alone but has a female roommate who is 60, Leonora Rosa who has a brother in town she is estranged from. Leonora Rosa had Covid  and now has long-haulers and is on medicaid     She has 2 children and 4 stepchildren, her   '. She has a son near [place], [place] and another in [place]. She relies on her 2 grandkids who live in [place] and her 8y younger sister lives in [facility], her older sister lives in a halfway in [place]. 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  then  after 29y. Her ex  her cousin, met her 2nd  and was  for 25y.     Her son supports her keeping Leonora Rosa at her home. She does not feel taken advantage of despite no longer receiving rent.      Social Determinants of Health      Financial Resource Strain: Medium Risk (2022)    Overall Financial Resource Strain (CARDIA)     Difficulty of Paying Living Expenses: Somewhat hard   Food Insecurity: No Food Insecurity (2022)    Hunger Vital Sign     Worried About Running Out of Food in the Last Year: Never true     Ran Out of Food in the Last Year: Never true   Transportation Needs: No Transportation Needs (2022)    PRAPARE - Transportation     Lack of Transportation (Medical): No     Lack of Transportation (Non-Medical): No   Physical Activity: Inactive (2022)    Exercise Vital Sign     Days of Exercise per Week: 0 days     Minutes of Exercise per Session: 0 min   Stress: Stress Concern Present (2022)    [employer] - Occupational Stress Questionnaire     Feeling of Stress : To some extent   Social Connections: Moderately Isolated (2022)    Social Connection and Isolation Panel [NHANES]     Frequency of Communication with Friends and Family: More than three times a week     Frequency of Social Gatherings with Friends and Family: Once a week     Attends Gnosticist Services: Never     Active Member of Clubs or Organizations: Yes     Attends Club or Organization Meetings: More than 4 times per year     Marital Status:    Intimate Partner Violence: Not on file   Housing Stability: Low Risk  (2022)    Housing Stability Vital Sign     Unable to Pay for Housing in the Last Year: No     Number of Places Lived in the Last Year: 1     Unstable Housing in the Last Year: No          Family History   Problem Relation Age of Onset    Heart Disease Mother         CAD MI at age 72    Cancer Mother         breast cancer  at age 83    Cancer Sister     Lung Disease Father 75        Lung cancer    Cancer Maternal Grandmother         Pancreatic cancer    Heart Attack Maternal Grandfather         MI at age 70    Heart Disease Sister         Rhuemati cfever- herat murmur       Current  Outpatient Medications on File Prior to Visit   Medication Sig Dispense Refill    TRELEGY ELLIPTA 200-62.5-25 MCG/ACT inhaler Inhale 1 Puff every day.      valsartan-hydrochlorothiazide (DIOVAN-HCT) 80-12.5 MG per tablet Take 1 Tablet by mouth every day. 100 Tablet 3    guaiFENesin ER (MUCINEX) 600 MG TABLET SR 12 HR Take 600 mg by mouth every evening.      albuterol 108 (90 Base) MCG/ACT Aero Soln inhalation aerosol Inhale 2 Puffs every 6 hours as needed for Shortness of Breath. 8.5 g 3    pantoprazole (PROTONIX) 40 MG Tablet Delayed Response Take 20 mg by mouth 2 times a day.      albuterol (PROVENTIL) 2.5mg/3ml Nebu Soln solution for nebulization Take 3 mL by nebulization every four hours as needed for Shortness of Breath. 360 Each 3    PARoxetine (PAXIL) 10 MG Tab Take 1 Tablet by mouth every day. 100 Tablet 0    rosuvastatin (CRESTOR) 20 MG Tab Take 1 Tablet by mouth every evening. 100 Tablet 0    anastrozole (ARIMIDEX) 1 MG Tab Take 1 Tablet by mouth every morning. 100 Tablet 0    melatonin 5 mg Tab Take 5 mg by mouth at bedtime.      spironolactone (ALDACTONE) 25 MG Tab Take 12.5 mg by mouth every evening. (Patient not taking: Reported on 2/9/2024)      valsartan-hydrochlorothiazide (DIOVAN-HCT) 160-12.5 MG per tablet Take 0.5 Tablets by mouth 2 times a day. (Patient not taking: Reported on 12/21/2023) 90 Tablet 3    vitamin D (CHOLECALCIFEROL) 1000 Unit Tab Take 1,000 Units by mouth every day. (Patient not taking: Reported on 10/20/2023)       No current facility-administered medications on file prior to visit.       Allergies: Sulfa drugs, Vancomycin, and Codeine      ROS:   Review of Systems   Constitutional:  Negative for chills and fever.   HENT:  Positive for congestion. Negative for hearing loss.    Eyes:  Negative for discharge.   Respiratory:  Positive for cough and sputum production. Negative for hemoptysis and shortness of breath.    Cardiovascular:  Negative for chest pain.   Gastrointestinal:   "Negative for nausea and vomiting.   Musculoskeletal:  Negative for falls.   Skin:  Negative for rash.   Neurological:  Negative for focal weakness.       Vitals:  /62 (BP Location: Left arm, Patient Position: Sitting, BP Cuff Size: Adult)   Pulse 73   Resp 16   Ht 1.626 m (5' 4\")   Wt 80.7 kg (178 lb)   SpO2 93%     Physical Exam:  Physical Exam  Vitals and nursing note reviewed.   Constitutional:       General: She is not in acute distress.     Appearance: Normal appearance. She is not ill-appearing or toxic-appearing.   HENT:      Head: Normocephalic and atraumatic.      Nose: Nose normal.      Mouth/Throat:      Mouth: Mucous membranes are moist.   Eyes:      General: No scleral icterus.     Conjunctiva/sclera: Conjunctivae normal.   Cardiovascular:      Rate and Rhythm: Normal rate and regular rhythm.   Pulmonary:      Effort: Pulmonary effort is normal. No respiratory distress.      Breath sounds: No wheezing, rhonchi or rales.   Abdominal:      Palpations: Abdomen is soft.   Musculoskeletal:         General: No deformity or signs of injury. Normal range of motion.      Cervical back: Normal range of motion.   Skin:     General: Skin is warm and dry.   Neurological:      General: No focal deficit present.      Mental Status: She is alert. Mental status is at baseline.   Psychiatric:         Mood and Affect: Mood normal.         Behavior: Behavior normal.         Data:  PFT 7/12/16: mild obstruction w/o a significant BD response. There is reduction in WLF95-90% c/f small airways disease w/a significant BD response in these midflows. Increased diffusion capacity.     PFT 2/7/23: mild obstruction w/o a significant BD response. Normal diffusion.    Assessment/Plan:    Problem List Items Addressed This Visit       Asthma-COPD overlap syndrome     Multiple exacerbations in the last few months but now improved on Trelegy.  Also with some mild RLL bronchiectasis, likely from her multiple infections in the " past, that's contributing to her cough as well as PND.     -Continue Trelegy 200  -Has nebulizer at home-start DuoNebs twice daily with flutter valve (will Rx flutter valve) -once symptoms improve, can decrease to daily versus as needed  -As needed albuterol               Relevant Medications    ipratropium-albuterol (DUONEB) 0.5-2.5 (3) MG/3ML nebulizer solution    Other Relevant Orders    DME Flutter Valve    CULTURE RESPIRATORY W/ GRM STN    Post-nasal drip     - saline nasal spray +/- flonase            Return in about 3 months (around 5/9/2024).     This note was generated using voice recognition software which has a chance of producing errors of grammar and possibly content.  I have made every reasonable attempt to find and correct any obvious errors, but it should be expected that some may not be found prior to finalization of this note.    Time spent in record review prior to patient arrival, reviewing results, and in face-to-face encounter totaled 40 min, excluding any procedures if performed.    Hazel Alcala MD  Pulmonary and Critical Care Medicine  [facility]

## 2024-02-14 ENCOUNTER — OFFICE VISIT (OUTPATIENT)
Dept: SLEEP MEDICINE | Facility: MEDICAL CENTER | Age: 84
End: 2024-02-14
Attending: PREVENTIVE MEDICINE
Payer: MEDICARE

## 2024-02-14 VITALS
OXYGEN SATURATION: 94 % | HEART RATE: 81 BPM | BODY MASS INDEX: 29.61 KG/M2 | SYSTOLIC BLOOD PRESSURE: 124 MMHG | WEIGHT: 177.7 LBS | RESPIRATION RATE: 16 BRPM | HEIGHT: 65 IN | DIASTOLIC BLOOD PRESSURE: 70 MMHG

## 2024-02-14 DIAGNOSIS — R05.3 CHRONIC COUGH: ICD-10-CM

## 2024-02-14 DIAGNOSIS — G47.33 OSA ON CPAP: ICD-10-CM

## 2024-02-14 DIAGNOSIS — J45.40 MODERATE PERSISTENT ASTHMA, UNSPECIFIED WHETHER COMPLICATED: ICD-10-CM

## 2024-02-14 PROCEDURE — 3074F SYST BP LT 130 MM HG: CPT | Performed by: PREVENTIVE MEDICINE

## 2024-02-14 PROCEDURE — 99212 OFFICE O/P EST SF 10 MIN: CPT | Performed by: PREVENTIVE MEDICINE

## 2024-02-14 PROCEDURE — 3078F DIAST BP <80 MM HG: CPT | Performed by: PREVENTIVE MEDICINE

## 2024-02-14 PROCEDURE — 99214 OFFICE O/P EST MOD 30 MIN: CPT | Performed by: PREVENTIVE MEDICINE

## 2024-02-14 ASSESSMENT — FIBROSIS 4 INDEX: FIB4 SCORE: 2.17

## 2024-02-16 NOTE — PROGRESS NOTES
CHIEF COMPLAINT: Compliance check/Annual Visit/First Compliance  LAST SEEN: SUSANNAH Gerardo on 10/20/2023  HISTORY OF PRESENT ILLNESS:  Erin Hess is a 83 y.o.female   who is here today to follow-up  for ROBYN.  Patient did not bring her machine or SD card because she was under the impression that her compliance data should be accessible remotely.  However, there is no remote access so her patient will need to bring in her machine/SD card for compliance check.      COMPLIANCE DATA greater than 4 hours: PENDING %  Will order mask and supplies when compliance is available.    Machine type:   Date range:  AHI:  TIME USED:  PRESSURE SETTINGS:  LEAK:    This patient is using PAP therapy consistently and is benefiting from it .        Significant comorbidities and modifying factors: see below    PAST MEDICAL HISTORY:  Past Medical History:   Diagnosis Date    Arthritis     Asthma     Breast cancer (HCC)     Bronchitis 2011    Cancer (HCC) 12/2012    right breast    Cough     Dizziness 03/11/2013    Heart burn     Heart murmur     Hiatus hernia syndrome     HTN (hypertension) 03/12/2013    Hypercholesterolemia     Hyperlipidemia 03/12/2013    Hypertension     Hypokalemia 03/12/2013    Indigestion     Mitral annular calcification     Pain     back, hips, knees    Psychiatric disorder     depression    Sleep apnea     h/o gastric sleeve lost 40 lb now not on CPAP    Snoring     Sputum production     ULCERATIVE COLITIS 03/12/2013    Vertigo 03/11/2013      PROBLEM LIST:  Patient Active Problem List    Diagnosis Date Noted    Chronic respiratory failure with hypoxia ([facility]) 10/20/2023    Post-nasal drip 02/09/2023    Numbness in feet 05/25/2022    Chronic lower back pain 05/25/2022    Mood disorder ([facility]) 05/25/2022    Cough 05/25/2022    Osteopenia of left thigh 05/25/2022    Elevated glycohemoglobin 05/25/2022    Generalized anxiety disorder 03/10/2022    Stage 3a chronic kidney disease (HCC) 03/10/2022    History of 2019 novel  coronavirus disease (COVID-19) 03/10/2022    H/O gastric sleeve 03/10/2022    GERD (gastroesophageal reflux disease) 03/10/2022    History of breast cancer 03/10/2022    Advance directive discussed with patient 03/10/2022    Mitral annular calcification     Complex renal cyst 01/10/2018    Coronary artery disease due to lipid rich plaque     Chronic rhinitis 2017    Moderate aortic stenosis 2017    Dyslipidemia 2017    Fatigue 2017    Asthma-COPD overlap syndrome 2016    ROBYN (obstructive sleep apnea) 2016    Obesity (BMI 30.0-34.9) 2016    Sciatica 2013    Essential hypertension 2013    Ulcerative pancolitis without complication (HCC) 2013     PAST SOCIAL HISTORY:  Past Surgical History:   Procedure Laterality Date    GASTRIC SLEEVE LAPAROSCOPY N/A 2016    Procedure: GASTRIC SLEEVE LAPAROSCOPY, HIATAL HERNIA AND LIVER BIOPSY;  Surgeon: Mauricio Montes M.D.;  Location: SURGERY [facility];  Service:     US-NEEDLE CORE BX-BREAST PANEL      rt breast, with lumpectomy     GYN SURGERY      vag hyster     GYN SURGERY      vaginal cyst removal     LUMPECTOMY  left    OTHER       R breast bx X 2& lipoma removal     PAST FAMILY HISTORY:  Family History   Problem Relation Age of Onset    Heart Disease Mother         CAD MI at age 72    Cancer Mother         breast cancer  at age 83    Cancer Sister     Lung Disease Father 75        Lung cancer    Cancer Maternal Grandmother         Pancreatic cancer    Heart Attack Maternal Grandfather         MI at age 70    Heart Disease Sister         Rhuemati cfever- herat murmur     SOCIAL HISTORY:  Social History     Socioeconomic History    Marital status:      Spouse name: Not on file    Number of children: Not on file    Years of education: Not on file    Highest education level: 12th grade   Occupational History    Not on file   Tobacco Use    Smoking status: Former     Current packs/day: 0.00      Average packs/day: 1 pack/day for 20.0 years (20.0 ttl pk-yrs)     Types: Cigarettes     Start date: 1966     Quit date: 1986     Years since quittin.7     Passive exposure: Past    Smokeless tobacco: Never    Tobacco comments:     Quit in    Vaping Use    Vaping Use: Never used   Substance and Sexual Activity    Alcohol use: Yes     Alcohol/week: 4.2 oz     Types: 7 Standard drinks or equivalent per week     Comment: 1 drink a night    Drug use: No    Sexual activity: Not on file   Other Topics Concern    Not on file   Social History Narrative    She lives alone but has a female roommate who is 60, Leonora Rosa who has a brother in town she is estranged from. Leonora Rosa had Covid  and now has long-haulers and is on medicaid     She has 2 children and 4 stepchildren, her   '. She has a son near [place], [place] and another in [place]. She relies on her 2 grandkids who live in [place] and her 8y younger sister lives in [facility], her older sister lives in a shelter in [place]. 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 Her ex  her cousin, met her 2nd  and was  for 25y.     Her son supports her keeping Leonora Rosa at her home. She does not feel taken advantage of despite no longer receiving rent.      Social Determinants of Health     Financial Resource Strain: Medium Risk (2022)    Overall Financial Resource Strain (CARDIA)     Difficulty of Paying Living Expenses: Somewhat hard   Food Insecurity: No Food Insecurity (2022)    Hunger Vital Sign     Worried About Running Out of Food in the Last Year: Never true     Ran Out of Food in the Last Year: Never true   Transportation Needs: No Transportation Needs (2022)    PRAPARE - Transportation     Lack of Transportation (Medical): No     Lack of Transportation (Non-Medical): No   Physical Activity: Inactive (2022)    Exercise Vital Sign      Days of Exercise per Week: 0 days     Minutes of Exercise per Session: 0 min   Stress: Stress Concern Present (12/27/2022)    [employer] - Occupational Stress Questionnaire     Feeling of Stress : To some extent   Social Connections: Moderately Isolated (12/27/2022)    Social Connection and Isolation Panel [NHANES]     Frequency of Communication with Friends and Family: More than three times a week     Frequency of Social Gatherings with Friends and Family: Once a week     Attends Mosque Services: Never     Active Member of Clubs or Organizations: Yes     Attends Club or Organization Meetings: More than 4 times per year     Marital Status:    Intimate Partner Violence: Not on file   Housing Stability: Low Risk  (12/27/2022)    Housing Stability Vital Sign     Unable to Pay for Housing in the Last Year: No     Number of Places Lived in the Last Year: 1     Unstable Housing in the Last Year: No     ALLERGIES: Sulfa drugs, Vancomycin, and Codeine  MEDICATIONS:  Current Outpatient Medications   Medication Sig Dispense Refill    ipratropium-albuterol (DUONEB) 0.5-2.5 (3) MG/3ML nebulizer solution Take 3 mL by nebulization every four hours as needed for Shortness of Breath (Wheezing). 30 mL 3    TRELEGY ELLIPTA 200-62.5-25 MCG/ACT inhaler Inhale 1 Puff every day.      valsartan-hydrochlorothiazide (DIOVAN-HCT) 80-12.5 MG per tablet Take 1 Tablet by mouth every day. 100 Tablet 3    guaiFENesin ER (MUCINEX) 600 MG TABLET SR 12 HR Take 600 mg by mouth every evening.      albuterol 108 (90 Base) MCG/ACT Aero Soln inhalation aerosol Inhale 2 Puffs every 6 hours as needed for Shortness of Breath. 8.5 g 3    pantoprazole (PROTONIX) 40 MG Tablet Delayed Response Take 20 mg by mouth 2 times a day.      albuterol (PROVENTIL) 2.5mg/3ml Nebu Soln solution for nebulization Take 3 mL by nebulization every four hours as needed for Shortness of Breath. 360 Each 3    PARoxetine (PAXIL) 10 MG Tab  "Take 1 Tablet by mouth every day. 100 Tablet 0    rosuvastatin (CRESTOR) 20 MG Tab Take 1 Tablet by mouth every evening. 100 Tablet 0    anastrozole (ARIMIDEX) 1 MG Tab Take 1 Tablet by mouth every morning. 100 Tablet 0    melatonin 5 mg Tab Take 5 mg by mouth at bedtime.       No current facility-administered medications for this visit.    \"CURRENT RX\"    REVIEW OF SYSTEMS:  SEE HPI      PHYSICAL EXAM/VITALS:  /70 (BP Location: Left arm, Patient Position: Sitting, BP Cuff Size: Adult)   Pulse 81   Resp 16   Ht 1.638 m (5' 4.5\")   Wt 80.6 kg (177 lb 11.2 oz)   SpO2 94%   BMI 30.03 kg/m²   Appearance: Well-nourished, well-developed,  looks stated age, no acute distress  Eyes:   EOMI  ENMT:  WNL  Neck: Supple, trachea midline  Respiratory effort:  No intercostal retractions or use of accessory muscles  Musculoskeletal:  Grossly normal; gait and station normal  Neurologic:  oriented to person, time, place, and purpose; judgement intact  Psychiatric:  No depression, anxiety, agitation    MEDICAL DECISION MAKING:  The medical record was reviewed as it pertains to this referral. This includes records from primary care,consultants notes, referral request, hospital records, labs and imaging. Any available diagnostic and titration nocturnal polysomnograms, home sleep apnea tests, continuous nocturnal oximetry results, multiple sleep latency tests, and recent compliance reports were reviewed with the patient.    ASSESSMENT/PLAN:  Erin Hess is a 83 y.o.female who is reportedly doing well on PAP therapy.  However, compliance data are needed for review. Patient reports that she was not using her PAP therapy consistently due to a chronic cough.  Patient will bring in her machine and SD card at the end of February.        DIAGNOSES :    1. ROBYN on CPAP    2. Chronic cough    3. Moderate persistent asthma, unspecified whether complicated        The risks of untreated sleep apnea were discussed with the patient at " length. Patients with ROBYN are at increased risk of cardiovascular disease including coronary artery disease, systemic arterial hypertension, pulmonary arterial hypertension, cardiac arrhythmias, and stroke. ROBYN patients have an increased risk of motor vehicle accidents, type 2 diabetes, chronic kidney disease, and non-alcoholic liver disease. The patient was advised to avoid driving a motor vehicle when drowsy.  Have advised the patient to follow up with the appropriate healthcare practitioners for all other medical problems and issues.    RETURN TO CLINIC: Return in about 6 months (around 8/14/2024) for Compliance check, With Dr Clark.    My total time spent caring for the patient on the day of the encounter was 40 minutes. This includes time spent on a thorough chart review including other physician notes, all sleep studies, as well as critical labs and pulmonary and cardiac studies.  Additionally, it includes a thorough discussion of good sleep hygiene and stimulus control, as well as  the need for consistency in terms of sleep preparation and practice.    Please note that this dictation was created using voice recognition software.  I have made every reasonable attempt to correct obvious errors, I expect that there are errors of grammar and possibly content that I did not discover before finalizing this note.

## 2024-03-04 PROBLEM — N18.32 STAGE 3B CHRONIC KIDNEY DISEASE: Status: ACTIVE | Noted: 2022-03-10

## 2024-03-05 ENCOUNTER — OFFICE VISIT (OUTPATIENT)
Dept: FAMILY PLANNING/WOMEN'S HEALTH CLINIC | Facility: PHYSICIAN GROUP | Age: 84
End: 2024-03-05
Payer: MEDICARE

## 2024-03-05 VITALS
BODY MASS INDEX: 30.49 KG/M2 | SYSTOLIC BLOOD PRESSURE: 110 MMHG | WEIGHT: 183 LBS | DIASTOLIC BLOOD PRESSURE: 62 MMHG | HEIGHT: 65 IN

## 2024-03-05 DIAGNOSIS — K51.00 ULCERATIVE PANCOLITIS WITHOUT COMPLICATION (HCC): ICD-10-CM

## 2024-03-05 DIAGNOSIS — J44.9 CHRONIC OBSTRUCTIVE PULMONARY DISEASE, UNSPECIFIED COPD TYPE (HCC): ICD-10-CM

## 2024-03-05 DIAGNOSIS — J96.11 CHRONIC RESPIRATORY FAILURE WITH HYPOXIA (HCC): ICD-10-CM

## 2024-03-05 DIAGNOSIS — G47.33 OSA (OBSTRUCTIVE SLEEP APNEA): ICD-10-CM

## 2024-03-05 DIAGNOSIS — M85.852 OSTEOPENIA OF LEFT THIGH: ICD-10-CM

## 2024-03-05 DIAGNOSIS — I10 ESSENTIAL HYPERTENSION: ICD-10-CM

## 2024-03-05 DIAGNOSIS — Z99.81 DEPENDENCE ON SUPPLEMENTAL OXYGEN: ICD-10-CM

## 2024-03-05 DIAGNOSIS — E78.5 DYSLIPIDEMIA: ICD-10-CM

## 2024-03-05 DIAGNOSIS — N18.32 STAGE 3B CHRONIC KIDNEY DISEASE: ICD-10-CM

## 2024-03-05 PROCEDURE — 3074F SYST BP LT 130 MM HG: CPT | Performed by: PHYSICIAN ASSISTANT

## 2024-03-05 PROCEDURE — 1126F AMNT PAIN NOTED NONE PRSNT: CPT | Performed by: PHYSICIAN ASSISTANT

## 2024-03-05 PROCEDURE — G0439 PPPS, SUBSEQ VISIT: HCPCS | Performed by: PHYSICIAN ASSISTANT

## 2024-03-05 PROCEDURE — 3078F DIAST BP <80 MM HG: CPT | Performed by: PHYSICIAN ASSISTANT

## 2024-03-05 RX ORDER — SPIRONOLACTONE 25 MG/1
25 TABLET ORAL
COMMUNITY
Start: 2024-02-18

## 2024-03-05 SDOH — ECONOMIC STABILITY: FOOD INSECURITY: WITHIN THE PAST 12 MONTHS, YOU WORRIED THAT YOUR FOOD WOULD RUN OUT BEFORE YOU GOT MONEY TO BUY MORE.: NEVER TRUE

## 2024-03-05 SDOH — ECONOMIC STABILITY: FOOD INSECURITY: WITHIN THE PAST 12 MONTHS, THE FOOD YOU BOUGHT JUST DIDN'T LAST AND YOU DIDN'T HAVE MONEY TO GET MORE.: NEVER TRUE

## 2024-03-05 SDOH — ECONOMIC STABILITY: INCOME INSECURITY: IN THE PAST 12 MONTHS, HAS THE ELECTRIC, GAS, OIL, OR WATER COMPANY THREATENED TO SHUT OFF SERVICE IN YOUR HOME?: NO

## 2024-03-05 SDOH — ECONOMIC STABILITY: TRANSPORTATION INSECURITY
IN THE PAST 12 MONTHS, HAS LACK OF TRANSPORTATION KEPT YOU FROM MEETINGS, WORK, OR FROM GETTING THINGS NEEDED FOR DAILY LIVING?: NO

## 2024-03-05 SDOH — ECONOMIC STABILITY: INCOME INSECURITY: HOW HARD IS IT FOR YOU TO PAY FOR THE VERY BASICS LIKE FOOD, HOUSING, MEDICAL CARE, AND HEATING?: SOMEWHAT HARD

## 2024-03-05 SDOH — ECONOMIC STABILITY: TRANSPORTATION INSECURITY
IN THE PAST 12 MONTHS, HAS THE LACK OF TRANSPORTATION KEPT YOU FROM MEDICAL APPOINTMENTS OR FROM GETTING MEDICATIONS?: NO

## 2024-03-05 ASSESSMENT — PAIN SCALES - GENERAL: PAINLEVEL: NO PAIN

## 2024-03-05 ASSESSMENT — ACTIVITIES OF DAILY LIVING (ADL): BATHING_REQUIRES_ASSISTANCE: 0

## 2024-03-05 ASSESSMENT — PATIENT HEALTH QUESTIONNAIRE - PHQ9
5. POOR APPETITE OR OVEREATING: 0 - NOT AT ALL
CLINICAL INTERPRETATION OF PHQ2 SCORE: 1

## 2024-03-05 ASSESSMENT — ENCOUNTER SYMPTOMS: GENERAL WELL-BEING: GOOD

## 2024-03-05 ASSESSMENT — FIBROSIS 4 INDEX: FIB4 SCORE: 2.2

## 2024-03-05 NOTE — ASSESSMENT & PLAN NOTE
Chronic, ongoing issue with GFR decreasing to 41 on most recent labs from 9/2023. Continue to encourage adequate hydration and avoidance of nephrotoxins such as NSAIDs. Follow up with PCP for continued monitoring and management.   Latest Reference Range & Units 01/25/23 08:51 02/23/23 09:27 09/21/23 14:17   GFR (CKD-EPI) >60 mL/min/1.73 m 2 51 ! 48 ! 41 !   !: Data is abnormal

## 2024-03-05 NOTE — ASSESSMENT & PLAN NOTE
Chronic, stable. Pt has historically maintained on mesalamine but discontinued about a year ago primarily due to drug cost. She fortunately has not suffered any significant relapsing symptoms such as persistent diarrhea, hematochezia.. Her last colonoscopy was 4 years ago. Follow up with GI per routine for continued monitoring and management.

## 2024-03-05 NOTE — ASSESSMENT & PLAN NOTE
Chronic, stable. Pt maintains on 2L O2 via NC at night and during the day as needed. This is new for her since December 2023. Follow up with pulmonology per routine.

## 2024-03-05 NOTE — ASSESSMENT & PLAN NOTE
Chronic, stable. Associated with CAD. Maintains on statin therapy without issue. Most recent lipid panel from January 2023 WNL. Continue rosuvastatin 20mg daily. Recommend Mediterranean diet and regular physical activity. Follow up with PCP at least annually for continued monitoring and management.   Lab Results   Component Value Date/Time    CHOLSTRLTOT 165 01/25/2023 08:51 AM    LDL 71 01/25/2023 08:51 AM    HDL 64 01/25/2023 08:51 AM    TRIGLYCERIDE 150 (H) 01/25/2023 08:51 AM

## 2024-03-05 NOTE — ASSESSMENT & PLAN NOTE
Chronic, stable. BP today 110/62. Pt has been maintaining on spironolactone 25mg and valsartan-HCTZ 80-12.5mg daily. Pt monitors BP at home and notes these have generally have been low so she has been reducing her dose Diovan by half most days. Denies dizziness/lightheadedness, chest pain. Continue current treatment regime: valsartan-HCTZ 80-12.5mg daily and spironolactone 25mg. Advise pt keep log of blood pressures and medication dosages to discuss further with PCP. Follow up with PCP per routine.

## 2024-03-05 NOTE — ASSESSMENT & PLAN NOTE
Chronic, stable. Last DEXA 3/2022 with osteopenia of the proximal left femur with T scores of -1.0. Denies hx of fragility fracture. Advise calcium and vitamin D supplementation with weightbearing exercises as tolerated. Follow up with PCP at least annually for continued monitoring and management.

## 2024-03-06 NOTE — ASSESSMENT & PLAN NOTE
Chronic, ongoing issue. She is considering switching CPAP to nasal mask given intolerance of standard mask. Presently she maintains on 2L O2 via NC nightly. Follow up with sleep medicine per routine for continued management.

## 2024-03-06 NOTE — PROGRESS NOTES
Comprehensive Health Assessment Program     Erin Hess is a 84 y.o. here for her comprehensive health assessment.    Patient Active Problem List    Diagnosis Date Noted    COPD (chronic obstructive pulmonary disease) (HCC) 03/05/2024    Dependence on supplemental oxygen 03/05/2024    Chronic respiratory failure with hypoxia (HCC) 10/20/2023    Post-nasal drip 02/09/2023    Numbness in feet 05/25/2022    Chronic lower back pain 05/25/2022    Cough 05/25/2022    Osteopenia of left thigh 05/25/2022    Elevated glycohemoglobin 05/25/2022    Generalized anxiety disorder 03/10/2022    Stage 3b chronic kidney disease (HCC) 03/10/2022    History of 2019 novel coronavirus disease (COVID-19) 03/10/2022    H/O gastric sleeve 03/10/2022    GERD (gastroesophageal reflux disease) 03/10/2022    History of breast cancer 03/10/2022    Advance directive discussed with patient 03/10/2022    Mitral annular calcification     Complex renal cyst 01/10/2018    Coronary artery disease due to lipid rich plaque     Chronic rhinitis 11/07/2017    Moderate aortic stenosis 07/13/2017    Dyslipidemia 07/13/2017    Fatigue 04/11/2017    Asthma-COPD overlap syndrome 07/08/2016    ROBYN (obstructive sleep apnea) 07/08/2016    Obesity (BMI 30.0-34.9) 05/18/2016    Sciatica 05/09/2013    Essential hypertension 03/12/2013    Ulcerative pancolitis without complication (HCC) 03/12/2013       Current Outpatient Medications   Medication Sig Dispense Refill    spironolactone (ALDACTONE) 25 MG Tab Take 25 mg by mouth every day.      TRELEGY ELLIPTA 200-62.5-25 MCG/ACT inhaler Inhale 1 Puff every day.      valsartan-hydrochlorothiazide (DIOVAN-HCT) 80-12.5 MG per tablet Take 1 Tablet by mouth every day. 100 Tablet 3    guaiFENesin ER (MUCINEX) 600 MG TABLET SR 12 HR Take 600 mg by mouth every evening.      pantoprazole (PROTONIX) 40 MG Tablet Delayed Response Take 20 mg by mouth 2 times a day.      albuterol (PROVENTIL) 2.5mg/3ml Nebu Soln solution for  nebulization Take 3 mL by nebulization every four hours as needed for Shortness of Breath. 360 Each 3    PARoxetine (PAXIL) 10 MG Tab Take 1 Tablet by mouth every day. 100 Tablet 0    rosuvastatin (CRESTOR) 20 MG Tab Take 1 Tablet by mouth every evening. 100 Tablet 0    anastrozole (ARIMIDEX) 1 MG Tab Take 1 Tablet by mouth every morning. 100 Tablet 0    melatonin 5 mg Tab Take 5 mg by mouth at bedtime.      ipratropium-albuterol (DUONEB) 0.5-2.5 (3) MG/3ML nebulizer solution Take 3 mL by nebulization every four hours as needed for Shortness of Breath (Wheezing). (Patient not taking: Reported on 3/5/2024) 30 mL 3    albuterol 108 (90 Base) MCG/ACT Aero Soln inhalation aerosol Inhale 2 Puffs every 6 hours as needed for Shortness of Breath. (Patient not taking: Reported on 3/5/2024) 8.5 g 3     No current facility-administered medications for this visit.          Current supplements as per medication list.     Allergies:   Sulfa drugs, Vancomycin, and Codeine  Social History     Tobacco Use    Smoking status: Former     Current packs/day: 0.00     Average packs/day: 1 pack/day for 20.0 years (20.0 ttl pk-yrs)     Types: Cigarettes     Start date: 1966     Quit date: 1986     Years since quittin.8     Passive exposure: Past    Smokeless tobacco: Never    Tobacco comments:     Quit in    Vaping Use    Vaping Use: Never used   Substance Use Topics    Alcohol use: Yes     Alcohol/week: 4.2 oz     Types: 7 Standard drinks or equivalent per week     Comment: 1 drink a night    Drug use: No     Family History   Problem Relation Age of Onset    Heart Disease Mother         CAD MI at age 72    Cancer Mother         breast cancer  at age 83    Cancer Sister     Lung Disease Father 75        Lung cancer    Cancer Maternal Grandmother         Pancreatic cancer    Heart Attack Maternal Grandfather         MI at age 70    Heart Disease Sister         Nadya cfever- herat murmur     Erin  has a past medical  history of Arthritis, Asthma, Breast cancer (HCC), Bronchitis (2011), Cancer (HCC) (12/2012), Cough, Dizziness (03/11/2013), Heart burn, Heart murmur, Hiatus hernia syndrome, HTN (hypertension) (03/12/2013), Hypercholesterolemia, Hyperlipidemia (03/12/2013), Hypertension, Hypokalemia (03/12/2013), Indigestion, Mitral annular calcification, Pain, Psychiatric disorder, Sleep apnea, Snoring, Sputum production, ULCERATIVE COLITIS (03/12/2013), and Vertigo (03/11/2013).    She has no past medical history of Painful breathing, Shortness of breath, or Wheezing.   Past Surgical History:   Procedure Laterality Date    GASTRIC SLEEVE LAPAROSCOPY N/A 5/18/2016    Procedure: GASTRIC SLEEVE LAPAROSCOPY, HIATAL HERNIA AND LIVER BIOPSY;  Surgeon: Mauricio Montes M.D.;  Location: SURGERY [facility];  Service:     US-NEEDLE CORE BX-BREAST PANEL  2013    rt breast, with lumpectomy     GYN SURGERY      vag hyster 1990    GYN SURGERY      vaginal cyst removal     LUMPECTOMY  left    OTHER       R breast bx X 2& lipoma removal       Screening:  In the last six months have you experienced any leakage of urine? Yes with coughing and sneezing    Depression Screening  Little interest or pleasure in doing things?  1 - several days  Feeling down, depressed , or hopeless? 0 - not at all  Trouble falling or staying asleep, or sleeping too much?  0 - not at all  Feeling tired or having little energy?  2 - more than half the days  Poor appetite or overeating?  0 - not at all  Feeling bad about yourself - or that you are a failure or have let yourself or your family down? 0 - not at all  Trouble concentrating on things, such as reading the newspaper or watching television? 0 - not at all  Moving or speaking so slowly that other people could have noticed.  Or the opposite - being so fidgety or restless that you have been moving around a lot more than usual?  0 - not at all  Thoughts that you would be better off dead, or of hurting yourself?  0 -  not at all  Patient Health Questionnaire Score:  3/27    If depressive symptoms identified deferred to follow up visit unless specifically addressed in assessment and plan.    Interpretation of PHQ-9 Total Score   Score Severity   1-4 No Depression   5-9 Mild Depression   10-14 Moderate Depression   15-19 Moderately Severe Depression   20-27 Severe Depression    Screening for Cognitive Impairment  Do you or any of your friends or family members have any concern about your memory? Yes  Three Minute Recall (Leader, Season, Table) 2/3    Jori clock face with all 12 numbers and set the hands to show 10 minutes after 11.  Yes 5/5  Cognitive concerns identified deferred for follow up unless specifically addressed in assessment and plan.    Fall Risk Assessment  Has the patient had two or more falls in the last year or any fall with injury in the last year?  No    Safety Assessment  Do you always wear your seatbelt?  Yes  Any changes to home needed to function safely? No  Difficulty hearing.  No  Patient counseled about all safety risks that were identified.    Functional Assessment ADLs  Are there any barriers preventing you from cooking for yourself or meeting nutritional needs?  No.    Are there any barriers preventing you from driving safely or obtaining transportation?  No.    Are there any barriers preventing you from using a telephone or calling for help?  No    Are there any barriers preventing you from shopping?  No.    Are there any barriers preventing you from taking care of your own finances?  No    Are there any barriers preventing you from managing your medications?  No    Are there any barriers preventing you from showering, bathing or dressing yourself? No    Are there any barriers preventing you from doing housework or laundry? No    Are there any barriers preventing you from using the toilet?No    Are you currently engaging in any exercise or physical activity?  No.      Self-Assessment of Health  What is  your perception of your health? Good    Do you sleep more than six hours a night? Yes    In the past 7 days, how much did pain keep you from doing your normal work? None    Do you spend quality time with family or friends (virtually or in person)? Yes Lives with a friend and sees family frequently.   Do you usually eat a heart healthy diet that constists of a variety of fruits, vegetables, whole grains and fiber? Yes    Do you eat foods high in fat and/or Fast Food more than three times per week? No    How concerned are you that your medical conditions are not being well managed? Not at all    Are you worried that in the next 2 months, you may not have stable housing that you own, rent, or stay in as part of a household? No        Advance Care Planning  Do you have an Advance Directive, Living Will, Durable Power of , or POLST? Yes  Advance Directive       is on file      Health Maintenance Summary            Overdue - Zoster (Shingles) Vaccines (2 of 2) Overdue since 12/26/2020      10/31/2020  Imm Admin: Zoster Vaccine Recombinant (RZV) (SHINGRIX)              Overdue - Annual Pulmonary Function Test / Spirometry (Yearly) Overdue since 2/7/2024 02/07/2023  PULMONARY FUNCTION TESTS -Test requested: Complete Pulmonary Function Test    07/14/2016  AMB PULMONARY FUNCTION TEST/LAB              Postponed - COVID-19 Vaccine (4 - 2023-24 season) Postponed until 2/9/2025 11/30/2021  Imm Admin: MODERNA SARS-COV-2 VACCINE (12+)    03/05/2021  Imm Admin: MODERNA SARS-COV-2 VACCINE (12+)    02/06/2021  Imm Admin: MODERNA SARS-COV-2 VACCINE (12+)              Annual Wellness Visit (Yearly) Next due on 3/5/2025      03/05/2024  Level of Service: AL ANNUAL WELLNESS VISIT-INCLUDES PPPS SUBSEQUE*    05/25/2022  Level of Service: AL ANNUAL WELLNESS VISIT-INCLUDES PPPS SUBSEQUE*              Bone Density Scan (Every 5 Years) Next due on 3/23/2027      03/23/2022  DS-BONE DENSITY STUDY (DEXA)    04/04/2016  DS-BONE  DENSITY STUDY (DEXA)              IMM DTaP/Tdap/Td Vaccine (2 - Td or Tdap) Next due on 9/2/2029 09/02/2019  Imm Admin: Tdap Vaccine              Pneumococcal Vaccine: 65+ Years (Series Information) Completed      12/06/2018  Imm Admin: Pneumococcal polysaccharide vaccine (PPSV-23)    05/19/2016  Imm Admin: Pneumococcal Conjugate Vaccine (Prevnar/PCV-13)    10/01/2011  Imm Admin: Pneumococcal Vaccine (PCV7) - HISTORICAL DATA              Influenza Vaccine (Series Information) Completed      10/20/2023  Imm Admin: Influenza Vaccine Adult HD    11/30/2021  Imm Admin: Influenza, Unspecified - HISTORICAL DATA    11/10/2020  Imm Admin: Influenza, Unspecified - HISTORICAL DATA    10/31/2020  Imm Admin: Influenza Vaccine Adult HD    10/29/2019  Imm Admin: Influenza Vaccine Adult HD    Only the first 5 history entries have been loaded, but more history exists.              Hepatitis A Vaccine (Hep A) (Series Information) Aged Out      No completion history exists for this topic.              Hepatitis B Vaccine (Hep B) (Series Information) Aged Out      No completion history exists for this topic.              HPV Vaccines (Series Information) Aged Out      No completion history exists for this topic.              Polio Vaccine (Inactivated Polio) (Series Information) Aged Out      No completion history exists for this topic.              Meningococcal Immunization (Series Information) Aged Out      No completion history exists for this topic.              Discontinued - Mammogram  Discontinued        Frequency changed to Never automatically (Topic No Longer Applies)    03/24/2022  MA-SCREENING MAMMO BILAT W/TOMOSYNTHESIS W/CAD    03/23/2021  MA-SCREENING MAMMO BILAT W/TOMOSYNTHESIS W/CAD    03/13/2020  MA-SCREENING MAMMO BILAT W/TOMOSYNTHESIS W/CAD    03/05/2019  MA-SCREENING MAMMO BILAT W/TOMOSYNTHESIS W/CAD    Only the first 5 history entries have been loaded, but more history exists.              Discontinued -  "Colorectal Cancer Screening  Discontinued        Frequency changed to Never automatically (Topic No Longer Applies)    02/12/2016  Occult Blood Feces component of OCCULT BLOOD STOOL                    Patient Care Team:  Terra Zambrano M.D. as PCP - General (Family Medicine)  [facility] (Optometry)  Sharan Tran M.D. (Cardiovascular Disease (Cardiology))  Mauricio Montes M.D. (Surgery)  [facility] as Respiratory Therapist (DME Services)    Financial Resource Strain: Medium Risk (3/5/2024)    Overall Financial Resource Strain (CARDIA)     Difficulty of Paying Living Expenses: Somewhat hard      Transportation Needs: No Transportation Needs (3/5/2024)    PRAPARE - Transportation     Lack of Transportation (Medical): No     Lack of Transportation (Non-Medical): No      Food Insecurity: No Food Insecurity (3/5/2024)    Hunger Vital Sign     Worried About Running Out of Food in the Last Year: Never true     Ran Out of Food in the Last Year: Never true        Encounter Vitals  Blood Pressure : 110/62  O2 Delivery Device: Nasal Cannula (every night)  Weight: 83 kg (183 lb)  Height: 163.8 cm (5' 4.5\")  BMI (Calculated): 30.93  Pain Score: No pain  Pulmonary Vitals  O2 Flow Rate (L/min): 2  DME  O2 Delivery Device: Nasal Cannula (every night)     Alert, oriented in no acute distress.  Eye contact is good, speech goal directed, affect calm.    Assessment and Plan. The following treatment and monitoring plan is recommended:  Ulcerative pancolitis without complication (HCC)  Chronic, stable. Pt has historically maintained on mesalamine but discontinued about a year ago primarily due to drug cost. She fortunately has not suffered any significant relapsing symptoms such as persistent diarrhea, hematochezia.. Her last colonoscopy was 4 years ago. Follow up with GI per routine for continued monitoring and management.    Stage 3b chronic kidney disease (HCC)  Chronic, ongoing issue with GFR decreasing to 41 on " most recent labs from 9/2023 down from 48, seven months prior. Continue to encourage adequate hydration and avoidance of nephrotoxins such as NSAIDs. Follow up with PCP for continued monitoring and management.   Latest Reference Range & Units 01/25/23 08:51 02/23/23 09:27 09/21/23 14:17   GFR (CKD-EPI) >60 mL/min/1.73 m 2 51 ! 48 ! 41 !   !: Data is abnormal    Essential hypertension  Chronic, stable. BP today 110/62. Pt has been maintaining on spironolactone 25mg and valsartan-HCTZ 80-12.5mg daily. Pt monitors BP at home and notes these have generally have been low so she has been reducing her dose Diovan by half most days. Denies dizziness/lightheadedness, chest pain. Continue current treatment regime: valsartan-HCTZ 80-12.5mg daily and spironolactone 25mg. Advise pt keep log of blood pressures and medication dosages to discuss further with PCP. Follow up with PCP per routine.    Dependence on supplemental oxygen  Chronic respiratory failure with hypoxia (HCC)  Chronic, stable. Pt maintains on 2L O2 via NC at night and during the day as needed. This is new for her since December 2023. Follow up with pulmonology per routine.    Dyslipidemia  Chronic, stable. Associated with CAD. Maintains on statin therapy without issue. Most recent lipid panel from January 2023 WNL. Continue rosuvastatin 20mg daily. Recommend Mediterranean diet and regular physical activity. Follow up with PCP at least annually for continued monitoring and management.   Lab Results   Component Value Date/Time    CHOLSTRLTOT 165 01/25/2023 08:51 AM    LDL 71 01/25/2023 08:51 AM    HDL 64 01/25/2023 08:51 AM    TRIGLYCERIDE 150 (H) 01/25/2023 08:51 AM         Osteopenia of left thigh  Chronic, stable. Last DEXA 3/2022 with osteopenia of the proximal left femur with T scores of -1.0. Denies hx of fragility fracture. Advise calcium and vitamin D supplementation with weightbearing exercises as tolerated. Follow up with PCP at least annually for  continued monitoring and management.    Asthma-COPD overlap syndrome  COPD (chronic obstructive pulmonary disease) (HCC)  Chronic, worsening over the last year with pt reporting several COPD flares. She recently was prescribed supplemental oxygen (2L via NC) at night and PRN. She maintains on Trelegy Ellipta 200-62.5-25mcg/act daily with albuterol nebulizer once daily. Encouraged RSV vaccination. Follow up with pulmonology per routine for continued monitoring and management.    ROBYN (obstructive sleep apnea)  Chronic, ongoing issue. She is considering switching CPAP to nasal mask given intolerance of standard mask. Presently she maintains on 2L O2 via NC nightly. Follow up with sleep medicine per routine for continued management.    Services suggested: No services needed at this time  Health Care Screening: Age-appropriate preventive services recommended by USPTF and ACIP covered by Medicare were discussed today. Services ordered if indicated and agreed upon by the patient.  Referrals offered: Community-based lifestyle interventions to reduce health risks and promote self-management and wellness, fall prevention, nutrition, physical activity, tobacco-use cessation, weight loss, and mental health services as per orders if indicated.    Discussion today about general wellness and lifestyle habits:    Prevent falls and reduce trip hazards; Cautioned about securing or removing rugs.  Have a working fire alarm and carbon monoxide detector.  Engage in regular physical activity and social activities.    Follow-up: Return for follow up visit with PCP as previously scheduled.

## 2024-03-06 NOTE — ASSESSMENT & PLAN NOTE
Chronic, worsening over the last year with pt reporting several COPD flares. She recently was prescribed supplemental oxygen (2L via NC) at night and PRN. She maintains on Trelegy Ellipta 200-62.5-25mcg/act daily with albuterol nebulizer once daily. Encouraged RSV vaccination. Follow up with pulmonology per routine for continued monitoring and management.

## 2024-03-08 ENCOUNTER — DOCUMENTATION (OUTPATIENT)
Dept: FAMILY PLANNING/WOMEN'S HEALTH CLINIC | Facility: PHYSICIAN GROUP | Age: 84
End: 2024-03-08
Payer: MEDICARE

## 2024-03-19 ENCOUNTER — HOSPITAL ENCOUNTER (OUTPATIENT)
Dept: RADIOLOGY | Facility: MEDICAL CENTER | Age: 84
End: 2024-03-19
Attending: FAMILY MEDICINE
Payer: MEDICARE

## 2024-03-19 DIAGNOSIS — Z12.31 VISIT FOR SCREENING MAMMOGRAM: ICD-10-CM

## 2024-03-19 PROCEDURE — 77067 SCR MAMMO BI INCL CAD: CPT

## 2024-04-11 ENCOUNTER — APPOINTMENT (RX ONLY)
Dept: URBAN - METROPOLITAN AREA CLINIC 15 | Facility: CLINIC | Age: 84
Setting detail: DERMATOLOGY
End: 2024-04-11

## 2024-04-11 DIAGNOSIS — D18.0 HEMANGIOMA: ICD-10-CM

## 2024-04-11 DIAGNOSIS — L85.3 XEROSIS CUTIS: ICD-10-CM

## 2024-04-11 DIAGNOSIS — L29.8 OTHER PRURITUS: ICD-10-CM

## 2024-04-11 DIAGNOSIS — L82.1 OTHER SEBORRHEIC KERATOSIS: ICD-10-CM

## 2024-04-11 DIAGNOSIS — D22 MELANOCYTIC NEVI: ICD-10-CM

## 2024-04-11 DIAGNOSIS — L81.4 OTHER MELANIN HYPERPIGMENTATION: ICD-10-CM

## 2024-04-11 DIAGNOSIS — L29.89 OTHER PRURITUS: ICD-10-CM

## 2024-04-11 PROBLEM — D22.39 MELANOCYTIC NEVI OF OTHER PARTS OF FACE: Status: ACTIVE | Noted: 2024-04-11

## 2024-04-11 PROBLEM — D22.5 MELANOCYTIC NEVI OF TRUNK: Status: ACTIVE | Noted: 2024-04-11

## 2024-04-11 PROBLEM — D22.61 MELANOCYTIC NEVI OF RIGHT UPPER LIMB, INCLUDING SHOULDER: Status: ACTIVE | Noted: 2024-04-11

## 2024-04-11 PROBLEM — D22.71 MELANOCYTIC NEVI OF RIGHT LOWER LIMB, INCLUDING HIP: Status: ACTIVE | Noted: 2024-04-11

## 2024-04-11 PROBLEM — D18.01 HEMANGIOMA OF SKIN AND SUBCUTANEOUS TISSUE: Status: ACTIVE | Noted: 2024-04-11

## 2024-04-11 PROBLEM — D22.62 MELANOCYTIC NEVI OF LEFT UPPER LIMB, INCLUDING SHOULDER: Status: ACTIVE | Noted: 2024-04-11

## 2024-04-11 PROBLEM — D22.72 MELANOCYTIC NEVI OF LEFT LOWER LIMB, INCLUDING HIP: Status: ACTIVE | Noted: 2024-04-11

## 2024-04-11 PROCEDURE — ? LIQUID NITROGEN

## 2024-04-11 PROCEDURE — ? PRESCRIPTION

## 2024-04-11 PROCEDURE — ? ADDITIONAL NOTES

## 2024-04-11 PROCEDURE — 99213 OFFICE O/P EST LOW 20 MIN: CPT

## 2024-04-11 PROCEDURE — ? COUNSELING

## 2024-04-11 RX ORDER — CLOBETASOL PROPIONATE 0.5 MG/ML
SOLUTION TOPICAL BID
Qty: 50 | Refills: 3 | Status: ERX | COMMUNITY
Start: 2024-04-11

## 2024-04-11 RX ADMIN — CLOBETASOL PROPIONATE: 0.5 SOLUTION TOPICAL at 00:00

## 2024-04-11 ASSESSMENT — LOCATION SIMPLE DESCRIPTION DERM
LOCATION SIMPLE: RIGHT UPPER BACK
LOCATION SIMPLE: RIGHT CLAVICULAR SKIN
LOCATION SIMPLE: RIGHT EYEBROW
LOCATION SIMPLE: LEFT ELBOW
LOCATION SIMPLE: RIGHT POPLITEAL SKIN
LOCATION SIMPLE: RIGHT PRETIBIAL REGION
LOCATION SIMPLE: CHEST
LOCATION SIMPLE: RIGHT POSTERIOR THIGH
LOCATION SIMPLE: POSTERIOR SCALP
LOCATION SIMPLE: LEFT FOREARM
LOCATION SIMPLE: RIGHT FOREARM
LOCATION SIMPLE: RIGHT WRIST
LOCATION SIMPLE: LEFT POSTERIOR UPPER ARM
LOCATION SIMPLE: LEFT PRETIBIAL REGION
LOCATION SIMPLE: RIGHT THIGH
LOCATION SIMPLE: LEFT FOREHEAD
LOCATION SIMPLE: UPPER BACK
LOCATION SIMPLE: NOSE
LOCATION SIMPLE: LEFT POSTERIOR THIGH
LOCATION SIMPLE: RIGHT SCALP
LOCATION SIMPLE: LEFT POPLITEAL SKIN
LOCATION SIMPLE: SCALP
LOCATION SIMPLE: RIGHT HAND
LOCATION SIMPLE: ABDOMEN
LOCATION SIMPLE: LEFT CHEEK
LOCATION SIMPLE: RIGHT POSTERIOR UPPER ARM

## 2024-04-11 ASSESSMENT — LOCATION DETAILED DESCRIPTION DERM
LOCATION DETAILED: LEFT POPLITEAL SKIN
LOCATION DETAILED: RIGHT POPLITEAL SKIN
LOCATION DETAILED: LEFT PROXIMAL PRETIBIAL REGION
LOCATION DETAILED: LEFT INFERIOR CENTRAL MALAR CHEEK
LOCATION DETAILED: MIDDLE STERNUM
LOCATION DETAILED: SUBXIPHOID
LOCATION DETAILED: RIGHT ANTERIOR DISTAL THIGH
LOCATION DETAILED: LOWER STERNUM
LOCATION DETAILED: LEFT VENTRAL LATERAL PROXIMAL FOREARM
LOCATION DETAILED: LEFT SUPERIOR MEDIAL BUCCAL CHEEK
LOCATION DETAILED: RIGHT LATERAL SUPERIOR CHEST
LOCATION DETAILED: LEFT DISTAL POSTERIOR UPPER ARM
LOCATION DETAILED: POSTERIOR MID-PARIETAL SCALP
LOCATION DETAILED: RIGHT DISTAL POSTERIOR UPPER ARM
LOCATION DETAILED: RIGHT SUPERIOR UPPER BACK
LOCATION DETAILED: RIGHT DISTAL POSTERIOR THIGH
LOCATION DETAILED: RIGHT VENTRAL DISTAL FOREARM
LOCATION DETAILED: RIGHT CENTRAL PARIETAL SCALP
LOCATION DETAILED: RIGHT LATERAL DORSAL WRIST
LOCATION DETAILED: RIGHT RADIAL DORSAL HAND
LOCATION DETAILED: RIGHT DISTAL RADIAL DORSAL FOREARM
LOCATION DETAILED: RIGHT PROXIMAL DORSAL FOREARM
LOCATION DETAILED: LEFT CENTRAL PARIETAL SCALP
LOCATION DETAILED: RIGHT CLAVICULAR SKIN
LOCATION DETAILED: RIGHT PROXIMAL POSTERIOR UPPER ARM
LOCATION DETAILED: NASAL SUPRATIP
LOCATION DETAILED: RIGHT PROXIMAL PRETIBIAL REGION
LOCATION DETAILED: RIGHT MEDIAL FRONTAL SCALP
LOCATION DETAILED: RIGHT VENTRAL PROXIMAL FOREARM
LOCATION DETAILED: INFERIOR THORACIC SPINE
LOCATION DETAILED: LEFT LATERAL ELBOW
LOCATION DETAILED: LEFT DISTAL POSTERIOR THIGH
LOCATION DETAILED: LEFT INFERIOR FOREHEAD
LOCATION DETAILED: RIGHT CENTRAL EYEBROW
LOCATION DETAILED: LEFT DISTAL DORSAL FOREARM

## 2024-04-11 ASSESSMENT — LOCATION ZONE DERM
LOCATION ZONE: TRUNK
LOCATION ZONE: NOSE
LOCATION ZONE: HAND
LOCATION ZONE: ARM
LOCATION ZONE: SCALP
LOCATION ZONE: LEG
LOCATION ZONE: FACE

## 2024-04-11 NOTE — PROCEDURE: LIQUID NITROGEN
Post-Care Instructions: I reviewed with the patient in detail post-care instructions. Patient is to wear sunprotection, and avoid picking at any of the treated lesions. Pt may apply Vaseline to crusted or scabbing areas.
Show Spray Paint Technique Variable?: Yes
Medical Necessity Clause: This procedure was medically necessary because the lesions that were treated were:  If lesion does not resolve, bx is needed.
Consent: The patient's consent was obtained including but not limited to risks of crusting, scabbing, blistering, scarring, darker or lighter pigmentary change, recurrence, incomplete removal and infection.
Spray Paint Text: The liquid nitrogen was applied to the skin utilizing a spray paint frosting technique.
Spray Paint Technique: No
Medical Necessity Information: It is in your best interest to select a reason for this procedure from the list below. All of these items fulfill various CMS LCD requirements except the new and changing color options.
Duration Of Freeze Thaw-Cycle (Seconds): 0
Detail Level: Detailed

## 2024-04-11 NOTE — PROCEDURE: ADDITIONAL NOTES
Detail Level: Zone
Render Risk Assessment In Note?: no
Additional Notes: Discussed VO5 hot oil treatment recommended to do it 5 minutes before she showers.

## 2024-05-09 ENCOUNTER — HOSPITAL ENCOUNTER (OUTPATIENT)
Facility: MEDICAL CENTER | Age: 84
End: 2024-05-09
Attending: STUDENT IN AN ORGANIZED HEALTH CARE EDUCATION/TRAINING PROGRAM
Payer: MEDICARE

## 2024-05-09 DIAGNOSIS — J44.89 ASTHMA-COPD OVERLAP SYNDROME (HCC): ICD-10-CM

## 2024-05-09 LAB
GRAM STN SPEC: NORMAL
SIGNIFICANT IND 70042: NORMAL
SITE SITE: NORMAL
SOURCE SOURCE: NORMAL

## 2024-05-11 LAB
BACTERIA SPEC RESP CULT: NORMAL
GRAM STN SPEC: NORMAL
SIGNIFICANT IND 70042: NORMAL
SITE SITE: NORMAL
SOURCE SOURCE: NORMAL

## 2024-06-03 ENCOUNTER — OFFICE VISIT (OUTPATIENT)
Dept: SLEEP MEDICINE | Facility: MEDICAL CENTER | Age: 84
End: 2024-06-03
Attending: INTERNAL MEDICINE
Payer: MEDICARE

## 2024-06-03 VITALS
OXYGEN SATURATION: 91 % | HEART RATE: 72 BPM | HEIGHT: 65 IN | SYSTOLIC BLOOD PRESSURE: 116 MMHG | DIASTOLIC BLOOD PRESSURE: 60 MMHG | BODY MASS INDEX: 29.66 KG/M2 | WEIGHT: 178 LBS

## 2024-06-03 DIAGNOSIS — R05.8 UPPER AIRWAY COUGH SYNDROME: ICD-10-CM

## 2024-06-03 DIAGNOSIS — J44.89 ASTHMA-COPD OVERLAP SYNDROME (HCC): ICD-10-CM

## 2024-06-03 PROCEDURE — 99211 OFF/OP EST MAY X REQ PHY/QHP: CPT | Performed by: INTERNAL MEDICINE

## 2024-06-03 PROCEDURE — 99214 OFFICE O/P EST MOD 30 MIN: CPT | Performed by: INTERNAL MEDICINE

## 2024-06-03 PROCEDURE — 3074F SYST BP LT 130 MM HG: CPT | Performed by: INTERNAL MEDICINE

## 2024-06-03 PROCEDURE — 3078F DIAST BP <80 MM HG: CPT | Performed by: INTERNAL MEDICINE

## 2024-06-03 ASSESSMENT — ENCOUNTER SYMPTOMS
CHILLS: 0
HEMOPTYSIS: 0
FOCAL WEAKNESS: 0
FEVER: 0
COUGH: 1
VOMITING: 0
EYE DISCHARGE: 0
SHORTNESS OF BREATH: 0
NAUSEA: 0
FALLS: 0
SPUTUM PRODUCTION: 1

## 2024-06-03 ASSESSMENT — FIBROSIS 4 INDEX: FIB4 SCORE: 2.2

## 2024-06-03 NOTE — PROGRESS NOTES
Pulmonary Clinic Note    Chief Complaint:  Chief Complaint   Patient presents with    Follow-Up     Asthma-COPD overlap syndrome. Last seen 02/09/24        Results     Culture: 05/09/24     HPI:   Erin Hess is a very pleasant 84 y.o. 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 Last seen in clinic by Dr Alcala in 2/2024.     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    Interval events since last clinic visit:  Complaint with Trelegy 200, advised to start duonebs BID with flutter valve  Sputum cultures no significant growth  No exacerbations since last visit but still having a cough productive of yellow sputum, significant ongoing sinus issues, takes PO allergy meds    Past Medical History:   Diagnosis Date    Arthritis     Asthma     Breast cancer (HCC)     Bronchitis 2011    Cancer (HCC) 12/2012    right breast    Cough     Dizziness 03/11/2013    Heart burn     Heart murmur     Hiatus hernia syndrome     HTN (hypertension) 03/12/2013    Hypercholesterolemia     Hyperlipidemia 03/12/2013    Hypertension     Hypokalemia 03/12/2013    Indigestion     Mitral annular calcification     Pain     back, hips, knees    Psychiatric disorder     depression    Sleep apnea     h/o gastric sleeve lost 40 lb now not on CPAP    Snoring     Sputum production     ULCERATIVE COLITIS 03/12/2013    Vertigo 03/11/2013       Past Surgical History:   Procedure Laterality Date    GASTRIC SLEEVE LAPAROSCOPY N/A 5/18/2016    Procedure: GASTRIC SLEEVE LAPAROSCOPY, HIATAL HERNIA AND LIVER BIOPSY;  Surgeon: Mauricio Montes M.D.;  Location: SURGERY [facility];  Service:     US-NEEDLE CORE BX-BREAST PANEL  2013    rt breast, with  lumpectomy     GYN SURGERY      vag hyster     GYN SURGERY      vaginal cyst removal     LUMPECTOMY  left    OTHER       R breast bx X 2& lipoma removal       Social History     Socioeconomic History    Marital status:      Spouse name: Not on file    Number of children: 6    Years of education: Not on file    Highest education level: 12th grade   Occupational History    Not on file   Tobacco Use    Smoking status: Former     Current packs/day: 0.00     Average packs/day: 1 pack/day for 20.0 years (20.0 ttl pk-yrs)     Types: Cigarettes     Start date: 1966     Quit date: 1986     Years since quittin.0     Passive exposure: Past    Smokeless tobacco: Never    Tobacco comments:     Quit in    Vaping Use    Vaping status: Never Used   Substance and Sexual Activity    Alcohol use: Yes     Alcohol/week: 4.2 oz     Types: 7 Standard drinks or equivalent per week     Comment: 1 drink a night    Drug use: No    Sexual activity: Not Currently   Other Topics Concern    Not on file   Social History Narrative    She lives alone but has a female roommate who is 60, Leonora Rosa who has a brother in town she is estranged from. Leonora Rosa had Covid  and now has long-haulers and is on medicaid     She has 2 children and 4 stepchildren, her   '. She has a son near [place], [place] and another in [place]. She relies on her 2 grandkids who live in [place] and her 8y younger sister lives in [facility] [place], her older sister lives in a [place] in [place]. 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  then  after 27y. Her ex  her cousin, met her 2nd  and was  for 25y.     Her son supports her keeping Leonora Rosa at her home. She does not feel taken advantage of despite no longer receiving rent.      Social Determinants of Health     Financial Resource Strain: Medium Risk (3/5/2024)    Overall Financial Resource Strain (CARDIA)     Difficulty of  Paying Living Expenses: Somewhat hard   Food Insecurity: No Food Insecurity (3/5/2024)    Hunger Vital Sign     Worried About Running Out of Food in the Last Year: Never true     Ran Out of Food in the Last Year: Never true   Transportation Needs: No Transportation Needs (3/5/2024)    PRAPARE - Transportation     Lack of Transportation (Medical): No     Lack of Transportation (Non-Medical): No   Physical Activity: Inactive (2022)    Exercise Vital Sign     Days of Exercise per Week: 0 days     Minutes of Exercise per Session: 0 min   Stress: Stress Concern Present (2022)    [employer] [place] [employer] - Occupational Stress Questionnaire     Feeling of Stress : To some extent   Social Connections: Moderately Isolated (2022)    Social Connection and Isolation Panel [NHANES]     Frequency of Communication with Friends and Family: More than three times a week     Frequency of Social Gatherings with Friends and Family: Once a week     Attends Zoroastrian Services: Never     Active Member of Clubs or Organizations: Yes     Attends Club or Organization Meetings: More than 4 times per year     Marital Status:    Intimate Partner Violence: Not on file   Housing Stability: Low Risk  (2022)    Housing Stability Vital Sign     Unable to Pay for Housing in the Last Year: No     Number of Places Lived in the Last Year: 1     Unstable Housing in the Last Year: No          Family History   Problem Relation Age of Onset    Heart Disease Mother         CAD MI at age 72    Cancer Mother         breast cancer  at age 83    Cancer Sister     Lung Disease Father 75        Lung cancer    Cancer Maternal Grandmother         Pancreatic cancer    Heart Attack Maternal Grandfather         MI at age 70    Heart Disease Sister         Rhuemati cfever- herat murmur       Current Outpatient Medications on File Prior to Visit   Medication Sig Dispense Refill    spironolactone (ALDACTONE) 25 MG Tab Take  "25 mg by mouth every day.      ipratropium-albuterol (DUONEB) 0.5-2.5 (3) MG/3ML nebulizer solution Take 3 mL by nebulization every four hours as needed for Shortness of Breath (Wheezing). 30 mL 3    TRELEGY ELLIPTA 200-62.5-25 MCG/ACT inhaler Inhale 1 Puff every day.      valsartan-hydrochlorothiazide (DIOVAN-HCT) 80-12.5 MG per tablet Take 1 Tablet by mouth every day. 100 Tablet 3    guaiFENesin ER (MUCINEX) 600 MG TABLET SR 12 HR Take 600 mg by mouth every evening.      pantoprazole (PROTONIX) 40 MG Tablet Delayed Response Take 20 mg by mouth 2 times a day.      albuterol (PROVENTIL) 2.5mg/3ml Nebu Soln solution for nebulization Take 3 mL by nebulization every four hours as needed for Shortness of Breath. 360 Each 3    PARoxetine (PAXIL) 10 MG Tab Take 1 Tablet by mouth every day. 100 Tablet 0    rosuvastatin (CRESTOR) 20 MG Tab Take 1 Tablet by mouth every evening. 100 Tablet 0    anastrozole (ARIMIDEX) 1 MG Tab Take 1 Tablet by mouth every morning. 100 Tablet 0    melatonin 5 mg Tab Take 5 mg by mouth at bedtime.      albuterol 108 (90 Base) MCG/ACT Aero Soln inhalation aerosol Inhale 2 Puffs every 6 hours as needed for Shortness of Breath. 8.5 g 3     No current facility-administered medications on file prior to visit.       Allergies: Sulfa drugs, Vancomycin, and Codeine      ROS:   Review of Systems   Constitutional:  Negative for chills and fever.   HENT:  Positive for congestion. Negative for hearing loss.    Eyes:  Negative for discharge.   Respiratory:  Positive for cough and sputum production. Negative for hemoptysis and shortness of breath.    Cardiovascular:  Negative for chest pain.   Gastrointestinal:  Negative for nausea and vomiting.   Musculoskeletal:  Negative for falls.   Skin:  Negative for rash.   Neurological:  Negative for focal weakness.       Vitals:  /60 (BP Location: Left arm, Patient Position: Sitting, BP Cuff Size: Adult)   Pulse 72   Ht 1.638 m (5' 4.5\")   Wt 80.7 kg (178 " lb)   SpO2 91%     Physical Exam:  Physical Exam  Vitals and nursing note reviewed.   Constitutional:       General: She is not in acute distress.     Appearance: Normal appearance. She is not ill-appearing or toxic-appearing.   HENT:      Head: Normocephalic and atraumatic.      Nose: Nose normal.      Mouth/Throat:      Mouth: Mucous membranes are moist.   Eyes:      General: No scleral icterus.     Conjunctiva/sclera: Conjunctivae normal.   Cardiovascular:      Rate and Rhythm: Normal rate and regular rhythm.   Pulmonary:      Effort: Pulmonary effort is normal. No respiratory distress.      Breath sounds: No wheezing, rhonchi or rales.   Abdominal:      Palpations: Abdomen is soft.   Musculoskeletal:         General: No deformity or signs of injury. Normal range of motion.      Cervical back: Normal range of motion.   Skin:     General: Skin is warm and dry.   Neurological:      General: No focal deficit present.      Mental Status: She is alert. Mental status is at baseline.   Psychiatric:         Mood and Affect: Mood normal.         Behavior: Behavior normal.       Data:  PFT 7/12/16: mild obstruction w/o a significant BD response. There is reduction in UUU39-28% c/f small airways disease w/a significant BD response in these midflows. Increased diffusion capacity.     PFT 2/7/23: mild obstruction w/o a significant BD response. Normal diffusion.    Assessment/Plan:    1. Upper airway cough syndrome        2. Pulmonary emphysema, unspecified emphysema type (HCC)          Perpetuated by uncontrolled sinus congestion. Sputum cultures unrevealing. Continue OTC antihistamine. Counseled about daily sinus hygiene regimen with Neilmed sinus rinse.   Cont Trelegy 200.   Return in about 6 months (around 12/3/2024) for Dr Pablo only.     This note was generated using voice recognition software which has a chance of producing errors of grammar and possibly content.  I have made every reasonable attempt to find and  correct any obvious errors, but it should be expected that some may not be found prior to finalization of this note.    Time spent in record review prior to patient arrival, reviewing results, and in face-to-face encounter totaled 38 min, excluding any procedures if performed.    __________  Markell Pablo MD  Pulmonary and Critical Care Medicine  [facility]

## 2024-06-18 ENCOUNTER — HOSPITAL ENCOUNTER (OUTPATIENT)
Dept: CARDIOLOGY | Facility: MEDICAL CENTER | Age: 84
End: 2024-06-18
Attending: INTERNAL MEDICINE
Payer: MEDICARE

## 2024-06-18 DIAGNOSIS — I35.0 MODERATE AORTIC STENOSIS: ICD-10-CM

## 2024-06-18 PROCEDURE — 93306 TTE W/DOPPLER COMPLETE: CPT

## 2024-06-24 ENCOUNTER — HOSPITAL ENCOUNTER (OUTPATIENT)
Dept: LAB | Facility: MEDICAL CENTER | Age: 84
End: 2024-06-24
Attending: FAMILY MEDICINE
Payer: MEDICARE

## 2024-06-24 ENCOUNTER — TELEPHONE (OUTPATIENT)
Dept: CARDIOLOGY | Facility: MEDICAL CENTER | Age: 84
End: 2024-06-24
Payer: MEDICARE

## 2024-06-24 LAB
ALBUMIN SERPL BCP-MCNC: 4 G/DL (ref 3.2–4.9)
ALBUMIN/GLOB SERPL: 1.2 G/DL
ALP SERPL-CCNC: 71 U/L (ref 30–99)
ALT SERPL-CCNC: 10 U/L (ref 2–50)
ANION GAP SERPL CALC-SCNC: 10 MMOL/L (ref 7–16)
APPEARANCE UR: CLEAR
AST SERPL-CCNC: 14 U/L (ref 12–45)
BACTERIA #/AREA URNS HPF: ABNORMAL /HPF
BASOPHILS # BLD AUTO: 0.7 % (ref 0–1.8)
BASOPHILS # BLD: 0.04 K/UL (ref 0–0.12)
BILIRUB SERPL-MCNC: 0.6 MG/DL (ref 0.1–1.5)
BILIRUB UR QL STRIP.AUTO: NEGATIVE
BUN SERPL-MCNC: 19 MG/DL (ref 8–22)
CALCIUM ALBUM COR SERPL-MCNC: 10.1 MG/DL (ref 8.5–10.5)
CALCIUM SERPL-MCNC: 10.1 MG/DL (ref 8.4–10.2)
CHLORIDE SERPL-SCNC: 105 MMOL/L (ref 96–112)
CHOLEST SERPL-MCNC: 175 MG/DL (ref 100–199)
CO2 SERPL-SCNC: 26 MMOL/L (ref 20–33)
COLOR UR: YELLOW
CREAT SERPL-MCNC: 0.97 MG/DL (ref 0.5–1.4)
EOSINOPHIL # BLD AUTO: 0.11 K/UL (ref 0–0.51)
EOSINOPHIL NFR BLD: 2 % (ref 0–6.9)
EPI CELLS #/AREA URNS HPF: ABNORMAL /HPF
ERYTHROCYTE [DISTWIDTH] IN BLOOD BY AUTOMATED COUNT: 43.5 FL (ref 35.9–50)
EST. AVERAGE GLUCOSE BLD GHB EST-MCNC: 123 MG/DL
FASTING STATUS PATIENT QL REPORTED: NORMAL
GFR SERPLBLD CREATININE-BSD FMLA CKD-EPI: 58 ML/MIN/1.73 M 2
GLOBULIN SER CALC-MCNC: 3.4 G/DL (ref 1.9–3.5)
GLUCOSE SERPL-MCNC: 100 MG/DL (ref 65–99)
GLUCOSE UR STRIP.AUTO-MCNC: NEGATIVE MG/DL
HBA1C MFR BLD: 5.9 % (ref 4–5.6)
HCT VFR BLD AUTO: 43.8 % (ref 37–47)
HDLC SERPL-MCNC: 75 MG/DL
HGB BLD-MCNC: 13.9 G/DL (ref 12–16)
IMM GRANULOCYTES # BLD AUTO: 0.02 K/UL (ref 0–0.11)
IMM GRANULOCYTES NFR BLD AUTO: 0.4 % (ref 0–0.9)
KETONES UR STRIP.AUTO-MCNC: NEGATIVE MG/DL
LDLC SERPL CALC-MCNC: 79 MG/DL
LEUKOCYTE ESTERASE UR QL STRIP.AUTO: ABNORMAL
LYMPHOCYTES # BLD AUTO: 1.36 K/UL (ref 1–4.8)
LYMPHOCYTES NFR BLD: 24.6 % (ref 22–41)
MCH RBC QN AUTO: 27.5 PG (ref 27–33)
MCHC RBC AUTO-ENTMCNC: 31.7 G/DL (ref 32.2–35.5)
MCV RBC AUTO: 86.6 FL (ref 81.4–97.8)
MICRO URNS: ABNORMAL
MONOCYTES # BLD AUTO: 0.5 K/UL (ref 0–0.85)
MONOCYTES NFR BLD AUTO: 9 % (ref 0–13.4)
NEUTROPHILS # BLD AUTO: 3.5 K/UL (ref 1.82–7.42)
NEUTROPHILS NFR BLD: 63.3 % (ref 44–72)
NITRITE UR QL STRIP.AUTO: NEGATIVE
NRBC # BLD AUTO: 0 K/UL
NRBC BLD-RTO: 0 /100 WBC (ref 0–0.2)
PH UR STRIP.AUTO: 6.5 [PH] (ref 5–8)
PLATELET # BLD AUTO: 197 K/UL (ref 164–446)
PMV BLD AUTO: 10.5 FL (ref 9–12.9)
POTASSIUM SERPL-SCNC: 4.3 MMOL/L (ref 3.6–5.5)
PROT SERPL-MCNC: 7.4 G/DL (ref 6–8.2)
PROT UR QL STRIP: NEGATIVE MG/DL
RBC # BLD AUTO: 5.06 M/UL (ref 4.2–5.4)
RBC # URNS HPF: ABNORMAL /HPF
RBC UR QL AUTO: ABNORMAL
SODIUM SERPL-SCNC: 141 MMOL/L (ref 135–145)
SP GR UR STRIP.AUTO: 1.01
TRIGL SERPL-MCNC: 104 MG/DL (ref 0–149)
WBC # BLD AUTO: 5.5 K/UL (ref 4.8–10.8)
WBC #/AREA URNS HPF: ABNORMAL /HPF

## 2024-06-24 PROCEDURE — 80053 COMPREHEN METABOLIC PANEL: CPT

## 2024-06-24 PROCEDURE — 81001 URINALYSIS AUTO W/SCOPE: CPT

## 2024-06-24 PROCEDURE — 36415 COLL VENOUS BLD VENIPUNCTURE: CPT

## 2024-06-24 PROCEDURE — 83036 HEMOGLOBIN GLYCOSYLATED A1C: CPT

## 2024-06-24 PROCEDURE — 85025 COMPLETE CBC W/AUTO DIFF WBC: CPT

## 2024-06-24 PROCEDURE — 80061 LIPID PANEL: CPT

## 2024-06-24 NOTE — TELEPHONE ENCOUNTER
CW    Caller: Erin Hess     Topic/issue: Patient has not seen the results of her echo yet she completed on 6/18.  Please call pt with results     Callback Number: [phone]    Thank You   Barbara EMERSON

## 2024-06-25 ENCOUNTER — TELEPHONE (OUTPATIENT)
Dept: CARDIOLOGY | Facility: MEDICAL CENTER | Age: 84
End: 2024-06-25

## 2024-06-25 DIAGNOSIS — I35.0 SEVERE AORTIC STENOSIS: Chronic | ICD-10-CM

## 2024-06-25 LAB
LV EJECT FRACT  99904: 60
LV EJECT FRACT MOD 2C 99903: 60.26
LV EJECT FRACT MOD 4C 99902: 55.59
LV EJECT FRACT MOD BP 99901: 56.87

## 2024-06-25 PROCEDURE — 93306 TTE W/DOPPLER COMPLETE: CPT | Mod: 26 | Performed by: INTERNAL MEDICINE

## 2024-06-25 NOTE — TELEPHONE ENCOUNTER
Attempted to call pt, [phone] unable to reach.  Left detailed message advising delay in Echo report to be read by CW and if she would like it to be reviewed by covering cardiologist to let us know by returning our call with her decision.    Upon chart review, pt is active on [employer].  Last login 6/24/2024.  manetch message sent regarding findings, awaiting pt response.

## 2024-06-26 ENCOUNTER — PATIENT MESSAGE (OUTPATIENT)
Dept: CARDIOLOGY | Facility: MEDICAL CENTER | Age: 84
End: 2024-06-26
Payer: MEDICARE

## 2024-06-26 ENCOUNTER — TELEPHONE (OUTPATIENT)
Dept: CARDIOLOGY | Facility: MEDICAL CENTER | Age: 84
End: 2024-06-26
Payer: MEDICARE

## 2024-06-26 DIAGNOSIS — I35.0 SEVERE AORTIC STENOSIS: ICD-10-CM

## 2024-06-26 DIAGNOSIS — Z01.810 PRE-PROCEDURAL CARDIOVASCULAR EXAMINATION: ICD-10-CM

## 2024-06-26 NOTE — TELEPHONE ENCOUNTER
Referral from: Dr. Tran for Severe AS    Patient called on 6/26/2024.    Called and left message requesting call back to discuss referral.      First attempt

## 2024-06-26 NOTE — PATIENT COMMUNICATION
Noted findings.    ==========    Echocardiogram  ALICIA Ham5 hours ago (10:34 AM)     Dr. Tran reached out to us, we will call the patient. Thank you Yovana!

## 2024-06-26 NOTE — PATIENT COMMUNICATION
Occupational Therapy    Visit Type: initial evaluation  Co-treat with: OT student   Treatment diagnosis: Wound left leg; impaired ADLs    Relevant History/Co-morbidities: Admitted on 06/28: I&D LLE wound,   TTWB LLE    PMH: left tibial plateau fracture status post ORIF in March 2021 which was complicated by postoperative infection requiring multiple incisions and debridements, split thickness skin graft to the left knee        SUBJECTIVE  Patient agreed to participate in therapy this date.  \"I can go to my sister's house if need me.\"  \"I don't understand how I'm going to do this, I rather brush my teeth at the sink.\"  Prior treatment in the past year for same condition: no therapies  Patient has not been hospitalized, in a skilled nursing facility, or seen by home health in the last 30 days.  Patient / Family Goal: return home and maximize function    Pain   RN informed on pain level.    Location: LLE    At onset of session (out of 10): 8    OBJECTIVE     Cognitive Status   Level of Consciousness   - alert  Arousal Alertness   - appropriate responses to stimuli  Affect/Behavior    - cooperative and anxious  Orientation    - Oriented to: person, place, time and situation  Functional Communication   - Overall Status: within functional limits   - Forms of Communication: verbal (hyperverbal)  Attention Span    - Attention: - attends with cues to redirect   - Attention impairment: distractibility  Following Direction   - follows all commands and directions consistently  Transition Between Tasks   - transitions without difficulty  Memory   - intact  Safety Awareness/Insight   - intact  Awareness of Deficits   - fully aware of deficits  Verbal Expression   - intact  [facility] Cognition Screen:    1. Following/understanding a 10 to 15 minute speech or presentation (e.g., lesson at a place of Islam, guest lecturer at a senior center?  None (4)    2. Understanding familiar people during ordinary conversations?  None (4)    3.  To valve clinic CW placed referral to be evaluated for severe aortic stenosis, thank you!   Remembering to take medications at the appropriate time?  None (4)    4. Remembering where things were placed or put away (e.g., keys)?  None (4)    5. Remembering a list of 4 or 5 errands without writing it down?  None (4)    6. Taking care of complicated tasks like managing a checking account or getting appliances fixed?  None (4)    AM-PAC Cognition Screen:  Cognition Score: 24/24  Cognition Interpretation: no impairment suspected    Observation   Patient verbalized multiple concerns of weight bearing status and recent surgeries performed. Patient also verbalized wanted to consult with plastics; OT reached out to doctor via perfect serve to express patient's concerns.     Range of Motion (ROM)   (degrees unless noted; active unless noted; norms in ( ); negative=lacking to 0, positive=beyond 0)  WNL: LUE, RUE    Strength  (out of 5 unless noted, standard test position unless noted)   5/5: LUE, RUE  Gross :  strength grossly equal bilateral      Sitting Balance  (MIREYA = base of support)  Static      - Trial 1 details: supervision, with double UE support and with back support (W/C)  Dynamic      Sitting dynamic trial 1: in bed      - Trial 1 details: supervision, reaches with 2 hands, reaches forward, with back unsupported, with single UE support and reaches with one hand (in bed )      Coordination  LUE: intact   RUE: intact      Sensation/Dermatome Testing:    Intact: LUE light touch, RUE light touch    Bed Mobility  - Sit to supine: with verbal cues, contact guard/touching/steadying assist  Patient sitting in wheelchair upon therapist arrival; patient returned back to bed after session.   Transfers  Assistive devices: gait belt, 2-wheeled walker  - Sit to stand: moderate assist, with verbal cues (wheelchair )       - Second Trial: contact guard/touching/steadying assist, with verbal cues (commode )  - Stand to sit: moderate assist, with verbal cues (wheelchair )       - Second Trial: contact  guard/touching/steadying assist, with verbal cues (commode )  Patient required cueing for proper hand placement with RW use during transfer.       Functional Ambulation  - Assistance: contact guard/touching/steadying assist  - Assistive device: 2-wheeled walker and gait belt  - Distance (ft):3  - Surface: even  Patient safely performed ambulation via hopping from wheelchair to commode and commode to bed while maintaining LLE precautions. No LOB throughout.   Activities of Daily Living (ADLs)  Grooming/Oral Hygiene:   - Grooming assist: stand by assist and with verbal cues (combed hair )       - Oral hygiene assist: stand by assist and with verbal cues (brushed teeth )  - Position: chair  Lower Body Dressing:   - Footwear:       - Assistance: stand by assist (doffed right shoe)       - Position: supine/bed  Toileting:   - Toilet transfer:        - Assist: contact guard/touching/steadying assist and with verbal cues (simulated with BSC)       - Device: 2-wheeled walker and gait belt       - Equipment: bedside commode  - Assist: stand by assist and with verbal cues (in sitting )  - Equipment: bedside commode  Patient required cueing for proper hand placement during toilet transfer.  Interventions    Training provided: activity tolerance, ADL training, balance retraining, bed mobility training, body mechanics, breathing/relaxation, compensatory techniques, energy conservation, functional ambulation, positioning, safety training and transfer trainingPatient educated on POC, role of OT, fall prevention, weight bearing precautions, and continued participation in ADLs with hospital staff.   Skilled input: verbal instruction/cues and tactile instruction/cues  Verbal Consent: Writer verbally educated and received verbal consent for hand placement, positioning of patient, and techniques to be performed today from patient for clothing adjustments for techniques and therapist position for techniques as described above and how  they are pertinent to the patient's plan of care.         Education:   - Present and ready to learn: patient  Education provided during session:  - Results of above outlined education: Verbalizes understanding and Needs reinforcement    ASSESSMENT   Patient will benefit from inpatient skilled therapy to address current assessed functional limitations and impairments.  Interferring components: decreased activity tolerance    Summary of function and discharge needs based on today's assessment:  - Current level of function: slightly below baseline level of function  - Therapy needs at discharge: therapy 1-3 times per week  - Activities of daily living (ADLs) requiring support at discharge: bed mobility, transfers, toileting, ambulation, dressing, grooming, feeding and bathing  - Instrumental activities of daily living (IADLs) requiring support at discharge: community mobility  - Impairments that require further therapy intervention: pain, ROM, activity tolerance, strength and balance  AM-PAC  - Prior Level of Function: IND/MOD I ([facility] 22-24)       Key: MOD A=moderate assistance, IND/MOD I=independent/modified independent  - Generalized Current Level of Function     - Current Self-Cares: 19       Scoring Key= >21 Modified Independent; 20-21 Supervision; 18-19 Minimal assist; 13-18 Moderate assist; 9-12 Max assist; <9 Total assist       • Personal Occupations Profile Affected: bathing/showering, lower body dressing, upper body dressing, personal hygiene/grooming, functional mobility/transfers, toileting/toilet hygiene, community mobility, social participation community      • Clinical decision making: Low - Patient has few limitations (1-3), comorbidities and/or complexities, as noted in problem focused assessment noted above, that impact their occupational profile.  Resulting in few treatment options and no task modification consistent with low clinical decision making complexity.    PLAN  Suggestions for next session  as indicated:      PT/OT Mobility Equipment for Discharge: patient owns manual wheelchair, RW  PT/OT ADL Equipment for Discharge: patient owns bedside commode  Interventions: ADL retraining, balance, energy conservation, safety training, therapeutic exercise, patient education, activity tolerance training, compensatory technique education, bed mobility training, positioning, transfer training, body mechanics, functional transfer training, compensatory techniques and therapeutic activity  Agreement to plan and goals: patient agrees with goals and treatment plan      GOALS  Short Term Goals:   Patient will perform sit to stand transfer with supervision in prep for toileting. (06/29/23)  Long Term Goals: (to be met by time of discharge from hospital)  Grooming: Patient will complete grooming tasks in sitting modified independent.  Lower body dressing: Patient will complete lower body dressing in sitting modified independent.  Toileting: Patient will complete toileting modified independent.  Toilet transfer: Patient will complete toilet transfer with modified independent.         Documented in the chart in the following areas: Prior Level of Function. Assessment/Plan.    Admitting diagnosis: Wound of left lower extremity, subsequent encounter (S81.802D);Wound of left leg, subsequent encounter (S81.802D)    Co-morbidities and problem list:   Patient Active Problem List:   Tibial plateau fracture, left   Closed fibular fracture   Leukocytosis   Tobacco abuse   Tibial plateau fracture, left   Acute postoperative pain   Surgical site infection   Class 1 obesity due to excess calories in adult   Lumbar radiculopathy   Septic joint of left knee joint (CMD)   Primary osteoarthritis of right knee   Osteomyelitis (CMD)   Surgical site infection   Anemia   Arthrofibrosis of knee joint, left   Closed fracture of medial plateau of left tibia   Surgical wound, non healing, sequela   Wound of left lower extremity, subsequent  encounter   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    Patient at End of Session:   Location: in bed  Safety measures: bed rails x4, call light within reach and alarm system in place/re-engaged  Handoff to: nurse      Care approved by and performed under the direction of on-site therapist. Student’s note read and approved.  Irene Ramírez OTR/L        Therapy procedure time and total treatment time can be found documented on the Time Entry flowsheet

## 2024-06-27 ENCOUNTER — TELEPHONE (OUTPATIENT)
Dept: CARDIOLOGY | Facility: MEDICAL CENTER | Age: 84
End: 2024-06-27
Payer: MEDICARE

## 2024-06-27 NOTE — TELEPHONE ENCOUNTER
Received blind transfer call from triage RN.     Spoke with patient.    Discussed with patient consultation appointments, testing needed, and plan of care.    Patient given dates and times of testing and consultations.    All questions answered.    Phone number given to patient for [facility] for any further questions or concerns.

## 2024-06-27 NOTE — TELEPHONE ENCOUNTER
----- Message from Ethel Soni R.N. sent at 6/27/2024  9:22 AM PDT -----  Regarding: Pre TAVR Cath  Hey! Please hold pre TAVR cath 7/8-7/9. Thank you!

## 2024-07-03 ENCOUNTER — OFFICE VISIT (OUTPATIENT)
Dept: CARDIOTHORACIC SURGERY | Facility: MEDICAL CENTER | Age: 84
End: 2024-07-03
Payer: MEDICARE

## 2024-07-03 ENCOUNTER — APPOINTMENT (OUTPATIENT)
Dept: CARDIOLOGY | Facility: MEDICAL CENTER | Age: 84
End: 2024-07-03
Attending: INTERNAL MEDICINE
Payer: MEDICARE

## 2024-07-03 ENCOUNTER — DOCUMENTATION (OUTPATIENT)
Dept: CARDIOLOGY | Facility: MEDICAL CENTER | Age: 84
End: 2024-07-03

## 2024-07-03 ENCOUNTER — HOSPITAL ENCOUNTER (OUTPATIENT)
Dept: RADIOLOGY | Facility: MEDICAL CENTER | Age: 84
End: 2024-07-03
Payer: MEDICARE

## 2024-07-03 VITALS
WEIGHT: 178 LBS | SYSTOLIC BLOOD PRESSURE: 110 MMHG | OXYGEN SATURATION: 90 % | HEIGHT: 65 IN | DIASTOLIC BLOOD PRESSURE: 64 MMHG | HEART RATE: 70 BPM | BODY MASS INDEX: 29.66 KG/M2 | TEMPERATURE: 97.3 F

## 2024-07-03 VITALS
HEIGHT: 65 IN | DIASTOLIC BLOOD PRESSURE: 56 MMHG | OXYGEN SATURATION: 92 % | BODY MASS INDEX: 29.66 KG/M2 | WEIGHT: 178 LBS | SYSTOLIC BLOOD PRESSURE: 94 MMHG | RESPIRATION RATE: 16 BRPM | HEART RATE: 80 BPM

## 2024-07-03 DIAGNOSIS — N18.32 STAGE 3B CHRONIC KIDNEY DISEASE: ICD-10-CM

## 2024-07-03 DIAGNOSIS — Z90.3 H/O GASTRIC SLEEVE: ICD-10-CM

## 2024-07-03 DIAGNOSIS — I35.0 SEVERE AORTIC STENOSIS: ICD-10-CM

## 2024-07-03 DIAGNOSIS — I35.0 SEVERE AORTIC STENOSIS: Chronic | ICD-10-CM

## 2024-07-03 DIAGNOSIS — Z99.81 DEPENDENCE ON SUPPLEMENTAL OXYGEN: ICD-10-CM

## 2024-07-03 DIAGNOSIS — Z01.810 PRE-PROCEDURAL CARDIOVASCULAR EXAMINATION: ICD-10-CM

## 2024-07-03 DIAGNOSIS — Z00.6 EXAMINATION OF PARTICIPANT IN CLINICAL TRIAL: ICD-10-CM

## 2024-07-03 DIAGNOSIS — J44.89 ASTHMA-COPD OVERLAP SYNDROME (HCC): ICD-10-CM

## 2024-07-03 DIAGNOSIS — I10 ESSENTIAL HYPERTENSION: ICD-10-CM

## 2024-07-03 PROBLEM — R05.9 COUGH: Status: RESOLVED | Noted: 2022-05-25 | Resolved: 2024-07-03

## 2024-07-03 PROBLEM — J44.9 COPD (CHRONIC OBSTRUCTIVE PULMONARY DISEASE) (HCC): Status: RESOLVED | Noted: 2024-03-05 | Resolved: 2024-07-03

## 2024-07-03 LAB — EKG IMPRESSION: NORMAL

## 2024-07-03 PROCEDURE — 99213 OFFICE O/P EST LOW 20 MIN: CPT | Performed by: INTERNAL MEDICINE

## 2024-07-03 PROCEDURE — 3078F DIAST BP <80 MM HG: CPT | Performed by: THORACIC SURGERY (CARDIOTHORACIC VASCULAR SURGERY)

## 2024-07-03 PROCEDURE — 99215 OFFICE O/P EST HI 40 MIN: CPT | Performed by: INTERNAL MEDICINE

## 2024-07-03 PROCEDURE — 93010 ELECTROCARDIOGRAM REPORT: CPT | Performed by: INTERNAL MEDICINE

## 2024-07-03 PROCEDURE — 700117 HCHG RX CONTRAST REV CODE 255

## 2024-07-03 PROCEDURE — 3074F SYST BP LT 130 MM HG: CPT | Performed by: INTERNAL MEDICINE

## 2024-07-03 PROCEDURE — 76497 UNLISTED CT PROCEDURE: CPT

## 2024-07-03 PROCEDURE — 3074F SYST BP LT 130 MM HG: CPT | Performed by: THORACIC SURGERY (CARDIOTHORACIC VASCULAR SURGERY)

## 2024-07-03 PROCEDURE — 3078F DIAST BP <80 MM HG: CPT | Performed by: INTERNAL MEDICINE

## 2024-07-03 PROCEDURE — G2211 COMPLEX E/M VISIT ADD ON: HCPCS | Performed by: INTERNAL MEDICINE

## 2024-07-03 PROCEDURE — 93005 ELECTROCARDIOGRAM TRACING: CPT | Performed by: INTERNAL MEDICINE

## 2024-07-03 PROCEDURE — 99205 OFFICE O/P NEW HI 60 MIN: CPT | Performed by: THORACIC SURGERY (CARDIOTHORACIC VASCULAR SURGERY)

## 2024-07-03 RX ADMIN — IOHEXOL 100 ML: 350 INJECTION, SOLUTION INTRAVENOUS at 09:00

## 2024-07-03 ASSESSMENT — ENCOUNTER SYMPTOMS
BRUISES/BLEEDS EASILY: 0
PALPITATIONS: 0
DOUBLE VISION: 0
POLYDIPSIA: 0
MUSCULOSKELETAL NEGATIVE: 1
ORTHOPNEA: 0
TREMORS: 0
WEIGHT LOSS: 0
HEMOPTYSIS: 0
SEIZURES: 0
FEVER: 0
PHOTOPHOBIA: 0
ABDOMINAL PAIN: 0
COUGH: 0
HEADACHES: 0
HALLUCINATIONS: 0
DIZZINESS: 1
SHORTNESS OF BREATH: 1
CHILLS: 0
VOMITING: 0
EYE PAIN: 0
PND: 0
BLOOD IN STOOL: 0
NAUSEA: 0
SPEECH CHANGE: 0
FOCAL WEAKNESS: 0
MEMORY LOSS: 0

## 2024-07-03 ASSESSMENT — PATIENT HEALTH QUESTIONNAIRE - PHQ9
CLINICAL INTERPRETATION OF PHQ2 SCORE: 1
5. POOR APPETITE OR OVEREATING: 0 - NOT AT ALL
SUM OF ALL RESPONSES TO PHQ QUESTIONS 1-9: 6

## 2024-07-03 ASSESSMENT — FIBROSIS 4 INDEX
FIB4 SCORE: 1.84
FIB4 SCORE: 1.84

## 2024-07-05 ENCOUNTER — APPOINTMENT (OUTPATIENT)
Dept: ADMISSIONS | Facility: MEDICAL CENTER | Age: 84
End: 2024-07-05
Attending: INTERNAL MEDICINE
Payer: MEDICARE

## 2024-07-08 ENCOUNTER — HOSPITAL ENCOUNTER (OUTPATIENT)
Facility: MEDICAL CENTER | Age: 84
End: 2024-07-08
Attending: INTERNAL MEDICINE | Admitting: INTERNAL MEDICINE
Payer: MEDICARE

## 2024-07-08 ENCOUNTER — DOCUMENTATION (OUTPATIENT)
Dept: CARDIOLOGY | Facility: MEDICAL CENTER | Age: 84
End: 2024-07-08

## 2024-07-08 ENCOUNTER — APPOINTMENT (OUTPATIENT)
Dept: CARDIOLOGY | Facility: MEDICAL CENTER | Age: 84
End: 2024-07-08
Attending: INTERNAL MEDICINE
Payer: MEDICARE

## 2024-07-08 VITALS
SYSTOLIC BLOOD PRESSURE: 145 MMHG | WEIGHT: 177.03 LBS | HEART RATE: 61 BPM | DIASTOLIC BLOOD PRESSURE: 64 MMHG | TEMPERATURE: 97.5 F | BODY MASS INDEX: 30.22 KG/M2 | OXYGEN SATURATION: 96 % | HEIGHT: 64 IN | RESPIRATION RATE: 18 BRPM

## 2024-07-08 DIAGNOSIS — I35.0 SEVERE AORTIC STENOSIS: ICD-10-CM

## 2024-07-08 DIAGNOSIS — Z01.810 PRE-PROCEDURAL CARDIOVASCULAR EXAMINATION: ICD-10-CM

## 2024-07-08 PROBLEM — K44.9 HIATAL HERNIA: Status: ACTIVE | Noted: 2024-07-08

## 2024-07-08 PROBLEM — K76.0 HEPATIC STEATOSIS: Status: ACTIVE | Noted: 2024-07-08

## 2024-07-08 PROBLEM — K80.20 GALL STONES: Status: ACTIVE | Noted: 2024-07-08

## 2024-07-08 PROBLEM — I70.0 AORTIC ATHEROSCLEROSIS (HCC): Status: ACTIVE | Noted: 2024-07-08

## 2024-07-08 LAB
ALBUMIN SERPL BCP-MCNC: 4 G/DL (ref 3.2–4.9)
ALBUMIN/GLOB SERPL: 1.3 G/DL
ALP SERPL-CCNC: 65 U/L (ref 30–99)
ALT SERPL-CCNC: 8 U/L (ref 2–50)
ANION GAP SERPL CALC-SCNC: 7 MMOL/L (ref 7–16)
APTT PPP: 27.5 SEC (ref 24.7–36)
AST SERPL-CCNC: 10 U/L (ref 12–45)
BASOPHILS # BLD AUTO: 0.5 % (ref 0–1.8)
BASOPHILS # BLD: 0.03 K/UL (ref 0–0.12)
BILIRUB SERPL-MCNC: 0.5 MG/DL (ref 0.1–1.5)
BUN SERPL-MCNC: 18 MG/DL (ref 8–22)
CALCIUM ALBUM COR SERPL-MCNC: 10.1 MG/DL (ref 8.5–10.5)
CALCIUM SERPL-MCNC: 10.1 MG/DL (ref 8.5–10.5)
CHLORIDE SERPL-SCNC: 106 MMOL/L (ref 96–112)
CO2 SERPL-SCNC: 26 MMOL/L (ref 20–33)
CREAT SERPL-MCNC: 0.93 MG/DL (ref 0.5–1.4)
EOSINOPHIL # BLD AUTO: 0.12 K/UL (ref 0–0.51)
EOSINOPHIL NFR BLD: 2.1 % (ref 0–6.9)
ERYTHROCYTE [DISTWIDTH] IN BLOOD BY AUTOMATED COUNT: 45.6 FL (ref 35.9–50)
GFR SERPLBLD CREATININE-BSD FMLA CKD-EPI: 61 ML/MIN/1.73 M 2
GLOBULIN SER CALC-MCNC: 3.1 G/DL (ref 1.9–3.5)
GLUCOSE SERPL-MCNC: 95 MG/DL (ref 65–99)
HCT VFR BLD AUTO: 42.5 % (ref 37–47)
HGB BLD-MCNC: 13.7 G/DL (ref 12–16)
IMM GRANULOCYTES # BLD AUTO: 0.01 K/UL (ref 0–0.11)
IMM GRANULOCYTES NFR BLD AUTO: 0.2 % (ref 0–0.9)
INR PPP: 1 (ref 0.87–1.13)
LYMPHOCYTES # BLD AUTO: 1.14 K/UL (ref 1–4.8)
LYMPHOCYTES NFR BLD: 20.2 % (ref 22–41)
MCH RBC QN AUTO: 28 PG (ref 27–33)
MCHC RBC AUTO-ENTMCNC: 32.2 G/DL (ref 32.2–35.5)
MCV RBC AUTO: 86.7 FL (ref 81.4–97.8)
MONOCYTES # BLD AUTO: 0.51 K/UL (ref 0–0.85)
MONOCYTES NFR BLD AUTO: 9.1 % (ref 0–13.4)
NEUTROPHILS # BLD AUTO: 3.82 K/UL (ref 1.82–7.42)
NEUTROPHILS NFR BLD: 67.9 % (ref 44–72)
NRBC # BLD AUTO: 0 K/UL
NRBC BLD-RTO: 0 /100 WBC (ref 0–0.2)
PLATELET # BLD AUTO: 188 K/UL (ref 164–446)
PMV BLD AUTO: 10.3 FL (ref 9–12.9)
POTASSIUM SERPL-SCNC: 3.7 MMOL/L (ref 3.6–5.5)
PROT SERPL-MCNC: 7.1 G/DL (ref 6–8.2)
PROTHROMBIN TIME: 13.3 SEC (ref 12–14.6)
RBC # BLD AUTO: 4.9 M/UL (ref 4.2–5.4)
SODIUM SERPL-SCNC: 139 MMOL/L (ref 135–145)
WBC # BLD AUTO: 5.6 K/UL (ref 4.8–10.8)

## 2024-07-08 PROCEDURE — C1769 GUIDE WIRE: HCPCS

## 2024-07-08 PROCEDURE — 700105 HCHG RX REV CODE 258: Performed by: INTERNAL MEDICINE

## 2024-07-08 PROCEDURE — 80053 COMPREHEN METABOLIC PANEL: CPT

## 2024-07-08 PROCEDURE — 85025 COMPLETE CBC W/AUTO DIFF WBC: CPT

## 2024-07-08 PROCEDURE — 700101 HCHG RX REV CODE 250

## 2024-07-08 PROCEDURE — 93567 NJX CAR CTH SPRVLV AORTGRPHY: CPT | Performed by: INTERNAL MEDICINE

## 2024-07-08 PROCEDURE — 700117 HCHG RX CONTRAST REV CODE 255: Performed by: INTERNAL MEDICINE

## 2024-07-08 PROCEDURE — 99152 MOD SED SAME PHYS/QHP 5/>YRS: CPT | Performed by: INTERNAL MEDICINE

## 2024-07-08 PROCEDURE — 160035 HCHG PACU - 1ST 60 MINS PHASE I

## 2024-07-08 PROCEDURE — 160002 HCHG RECOVERY MINUTES (STAT)

## 2024-07-08 PROCEDURE — 93454 CORONARY ARTERY ANGIO S&I: CPT | Mod: 26 | Performed by: INTERNAL MEDICINE

## 2024-07-08 PROCEDURE — 85610 PROTHROMBIN TIME: CPT

## 2024-07-08 PROCEDURE — 85730 THROMBOPLASTIN TIME PARTIAL: CPT

## 2024-07-08 PROCEDURE — A9270 NON-COVERED ITEM OR SERVICE: HCPCS

## 2024-07-08 PROCEDURE — 160036 HCHG PACU - EA ADDL 30 MINS PHASE I

## 2024-07-08 PROCEDURE — 700111 HCHG RX REV CODE 636 W/ 250 OVERRIDE (IP)

## 2024-07-08 PROCEDURE — G0278 ILIAC ART ANGIO,CARDIAC CATH: HCPCS | Performed by: INTERNAL MEDICINE

## 2024-07-08 PROCEDURE — 700102 HCHG RX REV CODE 250 W/ 637 OVERRIDE(OP)

## 2024-07-08 RX ORDER — HYDRALAZINE HYDROCHLORIDE 10 MG/1
10 TABLET, FILM COATED ORAL
Status: DISCONTINUED | OUTPATIENT
Start: 2024-07-08 | End: 2024-07-08 | Stop reason: HOSPADM

## 2024-07-08 RX ORDER — ASPIRIN 81 MG/1
TABLET, CHEWABLE ORAL
Status: COMPLETED
Start: 2024-07-08 | End: 2024-07-08

## 2024-07-08 RX ORDER — VERAPAMIL HYDROCHLORIDE 2.5 MG/ML
INJECTION, SOLUTION INTRAVENOUS
Status: COMPLETED
Start: 2024-07-08 | End: 2024-07-08

## 2024-07-08 RX ORDER — SODIUM CHLORIDE 9 MG/ML
3 INJECTION, SOLUTION INTRAVENOUS CONTINUOUS
Status: ACTIVE | OUTPATIENT
Start: 2024-07-08 | End: 2024-07-08

## 2024-07-08 RX ORDER — HEPARIN SODIUM 1000 [USP'U]/ML
INJECTION, SOLUTION INTRAVENOUS; SUBCUTANEOUS
Status: COMPLETED
Start: 2024-07-08 | End: 2024-07-08

## 2024-07-08 RX ORDER — HEPARIN SODIUM 200 [USP'U]/100ML
INJECTION, SOLUTION INTRAVENOUS
Status: COMPLETED
Start: 2024-07-08 | End: 2024-07-08

## 2024-07-08 RX ORDER — MIDAZOLAM HYDROCHLORIDE 1 MG/ML
INJECTION INTRAMUSCULAR; INTRAVENOUS
Status: COMPLETED
Start: 2024-07-08 | End: 2024-07-08

## 2024-07-08 RX ORDER — LIDOCAINE HYDROCHLORIDE 20 MG/ML
INJECTION, SOLUTION INFILTRATION; PERINEURAL
Status: COMPLETED
Start: 2024-07-08 | End: 2024-07-08

## 2024-07-08 RX ADMIN — FENTANYL CITRATE 50 MCG: 50 INJECTION, SOLUTION INTRAMUSCULAR; INTRAVENOUS at 08:05

## 2024-07-08 RX ADMIN — MIDAZOLAM HYDROCHLORIDE 1 MG: 1 INJECTION, SOLUTION INTRAMUSCULAR; INTRAVENOUS at 08:05

## 2024-07-08 RX ADMIN — SODIUM CHLORIDE 3 ML/KG/HR: 9 INJECTION, SOLUTION INTRAVENOUS at 08:24

## 2024-07-08 RX ADMIN — LIDOCAINE HYDROCHLORIDE: 20 INJECTION, SOLUTION INFILTRATION; PERINEURAL at 07:45

## 2024-07-08 RX ADMIN — IOHEXOL 70 ML: 350 INJECTION, SOLUTION INTRAVENOUS at 08:04

## 2024-07-08 RX ADMIN — HEPARIN SODIUM 2000 UNITS: 200 INJECTION, SOLUTION INTRAVENOUS at 07:45

## 2024-07-08 RX ADMIN — ASPIRIN 324 MG: 81 TABLET, CHEWABLE ORAL at 07:37

## 2024-07-08 RX ADMIN — NITROGLYCERIN 10 ML: 20 INJECTION INTRAVENOUS at 07:45

## 2024-07-08 RX ADMIN — VERAPAMIL HYDROCHLORIDE 5 MG: 2.5 INJECTION, SOLUTION INTRAVENOUS at 07:45

## 2024-07-08 RX ADMIN — HEPARIN SODIUM: 1000 INJECTION, SOLUTION INTRAVENOUS; SUBCUTANEOUS at 07:45

## 2024-07-08 ASSESSMENT — PAIN DESCRIPTION - PAIN TYPE: TYPE: SURGICAL PAIN

## 2024-07-08 ASSESSMENT — FIBROSIS 4 INDEX: FIB4 SCORE: 1.84

## 2024-07-10 ENCOUNTER — DOCUMENTATION (OUTPATIENT)
Dept: CARDIOLOGY | Facility: MEDICAL CENTER | Age: 84
End: 2024-07-10
Payer: MEDICARE

## 2024-07-10 ENCOUNTER — TELEPHONE (OUTPATIENT)
Dept: CARDIOLOGY | Facility: MEDICAL CENTER | Age: 84
End: 2024-07-10
Payer: MEDICARE

## 2024-07-10 DIAGNOSIS — I35.0 NONRHEUMATIC AORTIC (VALVE) STENOSIS: ICD-10-CM

## 2024-07-12 ENCOUNTER — PRE-ADMISSION TESTING (OUTPATIENT)
Dept: ADMISSIONS | Facility: MEDICAL CENTER | Age: 84
DRG: 266 | End: 2024-07-12
Attending: INTERNAL MEDICINE
Payer: MEDICARE

## 2024-07-12 DIAGNOSIS — Z01.812 PRE-OPERATIVE LABORATORY EXAMINATION: ICD-10-CM

## 2024-07-12 LAB
ABO GROUP BLD: NORMAL
BLD GP AB SCN SERPL QL: NORMAL
NT-PROBNP SERPL IA-MCNC: 557 PG/ML (ref 0–125)
RH BLD: NORMAL

## 2024-07-12 PROCEDURE — 86901 BLOOD TYPING SEROLOGIC RH(D): CPT

## 2024-07-12 PROCEDURE — 83880 ASSAY OF NATRIURETIC PEPTIDE: CPT

## 2024-07-12 PROCEDURE — 86850 RBC ANTIBODY SCREEN: CPT

## 2024-07-12 PROCEDURE — 86900 BLOOD TYPING SEROLOGIC ABO: CPT

## 2024-07-12 PROCEDURE — 36415 COLL VENOUS BLD VENIPUNCTURE: CPT

## 2024-07-15 ENCOUNTER — HOSPITAL ENCOUNTER (INPATIENT)
Facility: MEDICAL CENTER | Age: 84
LOS: 8 days | DRG: 266 | End: 2024-07-23
Attending: INTERNAL MEDICINE | Admitting: STUDENT IN AN ORGANIZED HEALTH CARE EDUCATION/TRAINING PROGRAM
Payer: MEDICARE

## 2024-07-15 ENCOUNTER — APPOINTMENT (OUTPATIENT)
Dept: RADIOLOGY | Facility: MEDICAL CENTER | Age: 84
DRG: 266 | End: 2024-07-15
Payer: MEDICARE

## 2024-07-15 ENCOUNTER — APPOINTMENT (OUTPATIENT)
Dept: CARDIOLOGY | Facility: MEDICAL CENTER | Age: 84
DRG: 266 | End: 2024-07-15
Attending: STUDENT IN AN ORGANIZED HEALTH CARE EDUCATION/TRAINING PROGRAM
Payer: MEDICARE

## 2024-07-15 ENCOUNTER — APPOINTMENT (OUTPATIENT)
Dept: CARDIOLOGY | Facility: MEDICAL CENTER | Age: 84
DRG: 266 | End: 2024-07-15
Attending: ANESTHESIOLOGY
Payer: MEDICARE

## 2024-07-15 ENCOUNTER — ANESTHESIA (OUTPATIENT)
Dept: SURGERY | Facility: MEDICAL CENTER | Age: 84
DRG: 266 | End: 2024-07-15
Payer: MEDICARE

## 2024-07-15 ENCOUNTER — ANESTHESIA EVENT (OUTPATIENT)
Dept: SURGERY | Facility: MEDICAL CENTER | Age: 84
DRG: 266 | End: 2024-07-15
Payer: MEDICARE

## 2024-07-15 DIAGNOSIS — F41.1 GENERALIZED ANXIETY DISORDER: ICD-10-CM

## 2024-07-15 DIAGNOSIS — I48.91 ATRIAL FIBRILLATION WITH RAPID VENTRICULAR RESPONSE (HCC): ICD-10-CM

## 2024-07-15 DIAGNOSIS — N18.32 STAGE 3B CHRONIC KIDNEY DISEASE: ICD-10-CM

## 2024-07-15 DIAGNOSIS — Z95.2 S/P TAVR (TRANSCATHETER AORTIC VALVE REPLACEMENT): ICD-10-CM

## 2024-07-15 PROBLEM — Z86.16 HISTORY OF 2019 NOVEL CORONAVIRUS DISEASE (COVID-19): Status: RESOLVED | Noted: 2022-03-10 | Resolved: 2024-07-15

## 2024-07-15 PROBLEM — I95.81 POSTPROCEDURAL HYPOTENSION: Status: ACTIVE | Noted: 2024-07-15

## 2024-07-15 PROBLEM — S75.001A COMMON FEMORAL ARTERY INJURY, RIGHT, INITIAL ENCOUNTER: Status: ACTIVE | Noted: 2024-07-15

## 2024-07-15 PROBLEM — I50.31 ACUTE DIASTOLIC HF (HEART FAILURE) (HCC): Status: ACTIVE | Noted: 2024-07-15

## 2024-07-15 LAB
ABO + RH BLD: NORMAL
ABO GROUP BLD: NORMAL
ACT BLD: 128 SEC (ref 74–137)
ACT BLD: 250 SEC (ref 74–137)
ACT BLD: 256 SEC (ref 74–137)
ACT BLD: 281 SEC (ref 74–137)
ACT BLD: 281 SEC (ref 74–137)
ACT BLD: 299 SEC (ref 74–137)
ALBUMIN SERPL BCP-MCNC: 2.6 G/DL (ref 3.2–4.9)
ALBUMIN/GLOB SERPL: 1.4 G/DL
ALP SERPL-CCNC: 44 U/L (ref 30–99)
ALT SERPL-CCNC: 9 U/L (ref 2–50)
ANION GAP SERPL CALC-SCNC: 10 MMOL/L (ref 7–16)
AST SERPL-CCNC: 14 U/L (ref 12–45)
BARCODED ABORH UBTYP: 600
BARCODED ABORH UBTYP: 600
BARCODED PRD CODE UBPRD: NORMAL
BARCODED PRD CODE UBPRD: NORMAL
BARCODED UNIT NUM UBUNT: NORMAL
BARCODED UNIT NUM UBUNT: NORMAL
BASE EXCESS BLDA CALC-SCNC: -3 MMOL/L (ref -4–3)
BASE EXCESS BLDA CALC-SCNC: -5 MMOL/L (ref -4–3)
BASE EXCESS BLDA CALC-SCNC: -5 MMOL/L (ref -4–3)
BASOPHILS # BLD AUTO: 0.1 % (ref 0–1.8)
BASOPHILS # BLD: 0.02 K/UL (ref 0–0.12)
BILIRUB SERPL-MCNC: 0.3 MG/DL (ref 0.1–1.5)
BLD GP AB SCN SERPL QL: NORMAL
BODY TEMPERATURE: ABNORMAL DEGREES
BUN SERPL-MCNC: 19 MG/DL (ref 8–22)
CA-I BLD ISE-SCNC: 1.25 MMOL/L (ref 1.1–1.3)
CA-I BLD ISE-SCNC: 1.32 MMOL/L (ref 1.1–1.3)
CA-I BLD ISE-SCNC: 1.35 MMOL/L (ref 1.1–1.3)
CA-I SERPL-SCNC: 1.2 MMOL/L (ref 1.1–1.3)
CALCIUM ALBUM COR SERPL-MCNC: 9.3 MG/DL (ref 8.5–10.5)
CALCIUM SERPL-MCNC: 8.2 MG/DL (ref 8.5–10.5)
CFT BLD TEG: 5.5 MIN (ref 4.6–9.1)
CFT P HPASE BLD TEG: 5.2 MIN (ref 4.3–8.3)
CHLORIDE SERPL-SCNC: 107 MMOL/L (ref 96–112)
CLOT ANGLE BLD TEG: 73.5 DEGREES (ref 63–78)
CLOT LYSIS 30M P MA LENFR BLD TEG: 0 % (ref 0–2.6)
CO2 BLDA-SCNC: 23 MMOL/L (ref 20–33)
CO2 BLDA-SCNC: 23 MMOL/L (ref 20–33)
CO2 BLDA-SCNC: 24 MMOL/L (ref 20–33)
CO2 SERPL-SCNC: 20 MMOL/L (ref 20–33)
COMPONENT R 8504R: NORMAL
COMPONENT R 8504R: NORMAL
CREAT SERPL-MCNC: 0.93 MG/DL (ref 0.5–1.4)
CT.EXTRINSIC BLD ROTEM: 1.3 MIN (ref 0.8–2.1)
EKG IMPRESSION: NORMAL
EOSINOPHIL # BLD AUTO: 0 K/UL (ref 0–0.51)
EOSINOPHIL NFR BLD: 0 % (ref 0–6.9)
ERYTHROCYTE [DISTWIDTH] IN BLOOD BY AUTOMATED COUNT: 46.5 FL (ref 35.9–50)
ERYTHROCYTE [DISTWIDTH] IN BLOOD BY AUTOMATED COUNT: 46.9 FL (ref 35.9–50)
GFR SERPLBLD CREATININE-BSD FMLA CKD-EPI: 61 ML/MIN/1.73 M 2
GLOBULIN SER CALC-MCNC: 1.9 G/DL (ref 1.9–3.5)
GLUCOSE SERPL-MCNC: 190 MG/DL (ref 65–99)
HCO3 BLDA-SCNC: 21.2 MMOL/L (ref 17–25)
HCO3 BLDA-SCNC: 21.7 MMOL/L (ref 17–25)
HCO3 BLDA-SCNC: 23 MMOL/L (ref 17–25)
HCT VFR BLD AUTO: 30.1 % (ref 37–47)
HCT VFR BLD AUTO: 30.6 % (ref 37–47)
HCT VFR BLD AUTO: 33.4 % (ref 37–47)
HCT VFR BLD CALC: 32 % (ref 37–47)
HCT VFR BLD CALC: 33 % (ref 37–47)
HCT VFR BLD CALC: 34 % (ref 37–47)
HGB BLD-MCNC: 10.7 G/DL (ref 12–16)
HGB BLD-MCNC: 10.9 G/DL (ref 12–16)
HGB BLD-MCNC: 11.2 G/DL (ref 12–16)
HGB BLD-MCNC: 11.6 G/DL (ref 12–16)
HGB BLD-MCNC: 9.8 G/DL (ref 12–16)
HGB BLD-MCNC: 9.8 G/DL (ref 12–16)
IMM GRANULOCYTES # BLD AUTO: 0.08 K/UL (ref 0–0.11)
IMM GRANULOCYTES NFR BLD AUTO: 0.5 % (ref 0–0.9)
INR PPP: 1.14 (ref 0.87–1.13)
LV EJECT FRACT  99904: 65
LV EJECT FRACT MOD 2C 99903: 62.5
LV EJECT FRACT MOD 4C 99902: 56.39
LV EJECT FRACT MOD BP 99901: 57.44
LYMPHOCYTES # BLD AUTO: 0.7 K/UL (ref 1–4.8)
LYMPHOCYTES NFR BLD: 4.5 % (ref 22–41)
MCF BLD TEG: 59.9 MM (ref 52–69)
MCF.PLATELET INHIB BLD ROTEM: 19.3 MM (ref 15–32)
MCH RBC QN AUTO: 27.6 PG (ref 27–33)
MCH RBC QN AUTO: 28.9 PG (ref 27–33)
MCHC RBC AUTO-ENTMCNC: 32 G/DL (ref 32.2–35.5)
MCHC RBC AUTO-ENTMCNC: 32.6 G/DL (ref 32.2–35.5)
MCV RBC AUTO: 86.1 FL (ref 81.4–97.8)
MCV RBC AUTO: 88.8 FL (ref 81.4–97.8)
MONOCYTES # BLD AUTO: 1.12 K/UL (ref 0–0.85)
MONOCYTES NFR BLD AUTO: 7.2 % (ref 0–13.4)
NEUTROPHILS # BLD AUTO: 13.66 K/UL (ref 1.82–7.42)
NEUTROPHILS NFR BLD: 87.7 % (ref 44–72)
NRBC # BLD AUTO: 0 K/UL
NRBC BLD-RTO: 0 /100 WBC (ref 0–0.2)
NT-PROBNP SERPL IA-MCNC: 331 PG/ML (ref 0–125)
PA AA BLD-ACNC: 7.2 % (ref 0–11)
PA ADP BLD-ACNC: 27.3 % (ref 0–17)
PCO2 BLDA: 44 MMHG (ref 26–37)
PCO2 BLDA: 45.2 MMHG (ref 26–37)
PCO2 BLDA: 45.6 MMHG (ref 26–37)
PCO2 TEMP ADJ BLDA: 44 MMHG (ref 26–37)
PCO2 TEMP ADJ BLDA: 45.2 MMHG (ref 26–37)
PCO2 TEMP ADJ BLDA: 45.6 MMHG (ref 26–37)
PH BLDA: 7.28 [PH] (ref 7.4–7.5)
PH BLDA: 7.3 [PH] (ref 7.4–7.5)
PH BLDA: 7.31 [PH] (ref 7.4–7.5)
PH TEMP ADJ BLDA: 7.28 [PH] (ref 7.4–7.5)
PH TEMP ADJ BLDA: 7.3 [PH] (ref 7.4–7.5)
PH TEMP ADJ BLDA: 7.31 [PH] (ref 7.4–7.5)
PLATELET # BLD AUTO: 144 K/UL (ref 164–446)
PLATELET # BLD AUTO: 145 K/UL (ref 164–446)
PMV BLD AUTO: 10.4 FL (ref 9–12.9)
PMV BLD AUTO: 11 FL (ref 9–12.9)
PO2 BLDA: 134 MMHG (ref 64–87)
PO2 BLDA: 279 MMHG (ref 64–87)
PO2 BLDA: 306 MMHG (ref 64–87)
PO2 TEMP ADJ BLDA: 134 MMHG (ref 64–87)
PO2 TEMP ADJ BLDA: 279 MMHG (ref 64–87)
PO2 TEMP ADJ BLDA: 306 MMHG (ref 64–87)
POTASSIUM BLD-SCNC: 3.8 MMOL/L (ref 3.6–5.5)
POTASSIUM BLD-SCNC: 3.8 MMOL/L (ref 3.6–5.5)
POTASSIUM BLD-SCNC: 4.5 MMOL/L (ref 3.6–5.5)
POTASSIUM SERPL-SCNC: 4.1 MMOL/L (ref 3.6–5.5)
PRODUCT TYPE UPROD: NORMAL
PRODUCT TYPE UPROD: NORMAL
PROT SERPL-MCNC: 4.5 G/DL (ref 6–8.2)
PROTHROMBIN TIME: 14.7 SEC (ref 12–14.6)
RBC # BLD AUTO: 3.39 M/UL (ref 4.2–5.4)
RBC # BLD AUTO: 3.88 M/UL (ref 4.2–5.4)
RH BLD: NORMAL
SAO2 % BLDA: 100 % (ref 93–99)
SAO2 % BLDA: 100 % (ref 93–99)
SAO2 % BLDA: 99 % (ref 93–99)
SODIUM BLD-SCNC: 136 MMOL/L (ref 135–145)
SODIUM BLD-SCNC: 137 MMOL/L (ref 135–145)
SODIUM BLD-SCNC: 137 MMOL/L (ref 135–145)
SODIUM SERPL-SCNC: 137 MMOL/L (ref 135–145)
SPECIMEN DRAWN FROM PATIENT: ABNORMAL
TEG ALGORITHM TGALG: ABNORMAL
UNIT STATUS USTAT: NORMAL
UNIT STATUS USTAT: NORMAL
WBC # BLD AUTO: 11 K/UL (ref 4.8–10.8)
WBC # BLD AUTO: 15.6 K/UL (ref 4.8–10.8)

## 2024-07-15 PROCEDURE — 160031 HCHG SURGERY MINUTES - 1ST 30 MINS LEVEL 5: Performed by: INTERNAL MEDICINE

## 2024-07-15 PROCEDURE — 700105 HCHG RX REV CODE 258: Performed by: INTERNAL MEDICINE

## 2024-07-15 PROCEDURE — 86850 RBC ANTIBODY SCREEN: CPT

## 2024-07-15 PROCEDURE — 160042 HCHG SURGERY MINUTES - EA ADDL 1 MIN LEVEL 5: Performed by: INTERNAL MEDICINE

## 2024-07-15 PROCEDURE — C1894 INTRO/SHEATH, NON-LASER: HCPCS | Performed by: INTERNAL MEDICINE

## 2024-07-15 PROCEDURE — C1887 CATHETER, GUIDING: HCPCS | Performed by: INTERNAL MEDICINE

## 2024-07-15 PROCEDURE — 85014 HEMATOCRIT: CPT

## 2024-07-15 PROCEDURE — C1751 CATH, INF, PER/CENT/MIDLINE: HCPCS | Performed by: INTERNAL MEDICINE

## 2024-07-15 PROCEDURE — 36415 COLL VENOUS BLD VENIPUNCTURE: CPT

## 2024-07-15 PROCEDURE — 33262 RMVL& REPLC PULSE GEN 1 LEAD: CPT | Mod: 62,Q0 | Performed by: THORACIC SURGERY (CARDIOTHORACIC VASCULAR SURGERY)

## 2024-07-15 PROCEDURE — 93308 TTE F-UP OR LMTD: CPT | Mod: 26 | Performed by: INTERNAL MEDICINE

## 2024-07-15 PROCEDURE — 700105 HCHG RX REV CODE 258

## 2024-07-15 PROCEDURE — 82330 ASSAY OF CALCIUM: CPT

## 2024-07-15 PROCEDURE — 99291 CRITICAL CARE FIRST HOUR: CPT | Performed by: STUDENT IN AN ORGANIZED HEALTH CARE EDUCATION/TRAINING PROGRAM

## 2024-07-15 PROCEDURE — 700105 HCHG RX REV CODE 258: Performed by: ANESTHESIOLOGY

## 2024-07-15 PROCEDURE — 93010 ELECTROCARDIOGRAM REPORT: CPT | Performed by: INTERNAL MEDICINE

## 2024-07-15 PROCEDURE — 85025 COMPLETE CBC W/AUTO DIFF WBC: CPT

## 2024-07-15 PROCEDURE — 85610 PROTHROMBIN TIME: CPT

## 2024-07-15 PROCEDURE — 86901 BLOOD TYPING SEROLOGIC RH(D): CPT

## 2024-07-15 PROCEDURE — 700101 HCHG RX REV CODE 250: Performed by: ANESTHESIOLOGY

## 2024-07-15 PROCEDURE — 35286 RPR BLVSL GRF OTH/TH VN LXTR: CPT | Mod: AS,RT | Performed by: PHYSICIAN ASSISTANT

## 2024-07-15 PROCEDURE — C1769 GUIDE WIRE: HCPCS | Performed by: INTERNAL MEDICINE

## 2024-07-15 PROCEDURE — 160048 HCHG OR STATISTICAL LEVEL 1-5: Performed by: INTERNAL MEDICINE

## 2024-07-15 PROCEDURE — 93355 ECHO TRANSESOPHAGEAL (TEE): CPT

## 2024-07-15 PROCEDURE — 35286 RPR BLVSL GRF OTH/TH VN LXTR: CPT | Mod: RT | Performed by: SURGERY

## 2024-07-15 PROCEDURE — C1725 CATH, TRANSLUMIN NON-LASER: HCPCS | Performed by: INTERNAL MEDICINE

## 2024-07-15 PROCEDURE — 160009 HCHG ANES TIME/MIN: Performed by: INTERNAL MEDICINE

## 2024-07-15 PROCEDURE — C1883 ADAPT/EXT, PACING/NEURO LEAD: HCPCS | Performed by: INTERNAL MEDICINE

## 2024-07-15 PROCEDURE — 85027 COMPLETE CBC AUTOMATED: CPT

## 2024-07-15 PROCEDURE — 84295 ASSAY OF SERUM SODIUM: CPT | Mod: 91

## 2024-07-15 PROCEDURE — 700111 HCHG RX REV CODE 636 W/ 250 OVERRIDE (IP): Performed by: STUDENT IN AN ORGANIZED HEALTH CARE EDUCATION/TRAINING PROGRAM

## 2024-07-15 PROCEDURE — 160035 HCHG PACU - 1ST 60 MINS PHASE I: Performed by: INTERNAL MEDICINE

## 2024-07-15 PROCEDURE — C1760 CLOSURE DEV, VASC: HCPCS | Performed by: INTERNAL MEDICINE

## 2024-07-15 PROCEDURE — 84132 ASSAY OF SERUM POTASSIUM: CPT | Mod: 91

## 2024-07-15 PROCEDURE — 160036 HCHG PACU - EA ADDL 30 MINS PHASE I: Performed by: INTERNAL MEDICINE

## 2024-07-15 PROCEDURE — 93325 DOPPLER ECHO COLOR FLOW MAPG: CPT

## 2024-07-15 PROCEDURE — 160002 HCHG RECOVERY MINUTES (STAT): Performed by: INTERNAL MEDICINE

## 2024-07-15 PROCEDURE — 85347 COAGULATION TIME ACTIVATED: CPT

## 2024-07-15 PROCEDURE — 85347 COAGULATION TIME ACTIVATED: CPT | Mod: 91

## 2024-07-15 PROCEDURE — 86923 COMPATIBILITY TEST ELECTRIC: CPT | Mod: 91

## 2024-07-15 PROCEDURE — 700105 HCHG RX REV CODE 258: Performed by: STUDENT IN AN ORGANIZED HEALTH CARE EDUCATION/TRAINING PROGRAM

## 2024-07-15 PROCEDURE — 80053 COMPREHEN METABOLIC PANEL: CPT

## 2024-07-15 PROCEDURE — 770022 HCHG ROOM/CARE - ICU (200)

## 2024-07-15 PROCEDURE — 700117 HCHG RX CONTRAST REV CODE 255: Performed by: INTERNAL MEDICINE

## 2024-07-15 PROCEDURE — 93325 DOPPLER ECHO COLOR FLOW MAPG: CPT | Mod: 26 | Performed by: INTERNAL MEDICINE

## 2024-07-15 PROCEDURE — 700111 HCHG RX REV CODE 636 W/ 250 OVERRIDE (IP): Performed by: INTERNAL MEDICINE

## 2024-07-15 PROCEDURE — 93005 ELECTROCARDIOGRAM TRACING: CPT

## 2024-07-15 PROCEDURE — 71045 X-RAY EXAM CHEST 1 VIEW: CPT

## 2024-07-15 PROCEDURE — 700111 HCHG RX REV CODE 636 W/ 250 OVERRIDE (IP): Performed by: ANESTHESIOLOGY

## 2024-07-15 PROCEDURE — 700101 HCHG RX REV CODE 250

## 2024-07-15 PROCEDURE — 700102 HCHG RX REV CODE 250 W/ 637 OVERRIDE(OP): Mod: JZ

## 2024-07-15 PROCEDURE — A9270 NON-COVERED ITEM OR SERVICE: HCPCS | Mod: JZ

## 2024-07-15 PROCEDURE — 503001 HCHG PERFUSION: Performed by: INTERNAL MEDICINE

## 2024-07-15 PROCEDURE — 04CK0ZZ EXTIRPATION OF MATTER FROM RIGHT FEMORAL ARTERY, OPEN APPROACH: ICD-10-PCS | Performed by: SURGERY

## 2024-07-15 PROCEDURE — 86900 BLOOD TYPING SEROLOGIC ABO: CPT

## 2024-07-15 PROCEDURE — 83880 ASSAY OF NATRIURETIC PEPTIDE: CPT

## 2024-07-15 PROCEDURE — 04UK0KZ SUPPLEMENT RIGHT FEMORAL ARTERY WITH NONAUTOLOGOUS TISSUE SUBSTITUTE, OPEN APPROACH: ICD-10-PCS | Performed by: SURGERY

## 2024-07-15 PROCEDURE — C1781 MESH (IMPLANTABLE): HCPCS | Performed by: INTERNAL MEDICINE

## 2024-07-15 PROCEDURE — 85018 HEMOGLOBIN: CPT

## 2024-07-15 PROCEDURE — 700111 HCHG RX REV CODE 636 W/ 250 OVERRIDE (IP): Mod: JZ

## 2024-07-15 PROCEDURE — 82803 BLOOD GASES ANY COMBINATION: CPT

## 2024-07-15 PROCEDURE — 110372 HCHG SHELL REV 278: Performed by: INTERNAL MEDICINE

## 2024-07-15 PROCEDURE — 85576 BLOOD PLATELET AGGREGATION: CPT | Mod: 91

## 2024-07-15 PROCEDURE — 02RF38Z REPLACEMENT OF AORTIC VALVE WITH ZOOPLASTIC TISSUE, PERCUTANEOUS APPROACH: ICD-10-PCS | Performed by: THORACIC SURGERY (CARDIOTHORACIC VASCULAR SURGERY)

## 2024-07-15 PROCEDURE — 700101 HCHG RX REV CODE 250: Performed by: INTERNAL MEDICINE

## 2024-07-15 PROCEDURE — 85384 FIBRINOGEN ACTIVITY: CPT

## 2024-07-15 PROCEDURE — 33362 REPLACE AORTIC VALVE OPEN: CPT | Mod: 62,Q0 | Performed by: INTERNAL MEDICINE

## 2024-07-15 DEVICE — PATCH .8X8CM XENOSURE BIOLOGIC VASCULAR---ORDER IN MULTIPLES OF 5---: Type: IMPLANTABLE DEVICE | Site: GROIN | Status: FUNCTIONAL

## 2024-07-15 DEVICE — DEVICE CLSR 6FR HMST IMPL SLF STS PLUS ANGIOSEAL (10EA/CA): Type: IMPLANTABLE DEVICE | Site: GROIN | Status: FUNCTIONAL

## 2024-07-15 DEVICE — IMPLANTABLE DEVICE: Type: IMPLANTABLE DEVICE | Site: GROIN | Status: FUNCTIONAL

## 2024-07-15 RX ORDER — EPHEDRINE SULFATE 50 MG/ML
INJECTION, SOLUTION INTRAVENOUS
Status: COMPLETED
Start: 2024-07-15 | End: 2024-07-15

## 2024-07-15 RX ORDER — HYDROCHLOROTHIAZIDE 25 MG/1
6.25 TABLET ORAL
Status: DISCONTINUED | OUTPATIENT
Start: 2024-07-15 | End: 2024-07-23 | Stop reason: HOSPADM

## 2024-07-15 RX ORDER — ACETAMINOPHEN 325 MG/1
650 TABLET ORAL EVERY 6 HOURS PRN
Status: DISCONTINUED | OUTPATIENT
Start: 2024-07-15 | End: 2024-07-23 | Stop reason: HOSPADM

## 2024-07-15 RX ORDER — ROSUVASTATIN CALCIUM 20 MG/1
20 TABLET, COATED ORAL EVERY EVENING
Status: DISCONTINUED | OUTPATIENT
Start: 2024-07-15 | End: 2024-07-23 | Stop reason: HOSPADM

## 2024-07-15 RX ORDER — PROTAMINE SULFATE 10 MG/ML
INJECTION, SOLUTION INTRAVENOUS PRN
Status: DISCONTINUED | OUTPATIENT
Start: 2024-07-15 | End: 2024-07-15 | Stop reason: SURG

## 2024-07-15 RX ORDER — HYDROMORPHONE HYDROCHLORIDE 1 MG/ML
0.2 INJECTION, SOLUTION INTRAMUSCULAR; INTRAVENOUS; SUBCUTANEOUS
Status: DISCONTINUED | OUTPATIENT
Start: 2024-07-15 | End: 2024-07-15 | Stop reason: HOSPADM

## 2024-07-15 RX ORDER — POTASSIUM CHLORIDE 1500 MG/1
20 TABLET, EXTENDED RELEASE ORAL DAILY
Status: DISCONTINUED | OUTPATIENT
Start: 2024-07-15 | End: 2024-07-15

## 2024-07-15 RX ORDER — LIDOCAINE HYDROCHLORIDE 20 MG/ML
INJECTION, SOLUTION EPIDURAL; INFILTRATION; INTRACAUDAL; PERINEURAL PRN
Status: DISCONTINUED | OUTPATIENT
Start: 2024-07-15 | End: 2024-07-15 | Stop reason: SURG

## 2024-07-15 RX ORDER — IPRATROPIUM BROMIDE AND ALBUTEROL SULFATE 2.5; .5 MG/3ML; MG/3ML
3 SOLUTION RESPIRATORY (INHALATION)
Status: DISCONTINUED | OUTPATIENT
Start: 2024-07-15 | End: 2024-07-15 | Stop reason: HOSPADM

## 2024-07-15 RX ORDER — SODIUM CHLORIDE 9 MG/ML
INJECTION, SOLUTION INTRAVENOUS CONTINUOUS
Status: ACTIVE | OUTPATIENT
Start: 2024-07-15 | End: 2024-07-15

## 2024-07-15 RX ORDER — AMOXICILLIN 250 MG
1 CAPSULE ORAL
Status: DISCONTINUED | OUTPATIENT
Start: 2024-07-15 | End: 2024-07-23 | Stop reason: HOSPADM

## 2024-07-15 RX ORDER — ONDANSETRON 2 MG/ML
4 INJECTION INTRAMUSCULAR; INTRAVENOUS EVERY 4 HOURS PRN
Status: DISCONTINUED | OUTPATIENT
Start: 2024-07-15 | End: 2024-07-23 | Stop reason: HOSPADM

## 2024-07-15 RX ORDER — ANASTROZOLE 1 MG/1
1 TABLET ORAL EVERY MORNING
Status: DISCONTINUED | OUTPATIENT
Start: 2024-07-16 | End: 2024-07-23 | Stop reason: HOSPADM

## 2024-07-15 RX ORDER — POLYETHYLENE GLYCOL 3350 17 G/17G
1 POWDER, FOR SOLUTION ORAL 2 TIMES DAILY PRN
Status: DISCONTINUED | OUTPATIENT
Start: 2024-07-15 | End: 2024-07-23 | Stop reason: HOSPADM

## 2024-07-15 RX ORDER — ONDANSETRON 2 MG/ML
4 INJECTION INTRAMUSCULAR; INTRAVENOUS
Status: COMPLETED | OUTPATIENT
Start: 2024-07-15 | End: 2024-07-15

## 2024-07-15 RX ORDER — SODIUM CHLORIDE, SODIUM LACTATE, POTASSIUM CHLORIDE, CALCIUM CHLORIDE 600; 310; 30; 20 MG/100ML; MG/100ML; MG/100ML; MG/100ML
INJECTION, SOLUTION INTRAVENOUS CONTINUOUS
Status: DISCONTINUED | OUTPATIENT
Start: 2024-07-15 | End: 2024-07-15 | Stop reason: HOSPADM

## 2024-07-15 RX ORDER — HYDRALAZINE HYDROCHLORIDE 20 MG/ML
10 INJECTION INTRAMUSCULAR; INTRAVENOUS
Status: DISCONTINUED | OUTPATIENT
Start: 2024-07-15 | End: 2024-07-23 | Stop reason: HOSPADM

## 2024-07-15 RX ORDER — DIPHENHYDRAMINE HCL 25 MG
25 TABLET ORAL NIGHTLY PRN
Status: DISCONTINUED | OUTPATIENT
Start: 2024-07-15 | End: 2024-07-15

## 2024-07-15 RX ORDER — HEPARIN SODIUM 1000 [USP'U]/ML
INJECTION, SOLUTION INTRAVENOUS; SUBCUTANEOUS PRN
Status: DISCONTINUED | OUTPATIENT
Start: 2024-07-15 | End: 2024-07-15 | Stop reason: SURG

## 2024-07-15 RX ORDER — FUROSEMIDE 10 MG/ML
20 INJECTION INTRAMUSCULAR; INTRAVENOUS
Status: DISCONTINUED | OUTPATIENT
Start: 2024-07-15 | End: 2024-07-16

## 2024-07-15 RX ORDER — FUROSEMIDE 10 MG/ML
20 INJECTION INTRAMUSCULAR; INTRAVENOUS
Status: DISCONTINUED | OUTPATIENT
Start: 2024-07-15 | End: 2024-07-15

## 2024-07-15 RX ORDER — METOPROLOL TARTRATE 1 MG/ML
1 INJECTION, SOLUTION INTRAVENOUS
Status: DISCONTINUED | OUTPATIENT
Start: 2024-07-15 | End: 2024-07-15 | Stop reason: HOSPADM

## 2024-07-15 RX ORDER — BUPIVACAINE HYDROCHLORIDE 2.5 MG/ML
INJECTION, SOLUTION EPIDURAL; INFILTRATION; INTRACAUDAL
Status: DISCONTINUED | OUTPATIENT
Start: 2024-07-15 | End: 2024-07-15 | Stop reason: HOSPADM

## 2024-07-15 RX ORDER — IBUPROFEN 200 MG
400 TABLET ORAL EVERY 6 HOURS PRN
Status: ON HOLD | COMMUNITY
End: 2024-07-23

## 2024-07-15 RX ORDER — SODIUM CHLORIDE, SODIUM LACTATE, POTASSIUM CHLORIDE, CALCIUM CHLORIDE 600; 310; 30; 20 MG/100ML; MG/100ML; MG/100ML; MG/100ML
INJECTION, SOLUTION INTRAVENOUS CONTINUOUS
Status: ACTIVE | OUTPATIENT
Start: 2024-07-15 | End: 2024-07-15

## 2024-07-15 RX ORDER — HYDROCODONE BITARTRATE AND ACETAMINOPHEN 7.5; 325 MG/15ML; MG/15ML
30 SOLUTION ORAL
Status: DISCONTINUED | OUTPATIENT
Start: 2024-07-15 | End: 2024-07-15 | Stop reason: HOSPADM

## 2024-07-15 RX ORDER — BISACODYL 10 MG
10 SUPPOSITORY, RECTAL RECTAL
Status: DISCONTINUED | OUTPATIENT
Start: 2024-07-15 | End: 2024-07-23 | Stop reason: HOSPADM

## 2024-07-15 RX ORDER — IPRATROPIUM BROMIDE AND ALBUTEROL SULFATE 2.5; .5 MG/3ML; MG/3ML
3 SOLUTION RESPIRATORY (INHALATION) EVERY 4 HOURS PRN
Status: DISCONTINUED | OUTPATIENT
Start: 2024-07-15 | End: 2024-07-23 | Stop reason: HOSPADM

## 2024-07-15 RX ORDER — VALSARTAN AND HYDROCHLOROTHIAZIDE 80; 12.5 MG/1; MG/1
1 TABLET, FILM COATED ORAL DAILY
Status: DISCONTINUED | OUTPATIENT
Start: 2024-07-15 | End: 2024-07-15

## 2024-07-15 RX ORDER — POTASSIUM CHLORIDE 1500 MG/1
20 TABLET, EXTENDED RELEASE ORAL DAILY
Status: DISCONTINUED | OUTPATIENT
Start: 2024-07-15 | End: 2024-07-16

## 2024-07-15 RX ORDER — ROCURONIUM BROMIDE 10 MG/ML
INJECTION, SOLUTION INTRAVENOUS PRN
Status: DISCONTINUED | OUTPATIENT
Start: 2024-07-15 | End: 2024-07-15 | Stop reason: SURG

## 2024-07-15 RX ORDER — VALSARTAN 80 MG/1
40 TABLET ORAL
Status: DISCONTINUED | OUTPATIENT
Start: 2024-07-15 | End: 2024-07-23 | Stop reason: HOSPADM

## 2024-07-15 RX ORDER — DIPHENHYDRAMINE HCL 25 MG
25 TABLET ORAL NIGHTLY PRN
Status: ON HOLD | COMMUNITY
End: 2024-08-01

## 2024-07-15 RX ORDER — PAROXETINE 10 MG/1
10 TABLET, FILM COATED ORAL DAILY
Status: DISCONTINUED | OUTPATIENT
Start: 2024-07-16 | End: 2024-07-23 | Stop reason: HOSPADM

## 2024-07-15 RX ORDER — ASPIRIN 81 MG/1
81 TABLET ORAL DAILY
Status: DISCONTINUED | OUTPATIENT
Start: 2024-07-15 | End: 2024-07-23 | Stop reason: HOSPADM

## 2024-07-15 RX ORDER — ONDANSETRON 2 MG/ML
INJECTION INTRAMUSCULAR; INTRAVENOUS PRN
Status: DISCONTINUED | OUTPATIENT
Start: 2024-07-15 | End: 2024-07-15 | Stop reason: SURG

## 2024-07-15 RX ORDER — CEFAZOLIN SODIUM 1 G/3ML
INJECTION, POWDER, FOR SOLUTION INTRAMUSCULAR; INTRAVENOUS PRN
Status: DISCONTINUED | OUTPATIENT
Start: 2024-07-15 | End: 2024-07-15 | Stop reason: SURG

## 2024-07-15 RX ORDER — HYDRALAZINE HYDROCHLORIDE 20 MG/ML
5 INJECTION INTRAMUSCULAR; INTRAVENOUS
Status: DISCONTINUED | OUTPATIENT
Start: 2024-07-15 | End: 2024-07-15 | Stop reason: HOSPADM

## 2024-07-15 RX ORDER — SODIUM CHLORIDE, SODIUM GLUCONATE, SODIUM ACETATE, POTASSIUM CHLORIDE AND MAGNESIUM CHLORIDE 526; 502; 368; 37; 30 MG/100ML; MG/100ML; MG/100ML; MG/100ML; MG/100ML
INJECTION, SOLUTION INTRAVENOUS
Status: DISCONTINUED | OUTPATIENT
Start: 2024-07-15 | End: 2024-07-15 | Stop reason: SURG

## 2024-07-15 RX ORDER — HALOPERIDOL 5 MG/ML
1 INJECTION INTRAMUSCULAR
Status: DISCONTINUED | OUTPATIENT
Start: 2024-07-15 | End: 2024-07-15 | Stop reason: HOSPADM

## 2024-07-15 RX ORDER — HYDROCODONE BITARTRATE AND ACETAMINOPHEN 7.5; 325 MG/15ML; MG/15ML
15 SOLUTION ORAL
Status: DISCONTINUED | OUTPATIENT
Start: 2024-07-15 | End: 2024-07-15 | Stop reason: HOSPADM

## 2024-07-15 RX ORDER — LIDOCAINE HYDROCHLORIDE 20 MG/ML
INJECTION, SOLUTION INFILTRATION; PERINEURAL
Status: DISCONTINUED | OUTPATIENT
Start: 2024-07-15 | End: 2024-07-15 | Stop reason: HOSPADM

## 2024-07-15 RX ORDER — AMOXICILLIN 250 MG
1 CAPSULE ORAL NIGHTLY
Status: DISCONTINUED | OUTPATIENT
Start: 2024-07-15 | End: 2024-07-23 | Stop reason: HOSPADM

## 2024-07-15 RX ORDER — DOCUSATE SODIUM 100 MG/1
100 CAPSULE, LIQUID FILLED ORAL 2 TIMES DAILY
Status: DISCONTINUED | OUTPATIENT
Start: 2024-07-15 | End: 2024-07-23 | Stop reason: HOSPADM

## 2024-07-15 RX ORDER — SPIRONOLACTONE 25 MG/1
25 TABLET ORAL
Status: DISCONTINUED | OUTPATIENT
Start: 2024-07-15 | End: 2024-07-23 | Stop reason: HOSPADM

## 2024-07-15 RX ORDER — DIPHENHYDRAMINE HYDROCHLORIDE 50 MG/ML
25 INJECTION INTRAMUSCULAR; INTRAVENOUS EVERY 6 HOURS PRN
Status: DISCONTINUED | OUTPATIENT
Start: 2024-07-15 | End: 2024-07-15

## 2024-07-15 RX ORDER — PHENYLEPHRINE HYDROCHLORIDE 10 MG/ML
INJECTION, SOLUTION INTRAMUSCULAR; INTRAVENOUS; SUBCUTANEOUS PRN
Status: DISCONTINUED | OUTPATIENT
Start: 2024-07-15 | End: 2024-07-15 | Stop reason: SURG

## 2024-07-15 RX ORDER — EPHEDRINE SULFATE 50 MG/ML
5 INJECTION, SOLUTION INTRAVENOUS
Status: DISCONTINUED | OUTPATIENT
Start: 2024-07-15 | End: 2024-07-15 | Stop reason: HOSPADM

## 2024-07-15 RX ORDER — PHENYLEPHRINE HCL IN 0.9% NACL 1 MG/10 ML
SYRINGE (ML) INTRAVENOUS
Status: DISCONTINUED
Start: 2024-07-15 | End: 2024-07-15

## 2024-07-15 RX ORDER — HYDROMORPHONE HYDROCHLORIDE 1 MG/ML
0.4 INJECTION, SOLUTION INTRAMUSCULAR; INTRAVENOUS; SUBCUTANEOUS
Status: DISCONTINUED | OUTPATIENT
Start: 2024-07-15 | End: 2024-07-15 | Stop reason: HOSPADM

## 2024-07-15 RX ORDER — HYDROMORPHONE HYDROCHLORIDE 1 MG/ML
0.1 INJECTION, SOLUTION INTRAMUSCULAR; INTRAVENOUS; SUBCUTANEOUS
Status: DISCONTINUED | OUTPATIENT
Start: 2024-07-15 | End: 2024-07-15 | Stop reason: HOSPADM

## 2024-07-15 RX ORDER — DIPHENHYDRAMINE HYDROCHLORIDE 50 MG/ML
12.5 INJECTION INTRAMUSCULAR; INTRAVENOUS
Status: DISCONTINUED | OUTPATIENT
Start: 2024-07-15 | End: 2024-07-15 | Stop reason: HOSPADM

## 2024-07-15 RX ADMIN — PROPOFOL 50 MG: 10 INJECTION, EMULSION INTRAVENOUS at 10:11

## 2024-07-15 RX ADMIN — HALOPERIDOL LACTATE 1 MG: 5 INJECTION, SOLUTION INTRAMUSCULAR at 11:21

## 2024-07-15 RX ADMIN — ONDANSETRON 4 MG: 2 INJECTION INTRAMUSCULAR; INTRAVENOUS at 10:10

## 2024-07-15 RX ADMIN — ACETAMINOPHEN 650 MG: 325 TABLET, FILM COATED ORAL at 23:13

## 2024-07-15 RX ADMIN — SUGAMMADEX 200 MG: 100 INJECTION, SOLUTION INTRAVENOUS at 10:10

## 2024-07-15 RX ADMIN — NOREPINEPHRINE BITARTRATE 0.05 MCG/KG/MIN: 1 INJECTION, SOLUTION, CONCENTRATE INTRAVENOUS at 08:36

## 2024-07-15 RX ADMIN — FENTANYL CITRATE 50 MCG: 50 INJECTION, SOLUTION INTRAMUSCULAR; INTRAVENOUS at 10:15

## 2024-07-15 RX ADMIN — PROTAMINE SULFATE 50 MG: 10 INJECTION, SOLUTION INTRAVENOUS at 08:34

## 2024-07-15 RX ADMIN — HEPARIN SODIUM 10000 UNITS: 1000 INJECTION, SOLUTION INTRAVENOUS; SUBCUTANEOUS at 08:51

## 2024-07-15 RX ADMIN — HEPARIN SODIUM 3000 UNITS: 1000 INJECTION, SOLUTION INTRAVENOUS; SUBCUTANEOUS at 09:36

## 2024-07-15 RX ADMIN — SODIUM CHLORIDE: 9 INJECTION, SOLUTION INTRAVENOUS at 14:09

## 2024-07-15 RX ADMIN — ROSUVASTATIN CALCIUM 20 MG: 20 TABLET, FILM COATED ORAL at 20:00

## 2024-07-15 RX ADMIN — LIDOCAINE HYDROCHLORIDE 50 MG: 20 INJECTION, SOLUTION EPIDURAL; INFILTRATION; INTRACAUDAL at 07:42

## 2024-07-15 RX ADMIN — SENNOSIDES AND DOCUSATE SODIUM 1 TABLET: 50; 8.6 TABLET ORAL at 21:00

## 2024-07-15 RX ADMIN — SODIUM CHLORIDE, POTASSIUM CHLORIDE, SODIUM LACTATE AND CALCIUM CHLORIDE: 600; 310; 30; 20 INJECTION, SOLUTION INTRAVENOUS at 06:46

## 2024-07-15 RX ADMIN — ROCURONIUM BROMIDE 50 MG: 50 INJECTION, SOLUTION INTRAVENOUS at 07:42

## 2024-07-15 RX ADMIN — HEPARIN SODIUM 8000 UNITS: 1000 INJECTION, SOLUTION INTRAVENOUS; SUBCUTANEOUS at 08:12

## 2024-07-15 RX ADMIN — PHENYLEPHRINE HYDROCHLORIDE 200 MCG: 10 INJECTION INTRAVENOUS at 09:31

## 2024-07-15 RX ADMIN — EPHEDRINE SULFATE 5 MG: 50 INJECTION, SOLUTION INTRAVENOUS at 11:06

## 2024-07-15 RX ADMIN — ASPIRIN 81 MG: 81 TABLET, COATED ORAL at 15:13

## 2024-07-15 RX ADMIN — DOCUSATE SODIUM 100 MG: 100 CAPSULE, LIQUID FILLED ORAL at 20:21

## 2024-07-15 RX ADMIN — Medication 5 MG: at 23:13

## 2024-07-15 RX ADMIN — SODIUM CHLORIDE, POTASSIUM CHLORIDE, SODIUM LACTATE AND CALCIUM CHLORIDE: 600; 310; 30; 20 INJECTION, SOLUTION INTRAVENOUS at 11:05

## 2024-07-15 RX ADMIN — ONDANSETRON 4 MG: 2 INJECTION INTRAMUSCULAR; INTRAVENOUS at 10:51

## 2024-07-15 RX ADMIN — PHENYLEPHRINE HYDROCHLORIDE 200 MCG: 10 INJECTION INTRAVENOUS at 08:23

## 2024-07-15 RX ADMIN — FUROSEMIDE 20 MG: 10 INJECTION, SOLUTION INTRAVENOUS at 15:14

## 2024-07-15 RX ADMIN — HEPARIN SODIUM 3000 UNITS: 1000 INJECTION, SOLUTION INTRAVENOUS; SUBCUTANEOUS at 08:22

## 2024-07-15 RX ADMIN — POTASSIUM CHLORIDE 20 MEQ: 1500 TABLET, EXTENDED RELEASE ORAL at 15:13

## 2024-07-15 RX ADMIN — PROPOFOL 50 MG: 10 INJECTION, EMULSION INTRAVENOUS at 07:45

## 2024-07-15 RX ADMIN — ROCURONIUM BROMIDE 20 MG: 50 INJECTION, SOLUTION INTRAVENOUS at 08:52

## 2024-07-15 RX ADMIN — PHENYLEPHRINE HYDROCHLORIDE 0.25 MCG/KG/MIN: 10 INJECTION INTRAVENOUS at 17:55

## 2024-07-15 RX ADMIN — PROPOFOL 150 MG: 10 INJECTION, EMULSION INTRAVENOUS at 07:42

## 2024-07-15 RX ADMIN — PROTAMINE SULFATE 50 MG: 10 INJECTION, SOLUTION INTRAVENOUS at 10:10

## 2024-07-15 RX ADMIN — SODIUM CHLORIDE, SODIUM GLUCONATE, SODIUM ACETATE, POTASSIUM CHLORIDE AND MAGNESIUM CHLORIDE: 526; 502; 368; 37; 30 INJECTION, SOLUTION INTRAVENOUS at 09:29

## 2024-07-15 RX ADMIN — SENNOSIDES AND DOCUSATE SODIUM 1 TABLET: 50; 8.6 TABLET ORAL at 20:21

## 2024-07-15 RX ADMIN — PHENYLEPHRINE HYDROCHLORIDE 200 MCG: 10 INJECTION INTRAVENOUS at 09:24

## 2024-07-15 RX ADMIN — PHENYLEPHRINE HYDROCHLORIDE 100 MCG: 10 INJECTION INTRAVENOUS at 08:10

## 2024-07-15 RX ADMIN — CEFAZOLIN 2 G: 1 INJECTION, POWDER, FOR SOLUTION INTRAMUSCULAR; INTRAVENOUS at 07:42

## 2024-07-15 RX ADMIN — ROCURONIUM BROMIDE 20 MG: 50 INJECTION, SOLUTION INTRAVENOUS at 09:38

## 2024-07-15 ASSESSMENT — ENCOUNTER SYMPTOMS
SORE THROAT: 0
SHORTNESS OF BREATH: 1
SPUTUM PRODUCTION: 0
NAUSEA: 0
MUSCULOSKELETAL NEGATIVE: 1
PALPITATIONS: 0
VOMITING: 0
ABDOMINAL PAIN: 0
FOCAL WEAKNESS: 0
HEADACHES: 0
FEVER: 0
CHILLS: 0
EYES NEGATIVE: 1

## 2024-07-15 ASSESSMENT — PAIN DESCRIPTION - PAIN TYPE
TYPE: ACUTE PAIN;SURGICAL PAIN
TYPE: SURGICAL PAIN
TYPE: ACUTE PAIN
TYPE: SURGICAL PAIN
TYPE: SURGICAL PAIN
TYPE: ACUTE PAIN
TYPE: SURGICAL PAIN;ACUTE PAIN
TYPE: SURGICAL PAIN
TYPE: ACUTE PAIN
TYPE: SURGICAL PAIN
TYPE: SURGICAL PAIN

## 2024-07-15 ASSESSMENT — FIBROSIS 4 INDEX: FIB4 SCORE: 1.54

## 2024-07-15 ASSESSMENT — PAIN SCALES - GENERAL: PAIN_LEVEL: 2

## 2024-07-15 NOTE — ASSESSMENT & PLAN NOTE
Successful implantation of a 23 Ivan JEANETTE S3  Aspirin  Advance diet as tolerated  Pulse checks due to femoral artery injury from [employer]

## 2024-07-15 NOTE — OP REPORT
"TRANSCATHETER AORTIC VALVE REPLACEMENT REPORT    Referring Provider: Sharan Tran M.D.    INTERVENTIONAL CARDIOLOGIST: Ciro Luis MD  CARDIAC SURGEON: Carla Oconnor MD  ANESTHESIOLOGIST:  Dr. Norberto Capps  Vascular Surgeon: Len Swan MD    ASSISTANT: None    IMPRESSIONS:  1. Successful implantation of a 23 Ivan Jarred S3 Ultra Resilia deployed at nominal volume +1 cc via the transfemoral approach  2. Acute decompensated diastolic heart failure with LVEDP of 19 mmHg  3. Procedure complicated by injury to the common femoral artery requiring surgical cutdown to repair.     Recommendations:  Aspirin 81 mg daily    Pre-procedure diagnosis: TAVR recommended by heart valve team. Stage D1 (symptomatic severe AS)  Post-procedure diagnosis: Same    Procedure performed  Pigtail placement/ascending aortography  Transvenous pacemaker  23 Ivan S3 Ultra Resilia  Unplanned cutdown on the right femoral artery    Procedure Description  1. Access:   A) Valve Sheath: Right femoral, 14F Ivan eSheath. Fluoroscopic guidance was utilized for femoral access Dynamic ultrasound was utilized to gain access.  B) Temporary pacemaker: 6 Macedonian Left femoral vein. Fluoroscopic guidance was utilized for femoral access Dynamic ultrasound was utilized to gain access.  C) Pigtail catheter: 5/6 Macedonian right radial artery Micropuncture technique was utilized following local anesthesia with lidocaine.  A radial cocktail containing verapamil and saline was administered in the radial artery sheath  D) Other: 6F LFA, micropuncture, dynamic US guidance.     2. Procedure Description:  A TVP and pigtail catheter were placed and appropriate pacer function and implantation angle confirmed. The preclose was deployed followed by dilation of the tract with an 8F sheath. A standard 0.035\" J wire was used to deliver an catheter to the ascending aorta and then exchange for a 0.035\" Lunderquist wire. Over this wire the Ivan E sheath " "advanced into the descending aorta. An AL1 was placed in the ascending aorta and the valve crossed with a 0.035\" strait wire. The catheter was advanced into the LV apex and wire exchanged for a 0.035\" Amplatz Super Stiff wire.  Balloon aortic valvuloplasty was performed with a 21 mm balloon. Next a 23 mm Ivan Jarred S3 Ultra Resilia was deployed under rapid pacing with nominal volume+1 and 7 kashif.  Echo showed trace PVL . Successful valve implantation confirmed by HEATHER. A pigtail catheter was used to perform left heart catheterization and LVEDP measured at 19 mmHg.  The pigtail and TVP were then removed. Protamine was administered and the Ivan sheath was removed from the body after reinsertion of the dilator. The perclose sutures were tightened but one of the two stitches broke and the other pulled out, bringing with it a piece of the arterial wall. An additional perclose was attempted but could not grab the artery. At this point an 8 sheath was reinserted, an AL1 used to deliver a amplatz super stiff wire to the thoracic aorta then the sheath was exchanged for a 14F check-tiffani sheath. There was still bleeding around the arteriotomy and manual pressure ensued while access was obtained in the contralateral groin. Heparin was administered with ACT reaching >250 sec. Next I performed cross over with an omni-flush catheter and using a stiff angled glide wire a 7x40 mustang balloon was used to perform balloon occlusion of the left common iliac artery. At this point Dr. Oconnor, followed by Dr. Swan cut down on the vessel. Please see their dictation for details.     3. Hemostasis:   A) TAVR Sheath:  Manual cutdown  B) TVP: Manual  C) Pigtail access: TR band  D) LFA: Angioseal    Technical Factors  1. Complications: None  2. Estimated Blood Loss: 500 cc  3. Specimens: None  4. Contrast Volume: 75 ml  5. Sedation: General Anesthesia  6. Echo: HEATHER    "

## 2024-07-15 NOTE — PROGRESS NOTES
Patient arrived to unit 1330.     4 Eyes Skin Assessment Completed by Carlos, CAMMY and CAMMY Redmond.    Head WDL  Ears WDL  Nose WDL  Mouth WDL  Neck WDL  Breast/Chest Redness/excoriation under breasts   Shoulder Blades WDL  Spine WDL  (R) Arm/Elbow/Hand WDL  (L) Arm/Elbow/Hand WDL  Abdomen WDL  Groin Incision, prevena to right groin, surgiseal to left groin  Scrotum/Coccyx/Buttocks WDL  (R) Leg WDL  (L) Leg WDL  (R) Heel/Foot/Toe Redness and Blanching  (L) Heel/Foot/Toe Redness and Blanching          Devices In Places ECG, Tele Box, Pulse Ox, Arterial Line, and Nasal Cannula      Interventions In Place Gray Ear Foams, Pillows, Q2 Turns, and Pressure Redistribution Mattress    Possible Skin Injury No    Pictures Uploaded Into Epic N/A  Wound Consult Placed N/A  RN Wound Prevention Protocol Ordered No

## 2024-07-15 NOTE — CONSULTS
Critical Care History & Physical    Date of consult: 07/15/24    Referring Physician  Ciro Luis M.D.    Reason for Consultation  TAVR  Femoral artery injury    History of Presenting Illness  84 y.o. female with a history of HOCM, 20-pack-year tobacco use (quit 30 years ago), COPD/asthma overlap on Trelegy (2L NC at night, last PFTs from 2023 with FEV1 96%, DLCO 95% and high-res CT with reticular and fibrotic opacifications with mild bronchiectasis in the right lower lobe).  Additionally has history of severe aortic stenosis and was brought into the hospital for TAVR on 7/15.    The TAVR itself went very well and she successfully had a 23 Ivan valve installed.  However the termination of the procedure was complicated by femoral artery injury with the Perclose device.  For this vascular surgery was consulted and performed a right femoral endarterectomy as well as a repair with a bovine pericardial patch.     She then developed hypotension and given her underlying HOCM was started on phenylephrine infusion in the PACU.  She will be admitted to the ICU for vasopressors and every hour neurovascular checks.    Code Status  Full Code    Review of Systems  Review of Systems   Constitutional:  Negative for chills, fever and malaise/fatigue.   HENT:  Negative for congestion and sore throat.    Eyes: Negative.    Respiratory:  Positive for shortness of breath. Negative for sputum production.    Cardiovascular:  Negative for chest pain and palpitations.   Gastrointestinal:  Negative for abdominal pain, nausea and vomiting.   Genitourinary: Negative.    Musculoskeletal: Negative.    Skin: Negative.    Neurological:  Negative for focal weakness and headaches.   All other systems reviewed and are negative.      Past Medical History   has a past medical history of Anesthesia, Arthritis, Asthma, Breast cancer (HCC), Breath shortness, Bronchitis (2011), Cancer (HCC) (12/2012), Cataract, Chronic cough, Cough, Delayed  emergence from general anesthesia, Dizziness (03/11/2013), Heart burn, Heart murmur, Hiatus hernia syndrome, High cholesterol, HTN (hypertension) (03/12/2013), Hypercholesterolemia, Hyperlipidemia (03/12/2013), Hypertension, Hypokalemia (03/12/2013), Indigestion, Mitral annular calcification, Pain, Pneumonia, Pre-diabetes, Psychiatric disorder, Renal disorder, Severe aortic stenosis (06/25/2024), Sleep apnea, Snoring, Sputum production, ULCERATIVE COLITIS (03/12/2013), Urinary incontinence, and Vertigo (03/11/2013).    She has no past medical history of Painful breathing or Wheezing.    Surgical History   has a past surgical history that includes us-needle core bx-breast panel (01/01/2013); lumpectomy (left); gyn surgery; gyn surgery; other; gastric sleeve laparoscopy (N/A, 05/18/2016); and cataract extraction with iol.    Family History  Reviewed and not pertinent    Social History   reports that she quit smoking about 38 years ago. Her smoking use included cigarettes. She started smoking about 58 years ago. She has a 20 pack-year smoking history. She has been exposed to tobacco smoke. She has never used smokeless tobacco. She reports current alcohol use of about 4.2 oz of alcohol per week. She reports that she does not use drugs.    Medications  Home Medications       Reviewed by Tam Quiñones (Pharmacy Tech) on 07/15/24 at 0709  Med List Status: Complete     Medication Last Dose Status   anastrozole (ARIMIDEX) 1 MG Tab 7/15/2024 Active   ibuprofen (MOTRIN) 200 MG Tab 2 WEEKS AGO Active   ipratropium-albuterol (DUONEB) 0.5-2.5 (3) MG/3ML nebulizer solution 7/14/2024 Active   MAGNESIUM PO 1 WEEK AGO Active   melatonin 5 mg Tab 7/12/2024 Active   Multiple Vitamins-Minerals (PRESERVISION AREDS 2 PO) 1 WEEK AGO Active   pantoprazole (PROTONIX) 40 MG Tablet Delayed Response 7/15/2024 Active   PARoxetine (PAXIL) 10 MG Tab 7/15/2024 Active   rosuvastatin (CRESTOR) 20 MG Tab 7/13/2024 Active   spironolactone  (ALDACTONE) 25 MG Tab 7/13/2024 Active   TRELEGY ELLIPTA 200-62.5-25 MCG/ACT inhaler 7/14/2024 Active   valsartan-hydrochlorothiazide (DIOVAN-HCT) 80-12.5 MG per tablet 7/12/2024 Active                  Audit from Redirected Encounters    **Home medications have not yet been reviewed for this encounter**         Allergies  Allergies   Allergen Reactions    Sulfa Drugs Rash and Swelling     Rash, joint swelling  TPE=8433    Codeine Vomiting and Nausea    Oxycodone Vomiting and Nausea     .    Vancomycin Itching         Vital Signs last 24 hours  Temp:  [36.4 °C (97.5 °F)-36.7 °C (98 °F)] 36.6 °C (97.8 °F)  Pulse:  [53-80] 53  Resp:  [12-20] 13  BP: ()/(40-86) 95/47  SpO2:  [90 %-98 %] 92 %      Physical Exam   Physical Exam  Vitals and nursing note reviewed. Exam conducted with a chaperone present.   Constitutional:       General: She is sleeping. She is not in acute distress.     Appearance: Normal appearance. She is overweight. She is not ill-appearing.   HENT:      Head: Normocephalic.      Mouth/Throat:      Mouth: Mucous membranes are moist.   Eyes:      Extraocular Movements: Extraocular movements intact.   Cardiovascular:      Rate and Rhythm: Normal rate and regular rhythm.      Pulses: Normal pulses.   Pulmonary:      Effort: Pulmonary effort is normal. No respiratory distress.   Abdominal:      General: There is no distension.      Palpations: Abdomen is soft.      Tenderness: There is no abdominal tenderness. There is no guarding or rebound.      Comments: RLQ wound vac   Musculoskeletal:         General: Normal range of motion.      Cervical back: Normal range of motion and neck supple.      Comments: RLE warm and dry   Skin:     General: Skin is warm and dry.      Capillary Refill: Capillary refill takes less than 2 seconds.   Neurological:      General: No focal deficit present.      Mental Status: She is easily aroused.           Fluids    Intake/Output Summary (Last 24 hours) at 7/15/2024  1326  Last data filed at 7/15/2024 1015  Gross per 24 hour   Intake 1035 ml   Output 650 ml   Net 385 ml         Laboratory  Recent Results (from the past 48 hour(s))   ABO Rh Confirm    Collection Time: 07/15/24  6:40 AM   Result Value Ref Range    ABO Rh Confirm A POS    POCT activated clotting time device results    Collection Time: 07/15/24  8:18 AM   Result Value Ref Range    Istat Activated Clotting Time 256 (H) 74 - 137 sec   POCT activated clotting time device results    Collection Time: 07/15/24  8:29 AM   Result Value Ref Range    Istat Activated Clotting Time 299 (H) 74 - 137 sec   POCT arterial blood gas device results    Collection Time: 07/15/24  8:42 AM   Result Value Ref Range    Ph 7.311 (L) 7.400 - 7.500    Pco2 45.6 (H) 26.0 - 37.0 mmHg    Po2 134 (H) 64 - 87 mmHg    Tco2 24 20 - 33 mmol/L    S02 99 93 - 99 %    Hco3 23.0 17.0 - 25.0 mmol/L    BE -3 -4 - 3 mmol/L    Body Temp 37.0 C degrees    Ph Temp Yolanda 7.311 (L) 7.400 - 7.500    Pco2 Temp Co 45.6 (H) 26.0 - 37.0 mmHg    Po2 Temp Cor 134 (H) 64 - 87 mmHg    Specimen Arterial    POCT sodium device results    Collection Time: 07/15/24  8:42 AM   Result Value Ref Range    Istat Sodium 137 135 - 145 mmol/L   POCT potassium device results    Collection Time: 07/15/24  8:42 AM   Result Value Ref Range    Istat Potassium 3.8 3.6 - 5.5 mmol/L   POCT ionized CA device results    Collection Time: 07/15/24  8:42 AM   Result Value Ref Range    Istat Ionized Calcium 1.35 (H) 1.10 - 1.30 mmol/L   POCT hematocrit and hemoglobin device results    Collection Time: 07/15/24  8:42 AM   Result Value Ref Range    Istat Hematocrit 34 (L) 37 - 47 %    Istat Hemoglobin 11.6 (L) 12.0 - 16.0 g/dL   POCT activated clotting time device results    Collection Time: 07/15/24  8:58 AM   Result Value Ref Range    Istat Activated Clotting Time 281 (H) 74 - 137 sec   POCT activated clotting time device results    Collection Time: 07/15/24  9:32 AM   Result Value Ref Range     Istat Activated Clotting Time 250 (H) 74 - 137 sec   POCT arterial blood gas device results    Collection Time: 07/15/24  9:33 AM   Result Value Ref Range    Ph 7.300 (L) 7.400 - 7.500    Pco2 44.0 (H) 26.0 - 37.0 mmHg    Po2 279 (H) 64 - 87 mmHg    Tco2 23 20 - 33 mmol/L    S02 100 (H) 93 - 99 %    Hco3 21.7 17.0 - 25.0 mmol/L    BE -5 (L) -4 - 3 mmol/L    Body Temp 37.0 C degrees    Ph Temp Yolanda 7.300 (L) 7.400 - 7.500    Pco2 Temp Co 44.0 (H) 26.0 - 37.0 mmHg    Po2 Temp Cor 279 (H) 64 - 87 mmHg    Specimen Arterial    POCT sodium device results    Collection Time: 07/15/24  9:33 AM   Result Value Ref Range    Istat Sodium 136 135 - 145 mmol/L   POCT potassium device results    Collection Time: 07/15/24  9:33 AM   Result Value Ref Range    Istat Potassium 4.5 3.6 - 5.5 mmol/L   POCT ionized CA device results    Collection Time: 07/15/24  9:33 AM   Result Value Ref Range    Istat Ionized Calcium 1.32 (H) 1.10 - 1.30 mmol/L   POCT hematocrit and hemoglobin device results    Collection Time: 07/15/24  9:33 AM   Result Value Ref Range    Istat Hematocrit 33 (L) 37 - 47 %    Istat Hemoglobin 11.2 (L) 12.0 - 16.0 g/dL   POCT activated clotting time device results    Collection Time: 07/15/24  9:43 AM   Result Value Ref Range    Istat Activated Clotting Time 281 (H) 74 - 137 sec   POCT activated clotting time device results    Collection Time: 07/15/24 10:18 AM   Result Value Ref Range    Istat Activated Clotting Time 128 74 - 137 sec   POCT arterial blood gas device results    Collection Time: 07/15/24 10:19 AM   Result Value Ref Range    Ph 7.279 (LL) 7.400 - 7.500    Pco2 45.2 (H) 26.0 - 37.0 mmHg    Po2 306 (H) 64 - 87 mmHg    Tco2 23 20 - 33 mmol/L    S02 100 (H) 93 - 99 %    Hco3 21.2 17.0 - 25.0 mmol/L    BE -5 (L) -4 - 3 mmol/L    Body Temp 37.0 C degrees    Ph Temp Yolanda 7.279 (LL) 7.400 - 7.500    Pco2 Temp Co 45.2 (H) 26.0 - 37.0 mmHg    Po2 Temp Cor 306 (H) 64 - 87 mmHg    Specimen Arterial    POCT sodium  device results    Collection Time: 07/15/24 10:19 AM   Result Value Ref Range    Istat Sodium 137 135 - 145 mmol/L   POCT potassium device results    Collection Time: 07/15/24 10:19 AM   Result Value Ref Range    Istat Potassium 3.8 3.6 - 5.5 mmol/L   POCT ionized CA device results    Collection Time: 07/15/24 10:19 AM   Result Value Ref Range    Istat Ionized Calcium 1.25 1.10 - 1.30 mmol/L   POCT hematocrit and hemoglobin device results    Collection Time: 07/15/24 10:19 AM   Result Value Ref Range    Istat Hematocrit 32 (L) 37 - 47 %    Istat Hemoglobin 10.9 (L) 12.0 - 16.0 g/dL   CBC Without Differential    Collection Time: 07/15/24 11:00 AM   Result Value Ref Range    WBC 11.0 (H) 4.8 - 10.8 K/uL    RBC 3.39 (L) 4.20 - 5.40 M/uL    Hemoglobin 9.8 (L) 12.0 - 16.0 g/dL    Hematocrit 30.1 (L) 37.0 - 47.0 %    MCV 88.8 81.4 - 97.8 fL    MCH 28.9 27.0 - 33.0 pg    MCHC 32.6 32.2 - 35.5 g/dL    RDW 46.9 35.9 - 50.0 fL    Platelet Count 144 (L) 164 - 446 K/uL    MPV 10.4 9.0 - 12.9 fL   Comp Metabolic Panel (CMP)    Collection Time: 07/15/24 11:00 AM   Result Value Ref Range    Sodium 137 135 - 145 mmol/L    Potassium 4.1 3.6 - 5.5 mmol/L    Chloride 107 96 - 112 mmol/L    Co2 20 20 - 33 mmol/L    Anion Gap 10.0 7.0 - 16.0    Glucose 190 (H) 65 - 99 mg/dL    Bun 19 8 - 22 mg/dL    Creatinine 0.93 0.50 - 1.40 mg/dL    Calcium 8.2 (L) 8.5 - 10.5 mg/dL    Correct Calcium 9.3 8.5 - 10.5 mg/dL    AST(SGOT) 14 12 - 45 U/L    ALT(SGPT) 9 2 - 50 U/L    Alkaline Phosphatase 44 30 - 99 U/L    Total Bilirubin 0.3 0.1 - 1.5 mg/dL    Albumin 2.6 (L) 3.2 - 4.9 g/dL    Total Protein 4.5 (L) 6.0 - 8.2 g/dL    Globulin 1.9 1.9 - 3.5 g/dL    A-G Ratio 1.4 g/dL   proBrain Natriuretic Peptide, NT    Collection Time: 07/15/24 11:00 AM   Result Value Ref Range    NT-proBNP 331 (H) 0 - 125 pg/mL   ESTIMATED GFR    Collection Time: 07/15/24 11:00 AM   Result Value Ref Range    GFR (CKD-EPI) 61 >60 mL/min/1.73 m 2   EKG STAT POST-OP     Collection Time: 07/15/24 11:10 AM   Result Value Ref Range    Report       [facility]    Test Date:  2024-07-15  Pt Name:    ROSIO SHAFER                  Department: [facility]  MRN:        [id number]                      Room:       [facility]  Gender:     Female                       Technician: MILVIA  :        [id number]                   Requested By:MARTY JARAMILLO  Order #:    [id number]                    Reading MD: Dustin Pennington MD    Measurements  Intervals                                Axis  Rate:       50                           P:          75  VT:         165                          QRS:        20  QRSD:       88                           T:          41  QT:         483  QTc:        441    Interpretive Statements  Sinus bradycardia  ST ELEV, PROBABLE NORMAL EARLY REPOL PATTERN    Electronically Signed On 07- 11:51:28 PDT by Dustin Pennington MD     IONIZED CALCIUM    Collection Time: 07/15/24 12:30 PM   Result Value Ref Range    Ionized Calcium 1.2 1.1 - 1.3 mmol/L   HEMOGLOBIN AND HEMATOCRIT    Collection Time: 07/15/24 12:30 PM   Result Value Ref Range    Hemoglobin 9.8 (L) 12.0 - 16.0 g/dL    Hematocrit 30.6 (L) 37.0 - 47.0 %         Imaging  EC-HEATHER W/O CONT         DX-CHEST-PORTABLE (1 VIEW)   Final Result      1.  Low lung volumes with bibasilar hypoventilatory changes, underlying infection is possible.   2.  Stable mild enlargement of the cardiomediastinal silhouette.      EC-ECHOCARDIOGRAM LTD W/O CONT    (Results Pending)   CTA ABDOMEN PELVIS W & W/O POST PROCESS    (Results Pending)         Assessment/Plan  * S/P TAVR (transcatheter aortic valve replacement)  Assessment & Plan  Successful implantation of a 23 Ivan JEANETTE S3    Aspirin  Advance diet  Pulse checks every hour due to femoral artery injury from Perclose    Postprocedural hypotension  Assessment & Plan  I am actively titrating vasopressors for MAP >60  Phenylephrine is drug of choice given reports of HOCM    Repeat limited  ECHO to evaluate for pericardial effusion  Trend H/H given femoral artery injury    Common femoral artery injury, right, initial encounter  Assessment & Plan  Fem artery torn by ramakrishna hilliard    Vascular Surgery performed femoral endarterectomy and repair with bovine patch    Q1 hour pulse checks today    Acute diastolic HF (heart failure) (HCC)  Assessment & Plan  Continue home diuretics  LVEDP was 19 at the end of the case    Asthma-COPD overlap syndrome (HCC)- (present on admission)  Assessment & Plan  Not in acute exacerbation  Continue home Trelegy  PRN nebulizers    Primary hypertension- (present on admission)  Assessment & Plan   am resuming her home valsartan/HCTZ  She takes half the dose at home, so I will order each medication individually and cut the dose in half    Stage 3b chronic kidney disease- (present on admission)  Assessment & Plan  Strict I/Os  Renally dosed medications  Avoid nephrotoxic agents as able  Trend     Dyslipidemia- (present on admission)  Assessment & Plan  Statin    Severe aortic stenosis- (present on admission)  Assessment & Plan  Now s/p TAVR as above        DVT prophylaxis: Hold today, restart per cardiology  PUD prophylaxis: NA  Glycemic control: SSI  Nutrition: Advance as tolerated  Lines: Left radial arterial line  Kennedy: NA     Discussed patient condition and risk of morbidity and/or mortality with Family, RN, RT, Pharmacy, Code status disscussed, Charge nurse / hot rounds, Patient, and cardiology and vascular surgery.      The patient remains critically ill.  She requires q1 hour neurovascular checks and I am actively titrating vasopressors.  Critical care time = 40 minutes in directly providing and coordinating critical care and extensive data review.  No time overlap and excludes procedures.

## 2024-07-15 NOTE — OP REPORT
Full Operative Report  Date of Service: 07/15/24    PreOp Diagnosis: severe symptomatic aortic stenosis, mild obstructive CAD, HTN, HLD, obesity, asthma and COPD with 2L O2 at night, ROBYN, 25 pack-years smoking cigarettes, peripheral arterial disease, CKD 3B, breast cancer history, gastric sleeve history      PostOp Diagnosis: same      Procedure(s):  TRANSCATHETER AORTIC VALVE REPLACEMENT - Wound Class: Clean  ECHOCARDIOGRAM, TRANSESOPHAGEAL, INTRAOPERATIVE - Wound Class: Clean Contaminated    Surgeon(s):  MAYA Shabazz M.D. Katherine Jones, M.D. Benjamin Ebner, M.D.    Anesthesiologist/Type of Anesthesia:  Anesthesiologist: Norberto Capps M.D.  Perfusionist: Taya Alegria/General    Surgical Staff:  Circulator: Samy Maurer R.N.  Scrub Person: Rajani Sesay  First Assist: Gay Helton P.A.-C.; ESTHER Castro  Radiology Technologist: Parrish Garza; Calin Patel  Cardiology Nurse: Dayna Byrd R.N.    Specimens removed if any:  * No specimens in log *    Estimated Blood Loss: 250cc    Findings: no signficant perivalv leak, injury to right common femoral artery requiring intraoperative vascular surgery consultation    Complications: right common femoral artery injury for access    Details:  The patient was brought to the operating room placed on the operating room table in the supine position.  After successful induction of general anesthesia endotracheal intubation, the patient was prepped and draped in the usual sterile fashion.  A left femoral venous access was obtained and a temporary transvenous pacemaker was placed in the right ventricle.  Additionally, ultrasound-guided right radial arterial and right femoral arterial access was obtained.  A pigtail catheter was placed in the right coronary sinus.  The deployment angle was determined.  A 1 cm incision was made the right groin and 2 Perclose sutures were deployed.  A 14 Ugandan eSheath was then  placed in the right common femoral artery and its tip was positioned in the abdominal aorta.  The aortic valve was crossed with a wire. Pre-valve balloon dilatation was performed. A deployment catheter with a 23 mm S3 Ivan pericardial valve at its tip was positioned across the aortic valve annulus.  The implantation balloon was inflated to a peak pressure of 7 kashif at nominal +1cc volume injected.  The balloon was deflated and the deployment catheter was pulled back.  Intraoperative transesophageal echocardiography showed no significiant paravalvular leak.  Wires and catheters were removed.   The remainder of the operation will be dictated by Dr. Luis. At this point a right common femoral artery injury was suspected due to dislodgement of the Perclose devices and bleeding around the sheath. Dr. Swan kindly answered our call and recommended to start cutdown and exposure. This was initiated by me with Dr. Swan taking over on arrival. Please see his dictation for remainder of vascular repair.       7/15/2024 9:54 AM Carla Oconnor M.D.

## 2024-07-15 NOTE — ASSESSMENT & PLAN NOTE
I am actively titrating vasopressors for MAP >60  Phenylephrine is drug of choice given reports of HOCM  Repeat limited ECHO with small pericardial effusion, normal EF and well-functioning valve  Trend H/H given femoral artery injury  Continue midodrine

## 2024-07-15 NOTE — OR NURSING
1035 Patient arrived in PACU, VSS. Right groin has prevena wound vac covering sheath site. Right pedal pulse heard by doppler. Left groin closed with angioseal and covered with tegaderm, CDI with no hematoma. Left pedal pulse heard by doppler. Right wrist TR band present with 13 ml of air in balloon. Good CMS to right hand. Patient developed hypotension. Dr. Capps and Dolly HERRERA at bedside, administered meds per MAR. Temporary resolve of hypotension with medication.     1130 Patient had 1 episode of emesis, medication given.    1155 Notified Dr. Capps and SHARON Alberto, again of continued hypotension.     1304 Pt transferred to CICU T616 for higher level of care with RN, CNA, monitor, full oxygen tank and belongings.

## 2024-07-15 NOTE — ASSESSMENT & PLAN NOTE
Fem artery torn by ramakrishna hilliard  Vascular Surgery performed femoral endarterectomy and repair with bovine patch  pulse checks  Site looks good, pulse checks fine

## 2024-07-15 NOTE — OP REPORT
DATE OF SERVICE:  07/15/2024     SURGEON:  Sedrick Swan MD     ASSISTANT:  Gay Helton PA-C     ANESTHESIOLOGIST:  Norberto Capps MD     TYPE OF ANESTHESIA:  General anesthesia.     PREOPERATIVE DIAGNOSIS:  Right common femoral artery injury.     POSTOPERATIVE DIAGNOSIS:  Right common femoral artery injury.     PROCEDURE:  Right common femoral endarterectomy and repair using bovine   patch angioplasty.     INDICATIONS FOR PROCEDURE:  This is a pleasant 84-year-old female who   underwent successful TAVR.  At the conclusion of the case, attempt was made to   remove the sheath, but this was met with bleeding problem.  I was therefore   consulted.     DESCRIPTION OF PROCEDURE:  Dr. Oconnor from cardiac surgery already exposed the   groin along with the common femoral artery.  I performed vascular control of the proximal   common femoral artery using vascular clamps.  The sheath and the   wire were removed.  Vascular control of the right profunda femoral, and   superficial femoral arteries were obtained with vessel loops and vascular   clamps.  The anterior aspect of the right common femoral artery was found to be avulsed  approximately 2.5 cm in length.  There was calcified posterior plaque seen.    Endarterectomy of the common femoral artery was performed.  The intima of the   proximal profunda femoral artery as well as superficial femoral artery were   tacked with interrupted 7-0 Prolene sutures.  The arterial lumen was thoroughly   irrigated and was found to be free of any debris.     A sterile bovine pericardial patch was brought into the operative field.  A   patch angioplasty was performed from the proximal common femoral artery   extended into the proximal superficial femoral artery using running 6-0   Prolene sutures.  Prior to completing the patch angioplasty, backbleeding and   flushing were obtained.  The arterial lumen was also thoroughly irrigated with   heparinized saline solution.  The patch  angioplasty was completed.  Flow was   restored first into the profunda femoral artery and then into the superficial   femoral artery.  A sterile Doppler probe was brought into the operative field.    Excellent Doppler flow signals were obtained over the common femoral artery   above the repair site as well as over the superficial femoral and profunda   femoral arteries.     The wound was irrigated and was found to have excellent hemostasis.  The   Wound was closed subcutaneously in layers with interrupted and running 3-0   Vicryl sutures and subcuticularly with 4-0 Monocryl suture.  The wound was   cleaned and a small Prevena dressing was applied over the wound.     ESTIMATED BLOOD LOSS:  For my part was 5 mL.     COMPLICATIONS: None.     At the end of the procedure, the patient has strong brisk Doppler flow signals   over the right pedal arteries.        ______________________________  Sedrick Swan MD    TQBRISEIDA/ANSLEYN    DD:  07/15/2024 11:53  DT:  07/15/2024 12:19    Job#:  [id number]

## 2024-07-15 NOTE — PROGRESS NOTES
Medication history reviewed with PT at bedside    Med rec is complete per Pt reporting    Allergies reviewed.     Patient denies any outpatient antibiotics in the last 30 days.     Patient is not taking anticoagulants.    Preferred pharmacy for this visit - [facility] ([phone])

## 2024-07-16 ENCOUNTER — HOSPITAL ENCOUNTER (OUTPATIENT)
Dept: RADIOLOGY | Facility: MEDICAL CENTER | Age: 84
End: 2024-07-16
Payer: MEDICARE

## 2024-07-16 LAB
ACT BLD: 134 SEC (ref 74–137)
ALBUMIN SERPL BCP-MCNC: 3 G/DL (ref 3.2–4.9)
ALBUMIN/GLOB SERPL: 1.5 G/DL
ALP SERPL-CCNC: 47 U/L (ref 30–99)
ALT SERPL-CCNC: 9 U/L (ref 2–50)
ANION GAP SERPL CALC-SCNC: 8 MMOL/L (ref 7–16)
AST SERPL-CCNC: 14 U/L (ref 12–45)
BILIRUB SERPL-MCNC: 0.4 MG/DL (ref 0.1–1.5)
BUN SERPL-MCNC: 19 MG/DL (ref 8–22)
CALCIUM ALBUM COR SERPL-MCNC: 9.5 MG/DL (ref 8.5–10.5)
CALCIUM SERPL-MCNC: 8.7 MG/DL (ref 8.5–10.5)
CHLORIDE SERPL-SCNC: 108 MMOL/L (ref 96–112)
CO2 SERPL-SCNC: 21 MMOL/L (ref 20–33)
CREAT SERPL-MCNC: 0.83 MG/DL (ref 0.5–1.4)
EKG IMPRESSION: NORMAL
ERYTHROCYTE [DISTWIDTH] IN BLOOD BY AUTOMATED COUNT: 47.1 FL (ref 35.9–50)
ERYTHROCYTE [DISTWIDTH] IN BLOOD BY AUTOMATED COUNT: 47.8 FL (ref 35.9–50)
ERYTHROCYTE [DISTWIDTH] IN BLOOD BY AUTOMATED COUNT: 48.1 FL (ref 35.9–50)
FERRITIN SERPL-MCNC: 47.1 NG/ML (ref 10–291)
GFR SERPLBLD CREATININE-BSD FMLA CKD-EPI: 70 ML/MIN/1.73 M 2
GLOBULIN SER CALC-MCNC: 2 G/DL (ref 1.9–3.5)
GLUCOSE SERPL-MCNC: 122 MG/DL (ref 65–99)
HCT VFR BLD AUTO: 26.8 % (ref 37–47)
HCT VFR BLD AUTO: 27.4 % (ref 37–47)
HCT VFR BLD AUTO: 28.4 % (ref 37–47)
HGB BLD-MCNC: 8.6 G/DL (ref 12–16)
HGB BLD-MCNC: 8.9 G/DL (ref 12–16)
HGB BLD-MCNC: 8.9 G/DL (ref 12–16)
IRON SATN MFR SERPL: 22 % (ref 15–55)
IRON SERPL-MCNC: 63 UG/DL (ref 40–170)
MAGNESIUM SERPL-MCNC: 1.8 MG/DL (ref 1.5–2.5)
MCH RBC QN AUTO: 27.3 PG (ref 27–33)
MCH RBC QN AUTO: 28.4 PG (ref 27–33)
MCH RBC QN AUTO: 28.4 PG (ref 27–33)
MCHC RBC AUTO-ENTMCNC: 31.3 G/DL (ref 32.2–35.5)
MCHC RBC AUTO-ENTMCNC: 32.1 G/DL (ref 32.2–35.5)
MCHC RBC AUTO-ENTMCNC: 32.5 G/DL (ref 32.2–35.5)
MCV RBC AUTO: 87.1 FL (ref 81.4–97.8)
MCV RBC AUTO: 87.5 FL (ref 81.4–97.8)
MCV RBC AUTO: 88.4 FL (ref 81.4–97.8)
NT-PROBNP SERPL IA-MCNC: 427 PG/ML (ref 0–125)
PHOSPHATE SERPL-MCNC: 4 MG/DL (ref 2.5–4.5)
PLATELET # BLD AUTO: 102 K/UL (ref 164–446)
PLATELET # BLD AUTO: 108 K/UL (ref 164–446)
PLATELET # BLD AUTO: 96 K/UL (ref 164–446)
PLATELETS.RETICULATED NFR BLD AUTO: 5.1 % (ref 0.6–13.1)
PMV BLD AUTO: 10.6 FL (ref 9–12.9)
PMV BLD AUTO: 10.7 FL (ref 9–12.9)
PMV BLD AUTO: 10.8 FL (ref 9–12.9)
POTASSIUM SERPL-SCNC: 4.7 MMOL/L (ref 3.6–5.5)
PROT SERPL-MCNC: 5 G/DL (ref 6–8.2)
RBC # BLD AUTO: 3.03 M/UL (ref 4.2–5.4)
RBC # BLD AUTO: 3.13 M/UL (ref 4.2–5.4)
RBC # BLD AUTO: 3.26 M/UL (ref 4.2–5.4)
SODIUM SERPL-SCNC: 137 MMOL/L (ref 135–145)
TIBC SERPL-MCNC: 290 UG/DL (ref 250–450)
UIBC SERPL-MCNC: 227 UG/DL (ref 110–370)
WBC # BLD AUTO: 10.5 K/UL (ref 4.8–10.8)
WBC # BLD AUTO: 11.7 K/UL (ref 4.8–10.8)
WBC # BLD AUTO: 8.8 K/UL (ref 4.8–10.8)

## 2024-07-16 PROCEDURE — 85027 COMPLETE CBC AUTOMATED: CPT

## 2024-07-16 PROCEDURE — 700102 HCHG RX REV CODE 250 W/ 637 OVERRIDE(OP): Mod: JZ

## 2024-07-16 PROCEDURE — 80053 COMPREHEN METABOLIC PANEL: CPT

## 2024-07-16 PROCEDURE — 83880 ASSAY OF NATRIURETIC PEPTIDE: CPT

## 2024-07-16 PROCEDURE — 700111 HCHG RX REV CODE 636 W/ 250 OVERRIDE (IP): Mod: JZ

## 2024-07-16 PROCEDURE — 83550 IRON BINDING TEST: CPT

## 2024-07-16 PROCEDURE — 83540 ASSAY OF IRON: CPT

## 2024-07-16 PROCEDURE — 85055 RETICULATED PLATELET ASSAY: CPT

## 2024-07-16 PROCEDURE — A9270 NON-COVERED ITEM OR SERVICE: HCPCS | Performed by: STUDENT IN AN ORGANIZED HEALTH CARE EDUCATION/TRAINING PROGRAM

## 2024-07-16 PROCEDURE — 84100 ASSAY OF PHOSPHORUS: CPT

## 2024-07-16 PROCEDURE — 99232 SBSQ HOSP IP/OBS MODERATE 35: CPT | Mod: FS | Performed by: INTERNAL MEDICINE

## 2024-07-16 PROCEDURE — 85018 HEMOGLOBIN: CPT

## 2024-07-16 PROCEDURE — 700102 HCHG RX REV CODE 250 W/ 637 OVERRIDE(OP): Performed by: STUDENT IN AN ORGANIZED HEALTH CARE EDUCATION/TRAINING PROGRAM

## 2024-07-16 PROCEDURE — 85014 HEMATOCRIT: CPT

## 2024-07-16 PROCEDURE — 93010 ELECTROCARDIOGRAM REPORT: CPT | Performed by: INTERNAL MEDICINE

## 2024-07-16 PROCEDURE — 770022 HCHG ROOM/CARE - ICU (200)

## 2024-07-16 PROCEDURE — 51798 US URINE CAPACITY MEASURE: CPT

## 2024-07-16 PROCEDURE — 94664 DEMO&/EVAL PT USE INHALER: CPT

## 2024-07-16 PROCEDURE — 700111 HCHG RX REV CODE 636 W/ 250 OVERRIDE (IP)

## 2024-07-16 PROCEDURE — 83735 ASSAY OF MAGNESIUM: CPT

## 2024-07-16 PROCEDURE — 93005 ELECTROCARDIOGRAM TRACING: CPT

## 2024-07-16 PROCEDURE — 71045 X-RAY EXAM CHEST 1 VIEW: CPT

## 2024-07-16 PROCEDURE — 99291 CRITICAL CARE FIRST HOUR: CPT | Mod: GC | Performed by: INTERNAL MEDICINE

## 2024-07-16 PROCEDURE — 82728 ASSAY OF FERRITIN: CPT

## 2024-07-16 PROCEDURE — A9270 NON-COVERED ITEM OR SERVICE: HCPCS | Mod: JZ

## 2024-07-16 RX ORDER — MAGNESIUM SULFATE HEPTAHYDRATE 40 MG/ML
2 INJECTION, SOLUTION INTRAVENOUS ONCE
Status: COMPLETED | OUTPATIENT
Start: 2024-07-16 | End: 2024-07-16

## 2024-07-16 RX ORDER — ALBUTEROL SULFATE 5 MG/ML
2.5 SOLUTION RESPIRATORY (INHALATION)
Status: DISCONTINUED | OUTPATIENT
Start: 2024-07-16 | End: 2024-07-23

## 2024-07-16 RX ADMIN — DOCUSATE SODIUM 100 MG: 100 CAPSULE, LIQUID FILLED ORAL at 17:36

## 2024-07-16 RX ADMIN — POTASSIUM CHLORIDE 20 MEQ: 1500 TABLET, EXTENDED RELEASE ORAL at 05:05

## 2024-07-16 RX ADMIN — ANASTROZOLE 1 MG: 1 TABLET ORAL at 05:05

## 2024-07-16 RX ADMIN — HYDROCHLOROTHIAZIDE 6.25 MG: 12.5 TABLET ORAL at 05:05

## 2024-07-16 RX ADMIN — PAROXETINE HYDROCHLORIDE 10 MG: 10 TABLET, FILM COATED ORAL at 05:05

## 2024-07-16 RX ADMIN — VALSARTAN 40 MG: 80 TABLET ORAL at 05:10

## 2024-07-16 RX ADMIN — ROSUVASTATIN CALCIUM 20 MG: 20 TABLET, FILM COATED ORAL at 17:36

## 2024-07-16 RX ADMIN — FUROSEMIDE 20 MG: 10 INJECTION, SOLUTION INTRAVENOUS at 05:11

## 2024-07-16 RX ADMIN — DOCUSATE SODIUM 100 MG: 100 CAPSULE, LIQUID FILLED ORAL at 05:10

## 2024-07-16 RX ADMIN — OMEPRAZOLE 20 MG: 20 CAPSULE, DELAYED RELEASE ORAL at 05:05

## 2024-07-16 RX ADMIN — MAGNESIUM SULFATE HEPTAHYDRATE 2 G: 2 INJECTION, SOLUTION INTRAVENOUS at 09:12

## 2024-07-16 RX ADMIN — ASPIRIN 81 MG: 81 TABLET, COATED ORAL at 05:05

## 2024-07-16 ASSESSMENT — ENCOUNTER SYMPTOMS
NAUSEA: 0
SPUTUM PRODUCTION: 0
WEAKNESS: 1
HEARTBURN: 0
FEVER: 0
COUGH: 0
TINGLING: 0
TREMORS: 0
BRUISES/BLEEDS EASILY: 0
HEADACHES: 0
DIZZINESS: 0
CONSTITUTIONAL NEGATIVE: 1
PALPITATIONS: 0
PSYCHIATRIC NEGATIVE: 1
ALLERGIC/IMMUNOLOGIC NEGATIVE: 1
CHILLS: 0
ENDOCRINE NEGATIVE: 1
CHEST TIGHTNESS: 0
ABDOMINAL PAIN: 0
DIARRHEA: 0
VOMITING: 0
GASTROINTESTINAL NEGATIVE: 1
CONSTIPATION: 0
MUSCULOSKELETAL NEGATIVE: 1
HEMATOLOGIC/LYMPHATIC NEGATIVE: 1
EYES NEGATIVE: 1
SHORTNESS OF BREATH: 0
WHEEZING: 0

## 2024-07-16 ASSESSMENT — PAIN DESCRIPTION - PAIN TYPE
TYPE: ACUTE PAIN
TYPE: SURGICAL PAIN;ACUTE PAIN
TYPE: ACUTE PAIN
TYPE: ACUTE PAIN;SURGICAL PAIN
TYPE: SURGICAL PAIN
TYPE: ACUTE PAIN;SURGICAL PAIN
TYPE: ACUTE PAIN;SURGICAL PAIN

## 2024-07-16 NOTE — CARE PLAN
The patient is Watcher - Medium risk of patient condition declining or worsening    Shift Goals  Clinical Goals: Hemodynamic stability    Progress made toward(s) clinical / shift goals:    Problem: Pain - Standard  Goal: Alleviation of pain or a reduction in pain to the patient’s comfort goal  Outcome: Progressing     Problem: Knowledge Deficit - Standard  Goal: Patient and family/care givers will demonstrate understanding of plan of care, disease process/condition, diagnostic tests and medications  Outcome: Progressing       Patient is not progressing towards the following goals:

## 2024-07-16 NOTE — PROGRESS NOTES
Notified Dolly Bee APRN of Hgb now: 10.7 (was 9.8) and platelet now 145 (was 144). Demetra doesn't believe the abd CT is necessary. Do you still want it? No. CT cancelled. Also notified Dolly of pt's 3-4/10 pain upon deep breathing, pressure like pain, not constant. Pt also coughing up light yellow sputum. APRN noted and states if it gets worse, call cardiologist.

## 2024-07-16 NOTE — PROGRESS NOTES
"                 VASCULAR SURGERY               Inpatient Progress Note  _________________________________    Vitals (7/16/2024)  BP (!) 86/45   Pulse 74   Temp 36.2 °C (97.1 °F) (Temporal)   Resp 18   Ht 1.626 m (5' 4\")   Wt 80.1 kg (176 lb 9.4 oz)   SpO2 94%   BMI 30.31 kg/m²   _________________________________      7/16/2024  POD1 right femoral artery endarterectomy and patch repair  She has been hypotensive overnight. Reports mild pain to right groin, otherwise doing well.       Hypotensive to 86/45   Pleasant female in no apparent distress  Prevena in place to right groin, functioning  Feet warm, well perfused       A/P  Status post right femoral endarterectomy and patch repair  Continue aspirin daily   Keep Prevena in place for 7 days, then ok to remove  Follow up with vascular service in 2-3 weeks for wound check  Vascular surgery to sign off today, please call with any questions     The patient was seen and examined by Dr. Swan who agrees with the above plan.       Gay Helton PA-C  [facility]  [place] preferred or call my office [phone]  __________________________________________________________________  Patient:Erin Hess   MRN:[id number]   CSN:[id number]  "

## 2024-07-16 NOTE — CARE PLAN
The patient is Watcher - Medium risk of patient condition declining or worsening    Shift Goals  Clinical Goals: SBP >90; pain control  Patient Goals: sleep tonight  Family Goals: updates: son Len at bedside    Progress made toward(s) clinical / shift goals:    Problem: Pain - Standard  Goal: Alleviation of pain or a reduction in pain to the patient’s comfort goal  7/16/2024 0034 by Jolly Dempsey R.N.  Outcome: Progressing  7/16/2024 0032 by JOSE JoeN.  Outcome: Progressing     Problem: Knowledge Deficit - Standard  Goal: Patient and family/care givers will demonstrate understanding of plan of care, disease process/condition, diagnostic tests and medications  7/16/2024 0034 by HERBIE Joe.N.  Outcome: Progressing  7/16/2024 0032 by Jolly Dempsey R.N.  Outcome: Progressing     Problem: Fall Risk  Goal: Patient will remain free from falls  7/16/2024 0034 by Jolly Dempsey, R.N.  Outcome: Progressing  7/16/2024 0032 by Jolly Dempsey R.N.  Outcome: Progressing     Problem: Pain - Post Surgery  Goal: Alleviation or reduction of pain post surgery  Outcome: Progressing       Patient is not progressing towards the following goals:      Problem: Early Mobilization - Post Surgery  Goal: Early mobilization post surgery  Outcome: Not Progressing

## 2024-07-16 NOTE — PROGRESS NOTES
Cardiology Follow Up Progress Note    Date of Service  7/16/2024    Attending Physician  Ciro Luis M.D.    Chief Complaint   Scheduled TAVR    HPI  Erin Hess is a 84 y.o. female admitted 7/15/2024 with severe symptomatic aortic stenosis for planned TAVR. She has a history of HTN, asthma-COPD overlap, HLD, stage 3b CKD, obesity, ROBYN. Intraoperatively the patient sustained an injury to her right common femoral artery requiring surgical cutdown repair by Dr. Swan. Post-procedure, the patient did have hypotension requiring pressor support. They did receive IV lasix due to acute diastolic HF.     Interim Events  Overnight the patient required less pressor support and was weaned off.  Patient has not yet been mobilized today  HR and rhythm stable overnight  Hgb trended up and CT was canceled    Review of Systems  Review of Systems   Constitutional: Negative.         Malaise   HENT: Negative.     Eyes: Negative.    Respiratory:  Negative for chest tightness and shortness of breath.    Cardiovascular:  Negative for chest pain, palpitations and leg swelling.   Gastrointestinal: Negative.    Endocrine: Negative.    Genitourinary:  Positive for difficulty urinating.   Musculoskeletal: Negative.    Skin: Negative.    Allergic/Immunologic: Negative.    Neurological:  Positive for weakness.   Hematological: Negative.  Does not bruise/bleed easily.   Psychiatric/Behavioral: Negative.         Vital signs in last 24 hours  Temp:  [36.1 °C (97 °F)-36.7 °C (98 °F)] 36.2 °C (97.1 °F)  Pulse:  [52-91] 71  Resp:  [12-38] 18  BP: ()/(34-61) 92/47  SpO2:  [90 %-100 %] 94 %    Physical Exam  Physical Exam  Constitutional:       Appearance: Normal appearance. She is obese.   HENT:      Head: Normocephalic and atraumatic.      Mouth/Throat:      Pharynx: Oropharynx is clear.   Eyes:      Extraocular Movements: Extraocular movements intact.      Pupils: Pupils are equal, round, and reactive to light.   Cardiovascular:       Rate and Rhythm: Normal rate and regular rhythm.      Pulses: Normal pulses.      Heart sounds: No murmur heard.     Comments: Mild rub  Groin site is CDI with prevena intact  Pulmonary:      Effort: Pulmonary effort is normal.      Breath sounds: Normal breath sounds.   Abdominal:      General: Abdomen is flat. Bowel sounds are normal.      Palpations: Abdomen is soft.   Musculoskeletal:         General: Normal range of motion.      Cervical back: Neck supple. No tenderness.   Skin:     General: Skin is warm and dry.      Capillary Refill: Capillary refill takes 2 to 3 seconds.   Neurological:      General: No focal deficit present.      Mental Status: She is alert and oriented to person, place, and time.   Psychiatric:         Mood and Affect: Mood normal.         Behavior: Behavior normal.         Lab Review  Lab Results   Component Value Date/Time    WBC 10.5 07/16/2024 09:16 AM    RBC 3.03 (L) 07/16/2024 09:16 AM    HEMOGLOBIN 8.6 (L) 07/16/2024 09:16 AM    HEMATOCRIT 26.8 (L) 07/16/2024 09:16 AM    MCV 88.4 07/16/2024 09:16 AM    MCH 28.4 07/16/2024 09:16 AM    MCHC 32.1 (L) 07/16/2024 09:16 AM    MPV 10.6 07/16/2024 09:16 AM      Lab Results   Component Value Date/Time    SODIUM 137 07/16/2024 03:27 AM    POTASSIUM 4.7 07/16/2024 03:27 AM    CHLORIDE 108 07/16/2024 03:27 AM    CO2 21 07/16/2024 03:27 AM    GLUCOSE 122 (H) 07/16/2024 03:27 AM    BUN 19 07/16/2024 03:27 AM    CREATININE 0.83 07/16/2024 03:27 AM      Lab Results   Component Value Date/Time    ASTSGOT 14 07/16/2024 03:27 AM    ALTSGPT 9 07/16/2024 03:27 AM     Lab Results   Component Value Date/Time    CHOLSTRLTOT 175 06/24/2024 12:53 PM    LDL 79 06/24/2024 12:53 PM    HDL 75 06/24/2024 12:53 PM    TRIGLYCERIDE 104 06/24/2024 12:53 PM    TROPONINT 15 09/21/2023 02:17 PM       Recent Labs     07/15/24  1100 07/16/24  0327   NTPROBNP 331* 427*       Cardiac Imaging and Procedures Review  EKG:  My personal interpretation of the EKG dated 7/16/2024  is SR  Rate 78, 0.152/0.094/0.445    Intraoperative Echocardiogram:    CONCLUSIONS  Intraoperative HEATHER for TAVR procedure  Severe aortic stenosis. Transvalvular gradient and DANIKA difficult to   measure due to poor transgastric views from previous gastric sleeve   procedure. Replaced with 23 Ivan S3 Ultra Resilia valve. Good   prosthetic valvular function.  LV EF 65%. Grade 1 diastolic dysfunction. Asymetrical septal   hypertrophy with flow acceleration but no notable ANGIE.  Moderate MS and mild MR. Mild TR.  Severe left atrial enlargement.    LTD echocardiogram 7/15/2024  CONCLUSIONS  Limited echocardiogram to assess for pericardial effusion.     The left ventricular ejection fraction is visually estimated to be 65%.  Known TAVR aortic valve that is functioning normally with normal   transvalvular gradients.  Small pericardial effusion without evidence of hemodynamic compromise.      Compared to the prior study on 6/18/2024, there is now interval   placement of bioprosthetic aortic valve.  There is also presence of   small pericardial effusion.    Chest X-Ray 7/16/2024   1.  Mild pulmonary edema and/or infiltrates.  2.  Cardiomegaly  3.  Atherosclerosis      Assessment/Plan  S/P TAVR  -stable post-op  -cont asa lifelong  -prevena intact, left groin CDI  -cardiac rehab referral placed  -EKG shows SR  -mobilize as tolerated     Acute diastolic HF  -EF 65%  - Diuretic: lasix discontinued, pressures did not tolerate  - valsartan, HCTZ, and spironolactone held, reassess tomorrow    Common Femoral artery injury  -s/p repair by Dr. Swan  -devynena in place and intact  -Hgb 9.8, 10.7, 8.9, 8.6  -continue q 6 hr HH        Thank you for allowing me to participate in the care of this patient.  I will continue to follow this patient    I personally spent a total of 16 minutes which includes face-to-face time and non-face-to-face time spent on preparing to see the patient, reviewing hospital notes and tests, obtaining history  from the patient, performing a medically appropriate exam, counseling and educating the patient, ordering medications/tests/procedures/referrals as clinically indicated, and documenting information in the electronic medical record.      Please contact me with any questions.    ARMIN Rivera.   Cardiologist, [facility]  [phone]

## 2024-07-16 NOTE — PROGRESS NOTES
4 Eyes Skin Assessment Completed by CAMMY PERES and CAMMY Estevez.    Head WDL  Ears WDL  Nose WDL  Mouth WDL  Neck WDL  Breast/Chest WDL  Shoulder Blades WDL  Spine WDL  (R) Arm/Elbow/Hand WDL R wrist with gauze/tega from cath site  (L) Arm/Elbow/Hand WDL  Abdomen WDL  Groin Incision prevena R groin, angioseal L groin  Scrotum/Coccyx/Buttocks WDL  (R) Leg WDL  (L) Leg WDL  (R) Heel/Foot/Toe Redness and Blanching  (L) Heel/Foot/Toe Redness and Blanching          Devices In Places ECG, Blood Pressure Cuff, Pulse Ox, Arterial Line, SCD's, and Nasal Cannula      Interventions In Place Gray Ear Foams, NC W/Ear Foams, Pillows, Q2 Turns, Low Air Loss Mattress, Heels Loaded W/Pillows, and Pressure Redistribution Mattress    Possible Skin Injury No    Pictures Uploaded Into Epic N/A  Wound Consult Placed N/A  RN Wound Prevention Protocol Ordered No

## 2024-07-16 NOTE — HOSPITAL COURSE
"\"84 y.o. female with a history of HOCM, 20-pack-year tobacco use (quit 30 years ago), COPD/asthma overlap on Trelegy (2L NC at night, last PFTs from 2023 with FEV1 96%, DLCO 95% and high-res CT with reticular and fibrotic opacifications with mild bronchiectasis in the right lower lobe).  Additionally has history of severe aortic stenosis and was brought into the hospital for TAVR on 7/15.     The TAVR itself went very well and she successfully had a 23 Ivan valve installed.  However the termination of the procedure was complicated by femoral artery injury with the Perclose device.  For this vascular surgery was consulted and performed a right femoral endarterectomy as well as a repair with a bovine pericardial patch.      She then developed hypotension and given her underlying HOCM was started on phenylephrine infusion in the PACU.  She will be admitted to the ICU for vasopressors and every hour neurovascular checks.\"  "

## 2024-07-16 NOTE — PROGRESS NOTES
Patient got up to chair after bed rest was over. Patients blood pressure dropped and phenylephrine was started. Staff assist was called when patient was maxed out on phenylephrine and blood pressure dropped down to the 40's. Patient was assisted back to bed.  was called to bedside. COD, TEG, and CBC were sent down. Phenylephrine was able to be turned off momentarily after moving patient back to bed. SHARON Bee was updated. Awaiting CT scan at 2000.

## 2024-07-16 NOTE — PROGRESS NOTES
RCC    Called by RN for hypotension  Case reviewed with Dr. aXvier earlier  EHR reviewed  Patient seen and really examined serially    Patient was mobilized to commode and then to chair.  Not on pressors initially but within a few minutes of getting into the chair off the commode she became hypotensive into the 40-50 range and altered.  She was emergently placed back in bed and blood pressures come back up with the initiation of Tobin-Synephrine.  Titrated up to 5 mg/kg/min and now weaning back to 3.  She is alert and following and no respiratory distress on low-flow nasal cannula with O2 sats in the 94-98 range speaking in full sentences.  Her lungs are clear without wheezing or rales card exam reveals distant heart tones.  Abdomen tender over surgical site where there is a Provena in place.  Urine output is been about 200 cc since a dose of Lasix earlier.  Heart rate remained 60 throughout but she is not on any beta-blockers.  Minimal abdominal pain incisional but improved versus earlier without any narcotics.    Hypotension->HTN  Orthostasis?  Clinically without other symptoms and abdomen better per patient  S/p TAVR  S/p repair R femoral artery injury  History of HOCM  COPD/asthma without bronchospasm  History of hypertension  History of CKD    Actively titrating Tobin-Synephrine and blood pressure improved quickly  Due for follow-up labs will send: CBC, coags/TEG with mapping, COD  RN to update vascular and TAVR surgeons  Strict bedrest  Clinically seems orthostatic?  Will consider albumin/blood/crystalloid    F/U  Follow-up hemoglobin 10.7 up from 9.8, platelet count unchanged at 145 K  No signs of bleeding and no change in abdominal exam  CT scan had been ordered by surgery, may not be necessary, RN to reach out to them    F/U  Weaned off Tobin-Synephrine, borderline hypertensive    F/U  Patient's family at the bedside, reviewed case at length    The patient remains critically ill.  Additional critical care time =  55 minutes in directly providing and coordinating critical care and extensive data review.  No time overlap and excludes procedures.

## 2024-07-16 NOTE — PROGRESS NOTES
[facility] Progress Note      Admit Date: 7/15/2024    Resident(s): Go Campbell D.O.   Attending:  PAIGE CLAUDIO/ Dr. Guerra     Patient ID:    Name:  Erin Hess   YOB: 1940  Age:  84 y.o.  female   MRN:  [id number]    Hospital Course (carried forward and updated):  Erin Hess is a 84 y.o. female with PMH most notable for HOCM, COPD/Asthma, Aortic Stenosis (s/p TAVR 7/15) who initially presented to TAVR. TAVR successfully performed but afterwards pt had injury to her R common femoral artery requiring surgical repair by vascular surgery. Post-procedure pt became hypotensive likely in setting of hemorrhagic shock, required pressor support, and was admitted to the ICU.     Consultants:  Critical Care  Cardiology     Interval Events:  Overnight patient was able to be weaned off of pressors. However these were restarted around 7AM due to hypotension. This morning pt seen and examined at bedside. She reported having significant lightheaded feeling when trying to get up earlier. HR and rhythm stable. Trending Hgb and within normal variation. Overall patient said she felt very tired and lightheaded but denied any chest pain, shortness of breath, etc.     Vitals Range last 24h:  Temp:  [36.1 °C (97 °F)-36.6 °C (97.8 °F)] 36.2 °C (97.1 °F)  Pulse:  [52-91] 71  Resp:  [12-38] 18  BP: ()/(34-61) 92/47  SpO2:  [90 %-100 %] 94 %      Intake/Output Summary (Last 24 hours) at 7/16/2024 1115  Last data filed at 7/16/2024 0953  Gross per 24 hour   Intake 2731.9 ml   Output 1300 ml   Net 1431.9 ml        Review of Systems   Constitutional:  Positive for malaise/fatigue. Negative for chills and fever.   Respiratory:  Negative for cough, sputum production, shortness of breath and wheezing.    Cardiovascular:  Negative for chest pain and palpitations.   Gastrointestinal:  Negative for abdominal pain, constipation, diarrhea, heartburn, nausea and vomiting.   Genitourinary:  Negative for dysuria, frequency and  urgency.   Neurological:  Negative for dizziness, tingling, tremors and headaches.        Lightheaded feeling        PHYSICAL EXAM:  Vitals:    07/16/24 0600 07/16/24 0700 07/16/24 0800 07/16/24 0900   BP: 110/56 98/49 95/52 92/47   Pulse: 91 78 72 71   Resp: (!) 29 (!) 26 17 18   Temp:       TempSrc:       SpO2: 91% 93% 94% 94%   Weight:       Height:        Body mass index is 30.31 kg/m².    O2 therapy: Pulse Oximetry: 94 %, O2 (LPM): 2, O2 Delivery Device: Silicone Nasal Cannula    Date 07/16/24 0700 - 07/17/24 0659   Shift 1904-2316 2630-6008 7793-7389 24 Hour Total   INTAKE   I.V. 308.2   308.2     Magnesium Sulfate Volume 17   17     Volume (mL) (NS infusion) 291.3   291.3   Shift Total 308.2   308.2   OUTPUT   Shift Total       .2   308.2        Physical Exam  Vitals and nursing note reviewed.   Constitutional:       Appearance: She is ill-appearing. She is not toxic-appearing or diaphoretic.   HENT:      Head: Normocephalic and atraumatic.      Mouth/Throat:      Mouth: Mucous membranes are dry.      Pharynx: Oropharynx is clear. No oropharyngeal exudate or posterior oropharyngeal erythema.   Cardiovascular:      Rate and Rhythm: Normal rate and regular rhythm.      Pulses: Normal pulses.      Heart sounds: Murmur heard.      No friction rub. No gallop.   Pulmonary:      Effort: Pulmonary effort is normal. No respiratory distress.      Breath sounds: Normal breath sounds. No stridor. No wheezing, rhonchi or rales.   Abdominal:      General: Abdomen is flat. Bowel sounds are normal. There is no distension.      Palpations: Abdomen is soft.      Tenderness: There is no abdominal tenderness. There is no guarding or rebound.   Musculoskeletal:         General: No swelling.      Right lower leg: No edema.      Left lower leg: No edema.   Skin:     General: Skin is warm and dry.      Capillary Refill: Capillary refill takes 2 to 3 seconds.      Coloration: Skin is pale. Skin is not jaundiced.      Findings:  No bruising.   Neurological:      Mental Status: She is alert and oriented to person, place, and time.         Recent Labs     07/15/24  0842 07/15/24  0933 07/15/24  1019   ISTATAPH 7.311* 7.300* 7.279*   ISTATAPCO2 45.6* 44.0* 45.2*   ISTATAPO2 134* 279* 306*   ISTATATCO2 24 23 23   [id number] 99 100* 100*   ISTATARTHCO3 23.0 21.7 21.2   ISTATARTBE -3 -5* -5*   ISTATTEMP 37.0 C 37.0 C 37.0 C   ISTATSPEC Arterial Arterial Arterial   ISTATAPHTC 7.311* 7.300* 7.279*   [id number] 134* 279* 306*     Recent Labs     07/15/24  1100 07/16/24  0327   SODIUM 137 137   POTASSIUM 4.1 4.7   CHLORIDE 107 108   CO2 20 21   BUN 19 19   CREATININE 0.93 0.83   MAGNESIUM  --  1.8   PHOSPHORUS  --  4.0   CALCIUM 8.2* 8.7     Recent Labs     07/15/24  1100 07/16/24  0327   ALTSGPT 9 9   ASTSGOT 14 14   ALKPHOSPHAT 44 47   TBILIRUBIN 0.3 0.4   GLUCOSE 190* 122*     Recent Labs     07/15/24  1814 07/15/24  2029 07/16/24  0327 07/16/24  0916   RBC 3.88*  --  3.26* 3.03*   HEMOGLOBIN 10.7*  --  8.9* 8.6*   HEMATOCRIT 33.4*  --  28.4* 26.8*   PLATELETCT 145*  --  102* 96*   PROTHROMBTM  --  14.7*  --   --    INR  --  1.14*  --   --      Recent Labs     07/15/24  1100 07/15/24  1814 07/16/24  0327 07/16/24  0916   WBC 11.0* 15.6* 8.8 10.5   NEUTSPOLYS  --  87.70*  --   --    LYMPHOCYTES  --  4.50*  --   --    MONOCYTES  --  7.20  --   --    EOSINOPHILS  --  0.00  --   --    BASOPHILS  --  0.10  --   --    ASTSGOT 14  --  14  --    ALTSGPT 9  --  9  --    ALKPHOSPHAT 44  --  47  --    TBILIRUBIN 0.3  --  0.4  --        Meds:   [START ON 7/17/2024] fluticasone-umeclidinium-vilanterol  1 Puff      ipratropium-albuterol  3 mL      anastrozole  1 mg      melatonin  5 mg      omeprazole  20 mg      PARoxetine  10 mg      rosuvastatin  20 mg      [Held by provider] spironolactone  25 mg      hydrALAZINE  10 mg      acetaminophen  650 mg      docusate sodium  100 mg      senna-docusate  1 Tablet      senna-docusate  1 Tablet      polyethylene  glycol/lytes  1 Packet      magnesium hydroxide  30 mL      bisacodyl  10 mg      sodium phosphate  1 Each      ondansetron  4 mg      aspirin  81 mg      phenylephrine infusion  0-5 mcg/kg/min (Ideal) 1 mcg/kg/min (07/16/24 0700)    [Held by provider] valsartan  40 mg      [Held by provider] hydroCHLOROthiazide  6.25 mg      MD Alert...Adult ICU Electrolyte Replacement per Pharmacy            Procedures:  7/15 TAVR     Imaging:  DX-CHEST-PORTABLE (1 VIEW)   Final Result         1.  Mild pulmonary edema and/or infiltrates.   2.  Cardiomegaly   3.  Atherosclerosis      EC-ECHOCARDIOGRAM LTD W/O CONT   Final Result      EC-HEATHER W/O CONT   Final Result      DX-CHEST-PORTABLE (1 VIEW)   Final Result      1.  Low lung volumes with bibasilar hypoventilatory changes, underlying infection is possible.   2.  Stable mild enlargement of the cardiomediastinal silhouette.          ASSESSEMENT and PLAN:  * S/P TAVR (transcatheter aortic valve replacement)  Assessment & Plan      Successfully had TAVR 7/16. Had implantation of 23 Ivan JEANETTE S3       Plan:       -ASA     Postprocedural hypotension  Assessment & Plan  Ddx likely in setting hemorrhagic shock after common femoral artery injury after TAVR.  Plan:  -Holding home BP medications   -Pressors as needed to maintain MAP >65   -trending Hgb. Per cardiology, if continuing to decrease will get CT Abd  -Can consider gently IVF or albumin    Common femoral artery injury, right, initial encounter  Assessment & Plan  Fem artery injury after TAVR. Vascular Surgery consulted and performed femoral endarterectomy and repair with bovine patch.   -Monitor location of injury   -H&H Q6H     Acute diastolic HF (heart failure) (HCC)  Assessment & Plan  Continue home diuretics  LVEDP was 19 at the end of the case; echo 7/15 and 7/16 with LVEF 65%.   -Holding lasix and anti-hypertensives while hypotensive     Asthma-COPD overlap syndrome (HCC)- (present on admission)  Assessment & Plan  Not  in acute exacerbation  -continue home inhalers     Primary hypertension- (present on admission)  Assessment & Plan   Holding home meds while hypotensive.     Stage 3b chronic kidney disease- (present on admission)  Assessment & Plan  Strict I/Os  Renally dose all medications and avoid nephrotoxins.     Dyslipidemia- (present on admission)  Assessment & Plan  Statin      DISPO: ICU     CODE STATUS: FULL     Quality Measures:  Feeding: Cardiac   Analgesia: Tylenol   Sedation: n/a   Thromboprophylaxis: SCDs   Head of bed: >30 degrees  Ulcer prophylaxis: n/a   Glycemic control: n/a   Bowel care: bowel regimen  Indwelling lines: pIV x2, urethral catheter   Deescalation of antibiotics: n/a       Go Campbell D.O.

## 2024-07-17 LAB
ALBUMIN SERPL BCP-MCNC: 2.8 G/DL (ref 3.2–4.9)
ALBUMIN/GLOB SERPL: 1.3 G/DL
ALP SERPL-CCNC: 44 U/L (ref 30–99)
ALT SERPL-CCNC: 9 U/L (ref 2–50)
ANION GAP SERPL CALC-SCNC: 7 MMOL/L (ref 7–16)
AST SERPL-CCNC: 11 U/L (ref 12–45)
BILIRUB SERPL-MCNC: 0.4 MG/DL (ref 0.1–1.5)
BUN SERPL-MCNC: 15 MG/DL (ref 8–22)
CALCIUM ALBUM COR SERPL-MCNC: 9.9 MG/DL (ref 8.5–10.5)
CALCIUM SERPL-MCNC: 8.9 MG/DL (ref 8.5–10.5)
CHLORIDE SERPL-SCNC: 107 MMOL/L (ref 96–112)
CO2 SERPL-SCNC: 23 MMOL/L (ref 20–33)
CREAT SERPL-MCNC: 0.64 MG/DL (ref 0.5–1.4)
EKG IMPRESSION: NORMAL
ERYTHROCYTE [DISTWIDTH] IN BLOOD BY AUTOMATED COUNT: 47.8 FL (ref 35.9–50)
GFR SERPLBLD CREATININE-BSD FMLA CKD-EPI: 88 ML/MIN/1.73 M 2
GLOBULIN SER CALC-MCNC: 2.1 G/DL (ref 1.9–3.5)
GLUCOSE SERPL-MCNC: 119 MG/DL (ref 65–99)
HCT VFR BLD AUTO: 23.8 % (ref 37–47)
HCT VFR BLD AUTO: 24.8 % (ref 37–47)
HCT VFR BLD AUTO: 24.8 % (ref 37–47)
HGB BLD-MCNC: 7.6 G/DL (ref 12–16)
HGB BLD-MCNC: 7.9 G/DL (ref 12–16)
HGB BLD-MCNC: 8 G/DL (ref 12–16)
MAGNESIUM SERPL-MCNC: 2 MG/DL (ref 1.5–2.5)
MCH RBC QN AUTO: 28.3 PG (ref 27–33)
MCHC RBC AUTO-ENTMCNC: 32.3 G/DL (ref 32.2–35.5)
MCV RBC AUTO: 87.6 FL (ref 81.4–97.8)
PHOSPHATE SERPL-MCNC: 2.3 MG/DL (ref 2.5–4.5)
PLATELET # BLD AUTO: 91 K/UL (ref 164–446)
PLATELETS.RETICULATED NFR BLD AUTO: 5.6 % (ref 0.6–13.1)
PMV BLD AUTO: 11.2 FL (ref 9–12.9)
POTASSIUM SERPL-SCNC: 4.2 MMOL/L (ref 3.6–5.5)
PROT SERPL-MCNC: 4.9 G/DL (ref 6–8.2)
RBC # BLD AUTO: 2.83 M/UL (ref 4.2–5.4)
SODIUM SERPL-SCNC: 137 MMOL/L (ref 135–145)
WBC # BLD AUTO: 10 K/UL (ref 4.8–10.8)

## 2024-07-17 PROCEDURE — 770022 HCHG ROOM/CARE - ICU (200)

## 2024-07-17 PROCEDURE — A9270 NON-COVERED ITEM OR SERVICE: HCPCS

## 2024-07-17 PROCEDURE — 99291 CRITICAL CARE FIRST HOUR: CPT | Mod: GC | Performed by: INTERNAL MEDICINE

## 2024-07-17 PROCEDURE — 93010 ELECTROCARDIOGRAM REPORT: CPT | Performed by: INTERNAL MEDICINE

## 2024-07-17 PROCEDURE — 83735 ASSAY OF MAGNESIUM: CPT

## 2024-07-17 PROCEDURE — 700102 HCHG RX REV CODE 250 W/ 637 OVERRIDE(OP)

## 2024-07-17 PROCEDURE — 700101 HCHG RX REV CODE 250: Performed by: STUDENT IN AN ORGANIZED HEALTH CARE EDUCATION/TRAINING PROGRAM

## 2024-07-17 PROCEDURE — 99232 SBSQ HOSP IP/OBS MODERATE 35: CPT | Mod: FS | Performed by: INTERNAL MEDICINE

## 2024-07-17 PROCEDURE — 85027 COMPLETE CBC AUTOMATED: CPT

## 2024-07-17 PROCEDURE — 85018 HEMOGLOBIN: CPT

## 2024-07-17 PROCEDURE — 85014 HEMATOCRIT: CPT

## 2024-07-17 PROCEDURE — 85055 RETICULATED PLATELET ASSAY: CPT

## 2024-07-17 PROCEDURE — 84100 ASSAY OF PHOSPHORUS: CPT

## 2024-07-17 PROCEDURE — 80053 COMPREHEN METABOLIC PANEL: CPT

## 2024-07-17 PROCEDURE — 93005 ELECTROCARDIOGRAM TRACING: CPT

## 2024-07-17 PROCEDURE — 700105 HCHG RX REV CODE 258

## 2024-07-17 RX ORDER — SODIUM CHLORIDE, SODIUM LACTATE, POTASSIUM CHLORIDE, AND CALCIUM CHLORIDE .6; .31; .03; .02 G/100ML; G/100ML; G/100ML; G/100ML
500 INJECTION, SOLUTION INTRAVENOUS ONCE
Status: COMPLETED | OUTPATIENT
Start: 2024-07-17 | End: 2024-07-18

## 2024-07-17 RX ORDER — VIT C/E/ZN/COPPR/LUTEIN/ZEAXAN 250MG-90MG
2 CAPSULE ORAL EVERY EVENING
Status: DISCONTINUED | OUTPATIENT
Start: 2024-07-17 | End: 2024-07-17

## 2024-07-17 RX ORDER — MIDODRINE HYDROCHLORIDE 5 MG/1
2.5 TABLET ORAL
Status: DISCONTINUED | OUTPATIENT
Start: 2024-07-17 | End: 2024-07-22

## 2024-07-17 RX ADMIN — SODIUM CHLORIDE, POTASSIUM CHLORIDE, SODIUM LACTATE AND CALCIUM CHLORIDE 500 ML: 600; 310; 30; 20 INJECTION, SOLUTION INTRAVENOUS at 09:32

## 2024-07-17 RX ADMIN — DIBASIC SODIUM PHOSPHATE, MONOBASIC POTASSIUM PHOSPHATE AND MONOBASIC SODIUM PHOSPHATE 500 MG: 852; 155; 130 TABLET ORAL at 12:14

## 2024-07-17 RX ADMIN — OMEPRAZOLE 20 MG: 20 CAPSULE, DELAYED RELEASE ORAL at 05:39

## 2024-07-17 RX ADMIN — ROSUVASTATIN CALCIUM 20 MG: 20 TABLET, FILM COATED ORAL at 18:23

## 2024-07-17 RX ADMIN — ANASTROZOLE 1 MG: 1 TABLET ORAL at 05:40

## 2024-07-17 RX ADMIN — ASPIRIN 81 MG: 81 TABLET, COATED ORAL at 05:39

## 2024-07-17 RX ADMIN — DIBASIC SODIUM PHOSPHATE, MONOBASIC POTASSIUM PHOSPHATE AND MONOBASIC SODIUM PHOSPHATE 500 MG: 852; 155; 130 TABLET ORAL at 17:29

## 2024-07-17 RX ADMIN — MIDODRINE HYDROCHLORIDE 2.5 MG: 5 TABLET ORAL at 09:56

## 2024-07-17 RX ADMIN — MIDODRINE HYDROCHLORIDE 2.5 MG: 5 TABLET ORAL at 17:30

## 2024-07-17 RX ADMIN — ALBUTEROL SULFATE 2.5 MG: 2.5 SOLUTION RESPIRATORY (INHALATION) at 08:52

## 2024-07-17 RX ADMIN — SENNOSIDES AND DOCUSATE SODIUM 1 TABLET: 50; 8.6 TABLET ORAL at 20:24

## 2024-07-17 RX ADMIN — PAROXETINE HYDROCHLORIDE 10 MG: 10 TABLET, FILM COATED ORAL at 05:40

## 2024-07-17 RX ADMIN — DOCUSATE SODIUM 100 MG: 100 CAPSULE, LIQUID FILLED ORAL at 05:39

## 2024-07-17 RX ADMIN — MIDODRINE HYDROCHLORIDE 2.5 MG: 5 TABLET ORAL at 12:13

## 2024-07-17 RX ADMIN — POLYETHYLENE GLYCOL 3350 1 PACKET: 17 POWDER, FOR SOLUTION ORAL at 09:55

## 2024-07-17 RX ADMIN — DOCUSATE SODIUM 100 MG: 100 CAPSULE, LIQUID FILLED ORAL at 17:30

## 2024-07-17 RX ADMIN — DIBASIC SODIUM PHOSPHATE, MONOBASIC POTASSIUM PHOSPHATE AND MONOBASIC SODIUM PHOSPHATE 500 MG: 852; 155; 130 TABLET ORAL at 08:30

## 2024-07-17 ASSESSMENT — FIBROSIS 4 INDEX: FIB4 SCORE: 3.3

## 2024-07-17 ASSESSMENT — ENCOUNTER SYMPTOMS
TINGLING: 0
CONSTIPATION: 0
FEVER: 0
NECK PAIN: 0
MYALGIAS: 0
ABDOMINAL PAIN: 0
BRUISES/BLEEDS EASILY: 0
WEAKNESS: 1
NUMBNESS: 1
FATIGUE: 1
MUSCULOSKELETAL NEGATIVE: 1
CHILLS: 0
VOMITING: 0
GASTROINTESTINAL NEGATIVE: 1
PSYCHIATRIC NEGATIVE: 1
HEMATOLOGIC/LYMPHATIC NEGATIVE: 1
HEADACHES: 0
EYES NEGATIVE: 1
DIZZINESS: 0
SHORTNESS OF BREATH: 0
CHEST TIGHTNESS: 0
DIARRHEA: 0
PALPITATIONS: 0
NAUSEA: 0
ENDOCRINE NEGATIVE: 1
TREMORS: 0
COUGH: 0
ALLERGIC/IMMUNOLOGIC NEGATIVE: 1
WHEEZING: 0

## 2024-07-17 ASSESSMENT — PAIN DESCRIPTION - PAIN TYPE
TYPE: ACUTE PAIN

## 2024-07-17 ASSESSMENT — COPD QUESTIONNAIRES
HAVE YOU SMOKED AT LEAST 100 CIGARETTES IN YOUR ENTIRE LIFE: YES
DO YOU EVER COUGH UP ANY MUCUS OR PHLEGM?: NO/ONLY WITH OCCASIONAL COLDS OR INFECTIONS
DURING THE PAST 4 WEEKS HOW MUCH DID YOU FEEL SHORT OF BREATH: NONE/LITTLE OF THE TIME
COPD SCREENING SCORE: 4

## 2024-07-17 NOTE — RESPIRATORY CARE
"COPD EDUCATION by COPD CLINICAL EDUCATOR  7/16/2024  at  5:44 PM by Brittany Gonzalez, RRT     Patient interviewed by education team. COPD EDUCATION by COPD CLINICAL EDUCATOR  7/16/2024 at 5:46 PM by Brittany Gonzalez, RRT     Patient interviewed by COPD education team. Patient declined COPD program at this time as patient feels she has good understanding of COPD. Patient was open to education about respiratory medication including Albuterol nebulizer and Trelegy that she currently takes at home. Patient endorses taking Albuterol nebulizer once daily and that  has been ordered.An Action Plan was updated in the EMR to reflect current Respiratory Medication use.         COPD Assessment  COPD Clinical Specialists ONLY  COPD Education Initiated: Yes--Short Intervention (Review care/cleaning of resp tools including Flutter, neb kit.)  Is this a COPD exacerbation patient?: No  DME Company: [email or web address] Equipment Type: nebulizer, CPAP, home O2 at NOC at 2lpm.  Physician Name: Terra Zambrano M.D.  Pulmonary Follow Up Appointment: 08/14/24  Appt Time: 1340  Pulmonologist Name: [facility]  Referrals Initiated: Yes  Smoking Cessation:  (quit 30 years ago)  Home Health Care:  (TBD)  $ Demo/Eval of SVN's, MDI's and Aerosols: Yes (Flutter tool technique reviewed/cleaning)  (OP) Pulmonary Function Testing: Yes (2/7/23 Fev1 965, FEV1/FVC 63%)  Interdisciplinary Rounds: Attendance at Rounds (30 Min)    PFT Results    (OP) Pulmonary Function Testing: Yes (2/7/23 Fev1 965, FEV1/FVC 63%)  Meds to Beds        MY COPD ACTION PLAN     It is recommended that patients and physicians /healthcare providers complete this action plan together. This plan should be discussed at each physician visit and updated as needed.    The green, yellow and red zones show groups of symptoms of COPD. This list of symptoms is not comprehensive, and you may experience other symptoms. In the \"Actions\" column, your healthcare provider has recommended actions " "for you to take based on your symptoms.    Patient Name: Erin Hess   YOB: 1940   Last Updated on:     Green Zone:  I am doing well today Actions     Usual activitiy and exercise level   Take daily medications     Usual amounts of cough and phlegm/mucus   Use oxygen as prescribed     Sleep well at night   Continue regular exercise/diet plan     Appetite is good   At all times avoid cigarette smoke, inhaled irritants     Daily Medications (these medications are taken every day):    Trelegy daily        Albuterol nebulizer as directed by your MD.    Yellow Zone:  I am having a bad day or a COPD flare Actions     More breathless than usual   Continue daily medications     I have less energy for my daily activities   Use quick relief inhaler as ordered     Increased or thicker phlegm/mucus   Use oxygen as prescribed     Using quick relief inhaler/nebulizer more often   Get plenty of rest     Swelling of ankles more than usual   Use pursed lip breathing     More coughing than usual   At all times avoid cigarette smoke, inhaled irritants     I feel like I have a \"chest cold\"     Poor sleep and my symptoms woke me up     My appetite is not good     My medicine is not helping      Call provider immediately if symptoms don’t improve     Continue daily medications, add rescue medications:     Albuterol nebulizer as directed by your MD.           Medications to be used during a flare up, (as Discussed with Provider):              Red Zone:  I need urgent medical care Actions     Severe shortness of breath even at rest   Call 911 or seek medical care immediately     Not able to do any activity because of breathing      Fever or shaking chills      Feeling confused or very drowsy       Chest pains      Coughing up blood                  "

## 2024-07-17 NOTE — CARE PLAN
The patient is Watcher - Medium risk of patient condition declining or worsening    Shift Goals  Clinical Goals: Hemodyamic stability  Patient Goals: sleep tonight  Family Goals: updates: son Len at bedside    Progress made toward(s) clinical / shift goals:    Problem: Pain - Standard  Goal: Alleviation of pain or a reduction in pain to the patient’s comfort goal  Outcome: Progressing     Problem: Knowledge Deficit - Standard  Goal: Patient and family/care givers will demonstrate understanding of plan of care, disease process/condition, diagnostic tests and medications  Outcome: Progressing     Problem: Fall Risk  Goal: Patient will remain free from falls  Outcome: Progressing     Problem: Pain - Post Surgery  Goal: Alleviation or reduction of pain post surgery  Outcome: Progressing       Patient is not progressing towards the following goals:

## 2024-07-17 NOTE — PROGRESS NOTES
4 Eyes Skin Assessment Completed by CAMMY Nix and CAMMY Gipson.    Head WDL  Ears WDL  Nose WDL  Mouth WDL  Neck WDL  Breast/Chest WDL  Shoulder Blades Redness and Blanching  Spine Redness and Blanching  (R) Arm/Elbow/Hand Redness and Blanching  (L) Arm/Elbow/Hand Redness and Blanching  Abdomen WDL  Groin Bruising and Incision  Scrotum/Coccyx/Buttocks Redness and Blanching  (R) Leg WDL  (L) Leg WDL  (R) Heel/Foot/Toe Redness and Blanching  (L) Heel/Foot/Toe Redness and Blanching          Devices In Places ECG, Blood Pressure Cuff, Pulse Ox, Kennedy, and Arterial Line      Interventions In Place Gray Ear Foams, NC W/Ear Foams, Heel Mepilex, Sacral Mepilex, Chair Waffle, TAP System, Pillows, Q2 Turns, Low Air Loss Mattress, Heels Loaded W/Pillows, and Pressure Redistribution Mattress    Possible Skin Injury No    Pictures Uploaded Into Epic Yes  Wound Consult Placed N/A  RN Wound Prevention Protocol Ordered No

## 2024-07-17 NOTE — PROGRESS NOTES
4 Eyes Skin Assessment Completed by CAMMY Nix and CAMMY Lau.    Head WDL  Ears WDL  Nose WDL  Mouth WDL  Neck WDL  Breast/Chest WDL  Shoulder Blades Redness and Blanching  Spine Redness and Blanching  (R) Arm/Elbow/Hand Redness and Blanching  (L) Arm/Elbow/Hand Redness and Blanching  Abdomen WDL  Groin Bruising  Scrotum/Coccyx/Buttocks Redness and Blanching  (R) Leg WDL  (L) Leg WDL  (R) Heel/Foot/Toe Redness and Blanching  (L) Heel/Foot/Toe Redness and Blanching          Devices In Places ECG, Blood Pressure Cuff, Pulse Ox, and Arterial Line      Interventions In Place Gray Ear Foams, NC W/Ear Foams, Heel Mepilex, Sacral Mepilex, Chair Waffle, Pillows, Q2 Turns, Heels Loaded W/Pillows, and Pressure Redistribution Mattress    Possible Skin Injury No    Pictures Uploaded Into Epic N/A  Wound Consult Placed N/A  RN Wound Prevention Protocol Ordered No

## 2024-07-17 NOTE — PROGRESS NOTES
[facility] ICU Progress Note      Admit Date: 7/15/2024    Resident(s): Go Campbell D.O.   Attending:  PAIGE CLAUDIO/ Dr. Guerra     Patient ID:    Name:  Erin Hess   YOB: 1940  Age:  84 y.o.  female   MRN:  [id number]    Hospital Course (carried forward and updated):  Erin Hess is a 84 y.o. female with PMH most notable for HOCM, COPD/Asthma, Aortic Stenosis (s/p TAVR 7/15) who initially presented to TAVR. TAVR successfully performed but afterwards pt had injury to her R common femoral artery requiring surgical repair by vascular surgery. Post-procedure pt became hypotensive likely in setting of hemorrhagic shock, required pressor support, and was admitted to the ICU.     Consultants:  Critical Care  Cardiology     Interval Events:  Last night pt was able to sit up in bed with less hypotension than prior. This morning able to sit up and get to chair at bedside. Decreasing pressor requirement. Patient herself said she was feeling a lot better just generally fatigued.     Vitals Range last 24h:  Temp:  [37.2 °C (99 °F)-37.9 °C (100.2 °F)] 37.3 °C (99.1 °F)  Pulse:  [64-86] 76  Resp:  [13-55] 21  BP: ()/(39-58) 123/56  SpO2:  [86 %-97 %] 97 %      Intake/Output Summary (Last 24 hours) at 7/17/2024 1046  Last data filed at 7/17/2024 0833  Gross per 24 hour   Intake 521.37 ml   Output 825 ml   Net -303.63 ml        Review of Systems   Constitutional:  Positive for malaise/fatigue. Negative for chills and fever.   Respiratory:  Negative for cough, shortness of breath and wheezing.    Cardiovascular:  Negative for chest pain and palpitations.   Gastrointestinal:  Negative for abdominal pain, constipation, diarrhea, nausea and vomiting.   Genitourinary:  Negative for dysuria, frequency and urgency.   Musculoskeletal:  Negative for myalgias and neck pain.   Neurological:  Negative for dizziness, tingling, tremors and headaches.        Lightheaded feeling        PHYSICAL EXAM:  Vitals:    07/17/24  0900 07/17/24 0915 07/17/24 0930 07/17/24 1000   BP: 103/49 90/53 95/50 123/56   Pulse:    76   Resp: 19 15 (!) 32 (!) 21   Temp: 37.5 °C (99.5 °F)   37.3 °C (99.1 °F)   TempSrc:       SpO2:    97%   Weight:       Height:        Body mass index is 31.03 kg/m².    O2 therapy: Pulse Oximetry: 97 %, O2 (LPM): 1, O2 Delivery Device: Silicone Nasal Cannula    Date 07/17/24 0700 - 07/18/24 0659   Shift 5312-6372 5105-0355 8884-8991 24 Hour Total   INTAKE   Shift Total       OUTPUT   Urine 500   500     Output (mL) (Urethral Catheter Latex;Temperature probe 16 Fr.) 500   500   Stool         Number of Times Stooled 1 x   1 x   Shift Total 500   500   NET -500   -500        Physical Exam  Vitals and nursing note reviewed.   Constitutional:       Appearance: She is ill-appearing. She is not toxic-appearing or diaphoretic.   Cardiovascular:      Rate and Rhythm: Normal rate and regular rhythm.      Pulses: Normal pulses.      Heart sounds: Murmur heard.      No friction rub. No gallop.   Pulmonary:      Effort: Pulmonary effort is normal. No respiratory distress.      Breath sounds: Normal breath sounds. No stridor. No wheezing, rhonchi or rales.   Abdominal:      General: Abdomen is flat. Bowel sounds are normal. There is no distension.      Palpations: Abdomen is soft.      Tenderness: There is no abdominal tenderness. There is no guarding or rebound.   Musculoskeletal:         General: No swelling.      Right lower leg: No edema.      Left lower leg: No edema.   Skin:     Capillary Refill: Capillary refill takes 2 to 3 seconds.      Coloration: Skin is not pale.   Neurological:      Mental Status: She is alert and oriented to person, place, and time.         Recent Labs     07/15/24  0842 07/15/24  0933 07/15/24  1019   ISTATAPH 7.311* 7.300* 7.279*   ISTATAPCO2 45.6* 44.0* 45.2*   ISTATAPO2 134* 279* 306*   ISTATATCO2 24 23 23   [id number] 99 100* 100*   ISTATARTHCO3 23.0 21.7 21.2   ISTATARTBE -3 -5* -5*   ISTATTEMP 37.0  C 37.0 C 37.0 C   ISTATSPEC Arterial Arterial Arterial   ISTATAPHTC 7.311* 7.300* 7.279*   [id number] 134* 279* 306*     Recent Labs     07/15/24  1100 07/16/24  0327 07/17/24  0445   SODIUM 137 137 137   POTASSIUM 4.1 4.7 4.2   CHLORIDE 107 108 107   CO2 20 21 23   BUN 19 19 15   CREATININE 0.93 0.83 0.64   MAGNESIUM  --  1.8 2.0   PHOSPHORUS  --  4.0 2.3*   CALCIUM 8.2* 8.7 8.9     Recent Labs     07/15/24  1100 07/16/24  0327 07/17/24  0445   ALTSGPT 9 9 9   ASTSGOT 14 14 11*   ALKPHOSPHAT 44 47 44   TBILIRUBIN 0.3 0.4 0.4   GLUCOSE 190* 122* 119*     Recent Labs     07/15/24  2029 07/16/24  0327 07/16/24  0916 07/16/24  1526 07/16/24  2351 07/17/24  0445   RBC  --    < > 3.03* 3.13*  --  2.83*   HEMOGLOBIN  --    < > 8.6* 8.9* 7.9* 8.0*   HEMATOCRIT  --    < > 26.8* 27.4* 24.8* 24.8*   PLATELETCT  --    < > 96* 108*  --  91*   PROTHROMBTM 14.7*  --   --   --   --   --    INR 1.14*  --   --   --   --   --    IRON  --   --   --  63  --   --    FERRITIN  --   --   --  47.1  --   --    TOTIRONBC  --   --   --  290  --   --     < > = values in this interval not displayed.     Recent Labs     07/15/24  1100 07/15/24  1814 07/16/24  0327 07/16/24  0916 07/16/24  1526 07/17/24  0445   WBC 11.0* 15.6* 8.8 10.5 11.7* 10.0   NEUTSPOLYS  --  87.70*  --   --   --   --    LYMPHOCYTES  --  4.50*  --   --   --   --    MONOCYTES  --  7.20  --   --   --   --    EOSINOPHILS  --  0.00  --   --   --   --    BASOPHILS  --  0.10  --   --   --   --    ASTSGOT 14  --  14  --   --  11*   ALTSGPT 9  --  9  --   --  9   ALKPHOSPHAT 44  --  47  --   --  44   TBILIRUBIN 0.3  --  0.4  --   --  0.4       Meds:   phosphorus  500 mg      midodrine  2.5 mg      fluticasone-umeclidinium-vilanterol  1 Puff      Respiratory Therapy Consult        albuterol  2.5 mg      ipratropium-albuterol  3 mL      anastrozole  1 mg      melatonin  5 mg      omeprazole  20 mg      PARoxetine  10 mg      rosuvastatin  20 mg      [Held by provider] spironolactone   25 mg      hydrALAZINE  10 mg      acetaminophen  650 mg      docusate sodium  100 mg      senna-docusate  1 Tablet      senna-docusate  1 Tablet      polyethylene glycol/lytes  1 Packet      magnesium hydroxide  30 mL      bisacodyl  10 mg      sodium phosphate  1 Each      ondansetron  4 mg      aspirin  81 mg      phenylephrine infusion  0-5 mcg/kg/min (Ideal) 0.25 mcg/kg/min (07/17/24 0833)    [Held by provider] valsartan  40 mg      [Held by provider] hydroCHLOROthiazide  6.25 mg      MD Alert...Adult ICU Electrolyte Replacement per Pharmacy            Procedures:  7/15 TAVR     Imaging:  DX-CHEST-PORTABLE (1 VIEW)   Final Result         1.  Mild pulmonary edema and/or infiltrates.   2.  Cardiomegaly   3.  Atherosclerosis      EC-ECHOCARDIOGRAM LTD W/O CONT   Final Result      EC-HEATHER W/O CONT   Final Result      DX-CHEST-PORTABLE (1 VIEW)   Final Result      1.  Low lung volumes with bibasilar hypoventilatory changes, underlying infection is possible.   2.  Stable mild enlargement of the cardiomediastinal silhouette.          ASSESSEMENT and PLAN:  * S/P TAVR (transcatheter aortic valve replacement)  Assessment & Plan      Successfully had TAVR 7/16. Had implantation of 23 Ivan JEANETTE S3       Plan:       -ASA       -regular pulse checks     Postprocedural hypotension  Assessment & Plan  Ddx likely in setting hemorrhagic shock after common femoral artery injury after TAVR.  Plan:  -Holding home BP medications   -trending Hgb Q12H   -Decreasing pressor requirement   -Started pt on low dose midodrine 7/17- will likely not need long term.     Common femoral artery injury, right, initial encounter  Assessment & Plan  Fem artery injury after TAVR. Vascular Surgery consulted and performed femoral endarterectomy and repair with bovine patch.   -Monitor location of injury   -H&H Q12H    Acute diastolic HF (heart failure) (HCC)  Assessment & Plan  Continue home diuretics  LVEDP was 19 at the end of the case; echo  7/15 and 7/16 with LVEF 65%.   -Holding lasix and anti-hypertensives while hypotensive     Asthma-COPD overlap syndrome (HCC)- (present on admission)  Assessment & Plan  Not in acute exacerbation  -continue home inhalers     Primary hypertension- (present on admission)  Assessment & Plan   Holding home meds while hypotensive. Restart as tolerated.     Stage 3b chronic kidney disease- (present on admission)  Assessment & Plan  Strict I/Os  Renally dose all medications and avoid nephrotoxins.     Dyslipidemia- (present on admission)  Assessment & Plan  Statin      DISPO: ICU     CODE STATUS: FULL     Quality Measures:  Feeding: Cardiac   Analgesia: Tylenol   Sedation: n/a   Thromboprophylaxis: SCDs   Head of bed: >30 degrees  Ulcer prophylaxis: n/a   Glycemic control: n/a   Bowel care: bowel regimen  Indwelling lines: pIV x2, urethral catheter   Deescalation of antibiotics: n/a       Go Campbell D.O.

## 2024-07-17 NOTE — PROGRESS NOTES
Cardiology Follow Up Progress Note    Date of Service  7/17/2024    Attending Physician  Blu Guerra Jr., D.O.    Chief Complaint   Scheduled TAVR     HPI  Erin Hess is a 84 y.o. female admitted 7/15/2024 with severe symptomatic aortic stenosis for planned TAVR. She has a history of HTN, asthma-COPD overlap, HLD, stage 3b CKD, obesity, ROBYN. Intraoperatively the patient sustained an injury to her right common femoral artery requiring surgical cutdown repair by Dr. Swan. Post-procedure, the patient did have hypotension requiring pressor support. They did receive IV lasix due to acute diastolic HF.      Interim Events  7/16/2024 Overnight the patient required less pressor support and was weaned off.  Patient has not yet been mobilized today  HR and rhythm stable overnight  Hgb trended up and CT was canceled    7/17/2024 Patient was able to sit at edge of bed yesterday, did become hypotensive upon standing.  She remains on low dose phenylephrine   Right foot numbness improved, pedal pulses normal    Review of Systems  Review of Systems   Constitutional:  Positive for fatigue.   HENT: Negative.     Eyes: Negative.    Respiratory:  Negative for chest tightness and shortness of breath.    Cardiovascular:  Negative for chest pain, palpitations and leg swelling.   Gastrointestinal: Negative.    Endocrine: Negative.    Genitourinary: Negative.    Musculoskeletal: Negative.    Skin: Negative.    Allergic/Immunologic: Negative.    Neurological:  Positive for weakness and numbness.   Hematological: Negative.  Does not bruise/bleed easily.   Psychiatric/Behavioral: Negative.         Vital signs in last 24 hours  Temp:  [37.2 °C (99 °F)-37.9 °C (100.2 °F)] 37.4 °C (99.3 °F)  Pulse:  [64-86] 75  Resp:  [13-55] 19  BP: ()/(39-58) 117/55  SpO2:  [86 %-97 %] 95 %    Physical Exam  Physical Exam  Constitutional:       Appearance: Normal appearance. She is obese.   HENT:      Head: Normocephalic and atraumatic.       Mouth/Throat:      Pharynx: Oropharynx is clear.   Eyes:      Extraocular Movements: Extraocular movements intact.      Pupils: Pupils are equal, round, and reactive to light.   Cardiovascular:      Rate and Rhythm: Normal rate and regular rhythm.      Pulses: Normal pulses.      Heart sounds: Normal heart sounds. No murmur heard.     No friction rub.      Comments: Groin site prevena dressing is CDI  Pulmonary:      Effort: Pulmonary effort is normal.      Breath sounds: Normal breath sounds.   Abdominal:      General: Abdomen is flat. Bowel sounds are normal.      Palpations: Abdomen is soft.   Musculoskeletal:         General: Normal range of motion.      Cervical back: Neck supple. No tenderness.   Skin:     General: Skin is warm and dry.   Neurological:      General: No focal deficit present.      Mental Status: She is alert and oriented to person, place, and time.   Psychiatric:         Mood and Affect: Mood normal.         Behavior: Behavior normal.         Lab Review  Lab Results   Component Value Date/Time    WBC 10.0 07/17/2024 04:45 AM    RBC 2.83 (L) 07/17/2024 04:45 AM    HEMOGLOBIN 8.0 (L) 07/17/2024 04:45 AM    HEMATOCRIT 24.8 (L) 07/17/2024 04:45 AM    MCV 87.6 07/17/2024 04:45 AM    MCH 28.3 07/17/2024 04:45 AM    MCHC 32.3 07/17/2024 04:45 AM    MPV 11.2 07/17/2024 04:45 AM      Lab Results   Component Value Date/Time    SODIUM 137 07/17/2024 04:45 AM    POTASSIUM 4.2 07/17/2024 04:45 AM    CHLORIDE 107 07/17/2024 04:45 AM    CO2 23 07/17/2024 04:45 AM    GLUCOSE 119 (H) 07/17/2024 04:45 AM    BUN 15 07/17/2024 04:45 AM    CREATININE 0.64 07/17/2024 04:45 AM      Lab Results   Component Value Date/Time    ASTSGOT 11 (L) 07/17/2024 04:45 AM    ALTSGPT 9 07/17/2024 04:45 AM     Lab Results   Component Value Date/Time    CHOLSTRLTOT 175 06/24/2024 12:53 PM    LDL 79 06/24/2024 12:53 PM    HDL 75 06/24/2024 12:53 PM    TRIGLYCERIDE 104 06/24/2024 12:53 PM    TROPONINT 15 09/21/2023 02:17 PM        Recent Labs     07/15/24  1100 07/16/24  0327   NTPROBNP 331* 427*       Cardiac Imaging and Procedures Review  EKG:  My personal interpretation of the EKG dated 7/17/2024 is   SR with LA enlargement  Rate 82, 0.159/0.091/0.437    Intraoperative Echocardiogram:    CONCLUSIONS  Intraoperative HEATHER for TAVR procedure  Severe aortic stenosis. Transvalvular gradient and DANIKA difficult to   measure due to poor transgastric views from previous gastric sleeve   procedure. Replaced with 23 Ivan S3 Ultra Resilia valve. Good   prosthetic valvular function.  LV EF 65%. Grade 1 diastolic dysfunction. Asymetrical septal   hypertrophy with flow acceleration but no notable ANGIE.  Moderate MS and mild MR. Mild TR.  Severe left atrial enlargement.     LTD echocardiogram 7/15/2024  CONCLUSIONS  Limited echocardiogram to assess for pericardial effusion.     The left ventricular ejection fraction is visually estimated to be 65%.  Known TAVR aortic valve that is functioning normally with normal   transvalvular gradients.  Small pericardial effusion without evidence of hemodynamic compromise.      Compared to the prior study on 6/18/2024, there is now interval   placement of bioprosthetic aortic valve.  There is also presence of   small pericardial effusion.     Chest X-Ray 7/16/2024   1.  Mild pulmonary edema and/or infiltrates.  2.  Cardiomegaly  3.  Atherosclerosis    Assessment/Plan  S/P TAVR  -stable post-op  -cont asa lifelong  -prevena intact, left groin CDI  -cardiac rehab referral placed  -EKG shows SR  -mobilize TID, up to chair as tolerated   -wean phenylephrine     Acute diastolic HF  -EF 65%  - Diuretic: lasix discontinued, pressures did not tolerate  - valsartan, HCTZ, and spironolactone held, reassess tomorrow     Common Femoral artery injury  -s/p repair by Dr. Swan  -pascual in place and intact  -Hgb 9.8, 10.7, 8.9, 8.6, 8.9, 7.9, 8.0  -continue q 6 hr HH        Thank you for allowing me to participate in the care of  this patient.  I will continue to follow this patient     I personally spent a total of 15 minutes which includes face-to-face time and non-face-to-face time spent on preparing to see the patient, reviewing hospital notes and tests, obtaining history from the patient, performing a medically appropriate exam, counseling and educating the patient, ordering medications/tests/procedures/referrals as clinically indicated, and documenting information in the electronic medical record.       Please contact me with any questions.    ARMIN Rivera.   Cardiologist, [facility]  [phone]

## 2024-07-17 NOTE — CARE PLAN
The patient is Watcher - Medium risk of patient condition declining or worsening    Shift Goals  Clinical Goals: Hemodynamics  Patient Goals: Sleep  Family Goals: Updates    Progress made toward(s) clinical / shift goals:    Problem: Pain - Standard  Goal: Alleviation of pain or a reduction in pain to the patient’s comfort goal  Outcome: Progressing     Problem: Knowledge Deficit - Standard  Goal: Patient and family/care givers will demonstrate understanding of plan of care, disease process/condition, diagnostic tests and medications  Outcome: Progressing     Problem: Fall Risk  Goal: Patient will remain free from falls  Outcome: Progressing     Problem: Pain - Post Surgery  Goal: Alleviation or reduction of pain post surgery  Outcome: Progressing       Patient is not progressing towards the following goals:

## 2024-07-18 LAB
ALBUMIN SERPL BCP-MCNC: 2.9 G/DL (ref 3.2–4.9)
ALBUMIN/GLOB SERPL: 1.4 G/DL
ALP SERPL-CCNC: 44 U/L (ref 30–99)
ALT SERPL-CCNC: 8 U/L (ref 2–50)
ANION GAP SERPL CALC-SCNC: 8 MMOL/L (ref 7–16)
AST SERPL-CCNC: 11 U/L (ref 12–45)
BILIRUB SERPL-MCNC: 0.5 MG/DL (ref 0.1–1.5)
BUN SERPL-MCNC: 11 MG/DL (ref 8–22)
CALCIUM ALBUM COR SERPL-MCNC: 10.2 MG/DL (ref 8.5–10.5)
CALCIUM SERPL-MCNC: 9.3 MG/DL (ref 8.5–10.5)
CHLORIDE SERPL-SCNC: 105 MMOL/L (ref 96–112)
CO2 SERPL-SCNC: 26 MMOL/L (ref 20–33)
CREAT SERPL-MCNC: 0.65 MG/DL (ref 0.5–1.4)
EKG IMPRESSION: NORMAL
ERYTHROCYTE [DISTWIDTH] IN BLOOD BY AUTOMATED COUNT: 47.1 FL (ref 35.9–50)
GFR SERPLBLD CREATININE-BSD FMLA CKD-EPI: 88 ML/MIN/1.73 M 2
GLOBULIN SER CALC-MCNC: 2.1 G/DL (ref 1.9–3.5)
GLUCOSE SERPL-MCNC: 114 MG/DL (ref 65–99)
HCT VFR BLD AUTO: 23.4 % (ref 37–47)
HCT VFR BLD AUTO: 27.7 % (ref 37–47)
HGB BLD-MCNC: 7.5 G/DL (ref 12–16)
HGB BLD-MCNC: 8.5 G/DL (ref 12–16)
MAGNESIUM SERPL-MCNC: 1.9 MG/DL (ref 1.5–2.5)
MCH RBC QN AUTO: 28 PG (ref 27–33)
MCHC RBC AUTO-ENTMCNC: 32.1 G/DL (ref 32.2–35.5)
MCV RBC AUTO: 87.3 FL (ref 81.4–97.8)
PHOSPHATE SERPL-MCNC: 2.2 MG/DL (ref 2.5–4.5)
PLATELET # BLD AUTO: 79 K/UL (ref 164–446)
PLATELETS.RETICULATED NFR BLD AUTO: 5.2 % (ref 0.6–13.1)
PMV BLD AUTO: 11.4 FL (ref 9–12.9)
POTASSIUM SERPL-SCNC: 4 MMOL/L (ref 3.6–5.5)
PROT SERPL-MCNC: 5 G/DL (ref 6–8.2)
RBC # BLD AUTO: 2.68 M/UL (ref 4.2–5.4)
SODIUM SERPL-SCNC: 139 MMOL/L (ref 135–145)
WBC # BLD AUTO: 8 K/UL (ref 4.8–10.8)

## 2024-07-18 PROCEDURE — 85018 HEMOGLOBIN: CPT

## 2024-07-18 PROCEDURE — 97535 SELF CARE MNGMENT TRAINING: CPT

## 2024-07-18 PROCEDURE — 85055 RETICULATED PLATELET ASSAY: CPT

## 2024-07-18 PROCEDURE — 99223 1ST HOSP IP/OBS HIGH 75: CPT | Performed by: INTERNAL MEDICINE

## 2024-07-18 PROCEDURE — 700102 HCHG RX REV CODE 250 W/ 637 OVERRIDE(OP)

## 2024-07-18 PROCEDURE — 93005 ELECTROCARDIOGRAM TRACING: CPT

## 2024-07-18 PROCEDURE — 84100 ASSAY OF PHOSPHORUS: CPT

## 2024-07-18 PROCEDURE — 85027 COMPLETE CBC AUTOMATED: CPT

## 2024-07-18 PROCEDURE — 80053 COMPREHEN METABOLIC PANEL: CPT

## 2024-07-18 PROCEDURE — 99024 POSTOP FOLLOW-UP VISIT: CPT | Performed by: PHYSICIAN ASSISTANT

## 2024-07-18 PROCEDURE — 83735 ASSAY OF MAGNESIUM: CPT

## 2024-07-18 PROCEDURE — 97163 PT EVAL HIGH COMPLEX 45 MIN: CPT

## 2024-07-18 PROCEDURE — 770020 HCHG ROOM/CARE - TELE (206)

## 2024-07-18 PROCEDURE — A9270 NON-COVERED ITEM OR SERVICE: HCPCS

## 2024-07-18 PROCEDURE — 93010 ELECTROCARDIOGRAM REPORT: CPT | Performed by: STUDENT IN AN ORGANIZED HEALTH CARE EDUCATION/TRAINING PROGRAM

## 2024-07-18 PROCEDURE — 700111 HCHG RX REV CODE 636 W/ 250 OVERRIDE (IP)

## 2024-07-18 PROCEDURE — 85014 HEMATOCRIT: CPT

## 2024-07-18 PROCEDURE — 99232 SBSQ HOSP IP/OBS MODERATE 35: CPT

## 2024-07-18 PROCEDURE — 99233 SBSQ HOSP IP/OBS HIGH 50: CPT | Mod: GC | Performed by: INTERNAL MEDICINE

## 2024-07-18 RX ORDER — MAGNESIUM SULFATE 1 G/100ML
1 INJECTION INTRAVENOUS ONCE
Status: COMPLETED | OUTPATIENT
Start: 2024-07-18 | End: 2024-07-18

## 2024-07-18 RX ADMIN — MAGNESIUM SULFATE IN DEXTROSE 1 G: 10 INJECTION, SOLUTION INTRAVENOUS at 07:45

## 2024-07-18 RX ADMIN — ROSUVASTATIN CALCIUM 20 MG: 20 TABLET, FILM COATED ORAL at 17:18

## 2024-07-18 RX ADMIN — DIBASIC SODIUM PHOSPHATE, MONOBASIC POTASSIUM PHOSPHATE AND MONOBASIC SODIUM PHOSPHATE 500 MG: 852; 155; 130 TABLET ORAL at 13:40

## 2024-07-18 RX ADMIN — ASPIRIN 81 MG: 81 TABLET, COATED ORAL at 05:08

## 2024-07-18 RX ADMIN — MIDODRINE HYDROCHLORIDE 2.5 MG: 5 TABLET ORAL at 07:46

## 2024-07-18 RX ADMIN — ACETAMINOPHEN 650 MG: 325 TABLET, FILM COATED ORAL at 21:09

## 2024-07-18 RX ADMIN — DIBASIC SODIUM PHOSPHATE, MONOBASIC POTASSIUM PHOSPHATE AND MONOBASIC SODIUM PHOSPHATE 500 MG: 852; 155; 130 TABLET ORAL at 17:13

## 2024-07-18 RX ADMIN — DOCUSATE SODIUM 100 MG: 100 CAPSULE, LIQUID FILLED ORAL at 05:08

## 2024-07-18 RX ADMIN — ACETAMINOPHEN 650 MG: 325 TABLET, FILM COATED ORAL at 05:08

## 2024-07-18 RX ADMIN — FLUTICASONE FUROATE, UMECLIDINIUM BROMIDE AND VILANTEROL TRIFENATATE 1 PUFF: 200; 62.5; 25 POWDER RESPIRATORY (INHALATION) at 05:09

## 2024-07-18 RX ADMIN — ANASTROZOLE 1 MG: 1 TABLET ORAL at 05:09

## 2024-07-18 RX ADMIN — DIBASIC SODIUM PHOSPHATE, MONOBASIC POTASSIUM PHOSPHATE AND MONOBASIC SODIUM PHOSPHATE 500 MG: 852; 155; 130 TABLET ORAL at 07:46

## 2024-07-18 RX ADMIN — SENNOSIDES AND DOCUSATE SODIUM 1 TABLET: 50; 8.6 TABLET ORAL at 21:09

## 2024-07-18 RX ADMIN — PAROXETINE HYDROCHLORIDE 10 MG: 10 TABLET, FILM COATED ORAL at 05:09

## 2024-07-18 RX ADMIN — MIDODRINE HYDROCHLORIDE 2.5 MG: 5 TABLET ORAL at 17:13

## 2024-07-18 RX ADMIN — OMEPRAZOLE 20 MG: 20 CAPSULE, DELAYED RELEASE ORAL at 05:08

## 2024-07-18 RX ADMIN — MIDODRINE HYDROCHLORIDE 2.5 MG: 5 TABLET ORAL at 13:40

## 2024-07-18 ASSESSMENT — GAIT ASSESSMENTS
GAIT LEVEL OF ASSIST: MINIMAL ASSIST
DISTANCE (FEET): 7
ASSISTIVE DEVICE: FRONT WHEEL WALKER
DEVIATION: BRADYKINETIC;SHUFFLED GAIT

## 2024-07-18 ASSESSMENT — LIFESTYLE VARIABLES: SUBSTANCE_ABUSE: 0

## 2024-07-18 ASSESSMENT — COGNITIVE AND FUNCTIONAL STATUS - GENERAL
CLIMB 3 TO 5 STEPS WITH RAILING: A LOT
STANDING UP FROM CHAIR USING ARMS: A LITTLE
MOBILITY SCORE: 17
TURNING FROM BACK TO SIDE WHILE IN FLAT BAD: A LITTLE
WALKING IN HOSPITAL ROOM: A LITTLE
MOVING TO AND FROM BED TO CHAIR: A LITTLE
MOVING FROM LYING ON BACK TO SITTING ON SIDE OF FLAT BED: A LITTLE
SUGGESTED CMS G CODE MODIFIER MOBILITY: CK

## 2024-07-18 ASSESSMENT — ENCOUNTER SYMPTOMS
VOMITING: 0
WHEEZING: 0
NUMBNESS: 1
HALLUCINATIONS: 0
BRUISES/BLEEDS EASILY: 0
ABDOMINAL PAIN: 0
EYE PAIN: 0
SHORTNESS OF BREATH: 0
TREMORS: 0
PSYCHIATRIC NEGATIVE: 1
NECK PAIN: 0
CHEST TIGHTNESS: 0
EYES NEGATIVE: 1
NAUSEA: 0
HEMATOLOGIC/LYMPHATIC NEGATIVE: 1
GASTROINTESTINAL NEGATIVE: 1
HEADACHES: 0
COUGH: 0
CONSTIPATION: 0
DIARRHEA: 0
SPEECH CHANGE: 0
ORTHOPNEA: 0
MYALGIAS: 0
ALLERGIC/IMMUNOLOGIC NEGATIVE: 1
DIZZINESS: 0
BACK PAIN: 0
TINGLING: 0
BLURRED VISION: 0
PHOTOPHOBIA: 0
DOUBLE VISION: 0
WEIGHT LOSS: 0
CONSTITUTIONAL NEGATIVE: 1
MUSCULOSKELETAL NEGATIVE: 1
CHILLS: 0
WEAKNESS: 1
SENSORY CHANGE: 0
FOCAL WEAKNESS: 0
FEVER: 0
PALPITATIONS: 0
SPUTUM PRODUCTION: 0
ENDOCRINE NEGATIVE: 1

## 2024-07-18 ASSESSMENT — PAIN DESCRIPTION - PAIN TYPE
TYPE: ACUTE PAIN
TYPE: SURGICAL PAIN
TYPE: ACUTE PAIN
TYPE: SURGICAL PAIN
TYPE: SURGICAL PAIN
TYPE: ACUTE PAIN

## 2024-07-18 ASSESSMENT — FIBROSIS 4 INDEX: FIB4 SCORE: 4.04

## 2024-07-18 NOTE — PROGRESS NOTES
"                 VASCULAR SURGERY                 Clinic Progress Note  _________________________________    Vitals for Today's Visit (7/18/2024)  /54   Pulse 87   Temp 36.3 °C (97.3 °F) (Temporal)   Resp 16   Ht 1.626 m (5' 4\")   Wt 82.7 kg (182 lb 5.1 oz)   SpO2 96%   BMI 31.30 kg/m²   _________________________________        7/18/2024  Status post right femoral artery endarterectomy and patch repair     Re-consulted this morning for difficulty finding pedal pulses. The patient is doing well, denies any significant groin or lower extremity pain. She has been out of bed and ambulating.       Vitals stable  Pleasant female in no apparent distress  Right groin with Prevena dressing in place, functioning.   Feet are pink and warm with multiphasic doppler flow signals in the bilateral PT/DP       A/P:  Status post right femoral artery endarterectomy and patch repair   The patient was seen and examined this morning. She has multiphasic doppler flow over her distal pulses bilaterally.   Keep Prevena in place for 7 days post op (7/22), then ok to remove  Follow up with vascular service in 2-3 weeks for wound check  Vascular surgery to sign off today, please call with any questions        The patient was seen and examined by Dr. Swan who agrees with the above plan.       Gay Helton PA-C  [facility]  [phone]  [address], [place] [address]  "

## 2024-07-18 NOTE — CARE PLAN
The patient is Watcher - Medium risk of patient condition declining or worsening    Shift Goals  Clinical Goals: Wean vasopressors  Patient Goals: have a bowel movement  Family Goals: Updates    Progress made toward(s) clinical / shift goals:    Problem: Pain - Standard  Goal: Alleviation of pain or a reduction in pain to the patient’s comfort goal  Outcome: Progressing     Problem: Fall Risk  Goal: Patient will remain free from falls  Outcome: Progressing     Problem: Neurovascular Monitoring  Goal: Patient's neurovascular status will be maintained or improve  Outcome: Progressing     Problem: Pain - Post Surgery  Goal: Alleviation or reduction of pain post surgery  Outcome: Progressing     Problem: Bowel Elimination - Post Surgical  Goal: Patient will resume regular bowel sounds and function with no discomfort or distention  Outcome: Progressing       Patient is not progressing towards the following goals:

## 2024-07-18 NOTE — PROGRESS NOTES
[facility] ICU Progress Note      Admit Date: 7/15/2024    Resident(s): Go Campbell D.O.   Attending:  PAIGE CLAUDIO/ Dr. Guerra     Patient ID:    Name:  Erin Hess   YOB: 1940  Age:  84 y.o.  female   MRN:  [id number]    Hospital Course (carried forward and updated):  Erin Hess is a 84 y.o. female with PMH most notable for HOCM, COPD/Asthma, Aortic Stenosis (s/p TAVR 7/15) who initially presented to TAVR. TAVR successfully performed but afterwards pt had injury to her R common femoral artery requiring surgical repair by vascular surgery. Post-procedure pt became hypotensive likely in setting of hemorrhagic shock, required pressor support, and was admitted to the ICU.     Consultants:  Critical Care  Cardiology     Interval Events:  No acute overnight events. This morning pt said she was feeling well, just generally tired. Denied any lightheadedness or headaches. Requested to see PT and Social work. OK to transfer today.     Vitals Range last 24h:  Temp:  [36.1 °C (96.9 °F)-37.5 °C (99.5 °F)] 36.3 °C (97.3 °F)  Pulse:  [68-87] 87  Resp:  [15-42] 16  BP: ()/(41-58) 108/54  SpO2:  [94 %-98 %] 96 %      Intake/Output Summary (Last 24 hours) at 7/18/2024 0727  Last data filed at 7/18/2024 0700  Gross per 24 hour   Intake 240 ml   Output 1350 ml   Net -1110 ml        Review of Systems   Constitutional:  Positive for malaise/fatigue. Negative for chills and fever.   Respiratory:  Negative for cough, shortness of breath and wheezing.    Cardiovascular:  Negative for chest pain and palpitations.   Gastrointestinal:  Negative for abdominal pain, constipation, diarrhea, nausea and vomiting.   Genitourinary:  Negative for dysuria, frequency and urgency.   Musculoskeletal:  Negative for myalgias and neck pain.   Neurological:  Positive for weakness. Negative for dizziness and headaches.       PHYSICAL EXAM:  Vitals:    07/18/24 0000 07/18/24 0200 07/18/24 0400 07/18/24 0500   BP: 108/53 105/51 108/54     Pulse:       Resp: 17 18 16    Temp:   36.3 °C (97.3 °F)    TempSrc:   Temporal    SpO2:       Weight:    82.7 kg (182 lb 5.1 oz)   Height:        Body mass index is 31.3 kg/m².    O2 therapy: Pulse Oximetry: 96 %, O2 (LPM): 1, O2 Delivery Device: Silicone Nasal Cannula    Date 07/18/24 0700 - 07/19/24 0659   Shift 9261-5732 6073-0417 8956-7785 24 Hour Total   INTAKE   Shift Total       OUTPUT   Urine 500   500     Urine Void (mL) 500   500   Shift Total 500   500   NET -500   -500        Physical Exam  Vitals and nursing note reviewed.   Constitutional:       Appearance: She is ill-appearing. She is not toxic-appearing or diaphoretic.   Cardiovascular:      Rate and Rhythm: Normal rate and regular rhythm.      Pulses: Normal pulses.      Heart sounds: Murmur heard.      No friction rub. No gallop.   Pulmonary:      Effort: Pulmonary effort is normal. No respiratory distress.      Breath sounds: Normal breath sounds. No stridor. No wheezing, rhonchi or rales.   Abdominal:      General: Abdomen is flat. Bowel sounds are normal. There is no distension.      Palpations: Abdomen is soft.      Tenderness: There is no abdominal tenderness. There is no guarding or rebound.   Musculoskeletal:         General: No swelling.      Right lower leg: No edema.      Left lower leg: No edema.   Skin:     Capillary Refill: Capillary refill takes 2 to 3 seconds.      Coloration: Skin is not pale.   Neurological:      Mental Status: She is alert and oriented to person, place, and time.         Recent Labs     07/15/24  0842 07/15/24  0933 07/15/24  1019   ISTATAPH 7.311* 7.300* 7.279*   ISTATAPCO2 45.6* 44.0* 45.2*   ISTATAPO2 134* 279* 306*   ISTATATCO2 24 23 23   WXNRQYO5PQD 99 100* 100*   ISTATARTHCO3 23.0 21.7 21.2   ISTATARTBE -3 -5* -5*   ISTATTEMP 37.0 C 37.0 C 37.0 C   ISTATSPEC Arterial Arterial Arterial   ISTATAPHTC 7.311* 7.300* 7.279*   [id number] 134* 279* 306*     Recent Labs     07/16/24  0327 07/17/24  0440  07/18/24 0415   SODIUM 137 137 139   POTASSIUM 4.7 4.2 4.0   CHLORIDE 108 107 105   CO2 21 23 26   BUN 19 15 11   CREATININE 0.83 0.64 0.65   MAGNESIUM 1.8 2.0 1.9   PHOSPHORUS 4.0 2.3* 2.2*   CALCIUM 8.7 8.9 9.3     Recent Labs     07/16/24 0327 07/17/24 0445 07/18/24 0415   ALTSGPT 9 9 8   ASTSGOT 14 11* 11*   ALKPHOSPHAT 47 44 44   TBILIRUBIN 0.4 0.4 0.5   GLUCOSE 122* 119* 114*     Recent Labs     07/15/24  2029 07/16/24  0327 07/16/24  1526 07/16/24 2351 07/17/24 0445 07/17/24 2103 07/18/24 0415   RBC  --    < > 3.13*  --  2.83*  --  2.68*   HEMOGLOBIN  --    < > 8.9*   < > 8.0* 7.6* 7.5*   HEMATOCRIT  --    < > 27.4*   < > 24.8* 23.8* 23.4*   PLATELETCT  --    < > 108*  --  91*  --  79*   PROTHROMBTM 14.7*  --   --   --   --   --   --    INR 1.14*  --   --   --   --   --   --    IRON  --   --  63  --   --   --   --    FERRITIN  --   --  47.1  --   --   --   --    TOTIRONBC  --   --  290  --   --   --   --     < > = values in this interval not displayed.     Recent Labs     07/15/24  1814 07/16/24  0327 07/16/24  0916 07/16/24  1526 07/17/24 0445 07/18/24 0415   WBC 15.6* 8.8   < > 11.7* 10.0 8.0   NEUTSPOLYS 87.70*  --   --   --   --   --    LYMPHOCYTES 4.50*  --   --   --   --   --    MONOCYTES 7.20  --   --   --   --   --    EOSINOPHILS 0.00  --   --   --   --   --    BASOPHILS 0.10  --   --   --   --   --    ASTSGOT  --  14  --   --  11* 11*   ALTSGPT  --  9  --   --  9 8   ALKPHOSPHAT  --  47  --   --  44 44   TBILIRUBIN  --  0.4  --   --  0.4 0.5    < > = values in this interval not displayed.       Meds:   magnesium sulfate  1 g      phosphorus  500 mg      midodrine  2.5 mg      fluticasone-umeclidinium-vilanterol  1 Puff      Respiratory Therapy Consult        albuterol  2.5 mg      ipratropium-albuterol  3 mL      anastrozole  1 mg      melatonin  5 mg      omeprazole  20 mg      PARoxetine  10 mg      rosuvastatin  20 mg      [Held by provider] spironolactone  25 mg      hydrALAZINE  10 mg       acetaminophen  650 mg      docusate sodium  100 mg      senna-docusate  1 Tablet      senna-docusate  1 Tablet      polyethylene glycol/lytes  1 Packet      magnesium hydroxide  30 mL      bisacodyl  10 mg      sodium phosphate  1 Each      ondansetron  4 mg      aspirin  81 mg      phenylephrine infusion  0-5 mcg/kg/min (Ideal) 0.25 mcg/kg/min (07/17/24 0833)    [Held by provider] valsartan  40 mg      [Held by provider] hydroCHLOROthiazide  6.25 mg      MD Alert...Adult ICU Electrolyte Replacement per Pharmacy            Procedures:  7/15 TAVR     Imaging:  DX-CHEST-PORTABLE (1 VIEW)   Final Result         1.  Mild pulmonary edema and/or infiltrates.   2.  Cardiomegaly   3.  Atherosclerosis      EC-ECHOCARDIOGRAM LTD W/O CONT   Final Result      EC-HEATHER W/O CONT   Final Result      DX-CHEST-PORTABLE (1 VIEW)   Final Result      1.  Low lung volumes with bibasilar hypoventilatory changes, underlying infection is possible.   2.  Stable mild enlargement of the cardiomediastinal silhouette.          ASSESSEMENT and PLAN:  * S/P TAVR (transcatheter aortic valve replacement)  Assessment & Plan      Successfully had TAVR 7/16. Had implantation of 23 Ivan JEANETTE S3       Plan:       -ASA       -regular pulse checks     Postprocedural hypotension  Assessment & Plan  Ddx likely in setting hemorrhagic shock after common femoral artery injury after TAVR.  Plan:  -Holding home BP medications   -trending Hgb Q12H   -Discontinued pressors   -Started pt on low dose midodrine 7/17- will likely not need long term.     Common femoral artery injury, right, initial encounter  Assessment & Plan  Fem artery injury after TAVR. Vascular Surgery consulted and performed femoral endarterectomy and repair with bovine patch.   -Monitor location of injury   -H&H Q12H; transfuse for <7.     Acute diastolic HF (heart failure) (HCC)  Assessment & Plan  Continue home diuretics  LVEDP was 19 at the end of the case; echo 7/15 and 7/16 with LVEF  65%.   -Holding lasix and anti-hypertensives while hypotensive     Asthma-COPD overlap syndrome (HCC)- (present on admission)  Assessment & Plan  Not in acute exacerbation  -continue home inhalers     Primary hypertension- (present on admission)  Assessment & Plan   Holding home meds while hypotensive. Restart as tolerated.     Stage 3b chronic kidney disease- (present on admission)  Assessment & Plan  Strict I/Os  Renally dose all medications and avoid nephrotoxins.     Dyslipidemia- (present on admission)  Assessment & Plan  Statin      DISPO: ICU     CODE STATUS: FULL     Quality Measures:  Feeding: Cardiac   Analgesia: Tylenol   Sedation: n/a   Thromboprophylaxis: SCDs   Head of bed: >30 degrees  Ulcer prophylaxis: n/a   Glycemic control: n/a   Bowel care: bowel regimen  Indwelling lines: pIV x2, urethral catheter   Deescalation of antibiotics: n/a       Go Campbell D.O.

## 2024-07-18 NOTE — PROGRESS NOTES
Cardiology Follow Up Progress Note    Date of Service  7/18/2024    Attending Physician  Blu Guerra Jr., D.O.    Chief Complaint   Scheduled TAVR     HPI  Erin Hess is a 84 y.o. female admitted 7/15/2024 with severe symptomatic aortic stenosis for planned TAVR. She has a history of HTN, asthma-COPD overlap, HLD, stage 3b CKD, obesity, ROBYN. Intraoperatively the patient sustained an injury to her right common femoral artery requiring surgical cutdown repair by Dr. Swan. Post-procedure, the patient did have hypotension requiring pressor support. They did receive IV lasix due to acute diastolic HF.      Interim Events  7/16/2024 Overnight the patient required less pressor support and was weaned off.  Patient has not yet been mobilized today  HR and rhythm stable overnight  Hgb trended up and CT was canceled     7/17/2024 Patient was able to sit at edge of bed yesterday, did become hypotensive upon standing.  She remains on low dose phenylephrine   Right foot numbness improved, pedal pulses normal    7/18/2024 Now off of pressors, BPs stable.   Mobilized up to the chair  Groin site looks CDI with moderate bruising surrounding  She reports numbness to the right foot, this has been impairing her ability to walk  PT to evaluate today  HH 7.9 -> 8.0 -> 7.6 -> 7.5    Review of Systems  Review of Systems   Constitutional: Negative.    HENT: Negative.     Eyes: Negative.    Respiratory:  Negative for chest tightness and shortness of breath.    Cardiovascular:  Negative for chest pain, palpitations and leg swelling.   Gastrointestinal: Negative.    Endocrine: Negative.    Genitourinary: Negative.    Musculoskeletal: Negative.    Skin: Negative.    Allergic/Immunologic: Negative.    Neurological:  Positive for numbness.        Right foot   Hematological: Negative.  Does not bruise/bleed easily.   Psychiatric/Behavioral: Negative.         Vital signs in last 24 hours  Temp:  [36.1 °C (96.9 °F)-37.5 °C (99.5 °F)] 36.3 °C  (97.3 °F)  Pulse:  [68-87] 87  Resp:  [15-42] 16  BP: ()/(41-58) 108/54  SpO2:  [94 %-98 %] 96 %    Physical Exam  Physical Exam  Constitutional:       Appearance: Normal appearance. She is obese.   HENT:      Head: Normocephalic and atraumatic.      Mouth/Throat:      Pharynx: Oropharynx is clear.   Eyes:      Extraocular Movements: Extraocular movements intact.      Pupils: Pupils are equal, round, and reactive to light.   Cardiovascular:      Rate and Rhythm: Normal rate and regular rhythm.      Pulses: Normal pulses.      Heart sounds: Murmur heard.      Systolic murmur is present with a grade of 2/6.      No friction rub.   Pulmonary:      Effort: Pulmonary effort is normal.      Breath sounds: Normal breath sounds.   Abdominal:      General: Abdomen is flat. Bowel sounds are normal.      Palpations: Abdomen is soft.   Musculoskeletal:         General: Normal range of motion.      Cervical back: Neck supple. No tenderness.   Skin:     General: Skin is warm and dry.   Neurological:      General: No focal deficit present.      Mental Status: She is alert and oriented to person, place, and time.   Psychiatric:         Mood and Affect: Mood normal.         Behavior: Behavior normal.         Lab Review  Lab Results   Component Value Date/Time    WBC 8.0 07/18/2024 04:15 AM    RBC 2.68 (L) 07/18/2024 04:15 AM    HEMOGLOBIN 7.5 (L) 07/18/2024 04:15 AM    HEMATOCRIT 23.4 (L) 07/18/2024 04:15 AM    MCV 87.3 07/18/2024 04:15 AM    MCH 28.0 07/18/2024 04:15 AM    MCHC 32.1 (L) 07/18/2024 04:15 AM    MPV 11.4 07/18/2024 04:15 AM      Lab Results   Component Value Date/Time    SODIUM 139 07/18/2024 04:15 AM    POTASSIUM 4.0 07/18/2024 04:15 AM    CHLORIDE 105 07/18/2024 04:15 AM    CO2 26 07/18/2024 04:15 AM    GLUCOSE 114 (H) 07/18/2024 04:15 AM    BUN 11 07/18/2024 04:15 AM    CREATININE 0.65 07/18/2024 04:15 AM      Lab Results   Component Value Date/Time    ASTSGOT 11 (L) 07/18/2024 04:15 AM    ALTSGPT 8  07/18/2024 04:15 AM     Lab Results   Component Value Date/Time    CHOLSTRLTOT 175 06/24/2024 12:53 PM    LDL 79 06/24/2024 12:53 PM    HDL 75 06/24/2024 12:53 PM    TRIGLYCERIDE 104 06/24/2024 12:53 PM    TROPONINT 15 09/21/2023 02:17 PM       Recent Labs     07/15/24  1100 07/16/24  0327   NTPROBNP 331* 427*       Cardiac Imaging and Procedures Review  EKG:  My personal interpretation of the EKG dated 7/18/2024 is   SR LA enlargement, rate 72, 0.160/0.094/0.420    Intraoperative Echocardiogram:    CONCLUSIONS  Intraoperative HEATHER for TAVR procedure  Severe aortic stenosis. Transvalvular gradient and DANIKA difficult to   measure due to poor transgastric views from previous gastric sleeve   procedure. Replaced with 23 Vian S3 Ultra Resilia valve. Good   prosthetic valvular function.  LV EF 65%. Grade 1 diastolic dysfunction. Asymetrical septal   hypertrophy with flow acceleration but no notable ANGIE.  Moderate MS and mild MR. Mild TR.  Severe left atrial enlargement.     LTD echocardiogram 7/15/2024  CONCLUSIONS  Limited echocardiogram to assess for pericardial effusion.     The left ventricular ejection fraction is visually estimated to be 65%.  Known TAVR aortic valve that is functioning normally with normal   transvalvular gradients.  Small pericardial effusion without evidence of hemodynamic compromise.      Compared to the prior study on 6/18/2024, there is now interval   placement of bioprosthetic aortic valve.  There is also presence of   small pericardial effusion.     Chest X-Ray 7/16/2024   1.  Mild pulmonary edema and/or infiltrates.  2.  Cardiomegaly  3.  Atherosclerosis    Assessment/Plan  S/P TAVR  -stable post-op  -cont asa lifelong  -prevena intact, left groin CDI  -cardiac rehab referral placed  -EKG shows SR  -mobilize TID, up to chair as tolerated   -now off phenylephrine, transfer to tele  - plan to work with PT today     Acute diastolic HF  -EF 65%  - Diuretic: lasix discontinued, pressures did  not tolerate  - valsartan, HCTZ, and spironolactone held     Common Femoral artery injury  -s/p repair by Dr. Swan  -prevena in place and intact  -Hgb 9.8, 10.7, 8.9, 8.6, 8.9, 7.9, 8.0, 7.6, 7.5  -continue q 6 hr HH    Right foot numbness  - patient reported significant numbness to the right foot  - pedal pulses are strong and easily palpated  - doppler confirmed pulses normal  - I notified Dr. Swan about patient concern of numbness. He did not believe that an US or CT was necessary as long as the pulses were present        Thank you for allowing me to participate in the care of this patient.  I will continue to follow this patient     I personally spent a total of 16 minutes which includes face-to-face time and non-face-to-face time spent on preparing to see the patient, reviewing hospital notes and tests, obtaining history from the patient, performing a medically appropriate exam, counseling and educating the patient, ordering medications/tests/procedures/referrals as clinically indicated, and documenting information in the electronic medical record.    Please contact me with any questions.    ARMIN Rivera.   Cardiologist, [facility]  [phone]

## 2024-07-18 NOTE — CARE PLAN
Problem: Pain - Standard  Goal: Alleviation of pain or a reduction in pain to the patient’s comfort goal  Description: Target End Date:  Prior to discharge or change in level of care    Document on Vitals flowsheet    1.  Document pain using the appropriate pain scale per order or unit policy  2.  Educate and implement non-pharmacologic comfort measures (i.e. relaxation, distraction, massage, cold/heat therapy, etc.)  3.  Pain management medications as ordered  4.  Reassess pain after pain med administration per policy  5.  If opiods administered assess patient's response to pain medication is appropriate per POSS sedation scale  6.  Follow pain management plan developed in collaboration with patient and interdisciplinary team (including palliative care or pain specialists if applicable)  Outcome: Progressing     Problem: Knowledge Deficit - Standard  Goal: Patient and family/care givers will demonstrate understanding of plan of care, disease process/condition, diagnostic tests and medications  Description: Target End Date:  1-3 days or as soon as patient condition allows    Document in Patient Education    1.  Patient and family/caregiver oriented to unit, equipment, visitation policy and means for communicating concern  2.  Complete/review Learning Assessment  3.  Assess knowledge level of disease process/condition, treatment plan, diagnostic tests and medications  4.  Explain disease process/condition, treatment plan, diagnostic tests and medications  Outcome: Progressing     Problem: Respiratory/Oxygenation Function Post-Surgical  Goal: Patient will achieve/maintain normal respiratory rate/effort  Description: Target End Date:  Prior to discharge or change in level of care    Document on Assessment flowsheet    1.   Assess and monitor rate, rhythm, depth and effort of respiration  2.   Ensure continuous pulse oximetry in use  3.   Assess O2 saturation, administer/titrate oxygen as ordered  4.   Educate patient  on importance of turning, coughing and deep breathing  5.   Educate patient on splinting techniques during deep breathing and coughing  6.   Encourage use of incentive spirometer (at least 5 to 10 times per hour)  7.   Position patient for maximum ventilatory efficiency  8.   Collaborate with RT to administer medication/treatments per order  9.   Airway suctioning as needed  10. Oral hygiene  11. Alternate physical activity with rest periods  Outcome: Progressing     Problem: Early Mobilization - Post Surgery  Goal: Early mobilization post surgery  Description: Target End Date:  Post op day 1    1.  Out of bed on post op day 0, unless contraindicated  2.  Ambulate three times a day post-op day one, advance activity as tolerated  3.  Up in chair for meals  4.  Pain management adequate to allow progressive mobilization  5.  Don/doff brace/corset as ordered  Outcome: Progressing     Problem: Pain - Post Surgery  Goal: Alleviation or reduction of pain post surgery  Description: Target End Date:  Target End Date:  1 - 3 days post op    1.  Pain assessed q2 hours for 24 hours, then q4 hours during use of PCA  2.  Transition to oral medications as appropriate  3.  Epidural assessment if applicable  4.  Ice to surgical site per order  Outcome: Progressing   The patient is Stable - Low risk of patient condition declining or worsening    Shift Goals  Clinical Goals: Hemodynamic stability  Patient Goals: have a bowel movement  Family Goals: Updates    Progress made toward(s) clinical / shift goals:  Progress toward goals as stated above.     Patient is not progressing towards the following goals:

## 2024-07-18 NOTE — THERAPY
Physical Therapy   Cardiac Rehab Initial Evaluation     Patient Name: Erin Hess  Age:  84 y.o., Sex:  female  Medical Record #: [id number]  Today's Date: 7/18/2024     Precautions  Precautions: Fall Risk;Cardiac Precautions (See Comments)  Comments: prevena R groin    Assessment  Patient is 84 y.o. female s/p TAVR and femoral endarterectomy with patch repair on 7/15, prevena R groin; diagnosis of acute diastolic heart failure. PMHx of breast CA, gastric sleeve surgery, DLD, Htn, CKD III, obesity, ROBYN, prior smoker, COPD, asthma, aortic stenosis. Pt receptive to [facility] with handouts provided regarding walking program, talk test, RPE scale, BP/HR monitoring, s/s heart failure response, Cardiac rehab phase I and II, activity pacing and progression. Pt mobilized as detailed below. Recommend placement. Pt would benefit from continued acute IP PT services to address below deficits.     Plan    Physical Therapy Initial Treatment Plan   Treatment Plan : Bed Mobility, Equipment, Family / Caregiver Training, Gait Training, Manual Therapy, Neuro Re-Education / Balance, Self Care / Home Evaluation, Stair Training, Therapeutic Activities, Therapeutic Exercise  Treatment Frequency: 4 Times per Week  Duration: Until Therapy Goals Met    DC Equipment Recommendations: Unable to determine at this time  Discharge Recommendations: Recommend post-acute placement for additional physical therapy services prior to discharge home (PM&R consult)       Subjective    Family and pt had questions regarding d/c planning, PT d/c recommendations.     Objective     07/18/24 1417   Time In/Time Out   Therapy Start Time 1350   Therapy End Time 1417   Total Therapy Time 27   Charge Group   PT Evaluation PT Evaluation High   PT Self Care / Home Evaluation (Units) 1   Total Time Spent   PT Evaluation Time Spent (Mins) 17   PT Self Care/Home Evaluation Time Spent (Mins) 10   PT Total Time Spent (Calculated) 27   Initial Contact Note    Initial Contact Note  Order Received and Verified, Physical Therapy Evaluation in Progress with Full Report to Follow.   Precautions   Precautions Fall Risk;Cardiac Precautions (See Comments)   Comments prevena R groin   Vitals   Pulse 79   Patient BP Position Sitting   Blood Pressure  118/49  (standing 105/68 HR 97; returned reclined 107/49 HR 81)   Pulse Oximetry 94 %   O2 (LPM) 1   O2 Delivery Device Silicone Nasal Cannula   Vitals Comments new o2 needs, reports O2 at night baseline. C/o dizziness/lightheadedness in standing, improved once reclined, RN aware   Pain 0 - 10 Group   Therapist Pain Assessment Post Activity Pain Same as Prior to Activity;Nurse Notified  (c/o pain R groin not rated, agreeable to mobility)   Prior Living Situation   Housing / Facility 1 Story House   Steps Into Home   (threshold)   Steps In Home 0   Equipment Owned None   Lives with - Patient's Self Care Capacity   (roommate)   Comments reports grandson and his wife live in area, able to assist pt daily/nightly upon d/c. Son also very supportive, flying often though   Prior Level of Functional Mobility   Bed Mobility Independent   Transfer Status Independent   Ambulation Independent   Ambulation Distance   (community)   Assistive Devices Used None   Stairs Independent   Cognition    Cognition / Consciousness WDL   Level of Consciousness Alert   Comments pleasant and cooperative   Active ROM Upper Body   Active ROM Upper Body  WDL   Comments grossly with mobility   Strength Upper Body   Upper Body Strength  WDL   Comments same as above   Passive ROM Lower Body   Passive ROM Lower Body WDL   Active ROM Lower Body    Active ROM Lower Body  X   Comments difficulty lifting RLE 2/2 pain, weakness   Strength Lower Body   Lower Body Strength  X   Comments R knee ext 3+/5 noted with standing, LLE grossly 4/5   Sensation Lower Body   Comments numbness R toes and foot   Coordination Lower Body    Comments impaired RLE 2/2 weakness and pain   Balance Assessment    Sitting Balance (Static) Fair +   Sitting Balance (Dynamic) Fair   Standing Balance (Static) Fair -   Standing Balance (Dynamic) Poor +   Weight Shift Sitting Fair   Weight Shift Standing Fair   Comments w/ FWW   Bed Mobility    Comments NT, in chair pre/post session   Gait Analysis   Gait Level Of Assist Minimal Assist   Assistive Device Front Wheel Walker   Distance (Feet) 7  (forward/backward. Limited 2/2 dizziness, diaphoretic appearing. Negative talk test, RPE 13)   # of Times Distance was Traveled 1   Deviation Bradykinetic;Shuffled Gait  (decreased stride length)   Weight Bearing Status no restrictions   Functional Mobility   Sit to Stand Minimal Assist   Bed, Chair, Wheelchair Transfer Minimal Assist   Transfer Method Stand Step   Mobility w/ FWW STS, steps forward/backward, return reclined   ICU Target Mobility Level   ICU Mobility - Targeted Level Level 4   6 Clicks Assessment - How much HELP from from another person do you currently need... (If the patient hasn't done an activity recently, how much help from another person do you think he/she would need if he/she tried?)   Turning from your back to your side while in a flat bed without using bedrails? 3   Moving from lying on your back to sitting on the side of a flat bed without using bedrails? 3   Moving to and from a bed to a chair (including a wheelchair)? 3   Standing up from a chair using your arms (e.g., wheelchair, or bedside chair)? 3   Walking in hospital room? 3   Climbing 3-5 steps with a railing? 2   6 clicks Mobility Score 17   Activity Tolerance   Sitting in Chair up pre/post session   Standing 3 min   Patient / Family Goals    Patient / Family Goal #1 to go to rehab   Short Term Goals    Short Term Goal # 1 Pt will perform supine <> sit with SPV in 6 visits to get in/out of bed   Short Term Goal # 2 Pt will perform stand step transfers with FWW and SPV in 6 visits to get in/out of chair   Short Term Goal # 3 Pt will ambulate 150ft with  JIMW and SMITH in 6 visits to access home environment   Education Group   Education Provided Role of Physical Therapist;Cardiac Precautions   Cardiac Precautions Patient Response Patient;Acceptance;Family;Explanation;Handout;Verbal Demonstration;Action Demonstration   Role of Physical Therapist Patient Response Patient;Acceptance;Explanation;Verbal Demonstration   Additional Comments Pt receptive to self management and compensatory strategies with mobility. Extensive education regarding PT role, PT POC, d/c planning options and recommendations   Physical Therapy Initial Treatment Plan    Treatment Plan  Bed Mobility;Equipment;Family / Caregiver Training;Gait Training;Manual Therapy;Neuro Re-Education / Balance;Self Care / Home Evaluation;Stair Training;Therapeutic Activities;Therapeutic Exercise   Treatment Frequency 4 Times per Week   Duration Until Therapy Goals Met   Problem List    Problems Pain;Impaired Bed Mobility;Impaired Transfers;Impaired Ambulation;Functional Strength Deficit;Impaired Balance;Decreased Activity Tolerance;Safety Awareness Deficits / Cognition;Impaired Coordination;Limited Knowledge of Post-Op Precautions   Anticipated Discharge Equipment and Recommendations   DC Equipment Recommendations Unable to determine at this time   Discharge Recommendations Recommend post-acute placement for additional physical therapy services prior to discharge home  (PM&R consult)   Interdisciplinary Plan of Care Collaboration   IDT Collaboration with  Nursing;Family / Caregiver   Patient Position at End of Therapy Chair Alarm On;Seated;Call Light within Reach;Tray Table within Reach;Phone within Reach;Family / Friend in Room   Collaboration Comments Rn updated   Session Information   Date / Session Number  7/18- 1 (1/4, 7/24)   Priority   (TAVR)

## 2024-07-18 NOTE — CARE PLAN
The patient is Watcher - Medium risk of patient condition declining or worsening    Shift Goals  Clinical Goals: Hemodynamic stability  Patient Goals: have a bowel movement  Family Goals: Updates    Progress made toward(s) clinical / shift goals:        Problem: Pain - Standard  Goal: Alleviation of pain or a reduction in pain to the patient’s comfort goal  Outcome: Progressing     Problem: Knowledge Deficit - Standard  Goal: Patient and family/care givers will demonstrate understanding of plan of care, disease process/condition, diagnostic tests and medications  Outcome: Progressing     Problem: Skin Integrity  Goal: Skin integrity is maintained or improved  Outcome: Progressing     Problem: Fall Risk  Goal: Patient will remain free from falls  Outcome: Progressing       Problem: Neuro Status  Goal: Neuro status will remain stable or improve  Outcome: Progressing     Problem: Neurovascular Monitoring  Goal: Patient's neurovascular status will be maintained or improve  Outcome: Progressing     Problem: Risk for Post Op Fluid Imbalance  Goal: Promotion of fluid balance  Outcome: Progressing     Problem: Respiratory/Oxygenation Function Post-Surgical  Goal: Patient will achieve/maintain normal respiratory rate/effort  Outcome: Progressing     Problem: Early Mobilization - Post Surgery  Goal: Early mobilization post surgery  Outcome: Progressing     Problem: Surgical Drain Management  Goal: Proper management/care of surgical drains will be maintained  Outcome: Progressing     Problem: Bowel Elimination - Post Surgical  Goal: Patient will resume regular bowel sounds and function with no discomfort or distention  Outcome: Progressing     Problem: Respiratory  Goal: Patient will achieve/maintain optimum respiratory ventilation and gas exchange  Outcome: Progressing

## 2024-07-18 NOTE — DISCHARGE PLANNING
Case Management Discharge Planning    Admission Date: 7/15/2024  GMLOS: 2.7  ALOS: 3    6-Clicks ADL Score:    6-Clicks Mobility Score:        Anticipated Discharge Dispo: Discharge Disposition: Discharged to home/self care (01)  Discharge Address: [address]   [place] [address]    DME Needed: No    Action(s) Taken: DC Assessment Complete (See below) and Family Conference    LMSW met with pt at bedside to complete DC assessment. LMSW Introduced self and department roles. Pt A&Ox4 and able to verify the information on the face sheet. Pt lives with a roommate in a single-story house. Pt verified address as [address], [place] [address]. Pt verified PCP as Terra Zambrano MD. Prior to this hospitalization pt was independent at home with ADLs and most IADLs. Pt does not use any DME at baseline for mobilization. Pt stated she uses home O2, 2L at night and as needed during the day. Pt gets her home O2 through [employer]. Pt reported that her family is a good support for her. Pt denies any SA or MH concerns. Pt does have an advance directive.     LMSW had a discussion with the pt and the pt family about the plan for DC and the possibilities being SNF vs IPR vs HH. Pt is pending PT/OT evaluation and recommendations. LMSW will keep family updated on the pt DC.     Escalations Completed: None    Medically Clear: No    Next Steps: f/u with pt and medical team to discuss dc needs and barriers.     Barriers to Discharge: Medical clearance and Pending PT Evaluation    Is the patient up for discharge tomorrow: No      Care Transition Team Assessment    Information Source  Orientation Level: Oriented X4  Information Given By: Patient, Relative  Who is responsible for making decisions for patient? : Patient    Readmission Evaluation  Is this a readmission?: No    Elopement Risk  Legal Hold: No  Ambulatory or Self Mobile in Wheelchair: No-Not an Elopement Risk  Elopement Risk: Not at Risk for  Elopement    Interdisciplinary Discharge Planning  Lives with - Patient's Self Care Capacity: Unrelated Adult  Support Systems: Children, Family Member(s)  Housing / Facility: [address]  Durable Medical Equipment: Home Oxygen    Discharge Preparedness  What is your plan after discharge?: Uncertain - pending medical team collaboration  What are your discharge supports?: Child  Prior Functional Level: Ambulatory, Independent with Activities of Daily Living, Independent with Medication Management  Difficulity with ADLs: None  Difficulity with IADLs: None    Functional Assesment  Prior Functional Level: Ambulatory, Independent with Activities of Daily Living, Independent with Medication Management    Finances  Financial Barriers to Discharge: No  Prescription Coverage: Yes    Vision / Hearing Impairment  Right Eye Vision: Impaired, Wears Glasses  Left Eye Vision: Impaired, Wears Glasses  Hearing Impairment: Both Ears, Hearing Device Not Available    Advance Directive  Advance Directive?: DPOA for Health Care, Living Will    Domestic Abuse  Physical Abuse or Sexual Abuse: No  Verbal Abuse or Emotional Abuse: No    Psychological Assessment  History of Substance Abuse: None  History of Psychiatric Problems: No  Non-compliant with Treatment: No      Anticipated Discharge Information  Discharge Disposition: Discharged to home/self care (01)  Discharge Address: [id number] [place] [address] [place] [id number]

## 2024-07-19 ENCOUNTER — APPOINTMENT (OUTPATIENT)
Dept: RADIOLOGY | Facility: MEDICAL CENTER | Age: 84
DRG: 266 | End: 2024-07-19
Payer: MEDICARE

## 2024-07-19 ENCOUNTER — APPOINTMENT (OUTPATIENT)
Dept: CARDIOLOGY | Facility: MEDICAL CENTER | Age: 84
DRG: 266 | End: 2024-07-19
Payer: MEDICARE

## 2024-07-19 PROBLEM — I48.91 ATRIAL FIBRILLATION WITH RAPID VENTRICULAR RESPONSE (HCC): Status: ACTIVE | Noted: 2024-07-19

## 2024-07-19 PROBLEM — D64.9 ANEMIA: Status: ACTIVE | Noted: 2024-07-19

## 2024-07-19 LAB
ABO GROUP BLD: NORMAL
ALBUMIN SERPL BCP-MCNC: 2.8 G/DL (ref 3.2–4.9)
ALBUMIN/GLOB SERPL: 1 G/DL
ALP SERPL-CCNC: 46 U/L (ref 30–99)
ALT SERPL-CCNC: 6 U/L (ref 2–50)
ANION GAP SERPL CALC-SCNC: 9 MMOL/L (ref 7–16)
APPEARANCE UR: CLEAR
AST SERPL-CCNC: 9 U/L (ref 12–45)
BARCODED ABORH UBTYP: 6200
BARCODED PRD CODE UBPRD: NORMAL
BARCODED UNIT NUM UBUNT: NORMAL
BILIRUB SERPL-MCNC: 0.6 MG/DL (ref 0.1–1.5)
BILIRUB UR QL STRIP.AUTO: NEGATIVE
BLD GP AB SCN SERPL QL: NORMAL
BUN SERPL-MCNC: 14 MG/DL (ref 8–22)
CALCIUM ALBUM COR SERPL-MCNC: 10.1 MG/DL (ref 8.5–10.5)
CALCIUM SERPL-MCNC: 9.1 MG/DL (ref 8.5–10.5)
CHLORIDE SERPL-SCNC: 104 MMOL/L (ref 96–112)
CO2 SERPL-SCNC: 27 MMOL/L (ref 20–33)
COLOR UR: YELLOW
COMPONENT R 8504R: NORMAL
CORTIS SERPL-MCNC: 23.1 UG/DL (ref 0–23)
CREAT SERPL-MCNC: 0.73 MG/DL (ref 0.5–1.4)
EKG IMPRESSION: NORMAL
ERYTHROCYTE [DISTWIDTH] IN BLOOD BY AUTOMATED COUNT: 47.8 FL (ref 35.9–50)
GFR SERPLBLD CREATININE-BSD FMLA CKD-EPI: 82 ML/MIN/1.73 M 2
GLOBULIN SER CALC-MCNC: 2.7 G/DL (ref 1.9–3.5)
GLUCOSE BLD STRIP.AUTO-MCNC: 112 MG/DL (ref 65–99)
GLUCOSE SERPL-MCNC: 115 MG/DL (ref 65–99)
GLUCOSE UR STRIP.AUTO-MCNC: NEGATIVE MG/DL
HCT VFR BLD AUTO: 24.4 % (ref 37–47)
HCT VFR BLD AUTO: 25.1 % (ref 37–47)
HGB BLD-MCNC: 7.8 G/DL (ref 12–16)
HGB BLD-MCNC: 8.1 G/DL (ref 12–16)
KETONES UR STRIP.AUTO-MCNC: NEGATIVE MG/DL
LACTATE SERPL-SCNC: 0.9 MMOL/L (ref 0.5–2)
LEUKOCYTE ESTERASE UR QL STRIP.AUTO: NEGATIVE
LV EJECT FRACT MOD 4C 99902: 55.36
MAGNESIUM SERPL-MCNC: 2.1 MG/DL (ref 1.5–2.5)
MCH RBC QN AUTO: 27.9 PG (ref 27–33)
MCHC RBC AUTO-ENTMCNC: 32 G/DL (ref 32.2–35.5)
MCV RBC AUTO: 87.1 FL (ref 81.4–97.8)
MICRO URNS: NORMAL
NITRITE UR QL STRIP.AUTO: NEGATIVE
PH UR STRIP.AUTO: 5.5 [PH] (ref 5–8)
PHOSPHATE SERPL-MCNC: 3.3 MG/DL (ref 2.5–4.5)
PLATELET # BLD AUTO: 95 K/UL (ref 164–446)
PLATELETS.RETICULATED NFR BLD AUTO: 6 % (ref 0.6–13.1)
PMV BLD AUTO: 11.6 FL (ref 9–12.9)
POTASSIUM SERPL-SCNC: 3.8 MMOL/L (ref 3.6–5.5)
PRODUCT TYPE UPROD: NORMAL
PROT SERPL-MCNC: 5.5 G/DL (ref 6–8.2)
PROT UR QL STRIP: NEGATIVE MG/DL
RBC # BLD AUTO: 2.8 M/UL (ref 4.2–5.4)
RBC UR QL AUTO: NEGATIVE
RH BLD: NORMAL
SODIUM SERPL-SCNC: 140 MMOL/L (ref 135–145)
SP GR UR STRIP.AUTO: 1.01
TROPONIN T SERPL-MCNC: 116 NG/L (ref 6–19)
TSH SERPL DL<=0.005 MIU/L-ACNC: 1.49 UIU/ML (ref 0.38–5.33)
UNIT STATUS USTAT: NORMAL
UROBILINOGEN UR STRIP.AUTO-MCNC: 1 MG/DL
WBC # BLD AUTO: 6.5 K/UL (ref 4.8–10.8)

## 2024-07-19 PROCEDURE — 83605 ASSAY OF LACTIC ACID: CPT

## 2024-07-19 PROCEDURE — 93010 ELECTROCARDIOGRAM REPORT: CPT | Mod: 76 | Performed by: STUDENT IN AN ORGANIZED HEALTH CARE EDUCATION/TRAINING PROGRAM

## 2024-07-19 PROCEDURE — 700102 HCHG RX REV CODE 250 W/ 637 OVERRIDE(OP)

## 2024-07-19 PROCEDURE — 700101 HCHG RX REV CODE 250

## 2024-07-19 PROCEDURE — 84443 ASSAY THYROID STIM HORMONE: CPT

## 2024-07-19 PROCEDURE — 84484 ASSAY OF TROPONIN QUANT: CPT

## 2024-07-19 PROCEDURE — 71045 X-RAY EXAM CHEST 1 VIEW: CPT

## 2024-07-19 PROCEDURE — 93325 DOPPLER ECHO COLOR FLOW MAPG: CPT

## 2024-07-19 PROCEDURE — 85027 COMPLETE CBC AUTOMATED: CPT

## 2024-07-19 PROCEDURE — 51798 US URINE CAPACITY MEASURE: CPT

## 2024-07-19 PROCEDURE — 84100 ASSAY OF PHOSPHORUS: CPT

## 2024-07-19 PROCEDURE — 700105 HCHG RX REV CODE 258

## 2024-07-19 PROCEDURE — 93325 DOPPLER ECHO COLOR FLOW MAPG: CPT | Mod: 26 | Performed by: STUDENT IN AN ORGANIZED HEALTH CARE EDUCATION/TRAINING PROGRAM

## 2024-07-19 PROCEDURE — 82962 GLUCOSE BLOOD TEST: CPT

## 2024-07-19 PROCEDURE — A9270 NON-COVERED ITEM OR SERVICE: HCPCS | Mod: JZ | Performed by: INTERNAL MEDICINE

## 2024-07-19 PROCEDURE — 94640 AIRWAY INHALATION TREATMENT: CPT

## 2024-07-19 PROCEDURE — 93010 ELECTROCARDIOGRAM REPORT: CPT | Mod: 77 | Performed by: INTERNAL MEDICINE

## 2024-07-19 PROCEDURE — 82533 TOTAL CORTISOL: CPT

## 2024-07-19 PROCEDURE — P9016 RBC LEUKOCYTES REDUCED: HCPCS

## 2024-07-19 PROCEDURE — 85018 HEMOGLOBIN: CPT

## 2024-07-19 PROCEDURE — 93005 ELECTROCARDIOGRAM TRACING: CPT | Performed by: INTERNAL MEDICINE

## 2024-07-19 PROCEDURE — A9270 NON-COVERED ITEM OR SERVICE: HCPCS

## 2024-07-19 PROCEDURE — 86901 BLOOD TYPING SEROLOGIC RH(D): CPT

## 2024-07-19 PROCEDURE — 700101 HCHG RX REV CODE 250: Performed by: STUDENT IN AN ORGANIZED HEALTH CARE EDUCATION/TRAINING PROGRAM

## 2024-07-19 PROCEDURE — 85055 RETICULATED PLATELET ASSAY: CPT

## 2024-07-19 PROCEDURE — 700117 HCHG RX CONTRAST REV CODE 255

## 2024-07-19 PROCEDURE — 83735 ASSAY OF MAGNESIUM: CPT

## 2024-07-19 PROCEDURE — 99291 CRITICAL CARE FIRST HOUR: CPT | Performed by: INTERNAL MEDICINE

## 2024-07-19 PROCEDURE — 700102 HCHG RX REV CODE 250 W/ 637 OVERRIDE(OP): Mod: JZ | Performed by: INTERNAL MEDICINE

## 2024-07-19 PROCEDURE — 85014 HEMATOCRIT: CPT

## 2024-07-19 PROCEDURE — 80053 COMPREHEN METABOLIC PANEL: CPT

## 2024-07-19 PROCEDURE — 86900 BLOOD TYPING SEROLOGIC ABO: CPT

## 2024-07-19 PROCEDURE — 86923 COMPATIBILITY TEST ELECTRIC: CPT

## 2024-07-19 PROCEDURE — 700111 HCHG RX REV CODE 636 W/ 250 OVERRIDE (IP)

## 2024-07-19 PROCEDURE — 93308 TTE F-UP OR LMTD: CPT | Mod: 26 | Performed by: STUDENT IN AN ORGANIZED HEALTH CARE EDUCATION/TRAINING PROGRAM

## 2024-07-19 PROCEDURE — 93005 ELECTROCARDIOGRAM TRACING: CPT

## 2024-07-19 PROCEDURE — 700111 HCHG RX REV CODE 636 W/ 250 OVERRIDE (IP): Mod: JZ

## 2024-07-19 PROCEDURE — 36430 TRANSFUSION BLD/BLD COMPNT: CPT

## 2024-07-19 PROCEDURE — 700105 HCHG RX REV CODE 258: Performed by: INTERNAL MEDICINE

## 2024-07-19 PROCEDURE — 86850 RBC ANTIBODY SCREEN: CPT

## 2024-07-19 PROCEDURE — 36415 COLL VENOUS BLD VENIPUNCTURE: CPT

## 2024-07-19 PROCEDURE — 30233N1 TRANSFUSION OF NONAUTOLOGOUS RED BLOOD CELLS INTO PERIPHERAL VEIN, PERCUTANEOUS APPROACH: ICD-10-PCS | Performed by: INTERNAL MEDICINE

## 2024-07-19 PROCEDURE — 74174 CTA ABD&PLVS W/CONTRAST: CPT

## 2024-07-19 PROCEDURE — 770020 HCHG ROOM/CARE - TELE (206)

## 2024-07-19 PROCEDURE — 81003 URINALYSIS AUTO W/O SCOPE: CPT

## 2024-07-19 PROCEDURE — 700111 HCHG RX REV CODE 636 W/ 250 OVERRIDE (IP): Mod: JZ | Performed by: INTERNAL MEDICINE

## 2024-07-19 RX ORDER — MORPHINE SULFATE 4 MG/ML
2 INJECTION INTRAVENOUS
Status: DISCONTINUED | OUTPATIENT
Start: 2024-07-19 | End: 2024-07-23 | Stop reason: HOSPADM

## 2024-07-19 RX ORDER — SODIUM CHLORIDE, SODIUM LACTATE, POTASSIUM CHLORIDE, AND CALCIUM CHLORIDE .6; .31; .03; .02 G/100ML; G/100ML; G/100ML; G/100ML
250 INJECTION, SOLUTION INTRAVENOUS ONCE
Status: COMPLETED | OUTPATIENT
Start: 2024-07-19 | End: 2024-07-19

## 2024-07-19 RX ORDER — MORPHINE SULFATE 4 MG/ML
2 INJECTION INTRAVENOUS ONCE
Status: COMPLETED | OUTPATIENT
Start: 2024-07-19 | End: 2024-07-19

## 2024-07-19 RX ORDER — DEXTROSE MONOHYDRATE 50 MG/ML
INJECTION, SOLUTION INTRAVENOUS CONTINUOUS
Status: DISCONTINUED | OUTPATIENT
Start: 2024-07-19 | End: 2024-07-19

## 2024-07-19 RX ORDER — POTASSIUM CHLORIDE 1500 MG/1
40 TABLET, EXTENDED RELEASE ORAL ONCE
Status: COMPLETED | OUTPATIENT
Start: 2024-07-19 | End: 2024-07-19

## 2024-07-19 RX ORDER — METOPROLOL TARTRATE 1 MG/ML
2.5 INJECTION, SOLUTION INTRAVENOUS
Status: DISCONTINUED | OUTPATIENT
Start: 2024-07-19 | End: 2024-07-23 | Stop reason: HOSPADM

## 2024-07-19 RX ORDER — DEXTROSE MONOHYDRATE 50 MG/ML
INJECTION, SOLUTION INTRAVENOUS CONTINUOUS
Status: ACTIVE | OUTPATIENT
Start: 2024-07-19 | End: 2024-07-20

## 2024-07-19 RX ADMIN — IOHEXOL 100 ML: 350 INJECTION, SOLUTION INTRAVENOUS at 04:15

## 2024-07-19 RX ADMIN — MIDODRINE HYDROCHLORIDE 2.5 MG: 5 TABLET ORAL at 16:52

## 2024-07-19 RX ADMIN — POTASSIUM CHLORIDE 40 MEQ: 1500 TABLET, EXTENDED RELEASE ORAL at 08:31

## 2024-07-19 RX ADMIN — SENNOSIDES AND DOCUSATE SODIUM 1 TABLET: 50; 8.6 TABLET ORAL at 21:29

## 2024-07-19 RX ADMIN — Medication 5 MG: at 21:28

## 2024-07-19 RX ADMIN — PAROXETINE HYDROCHLORIDE 10 MG: 10 TABLET, FILM COATED ORAL at 05:06

## 2024-07-19 RX ADMIN — AMIODARONE HYDROCHLORIDE 150 MG: 1.5 INJECTION, SOLUTION INTRAVENOUS at 08:58

## 2024-07-19 RX ADMIN — DEXTROSE MONOHYDRATE: 50 INJECTION, SOLUTION INTRAVENOUS at 08:51

## 2024-07-19 RX ADMIN — MORPHINE SULFATE 2 MG: 4 INJECTION INTRAVENOUS at 01:24

## 2024-07-19 RX ADMIN — ASPIRIN 81 MG: 81 TABLET, COATED ORAL at 05:05

## 2024-07-19 RX ADMIN — ALBUTEROL SULFATE 2.5 MG: 2.5 SOLUTION RESPIRATORY (INHALATION) at 08:35

## 2024-07-19 RX ADMIN — ANASTROZOLE 1 MG: 1 TABLET ORAL at 05:05

## 2024-07-19 RX ADMIN — ROSUVASTATIN CALCIUM 20 MG: 20 TABLET, FILM COATED ORAL at 16:52

## 2024-07-19 RX ADMIN — DOCUSATE SODIUM 100 MG: 100 CAPSULE, LIQUID FILLED ORAL at 16:52

## 2024-07-19 RX ADMIN — SODIUM CHLORIDE, POTASSIUM CHLORIDE, SODIUM LACTATE AND CALCIUM CHLORIDE 250 ML: 600; 310; 30; 20 INJECTION, SOLUTION INTRAVENOUS at 03:47

## 2024-07-19 RX ADMIN — OMEPRAZOLE 20 MG: 20 CAPSULE, DELAYED RELEASE ORAL at 05:05

## 2024-07-19 RX ADMIN — ACETAMINOPHEN 650 MG: 325 TABLET, FILM COATED ORAL at 21:28

## 2024-07-19 RX ADMIN — AMIODARONE HYDROCHLORIDE 1 MG/MIN: 1.8 INJECTION, SOLUTION INTRAVENOUS at 09:35

## 2024-07-19 RX ADMIN — DOCUSATE SODIUM 100 MG: 100 CAPSULE, LIQUID FILLED ORAL at 05:05

## 2024-07-19 RX ADMIN — MIDODRINE HYDROCHLORIDE 2.5 MG: 5 TABLET ORAL at 08:31

## 2024-07-19 RX ADMIN — MIDODRINE HYDROCHLORIDE 2.5 MG: 5 TABLET ORAL at 12:09

## 2024-07-19 RX ADMIN — SODIUM CHLORIDE, POTASSIUM CHLORIDE, SODIUM LACTATE AND CALCIUM CHLORIDE 250 ML: 600; 310; 30; 20 INJECTION, SOLUTION INTRAVENOUS at 07:02

## 2024-07-19 RX ADMIN — FLUTICASONE FUROATE, UMECLIDINIUM BROMIDE AND VILANTEROL TRIFENATATE 1 PUFF: 200; 62.5; 25 POWDER RESPIRATORY (INHALATION) at 05:05

## 2024-07-19 RX ADMIN — ONDANSETRON 4 MG: 2 INJECTION INTRAMUSCULAR; INTRAVENOUS at 02:21

## 2024-07-19 RX ADMIN — AMIODARONE HYDROCHLORIDE 0.5 MG/MIN: 1.8 INJECTION, SOLUTION INTRAVENOUS at 15:01

## 2024-07-19 RX ADMIN — METOPROLOL TARTRATE 2.5 MG: 5 INJECTION INTRAVENOUS at 02:21

## 2024-07-19 ASSESSMENT — ENCOUNTER SYMPTOMS
PHOTOPHOBIA: 0
ABDOMINAL PAIN: 0
CHILLS: 0
TINGLING: 0
BLURRED VISION: 0
DIZZINESS: 0
DOUBLE VISION: 0
EYE PAIN: 0
WEIGHT LOSS: 0
SHORTNESS OF BREATH: 1
WEAKNESS: 1
MYALGIAS: 0
ORTHOPNEA: 0
COUGH: 0
TREMORS: 0
VOMITING: 0
NAUSEA: 0
DIARRHEA: 0
SPEECH CHANGE: 0
HEADACHES: 0
HALLUCINATIONS: 0
FEVER: 0
NECK PAIN: 0
CONSTIPATION: 0
BACK PAIN: 0
SENSORY CHANGE: 0
PALPITATIONS: 0
FOCAL WEAKNESS: 0
SPUTUM PRODUCTION: 0

## 2024-07-19 ASSESSMENT — PAIN DESCRIPTION - PAIN TYPE
TYPE: ACUTE PAIN
TYPE: SURGICAL PAIN
TYPE: ACUTE PAIN;SURGICAL PAIN
TYPE: ACUTE PAIN
TYPE: ACUTE PAIN;SURGICAL PAIN
TYPE: SURGICAL PAIN
TYPE: ACUTE PAIN
TYPE: ACUTE PAIN

## 2024-07-19 ASSESSMENT — FIBROSIS 4 INDEX: FIB4 SCORE: 3.17

## 2024-07-19 ASSESSMENT — PATIENT HEALTH QUESTIONNAIRE - PHQ9
SUM OF ALL RESPONSES TO PHQ9 QUESTIONS 1 AND 2: 0
1. LITTLE INTEREST OR PLEASURE IN DOING THINGS: NOT AT ALL

## 2024-07-19 ASSESSMENT — LIFESTYLE VARIABLES: SUBSTANCE_ABUSE: 0

## 2024-07-19 NOTE — ASSESSMENT & PLAN NOTE
Patient found to have new onset atrial fibrillation with rapid ventricular response.  Needed amiodarone IV loading dose  Converted to sinus, switch to oral amiodarone.  Cardiology on board  She developed new onset atrial fibrillation in if she will remain persistently in A-fib she may need anticoagulation as well.  Due to anemia and acute blood loss, holding anticoagulation at this time

## 2024-07-19 NOTE — CONSULTS
Hospital Medicine Consultation    Date of Service  7/18/2024    Referring Physician    Blu Guerra Jr., [email or web address].       Consulting Physician  Daja Ramon M.D.    Reason for Consultation  Transfer of care    History of Presenting Illness  84 y.o. female with past medical history of HOCM, prior history of tobacco use, COPD on 2 L of oxygen at night who presented to the hospital for TAVR due to her severe aortic stenosis on 7/15/2024.  Patient underwent TAVR that was successful however at the end of the procedure patient was complicated by femoral artery injury with Perclose device.  Vascular surgery was consulted and patient remained right femoral endarterectomy as well as repair with bovine pericardial patch and she admitted to ICU.    On July 18, 2024 intensivist Dr. Guerra request to transfer care to hospitalist service.  I evaluated and examined her at the bedside.  She was sitting in chair and expressed that she is feeling overall better.  She reported mild pain at the site of surgery and Prevena.  She denies acute complaints of chest pain, nausea and vomiting.  I discussed plan of care with patient and her sister at the bedside and answered all of their questions.    I discussed plan of care with bedside RN in ICU.    Review of Systems  Review of Systems   Constitutional:  Negative for chills, fever and weight loss.   HENT:  Negative for hearing loss and tinnitus.    Eyes:  Negative for blurred vision, double vision, photophobia and pain.   Respiratory:  Negative for cough, sputum production and shortness of breath.    Cardiovascular:  Negative for chest pain, palpitations, orthopnea and leg swelling.        Mild pain in groin   Gastrointestinal:  Negative for abdominal pain, constipation, diarrhea, nausea and vomiting.   Genitourinary:  Negative for dysuria, frequency and urgency.   Musculoskeletal:  Negative for back pain, joint pain, myalgias and neck pain.   Skin:  Negative for rash.   Neurological:   Negative for dizziness, tingling, tremors, sensory change, speech change, focal weakness and headaches.   Psychiatric/Behavioral:  Negative for hallucinations and substance abuse.    All other systems reviewed and are negative.      Past Medical History   has a past medical history of Anesthesia, Arthritis, Asthma, Breast cancer (HCC), Breath shortness, Bronchitis (2011), Cancer (HCC) (12/2012), Cataract, Chronic cough, Cough, Delayed emergence from general anesthesia, Dizziness (03/11/2013), Heart burn, Heart murmur, Hiatus hernia syndrome, High cholesterol, HTN (hypertension) (03/12/2013), Hypercholesterolemia, Hyperlipidemia (03/12/2013), Hypertension, Hypokalemia (03/12/2013), Indigestion, Mitral annular calcification, Pain, Pneumonia, Pre-diabetes, Psychiatric disorder, Renal disorder, Severe aortic stenosis (06/25/2024), Sleep apnea, Snoring, Sputum production, ULCERATIVE COLITIS (03/12/2013), Urinary incontinence, and Vertigo (03/11/2013).    She has no past medical history of Painful breathing or Wheezing.    Surgical History   has a past surgical history that includes us-needle core bx-breast panel (01/01/2013); lumpectomy (left); gyn surgery; gyn surgery; other; gastric sleeve laparoscopy (N/A, 05/18/2016); cataract extraction with iol; transcatheter aortic valve replacement (Bilateral, 7/15/2024); echocardiogram, transesophageal, intraoperative (N/A, 7/15/2024); and femoral artery repair (Right, 7/15/2024).    Family History  family history includes Cancer in her maternal grandmother, mother, and sister; Heart Attack in her maternal grandfather; Heart Disease in her mother and sister; Lung Disease (age of onset: 75) in her father.    Social History   reports that she quit smoking about 38 years ago. Her smoking use included cigarettes. She started smoking about 58 years ago. She has a 20 pack-year smoking history. She has been exposed to tobacco smoke. She has never used smokeless tobacco. She reports  current alcohol use of about 4.2 oz of alcohol per week. She reports that she does not use drugs.    Medications  Prior to Admission Medications   Prescriptions Last Dose Informant Patient Reported? Taking?   MAGNESIUM PO 1 WEEK AGO at [facility] Patient Yes No   Sig: Take 420 mg by mouth every evening.   Multiple Vitamins-Minerals (PRESERVISION AREDS 2 PO) 1 WEEK AGO at [facility] Patient Yes No   Sig: Take 1 Tablet by mouth every evening.   PARoxetine (PAXIL) 10 MG Tab 7/15/2024 at 0400 Patient No Yes   Sig: Take 1 Tablet by mouth every day.   TRELEGY ELLIPTA 200-62.5-25 MCG/ACT inhaler 7/14/2024 at AM Patient Yes Yes   Sig: Inhale 1 Puff every day.   anastrozole (ARIMIDEX) 1 MG Tab 7/15/2024 at 0400 Patient No Yes   Sig: Take 1 Tablet by mouth every morning.   diphenhydrAMINE (BENADRYL) 25 MG Tab 7/12/2024 at PRN Patient Yes Yes   Sig: Take 25 mg by mouth at bedtime as needed for Sleep.   ibuprofen (MOTRIN) 200 MG Tab 2 WEEKS AGO at [facility] Patient Yes Yes   Sig: Take 400 mg by mouth every 6 hours as needed for Mild Pain.   ipratropium-albuterol (DUONEB) 0.5-2.5 (3) MG/3ML nebulizer solution 7/14/2024 at PRN Patient No Yes   Sig: Take 3 mL by nebulization every four hours as needed for Shortness of Breath (Wheezing).   melatonin 5 mg Tab 7/12/2024 at PRN Patient Yes No   Sig: Take 5 mg by mouth every evening as needed.   pantoprazole (PROTONIX) 40 MG Tablet Delayed Response 7/15/2024 at 0400 Patient Yes Yes   Sig: Take 40 mg by mouth every day.   rosuvastatin (CRESTOR) 20 MG Tab 7/13/2024 at PM Patient No No   Sig: Take 1 Tablet by mouth every evening.   spironolactone (ALDACTONE) 25 MG Tab 7/13/2024 at HOLD Patient Yes No   Sig: Take 25 mg by mouth every day.   valsartan-hydrochlorothiazide (DIOVAN-HCT) 80-12.5 MG per tablet 7/12/2024 at HOLD PT CUTS PER MD ORDERS Patient No No   Sig: Take 1 Tablet by mouth every day.   Patient taking differently: Take 1 Tablet by mouth every day. Patient reports cutting dose in half due to  low BP      Facility-Administered Medications: None       Allergies  Allergies   Allergen Reactions    Sulfa Drugs Rash and Swelling     Rash, joint swelling  [id number]    Codeine Vomiting and Nausea    Oxycodone Vomiting and Nausea     .    Vancomycin Itching       Physical Exam  Temp:  [36.1 °C (96.9 °F)-36.5 °C (97.7 °F)] 36.5 °C (97.7 °F)  Pulse:  [75-80] 79  Resp:  [15-23] 18  BP: (105-128)/(49-59) 107/49  SpO2:  [94 %-98 %] 94 %    Physical Exam  Vitals reviewed.   Constitutional:       General: She is not in acute distress.     Appearance: Normal appearance. She is not ill-appearing.   HENT:      Head: Normocephalic and atraumatic.      Nose: No congestion.      Comments: Oxygen nasal cannula  Eyes:      General:         Right eye: No discharge.         Left eye: No discharge.      Pupils: Pupils are equal, round, and reactive to light.   Cardiovascular:      Rate and Rhythm: Normal rate and regular rhythm.      Pulses: Normal pulses.      Heart sounds: Normal heart sounds. No murmur heard.     Comments: Right groin has dressing and Prevena present  Pulmonary:      Effort: Pulmonary effort is normal. No respiratory distress.      Breath sounds: Normal breath sounds. No stridor.   Abdominal:      General: Bowel sounds are normal. There is no distension.      Palpations: Abdomen is soft.      Tenderness: There is no abdominal tenderness.   Musculoskeletal:         General: No swelling or tenderness. Normal range of motion.      Cervical back: Normal range of motion. No rigidity.   Skin:     General: Skin is warm.      Capillary Refill: Capillary refill takes less than 2 seconds.      Coloration: Skin is not jaundiced or pale.      Findings: No bruising.   Neurological:      General: No focal deficit present.      Mental Status: She is alert and oriented to person, place, and time.      Cranial Nerves: No cranial nerve deficit.   Psychiatric:         Mood and Affect: Mood normal.         Behavior: Behavior  normal.         Fluids  Date 07/18/24 0700 - 07/19/24 0659   Shift 4050-7098 9401-3730 9799-2485 24 Hour Total   INTAKE   I.V. 99.5   99.5   Shift Total 99.5   99.5   OUTPUT   Urine 900 300  1200   Shift Total 900 300  1200   Weight (kg) 82.7 82.7 82.7 82.7       Laboratory  Recent Labs     07/16/24  1526 07/16/24  2351 07/17/24  0445 07/17/24  2103 07/18/24  0415 07/18/24  0956   WBC 11.7*  --  10.0  --  8.0  --    RBC 3.13*  --  2.83*  --  2.68*  --    HEMOGLOBIN 8.9*   < > 8.0* 7.6* 7.5* 8.5*   HEMATOCRIT 27.4*   < > 24.8* 23.8* 23.4* 27.7*   MCV 87.5  --  87.6  --  87.3  --    MCH 28.4  --  28.3  --  28.0  --    MCHC 32.5  --  32.3  --  32.1*  --    RDW 47.8  --  47.8  --  47.1  --    PLATELETCT 108*  --  91*  --  79*  --    MPV 10.8  --  11.2  --  11.4  --     < > = values in this interval not displayed.     Recent Labs     07/16/24  0327 07/17/24  0445 07/18/24  0415   SODIUM 137 137 139   POTASSIUM 4.7 4.2 4.0   CHLORIDE 108 107 105   CO2 21 23 26   GLUCOSE 122* 119* 114*   BUN 19 15 11   CREATININE 0.83 0.64 0.65   CALCIUM 8.7 8.9 9.3     Recent Labs     07/15/24  2029   INR 1.14*                 Imaging  DX-CHEST-PORTABLE (1 VIEW)   Final Result         1.  Mild pulmonary edema and/or infiltrates.   2.  Cardiomegaly   3.  Atherosclerosis      EC-ECHOCARDIOGRAM LTD W/O CONT   Final Result      EC-HEATHER W/O CONT   Final Result      DX-CHEST-PORTABLE (1 VIEW)   Final Result      1.  Low lung volumes with bibasilar hypoventilatory changes, underlying infection is possible.   2.  Stable mild enlargement of the cardiomediastinal silhouette.          Assessment/Plan  * S/P TAVR (transcatheter aortic valve replacement)  Assessment & Plan  Successful implantation of a 23 Ivan JEANETTE S3  Aspirin  Pulse checks due to femoral artery injury from Perclose  Vascular surgery evaluated her and made recommendations.    Postprocedural hypotension- (present on admission)  Assessment & Plan  I am actively titrating  vasopressors for MAP >60  Phenylephrine is drug of choice given reports of HOCM  Repeat limited ECHO with small pericardial effusion, normal EF and well-functioning valve  Trend H/H given femoral artery injury  Continue midodrine  Cardiology is following her.  Continue monitor closely on telemetry.    Common femoral artery injury, right, initial encounter- (present on admission)  Assessment & Plan  Femoral artery torn by ramakrishna hilliard  Vascular Surgery performed femoral endarterectomy and repair with bovine patch  pulse checks  Site looks good, pulse checks fine    Acute diastolic HF (heart failure) ([facility])  Assessment & Plan  Continue home diuretics  LVEDP was 19 at the end of the case  Continue to monitor closely.    Stage 3b chronic kidney disease- (present on admission)  Assessment & Plan  Strict I/Os  Renally dosed medications  Avoid nephrotoxic agents as able  Ordered lab workup for tomorrow.    Dyslipidemia- (present on admission)  Assessment & Plan  Continue Statin.    Severe aortic stenosis- (present on admission)  Assessment & Plan  Now s/p TAVR as above  Cardiology is following    Asthma-COPD overlap syndrome (HCC)- (present on admission)  Assessment & Plan  Not in acute exacerbation  Continue home Trelegy  PRN nebulizers  Continue to monitor    Primary hypertension- (present on admission)  Assessment & Plan  Hold home valsartan/HCTZ  Restart when clinically able  Blood pressure is running on the lower side.  Currently she is on midodrine.      Thank you for allow me to participate in patient care.  Hospitalist service will continue to follow this patient.    I reviewed and summarized patient hospitalization in this document.    I discussed plan of care with bedside RN and ICU.    I discussed plan of care with intensivist Dr. Guerra.    High complexity medical care due to multiorgan involvement.

## 2024-07-19 NOTE — PROGRESS NOTES
Hospital Medicine Daily Progress Note    Date of Service  7/19/2024    Chief Complaint  Erin Hess is a 84 y.o. female admitted 7/15/2024 with severe aortic stenosis s/p TAVR    Hospital Course    84 y.o. female with past medical history of HOCM, prior history of tobacco use, COPD on 2 L of oxygen at night who presented to the hospital for TAVR due to her severe aortic stenosis on 7/15/2024.  Patient underwent TAVR that was successful however at the end of the procedure patient was complicated by femoral artery injury with Perclose device.  Vascular surgery was consulted and patient remained right femoral endarterectomy as well as repair with bovine pericardial patch and she admitted to ICU.     On July 18, 2024 intensivist Dr. Guerra request to transfer care to hospitalist service.  I evaluated and examined her at the bedside.  She was sitting in chair and expressed that she is feeling overall better.  She reported mild pain at the site of surgery and Prevena.  She denies acute complaints of chest pain, nausea and vomiting.  I discussed plan of care with patient and her sister at the bedside and answered all of their questions.    Interval Problem Update    Overnight patient developed new onset atrial fibrillation associated nausea and shortness of breath.  Patient also developed hypotension.  I evaluated her at the bedside.  I discussed plan of care with cardiology I ordered IV amiodarone bolus and started her on amiodarone infusion.  Due to her low hemoglobin and hypotension I ordered 1 unit of blood transfusion.  I discussed plan of care with patient and with bedside RN.  I discussed plan of care with cardiology team.    I have discussed this patient's plan of care and discharge plan at IDT rounds today with Case Management, Nursing, Nursing leadership, and other members of the IDT team.    Consultants/Specialty  cardiology and critical care    Code Status  Full Code    Disposition  The patient is not  medically cleared for discharge to home or a post-acute facility.      I have placed the appropriate orders for post-discharge needs.    Review of Systems  Review of Systems   Constitutional:  Positive for malaise/fatigue. Negative for chills, fever and weight loss.   HENT:  Negative for hearing loss and tinnitus.    Eyes:  Negative for blurred vision, double vision, photophobia and pain.   Respiratory:  Positive for shortness of breath. Negative for cough and sputum production.    Cardiovascular:  Negative for chest pain, palpitations, orthopnea and leg swelling.   Gastrointestinal:  Negative for abdominal pain, constipation, diarrhea, nausea and vomiting.   Genitourinary:  Negative for dysuria, frequency and urgency.   Musculoskeletal:  Negative for back pain, joint pain, myalgias and neck pain.   Skin:  Negative for rash.   Neurological:  Positive for weakness. Negative for dizziness, tingling, tremors, sensory change, speech change, focal weakness and headaches.   Psychiatric/Behavioral:  Negative for hallucinations and substance abuse.    All other systems reviewed and are negative.       Physical Exam  Temp:  [35.9 °C (96.6 °F)-36.2 °C (97.1 °F)] 36.1 °C (97 °F)  Pulse:  [] 59  Resp:  [13-33] 18  BP: ()/(46-60) 107/51  SpO2:  [92 %-99 %] 97 %    Physical Exam  Vitals reviewed.   Constitutional:       General: She is not in acute distress.     Appearance: Normal appearance. She is ill-appearing.   HENT:      Head: Normocephalic and atraumatic.      Nose: No congestion.   Eyes:      General:         Right eye: No discharge.         Left eye: No discharge.      Pupils: Pupils are equal, round, and reactive to light.   Cardiovascular:      Rate and Rhythm: Normal rate and regular rhythm.      Pulses: Normal pulses.      Heart sounds: Normal heart sounds. No murmur heard.     Comments: Now she converted to sinus rhythm  Pulmonary:      Effort: Pulmonary effort is normal. No respiratory distress.       Breath sounds: Normal breath sounds. No stridor.   Abdominal:      General: Bowel sounds are normal. There is no distension.      Palpations: Abdomen is soft.      Tenderness: There is no abdominal tenderness.   Musculoskeletal:         General: No swelling or tenderness. Normal range of motion.      Cervical back: Normal range of motion. No rigidity.   Skin:     General: Skin is warm.      Capillary Refill: Capillary refill takes less than 2 seconds.      Coloration: Skin is not jaundiced or pale.      Findings: No bruising.   Neurological:      General: No focal deficit present.      Mental Status: She is alert and oriented to person, place, and time.      Cranial Nerves: No cranial nerve deficit.   Psychiatric:         Mood and Affect: Mood normal.         Behavior: Behavior normal.         Fluids    Intake/Output Summary (Last 24 hours) at 7/19/2024 1619  Last data filed at 7/19/2024 1532  Gross per 24 hour   Intake 1163.33 ml   Output 1800 ml   Net -636.67 ml       Laboratory  Recent Labs     07/17/24 0445 07/17/24 2103 07/18/24 0415 07/18/24  0956 07/19/24  0240   WBC 10.0  --  8.0  --  6.5   RBC 2.83*  --  2.68*  --  2.80*   HEMOGLOBIN 8.0*   < > 7.5* 8.5* 7.8*   HEMATOCRIT 24.8*   < > 23.4* 27.7* 24.4*   MCV 87.6  --  87.3  --  87.1   MCH 28.3  --  28.0  --  27.9   MCHC 32.3  --  32.1*  --  32.0*   RDW 47.8  --  47.1  --  47.8   PLATELETCT 91*  --  79*  --  95*   MPV 11.2  --  11.4  --  11.6    < > = values in this interval not displayed.     Recent Labs     07/17/24 0445 07/18/24 0415 07/19/24  0240   SODIUM 137 139 140   POTASSIUM 4.2 4.0 3.8   CHLORIDE 107 105 104   CO2 23 26 27   GLUCOSE 119* 114* 115*   BUN 15 11 14   CREATININE 0.64 0.65 0.73   CALCIUM 8.9 9.3 9.1                   Imaging  EC-ECHOCARDIOGRAM LTD W/O CONT   Final Result      CTA ABDOMEN PELVIS W & W/O POST PROCESS   Final Result         1.  No visualized aneurysm or dissection.   2.  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    3.  Intermediate density fluid collection and air in the right groin adjacent to the iliac vessels, appearance most compatible with hematoma.   4.  Atherosclerosis and atherosclerotic coronary artery disease   5.  Trace dependent bilateral pleural effusions   6.  Cholelithiasis   7.  Diverticulosis   8.  Small hiatal hernia      DX-CHEST-PORTABLE (1 VIEW)   Final Result         1.  Hazy left pulmonary infiltrates, stable   2.  Atherosclerosis      DX-CHEST-PORTABLE (1 VIEW)   Final Result         1.  Mild pulmonary edema and/or infiltrates.   2.  Cardiomegaly   3.  Atherosclerosis      EC-ECHOCARDIOGRAM LTD W/O CONT   Final Result      EC-HEATHER W/O CONT   Final Result      DX-CHEST-PORTABLE (1 VIEW)   Final Result      1.  Low lung volumes with bibasilar hypoventilatory changes, underlying infection is possible.   2.  Stable mild enlargement of the cardiomediastinal silhouette.           Assessment/Plan  * S/P TAVR (transcatheter aortic valve replacement)  Assessment & Plan  Successful implantation of a 23 Ivan JEANETTE S3  Aspirin  Pulse checks due to femoral artery injury from Perclose  Vascular surgery evaluated her and made recommendations.  She found to have hypotension and anemia I ordered 1 unit of blood transfusion.    Anemia  Assessment & Plan  Patient hemoglobin is running low and she also found to have low hemoglobin but  I ordered 1 unit of blood transfusion  I will repeat H&H if her hemoglobin come back below 8 plan is to order 1 unit of blood.  I discussed with her and obtain consent for blood transfusion.    Atrial fibrillation with rapid ventricular response (HCC)  Assessment & Plan  Patient found to have new onset atrial fibrillation with rapid ventricular response.  I discussed plan of care with cardiology.  She is symptomatic with hypotension.  I ordered amiodarone bolus and started on amiodarone infusion.  She developed new onset atrial fibrillation in if she will remain persistently in A-fib  she may need anticoagulation as well.  She is hypotensive and symptomatic with low hemoglobin I ordered 1 unit of blood transfusion.  Continue to monitor closely on telemetry.    Postprocedural hypotension- (present on admission)  Assessment & Plan  I am actively titrating vasopressors for MAP >60  Phenylephrine is drug of choice given reports of HOCM  Repeat limited ECHO with small pericardial effusion, normal EF and well-functioning valve  Trend H/H given femoral artery injury  Continue midodrine  Cardiology is following her.  Due to her symptomatic hypotension and low hemoglobin by referring her to blood transfusion.  I ordered repeat H&H    Common femoral artery injury, right, initial encounter- (present on admission)  Assessment & Plan  Femoral artery torn by ramakrishna hilliard  Vascular Surgery performed femoral endarterectomy and repair with bovine patch  pulse checks  Site looks good, pulse checks fine  Continue Prevena    Acute diastolic HF (heart failure) (HCC)  Assessment & Plan  Continue home diuretics  LVEDP was 19 at the end of the case  Continue to monitor closely.    Stage 3b chronic kidney disease- (present on admission)  Assessment & Plan  Strict I/Os  Renally dosed medications  Avoid nephrotoxic agents as able  Continue monitor labs closely.  Ordered lab workup for tomorrow.    Dyslipidemia- (present on admission)  Assessment & Plan  Continue Statin.    Severe aortic stenosis- (present on admission)  Assessment & Plan  Now s/p TAVR as above  Cardiology is following  Discussed plan of care with cardiology team.    Asthma-COPD overlap syndrome (HCC)- (present on admission)  Assessment & Plan  Not in acute exacerbation  Continue home Trelegy  PRN nebulizers  Continue to monitor    Primary hypertension- (present on admission)  Assessment & Plan  Hold home valsartan/HCTZ  Restart when clinically able  Blood pressure is running on the lower side.  Currently she is on midodrine.  Currently she is  hypotensive.      I discussed plan of care with bedside RN and ICU.  Patient remains in ICU as tele overflow    Patient is critically ill.   The patient continues to have: Hypotension and atrial fibrillation with rapid ventricle response  The vital organ system that is affected is the: Cardiovascular system  If untreated there is a high chance of deterioration into: Cardiovascular collapse  And eventually death.   The critical care that I am providing today is: I discussed plan of care with cardiology and I started her on amiodarone bolus 150 mg and amiodarone infusion.  She also found to have symptomatic hypotension I ordered were not a blood transfusion.  The critical that has been undertaken is medically complex.   There has been no overlap in critical care time.   Critical Care Time not including procedures: 39 minutes       VTE prophylaxis:   SCDs/TEDs      I have performed a physical exam and reviewed and updated ROS and Plan today (7/19/2024). In review of yesterday's note (7/18/2024), there are no changes except as documented above.

## 2024-07-19 NOTE — CARE PLAN
Problem: Pain - Standard  Goal: Alleviation of pain or a reduction in pain to the patient’s comfort goal  Outcome: Progressing  Note: Prn meds per MAR     Problem: Knowledge Deficit - Standard  Goal: Patient and family/care givers will demonstrate understanding of plan of care, disease process/condition, diagnostic tests and medications  Outcome: Progressing  Note: Monitor HR, urinary retention, needs transfusion   The patient is Watcher - Medium risk of patient condition declining or worsening    Shift Goals  Clinical Goals: MAP greater than 65  Patient Goals: Bath, back to bed  Family Goals: Updates    Progress made toward(s) clinical / shift goals:  stable hemodynamic status,     Patient is not progressing towards the following goals:

## 2024-07-19 NOTE — ASSESSMENT & PLAN NOTE
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

## 2024-07-19 NOTE — THERAPY
Physical Therapy Contact Note    Patient Name: Erin Hess  Age:  84 y.o., Sex:  female  Medical Record #: [id number]  Today's Date: 7/19/2024    Pt getting blood transfusion in AM. Will re-attempt as appropriate. Noted rehab requesting OT eval, no OT orders present, made RN aware.    Jesus Patricia PT, DPT

## 2024-07-19 NOTE — PROGRESS NOTES
"NOC HOSPITALIST CROSS COVER    Notified by RN regarding new onset rapid atrial fibrillation with associated nausea and shortness of breath. She also has some increased pain at her right groin site that is s/p vascular repair by Dr Swan. The patient was admitted on 7/15 for an elective TAVR and had some post-operative hypotension requiring ICU stay.     Patient was seen at bedside. She reported feeling her heart \"racing\" and feeling \"hot.\" Lung sounds diminished throughout. Heart sounds irregular and tachycardic, with a murmur. Her abdomen is soft, but tender to the touch throughout. Groin site is tender and she has significant bruising to her suprapubic area. Bladder scan > 500 mL. No peripheral edema.     Stat EKG reveals rapid afib, rate 104, with trace ST depression in lateral leads, without significant ST elevations, and a QTc of 477.     CXR demonstrates left sided atelectasis without pulmonary edema.       Vitals:    07/19/24 0220   BP: 98/51   Pulse: 86   Resp: (!) 22   Temp:    SpO2: 93%      Plan:  #Afib  -Appears new onset  -Stat labs including CBC, CMP, lactic, TSH, cortisol, mag, and phos ordered and pending  -Lopressor 2.5 mg IV every 5 min x 3 doses for HR > 120, hold for SBP < 95 mmHg  -250 cc LR bolus  -Will need OAC once bleeding ruled out    #Urinary retention  -Bladder scan > 500 mL   -May be contributing to her pain at her groin site due to distention  -Straight cath x 2 for bladder scan > 400 mL. If 3rd bladder scan > 400 mL, consider batista     UPDATE:  Discussed with Dr Quinonez, cardiology, regarding patient's ongoing chest pain which is likely symptomatic to the atrial fibrillation. With her unexplained anemia, will obtain CTA abdomen/pelvis to rule out retroperitoneal bleeding. Additionally, the patient had a small pericardial effusion on echo on 7/15. Given her new afib with soft pressures, STAT limited echo ordered per Dr Quinonez to rule out worsening pericardial effusion. Mild hypotension " this AM in the 80s systolic for which she will be given an additional 250 cc bolus. Per Dr Quinonez, if patient's BP is low, consider amiodarone bolus and drip.     Discussed case with collaborating MD, who agreed with plan of care.     -----------------------------------------------------------------------------------------------------------    Electronically signed by:  Sulaiman Pratt, FATUMA, SHARON, BREA-BC  Hospitalist Services

## 2024-07-19 NOTE — PROGRESS NOTES
4 Eyes Skin Assessment Completed by CAMMY Haider and CAMMY Foley.    Head WDL  Ears Redness and Blanching  Nose Discoloration From prior to admit, pt saw dermatologist.  Mouth WDL  Neck WDL  Breast/Chest WDL  Shoulder Blades Redness and Blanching  Spine Redness and Blanching  (R) Arm/Elbow/Hand Redness and Blanching  (L) Arm/Elbow/Hand Redness and Blanching  Abdomen WDL  Groin Bruising  Scrotum/Coccyx/Buttocks Redness and Blanching  (R) Leg WDL  (L) Leg WDL  (R) Heel/Foot/Toe Redness and Blanching  (L) Heel/Foot/Toe Redness and Blanching          Devices In Places ECG, Blood Pressure Cuff, Pulse Ox, and Nasal Cannula      Interventions In Place NC W/Ear Foams, Sacral Mepilex, Pillows, Elbow Mepilex, and Pressure Redistribution Mattress    Possible Skin Injury No    Pictures Uploaded Into Epic N/A  Wound Consult Placed N/A  RN Wound Prevention Protocol Ordered Yes

## 2024-07-19 NOTE — ASSESSMENT & PLAN NOTE
Strict I/Os  Renally dosed medications  Avoid nephrotoxic agents as able  Continue monitor labs closely.  Ordered lab workup for tomorrow.

## 2024-07-19 NOTE — CARE PLAN
Problem: Bronchoconstriction  Goal: Improve in air movement and diminished wheezing  Description: Target End Date:  2 to 3 days    1.  Implement inhaled treatments  2.  Evaluate and manage medication effects  Outcome: Progressing   Albuterol QD.

## 2024-07-19 NOTE — ASSESSMENT & PLAN NOTE
After the TAVR procedure   Patient needed 1 unit of red blood cell   Needed procedure by vascular surgeon   Hemoglobin stable around 8   Continue monitor hemoglobin every 12 hours and blood transfusion if less than 7

## 2024-07-19 NOTE — ASSESSMENT & PLAN NOTE
Femoral artery torn by ramakrishna hilliard  Vascular Surgery performed femoral endarterectomy and repair with bovine patch  pulse checks  Site looks good, pulse checks fine  DVT study was negative  Patient was cleared by vascular surgeon for discharge to follow-up as outpatient

## 2024-07-19 NOTE — CARE PLAN
The patient is Watcher - Medium risk of patient condition declining or worsening    Shift Goals  Clinical Goals: MAP greater than 65  Patient Goals: Bath, back to bed  Family Goals: Updates    Progress made toward(s) clinical / shift goals:    Problem: Pain - Standard  Goal: Alleviation of pain or a reduction in pain to the patient’s comfort goal  Outcome: Progressing  Note: Medication given per MAR.      Problem: Fall Risk  Goal: Patient will remain free from falls  Outcome: Progressing     Problem: Neuro Status  Goal: Neuro status will remain stable or improve  Outcome: Progressing  Flowsheets (Taken 7/19/2024 0516)  Level of Consciousness: Alert

## 2024-07-19 NOTE — PROGRESS NOTES
0318 notified Sedrick Swan that patient had increased pain and bruising in the surgical site on the right groin.

## 2024-07-19 NOTE — ASSESSMENT & PLAN NOTE
Hold home valsartan/HCTZ  Restart when clinically able  Blood pressure is running on the lower side.  Currently she is on midodrine.  Currently she is hypotensive.

## 2024-07-19 NOTE — PROGRESS NOTES
0101 notified Morena Pratt that the patient had increased pain in the right groin surgical site. Notified FATUMA Pratt that the site was firm and tender to the touch. One time dose of morphine ordered.

## 2024-07-19 NOTE — PROGRESS NOTES
Received a phone call about new onset AF with chest pain.  EKG shows Afib with  bpm, and minimal ST depressions.  Her hemoglobin has been downtrending and her BP has been low as per primary team- recommend ruling out RP bleed as well as any other bleeding sources.  Recommend repeating limited echo to make sure pericardial effusion not enlarging  Start OAC when above ruled out  Use beta blocker or calcium channel blocker for rate control for now. If BP too low, may start amidarone gtt.   Patient had recent LHC without obstructive CAD prior to TAVR.     Darren Quinonez M.D.

## 2024-07-19 NOTE — DISCHARGE PLANNING
[facility] Transitional Care Coordination    Referral from: Dr. Ramon    Insurance Provider on Facesheet: SCP    Potential Rehab Diagnosis: Cardiac    Chart review indicates patient may have on going medical management and may have therapy needs to possibly meet inpatient rehab facility criteria with the goal of returning to community.    D/C support will need to be verified: Son    Physiatry consultation pended per protocol. Following for an OT eval.     Thank you for the referral.

## 2024-07-19 NOTE — PROGRESS NOTES
Received in bed aaox4, c/o pain and tenderness on right groin site, dressing with prevena CDI,was on afib 120's, converted to SR 74 at 0730, /51, feels needed to void but unable to, requesting to be straight cath again, bladder scan 336 ml, POC discussed, needs attended.

## 2024-07-19 NOTE — PROGRESS NOTES
0634 notified FATUMA Pratt that the patient's SBP was in the 70's and then 80's after rechecking. LR bolus ordered.

## 2024-07-19 NOTE — ASSESSMENT & PLAN NOTE
Successful implantation of a 23 Ivan JEANETTE S3  Aspirin  Pulse checks due to femoral artery injury from [facility]  Vascular surgery evaluated her and made recommendations.  She found to have hypotension and anemia I ordered 1 unit of blood transfusion.

## 2024-07-19 NOTE — PROGRESS NOTES
0205 notified DNP Rife that the patient converted into afib with chest pain and diaphoresis. DNP Rife at bedside labs, EKG, bladder scan, and straight cath ordered.     0327 notified DNP Rife that patient has continued chest pressure and a fluttering sensation. Troponin ordered.     0359 notified DNP Rife that patient's troponin was 116.     0408 patient transported to [facility]. Vital signs stable.    0450 echo at bedside.

## 2024-07-20 PROBLEM — D62 ANEMIA DUE TO ACUTE BLOOD LOSS: Status: ACTIVE | Noted: 2024-07-19

## 2024-07-20 LAB
ALBUMIN SERPL BCP-MCNC: 2.8 G/DL (ref 3.2–4.9)
ALBUMIN/GLOB SERPL: 1.2 G/DL
ALP SERPL-CCNC: 46 U/L (ref 30–99)
ALT SERPL-CCNC: 9 U/L (ref 2–50)
ANION GAP SERPL CALC-SCNC: 9 MMOL/L (ref 7–16)
AST SERPL-CCNC: 13 U/L (ref 12–45)
BILIRUB SERPL-MCNC: 0.6 MG/DL (ref 0.1–1.5)
BUN SERPL-MCNC: 14 MG/DL (ref 8–22)
CALCIUM ALBUM COR SERPL-MCNC: 10.4 MG/DL (ref 8.5–10.5)
CALCIUM SERPL-MCNC: 9.4 MG/DL (ref 8.5–10.5)
CHLORIDE SERPL-SCNC: 103 MMOL/L (ref 96–112)
CO2 SERPL-SCNC: 26 MMOL/L (ref 20–33)
CREAT SERPL-MCNC: 0.83 MG/DL (ref 0.5–1.4)
ERYTHROCYTE [DISTWIDTH] IN BLOOD BY AUTOMATED COUNT: 49 FL (ref 35.9–50)
GFR SERPLBLD CREATININE-BSD FMLA CKD-EPI: 70 ML/MIN/1.73 M 2
GLOBULIN SER CALC-MCNC: 2.4 G/DL (ref 1.9–3.5)
GLUCOSE SERPL-MCNC: 124 MG/DL (ref 65–99)
HCT VFR BLD AUTO: 26.4 % (ref 37–47)
HCT VFR BLD AUTO: 26.4 % (ref 37–47)
HGB BLD-MCNC: 8.5 G/DL (ref 12–16)
HGB BLD-MCNC: 8.6 G/DL (ref 12–16)
MAGNESIUM SERPL-MCNC: 2 MG/DL (ref 1.5–2.5)
MCH RBC QN AUTO: 28.9 PG (ref 27–33)
MCHC RBC AUTO-ENTMCNC: 32.6 G/DL (ref 32.2–35.5)
MCV RBC AUTO: 88.6 FL (ref 81.4–97.8)
PHOSPHATE SERPL-MCNC: 3.2 MG/DL (ref 2.5–4.5)
PLATELET # BLD AUTO: 108 K/UL (ref 164–446)
PMV BLD AUTO: 10.8 FL (ref 9–12.9)
POTASSIUM SERPL-SCNC: 3.9 MMOL/L (ref 3.6–5.5)
PROT SERPL-MCNC: 5.2 G/DL (ref 6–8.2)
RBC # BLD AUTO: 2.98 M/UL (ref 4.2–5.4)
SODIUM SERPL-SCNC: 138 MMOL/L (ref 135–145)
WBC # BLD AUTO: 7.2 K/UL (ref 4.8–10.8)

## 2024-07-20 PROCEDURE — 700102 HCHG RX REV CODE 250 W/ 637 OVERRIDE(OP)

## 2024-07-20 PROCEDURE — A9270 NON-COVERED ITEM OR SERVICE: HCPCS

## 2024-07-20 PROCEDURE — 83735 ASSAY OF MAGNESIUM: CPT

## 2024-07-20 PROCEDURE — 85018 HEMOGLOBIN: CPT

## 2024-07-20 PROCEDURE — A9270 NON-COVERED ITEM OR SERVICE: HCPCS | Performed by: INTERNAL MEDICINE

## 2024-07-20 PROCEDURE — 85014 HEMATOCRIT: CPT

## 2024-07-20 PROCEDURE — 80053 COMPREHEN METABOLIC PANEL: CPT

## 2024-07-20 PROCEDURE — 99233 SBSQ HOSP IP/OBS HIGH 50: CPT | Performed by: INTERNAL MEDICINE

## 2024-07-20 PROCEDURE — 770020 HCHG ROOM/CARE - TELE (206)

## 2024-07-20 PROCEDURE — 85027 COMPLETE CBC AUTOMATED: CPT

## 2024-07-20 PROCEDURE — 700111 HCHG RX REV CODE 636 W/ 250 OVERRIDE (IP): Mod: JZ | Performed by: INTERNAL MEDICINE

## 2024-07-20 PROCEDURE — A9270 NON-COVERED ITEM OR SERVICE: HCPCS | Performed by: NURSE PRACTITIONER

## 2024-07-20 PROCEDURE — 700102 HCHG RX REV CODE 250 W/ 637 OVERRIDE(OP): Performed by: INTERNAL MEDICINE

## 2024-07-20 PROCEDURE — 700102 HCHG RX REV CODE 250 W/ 637 OVERRIDE(OP): Performed by: NURSE PRACTITIONER

## 2024-07-20 PROCEDURE — 84100 ASSAY OF PHOSPHORUS: CPT

## 2024-07-20 PROCEDURE — 36415 COLL VENOUS BLD VENIPUNCTURE: CPT

## 2024-07-20 RX ORDER — AMIODARONE HYDROCHLORIDE 200 MG/1
200 TABLET ORAL
Status: DISCONTINUED | OUTPATIENT
Start: 2024-07-27 | End: 2024-07-23 | Stop reason: HOSPADM

## 2024-07-20 RX ORDER — AMIODARONE HYDROCHLORIDE 200 MG/1
400 TABLET ORAL TWICE DAILY
Status: DISCONTINUED | OUTPATIENT
Start: 2024-07-20 | End: 2024-07-23 | Stop reason: HOSPADM

## 2024-07-20 RX ADMIN — MIDODRINE HYDROCHLORIDE 2.5 MG: 5 TABLET ORAL at 07:38

## 2024-07-20 RX ADMIN — OMEPRAZOLE 20 MG: 20 CAPSULE, DELAYED RELEASE ORAL at 06:07

## 2024-07-20 RX ADMIN — AMIODARONE HYDROCHLORIDE 400 MG: 200 TABLET ORAL at 13:14

## 2024-07-20 RX ADMIN — POLYETHYLENE GLYCOL 3350 1 PACKET: 17 POWDER, FOR SOLUTION ORAL at 18:04

## 2024-07-20 RX ADMIN — ANASTROZOLE 1 MG: 1 TABLET ORAL at 06:08

## 2024-07-20 RX ADMIN — MIDODRINE HYDROCHLORIDE 2.5 MG: 5 TABLET ORAL at 18:03

## 2024-07-20 RX ADMIN — DOCUSATE SODIUM 100 MG: 100 CAPSULE, LIQUID FILLED ORAL at 18:04

## 2024-07-20 RX ADMIN — ACETAMINOPHEN 650 MG: 325 TABLET, FILM COATED ORAL at 19:54

## 2024-07-20 RX ADMIN — ROSUVASTATIN CALCIUM 20 MG: 20 TABLET, FILM COATED ORAL at 18:05

## 2024-07-20 RX ADMIN — ASPIRIN 81 MG: 81 TABLET, COATED ORAL at 06:07

## 2024-07-20 RX ADMIN — MAGNESIUM HYDROXIDE 30 ML: 1200 LIQUID ORAL at 18:04

## 2024-07-20 RX ADMIN — MIDODRINE HYDROCHLORIDE 2.5 MG: 5 TABLET ORAL at 10:45

## 2024-07-20 RX ADMIN — DOCUSATE SODIUM 100 MG: 100 CAPSULE, LIQUID FILLED ORAL at 06:10

## 2024-07-20 RX ADMIN — AMIODARONE HYDROCHLORIDE 0.5 MG/MIN: 1.8 INJECTION, SOLUTION INTRAVENOUS at 01:35

## 2024-07-20 RX ADMIN — AMIODARONE HYDROCHLORIDE 400 MG: 200 TABLET ORAL at 18:03

## 2024-07-20 RX ADMIN — SENNOSIDES AND DOCUSATE SODIUM 1 TABLET: 50; 8.6 TABLET ORAL at 19:53

## 2024-07-20 RX ADMIN — Medication 5 MG: at 19:53

## 2024-07-20 RX ADMIN — PAROXETINE HYDROCHLORIDE 10 MG: 10 TABLET, FILM COATED ORAL at 06:08

## 2024-07-20 RX ADMIN — AMIODARONE HYDROCHLORIDE 0.5 MG/MIN: 1.8 INJECTION, SOLUTION INTRAVENOUS at 10:48

## 2024-07-20 SDOH — ECONOMIC STABILITY: TRANSPORTATION INSECURITY
IN THE PAST 12 MONTHS, HAS LACK OF RELIABLE TRANSPORTATION KEPT YOU FROM MEDICAL APPOINTMENTS, MEETINGS, WORK OR FROM GETTING THINGS NEEDED FOR DAILY LIVING?: NO

## 2024-07-20 ASSESSMENT — SOCIAL DETERMINANTS OF HEALTH (SDOH)
WITHIN THE LAST YEAR, HAVE YOU BEEN KICKED, HIT, SLAPPED, OR OTHERWISE PHYSICALLY HURT BY YOUR PARTNER OR EX-PARTNER?: NO
WITHIN THE PAST 12 MONTHS, YOU WORRIED THAT YOUR FOOD WOULD RUN OUT BEFORE YOU GOT THE MONEY TO BUY MORE: NEVER TRUE
WITHIN THE PAST 12 MONTHS, THE FOOD YOU BOUGHT JUST DIDN'T LAST AND YOU DIDN'T HAVE MONEY TO GET MORE: NEVER TRUE
WITHIN THE LAST YEAR, HAVE YOU BEEN AFRAID OF YOUR PARTNER OR EX-PARTNER?: NO
WITHIN THE LAST YEAR, HAVE YOU BEEN HUMILIATED OR EMOTIONALLY ABUSED IN OTHER WAYS BY YOUR PARTNER OR EX-PARTNER?: NO
WITHIN THE LAST YEAR, HAVE TO BEEN RAPED OR FORCED TO HAVE ANY KIND OF SEXUAL ACTIVITY BY YOUR PARTNER OR EX-PARTNER?: NO
IN THE PAST 12 MONTHS, HAS THE ELECTRIC, GAS, OIL, OR WATER COMPANY THREATENED TO SHUT OFF SERVICE IN YOUR HOME?: NO

## 2024-07-20 ASSESSMENT — LIFESTYLE VARIABLES
CONSUMPTION TOTAL: NEGATIVE
TOTAL SCORE: 0
TOTAL SCORE: 0
SUBSTANCE_ABUSE: 0
HOW MANY TIMES IN THE PAST YEAR HAVE YOU HAD 5 OR MORE DRINKS IN A DAY: 0
AVERAGE NUMBER OF DAYS PER WEEK YOU HAVE A DRINK CONTAINING ALCOHOL: 0
EVER FELT BAD OR GUILTY ABOUT YOUR DRINKING: NO
EVER HAD A DRINK FIRST THING IN THE MORNING TO STEADY YOUR NERVES TO GET RID OF A HANGOVER: NO
HAVE YOU EVER FELT YOU SHOULD CUT DOWN ON YOUR DRINKING: NO
HAVE PEOPLE ANNOYED YOU BY CRITICIZING YOUR DRINKING: NO
TOTAL SCORE: 0
ALCOHOL_USE: NO
ON A TYPICAL DAY WHEN YOU DRINK ALCOHOL HOW MANY DRINKS DO YOU HAVE: 0

## 2024-07-20 ASSESSMENT — ENCOUNTER SYMPTOMS
BLURRED VISION: 0
ORTHOPNEA: 0
HALLUCINATIONS: 0
DOUBLE VISION: 0
PHOTOPHOBIA: 0
MYALGIAS: 0
FOCAL WEAKNESS: 0
WEAKNESS: 1
SPEECH CHANGE: 0
CHILLS: 0
TREMORS: 0
SPUTUM PRODUCTION: 0
TINGLING: 0
NAUSEA: 0
CONSTIPATION: 0
BACK PAIN: 0
VOMITING: 0
FEVER: 0
DIARRHEA: 0
COUGH: 0
SENSORY CHANGE: 0
HEADACHES: 0
SHORTNESS OF BREATH: 0
PALPITATIONS: 0
ABDOMINAL PAIN: 0
DIZZINESS: 0
EYE PAIN: 0
NECK PAIN: 0
WEIGHT LOSS: 0

## 2024-07-20 ASSESSMENT — PAIN DESCRIPTION - PAIN TYPE
TYPE: ACUTE PAIN

## 2024-07-20 ASSESSMENT — PATIENT HEALTH QUESTIONNAIRE - PHQ9
2. FEELING DOWN, DEPRESSED, IRRITABLE, OR HOPELESS: NOT AT ALL
1. LITTLE INTEREST OR PLEASURE IN DOING THINGS: NOT AT ALL
SUM OF ALL RESPONSES TO PHQ9 QUESTIONS 1 AND 2: 0

## 2024-07-20 ASSESSMENT — COGNITIVE AND FUNCTIONAL STATUS - GENERAL
WALKING IN HOSPITAL ROOM: A LOT
DRESSING REGULAR LOWER BODY CLOTHING: A LITTLE
MOVING TO AND FROM BED TO CHAIR: A LITTLE
SUGGESTED CMS G CODE MODIFIER MOBILITY: CK
STANDING UP FROM CHAIR USING ARMS: A LOT
DRESSING REGULAR UPPER BODY CLOTHING: A LITTLE
SUGGESTED CMS G CODE MODIFIER DAILY ACTIVITY: CJ
DAILY ACTIVITIY SCORE: 21
CLIMB 3 TO 5 STEPS WITH RAILING: A LOT
MOBILITY SCORE: 17
HELP NEEDED FOR BATHING: A LITTLE

## 2024-07-20 ASSESSMENT — FIBROSIS 4 INDEX: FIB4 SCORE: 3.29

## 2024-07-20 NOTE — PROGRESS NOTES
84-year-old status post successful TAVR 7/15/24 complicated by injury to the common femoral artery requiring surgical repair now with new onset atrial fibrillation 7/19/2024 complicated by hypotension.  Notable for low hemoglobin.  Status post 1 unit of blood transfusion.  Right groin Prevena intact.  Overall feeling better today.  Blood pressure stable post transfusion and on Midodrine. .  H&H stable.  Back in sinus rhythm on amiodarone.  Will switch to oral this afternoon.  Plan is to be discharged to rehab.  Will continue to follow.

## 2024-07-20 NOTE — DISCHARGE PLANNING
HTH/SCP TCN chart review completed. Collaborated with FABIO GONZALES prior to meeting with the pt. The most current review of medical record, knowledge of pt's PLOF and social support, LACE+ score of 62, 6 clicks scores of 17 mobility were considered.      Pt seen at bedside. Introduced TCN program. Provided education regarding post acute levels of care. Education provided regarding case management policy for blanket SNF referrals. Discussed HT/SCP plan benefits. Pt verbalizes understanding.     Pt reports she is anticipating that she will require post acute placement prior to dc to home and note PT recommending post acute placement as well. Pt is awaiting OT consult and TCN will monitor for dc recs. Note that pt lives with a roommate, though roommate would not be able to assist pt. However, pt does report having a grandson and his spouse who would likely be able to assist to some degree upon eventual dc to home. She would like [facility] consideration as her 1st choice for post acute placement, though is aware of  blanket referral policy and possible SNF need if she is unable to dc to [facility]. She reports if she is unable to dc to [place], she would consider SNF facilities and provide choice from accepting facilities at that time if post acute placement continues to be indicated.     No additional provider requests at this time. Given aforementioned,  choice proactively obtained for IRF, faxed to DPA and CM aware. In collaboration with FABIO, current discharge considerations are anticipated for dc to post acute placement ([facility] v SNF). TCN will continue to follow and collaborate with discharge planning team as additional post acute needs arise. Thank you.     Completed today:  PT with recommendations for post-acute placement for additional physical therapy services prior to discharge home (PM&R consult) on 7/19  OT consult now in place; will monitor for OT recs  Choice obtained: IRF ([facility]; see above)  Pt aware of [facility]'s blanket  referral policy if indicated; see above

## 2024-07-20 NOTE — PROGRESS NOTES
Hospital Medicine Daily Progress Note    Date of Service  7/20/2024    Chief Complaint  Erin Hess is a 84 y.o. female admitted 7/15/2024 with severe aortic stenosis s/p TAVR    Hospital Course    85-year-old female with history of Hocm, smoking, COPD on 2 L nasal cannula and severe aortic stenosis who presented 7/15 for TAVR.  Patient underwent TAVR that was successful however at the end of the procedure patient was complicated by femoral artery injury with Perclose device.  Vascular surgery was consulted and patient remained right femoral endarterectomy as well as repair with bovine pericardial patch and she admitted to ICU.  Patient needed blood transfusion and midodrine for low blood pressure and bleeding, patient was improved and transferred from ICU to IMCU and then telemetry floor.  Patient needed wound VAC and Prevena.    Also patient developed atrial fibrillation after the procedure and needed amiodarone loading dose IV and then switched to oral, her rhythm converted to sinus.      Interval Problem Update  -Evaluated examined the patient at bedside, patient has generalized weakness however no symptoms, her blood pressure stable around 110/50 and she is on 1 L nasal cannula.  -Still having lower abdominal pain with a bruises, Prevena on board.  -Hemoglobin stable around 8 today, stable kidney function.  -Switch amiodarone to oral per cardiology recommendation and the rhythm is sinus.  -PT and OT with rehab referral.        I have discussed this patient's plan of care and discharge plan at IDT rounds today with Case Management, Nursing, Nursing leadership, and other members of the IDT team.    Consultants/Specialty  cardiology and critical care    Code Status  Full Code    Disposition  The patient is not medically cleared for discharge to home or a post-acute facility.  Anticipate discharge to: skilled nursing facility    I have placed the appropriate orders for post-discharge needs.    Review of  Systems  Review of Systems   Constitutional:  Positive for malaise/fatigue. Negative for chills, fever and weight loss.   HENT:  Negative for hearing loss and tinnitus.    Eyes:  Negative for blurred vision, double vision, photophobia and pain.   Respiratory:  Negative for cough, sputum production and shortness of breath.    Cardiovascular:  Negative for chest pain, palpitations, orthopnea and leg swelling.   Gastrointestinal:  Negative for abdominal pain, constipation, diarrhea, nausea and vomiting.   Genitourinary:  Negative for dysuria, frequency and urgency.   Musculoskeletal:  Negative for back pain, joint pain, myalgias and neck pain.   Skin:  Negative for rash.   Neurological:  Positive for weakness. Negative for dizziness, tingling, tremors, sensory change, speech change, focal weakness and headaches.   Psychiatric/Behavioral:  Negative for hallucinations and substance abuse.    All other systems reviewed and are negative.       Physical Exam  Temp:  [36.1 °C (97 °F)-36.6 °C (97.9 °F)] 36.3 °C (97.4 °F)  Pulse:  [54-67] 67  Resp:  [15-21] 17  BP: (105-123)/(40-57) 113/49  SpO2:  [91 %-97 %] 95 %    Physical Exam  Vitals reviewed.   Constitutional:       General: She is not in acute distress.     Appearance: Normal appearance. She is ill-appearing.   HENT:      Head: Normocephalic and atraumatic.      Nose: No congestion.   Eyes:      General:         Right eye: No discharge.         Left eye: No discharge.      Pupils: Pupils are equal, round, and reactive to light.   Cardiovascular:      Rate and Rhythm: Normal rate and regular rhythm.      Pulses: Normal pulses.      Heart sounds: Normal heart sounds. No murmur heard.     Comments: Now she converted to sinus rhythm  Pulmonary:      Effort: Pulmonary effort is normal. No respiratory distress.      Breath sounds: Normal breath sounds. No stridor.   Abdominal:      General: Bowel sounds are normal. There is no distension.      Palpations: Abdomen is soft.       Tenderness: There is no abdominal tenderness.   Musculoskeletal:         General: No swelling or tenderness. Normal range of motion.      Cervical back: Normal range of motion. No rigidity.   Skin:     General: Skin is warm.      Capillary Refill: Capillary refill takes less than 2 seconds.      Coloration: Skin is not jaundiced or pale.      Findings: No bruising.   Neurological:      General: No focal deficit present.      Mental Status: She is alert and oriented to person, place, and time.      Cranial Nerves: No cranial nerve deficit.   Psychiatric:         Mood and Affect: Mood normal.         Behavior: Behavior normal.         Fluids    Intake/Output Summary (Last 24 hours) at 7/20/2024 1217  Last data filed at 7/20/2024 0135  Gross per 24 hour   Intake 1476.5 ml   Output 1100 ml   Net 376.5 ml       Laboratory  Recent Labs     07/18/24  0415 07/18/24  0956 07/19/24  0240 07/19/24  1648 07/20/24  0234   WBC 8.0  --  6.5  --  7.2   RBC 2.68*  --  2.80*  --  2.98*   HEMOGLOBIN 7.5*   < > 7.8* 8.1* 8.6*   HEMATOCRIT 23.4*   < > 24.4* 25.1* 26.4*   MCV 87.3  --  87.1  --  88.6   MCH 28.0  --  27.9  --  28.9   MCHC 32.1*  --  32.0*  --  32.6   RDW 47.1  --  47.8  --  49.0   PLATELETCT 79*  --  95*  --  108*   MPV 11.4  --  11.6  --  10.8    < > = values in this interval not displayed.     Recent Labs     07/18/24  0415 07/19/24  0240 07/20/24  0234   SODIUM 139 140 138   POTASSIUM 4.0 3.8 3.9   CHLORIDE 105 104 103   CO2 26 27 26   GLUCOSE 114* 115* 124*   BUN 11 14 14   CREATININE 0.65 0.73 0.83   CALCIUM 9.3 9.1 9.4                   Imaging  EC-ECHOCARDIOGRAM LTD W/O CONT   Final Result      CTA ABDOMEN PELVIS W & W/O POST PROCESS   Final Result         1.  No visualized aneurysm or dissection.   2.  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   3.  Intermediate density fluid collection and air in the right groin adjacent to the iliac vessels, appearance most compatible with hematoma.   4.  Atherosclerosis  and atherosclerotic coronary artery disease   5.  Trace dependent bilateral pleural effusions   6.  Cholelithiasis   7.  Diverticulosis   8.  Small hiatal hernia      DX-CHEST-PORTABLE (1 VIEW)   Final Result         1.  Hazy left pulmonary infiltrates, stable   2.  Atherosclerosis      DX-CHEST-PORTABLE (1 VIEW)   Final Result         1.  Mild pulmonary edema and/or infiltrates.   2.  Cardiomegaly   3.  Atherosclerosis      EC-ECHOCARDIOGRAM LTD W/O CONT   Final Result      EC-HEATHER W/O CONT   Final Result      DX-CHEST-PORTABLE (1 VIEW)   Final Result      1.  Low lung volumes with bibasilar hypoventilatory changes, underlying infection is possible.   2.  Stable mild enlargement of the cardiomediastinal silhouette.           Assessment/Plan  * S/P TAVR (transcatheter aortic valve replacement)  Assessment & Plan  Successful implantation of a 23 Ivan JEANETTE S3  Aspirin  Pulse checks due to femoral artery injury from Perclose  Vascular surgery evaluated her and made recommendations.  She found to have hypotension and anemia I ordered 1 unit of blood transfusion.    Anemia due to acute blood loss  Assessment & Plan  After the TAVR procedure   Patient needed 1 unit of red blood cell   Needed procedure by vascular surgeon   Hemoglobin stable around 8   Continue monitor hemoglobin every 12 hours and blood transfusion if less than 7     Atrial fibrillation with rapid ventricular response (HCC)  Assessment & Plan  Patient found to have new onset atrial fibrillation with rapid ventricular response.  Needed amiodarone IV loading dose  Converted to sinus, switch to oral amiodarone.  Cardiology on board  She developed new onset atrial fibrillation in if she will remain persistently in A-fib she may need anticoagulation as well.  Due to anemia and acute blood loss, holding anticoagulation at this time    Fatigue- (present on admission)  Assessment & Plan  Due to complicated medical history and hospitalization  PT and OT and  rehab    Postprocedural hypotension- (present on admission)  Assessment & Plan  Due to artery injury and bleeding  Continue midodrine  Improved   blood transfusion if needed        Common femoral artery injury, right, initial encounter- (present on admission)  Assessment & Plan  Femoral artery torn by ramakrishna hilliard  Vascular Surgery performed femoral endarterectomy and repair with bovine patch  pulse checks  Site looks good, pulse checks fine  Continue Prevena    Acute diastolic HF (heart failure) ([facility])  Assessment & Plan  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  Ordered lab workup for tomorrow.    Dyslipidemia- (present on admission)  Assessment & Plan  Continue Statin.    Severe aortic stenosis- (present on admission)  Assessment & Plan  Now s/p TAVR as above  Cardiology is following  Discussed plan of care with cardiology team.    Asthma-COPD overlap syndrome (HCC)- (present on admission)  Assessment & Plan  Not in acute exacerbation  Continue home Trelegy  PRN nebulizers  Continue to monitor    Primary hypertension- (present on admission)  Assessment & Plan  Hold home valsartan/HCTZ  Restart when clinically able  Blood pressure is running on the lower side.  Currently she is on midodrine.  Currently she is hypotensive.          VTE prophylaxis:   SCDs/TEDs   pharmacologic prophylaxis contraindicated due to Anemia and bleeding      I have performed a physical exam and reviewed and updated ROS and Plan today (7/20/2024). In review of yesterday's note (7/19/2024), there are no  changes except as documented above.      Greater than 51 minutes spent prepping to see patient (e.g. review of tests) obtaining and/or reviewing separately obtained history. Performing a medically appropriate examination and/ evaluation.  Counseling and educating the patient/family/caregiver.  Ordering medications, tests, or procedures.  Referring and communicating with other health care professionals.  Documenting clinical information in EPIC.  Independently interpreting results and communicating results to patient/family/caregiver.  Care coordination

## 2024-07-20 NOTE — PROGRESS NOTES
4 Eyes Skin Assessment Completed by CAMMY Kebede and CAMMY Price.    Head WDL  Ears WDL  Nose WDL  Mouth WDL  Neck WDL  Breast/Chest WDL  Shoulder Blades WDL  Spine WDL  (R) Arm/Elbow/Hand Redness, Blanching, and Bruising  (L) Arm/Elbow/Hand Redness, Blanching, and Bruising  Abdomen Scar  Groin Redness, Blanching, and Bruising  Scrotum/Coccyx/Buttocks WDL  (R) Leg WDL  (L) Leg WDL  (R) Heel/Foot/Toe Redness and Blanching  (L) Heel/Foot/Toe Redness and Blanching          Devices In Places Tele Box, Blood Pressure Cuff, and Pulse Ox, wound pump, silicone nasal cannula      Interventions In Place NC W/Ear Foams and Pillows    Possible Skin Injury No    Pictures Uploaded Into Epic Yes  Wound Consult Placed N/A  RN Wound Prevention Protocol Ordered No

## 2024-07-20 NOTE — CARE PLAN
MRN:  [id number] The patient is Stable - Low risk of patient condition declining or worsening    Shift Goals  Clinical Goals: Hemodynamic stability, ambulation  Patient Goals: sleep, get better  Family Goals: na    Progress made toward(s) clinical / shift goals:    Problem: Pain - Post Surgery  Goal: Alleviation or reduction of pain post surgery  Outcome: Progressing     Problem: Respiratory  Goal: Patient will achieve/maintain optimum respiratory ventilation and gas exchange  Outcome: Progressing       Patient is not progressing towards the following goals:

## 2024-07-20 NOTE — DISCHARGE PLANNING
Physiatry consult pending.   Would appreciate OT evaluation when clinically appropriate  TCC following

## 2024-07-20 NOTE — PROGRESS NOTES
Patient arrive to T8 via wheelchair with oxygen tele monitor, RN, and CCT. Upon arrival patient hooked up to new tele box and monitors notified. Bedside belongings accounted for. Education provided regarding safety and call light. Call light within reach. Prevena pump making loud suctioning noises, no leak visible. This RN informed by Alaina CHAWLA that the pump does make those sounds occasionally and was cleared by vascular team.

## 2024-07-21 LAB
ANION GAP SERPL CALC-SCNC: 5 MMOL/L (ref 7–16)
BASOPHILS # BLD AUTO: 0.5 % (ref 0–1.8)
BASOPHILS # BLD: 0.03 K/UL (ref 0–0.12)
BUN SERPL-MCNC: 13 MG/DL (ref 8–22)
CALCIUM SERPL-MCNC: 9.2 MG/DL (ref 8.5–10.5)
CHLORIDE SERPL-SCNC: 105 MMOL/L (ref 96–112)
CO2 SERPL-SCNC: 30 MMOL/L (ref 20–33)
CREAT SERPL-MCNC: 0.8 MG/DL (ref 0.5–1.4)
EOSINOPHIL # BLD AUTO: 0.18 K/UL (ref 0–0.51)
EOSINOPHIL NFR BLD: 3.3 % (ref 0–6.9)
ERYTHROCYTE [DISTWIDTH] IN BLOOD BY AUTOMATED COUNT: 47.9 FL (ref 35.9–50)
GFR SERPLBLD CREATININE-BSD FMLA CKD-EPI: 73 ML/MIN/1.73 M 2
GLUCOSE SERPL-MCNC: 103 MG/DL (ref 65–99)
HCT VFR BLD AUTO: 25.4 % (ref 37–47)
HGB BLD-MCNC: 8.2 G/DL (ref 12–16)
IMM GRANULOCYTES # BLD AUTO: 0.02 K/UL (ref 0–0.11)
IMM GRANULOCYTES NFR BLD AUTO: 0.4 % (ref 0–0.9)
LYMPHOCYTES # BLD AUTO: 1.06 K/UL (ref 1–4.8)
LYMPHOCYTES NFR BLD: 19.4 % (ref 22–41)
MAGNESIUM SERPL-MCNC: 2.4 MG/DL (ref 1.5–2.5)
MCH RBC QN AUTO: 28 PG (ref 27–33)
MCHC RBC AUTO-ENTMCNC: 32.3 G/DL (ref 32.2–35.5)
MCV RBC AUTO: 86.7 FL (ref 81.4–97.8)
MONOCYTES # BLD AUTO: 0.7 K/UL (ref 0–0.85)
MONOCYTES NFR BLD AUTO: 12.8 % (ref 0–13.4)
NEUTROPHILS # BLD AUTO: 3.48 K/UL (ref 1.82–7.42)
NEUTROPHILS NFR BLD: 63.6 % (ref 44–72)
NRBC # BLD AUTO: 0 K/UL
NRBC BLD-RTO: 0 /100 WBC (ref 0–0.2)
PLATELET # BLD AUTO: 105 K/UL (ref 164–446)
PMV BLD AUTO: 11.1 FL (ref 9–12.9)
POTASSIUM SERPL-SCNC: 4.3 MMOL/L (ref 3.6–5.5)
RBC # BLD AUTO: 2.93 M/UL (ref 4.2–5.4)
SODIUM SERPL-SCNC: 140 MMOL/L (ref 135–145)
WBC # BLD AUTO: 5.5 K/UL (ref 4.8–10.8)

## 2024-07-21 PROCEDURE — 83735 ASSAY OF MAGNESIUM: CPT

## 2024-07-21 PROCEDURE — 80048 BASIC METABOLIC PNL TOTAL CA: CPT

## 2024-07-21 PROCEDURE — 99232 SBSQ HOSP IP/OBS MODERATE 35: CPT | Performed by: INTERNAL MEDICINE

## 2024-07-21 PROCEDURE — 770020 HCHG ROOM/CARE - TELE (206)

## 2024-07-21 PROCEDURE — 85025 COMPLETE CBC W/AUTO DIFF WBC: CPT

## 2024-07-21 PROCEDURE — 97166 OT EVAL MOD COMPLEX 45 MIN: CPT

## 2024-07-21 PROCEDURE — A9270 NON-COVERED ITEM OR SERVICE: HCPCS

## 2024-07-21 PROCEDURE — 94640 AIRWAY INHALATION TREATMENT: CPT

## 2024-07-21 PROCEDURE — A9270 NON-COVERED ITEM OR SERVICE: HCPCS | Performed by: NURSE PRACTITIONER

## 2024-07-21 PROCEDURE — 700102 HCHG RX REV CODE 250 W/ 637 OVERRIDE(OP)

## 2024-07-21 PROCEDURE — 700101 HCHG RX REV CODE 250: Performed by: STUDENT IN AN ORGANIZED HEALTH CARE EDUCATION/TRAINING PROGRAM

## 2024-07-21 PROCEDURE — 36415 COLL VENOUS BLD VENIPUNCTURE: CPT

## 2024-07-21 PROCEDURE — 700102 HCHG RX REV CODE 250 W/ 637 OVERRIDE(OP): Performed by: NURSE PRACTITIONER

## 2024-07-21 RX ADMIN — SENNOSIDES AND DOCUSATE SODIUM 1 TABLET: 50; 8.6 TABLET ORAL at 20:34

## 2024-07-21 RX ADMIN — ANASTROZOLE 1 MG: 1 TABLET ORAL at 04:20

## 2024-07-21 RX ADMIN — DOCUSATE SODIUM 100 MG: 100 CAPSULE, LIQUID FILLED ORAL at 04:19

## 2024-07-21 RX ADMIN — BISACODYL 10 MG: 10 SUPPOSITORY RECTAL at 11:47

## 2024-07-21 RX ADMIN — OMEPRAZOLE 20 MG: 20 CAPSULE, DELAYED RELEASE ORAL at 04:19

## 2024-07-21 RX ADMIN — ACETAMINOPHEN 650 MG: 325 TABLET, FILM COATED ORAL at 20:34

## 2024-07-21 RX ADMIN — AMIODARONE HYDROCHLORIDE 400 MG: 200 TABLET ORAL at 04:19

## 2024-07-21 RX ADMIN — ASPIRIN 81 MG: 81 TABLET, COATED ORAL at 04:19

## 2024-07-21 RX ADMIN — ACETAMINOPHEN 650 MG: 325 TABLET, FILM COATED ORAL at 04:19

## 2024-07-21 RX ADMIN — DOCUSATE SODIUM 100 MG: 100 CAPSULE, LIQUID FILLED ORAL at 17:02

## 2024-07-21 RX ADMIN — FLUTICASONE FUROATE, UMECLIDINIUM BROMIDE AND VILANTEROL TRIFENATATE 1 PUFF: 200; 62.5; 25 POWDER RESPIRATORY (INHALATION) at 04:19

## 2024-07-21 RX ADMIN — ROSUVASTATIN CALCIUM 20 MG: 20 TABLET, FILM COATED ORAL at 17:02

## 2024-07-21 RX ADMIN — ALBUTEROL SULFATE 2.5 MG: 2.5 SOLUTION RESPIRATORY (INHALATION) at 07:21

## 2024-07-21 RX ADMIN — PAROXETINE HYDROCHLORIDE 10 MG: 10 TABLET, FILM COATED ORAL at 04:19

## 2024-07-21 RX ADMIN — Medication 5 MG: at 20:34

## 2024-07-21 RX ADMIN — POLYETHYLENE GLYCOL 3350 1 PACKET: 17 POWDER, FOR SOLUTION ORAL at 04:19

## 2024-07-21 RX ADMIN — AMIODARONE HYDROCHLORIDE 400 MG: 200 TABLET ORAL at 17:02

## 2024-07-21 ASSESSMENT — ENCOUNTER SYMPTOMS
EYE PAIN: 0
ORTHOPNEA: 0
HEADACHES: 0
DIZZINESS: 0
VOMITING: 0
WEAKNESS: 1
TREMORS: 0
DIARRHEA: 0
FEVER: 0
PALPITATIONS: 0
ABDOMINAL PAIN: 0
TINGLING: 0
SENSORY CHANGE: 0
SHORTNESS OF BREATH: 0
CONSTIPATION: 0
BACK PAIN: 0
FOCAL WEAKNESS: 0
CHILLS: 0
MYALGIAS: 0
NAUSEA: 0
NECK PAIN: 0
SPEECH CHANGE: 0
SPUTUM PRODUCTION: 0
DOUBLE VISION: 0
HALLUCINATIONS: 0
PHOTOPHOBIA: 0
WEIGHT LOSS: 0
COUGH: 0
BLURRED VISION: 0

## 2024-07-21 ASSESSMENT — LIFESTYLE VARIABLES: SUBSTANCE_ABUSE: 0

## 2024-07-21 ASSESSMENT — PAIN DESCRIPTION - PAIN TYPE
TYPE: CHRONIC PAIN
TYPE: CHRONIC PAIN
TYPE: ACUTE PAIN;SURGICAL PAIN
TYPE: CHRONIC PAIN

## 2024-07-21 ASSESSMENT — COGNITIVE AND FUNCTIONAL STATUS - GENERAL
HELP NEEDED FOR BATHING: A LITTLE
SUGGESTED CMS G CODE MODIFIER DAILY ACTIVITY: CK
DAILY ACTIVITIY SCORE: 18
DRESSING REGULAR UPPER BODY CLOTHING: A LITTLE
PERSONAL GROOMING: A LITTLE
DRESSING REGULAR LOWER BODY CLOTHING: A LOT
TOILETING: A LITTLE

## 2024-07-21 ASSESSMENT — ACTIVITIES OF DAILY LIVING (ADL): TOILETING: INDEPENDENT

## 2024-07-21 ASSESSMENT — FIBROSIS 4 INDEX: FIB4 SCORE: 3.38

## 2024-07-21 NOTE — PROGRESS NOTES
84-year-old status post successful TAVR 7/15/24 complicated by injury to the common femoral artery requiring surgical repair developed  new onset atrial fibrillation 7/19/2024 complicated by hypotension.  Notable for low hemoglobin.  Status post 1 unit of blood transfusion.  Right groin Prevena intact.  Complaining of right leg numbness, excellent circulation.  Venous ultrasound of right lower extremity pending. BP stable off midodrine. H&H stable.  Back in sinus rhythm on oral amiodarone.  Plan is to be discharged to rehab once evaluated by OT.  Will continue to follow.

## 2024-07-21 NOTE — THERAPY
Occupational Therapy   Initial Evaluation     Patient Name: Erin Hess  Age:  84 y.o., Sex:  female  Medical Record #: [id number]  Today's Date: 7/21/2024     Precautions  Precautions: Fall Risk, Cardiac Precautions (See Comments)  Comments: prevena R groin    Assessment    Patient is 84 y.o. female     Plan    Occupational Therapy Initial Treatment Plan   Treatment Interventions: Self Care / Activities of Daily Living, Adaptive Equipment, Neuro Re-Education / Balance, Therapeutic Exercises, Therapeutic Activity  Treatment Frequency: 3 Times per Week  Duration: Until Therapy Goals Met    DC Equipment Recommendations: Unable to determine at this time  Discharge Recommendations: Recommend post-acute placement for additional occupational therapy services prior to discharge home      Objective     07/21/24 1341   Initial Contact Note    Initial Contact Note Order Received and Verified, Occupational Therapy Evaluation in Progress with Full Report to Follow.   Prior Living Situation   Prior Services None   Housing / Facility 1 Story House   Bathroom Set up Walk In Shower;Shower Chair;Grab Bars  (grab bars being installed per son)   Equipment Owned Grab Bar(s) In Tub / Shower;Tub / Shower Seat   Lives with - Patient's Self Care Capacity Unrelated Adult   Comments Pt lives with a roommate. Pt has a very supportive family who can assist as needed. There were three family members present at time of eval.   Prior Level of ADL Function   Self Feeding Independent   Grooming / Hygiene Independent   Bathing Independent   Dressing Independent   Toileting Independent   Prior Level of IADL Function   Medication Management Independent   Laundry Independent   Kitchen Mobility Requires Assist  (Pt's roommate does a lot of the cooking)   Finances Independent   Home Management Independent   Shopping Independent   Prior Level Of Mobility Independent Without Device in Community;Independent Without Device in Home   Driving / Transportation  Driving Independent   Occupation (Pre-Hospital Vocational) Retired Due To Age   Comments Pt and her roommate share household chores.   History of Falls   History of Falls No   Precautions   Precautions Fall Risk;Cardiac Precautions (See Comments)   Vitals   Vitals Comments remained WDL   Pain   Pain Scales 0 to 10 Scale    Pain 0 - 10 Group   Therapist Pain Assessment Post Activity Pain Same as Prior to Activity;Nurse Notified  (not rated, agreeable to session)   Non Verbal Descriptors   Non Verbal Scale  Calm   Cognition    Cognition / Consciousness WDL   Level of Consciousness Alert   Comments pleasant, cooperative, motivated   Passive ROM Upper Body   Passive ROM Upper Body WDL   Active ROM Upper Body   Active ROM Upper Body  WDL   Strength Upper Body   Upper Body Strength  WDL   Sensation Upper Body   Upper Extremity Sensation  WDL   Upper Body Muscle Tone   Upper Body Muscle Tone  WDL   Coordination Upper Body   Coordination WDL   Balance Assessment   Sitting Balance (Static) Fair +   Sitting Balance (Dynamic) Fair   Standing Balance (Static) Fair -   Standing Balance (Dynamic) Poor +   Weight Shift Sitting Fair   Weight Shift Standing Fair   Comments w/ FWW   Bed Mobility    Supine to Sit Contact Guard Assist   Sit to Supine   (up to recliner post)   Scooting Contact Guard Assist   Rolling Contact Guard Assist   Comments HOB flat, use of rails   ADL Assessment   Grooming Minimal Assist;Standing  (washed hands/face at sink)   Lower Body Dressing Moderate Assist  (don/doff socks)   Toileting Minimal Assist  (urine and small BM on toilet)   Functional Mobility   Sit to Stand Contact Guard Assist   Bed, Chair, Wheelchair Transfer Minimal Assist   Toilet Transfers Minimal Assist   Transfer Method Stand Step   Mobility EOB>bathroom>chair   Comments w/ FWW   Visual Perception   Visual Perception  Not Tested   Activity Tolerance   Sitting in Chair up to recliner post   Sitting Edge of Bed <5 min   Standing >5 min    Comments limited by weakness, pain, pt reported feeling very fatigued at end of session   Patient / Family Goals   Patient / Family Goal #1 to get stronger   Short Term Goals   Short Term Goal # 1 Pt will perform ADL transfer w/ supv   Short Term Goal # 2 Pt will perform toilet hygiene w/ supv   Short Term Goal # 3 Pt will perform LB dressing w/ supv and AE PRN   Education Group   Education Provided Role of Occupational Therapist;Activities of Daily Living;Energy Conservation   Role of Occupational Therapist Patient Response Patient;Acceptance;Explanation;Verbal Demonstration   Energy Conservation Patient Response Patient;Acceptance;Explanation;Verbal Demonstration   ADL Patient Response Patient;Acceptance;Explanation;Verbal Demonstration   Occupational Therapy Initial Treatment Plan    Treatment Interventions Self Care / Activities of Daily Living;Adaptive Equipment;Neuro Re-Education / Balance;Therapeutic Exercises;Therapeutic Activity   Treatment Frequency 3 Times per Week   Duration Until Therapy Goals Met   Problem List   Problem List Decreased Active Daily Living Skills;Decreased Homemaking Skills;Decreased Functional Mobility;Decreased Activity Tolerance;Impaired Postural Control / Balance

## 2024-07-21 NOTE — THERAPY
Occupational Therapy   Initial Evaluation     Patient Name: Erin Hess  Age:  84 y.o., Sex:  female  Medical Record #: [id number]  Today's Date: 7/21/2024     Precautions  Precautions: Fall Risk, Cardiac Precautions (See Comments)  Comments: prevena R groin    Assessment    Patient is 84 y.o. female s/p TAVR and femoral endarterectomy with patch repair. Other pertinent medical history includes breast cancer, gastric sleeve surgery, DLD, HTN, CKD III, ROBYN, prior smoker, COPD, asthma, and aortic stenosis. Pt seen for OT evaluation. Pt required CGA-min A for functional mobility, ADL transfer, standing handwashing, and toilet hygiene. Pt required mod A to don/doff socks. Pt lives with a roommate. Pt has a supportive family who can assist as needed. Pt's son reported that they are planning to have people stay with pt once she is cleared to go home. Pt educated regarding the role of OT, alternative strategies for toilet hygiene, and energy conservation principles. Pt current functional performance limited by impaired activity tolerance, impaired balance, generalized weakness, and pain. Pt will benefit from skilled OT while admitted to acute care.     Plan    Occupational Therapy Initial Treatment Plan   Treatment Interventions: Self Care / Activities of Daily Living, Adaptive Equipment, Neuro Re-Education / Balance, Therapeutic Exercises, Therapeutic Activity  Treatment Frequency: 3 Times per Week  Duration: Until Therapy Goals Met    DC Equipment Recommendations: Unable to determine at this time  Discharge Recommendations: Recommend post-acute placement for additional occupational therapy services prior to discharge home      Objective     07/21/24 1341   Initial Contact Note    Initial Contact Note Order Received and Verified, Occupational Therapy Evaluation in Progress with Full Report to Follow.   Prior Living Situation   Prior Services None   Housing / Facility [address]   Bathroom Set up Walk In Shower;Shower  Chair;Grab Bars  (grab bars being installed per son)   Equipment Owned Grab Bar(s) In Tub / Shower;Tub / Shower Seat   Lives with - Patient's Self Care Capacity Unrelated Adult   Comments Pt lives with a roommate. Pt has a very supportive family who can assist as needed. There were three family members present at time of eval.   Prior Level of ADL Function   Self Feeding Independent   Grooming / Hygiene Independent   Bathing Independent   Dressing Independent   Toileting Independent   Prior Level of IADL Function   Medication Management Independent   Laundry Independent   Kitchen Mobility Requires Assist  (Pt's roommate does a lot of the cooking)   Finances Independent   Home Management Independent   Shopping Independent   Prior Level Of Mobility Independent Without Device in Community;Independent Without Device in Home   Driving / Transportation Driving Independent   Occupation (Pre-Hospital Vocational) Retired Due To Age   Comments Pt and her roommate share household chores.   History of Falls   History of Falls No   Precautions   Precautions Fall Risk;Cardiac Precautions (See Comments)   Vitals   Vitals Comments remained WDL   Pain   Pain Scales 0 to 10 Scale    Pain 0 - 10 Group   Therapist Pain Assessment Post Activity Pain Same as Prior to Activity;Nurse Notified  (not rated, agreeable to session)   Non Verbal Descriptors   Non Verbal Scale  Calm   Cognition    Cognition / Consciousness WDL   Level of Consciousness Alert   Comments pleasant, cooperative, motivated   Passive ROM Upper Body   Passive ROM Upper Body WDL   Active ROM Upper Body   Active ROM Upper Body  WDL   Strength Upper Body   Upper Body Strength  WDL   Sensation Upper Body   Upper Extremity Sensation  WDL   Upper Body Muscle Tone   Upper Body Muscle Tone  WDL   Coordination Upper Body   Coordination WDL   Balance Assessment   Sitting Balance (Static) Fair +   Sitting Balance (Dynamic) Fair   Standing Balance (Static) Fair -   Standing Balance  (Dynamic) Poor +   Weight Shift Sitting Fair   Weight Shift Standing Fair   Comments w/ FWW   Bed Mobility    Supine to Sit Contact Guard Assist   Sit to Supine   (up to recliner post)   Scooting Contact Guard Assist   Rolling Contact Guard Assist   Comments HOB flat, use of rails   ADL Assessment   Grooming Minimal Assist;Standing  (washed hands/face at sink)   Lower Body Dressing Moderate Assist  (don/doff socks)   Toileting Minimal Assist  (urine and small BM on toilet)   Functional Mobility   Sit to Stand Contact Guard Assist   Bed, Chair, Wheelchair Transfer Minimal Assist   Toilet Transfers Minimal Assist   Transfer Method Stand Step   Mobility EOB>bathroom>chair   Comments w/ FWW   Visual Perception   Visual Perception  Not Tested   Activity Tolerance   Sitting in Chair up to recliner post   Sitting Edge of Bed <5 min   Standing >5 min   Comments limited by weakness, pain, pt reported feeling very fatigued at end of session   Patient / Family Goals   Patient / Family Goal #1 to get stronger   Short Term Goals   Short Term Goal # 1 Pt will perform ADL transfer w/ supv   Short Term Goal # 2 Pt will perform toilet hygiene w/ supv   Short Term Goal # 3 Pt will perform LB dressing w/ supv and AE PRN   Education Group   Education Provided Role of Occupational Therapist;Activities of Daily Living;Energy Conservation   Role of Occupational Therapist Patient Response Patient;Acceptance;Explanation;Verbal Demonstration   Energy Conservation Patient Response Patient;Acceptance;Explanation;Verbal Demonstration   ADL Patient Response Patient;Acceptance;Explanation;Verbal Demonstration   Occupational Therapy Initial Treatment Plan    Treatment Interventions Self Care / Activities of Daily Living;Adaptive Equipment;Neuro Re-Education / Balance;Therapeutic Exercises;Therapeutic Activity   Treatment Frequency 3 Times per Week   Duration Until Therapy Goals Met   Problem List   Problem List Decreased Active Daily Living  Skills;Decreased Homemaking Skills;Decreased Functional Mobility;Decreased Activity Tolerance;Impaired Postural Control / Balance

## 2024-07-21 NOTE — PROGRESS NOTES
Bedside report received from off going RN: Arlette, assumed care of patient.     Fall Risk Score: HIGH RISK  Fall risk interventions in place: Place yellow fall risk ID band on patient, Provide patient/family education based on risk assessment, Educate patient/family to call staff for assistance when getting out of bed, Place fall precaution signage outside patient door, Place patient in room close to nursing station, Utilize bed/chair fall alarm, Notify charge of high risk for huddle, and Bed alarm connected correctly  Bed type: Regular (Delfino Score less than 17 interventions in place)  Patient on cardiac monitor: Yes  IVF/IV medications: Not Applicable   Oxygen: How many liters 1L, Traced the line to wall oxygen, and No oxygen tank in room  Bedside sitter: Not Applicable   Isolation: Not applicable

## 2024-07-21 NOTE — CARE PLAN
The patient is Stable - Low risk of patient condition declining or worsening    Shift Goals  Clinical Goals: Monitor VS/Labs, monitor CMS RLE and prevena pump  Patient Goals: sleep, go to rehab soon  Family Goals: na    Progress made toward(s) clinical / shift goals:    Problem: Neurovascular Monitoring  Goal: Patient's neurovascular status will be maintained or improve  Outcome: Progressing     Problem: Wound/ / Incision Healing  Goal: Patient's wound/surgical incision will decrease in size and heals properly  Outcome: Progressing       Patient is not progressing towards the following goals:

## 2024-07-21 NOTE — CARE PLAN
The patient is Stable - Low risk of patient condition declining or worsening    Shift Goals  Clinical Goals: hr control, hgb stable  Patient Goals: comfort  Family Goals: NIXON    Progress made toward(s) clinical / shift goals:      Patient is not progressing towards the following goals:      Problem: Pain - Standard  Goal: Alleviation of pain or a reduction in pain to the patient’s comfort goal  Outcome: Progressing     Problem: Knowledge Deficit - Standard  Goal: Patient and family/care givers will demonstrate understanding of plan of care, disease process/condition, diagnostic tests and medications  Outcome: Progressing     Problem: Skin Integrity  Goal: Skin integrity is maintained or improved  Outcome: Progressing     Problem: Fall Risk  Goal: Patient will remain free from falls  Outcome: Progressing     Problem: Pre Op  Goal: Optimal preparation for surgery  Outcome: Progressing     Problem: Neuro Status  Goal: Neuro status will remain stable or improve  Outcome: Progressing     Problem: Neurovascular Monitoring  Goal: Patient's neurovascular status will be maintained or improve  Outcome: Progressing     Problem: Risk for Post Op Fluid Imbalance  Goal: Promotion of fluid balance  Outcome: Progressing     Problem: Respiratory/Oxygenation Function Post-Surgical  Goal: Patient will achieve/maintain normal respiratory rate/effort  Outcome: Progressing     Problem: Early Mobilization - Post Surgery  Goal: Early mobilization post surgery  Outcome: Progressing     Problem: Pain - Post Surgery  Goal: Alleviation or reduction of pain post surgery  Outcome: Progressing  Goal: Proper management of On-Q Pump  Outcome: Progressing     Problem: Wound/ / Incision Healing  Goal: Patient's wound/surgical incision will decrease in size and heals properly  Outcome: Progressing     Problem: Surgical Drain Management  Goal: Proper management/care of surgical drains will be maintained  Outcome: Progressing     Problem: Bowel  Elimination - Post Surgical  Goal: Patient will resume regular bowel sounds and function with no discomfort or distention  Outcome: Progressing     Problem: Respiratory  Goal: Patient will achieve/maintain optimum respiratory ventilation and gas exchange  Outcome: Progressing

## 2024-07-21 NOTE — PROGRESS NOTES
Hospital Medicine Daily Progress Note    Date of Service  7/21/2024    Chief Complaint  Erin Hess is a 84 y.o. female admitted 7/15/2024 with severe aortic stenosis s/p TAVR    Hospital Course    85-year-old female with history of Hocm, smoking, COPD on 2 L nasal cannula and severe aortic stenosis who presented 7/15 for TAVR.  Patient underwent TAVR that was successful however at the end of the procedure patient was complicated by femoral artery injury with Perclose device.  Vascular surgery was consulted and patient remained right femoral endarterectomy as well as repair with bovine pericardial patch and she admitted to ICU.  Patient needed blood transfusion and midodrine for low blood pressure and bleeding, patient was improved and transferred from ICU to IMCU and then telemetry floor.  Patient needed wound VAC and Prevena.    Also patient developed atrial fibrillation after the procedure and needed amiodarone loading dose IV and then switched to oral, her rhythm converted to sinus.      Interval Problem Update  -Evaluated examined the patient at bedside, patient has generalized weakness however no symptoms.  -Improving on her blood pressure, holding midodrine and start with valsartan  -Prevena and wound VAC  -Hemoglobin is a stable, stable bruising on her abdomen however some pain and numbness on the right leg, ultrasound to rule out DVT is pending.  -Still waiting for OT evaluation and possible rehab placement      I have discussed this patient's plan of care and discharge plan at IDT rounds today with Case Management, Nursing, Nursing leadership, and other members of the IDT team.    Consultants/Specialty  cardiology and critical care    Code Status  Full Code    Disposition  The patient is not medically cleared for discharge to home or a post-acute facility.  Anticipate discharge to: an inpatient rehabilitation hospital    I have placed the appropriate orders for post-discharge needs.    Review of  Systems  Review of Systems   Constitutional:  Positive for malaise/fatigue. Negative for chills, fever and weight loss.   HENT:  Negative for hearing loss and tinnitus.    Eyes:  Negative for blurred vision, double vision, photophobia and pain.   Respiratory:  Negative for cough, sputum production and shortness of breath.    Cardiovascular:  Negative for chest pain, palpitations, orthopnea and leg swelling.   Gastrointestinal:  Negative for abdominal pain, constipation, diarrhea, nausea and vomiting.   Genitourinary:  Negative for dysuria, frequency and urgency.   Musculoskeletal:  Negative for back pain, joint pain, myalgias and neck pain.   Skin:  Negative for rash.   Neurological:  Positive for weakness. Negative for dizziness, tingling, tremors, sensory change, speech change, focal weakness and headaches.   Psychiatric/Behavioral:  Negative for hallucinations and substance abuse.    All other systems reviewed and are negative.       Physical Exam  Temp:  [36 °C (96.8 °F)-36.5 °C (97.7 °F)] 36.5 °C (97.7 °F)  Pulse:  [57-70] 63  Resp:  [16-18] 17  BP: (122-134)/(46-64) 124/52  SpO2:  [91 %-96 %] 96 %    Physical Exam  Vitals reviewed.   Constitutional:       General: She is not in acute distress.     Appearance: Normal appearance. She is ill-appearing.   HENT:      Head: Normocephalic and atraumatic.      Nose: No congestion.   Eyes:      General:         Right eye: No discharge.         Left eye: No discharge.      Pupils: Pupils are equal, round, and reactive to light.   Cardiovascular:      Rate and Rhythm: Normal rate and regular rhythm.      Pulses: Normal pulses.      Heart sounds: Normal heart sounds. No murmur heard.     Comments: Now she converted to sinus rhythm  Pulmonary:      Effort: Pulmonary effort is normal. No respiratory distress.      Breath sounds: Normal breath sounds. No stridor.   Abdominal:      General: Bowel sounds are normal. There is no distension.      Palpations: Abdomen is soft.       Tenderness: There is no abdominal tenderness.   Musculoskeletal:         General: No swelling or tenderness. Normal range of motion.      Cervical back: Normal range of motion. No rigidity.   Skin:     General: Skin is warm.      Capillary Refill: Capillary refill takes less than 2 seconds.      Coloration: Skin is not jaundiced or pale.      Findings: No bruising.   Neurological:      General: No focal deficit present.      Mental Status: She is alert and oriented to person, place, and time.      Cranial Nerves: No cranial nerve deficit.   Psychiatric:         Mood and Affect: Mood normal.         Behavior: Behavior normal.         Fluids    Intake/Output Summary (Last 24 hours) at 7/21/2024 1559  Last data filed at 7/21/2024 0800  Gross per 24 hour   Intake 480 ml   Output 400 ml   Net 80 ml       Laboratory  Recent Labs     07/19/24  0240 07/19/24  1648 07/20/24  0234 07/20/24  1946 07/21/24  0155   WBC 6.5  --  7.2  --  5.5   RBC 2.80*  --  2.98*  --  2.93*   HEMOGLOBIN 7.8*   < > 8.6* 8.5* 8.2*   HEMATOCRIT 24.4*   < > 26.4* 26.4* 25.4*   MCV 87.1  --  88.6  --  86.7   MCH 27.9  --  28.9  --  28.0   MCHC 32.0*  --  32.6  --  32.3   RDW 47.8  --  49.0  --  47.9   PLATELETCT 95*  --  108*  --  105*   MPV 11.6  --  10.8  --  11.1    < > = values in this interval not displayed.     Recent Labs     07/19/24  0240 07/20/24  0234 07/21/24  0155   SODIUM 140 138 140   POTASSIUM 3.8 3.9 4.3   CHLORIDE 104 103 105   CO2 27 26 30   GLUCOSE 115* 124* 103*   BUN 14 14 13   CREATININE 0.73 0.83 0.80   CALCIUM 9.1 9.4 9.2                   Imaging  EC-ECHOCARDIOGRAM LTD W/O CONT   Final Result      CTA ABDOMEN PELVIS W & W/O POST PROCESS   Final Result         1.  No visualized aneurysm or dissection.   2.  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   3.  Intermediate density fluid collection and air in the right groin adjacent to the iliac vessels, appearance most compatible with hematoma.   4.  Atherosclerosis and  atherosclerotic coronary artery disease   5.  Trace dependent bilateral pleural effusions   6.  Cholelithiasis   7.  Diverticulosis   8.  Small hiatal hernia      DX-CHEST-PORTABLE (1 VIEW)   Final Result         1.  Hazy left pulmonary infiltrates, stable   2.  Atherosclerosis      DX-CHEST-PORTABLE (1 VIEW)   Final Result         1.  Mild pulmonary edema and/or infiltrates.   2.  Cardiomegaly   3.  Atherosclerosis      EC-ECHOCARDIOGRAM LTD W/O CONT   Final Result      EC-HEATHER W/O CONT   Final Result      DX-CHEST-PORTABLE (1 VIEW)   Final Result      1.  Low lung volumes with bibasilar hypoventilatory changes, underlying infection is possible.   2.  Stable mild enlargement of the cardiomediastinal silhouette.      US-EXTREMITY VENOUS LOWER UNILAT RIGHT    (Results Pending)        Assessment/Plan  * S/P TAVR (transcatheter aortic valve replacement)  Assessment & Plan  Successful implantation of a 23 Ivan JEANETTE S3  Aspirin  Pulse checks due to femoral artery injury from Perclose  Vascular surgery evaluated her and made recommendations.  She found to have hypotension and anemia I ordered 1 unit of blood transfusion.    Anemia due to acute blood loss  Assessment & Plan  After the TAVR procedure   Patient needed 1 unit of red blood cell   Needed procedure by vascular surgeon   Hemoglobin stable around 8   Continue monitor hemoglobin every 12 hours and blood transfusion if less than 7     Atrial fibrillation with rapid ventricular response (HCC)  Assessment & Plan  Patient found to have new onset atrial fibrillation with rapid ventricular response.  Needed amiodarone IV loading dose  Converted to sinus, switch to oral amiodarone.  Cardiology on board  She developed new onset atrial fibrillation in if she will remain persistently in A-fib she may need anticoagulation as well.  Due to anemia and acute blood loss, holding anticoagulation at this time    Fatigue- (present on admission)  Assessment & Plan  Due to  complicated medical history and hospitalization  PT and OT and rehab    Postprocedural hypotension- (present on admission)  Assessment & Plan  Due to artery injury and bleeding  Continue midodrine  Improved   blood transfusion if needed        Common femoral artery injury, right, initial encounter- (present on admission)  Assessment & Plan  Femoral artery torn by ramakrishna hilliard  Vascular Surgery performed femoral endarterectomy and repair with bovine patch  pulse checks  Site looks good, pulse checks fine  Continue Prevena    Acute diastolic HF (heart failure) ([facility])  Assessment & Plan  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  Ordered lab workup for tomorrow.    Dyslipidemia- (present on admission)  Assessment & Plan  Continue Statin.    Severe aortic stenosis- (present on admission)  Assessment & Plan  Now s/p TAVR as above  Cardiology is following  Discussed plan of care with cardiology team.    Asthma-COPD overlap syndrome (HCC)- (present on admission)  Assessment & Plan  Not in acute exacerbation  Continue home Trelegy  PRN nebulizers  Continue to monitor    Primary hypertension- (present on admission)  Assessment & Plan  Hold home valsartan/HCTZ  Restart when clinically able  Blood pressure is running on the lower side.  Currently she is on midodrine.  Currently she is hypotensive.          VTE prophylaxis: VTE Selection    I have performed a physical exam and reviewed and updated ROS and Plan today (7/21/2024). In review of yesterday's note (7/20/2024), there are no changes except  as documented above.      Greater than 51 minutes spent prepping to see patient (e.g. review of tests) obtaining and/or reviewing separately obtained history. Performing a medically appropriate examination and/ evaluation.  Counseling and educating the patient/family/caregiver.  Ordering medications, tests, or procedures.  Referring and communicating with other health care professionals.  Documenting clinical information in EPIC.  Independently interpreting results and communicating results to patient/family/caregiver.  Care coordination

## 2024-07-21 NOTE — CARE PLAN
The patient is Stable - Low risk of patient condition declining or worsening    Shift Goals  Clinical Goals: Remain free of complications r/t R femoral artery injury and repair  Patient Goals: OT eval  Family Goals: POC updates    Progress made toward(s) clinical / shift goals:  R femoral site is checked, it remains soft but bruised. Cms checks are performed every four hours, RLE remains warm, pink, cap refill 3 seconds or less, and pulses palpable at 2+.    Patient is not progressing towards the following goals:

## 2024-07-22 ENCOUNTER — APPOINTMENT (OUTPATIENT)
Dept: RADIOLOGY | Facility: MEDICAL CENTER | Age: 84
DRG: 266 | End: 2024-07-22
Attending: INTERNAL MEDICINE
Payer: MEDICARE

## 2024-07-22 LAB
ANION GAP SERPL CALC-SCNC: 9 MMOL/L (ref 7–16)
BASOPHILS # BLD AUTO: 0.5 % (ref 0–1.8)
BASOPHILS # BLD: 0.03 K/UL (ref 0–0.12)
BUN SERPL-MCNC: 15 MG/DL (ref 8–22)
CALCIUM SERPL-MCNC: 9.1 MG/DL (ref 8.5–10.5)
CHLORIDE SERPL-SCNC: 103 MMOL/L (ref 96–112)
CO2 SERPL-SCNC: 28 MMOL/L (ref 20–33)
CREAT SERPL-MCNC: 1.02 MG/DL (ref 0.5–1.4)
EOSINOPHIL # BLD AUTO: 0.17 K/UL (ref 0–0.51)
EOSINOPHIL NFR BLD: 2.9 % (ref 0–6.9)
ERYTHROCYTE [DISTWIDTH] IN BLOOD BY AUTOMATED COUNT: 49.4 FL (ref 35.9–50)
GFR SERPLBLD CREATININE-BSD FMLA CKD-EPI: 55 ML/MIN/1.73 M 2
GLUCOSE SERPL-MCNC: 116 MG/DL (ref 65–99)
HCT VFR BLD AUTO: 25.8 % (ref 37–47)
HGB BLD-MCNC: 8.2 G/DL (ref 12–16)
IMM GRANULOCYTES # BLD AUTO: 0.03 K/UL (ref 0–0.11)
IMM GRANULOCYTES NFR BLD AUTO: 0.5 % (ref 0–0.9)
LYMPHOCYTES # BLD AUTO: 1.13 K/UL (ref 1–4.8)
LYMPHOCYTES NFR BLD: 19.5 % (ref 22–41)
MCH RBC QN AUTO: 28.4 PG (ref 27–33)
MCHC RBC AUTO-ENTMCNC: 31.8 G/DL (ref 32.2–35.5)
MCV RBC AUTO: 89.3 FL (ref 81.4–97.8)
MONOCYTES # BLD AUTO: 0.68 K/UL (ref 0–0.85)
MONOCYTES NFR BLD AUTO: 11.7 % (ref 0–13.4)
NEUTROPHILS # BLD AUTO: 3.75 K/UL (ref 1.82–7.42)
NEUTROPHILS NFR BLD: 64.9 % (ref 44–72)
NRBC # BLD AUTO: 0 K/UL
NRBC BLD-RTO: 0 /100 WBC (ref 0–0.2)
PLATELET # BLD AUTO: 118 K/UL (ref 164–446)
PMV BLD AUTO: 10.7 FL (ref 9–12.9)
POTASSIUM SERPL-SCNC: 3.9 MMOL/L (ref 3.6–5.5)
RBC # BLD AUTO: 2.89 M/UL (ref 4.2–5.4)
SODIUM SERPL-SCNC: 140 MMOL/L (ref 135–145)
WBC # BLD AUTO: 5.8 K/UL (ref 4.8–10.8)

## 2024-07-22 PROCEDURE — 94640 AIRWAY INHALATION TREATMENT: CPT

## 2024-07-22 PROCEDURE — 700102 HCHG RX REV CODE 250 W/ 637 OVERRIDE(OP)

## 2024-07-22 PROCEDURE — 700102 HCHG RX REV CODE 250 W/ 637 OVERRIDE(OP): Performed by: INTERNAL MEDICINE

## 2024-07-22 PROCEDURE — 770020 HCHG ROOM/CARE - TELE (206)

## 2024-07-22 PROCEDURE — 93971 EXTREMITY STUDY: CPT | Mod: 26,RT | Performed by: INTERNAL MEDICINE

## 2024-07-22 PROCEDURE — 80048 BASIC METABOLIC PNL TOTAL CA: CPT

## 2024-07-22 PROCEDURE — 85025 COMPLETE CBC W/AUTO DIFF WBC: CPT

## 2024-07-22 PROCEDURE — 36415 COLL VENOUS BLD VENIPUNCTURE: CPT

## 2024-07-22 PROCEDURE — A9270 NON-COVERED ITEM OR SERVICE: HCPCS | Performed by: INTERNAL MEDICINE

## 2024-07-22 PROCEDURE — 700101 HCHG RX REV CODE 250: Performed by: STUDENT IN AN ORGANIZED HEALTH CARE EDUCATION/TRAINING PROGRAM

## 2024-07-22 PROCEDURE — A9270 NON-COVERED ITEM OR SERVICE: HCPCS

## 2024-07-22 PROCEDURE — 99232 SBSQ HOSP IP/OBS MODERATE 35: CPT | Performed by: INTERNAL MEDICINE

## 2024-07-22 PROCEDURE — A9270 NON-COVERED ITEM OR SERVICE: HCPCS | Performed by: NURSE PRACTITIONER

## 2024-07-22 PROCEDURE — 93971 EXTREMITY STUDY: CPT | Mod: RT

## 2024-07-22 PROCEDURE — 700102 HCHG RX REV CODE 250 W/ 637 OVERRIDE(OP): Performed by: NURSE PRACTITIONER

## 2024-07-22 RX ADMIN — SPIRONOLACTONE 25 MG: 25 TABLET ORAL at 09:05

## 2024-07-22 RX ADMIN — PAROXETINE HYDROCHLORIDE 10 MG: 10 TABLET, FILM COATED ORAL at 04:47

## 2024-07-22 RX ADMIN — ASPIRIN 81 MG: 81 TABLET, COATED ORAL at 04:47

## 2024-07-22 RX ADMIN — FLUTICASONE FUROATE, UMECLIDINIUM BROMIDE AND VILANTEROL TRIFENATATE 1 PUFF: 200; 62.5; 25 POWDER RESPIRATORY (INHALATION) at 04:47

## 2024-07-22 RX ADMIN — AMIODARONE HYDROCHLORIDE 400 MG: 200 TABLET ORAL at 17:21

## 2024-07-22 RX ADMIN — AMIODARONE HYDROCHLORIDE 400 MG: 200 TABLET ORAL at 04:48

## 2024-07-22 RX ADMIN — ROSUVASTATIN CALCIUM 20 MG: 20 TABLET, FILM COATED ORAL at 17:21

## 2024-07-22 RX ADMIN — DOCUSATE SODIUM 100 MG: 100 CAPSULE, LIQUID FILLED ORAL at 04:47

## 2024-07-22 RX ADMIN — VALSARTAN 40 MG: 80 TABLET ORAL at 04:48

## 2024-07-22 RX ADMIN — OMEPRAZOLE 20 MG: 20 CAPSULE, DELAYED RELEASE ORAL at 04:47

## 2024-07-22 RX ADMIN — ALBUTEROL SULFATE 2.5 MG: 2.5 SOLUTION RESPIRATORY (INHALATION) at 11:39

## 2024-07-22 RX ADMIN — ANASTROZOLE 1 MG: 1 TABLET ORAL at 04:48

## 2024-07-22 ASSESSMENT — ENCOUNTER SYMPTOMS
DOUBLE VISION: 0
SPUTUM PRODUCTION: 0
HEADACHES: 0
BACK PAIN: 0
NAUSEA: 0
PALPITATIONS: 0
SHORTNESS OF BREATH: 0
WEIGHT LOSS: 0
ABDOMINAL PAIN: 0
HALLUCINATIONS: 0
COUGH: 0
MYALGIAS: 0
BLURRED VISION: 0
CONSTIPATION: 0
DIARRHEA: 0
NECK PAIN: 0
ORTHOPNEA: 0
SENSORY CHANGE: 0
FOCAL WEAKNESS: 0
VOMITING: 0
DIZZINESS: 0
FEVER: 0
CHILLS: 0
SPEECH CHANGE: 0
EYE PAIN: 0
TINGLING: 0
PHOTOPHOBIA: 0
WEAKNESS: 1
TREMORS: 0

## 2024-07-22 ASSESSMENT — PAIN DESCRIPTION - PAIN TYPE
TYPE: ACUTE PAIN
TYPE: ACUTE PAIN

## 2024-07-22 ASSESSMENT — FIBROSIS 4 INDEX: FIB4 SCORE: 3.01

## 2024-07-22 ASSESSMENT — LIFESTYLE VARIABLES: SUBSTANCE_ABUSE: 0

## 2024-07-22 NOTE — PREADMISSION SCREENING NOTE
Pre-Admission Screening Form    Patient Information:   Name: Erin Hess     MRN: [id number]       : 1940      Age: 84 y.o.   Gender: female      Race: White [7]       Marital Status:  [5]  Family Contact: Len Kellogg,Cyn Kellogg,Keegan HessSharan        Relationship: Son [15]  Sister [14]  Son [15]  Grandchild [6]  Home Phone:       [phone]           Cell Phone: [phone]  Advanced Directives: Copy in Chart  Code Status:  FULL  Current Attending Provider: Claudia Akers M.D.  Referring Physician: Dr. Ramon      Physiatrist Consult: Dr. Lv Solano       Referral Date: 2024  Primary Payor Source:  [facility] CARE PLUS  Secondary Payor Source:      Medical Information:   Date of Admission to Acute Care Sett[place]/15/2024  Room Number: T815/02  Rehabilitation Diagnosis: 0017.8 - Medically Complex: Medical/Surgical Complications  Immunization History   Administered Date(s) Administered    INFLUENZA TIV (IM) 10/01/2017    Influenza Vaccine Adult HD 2018, 10/29/2019, 10/31/2020, 10/20/2023    Influenza Vaccine Quad Inj (Pf) 2016    Influenza, Unspecified - HISTORICAL DATA 11/10/2020, 2021    MODERNA SARS-COV-2 VACCINE (12+) 2021, 2021, 2021    Pneumococcal Conjugate Vaccine (Prevnar/PCV-13) 2016    Pneumococcal Vaccine (PCV7) - HISTORICAL DATA 10/01/2011    Pneumococcal polysaccharide vaccine (PPSV-23) 2018    Tdap Vaccine 2019    Zoster Vaccine Recombinant (RZV) (SHINGRIX) 10/31/2020     Allergies   Allergen Reactions    Sulfa Drugs Rash and Swelling     Rash, joint swelling  [id number]    Codeine Vomiting and Nausea    Oxycodone Vomiting and Nausea     .    Vancomycin Itching     Past Medical History:   Diagnosis Date    Anesthesia     delayed emergence    Arthritis     Osteo    Asthma     COPD    Breast cancer (HCC)     Breath shortness     2L O2 NOC, PRN During Day     Bronchitis 2011    Cancer (HCC) 12/2012    right breast    Cataract     Bilat IOL    Chronic cough     productive, improving    Cough     Delayed emergence from general anesthesia     Dizziness 03/11/2013    Heart burn     Heart murmur     Hiatus hernia syndrome     High cholesterol     HTN (hypertension) 03/12/2013    Hypercholesterolemia     Hyperlipidemia 03/12/2013    Hypertension     Hypokalemia 03/12/2013    Indigestion     Mitral annular calcification     Pain     back, hips, knees    Pneumonia     Hx of    Pre-diabetes     Psychiatric disorder     depression    Renal disorder     CKD    Severe aortic stenosis 06/25/2024    Sleep apnea     h/o gastric sleeve lost 40 lb now not on CPAP    Snoring     Sputum production     ULCERATIVE COLITIS 03/12/2013    Urinary incontinence     Stress incontinence    Vertigo 03/11/2013     Past Surgical History:   Procedure Laterality Date    TRANSCATHETER AORTIC VALVE REPLACEMENT Bilateral 7/15/2024    Procedure: TRANSCATHETER AORTIC VALVE REPLACEMENT;  Surgeon: Ciro Luis M.D.;  Location: SURGERY [facility];  Service: Cardiac    ECHOCARDIOGRAM, TRANSESOPHAGEAL, INTRAOPERATIVE N/A 7/15/2024    Procedure: ECHOCARDIOGRAM, TRANSESOPHAGEAL, INTRAOPERATIVE;  Surgeon: Ciro Luis M.D.;  Location: SURGERY [facility];  Service: Cardiac    FEMORAL ARTERY REPAIR Right 7/15/2024    Procedure: REPAIR, ARTERY, FEMORAL WITH PATCH;  Surgeon: Ciro Luis M.D.;  Location: SURGERY [facility];  Service: Cardiac    GASTRIC SLEEVE LAPAROSCOPY N/A 05/18/2016    Procedure: GASTRIC SLEEVE LAPAROSCOPY, HIATAL HERNIA AND LIVER BIOPSY;  Surgeon: Mauricio Montes M.D.;  Location: SURGERY [facility];  Service:     US-NEEDLE CORE BX-BREAST PANEL  01/01/2013    rt breast, with lumpectomy     CATARACT EXTRACTION WITH IOL      GYN SURGERY      vag hyster 1990    GYN SURGERY      vaginal cyst removal     LUMPECTOMY  left    OTHER       R breast bx X 2& lipoma removal       History Leading  to Admission, Conditions that Caused the Need for Rehab (CMS):     Carla Oconnor M.D.  Physician  Surgery Cardiothoracic     OP Report     Signed     Date of Service: 7/15/2024  7:26 AM      Full Operative Report  Date of Service: 07/15/24     PreOp Diagnosis: severe symptomatic aortic stenosis, mild obstructive CAD, HTN, HLD, obesity, asthma and COPD with 2L O2 at night, ROBYN, 25 pack-years smoking cigarettes, peripheral arterial disease, CKD 3B, breast cancer history, gastric sleeve history     PostOp Diagnosis: same     Procedure(s):  TRANSCATHETER AORTIC VALVE REPLACEMENT - Wound Class: Clean  ECHOCARDIOGRAM, TRANSESOPHAGEAL, INTRAOPERATIVE - Wound Class: Clean Contaminated     Surgeon(s):  MAYA Shabazz M.D. Katherine Jones, M.D. Benjamin Ebner, M.D.     Anesthesiologist/Type of Anesthesia:  Anesthesiologist: Norberto Capps M.D.  Perfusionist: Taya Alegria/General     Surgical Staff:  Circulator: Samy Maurer R.N.  Scrub Person: Rajani Franks Assist: Gay Helton P.A.-C.; ESTHER Castro  Radiology Technologist: Parrish Garza; Calin Patel  Cardiology Nurse: Dayna Byrd R.N.     Specimens removed if any:  * No specimens in log *     Estimated Blood Loss: 250cc     Findings: no signficant perivalv leak, injury to right common femoral artery requiring intraoperative vascular surgery consultation     Complications: right common femoral artery injury for access     Details:  The patient was brought to the operating room placed on the operating room table in the supine position.  After successful induction of general anesthesia endotracheal intubation, the patient was prepped and draped in the usual sterile fashion.  A left femoral venous access was obtained and a temporary transvenous pacemaker was placed in the right ventricle.  Additionally, ultrasound-guided right radial arterial and right femoral arterial access was obtained.  A  pigtail catheter was placed in the right coronary sinus.  The deployment angle was determined.  A 1 cm incision was made the right groin and 2 Perclose sutures were deployed.  A 14 Mauritanian eSheath was then placed in the right common femoral artery and its tip was positioned in the abdominal aorta.  The aortic valve was crossed with a wire. Pre-valve balloon dilatation was performed. A deployment catheter with a 23 mm S3 Ivan pericardial valve at its tip was positioned across the aortic valve annulus.  The implantation balloon was inflated to a peak pressure of 7 kashif at nominal +1cc volume injected.  The balloon was deflated and the deployment catheter was pulled back.  Intraoperative transesophageal echocardiography showed no significiant paravalvular leak.  Wires and catheters were removed.   The remainder of the operation will be dictated by Dr. Luis. At this point a right common femoral artery injury was suspected due to dislodgement of the Perclose devices and bleeding around the sheath. Dr. Swan kindly answered our call and recommended to start cutdown and exposure. This was initiated by me with Dr. Swan taking over on arrival. Please see his dictation for remainder of vascular repair.         7/15/2024 9:54 AM Carla Oconnor M.D.             Ciro Luis M.D.  Physician  Interventional Cardiology     OP Report     Addendum     Date of Service: 7/15/2024 10:00 AM    Addendum     TRANSCATHETER AORTIC VALVE REPLACEMENT REPORT     Referring Provider: Sharan Tran M.D.     INTERVENTIONAL CARDIOLOGIST: Ciro Luis MD  CARDIAC SURGEON: Carla Oconnor MD  ANESTHESIOLOGIST:  Dr. Norberto Capps  Vascular Surgeon: Len Swan MD     ASSISTANT: None     IMPRESSIONS:  1. Successful implantation of a 23 Ivan Jarred S3 Ultra Resilia deployed at nominal volume +1 cc via the transfemoral approach  2. Acute decompensated diastolic heart failure with LVEDP of 19 mmHg  3. Procedure complicated by injury  "to the common femoral artery requiring surgical cutdown to repair.      Recommendations:  Aspirin 81 mg daily     Pre-procedure diagnosis: TAVR recommended by heart valve team. Stage D1 (symptomatic severe AS)  Post-procedure diagnosis: Same     Procedure performed  Pigtail placement/ascending aortography  Transvenous pacemaker  23 Ivan S3 Ultra Resilia  Unplanned cutdown on the right femoral artery     Procedure Description  1. Access:   A) Valve Sheath: Right femoral, 14F Ivan eSheath. Fluoroscopic guidance was utilized for femoral access Dynamic ultrasound was utilized to gain access.  B) Temporary pacemaker: 6 Tunisian Left femoral vein. Fluoroscopic guidance was utilized for femoral access Dynamic ultrasound was utilized to gain access.  C) Pigtail catheter: 5/6 Tunisian right radial artery Micropuncture technique was utilized following local anesthesia with lidocaine.  A radial cocktail containing verapamil and saline was administered in the radial artery sheath  D) Other: 6F LFA, micropuncture, dynamic US guidance.      2. Procedure Description:  A TVP and pigtail catheter were placed and appropriate pacer function and implantation angle confirmed. The preclose was deployed followed by dilation of the tract with an 8F sheath. A standard 0.035\" J wire was used to deliver an catheter to the ascending aorta and then exchange for a 0.035\" Lunderquist wire. Over this wire the Ivan E sheath advanced into the descending aorta. An AL1 was placed in the ascending aorta and the valve crossed with a 0.035\" strait wire. The catheter was advanced into the LV apex and wire exchanged for a 0.035\" Amplatz Super Stiff wire.  Balloon aortic valvuloplasty was performed with a 21 mm balloon. Next a 23 mm Ivan Jarred S3 Ultra Resilia was deployed under rapid pacing with nominal volume+1 and 7 kashif.  Echo showed trace PVL . Successful valve implantation confirmed by HEATHER. A pigtail catheter was used to perform left heart " catheterization and LVEDP measured at 19 mmHg.  The pigtail and TVP were then removed. Protamine was administered and the Ivan sheath was removed from the body after reinsertion of the dilator. The perclose sutures were tightened but one of the two stitches broke and the other pulled out, bringing with it a piece of the arterial wall. An additional perclose was attempted but could not grab the artery. At this point an 8 sheath was reinserted, an AL1 used to deliver a amplatz super stiff wire to the thoracic aorta then the sheath was exchanged for a 14F check-tiffani sheath. There was still bleeding around the arteriotomy and manual pressure ensued while access was obtained in the contralateral groin. Heparin was administered with ACT reaching >250 sec. Next I performed cross over with an omni-flush catheter and using a stiff angled glide wire a 7x40 mustang balloon was used to perform balloon occlusion of the left common iliac artery. At this point Dr. Oconnor, followed by Dr. Swan cut down on the vessel. Please see their dictation for details.      3. Hemostasis:   A) TAVR Sheath:  Manual cutdown  B) TVP: Manual  C) Pigtail access: TR band  D) LFA: Angioseal     Technical Factors  1. Complications: None  2. Estimated Blood Loss: 500 cc  3. Specimens: None  4. Contrast Volume: 75 ml  5. Sedation: General Anesthesia  6. Echo: HEATHER            Sedrick Swan M.D.  Physician  Surgery General     OP Report     Signed     Date of Service: 7/15/2024 11:53 AM    DATE OF SERVICE:  07/15/2024      SURGEON:  Sedrick Swan MD     ASSISTANT:  Gay Helton PA-C     ANESTHESIOLOGIST:  Norberto Capps MD     TYPE OF ANESTHESIA:  General anesthesia.     PREOPERATIVE DIAGNOSIS:  Right common femoral artery injury.     POSTOPERATIVE DIAGNOSIS:  Right common femoral artery injury.     PROCEDURE:  Right common femoral endarterectomy and repair using bovine   patch angioplasty.     INDICATIONS FOR PROCEDURE:  This is a  pleasant 84-year-old female who   underwent successful TAVR.  At the conclusion of the case, attempt was made to   remove the sheath, but this was met with bleeding problem.  I was therefore   consulted.     DESCRIPTION OF PROCEDURE:  Dr. Oconnor from cardiac surgery already exposed the   groin along with the common femoral artery.  I performed vascular control of the proximal   common femoral artery using vascular clamps.  The sheath and the   wire were removed.  Vascular control of the right profunda femoral, and   superficial femoral arteries were obtained with vessel loops and vascular   clamps.  The anterior aspect of the right common femoral artery was found to be avulsed  approximately 2.5 cm in length.  There was calcified posterior plaque seen.    Endarterectomy of the common femoral artery was performed.  The intima of the   proximal profunda femoral artery as well as superficial femoral artery were   tacked with interrupted 7-0 Prolene sutures.  The arterial lumen was thoroughly   irrigated and was found to be free of any debris.     A sterile bovine pericardial patch was brought into the operative field.  A   patch angioplasty was performed from the proximal common femoral artery   extended into the proximal superficial femoral artery using running 6-0   Prolene sutures.  Prior to completing the patch angioplasty, backbleeding and   flushing were obtained.  The arterial lumen was also thoroughly irrigated with   heparinized saline solution.  The patch angioplasty was completed.  Flow was   restored first into the profunda femoral artery and then into the superficial   femoral artery.  A sterile Doppler probe was brought into the operative field.    Excellent Doppler flow signals were obtained over the common femoral artery   above the repair site as well as over the superficial femoral and profunda   femoral arteries.     The wound was irrigated and was found to have excellent hemostasis.  The   Wound was  closed subcutaneously in layers with interrupted and running 3-0   Vicryl sutures and subcuticularly with 4-0 Monocryl suture.  The wound was   cleaned and a small Prevena dressing was applied over the wound.     ESTIMATED BLOOD LOSS:  For my part was 5 mL.     COMPLICATIONS: None.     At the end of the procedure, the patient has strong brisk Doppler flow signals  over the right pedal arteries.  ______________________________  MD Daja Moss M.D.  Physician  [facility] Medicine     Consults     Signed     Date of Service: 7/18/2024  5:37 PM      Hospital Medicine Consultation     Date of Service  7/18/2024     Referring Physician     Blu Guerra Jr., D.O.         Consulting Physician  Daja Ramon M.D.     Reason for Consultation  Transfer of care     History of Presenting Illness  84 y.o. female with past medical history of HOCM, prior history of tobacco use, COPD on 2 L of oxygen at night who presented to the hospital for TAVR due to her severe aortic stenosis on 7/15/2024.  Patient underwent TAVR that was successful however at the end of the procedure patient was complicated by femoral artery injury with Perclose device.  Vascular surgery was consulted and patient remained right femoral endarterectomy as well as repair with bovine pericardial patch and she admitted to ICU.     On July 18, 2024 intensivist Dr. Guerra request to transfer care to hospitalist service.  I evaluated and examined her at the bedside.  She was sitting in chair and expressed that she is feeling overall better.  She reported mild pain at the site of surgery and Prevena.  She denies acute complaints of chest pain, nausea and vomiting.  I discussed plan of care with patient and her sister at the bedside and answered all of their questions.     I discussed plan of care with bedside RN in ICU.     Review of Systems  Review of Systems   Constitutional:  Negative for chills, fever and weight  loss.   HENT:  Negative for hearing loss and tinnitus.    Eyes:  Negative for blurred vision, double vision, photophobia and pain.   Respiratory:  Negative for cough, sputum production and shortness of breath.    Cardiovascular:  Negative for chest pain, palpitations, orthopnea and leg swelling.        Mild pain in groin   Gastrointestinal:  Negative for abdominal pain, constipation, diarrhea, nausea and vomiting.   Genitourinary:  Negative for dysuria, frequency and urgency.   Musculoskeletal:  Negative for back pain, joint pain, myalgias and neck pain.   Skin:  Negative for rash.   Neurological:  Negative for dizziness, tingling, tremors, sensory change, speech change, focal weakness and headaches.   Psychiatric/Behavioral:  Negative for hallucinations and substance abuse.    All other systems reviewed and are negative.     Past Medical History   has a past medical history of Anesthesia, Arthritis, Asthma, Breast cancer (HCC), Breath shortness, Bronchitis (2011), Cancer (HCC) (12/2012), Cataract, Chronic cough, Cough, Delayed emergence from general anesthesia, Dizziness (03/11/2013), Heart burn, Heart murmur, Hiatus hernia syndrome, High cholesterol, HTN (hypertension) (03/12/2013), Hypercholesterolemia, Hyperlipidemia (03/12/2013), Hypertension, Hypokalemia (03/12/2013), Indigestion, Mitral annular calcification, Pain, Pneumonia, Pre-diabetes, Psychiatric disorder, Renal disorder, Severe aortic stenosis (06/25/2024), Sleep apnea, Snoring, Sputum production, ULCERATIVE COLITIS (03/12/2013), Urinary incontinence, and Vertigo (03/11/2013).     She has no past medical history of Painful breathing or Wheezing.     Surgical History   has a past surgical history that includes us-needle core bx-breast panel (01/01/2013); lumpectomy (left); gyn surgery; gyn surgery; other; gastric sleeve laparoscopy (N/A, 05/18/2016); cataract extraction with iol; transcatheter aortic valve replacement (Bilateral, 7/15/2024);  echocardiogram, transesophageal, intraoperative (N/A, 7/15/2024); and femoral artery repair (Right, 7/15/2024).     Family History  family history includes Cancer in her maternal grandmother, mother, and sister; Heart Attack in her maternal grandfather; Heart Disease in her mother and sister; Lung Disease (age of onset: 75) in her father.     Social History   reports that she quit smoking about 38 years ago. Her smoking use included cigarettes. She started smoking about 58 years ago. She has a 20 pack-year smoking history. She has been exposed to tobacco smoke. She has never used smokeless tobacco. She reports current alcohol use of about 4.2 oz of alcohol per week. She reports that she does not use drugs.                    Assessment/Plan  * S/P TAVR (transcatheter aortic valve replacement)  Assessment & Plan  Successful implantation of a 23 Ivan JEANETTE S3  Aspirin  Pulse checks due to femoral artery injury from Perclose  Vascular surgery evaluated her and made recommendations.     Postprocedural hypotension- (present on admission)  Assessment & Plan  I am actively titrating vasopressors for MAP >60  Phenylephrine is drug of choice given reports of HOCM  Repeat limited ECHO with small pericardial effusion, normal EF and well-functioning valve  Trend H/H given femoral artery injury  Continue midodrine  Cardiology is following her.  Continue monitor closely on telemetry.     Common femoral artery injury, right, initial encounter- (present on admission)  Assessment & Plan  [facility]  Vascular Surgery performed femoral endarterectomy and repair with bovine patch  pulse checks  Site looks good, pulse checks fine     Acute diastolic HF (heart failure) (HCC)  Assessment & Plan  Continue home diuretics  LVEDP was 19 at the end of the case  Continue to monitor closely.     Stage 3b chronic kidney disease- (present on admission)  Assessment & Plan  Strict I/Os  Renally dosed  medications  Avoid nephrotoxic agents as able  Ordered lab workup for tomorrow.     Dyslipidemia- (present on admission)  Assessment & Plan  Continue Statin.     Severe aortic stenosis- (present on admission)  Assessment & Plan  Now s/p TAVR as above  Cardiology is following     Asthma-COPD overlap syndrome (HCC)- (present on admission)  Assessment & Plan  Not in acute exacerbation  Continue home Trelegy  PRN nebulizers  Continue to monitor     Primary hypertension- (present on admission)  Assessment & Plan  Hold home valsartan/HCTZ  Restart when clinically able  Blood pressure is running on the lower side.  Currently she is on midodrine.     Thank you for allow me to participate in patient care.  Hospitalist service will continue to follow this patient.     I reviewed and summarized patient hospitalization in this document.     I discussed plan of care with bedside RN and ICU.     I discussed plan of care with intensivist Dr. Guerra.     High complexity medical care due to multiorgan involvement.                 Andrea Solano M.D.  Physician  Physical Medicine & Rehab     Consults     Signed     Date of Service: 7/22/2024 10:59 AM  Consult Orders  IP Consult For Physiatry [[id number]] ordered by Daja Ramon M.D. at 07/22/24 0935    Physical Medicine & Rehabilitation Chart Review       Please note that this is a chart review.     History of Present Illness:  Patient is a 84 y.o. female with a past medical history significant for HTN, HLD, CKD3, ROBYN, COPD, and aortic stenosis admitted to [facility] on 7/15/2024  5:18 AM with planned TAVR.  Patient underwent TAVR on 7/15/24 with Dr. Oconnor.  Post-operative course was complicated by right femoral artery injury and Vascular surgery was consulted. Patient underwent right femoral artery repair with Dr. Swan on 7/15/24 and patient was admitted to ICU.  Patient did require transfusion for anemia and midodrine for pressure support.  Patient  has since been stabilized and moved to the floor. Patient has prevena wound vac per most recent note.      Past Medical History:    Past Medical History:  Diagnosis Date   Anesthesia      delayed emergence   Arthritis      Osteo   Asthma      COPD   Breast cancer (HCC)     Breath shortness      2L O2 NOC, PRN During Day   Bronchitis 2011   Cancer (HCC) 12/2012    right breast   Cataract      Bilat IOL   Chronic cough      productive, improving   Cough     Delayed emergence from general anesthesia     Dizziness 03/11/2013   Heart burn     Heart murmur     Hiatus hernia syndrome     High cholesterol     HTN (hypertension) 03/12/2013   Hypercholesterolemia     Hyperlipidemia 03/12/2013   Hypertension     Hypokalemia 03/12/2013   Indigestion     Mitral annular calcification     Pain      back, hips, knees   Pneumonia      Hx of   Pre-diabetes     Psychiatric disorder      depression   Renal disorder      CKD   Severe aortic stenosis 06/25/2024   Sleep apnea      h/o gastric sleeve lost 40 lb now not on CPAP   Snoring     Sputum production     ULCERATIVE COLITIS 03/12/2013   Urinary incontinence      Stress incontinence   Vertigo 03/11/2013     Past Surgical History:    Past Surgical History:  Procedure Laterality Date   TRANSCATHETER AORTIC VALVE REPLACEMENT Bilateral 7/15/2024    Procedure: TRANSCATHETER AORTIC VALVE REPLACEMENT;  Surgeon: Ciro Luis M.D.;  Location: SURGERY [facility];  Service: Cardiac   ECHOCARDIOGRAM, TRANSESOPHAGEAL, INTRAOPERATIVE N/A 7/15/2024    Procedure: ECHOCARDIOGRAM, TRANSESOPHAGEAL, INTRAOPERATIVE;  Surgeon: Ciro Luis M.D.;  Location: [facility];  Service: Cardiac   FEMORAL ARTERY REPAIR Right 7/15/2024    Procedure: REPAIR, ARTERY, FEMORAL WITH PATCH;  Surgeon: Ciro Luis M.D.;  Location: [facility];  Service: Cardiac   GASTRIC SLEEVE LAPAROSCOPY N/A 05/18/2016    Procedure: GASTRIC SLEEVE LAPAROSCOPY, HIATAL HERNIA AND LIVER BIOPSY;  Surgeon: Mauricio DELACRUZ  MAYA Montes;  Location: SURGERY [facility];  Service:    US-NEEDLE CORE BX-BREAST PANEL   2013    rt breast, with lumpectomy    CATARACT EXTRACTION WITH IOL       GYN SURGERY        vag hyster    GYN SURGERY        vaginal cyst removal    LUMPECTOMY   left   OTHER         R breast bx X 2& lipoma removal     Family History:    Family History  Problem Relation Age of Onset   Heart Disease Mother          CAD MI at age 72   Cancer Mother          breast cancer  at age 83   Cancer Sister     Lung Disease Father 75        Lung cancer   Cancer Maternal Grandmother          Pancreatic cancer   Heart Attack Maternal Grandfather          MI at age 70   Heart Disease Sister          Rhuemati cfever- herat murmur  Medications:    Scheduled Medications  Medication Dose Frequency   amiodarone  400 mg TWICE DAILY   [START ON 2024] amiodarone  200 mg Q DAY   fluticasone-umeclidinium-vilanterol  1 Puff DAILY   albuterol  2.5 mg QDAILY (RT)   anastrozole  1 mg QAM   omeprazole  20 mg DAILY   PARoxetine  10 mg DAILY   rosuvastatin  20 mg Q EVENING   spironolactone  25 mg QDAY   docusate sodium  100 mg BID   senna-docusate  1 Tablet Nightly   aspirin  81 mg DAILY   valsartan  40 mg Q DAY   [Held by provider] hydroCHLOROthiazide  6.25 mg Q DAY     PRN medications: Metoprolol Tartrate, morphine injection, Respiratory Therapy Consult, ipratropium-albuterol, melatonin, hydrALAZINE, acetaminophen, senna-docusate, polyethylene glycol/lytes, magnesium hydroxide, bisacodyl, sodium phosphate, ondansetron     Allergies:  Sulfa drugs, Codeine, Oxycodone, and Vancomycin     Assessment & Plan:  The patient presents functional deficits in mobility and self-care, and Minimal  de-conditioning. Patient was previously Independent using None living with Unrelated Adult in a 1 Story House with  (threshold) step(s) to enter. Per most recent PT note they are Minimal Assist for mobility with Front Wheel Walker and Minimal Assist for  transfers.  Per most recent OT note they are Moderate Assist (don/doff socks) at LB dressing, Minimal Assist (urine and small BM on toilet) at toileting.      The patient's current diet is:    Current Diet Order  Procedures   Diet Order Diet: Cardiac     The patient is a Good candidate for an acute inpatient rehabilitation program with a coordinated program of care at an intensity and frequency not available at a lower level of care.      Note: This recommendation requires that patient has at least CGA/Minimal Assistance needs in at least two therapy disciplines.  If patient progresses to no longer need CGA/[place] with at least two therapy disciplines they may be more appropriate for Skilled nursing facility versus home with home health.       This recommendation is substantiated by the patient's current medical condition with intervention and assessment of medical issues requiring an acute level of care for patient's safety and maximum outcome. A coordinated program of care will be provided by an interdisciplinary team including physical therapy, occupational therapy, hospitalist, physiatry, and rehab nursing. Rehab goals include improved mobility, self-care management, strength and conditioning/endurance, pain management, bowel and bladder management, mood and affect, and safety with independent home management including caregiver training. Estimated length of stay is approximately 10-14 days. Rehab potential: Good. Disposition: to pre-morbid independent living setting with supportive care of patient's family. We will continue to follow with you in anticipation of discharge to acute inpatient rehabilitation when medically stable to do so at the discretion of the attending physician. Thank you for allowing us to participate in this patient's care. Please call with any questions regarding this recommendation.   ___________________________________     T. Lv Solano MD/PhD  [facility] - Physical Medicine &  Rehabilitation   [facility] - Brain Injury Medicine   ____________________________________       Co-morbidities:  See above  Potential Risk - Complications: Contractures, Deep Vein Thrombosis, Malnutrition, Pain, Perceptual Impairment, Pneumonia, Pressure Ulcer, and Infection  Level of Risk: High    Ongoing Medical Management Needed (Medical/Nursing Needs):   Patient Active Problem List    Diagnosis Date Noted    Atrial fibrillation with rapid ventricular response ([facility]) 07/19/2024    Anemia due to acute blood loss 07/19/2024    S/P TAVR (transcatheter aortic valve replacement) 07/15/2024    Acute diastolic HF (heart failure) ([facility]) 07/15/2024    Common femoral artery injury, right, initial encounter 07/15/2024    Postprocedural hypotension 07/15/2024    Hiatal hernia 07/08/2024    Hepatic steatosis 07/08/2024    Gall stones 07/08/2024    Aortic atherosclerosis ([facility]) 07/08/2024    Severe aortic stenosis 06/25/2024    Upper airway cough syndrome 06/03/2024    Dependence on supplemental oxygen 03/05/2024    Chronic respiratory failure with hypoxia ([facility]) 10/20/2023    Post-nasal drip 02/09/2023    Numbness in feet 05/25/2022    Chronic lower back pain 05/25/2022    Osteopenia of left thigh 05/25/2022    Elevated glycohemoglobin 05/25/2022    Generalized anxiety disorder 03/10/2022    Stage 3b chronic kidney disease 03/10/2022    H/O gastric sleeve 03/10/2022    GERD (gastroesophageal reflux disease) 03/10/2022    History of breast cancer 03/10/2022    Advance directive discussed with patient 03/10/2022    Mitral annular calcification     Complex renal cyst 01/10/2018    Chronic rhinitis 11/07/2017    Dyslipidemia 07/13/2017    Fatigue 04/11/2017    Asthma-COPD overlap syndrome (HCC) 07/08/2016    ROBYN (obstructive sleep apnea) 07/08/2016    Obesity (BMI 30.0-34.9) 05/18/2016    Sciatica 05/09/2013    Primary hypertension 03/12/2013    Ulcerative pancolitis without complication (HCC) 03/12/2013     Chetna Loza,  "ALICIA  Registered Nurse     Progress Notes     Signed     Date of Service: 7/22/2024  6:15 AM    Monitor Summary  Rhythm: SB/SR  Rate: 54-74  Ectopy: PVC  Measurements: .20/.06/.42  ---12 hr Chart Review---     Current Vital Signs:   Temperature: 36.5 °C (97.7 °F) Pulse: (!) 56 Respiration: 16 Blood Pressure : (!) 140/53  Weight: 83.9 kg (184 lb 15.5 oz) Height: 162.6 cm (5' 4\")  Pulse Oximetry: 95 % O2 (LPM): 1      Completed Laboratory Reports:  Recent Labs     07/19/24  1648 07/20/24  0234 07/20/24  1946 07/21/24  0155 07/22/24  0133   WBC  --  7.2  --  5.5 5.8   HEMOGLOBIN 8.1* 8.6* 8.5* 8.2* 8.2*   HEMATOCRIT 25.1* 26.4* 26.4* 25.4* 25.8*   PLATELETCT  --  108*  --  105* 118*   SODIUM  --  138  --  140 140   POTASSIUM  --  3.9  --  4.3 3.9   BUN  --  14  --  13 15   CREATININE  --  0.83  --  0.80 1.02   ALBUMIN  --  2.8*  --   --   --    GLUCOSE  --  124*  --  103* 116*     Additional Labs: Not Applicable    Prior Living Situation:   Housing / Facility: 1 Story House  Steps Into Home:  (threshold)  Steps In Home: 0  Lives with - Patient's Self Care Capacity: Unrelated Adult  Equipment Owned: Grab Bar(s) In Tub / Shower, Tub / Shower Seat    Prior Level of Function / Living Situation:   Physical Therapy: Prior Services: None  Housing / Facility: 1 Story House  Steps Into Home:  (threshold)  Steps In Home: 0  Bathroom Set up: Walk In Shower, Shower Chair, Grab Bars (grab bars being installed per son)  Equipment Owned: Grab Bar(s) In Tub / Shower, Tub / Shower Seat  Lives with - Patient's Self Care Capacity: Unrelated Adult  Bed Mobility: Independent  Transfer Status: Independent  Ambulation: Independent  Assistive Devices Used: None  Stairs: Independent  Current Level of Function:   Gait Level Of Assist: Minimal Assist  Assistive Device: Front Wheel Walker  Distance (Feet): 7 (forward/backward. Limited 2/2 dizziness, diaphoretic appearing)  Deviation: Bradykinetic, Shuffled Gait (decreased stride length)  Weight " Bearing Status: no restrictions  Supine to Sit: Contact Guard Assist  Sit to Supine:  (up to recliner post)  Scooting: Contact Guard Assist  Rolling: Contact Guard Assist  Comments: HOB flat, use of rails  Sit to Stand: Contact Guard Assist  Bed, Chair, Wheelchair Transfer: Minimal Assist  Toilet Transfers: Minimal Assist  Transfer Method: Stand Step  Sitting in Chair: up to recliner post  Sitting Edge of Bed: <5 min  Standing: >5 min  Occupational Therapy:   Self Feeding: Independent  Grooming / Hygiene: Independent  Bathing: Independent  Dressing: Independent  Toileting: Independent  Medication Management: Independent  Laundry: Independent  Kitchen Mobility: Requires Assist (Pt's roommate does a lot of the cooking)  Finances: Independent  Home Management: Independent  Shopping: Independent  Prior Level Of Mobility: Independent Without Device in Community, Independent Without Device in Home  Driving / Transportation: Driving Independent  Prior Services: None  Housing / Facility: [address]  Occupation (Pre-Hospital Vocational): Retired Due To Age  Current Level of Function:   Lower Body Dressing: Moderate Assist (don/doff socks)  Toileting: Minimal Assist (urine and small BM on toilet)  Speech Language Pathology:      Rehabilitation Prognosis/Potential: Good  Estimated Length of Stay: 10-14 days    Nursing:      Continent    Scope/Intensity of Services Recommended:  Physical Therapy: 1.5 hr / day  5 days / week. Therapeutic Interventions Required: Maximize Endurance, Mobility, Strength, and Safety  Occupational Therapy: 1.5 hr / day 5 days / week. Therapeutic Interventions Required: Maximize Self Care, ADLs, IADLs, and Energy Conservation  Rehabilitation Nursin/. Therapeutic Interventions Required: Monitor Pain, Skin, Vital Signs, Intake and Output, Labs, Safety, Family Training, and Right groin surgical incision care; DVT Prophylaxis; Bowel and bladder regimen; Infection control; ADL's; Infection control.    Rehabilitation Physician: 3 - 5 days / week. Therapeutic Interventions Required: Medical Management  Respiratory Care: Consult. Therapeutic Interventions Required: Pulmonary Toileting, O2 Weaning, and Respiratory care per protocol  Dietician: Consult. Therapeutic Interventions Required: Nutritional evaluation with recommendations to promote optimal  health and healing.     She requires 24-hour rehabilitation nursing to manage bowel and bladder function, skin care, surgical incision, nutrition and fluid intake, pulmonary hygiene, pain control, safety, medication management, and patient/family goals. In addition, rehabilitation nursing will reiterate and reinforce therapy skills and equipment use, including ADLs, as well as provide education to the patient and family. Erin Hess is willing to participate in and is able to tolerate the proposed plan of care.    Rehabilitation Goals and Plan (Expected frequency & duration of treatment in the IRF):   Return to the Community, Modified Independent Level of Care, Outpatient Support, and Family Able to Provide 24/7 Assistance  Anticipated Date of Rehabilitation Admission: 07/22/2024  Patient/Family oriented IRF level of care/facility/plan: Yes  Patient/Family willing to participate in IRF care/facility/plan: Yes  Patient able to tolerate IRF level of care proposed: Yes  Patient has potential to benefit IRF level of care proposed: Yes  Comments: Not Applicable    Special Needs or Precautions - Medical Necessity:  Safety Concerns/Precautions:  Fall Risk / High Risk for Falls, Balance, and Bed / Chair Alarm  Complex Wound Care: Right groin surgical incision care  Pain Management  Requires Oxygen    Diet:   DIET ORDERS (From admission to next 24h)       Start     Ordered    07/15/24 1325  Diet Order Diet: Cardiac  ALL MEALS        Question:  Diet:  Answer:  Cardiac    07/15/24 1324                    Anticipated Discharge Destination / Patient/Family Goal:  Destination:  Home with Assistance Support System: Family   Anticipated home health services: OT, PT, Nursing, and Aide  Previously used HH service/ provider: Not Applicable  Anticipated DME Needs: Oxygen and On service with [employer] for home O2 prior to admission  Outpatient Services:  TO be determined  Alternative resources to address additional identified needs:   Future Appointments   Date Time Provider Department Center   7/29/2024  1:45 PM ESTHER Rivera None   8/14/2024  1:40 PM [facility]   8/15/2024  1:15 PM [facility] EXAM [facility]   8/22/2024  9:15 AM ESTHER Rivera None   12/19/2024  1:30 PM Sharan Tran M.D. [facility] None   7/15/2025  1:15 PM [facility] EXAM 9 [facility]       Pre-Screen Completed: 7/22/2024 11:33 AM Mari Lenz R.N.

## 2024-07-22 NOTE — DISCHARGE PLANNING
[facility] Transitional Care Coordination    Insurance has authorized inpatient rehab.  Will follow for admission Tuesday.

## 2024-07-22 NOTE — PROGRESS NOTES
Hospital Medicine Daily Progress Note    Date of Service  7/22/2024    Chief Complaint  Erin Hess is a 84 y.o. female admitted 7/15/2024 with severe aortic stenosis s/p TAVR    Hospital Course    85-year-old female with history of Hocm, smoking, COPD on 2 L nasal cannula and severe aortic stenosis who presented 7/15 for TAVR.  Patient underwent TAVR that was successful however at the end of the procedure patient was complicated by femoral artery injury with Perclose device.  Vascular surgery was consulted and patient remained right femoral endarterectomy as well as repair with bovine pericardial patch and she admitted to ICU.  Patient needed blood transfusion and midodrine for low blood pressure and bleeding, patient was improved and transferred from ICU to IMCU and then telemetry floor.  Patient needed wound VAC and Prevena.  Patient was reevaluated by vascular surgeon on 7/22 and cleared the patient for discharge, Prevena was removed, to follow-up with wound clinic.  Ultrasound for right leg did not show any acute DVT, her pulses were intact.    Patient developed hypotension during the hospitalization and needed midodrine, blood pressure medication were held and later improved and resume valsartan and spironolactone with holding hydrochlorothiazide and midodrine.    Also patient developed atrial fibrillation after the procedure and needed amiodarone loading dose IV and then switched to oral, her rhythm converted to sinus.  Patient was cleared by cardiology team for discharge.    PT and OT evaluated the patient also PMR who recommended inpatient rehab, patient is cleared for discharge.      Interval Problem Update  -Evaluated examined the patient at bedside, patient has generalized weakness however no symptoms.  - sinus rhythm, cardiology on board  -Ultrasound for right leg showed  -Improving on blood pressure, discontinue midodrine and resume valsartan and spironolactone, watching kidney function and  potassium.  -Planning for rehab discharge, waiting for authorization, cleared by vascular surgeon and cardiology for discharge.      I have discussed this patient's plan of care and discharge plan at IDT rounds today with Case Management, Nursing, Nursing leadership, and other members of the IDT team.    Consultants/Specialty  cardiology and critical care    Code Status  Full Code    Disposition  The patient is medically cleared for discharge to home or a post-acute facility.  Anticipate discharge to: an inpatient rehabilitation hospital    I have placed the appropriate orders for post-discharge needs.    Review of Systems  Review of Systems   Constitutional:  Positive for malaise/fatigue. Negative for chills, fever and weight loss.   HENT:  Negative for hearing loss and tinnitus.    Eyes:  Negative for blurred vision, double vision, photophobia and pain.   Respiratory:  Negative for cough, sputum production and shortness of breath.    Cardiovascular:  Negative for chest pain, palpitations, orthopnea and leg swelling.   Gastrointestinal:  Negative for abdominal pain, constipation, diarrhea, nausea and vomiting.   Genitourinary:  Negative for dysuria, frequency and urgency.   Musculoskeletal:  Negative for back pain, joint pain, myalgias and neck pain.   Skin:  Negative for rash.   Neurological:  Positive for weakness. Negative for dizziness, tingling, tremors, sensory change, speech change, focal weakness and headaches.   Psychiatric/Behavioral:  Negative for hallucinations and substance abuse.    All other systems reviewed and are negative.       Physical Exam  Temp:  [36.5 °C (97.7 °F)-36.6 °C (97.9 °F)] 36.6 °C (97.9 °F)  Pulse:  [56-67] 57  Resp:  [16-18] 18  BP: (121-140)/(49-56) 128/51  SpO2:  [90 %-100 %] 95 %    Physical Exam  Vitals reviewed.   Constitutional:       General: She is not in acute distress.     Appearance: Normal appearance. She is not ill-appearing.   HENT:      Head: Normocephalic and  atraumatic.      Nose: No congestion.   Eyes:      General:         Right eye: No discharge.         Left eye: No discharge.      Pupils: Pupils are equal, round, and reactive to light.   Cardiovascular:      Rate and Rhythm: Normal rate and regular rhythm.      Pulses: Normal pulses.      Heart sounds: Normal heart sounds. No murmur heard.     Comments: Now she converted to sinus rhythm  Pulmonary:      Effort: Pulmonary effort is normal. No respiratory distress.      Breath sounds: Normal breath sounds. No stridor.   Abdominal:      General: Bowel sounds are normal. There is no distension.      Palpations: Abdomen is soft.      Tenderness: There is no abdominal tenderness.      Comments: Bruises on her lower abdomen   Musculoskeletal:         General: No swelling or tenderness. Normal range of motion.      Cervical back: Normal range of motion. No rigidity.   Skin:     General: Skin is warm.      Capillary Refill: Capillary refill takes less than 2 seconds.      Coloration: Skin is not jaundiced or pale.      Findings: No bruising.   Neurological:      General: No focal deficit present.      Mental Status: She is alert and oriented to person, place, and time.      Cranial Nerves: No cranial nerve deficit.   Psychiatric:         Mood and Affect: Mood normal.         Behavior: Behavior normal.         Fluids    Intake/Output Summary (Last 24 hours) at 7/22/2024 1440  Last data filed at 7/21/2024 1909  Gross per 24 hour   Intake 500 ml   Output --   Net 500 ml       Laboratory  Recent Labs     07/20/24 0234 07/20/24 1946 07/21/24 0155 07/22/24  0133   WBC 7.2  --  5.5 5.8   RBC 2.98*  --  2.93* 2.89*   HEMOGLOBIN 8.6* 8.5* 8.2* 8.2*   HEMATOCRIT 26.4* 26.4* 25.4* 25.8*   MCV 88.6  --  86.7 89.3   MCH 28.9  --  28.0 28.4   MCHC 32.6  --  32.3 31.8*   RDW 49.0  --  47.9 49.4   PLATELETCT 108*  --  105* 118*   MPV 10.8  --  11.1 10.7     Recent Labs     07/20/24 0234 07/21/24  0155 07/22/24  0133   SODIUM 138 140  140   POTASSIUM 3.9 4.3 3.9   CHLORIDE 103 105 103   CO2 26 30 28   GLUCOSE 124* 103* 116*   BUN 14 13 15   CREATININE 0.83 0.80 1.02   CALCIUM 9.4 9.2 9.1                   Imaging  US-EXTREMITY VENOUS LOWER UNILAT RIGHT   Final Result      EC-ECHOCARDIOGRAM LTD W/O CONT   Final Result      CTA ABDOMEN PELVIS W & W/O POST PROCESS   Final Result         1.  No visualized aneurysm or dissection.   2.  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   3.  Intermediate density fluid collection and air in the right groin adjacent to the iliac vessels, appearance most compatible with hematoma.   4.  Atherosclerosis and atherosclerotic coronary artery disease   5.  Trace dependent bilateral pleural effusions   6.  Cholelithiasis   7.  Diverticulosis   8.  Small hiatal hernia      DX-CHEST-PORTABLE (1 VIEW)   Final Result         1.  Hazy left pulmonary infiltrates, stable   2.  Atherosclerosis      DX-CHEST-PORTABLE (1 VIEW)   Final Result         1.  Mild pulmonary edema and/or infiltrates.   2.  Cardiomegaly   3.  Atherosclerosis      EC-ECHOCARDIOGRAM LTD W/O CONT   Final Result      EC-HEATHER W/O CONT   Final Result      DX-CHEST-PORTABLE (1 VIEW)   Final Result      1.  Low lung volumes with bibasilar hypoventilatory changes, underlying infection is possible.   2.  Stable mild enlargement of the cardiomediastinal silhouette.           Assessment/Plan  * S/P TAVR (transcatheter aortic valve replacement)  Assessment & Plan  Successful implantation of a 23 Ivan JEANETTE S3  Aspirin  Pulse checks due to femoral artery injury from Perclose  Vascular surgery evaluated her and made recommendations.  She found to have hypotension and anemia I ordered 1 unit of blood transfusion.    Anemia due to acute blood loss  Assessment & Plan  After the TAVR procedure   Patient needed 1 unit of red blood cell   Needed procedure by vascular surgeon   Hemoglobin stable around 8   Continue monitor hemoglobin every 12 hours and blood  transfusion if less than 7     Atrial fibrillation with rapid ventricular response ([facility])  Assessment & Plan  Patient found to have new onset atrial fibrillation with rapid ventricular response.  Needed amiodarone IV loading dose  Converted to sinus, switch to oral amiodarone.  Cardiology on board  She developed new onset atrial fibrillation in if she will remain persistently in A-fib she may need anticoagulation as well.  Due to anemia and acute blood loss, holding anticoagulation at this time    Fatigue- (present on admission)  Assessment & Plan  Due to complicated medical history and hospitalization  PT and OT and rehab    Postprocedural hypotension- (present on admission)  Assessment & Plan  Due to artery injury and bleeding  Continue midodrine  Improved   blood transfusion if needed        Common femoral artery injury, right, initial encounter- (present on admission)  Assessment & Plan  Femoral artery torn by ramakrishna hilliard  Vascular Surgery performed femoral endarterectomy and repair with bovine patch  pulse checks  Site looks good, pulse checks fine  DVT study was negative  Patient was cleared by vascular surgeon for discharge to follow-up as outpatient    Acute diastolic HF (heart failure) ([facility])  Assessment & Plan  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  Ordered lab workup for tomorrow.    Dyslipidemia- (present on admission)  Assessment & Plan  Continue Statin.    Severe aortic stenosis- (present on admission)  Assessment & Plan  Now s/p TAVR as above  Cardiology is following  Discussed plan of  care with cardiology team.    Asthma-COPD overlap syndrome (HCC)- (present on admission)  Assessment & Plan  Not in acute exacerbation  Continue home Trelegy  PRN nebulizers  Continue to monitor    Primary hypertension- (present on admission)  Assessment & Plan  Hold home valsartan/HCTZ  Restart when clinically able  Blood pressure is running on the lower side.  Currently she is on midodrine.  Currently she is hypotensive.          VTE prophylaxis:   SCDs/TEDs   pharmacologic prophylaxis contraindicated due to Due to hematoma  Will reevaluate the patient for DVT prophylaxis tomorrow since hemoglobin is a stable.    I have performed a physical exam and reviewed and updated ROS and Plan today (7/22/2024). In review of yesterday's note (7/21/2024), there are no changes except as documented above.

## 2024-07-22 NOTE — DISCHARGE PLANNING
Case Management Discharge Planning    Admission Date: 7/15/2024  GMLOS: 2.7  ALOS: 7    6-Clicks ADL Score: 18  6-Clicks Mobility Score: 17  PT and/or OT Eval ordered: Yes  Post-acute Referrals Ordered: Yes  Post-acute Choice Obtained: Yes  Has referral(s) been sent to post-acute provider:  Yes      Anticipated Discharge Dispo: Discharge Disposition: Disch to IP rehab facility or distinct part unit (62)  Discharge Address: [id number] [place] [address] [place]   [place] [id number]    DME Needed: No    Action(s) Taken: Patient discussed in rounds. MC for IPR today, RN CM requested update from [facility] and was informed that pt is a good candidate for rehab.     IPR submitted for Auth from patient's insurance; once they receive Auth they can take patient.     Escalations Completed: None    Medically Clear: Yes    Next Steps: F/U with medical team regarding D/C needs/ barriers.     Barriers to Discharge: Pending Insurance Authorization    Is the patient up for discharge tomorrow: No

## 2024-07-22 NOTE — PROGRESS NOTES
MONITOR SUMMARY:    Rhythm: SR/SB  Rate: 59-75  Ectopy: r PVC   .18/.05/.32            Normal Values  Rhythm SR  HR Range    Measurements   0.12-0.20 / 0.06-0.10  / 0.30-0.52

## 2024-07-22 NOTE — PROGRESS NOTES
Bedside report received from off going RN: Eva, assumed care of patient.     Fall Risk Score: HIGH RISK  Fall risk interventions in place: Place yellow fall risk ID band on patient, Provide patient/family education based on risk assessment, Educate patient/family to call staff for assistance when getting out of bed, Place fall precaution signage outside patient door, Place patient in room close to nursing station, Utilize bed/chair fall alarm, and Bed alarm connected correctly  Bed type: Regular (Delfino Score less than 17 interventions in place)  Patient on cardiac monitor: Yes  IVF/IV medications: Not Applicable   Oxygen: How many liters 0.5L  Bedside sitter: Not Applicable   Isolation: Not applicable

## 2024-07-22 NOTE — PROGRESS NOTES
Brief cardiology note:    Patient is still waiting placement at rehab facility. Stable vitals, working with therapies, and wound vac remains.     Discussed with hospitalist, no further need for cardiology coverage. Pending vascular surgery clearance for transfer to rehab.     Outpatient follow up has been arranged in structural heart clinic.    Please reach out to our office for any further concerns or questions. [phone].    Torrie Saini DNP, APRN, [facility]  Structural Heart Program, [facility]

## 2024-07-22 NOTE — DISCHARGE PLANNING
"[facility] TCN chart review completed. As prior, current discharge considerations are for post-acute placement.  Per chart review and previous collaboration with CM, patients first choice for post-acute placement is [facility].  Note [facility] is following as well.     Per this TCNs prior discussion with pt, she would like [facility] consideration* as her 1st choice for post acute placement, though is aware of  blanket referral policy and possible SNF need if she is unable to dc to [place]. She reports if she is unable to dc to [place], she would consider SNF facilities and provide choice from accepting facilities at that time if post acute placement continues to be indicated      TCN will continue to follow and collaborate with discharge planning team as additional post acute needs arise. Thank you.     Completed:  PT with recommendations for post-acute placement for additional physical therapy services prior to discharge home (PM&R consult) on 7/19/24.   OT recommends post-acute placement on 7/21/24.    Choice obtained: [facility] ([facility]; see above)  Pt aware of [facility] blanket referral policy if indicated; see above    *received VOALTE message from [facility] requesting auth: TCN has requested at this time**; note per [place] possible dc today and noted per cardiology note from 11:49AM today, \"Discussed with hospitalist, no further need for cardiology coverage. Pending vascular surgery clearance for transfer to rehab\"    **update 3:35PM - [facility] has authorized [facility]  "

## 2024-07-22 NOTE — CARE PLAN
The patient is Stable - Low risk of patient condition declining or worsening    Shift Goals  Clinical Goals: remain free of complications, pain control  Patient Goals: sleep  Family Goals: not present      Problem: Pain - Standard  Goal: Alleviation of pain or a reduction in pain to the patient’s comfort goal  Outcome: Progressing     Problem: Knowledge Deficit - Standard  Goal: Patient and family/care givers will demonstrate understanding of plan of care, disease process/condition, diagnostic tests and medications  Outcome: Progressing     Problem: Skin Integrity  Goal: Skin integrity is maintained or improved  Outcome: Progressing

## 2024-07-22 NOTE — PROGRESS NOTES
Bedside report received from off going RN/tech: Chetna assumed care of patient@0715.     Fall Risk Score: HIGH RISK  Fall risk interventions in place: Place yellow fall risk ID band on patient, Provide patient/family education based on risk assessment, Educate patient/family to call staff for assistance when getting out of bed, Place fall precaution signage outside patient door, Place patient in room close to nursing station, Utilize bed/chair fall alarm, and Bed alarm connected correctly  Bed type: Regular (Delfino Score less than 17 interventions in place)  Patient on cardiac monitor: Yes  IVF/IV medications: Not Applicable   Oxygen: How many liters .5L  Bedside sitter: Not Applicable   Isolation: Not applicable

## 2024-07-22 NOTE — CONSULTS
Physical Medicine & Rehabilitation Chart Review      Please note that this is a chart review.    History of Present Illness:  Patient is a 84 y.o. female with a past medical history significant for HTN, HLD, CKD3, ROBYN, COPD, and aortic stenosis admitted to [facility] on 7/15/2024  5:18 AM with planned TAVR.  Patient underwent TAVR on 7/15/24 with Dr. Oconnor.  Post-operative course was complicated by right femoral artery injury and Vascular surgery was consulted. Patient underwent right femoral artery repair with Dr. Swan on 7/15/24 and patient was admitted to ICU.  Patient did require transfusion for anemia and midodrine for pressure support.  Patient has since been stabilized and moved to the floor. Patient has prevena wound vac per most recent note.     Past Medical History:  Past Medical History:   Diagnosis Date    Anesthesia     delayed emergence    Arthritis     Osteo    Asthma     COPD    Breast cancer (HCC)     Breath shortness     2L O2 NOC, PRN During Day    Bronchitis 2011    Cancer (HCC) 12/2012    right breast    Cataract     Bilat IOL    Chronic cough     productive, improving    Cough     Delayed emergence from general anesthesia     Dizziness 03/11/2013    Heart burn     Heart murmur     Hiatus hernia syndrome     High cholesterol     HTN (hypertension) 03/12/2013    Hypercholesterolemia     Hyperlipidemia 03/12/2013    Hypertension     Hypokalemia 03/12/2013    Indigestion     Mitral annular calcification     Pain     back, hips, knees    Pneumonia     Hx of    Pre-diabetes     Psychiatric disorder     depression    Renal disorder     CKD    Severe aortic stenosis 06/25/2024    Sleep apnea     h/o gastric sleeve lost 40 lb now not on CPAP    Snoring     Sputum production     ULCERATIVE COLITIS 03/12/2013    Urinary incontinence     Stress incontinence    Vertigo 03/11/2013       Past Surgical History:  Past Surgical History:   Procedure Laterality Date    TRANSCATHETER AORTIC VALVE  REPLACEMENT Bilateral 7/15/2024    Procedure: TRANSCATHETER AORTIC VALVE REPLACEMENT;  Surgeon: Ciro Luis M.D.;  Location: SURGERY [facility];  Service: Cardiac    ECHOCARDIOGRAM, TRANSESOPHAGEAL, INTRAOPERATIVE N/A 7/15/2024    Procedure: ECHOCARDIOGRAM, TRANSESOPHAGEAL, INTRAOPERATIVE;  Surgeon: Ciro Luis M.D.;  Location: SURGERY [facility];  Service: Cardiac    FEMORAL ARTERY REPAIR Right 7/15/2024    Procedure: REPAIR, ARTERY, FEMORAL WITH PATCH;  Surgeon: Ciro Luis M.D.;  Location: SURGERY [facility];  Service: Cardiac    GASTRIC SLEEVE LAPAROSCOPY N/A 2016    Procedure: GASTRIC SLEEVE LAPAROSCOPY, HIATAL HERNIA AND LIVER BIOPSY;  Surgeon: Mauricio Montes M.D.;  Location: SURGERY [facility];  Service:     US-NEEDLE CORE BX-BREAST PANEL  2013    rt breast, with lumpectomy     CATARACT EXTRACTION WITH IOL      GYN SURGERY      vag hyster     GYN SURGERY      vaginal cyst removal     LUMPECTOMY  left    OTHER       R breast bx X 2& lipoma removal       Family History:  Family History   Problem Relation Age of Onset    Heart Disease Mother         CAD MI at age 72    Cancer Mother         breast cancer  at age 83    Cancer Sister     Lung Disease Father 75        Lung cancer    Cancer Maternal Grandmother         Pancreatic cancer    Heart Attack Maternal Grandfather         MI at age 70    Heart Disease Sister         Rhuemati cfever- herat murmur       Medications:  Scheduled Medications   Medication Dose Frequency    amiodarone  400 mg TWICE DAILY    [START ON 2024] amiodarone  200 mg Q DAY    fluticasone-umeclidinium-vilanterol  1 Puff DAILY    albuterol  2.5 mg QDAILY (RT)    anastrozole  1 mg QAM    omeprazole  20 mg DAILY    PARoxetine  10 mg DAILY    rosuvastatin  20 mg Q EVENING    spironolactone  25 mg QDAY    docusate sodium  100 mg BID    senna-docusate  1 Tablet Nightly    aspirin  81 mg DAILY    valsartan  40 mg Q DAY    [Held by provider]  hydroCHLOROthiazide  6.25 mg Q DAY     PRN medications: Metoprolol Tartrate, morphine injection, Respiratory Therapy Consult, ipratropium-albuterol, melatonin, hydrALAZINE, acetaminophen, senna-docusate, polyethylene glycol/lytes, magnesium hydroxide, bisacodyl, sodium phosphate, ondansetron    Allergies:  Sulfa drugs, Codeine, Oxycodone, and Vancomycin    Assessment & Plan:  The patient presents functional deficits in mobility and self-care, and Minimal  de-conditioning. Patient was previously Independent using None living with Unrelated Adult in a 1 Story House with  (threshold) step(s) to enter. Per most recent PT note they are Minimal Assist for mobility with Front Wheel Walker and Minimal Assist for transfers.  Per most recent OT note they are Moderate Assist (don/doff socks) at LB dressing, Minimal Assist (urine and small BM on toilet) at toileting.     The patient's current diet is:  Current Diet Order   Procedures    Diet Order Diet: Cardiac       The patient is a Good candidate for an acute inpatient rehabilitation program with a coordinated program of care at an intensity and frequency not available at a lower level of care.     Note: This recommendation requires that patient has at least CGA/Minimal Assistance needs in at least two therapy disciplines.  If patient progresses to no longer need CGA/[place] with at least two therapy disciplines they may be more appropriate for Skilled nursing facility versus home with home health.      This recommendation is substantiated by the patient's current medical condition with intervention and assessment of medical issues requiring an acute level of care for patient's safety and maximum outcome. A coordinated program of care will be provided by an interdisciplinary team including physical therapy, occupational therapy, hospitalist, physiatry, and rehab nursing. Rehab goals include improved mobility, self-care management, strength and conditioning/endurance, pain  management, bowel and bladder management, mood and affect, and safety with independent home management including caregiver training. Estimated length of stay is approximately 10-14 days. Rehab potential: Good. Disposition: to pre-morbid independent living setting with supportive care of patient's family. We will continue to follow with you in anticipation of discharge to acute inpatient rehabilitation when medically stable to do so at the discretion of the attending physician. Thank you for allowing us to participate in this patient's care. Please call with any questions regarding this recommendation.    ____________________________________    T. Lv Solano MD/PhD  [facility] - Physical Medicine & Rehabilitation   [facility] - Brain Injury Medicine   ____________________________________

## 2024-07-22 NOTE — PROGRESS NOTES
Assumed care of patient and received report from Eva CHAWLA. Assessment completed.Pt A&Ox 4. Pt on 0.5LNC. Tele monitor in place, call light and belongings are within reach. POC updated. Pt educated on room and call light, pt verbalized understanding. Needs met.

## 2024-07-22 NOTE — PROGRESS NOTES
Monitor Summary  Rhythm: SB/SR  Rate: 54-74  Ectopy: PVC  Measurements: .20/.06/.42  ---12 hr Chart Review---

## 2024-07-22 NOTE — SSS/OBV THERAPY EVAL
Physical Medicine & Rehabilitation Chart Review      Please note that this is a chart review.    History of Present Illness:  Patient is a 84 y.o. female with a past medical history significant for HTN, HLD, CKD3, ROBYN, COPD, and aortic stenosis admitted to [facility] on 7/15/2024  5:18 AM with planned TAVR.  Patient underwent TAVR on 7/15/24 with Dr. Oconnor.  Post-operative course was complicated by right femoral artery injury and Vascular surgery was consulted. Patient underwent right femoral artery repair with Dr. Swan on 7/15/24 and patient was admitted to ICU.  Patient did require transfusion for anemia and midodrine for pressure support.  Patient has since been stabilized and moved to the floor. Patient has prevena wound vac per most recent note.     Past Medical History:  Past Medical History:   Diagnosis Date    Anesthesia     delayed emergence    Arthritis     Osteo    Asthma     COPD    Breast cancer (HCC)     Breath shortness     2L O2 NOC, PRN During Day    Bronchitis 2011    Cancer (HCC) 12/2012    right breast    Cataract     Bilat IOL    Chronic cough     productive, improving    Cough     Delayed emergence from general anesthesia     Dizziness 03/11/2013    Heart burn     Heart murmur     Hiatus hernia syndrome     High cholesterol     HTN (hypertension) 03/12/2013    Hypercholesterolemia     Hyperlipidemia 03/12/2013    Hypertension     Hypokalemia 03/12/2013    Indigestion     Mitral annular calcification     Pain     back, hips, knees    Pneumonia     Hx of    Pre-diabetes     Psychiatric disorder     depression    Renal disorder     CKD    Severe aortic stenosis 06/25/2024    Sleep apnea     h/o gastric sleeve lost 40 lb now not on CPAP    Snoring     Sputum production     ULCERATIVE COLITIS 03/12/2013    Urinary incontinence     Stress incontinence    Vertigo 03/11/2013       Past Surgical History:  Past Surgical History:   Procedure Laterality Date    TRANSCATHETER AORTIC VALVE  REPLACEMENT Bilateral 7/15/2024    Procedure: TRANSCATHETER AORTIC VALVE REPLACEMENT;  Surgeon: Ciro Luis M.D.;  Location: SURGERY [facility];  Service: Cardiac    ECHOCARDIOGRAM, TRANSESOPHAGEAL, INTRAOPERATIVE N/A 7/15/2024    Procedure: ECHOCARDIOGRAM, TRANSESOPHAGEAL, INTRAOPERATIVE;  Surgeon: Ciro Luis M.D.;  Location: SURGERY [facility];  Service: Cardiac    FEMORAL ARTERY REPAIR Right 7/15/2024    Procedure: REPAIR, ARTERY, FEMORAL WITH PATCH;  Surgeon: Ciro Luis M.D.;  Location: SURGERY [facility];  Service: Cardiac    GASTRIC SLEEVE LAPAROSCOPY N/A 2016    Procedure: GASTRIC SLEEVE LAPAROSCOPY, HIATAL HERNIA AND LIVER BIOPSY;  Surgeon: Mauricio Montes M.D.;  Location: SURGERY [facility];  Service:     US-NEEDLE CORE BX-BREAST PANEL  2013    rt breast, with lumpectomy     CATARACT EXTRACTION WITH IOL      GYN SURGERY      vag hyster     GYN SURGERY      vaginal cyst removal     LUMPECTOMY  left    OTHER       R breast bx X 2& lipoma removal       Family History:  Family History   Problem Relation Age of Onset    Heart Disease Mother         CAD MI at age 72    Cancer Mother         breast cancer  at age 83    Cancer Sister     Lung Disease Father 75        Lung cancer    Cancer Maternal Grandmother         Pancreatic cancer    Heart Attack Maternal Grandfather         MI at age 70    Heart Disease Sister         Rhuemati cfever- herat murmur       Medications:  Scheduled Medications   Medication Dose Frequency    amiodarone  400 mg TWICE DAILY    [START ON 2024] amiodarone  200 mg Q DAY    fluticasone-umeclidinium-vilanterol  1 Puff DAILY    albuterol  2.5 mg QDAILY (RT)    anastrozole  1 mg QAM    omeprazole  20 mg DAILY    PARoxetine  10 mg DAILY    rosuvastatin  20 mg Q EVENING    spironolactone  25 mg QDAY    docusate sodium  100 mg BID    senna-docusate  1 Tablet Nightly    aspirin  81 mg DAILY    valsartan  40 mg Q DAY    [Held by provider]  hydroCHLOROthiazide  6.25 mg Q DAY     PRN medications: Metoprolol Tartrate, morphine injection, Respiratory Therapy Consult, ipratropium-albuterol, melatonin, hydrALAZINE, acetaminophen, senna-docusate, polyethylene glycol/lytes, magnesium hydroxide, bisacodyl, sodium phosphate, ondansetron    Allergies:  Sulfa drugs, Codeine, Oxycodone, and Vancomycin    Assessment & Plan:  The patient presents functional deficits in mobility and self-care, and Minimal  de-conditioning. Patient was previously Independent using None living with Unrelated Adult in a 1 Story House with  (threshold) step(s) to enter. Per most recent PT note they are Minimal Assist for mobility with Front Wheel Walker and Minimal Assist for transfers.  Per most recent OT note they are Moderate Assist (don/doff socks) at LB dressing, Minimal Assist (urine and small BM on toilet) at toileting.     The patient's current diet is:  Current Diet Order   Procedures    Diet Order Diet: Cardiac       The patient is a Good candidate for an acute inpatient rehabilitation program with a coordinated program of care at an intensity and frequency not available at a lower level of care.     Note: This recommendation requires that patient has at least CGA/Minimal Assistance needs in at least two therapy disciplines.  If patient progresses to no longer need CGA/[place] with at least two therapy disciplines they may be more appropriate for Skilled nursing facility versus home with home health.      This recommendation is substantiated by the patient's current medical condition with intervention and assessment of medical issues requiring an acute level of care for patient's safety and maximum outcome. A coordinated program of care will be provided by an interdisciplinary team including physical therapy, occupational therapy, hospitalist, physiatry, and rehab nursing. Rehab goals include improved mobility, self-care management, strength and conditioning/endurance, pain  management, bowel and bladder management, mood and affect, and safety with independent home management including caregiver training. Estimated length of stay is approximately 10-14 days. Rehab potential: Good. Disposition: to pre-morbid independent living setting with supportive care of patient's family. We will continue to follow with you in anticipation of discharge to acute inpatient rehabilitation when medically stable to do so at the discretion of the attending physician. Thank you for allowing us to participate in this patient's care. Please call with any questions regarding this recommendation.    ____________________________________    T. Lv Solano MD/PhD  [facility] - Physical Medicine & Rehabilitation   [facility] - Brain Injury Medicine   ____________________________________

## 2024-07-22 NOTE — DISCHARGE PLANNING
HTH/SCP TCN chart review completed. Collaborated with FABIO Peterson.   Current discharge considerations are for post-acute placement.  Per chart review and collaboration with CM, patients first choice for post-acute placement is [facility].  [facility] is following.     TCN will continue to follow and collaborate with discharge planning team as additional post acute needs arise. Thank you.    Completed:  PT with recommendations for post-acute placement for additional physical therapy services prior to discharge home (PM&R consult) on 7/19/24.   OT recommends post-acute placement on 7/21/24.    Choice obtained: [facility] ([facility]; see above)  Pt aware of [facility] blanket referral policy if indicated; see above

## 2024-07-23 ENCOUNTER — HOSPITAL ENCOUNTER (INPATIENT)
Facility: REHABILITATION | Age: 84
DRG: 949 | End: 2024-07-23
Attending: PHYSICAL MEDICINE & REHABILITATION | Admitting: PHYSICAL MEDICINE & REHABILITATION
Payer: MEDICARE

## 2024-07-23 ENCOUNTER — APPOINTMENT (OUTPATIENT)
Dept: CARDIOLOGY | Facility: MEDICAL CENTER | Age: 84
End: 2024-07-23
Payer: MEDICARE

## 2024-07-23 VITALS
TEMPERATURE: 97.7 F | HEART RATE: 66 BPM | DIASTOLIC BLOOD PRESSURE: 49 MMHG | HEIGHT: 64 IN | BODY MASS INDEX: 31.39 KG/M2 | RESPIRATION RATE: 17 BRPM | SYSTOLIC BLOOD PRESSURE: 118 MMHG | OXYGEN SATURATION: 95 % | WEIGHT: 183.86 LBS

## 2024-07-23 DIAGNOSIS — S75.001A: ICD-10-CM

## 2024-07-23 DIAGNOSIS — Z95.2 S/P TAVR (TRANSCATHETER AORTIC VALVE REPLACEMENT): ICD-10-CM

## 2024-07-23 DIAGNOSIS — I48.91 ATRIAL FIBRILLATION WITH RAPID VENTRICULAR RESPONSE (HCC): ICD-10-CM

## 2024-07-23 DIAGNOSIS — K21.9 GASTROESOPHAGEAL REFLUX DISEASE, UNSPECIFIED WHETHER ESOPHAGITIS PRESENT: ICD-10-CM

## 2024-07-23 DIAGNOSIS — I48.91 ATRIAL FIBRILLATION, UNSPECIFIED TYPE (HCC): ICD-10-CM

## 2024-07-23 DIAGNOSIS — I50.31 ACUTE DIASTOLIC HF (HEART FAILURE) (HCC): ICD-10-CM

## 2024-07-23 DIAGNOSIS — D62 ANEMIA DUE TO ACUTE BLOOD LOSS: ICD-10-CM

## 2024-07-23 DIAGNOSIS — J96.11 CHRONIC RESPIRATORY FAILURE WITH HYPOXIA (HCC): ICD-10-CM

## 2024-07-23 DIAGNOSIS — E78.2 MIXED HYPERLIPIDEMIA: ICD-10-CM

## 2024-07-23 LAB
ANION GAP SERPL CALC-SCNC: 10 MMOL/L (ref 7–16)
BASOPHILS # BLD AUTO: 0.7 % (ref 0–1.8)
BASOPHILS # BLD: 0.04 K/UL (ref 0–0.12)
BUN SERPL-MCNC: 11 MG/DL (ref 8–22)
CALCIUM SERPL-MCNC: 9.2 MG/DL (ref 8.5–10.5)
CHLORIDE SERPL-SCNC: 104 MMOL/L (ref 96–112)
CO2 SERPL-SCNC: 26 MMOL/L (ref 20–33)
CREAT SERPL-MCNC: 0.87 MG/DL (ref 0.5–1.4)
EOSINOPHIL # BLD AUTO: 0.23 K/UL (ref 0–0.51)
EOSINOPHIL NFR BLD: 3.9 % (ref 0–6.9)
ERYTHROCYTE [DISTWIDTH] IN BLOOD BY AUTOMATED COUNT: 50.3 FL (ref 35.9–50)
GFR SERPLBLD CREATININE-BSD FMLA CKD-EPI: 66 ML/MIN/1.73 M 2
GLUCOSE SERPL-MCNC: 167 MG/DL (ref 65–99)
HCT VFR BLD AUTO: 28.2 % (ref 37–47)
HGB BLD-MCNC: 8.8 G/DL (ref 12–16)
IMM GRANULOCYTES # BLD AUTO: 0.03 K/UL (ref 0–0.11)
IMM GRANULOCYTES NFR BLD AUTO: 0.5 % (ref 0–0.9)
LYMPHOCYTES # BLD AUTO: 1.05 K/UL (ref 1–4.8)
LYMPHOCYTES NFR BLD: 17.6 % (ref 22–41)
MCH RBC QN AUTO: 28.2 PG (ref 27–33)
MCHC RBC AUTO-ENTMCNC: 31.2 G/DL (ref 32.2–35.5)
MCV RBC AUTO: 90.4 FL (ref 81.4–97.8)
MONOCYTES # BLD AUTO: 0.54 K/UL (ref 0–0.85)
MONOCYTES NFR BLD AUTO: 9.1 % (ref 0–13.4)
NEUTROPHILS # BLD AUTO: 4.06 K/UL (ref 1.82–7.42)
NEUTROPHILS NFR BLD: 68.2 % (ref 44–72)
NRBC # BLD AUTO: 0 K/UL
NRBC BLD-RTO: 0 /100 WBC (ref 0–0.2)
PLATELET # BLD AUTO: 143 K/UL (ref 164–446)
PMV BLD AUTO: 10.7 FL (ref 9–12.9)
POTASSIUM SERPL-SCNC: 3.9 MMOL/L (ref 3.6–5.5)
RBC # BLD AUTO: 3.12 M/UL (ref 4.2–5.4)
SODIUM SERPL-SCNC: 140 MMOL/L (ref 135–145)
WBC # BLD AUTO: 6 K/UL (ref 4.8–10.8)

## 2024-07-23 PROCEDURE — 700102 HCHG RX REV CODE 250 W/ 637 OVERRIDE(OP): Performed by: INTERNAL MEDICINE

## 2024-07-23 PROCEDURE — 99239 HOSP IP/OBS DSCHRG MGMT >30: CPT | Performed by: INTERNAL MEDICINE

## 2024-07-23 PROCEDURE — A9270 NON-COVERED ITEM OR SERVICE: HCPCS | Performed by: PHYSICAL MEDICINE & REHABILITATION

## 2024-07-23 PROCEDURE — A9270 NON-COVERED ITEM OR SERVICE: HCPCS | Performed by: INTERNAL MEDICINE

## 2024-07-23 PROCEDURE — 99223 1ST HOSP IP/OBS HIGH 75: CPT | Performed by: PHYSICAL MEDICINE & REHABILITATION

## 2024-07-23 PROCEDURE — A9270 NON-COVERED ITEM OR SERVICE: HCPCS

## 2024-07-23 PROCEDURE — A9270 NON-COVERED ITEM OR SERVICE: HCPCS | Performed by: NURSE PRACTITIONER

## 2024-07-23 PROCEDURE — 36415 COLL VENOUS BLD VENIPUNCTURE: CPT

## 2024-07-23 PROCEDURE — 94760 N-INVAS EAR/PLS OXIMETRY 1: CPT

## 2024-07-23 PROCEDURE — 700102 HCHG RX REV CODE 250 W/ 637 OVERRIDE(OP): Performed by: PHYSICAL MEDICINE & REHABILITATION

## 2024-07-23 PROCEDURE — 770010 HCHG ROOM/CARE - REHAB SEMI PRIVAT*

## 2024-07-23 PROCEDURE — 700102 HCHG RX REV CODE 250 W/ 637 OVERRIDE(OP)

## 2024-07-23 PROCEDURE — 85025 COMPLETE CBC W/AUTO DIFF WBC: CPT

## 2024-07-23 PROCEDURE — 80048 BASIC METABOLIC PNL TOTAL CA: CPT

## 2024-07-23 PROCEDURE — 700102 HCHG RX REV CODE 250 W/ 637 OVERRIDE(OP): Performed by: NURSE PRACTITIONER

## 2024-07-23 RX ORDER — ANASTROZOLE 1 MG/1
1 TABLET ORAL EVERY MORNING
Status: CANCELLED | OUTPATIENT
Start: 2024-07-24

## 2024-07-23 RX ORDER — AMIODARONE HYDROCHLORIDE 400 MG/1
TABLET ORAL
Qty: 30 TABLET | Refills: 3 | Status: ON HOLD | OUTPATIENT
Start: 2024-07-23 | End: 2024-08-01

## 2024-07-23 RX ORDER — ONDANSETRON 2 MG/ML
4 INJECTION INTRAMUSCULAR; INTRAVENOUS 4 TIMES DAILY PRN
Status: DISCONTINUED | OUTPATIENT
Start: 2024-07-23 | End: 2024-08-02 | Stop reason: HOSPADM

## 2024-07-23 RX ORDER — ASPIRIN 81 MG/1
81 TABLET ORAL DAILY
Qty: 100 TABLET | Refills: 1 | Status: ON HOLD | OUTPATIENT
Start: 2024-07-24 | End: 2024-08-01

## 2024-07-23 RX ORDER — AMIODARONE HYDROCHLORIDE 200 MG/1
400 TABLET ORAL TWICE DAILY
Status: COMPLETED | OUTPATIENT
Start: 2024-07-23 | End: 2024-07-26

## 2024-07-23 RX ORDER — AMIODARONE HYDROCHLORIDE 200 MG/1
200 TABLET ORAL
Status: DISCONTINUED | OUTPATIENT
Start: 2024-07-27 | End: 2024-08-02 | Stop reason: HOSPADM

## 2024-07-23 RX ORDER — ASPIRIN 81 MG/1
81 TABLET ORAL DAILY
Status: DISCONTINUED | OUTPATIENT
Start: 2024-07-24 | End: 2024-08-02 | Stop reason: HOSPADM

## 2024-07-23 RX ORDER — TRAZODONE HYDROCHLORIDE 50 MG/1
50 TABLET, FILM COATED ORAL
Status: DISCONTINUED | OUTPATIENT
Start: 2024-07-23 | End: 2024-08-02 | Stop reason: HOSPADM

## 2024-07-23 RX ORDER — POLYETHYLENE GLYCOL 3350 17 G/17G
1 POWDER, FOR SOLUTION ORAL
Status: DISCONTINUED | OUTPATIENT
Start: 2024-07-23 | End: 2024-07-29

## 2024-07-23 RX ORDER — ANASTROZOLE 1 MG/1
1 TABLET ORAL EVERY MORNING
Status: DISCONTINUED | OUTPATIENT
Start: 2024-07-24 | End: 2024-08-02 | Stop reason: HOSPADM

## 2024-07-23 RX ORDER — HYDROCHLOROTHIAZIDE 25 MG/1
6.25 TABLET ORAL
Status: CANCELLED | OUTPATIENT
Start: 2024-07-24

## 2024-07-23 RX ORDER — ROSUVASTATIN CALCIUM 20 MG/1
20 TABLET, COATED ORAL EVERY EVENING
Status: CANCELLED | OUTPATIENT
Start: 2024-07-23

## 2024-07-23 RX ORDER — AMIODARONE HYDROCHLORIDE 200 MG/1
200 TABLET ORAL
Status: CANCELLED | OUTPATIENT
Start: 2024-07-27

## 2024-07-23 RX ORDER — TRAMADOL HYDROCHLORIDE 50 MG/1
50 TABLET ORAL EVERY 4 HOURS PRN
Status: DISCONTINUED | OUTPATIENT
Start: 2024-07-23 | End: 2024-08-02 | Stop reason: HOSPADM

## 2024-07-23 RX ORDER — ALUMINA, MAGNESIA, AND SIMETHICONE 2400; 2400; 240 MG/30ML; MG/30ML; MG/30ML
20 SUSPENSION ORAL
Status: DISCONTINUED | OUTPATIENT
Start: 2024-07-23 | End: 2024-08-02 | Stop reason: HOSPADM

## 2024-07-23 RX ORDER — ASPIRIN 81 MG/1
81 TABLET ORAL DAILY
Status: CANCELLED | OUTPATIENT
Start: 2024-07-24

## 2024-07-23 RX ORDER — SPIRONOLACTONE 25 MG/1
25 TABLET ORAL
Status: DISCONTINUED | OUTPATIENT
Start: 2024-07-24 | End: 2024-08-02 | Stop reason: HOSPADM

## 2024-07-23 RX ORDER — ROSUVASTATIN CALCIUM 10 MG/1
20 TABLET, COATED ORAL
Status: DISCONTINUED | OUTPATIENT
Start: 2024-07-23 | End: 2024-08-02 | Stop reason: HOSPADM

## 2024-07-23 RX ORDER — ONDANSETRON 4 MG/1
4 TABLET, ORALLY DISINTEGRATING ORAL 4 TIMES DAILY PRN
Status: DISCONTINUED | OUTPATIENT
Start: 2024-07-23 | End: 2024-08-02 | Stop reason: HOSPADM

## 2024-07-23 RX ORDER — PAROXETINE 10 MG/1
10 TABLET, FILM COATED ORAL DAILY
Status: CANCELLED | OUTPATIENT
Start: 2024-07-24

## 2024-07-23 RX ORDER — VALSARTAN 80 MG/1
40 TABLET ORAL
Status: DISCONTINUED | OUTPATIENT
Start: 2024-07-24 | End: 2024-07-27

## 2024-07-23 RX ORDER — AMOXICILLIN 250 MG
2 CAPSULE ORAL 2 TIMES DAILY
Status: DISCONTINUED | OUTPATIENT
Start: 2024-07-23 | End: 2024-07-29

## 2024-07-23 RX ORDER — HYDRALAZINE HYDROCHLORIDE 25 MG/1
25 TABLET, FILM COATED ORAL EVERY 8 HOURS PRN
Status: DISCONTINUED | OUTPATIENT
Start: 2024-07-23 | End: 2024-08-02 | Stop reason: HOSPADM

## 2024-07-23 RX ORDER — VALSARTAN 80 MG/1
40 TABLET ORAL
Status: CANCELLED | OUTPATIENT
Start: 2024-07-24

## 2024-07-23 RX ORDER — ECHINACEA PURPUREA EXTRACT 125 MG
2 TABLET ORAL PRN
Status: DISCONTINUED | OUTPATIENT
Start: 2024-07-23 | End: 2024-08-02 | Stop reason: HOSPADM

## 2024-07-23 RX ORDER — ACETAMINOPHEN 325 MG/1
650 TABLET ORAL EVERY 4 HOURS PRN
Status: DISCONTINUED | OUTPATIENT
Start: 2024-07-23 | End: 2024-08-02 | Stop reason: HOSPADM

## 2024-07-23 RX ORDER — CARBOXYMETHYLCELLULOSE SODIUM 5 MG/ML
1 SOLUTION/ DROPS OPHTHALMIC PRN
Status: DISCONTINUED | OUTPATIENT
Start: 2024-07-23 | End: 2024-08-02 | Stop reason: HOSPADM

## 2024-07-23 RX ORDER — SPIRONOLACTONE 25 MG/1
25 TABLET ORAL
Status: CANCELLED | OUTPATIENT
Start: 2024-07-24

## 2024-07-23 RX ORDER — HYDROCHLOROTHIAZIDE 25 MG/1
6.25 TABLET ORAL
Status: DISCONTINUED | OUTPATIENT
Start: 2024-07-24 | End: 2024-07-27

## 2024-07-23 RX ORDER — AMIODARONE HYDROCHLORIDE 200 MG/1
400 TABLET ORAL TWICE DAILY
Status: CANCELLED | OUTPATIENT
Start: 2024-07-23 | End: 2024-07-27

## 2024-07-23 RX ORDER — PAROXETINE 20 MG/1
10 TABLET, FILM COATED ORAL DAILY
Status: DISCONTINUED | OUTPATIENT
Start: 2024-07-24 | End: 2024-08-02 | Stop reason: HOSPADM

## 2024-07-23 RX ORDER — ALBUTEROL SULFATE 0.83 MG/ML
2.5 SOLUTION RESPIRATORY (INHALATION)
Status: DISCONTINUED | OUTPATIENT
Start: 2024-07-24 | End: 2024-08-02 | Stop reason: HOSPADM

## 2024-07-23 RX ADMIN — SPIRONOLACTONE 25 MG: 25 TABLET ORAL at 08:38

## 2024-07-23 RX ADMIN — ROSUVASTATIN CALCIUM 20 MG: 10 TABLET, FILM COATED ORAL at 17:32

## 2024-07-23 RX ADMIN — ASPIRIN 81 MG: 81 TABLET, COATED ORAL at 04:59

## 2024-07-23 RX ADMIN — TRAZODONE HYDROCHLORIDE 50 MG: 50 TABLET ORAL at 23:21

## 2024-07-23 RX ADMIN — ACETAMINOPHEN 650 MG: 325 TABLET ORAL at 20:14

## 2024-07-23 RX ADMIN — ANASTROZOLE 1 MG: 1 TABLET ORAL at 05:00

## 2024-07-23 RX ADMIN — AMIODARONE HYDROCHLORIDE 400 MG: 200 TABLET ORAL at 04:59

## 2024-07-23 RX ADMIN — VALSARTAN 40 MG: 80 TABLET ORAL at 04:58

## 2024-07-23 RX ADMIN — FLUTICASONE FUROATE, UMECLIDINIUM BROMIDE AND VILANTEROL TRIFENATATE 1 PUFF: 200; 62.5; 25 POWDER RESPIRATORY (INHALATION) at 05:00

## 2024-07-23 RX ADMIN — AMIODARONE HYDROCHLORIDE 400 MG: 200 TABLET ORAL at 17:31

## 2024-07-23 RX ADMIN — PAROXETINE HYDROCHLORIDE 10 MG: 10 TABLET, FILM COATED ORAL at 04:59

## 2024-07-23 RX ADMIN — OMEPRAZOLE 20 MG: 20 CAPSULE, DELAYED RELEASE ORAL at 04:58

## 2024-07-23 ASSESSMENT — PAIN DESCRIPTION - PAIN TYPE
TYPE: ACUTE PAIN
TYPE: ACUTE PAIN

## 2024-07-23 ASSESSMENT — LIFESTYLE VARIABLES
DOES PATIENT WANT TO STOP DRINKING: NO
ALCOHOL_USE: YES
CONSUMPTION TOTAL: POSITIVE
EVER HAD A DRINK FIRST THING IN THE MORNING TO STEADY YOUR NERVES TO GET RID OF A HANGOVER: YES
HAVE PEOPLE ANNOYED YOU BY CRITICIZING YOUR DRINKING: YES
TOTAL SCORE: 4
HOW MANY TIMES IN THE PAST YEAR HAVE YOU HAD 5 OR MORE DRINKS IN A DAY: 0
HAVE YOU EVER FELT YOU SHOULD CUT DOWN ON YOUR DRINKING: YES
TOTAL SCORE: 4
EVER FELT BAD OR GUILTY ABOUT YOUR DRINKING: YES
AVERAGE NUMBER OF DAYS PER WEEK YOU HAVE A DRINK CONTAINING ALCOHOL: 7
TOTAL SCORE: 4
ON A TYPICAL DAY WHEN YOU DRINK ALCOHOL HOW MANY DRINKS DO YOU HAVE: 1

## 2024-07-23 ASSESSMENT — COPD QUESTIONNAIRES
COPD SCREENING SCORE: 7
DO YOU EVER COUGH UP ANY MUCUS OR PHLEGM?: YES, A FEW DAYS A WEEK OR MONTH
HAVE YOU SMOKED AT LEAST 100 CIGARETTES IN YOUR ENTIRE LIFE: YES
DURING THE PAST 4 WEEKS HOW MUCH DID YOU FEEL SHORT OF BREATH: SOME OF THE TIME

## 2024-07-23 ASSESSMENT — PATIENT HEALTH QUESTIONNAIRE - PHQ9
SUM OF ALL RESPONSES TO PHQ9 QUESTIONS 1 AND 2: 0
2. FEELING DOWN, DEPRESSED, IRRITABLE, OR HOPELESS: NOT AT ALL
1. LITTLE INTEREST OR PLEASURE IN DOING THINGS: NOT AT ALL

## 2024-07-23 ASSESSMENT — FIBROSIS 4 INDEX
FIB4 SCORE: 2.48
FIB4 SCORE: 3.01

## 2024-07-23 NOTE — PROGRESS NOTES
Patient being discharged. Pt educated on discharge instructions and new prescriptions, verbalized understanding. Follow up appointment made with cardiology. PIV removed, monitor checked in. Patient going to [facility] via [facility]

## 2024-07-23 NOTE — PROGRESS NOTES
Patient admitted to facility at 1100 via GMT; accompanied by hospital transport.  Patient assisted to room and positioned in bed for comfort and safety; call light within reach.  Patient assisted with stowing belongings and oriented to room and facility.  Admission assessment performed and documented in computer.  Admission paperwork completed; signed copies placed in chart.  Patient has watched Fall Precaution video and reviewed paperwork in folder.

## 2024-07-23 NOTE — DISCHARGE PLANNING
TCN following. HTH/SCP chart reviewed. No new TCN needs identified. Please see prior TCN note from 7/22 for most recent discharge planning considerations if indicated. Current dc planning considerations are for dc to [facility] once medically cleared and that transportation to [facility] has been set for 1030/1100 VIA GMT for today.     Completed:  PT with recommendations for post-acute placement for additional physical therapy services prior to discharge home (PM&R consult) on 7/19/24.   OT recommends post-acute placement on 7/21/24.    PMR consult/recs in place  Choice obtained: [facility] ([facility] -> accepted and has been auth'd; see above)  Pt aware of [facility] blanket referral policy if indicated; see above

## 2024-07-23 NOTE — H&P
Physical Medicine & Rehabilitation  History and Physical (H&P)  &     Post Admission Physician Evaluation (PHANI)       Date of Admission: 7/23/2024  Date of Service: 7/23/2024   Erin Hess    [facility] Code to Support Admission: 0009 - Cardiac  Etiologic Diagnosis: S/P TAVR (transcatheter aortic valve replacement)    _______________________________________________    Chief Complaint: Decreased mobility, weakness    History of Present Illness:  Adapted from the PM&R Consult by Dr. Solano:   Patient is a 84 y.o. female with a past medical history significant for HTN, HLD, CKD3, ROBYN, COPD, and aortic stenosis admitted to [facility] on 7/15/2024 5:18 AM with planned TAVR. Patient underwent TAVR on 7/15/24 with Dr. Oconnor. Post-operative course was complicated by right femoral artery injury and Vascular surgery was consulted. Patient underwent right femoral artery repair with Dr. Swan on 7/15/24 and patient was admitted to ICU. Patient did require transfusion for anemia and midodrine for pressure support. Patient has since been stabilized and moved to the floor. Patient has prevena wound vac per most recent note.     Patient tolerated transfer to [facility]. She reports she is feeling better, still some pain at right groin. She reports swelling has not changed much. Denies changes to sensation to LLE. She reports fatigue. She denies SOB on oxygen, previously only on 2L at night.     Review of Systems:     Comprehensive 14 point ROS was reviewed and all were negative except as noted elsewhere in this document.     Past Medical History:  Past Medical History:   Diagnosis Date    Anesthesia     delayed emergence    Arthritis     Osteo    Asthma     COPD    Breast cancer (HCC)     Breath shortness     2L O2 NOC, PRN During Day    Bronchitis 2011    Cancer (HCC) 12/2012    right breast    Cataract     Bilat IOL    Chronic cough     productive, improving    Cough     Delayed emergence from general anesthesia      Dizziness 2013    Heart burn     Heart murmur     Hiatus hernia syndrome     High cholesterol     HTN (hypertension) 2013    Hypercholesterolemia     Hyperlipidemia 2013    Hypertension     Hypokalemia 2013    Indigestion     Mitral annular calcification     Pain     back, hips, knees    Pneumonia     Hx of    Pre-diabetes     Psychiatric disorder     depression    Renal disorder     CKD    Severe aortic stenosis 2024    Sleep apnea     h/o gastric sleeve lost 40 lb now not on CPAP    Snoring     Sputum production     ULCERATIVE COLITIS 2013    Urinary incontinence     Stress incontinence    Vertigo 2013       Past Surgical History:  Past Surgical History:   Procedure Laterality Date    TRANSCATHETER AORTIC VALVE REPLACEMENT Bilateral 7/15/2024    Procedure: TRANSCATHETER AORTIC VALVE REPLACEMENT;  Surgeon: Ciro Luis M.D.;  Location: SURGERY [facility];  Service: Cardiac    ECHOCARDIOGRAM, TRANSESOPHAGEAL, INTRAOPERATIVE N/A 7/15/2024    Procedure: ECHOCARDIOGRAM, TRANSESOPHAGEAL, INTRAOPERATIVE;  Surgeon: Ciro Luis M.D.;  Location: SURGERY [facility];  Service: Cardiac    FEMORAL ARTERY REPAIR Right 7/15/2024    Procedure: REPAIR, ARTERY, FEMORAL WITH PATCH;  Surgeon: Ciro Luis M.D.;  Location: SURGERY [facility];  Service: Cardiac    GASTRIC SLEEVE LAPAROSCOPY N/A 2016    Procedure: GASTRIC SLEEVE LAPAROSCOPY, HIATAL HERNIA AND LIVER BIOPSY;  Surgeon: Mauricio Montes M.D.;  Location: SURGERY [facility];  Service:     US-NEEDLE CORE BX-BREAST PANEL  2013    rt breast, with lumpectomy     CATARACT EXTRACTION WITH IOL      GYN SURGERY      vag hyster     GYN SURGERY      vaginal cyst removal     LUMPECTOMY  left    OTHER       R breast bx X 2& lipoma removal       Family History:  Family History   Problem Relation Age of Onset    Heart Disease Mother         CAD MI at age 72    Cancer Mother         breast cancer  at age 83     Cancer Sister     Lung Disease Father 75        Lung cancer    Cancer Maternal Grandmother         Pancreatic cancer    Heart Attack Maternal Grandfather         MI at age 70    Heart Disease Sister         Rhuemati cfever- herat murmur       Medications:  Current Facility-Administered Medications   Medication Dose    Respiratory Therapy Consult      Pharmacy Consult Request ...Pain Management Review 1 Each  1 Each    hydrALAZINE (Apresoline) tablet 25 mg  25 mg    acetaminophen (Tylenol) tablet 650 mg  650 mg    senna-docusate (Pericolace Or Senokot S) 8.6-50 MG per tablet 2 Tablet  2 Tablet    And    polyethylene glycol/lytes (Miralax) Packet 1 Packet  1 Packet    docusate sodium (Enemeez) enema 283 mg  283 mg    magnesium hydroxide (Milk Of Magnesia) suspension 30 mL  30 mL    carboxymethylcellulose (Refresh Tears) 0.5 % ophthalmic drops 1 Drop  1 Drop    mag hydrox-al hydrox-simeth (Maalox Plus Es Or Mylanta Ds) suspension 20 mL  20 mL    ondansetron (Zofran ODT) dispertab 4 mg  4 mg    Or    ondansetron (Zofran) syringe/vial injection 4 mg  4 mg    traZODone (Desyrel) tablet 50 mg  50 mg    sodium chloride (Ocean) 0.65 % nasal spray 2 Spray  2 Spray    traMADol (Ultram) 50 MG tablet 50 mg  50 mg    [START ON 7/27/2024] amiodarone (Cordarone) tablet 200 mg  200 mg    amiodarone (Cordarone) tablet 400 mg  400 mg    [START ON 7/24/2024] anastrozole (Arimidex) tablet 1 mg  1 mg    [START ON 7/24/2024] aspirin EC tablet 81 mg  81 mg    [START ON 7/24/2024] fluticasone-umeclidinium-vilanterol (Trelegy Ellipta) 200-62.5-25 mcg/act inhaler 1 Puff  1 Puff    [START ON 7/24/2024] hydroCHLOROthiazide tablet 6.25 mg  6.25 mg    [START ON 7/24/2024] omeprazole (PriLOSEC) capsule 20 mg  20 mg    [START ON 7/24/2024] PARoxetine (Paxil) tablet 10 mg  10 mg    rosuvastatin (Crestor) tablet 20 mg  20 mg    [START ON 7/24/2024] spironolactone (Aldactone) tablet 25 mg  25 mg    [START ON 7/24/2024] valsartan (Diovan) tablet TABS  40 mg  40 mg       Allergies:  Sulfa drugs, Codeine, Oxycodone, and Vancomycin    Psychosocial History:  Housing / Facility: 1 Story House  Steps Into Home:  (threshold)  Steps In Home: 0  Lives with - Patient's Self Care Capacity: Unrelated Adult  Equipment Owned: Grab Bar(s) In Tub / Shower, Tub / Shower Seat     Prior Level of Function / Living Situation:   Physical Therapy: Prior Services: None  Housing / Facility: 1 Story House  Steps Into Home:  (threshold)  Steps In Home: 0  Bathroom Set up: Walk In Shower, Shower Chair, Grab Bars (grab bars being installed per son)  Equipment Owned: Grab Bar(s) In Tub / Shower, Tub / Shower Seat  Lives with - Patient's Self Care Capacity: Unrelated Adult  Bed Mobility: Independent  Transfer Status: Independent  Ambulation: Independent  Assistive Devices Used: None  Stairs: Independent  Current Level of Function:   Gait Level Of Assist: Minimal Assist  Assistive Device: Front Wheel Walker  Distance (Feet): 7 (forward/backward. Limited 2/2 dizziness, diaphoretic appearing)  Deviation: Bradykinetic, Shuffled Gait (decreased stride length)  Weight Bearing Status: no restrictions  Supine to Sit: Contact Guard Assist  Sit to Supine:  (up to recliner post)  Scooting: Contact Guard Assist  Rolling: Contact Guard Assist  Comments: HOB flat, use of rails  Sit to Stand: Contact Guard Assist  Bed, Chair, Wheelchair Transfer: Minimal Assist  Toilet Transfers: Minimal Assist  Transfer Method: Stand Step  Sitting in Chair: up to recliner post  Sitting Edge of Bed: <5 min  Standing: >5 min  Occupational Therapy:   Self Feeding: Independent  Grooming / Hygiene: Independent  Bathing: Independent  Dressing: Independent  Toileting: Independent  Medication Management: Independent  Laundry: Independent  Kitchen Mobility: Requires Assist (Pt's roommate does a lot of the cooking)  Finances: Independent  Home Management: Independent  Shopping: Independent  Prior Level Of Mobility: Independent  "Without Device in Community, Independent Without Device in Home  Driving / Transportation: Driving Independent  Prior Services: None  Housing / Facility: 1 Story House  Occupation (Pre-Hospital Vocational): Retired Due To Age  Current Level of Function:   Lower Body Dressing: Moderate Assist (don/doff socks)  Toileting: Minimal Assist (urine and small BM on toilet)    CURRENT LEVEL OF FUNCTION:   Same as level of function prior to admission to [facility]    Physical Examination:     VITAL SIGNS:   height is 1.626 m (5' 4\") and weight is 83.7 kg (184 lb 8 oz). Her oral temperature is 36.2 °C (97.2 °F). Her blood pressure is 125/58 and her pulse is 82. Her respiration is 17 and oxygen saturation is 91%.   GENERAL: No apparent distress  HEENT: Normocephalic/atraumatic, EOMI, and PERRL  CARDIAC: Regular rate and rhythm, normal S1, S2   LUNGS: Clear to auscultation   ABDOMINAL: bowel sounds present, soft, and nontender    EXTREMITIES: no contractures, spasticity. R groin swollen  NEURO:  Mental status:  A&Ox4 (person, place, date, situation) answers questions appropriately  Speech: fluent, no aphasia or dysarthria  Motor:    5/5 BUE  4/5 R HF otherwise 5/5 BLE  Sensory: intact to light touch through out    Radiology:                       Results for orders placed during the hospital encounter of 01/04/23    CT-CHEST, HIGH RESOLUTION LUNG    Impression  1.  No CT evidence of diffuse interstitial lung disease.    2.  There are some limited areas of reticular and fibrotic opacification, most prominent in the right lower pulmonary lobe. Prior inflammation or infection causing scarring is a possibility.    3.  Mild bronchiectasis which is mainly present in the right lower lobe.    4.  Significant calcific atherosclerosis.    5.  Hiatal hernia is identified.    6.  Cholelithiasis.    Fleischner Society pulmonary nodule recommendations:  Not Applicable          CT CAP 7/19/24    IMPRESSION:        1.  No " visualized aneurysm or dissection.  2.  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  3.  Intermediate density fluid collection and air in the right groin adjacent to the iliac vessels, appearance most compatible with hematoma.  4.  Atherosclerosis and atherosclerotic coronary artery disease  5.  Trace dependent bilateral pleural effusions  6.  Cholelithiasis  7.  Diverticulosis  8.  Small hiatal hernia                 Laboratory Values:  Recent Labs     07/21/24  0155 07/22/24  0133 07/23/24  0844   SODIUM 140 140 140   POTASSIUM 4.3 3.9 3.9   CHLORIDE 105 103 104   CO2 30 28 26   GLUCOSE 103* 116* 167*   BUN 13 15 11   CREATININE 0.80 1.02 0.87   CALCIUM 9.2 9.1 9.2     Recent Labs     07/21/24  0155 07/22/24  0133 07/23/24  0844   WBC 5.5 5.8 6.0   RBC 2.93* 2.89* 3.12*   HEMOGLOBIN 8.2* 8.2* 8.8*   HEMATOCRIT 25.4* 25.8* 28.2*   MCV 86.7 89.3 90.4   MCH 28.0 28.4 28.2   MCHC 32.3 31.8* 31.2*   RDW 47.9 49.4 50.3*   PLATELETCT 105* 118* 143*   MPV 11.1 10.7 10.7           Primary Rehabilitation Diagnosis:    This patient is a 84 y.o. female admitted for acute inpatient rehabilitation with S/P TAVR (transcatheter aortic valve replacement).    Impairments:   ADLs/IADLs  Mobility    Secondary Diagnosis/Medical Co-morbidities Affecting Function  HTN/A fib/sCHF  COPD/Asthma  Anemia  R groin hematoma  Thrombocytopenia  Hyperglycemia  Depression  Obesity due to excess calories    Relevant Changes Since Preadmission Evaluation:    Status unchanged    The patient's rehabilitation potential is Good  The patient's medical prognosis is fair    Rehabilitation Plan:   Discussion and Recommendations:   1. The patient requires an acute inpatient rehabilitation program with a coordinated program of care at an intensity and frequency not available at a lower level of care. This recommendation is substantiated by the patient's medical physicians who recommend that the patient's intervention and assessment of medical issues needs  to be done at an acute level of care for patient's safety and maximum outcome.   2. A coordinated program of care will be supplied by an interdisciplinary team of physical therapy, occupational therapy, rehab physician, rehab nursing, and, if needed, speech therapy and rehab psychology. Rehab team presents a patient-specific rehabilitation and education program concentrating on prevention of future problems related to accessibility, mobility, skin, bowel, bladder, sexuality, and psychosocial and medical/surgical problems.   3. Need for Rehabilitation Physician: The rehab physician will be evaluating the patient on a multi-weekly basis to help coordinate the program of care. The rehab physician communicates between medical physicians, therapists, and nurses to maximize the patient's potential outcome. Specific areas in which the rehab physician will be providing daily assessment include the following:   A. Assessing the patient's heart rate and blood pressure response (vitals monitoring) to activity and making adjustments in medications or conservative measures as needed.   B. The rehab physician will be assessing the frequency at which the program can be increased to allow the patient to reach optimal functional outcome.   C. The rehab physician will also provide assessments in daily skin care, especially in light of patient's impairments in mobility.   D. The rehab physician will provide special expertise in understanding how to work with functional impairment and recommend appropriate interventions, compensatory techniques, and education that will facilitate the patient's outcome.   4. Rehab R.N.   The rehab RN will be working with patient to carry over in room mobility and activities of daily living when the patient is not in 3 hours of skilled therapy. Rehab nursing will be working in conjunction with rehab physician to address all the medical issues above and continue to assess laboratory work and discuss  abnormalities with the treating physicians, assess vitals, and response to activity, and discuss and report abnormalities with the rehab physician. Rehab RN will also continue daily skin care, supervise bladder/bowel program, instruct in medication administration, and ensure patient safety.   5. Rehab Therapy: Therapies to treat at intensity and frequency of (may change after completion of evaluation by all therapeutic disciplines):       PT:  Physical therapy to address mobility, transfer, gait training and evaluation for adaptive equipment needs 1 and 1/2 hour/day at least 5 days/week for the duration of the ELOS (see below)       OT:  Occupational therapy to address ADLs, self-care, home management training, functional mobility/transfers and assistive device evaluation, and community re-integration 1 and 1/2 hour/day at least 5 days/week for the duration of the ELOS (see below).     Medical management / Rehabilitation Issues/ Adverse Potential as part of rehabilitation plan     Rehabilitation Issues/Adverse Potential  1.  TAVR - Patient with severe aortic stenosis s/p TAVR on 7/15/24 with Dr. Oconnor which was complicated by right femoral artery injury requiring repair. Patient demonstrates functional deficits in strength, balance, coordination, and ADL's. Patient is [facility] to [facility] for comprehensive rehabilitation therapy as described below.   Rehabilitation nursing monitors bowel and bladder control, educates on medication administration, co-morbidities and monitors patient safety.    2.  Neurostimulants: None at this time but continue to assess daily for need to initiate should status change.    3.  DVT prophylaxis:  Patient is on not cleared for anticoagulation upon transfer. Encourage OOB. Monitor daily for signs and symptoms of DVT including but not limited to swelling and pain to prevent the development of DVT that may interfere with therapies.    4.  GI prophylaxis:  On prilosec  to prevent gastritis/dyspepsia which may interfere with therapies.    5.  Pain: No issues with pain currently / Controlled with APAP/Oxycodone    6.  Nutrition/Dysphagia: Dietician monitors nutrient intake, recommend supplements prn and provide nutrition education to pt/family to promote optimal nutrition for wound healing/recovery.     7.  Bladder/bowel:  Start bowel and bladder program as described below, to prevent constipation, urinary retention (which may lead to UTI), and urinary incontinence (which will impact upon pt's functional independence).   - TV Q3h while awake with post void bladder scans, I&O cath for PVRs >400  - up to commode after meal     8.  Skin/dermal ulcer prophylaxis: Monitor for new skin conditions with q.2 h. turns as required to prevent the development of skin breakdown.     9.  Cognition/Behavior: As needed psychologist provides adjustment counseling to illness and psychosocial barriers that may be potential barriers to rehabilitation.     10. Respiratory therapy: RT performs O2 management prn, breathing retraining, pulmonary hygiene and bronchospasm management prn to optimize participation in therapies.     Medical Co-Morbidities/Adverse Potential Affecting Function:  TAVR - Patient with severe aortic stenosis s/p TAVR on 7/15/24 with Dr. Oconnor which was complicated by right femoral artery injury requiring repair.  -PT and OT for mobility and ADLs. Per guidelines, 15 hours per week between PT, OT and/or SLP.  -Follow-up Cardiology. Continue ASA    HTN/A fib/sCHF - Patient on Amiodarone 400 mg BID with plan to transition to 200 mg daily on 7/27 as well as HCTZ 6.25 mg, Spironolactone 25 mg and Valsartan 40 mg daily.      HLD - Patient on Rosuvastatin 20 mg QHS    COPD/Asthma - Patient on Albuterol daily and Trelegy     Anemia - Check AM CBC    R groin hematoma - Wound care for right groin incision site.     Thrombocytopenia - Check AM CBC    Hyperglycemia - Check A 1c    Depression -  Patient on Paxil 10 m daily    Obesity - On admission Body mass index is 31.67 kg/m². Meets medical criteria. Dietitian to consult.     Pain - Patient on PRN Tylenol and Tramadol    Skin - Patient at risk for skin breakdown due to debility in areas including sacrum, achilles, elbows and head in addition to other sites. Nursing to assess skin daily.     GI Ppx - Patient on Prilosec for GERD prophylaxis. Patient on Senna-docusate for constipation prophylaxis.        DVT Ppx - Patient is not cleared for AC due to recent hematoma.     I personally performed a complete drug regimen review and no potential clinically significant medication issues were identified.     Goals/Expected Level of Function Based on Current Medical and Functional Status:  (may change based on patient's medical status and rate of impairment recovery)  Transfers:   Modified Independent  Mobility/Gait:   Modified Independent  ADL's:   Modified Independent    DISPOSITION: Discharge to pre-morbid independent living setting with the supportive care of patient's family.    ELOS: 10-14 days  ____________________________________    T. Lv Solano MD/PhD  [facility] - Physical Medicine & Rehabilitation   [facility] - Brain Injury Medicine   ____________________________________    Pt was seen today for 75 min. Time spent included pre-admission screening, pre-admission review of vitals and laboratory values/tests, unit/floor time, face-to-face time with the patient including physical examination, care coordination, counseling of patient and/or family, ordering medications/procedures/tests, discussion with other healthcare providers, and/or documentation in the electronic medical record.

## 2024-07-23 NOTE — THERAPY
Physical Therapy Contact Note    Patient Name: Erin Hess  Age:  84 y.o., Sex:  female  Medical Record #: [id number]  Today's Date: 7/23/2024    Pt attempted for follow up PT session. Pt on BSC w/ CNA when attempted, upon return at 1010 pt getting ready to DC at 1030. Will continue to follow if pt remains in house.

## 2024-07-23 NOTE — DISCHARGE SUMMARY
Discharge Summary    CHIEF COMPLAINT ON ADMISSION  No chief complaint on file.      Reason for Admission  Severe aortic stenosis/TAVR procedure  Femoral artery injury with bleeding and hypotension    Admission Date  7/15/2024    CODE STATUS  Full Code    HPI & HOSPITAL COURSE    85-year-old female with history of Hocm, smoking, COPD on 2 L nasal cannula and severe aortic stenosis who presented 7/15 for TAVR.  Patient underwent TAVR that was successful however at the end of the procedure patient was complicated by femoral artery injury with Perclose device.  Vascular surgery was consulted and patient remained right femoral endarterectomy as well as repair with bovine pericardial patch and she admitted to ICU.  Patient needed blood transfusion and midodrine for low blood pressure and bleeding, patient was improved and transferred from ICU to IMCU and then telemetry floor.  Patient needed wound VAC and Prevena.  Patient was reevaluated by vascular surgeon on 7/22 and cleared the patient for discharge, Prevena was removed, to follow-up with wound clinic.  Ultrasound for right leg did not show any acute DVT, her pulses were intact.     Patient developed hypotension during the hospitalization and needed midodrine, blood pressure medication were held and later improved and resume valsartan and spironolactone with hydrochlorothiazide and midodrine was discontinued.     Also patient developed atrial fibrillation after the procedure and needed amiodarone loading dose IV and then switched to oral, her rhythm converted to sinus.  Patient was cleared by cardiology team for discharge.  To continue amiodarone and aspirin.  Follow-up closely as outpatient.     PT and OT evaluated the patient also PMR who recommended inpatient rehab, patient is cleared for discharge.    On the day of discharge the patient was alert oriented x 4, patient is on 1 L nasal cannula, patient has bruises on her lower abdomen, pulses are good, no  significant new symptoms, surgery site was clear, patient feels okay for discharge to rehab to continue therapy.  Case was discussed also with the patient and family, answered all their question.         Therefore, she is discharged in good and stable condition to an inpatient rehabilitation hospital.    The patient met 2-midnight criteria for an inpatient stay at the time of discharge.    Discharge Date  7/23/2024    FOLLOW UP ITEMS POST DISCHARGE  Follow-up with cardiology  Follow-up with vascular surgeon    DISCHARGE DIAGNOSES  Principal Problem:    S/P TAVR (transcatheter aortic valve replacement) (POA: No)      Overview: Successful implantation of a 23 Ivan Jarred S3 Ultra Resilia deployed       at nominal volume +1 cc via the transfemoral approach on 7/15/2024 under       general anesthesia  Active Problems:    Fatigue (POA: Yes)      Overview: Since her pneumonia in the early winter 2022.    Atrial fibrillation with rapid ventricular response (HCC) (POA: Unknown)    Anemia due to acute blood loss (POA: Unknown)    Primary hypertension (POA: Yes)      Overview: Well-controlled on spironolactone 50 mg daily and Diovan       hydrochlorothiazide 160/12.5 mg half tab twice daily.    Asthma-COPD overlap syndrome (HCC) (POA: Yes)      Overview: Chronic, diagnosed around 67.  Continues on Symbicort and albuterol as       needed.  States that she really needs the albuterol nebulizer only when       she is sick with respiratory infections.  Followed by pulmonary    Severe aortic stenosis (Chronic) (POA: Yes)    Dyslipidemia (POA: Yes)      Overview: Well controlled on crestor 20    Stage 3b chronic kidney disease (POA: Yes)      Overview: rec to avoid nsaids. Stable, continue current plan of care with current       medications.           Acute diastolic HF (heart failure) (HCC) (POA: No)      Overview: LVEDP 19    Common femoral artery injury, right, initial encounter (POA: Yes)    Postprocedural hypotension (POA:  Yes)  Resolved Problems:    * No resolved hospital problems. *      FOLLOW UP  Future Appointments   Date Time Provider Department Center   7/29/2024  1:45 PM ESTHER Rivera None   8/14/2024  1:40 PM Sophia Clark M.D. [facility] None   8/15/2024  1:15 PM [facility] EXAM 10 [facility] [place]   8/22/2024  9:15 AM ESTHER Rivera None   12/19/2024  1:30 PM Sharan Tran M.D. [facility] None   7/15/2025  1:15 PM [facility] EXAM 9 [facility]     Terra Zambrano M.D.  [address] [place] [address] [address]  [address]  [facility] [address]  [phone]    Schedule an appointment as soon as possible for a visit in 1 week(s)        MEDICATIONS ON DISCHARGE     Medication List        START taking these medications        Instructions   amiodarone 400 MG tablet  Start taking on: July 23, 2024  Commonly known as: Pacerone   Take 1 Tablet by mouth 2 times a day for 4 days, THEN 0.5 Tablets 2 times a day for 30 days.     aspirin 81 MG EC tablet  Start taking on: July 24, 2024   Take 1 Tablet by mouth every day.  Dose: 81 mg            CHANGE how you take these medications        Instructions   valsartan-hydrochlorothiazide 80-12.5 MG per tablet  What changed: additional instructions  Commonly known as: Diovan-HCT   Take 1 Tablet by mouth every day.  Dose: 1 Tablet            CONTINUE taking these medications        Instructions   anastrozole 1 MG Tabs  Commonly known as: Arimidex   Take 1 Tablet by mouth every morning.  Dose: 1 mg     diphenhydrAMINE 25 MG Tabs  Commonly known as: Benadryl   Take 25 mg by mouth at bedtime as needed for Sleep.  Dose: 25 mg     ipratropium-albuterol 0.5-2.5 (3) MG/3ML nebulizer solution  Commonly known as: Duoneb   Take 3 mL by nebulization every four hours as needed for Shortness of Breath (Wheezing).  Dose: 3 mL     MAGNESIUM PO   Take 420 mg by mouth every evening.  Dose: 420 mg     melatonin 5 mg Tabs   Take 5 mg by mouth every evening as needed.  Dose: 5 mg      pantoprazole 40 MG Tbec  Commonly known as: Protonix   Take 40 mg by mouth every day.  Dose: 40 mg     PARoxetine 10 MG Tabs  Commonly known as: Paxil   Take 1 Tablet by mouth every day.  Dose: 10 mg     PRESERVISION AREDS 2 PO   Take 1 Tablet by mouth every evening.  Dose: 1 Tablet     rosuvastatin 20 MG Tabs  Commonly known as: Crestor   Take 1 Tablet by mouth every evening.  Dose: 20 mg     spironolactone 25 MG Tabs  Commonly known as: Aldactone   Take 25 mg by mouth every day.  Dose: 25 mg     Trelegy Ellipta 200-62.5-25 MCG/ACT inhaler  Generic drug: fluticasone-umeclidinium-vilanterol   Inhale 1 Puff every day.  Dose: 1 Puff            STOP taking these medications      ibuprofen 200 MG Tabs  Commonly known as: Motrin              Allergies  Allergies   Allergen Reactions    Sulfa Drugs Rash and Swelling     Rash, joint swelling  [id number]    Codeine Vomiting and Nausea    Oxycodone Vomiting and Nausea     .    Vancomycin Itching       DIET  No orders of the defined types were placed in this encounter.      ACTIVITY  As tolerated.  Weight bearing as tolerated    CONSULTATIONS  Intensivist  Vascular surgeon  Cardiologist    PROCEDURES  TAVR  Right common femoral endarterectomy and repair using bovine patch angioplasty.    LABORATORY  Lab Results   Component Value Date    SODIUM 140 07/23/2024    POTASSIUM 3.9 07/23/2024    CHLORIDE 104 07/23/2024    CO2 26 07/23/2024    GLUCOSE 167 (H) 07/23/2024    BUN 11 07/23/2024    CREATININE 0.87 07/23/2024        Lab Results   Component Value Date    WBC 6.0 07/23/2024    HEMOGLOBIN 8.8 (L) 07/23/2024    HEMATOCRIT 28.2 (L) 07/23/2024    PLATELETCT 143 (L) 07/23/2024        Total time of the discharge process exceeds 34 minutes.

## 2024-07-23 NOTE — DISCHARGE PLANNING
Case Management Discharge Planning    Admission Date: 7/15/2024  GMLOS: 2.7  ALOS: 8    6-Clicks ADL Score: 18  6-Clicks Mobility Score: 17  PT and/or OT Eval ordered: Yes  Post-acute Referrals Ordered: Yes  Post-acute Choice Obtained: Yes  Has referral(s) been sent to post-acute provider:  Yes      Anticipated Discharge Dispo: Discharge Disposition: Disch to  rehab facility or distinct part unit (62)  Discharge Address: [address]   [place] [address]    DME Needed: No    Action(s) Taken: IPR reached out to [facility] that they have received insurance auth and [facility] is willing to accept pt today.     Pt is MC to DC to [facility] per MD.     [facility] has set up transportation to [facility] for 1030/1100 VIA GMT.     0900 LMSW called pt's son and left VM updating him on transportation time to [facility].      0915 LMSW spoke with pt at bedside to inform her of transportation time. Pt agreeable and signed COBRA. LMSW updated care team.    Escalations Completed: None    Medically Clear: Yes

## 2024-07-23 NOTE — PROGRESS NOTES
4 Eyes Skin Assessment Completed by anya RN and Melissa RN.    Head WDL  Ears WDL  Nose WDL  Mouth WDL  Neck WDL  Breast/Chest WDL  Shoulder Blades WDL  Spine WDL  (R) Arm/Elbow/Hand Bruising  (L) Arm/Elbow/Hand Bruising  Abdomen WDL  Groin Bruising and Incision  Scrotum/Coccyx/Buttocks WDL  (R) Leg WDL  (L) Leg WDL  (R) Heel/Foot/Toe Edema  (L) Heel/Foot/Toe WDL          Devices In Places Blood Pressure Cuff and Nasal Cannula      Interventions In Place NC W/Ear Foams, Waffle Overlay, and Pillows    Possible Skin Injury No    Pictures Uploaded Into Epic Yes  Wound Consult Placed N/A  RN Wound Prevention Protocol Ordered Yes

## 2024-07-23 NOTE — PROGRESS NOTES
Bedside report received from off going RN/tech: Bc, assumed care of patient.     Fall Risk Score: HIGH RISK  Fall risk interventions in place: Place yellow fall risk ID band on patient, Provide patient/family education based on risk assessment, Educate patient/family to call staff for assistance when getting out of bed, Place fall precaution signage outside patient door, Place patient in room close to nursing station, Utilize bed/chair fall alarm, Notify charge of high risk for huddle, and Bed alarm connected correctly  Bed type: Regular (Delfino Score less than 17 interventions in place)  Patient on cardiac monitor: Yes  IVF/IV medications: Not Applicable   Oxygen: How many liters 1L  Bedside sitter: Not Applicable   Isolation: Not applicable

## 2024-07-23 NOTE — CARE PLAN
The patient is Watcher - Medium risk of patient condition declining or worsening    Shift Goals  Clinical Goals: safety    Problem: Pain - Standard  Goal: Alleviation of pain or a reduction in pain to the patient’s comfort goal  Outcome: Progressing     Problem: Urinary Elimination  Goal: Establish and maintain regular urinary output  Outcome: Progressing

## 2024-07-23 NOTE — DISCHARGE PLANNING
[facility] Transitional Care Coordination    Insurance has authorized inpatient rehab, auth #[id number].  Per Dr. Akers, medically cleared for transfer.  Anticipate transition to [facility] today.      0900 [place] to [facility] 1030/11a today.  Volate update to Dr. Akers.  Nursing to call report to [phone].  Volate message to bedside CAMMY Leung.  Volate update to RASHEEDA Rivas.  Telephone call to son Len jones provide update.  No answer.  Left message to include transport time/TCC contact information.     TCC will follow to assist as needed with transition to [facility].

## 2024-07-24 ENCOUNTER — APPOINTMENT (OUTPATIENT)
Dept: PHYSICAL THERAPY | Facility: REHABILITATION | Age: 84
DRG: 949 | End: 2024-07-24
Attending: PHYSICAL MEDICINE & REHABILITATION
Payer: MEDICARE

## 2024-07-24 ENCOUNTER — APPOINTMENT (OUTPATIENT)
Dept: OCCUPATIONAL THERAPY | Facility: REHABILITATION | Age: 84
DRG: 949 | End: 2024-07-24
Attending: PHYSICAL MEDICINE & REHABILITATION
Payer: MEDICARE

## 2024-07-24 LAB
25(OH)D3 SERPL-MCNC: 47 NG/ML (ref 30–100)
ALBUMIN SERPL BCP-MCNC: 3.2 G/DL (ref 3.2–4.9)
ALBUMIN/GLOB SERPL: 1.2 G/DL
ALP SERPL-CCNC: 55 U/L (ref 30–99)
ALT SERPL-CCNC: 8 U/L (ref 2–50)
ANION GAP SERPL CALC-SCNC: 9 MMOL/L (ref 7–16)
AST SERPL-CCNC: 14 U/L (ref 12–45)
BASOPHILS # BLD AUTO: 0.3 % (ref 0–1.8)
BASOPHILS # BLD: 0.02 K/UL (ref 0–0.12)
BILIRUB SERPL-MCNC: 0.6 MG/DL (ref 0.1–1.5)
BUN SERPL-MCNC: 12 MG/DL (ref 8–22)
CALCIUM ALBUM COR SERPL-MCNC: 9.9 MG/DL (ref 8.5–10.5)
CALCIUM SERPL-MCNC: 9.3 MG/DL (ref 8.5–10.5)
CHLORIDE SERPL-SCNC: 108 MMOL/L (ref 96–112)
CO2 SERPL-SCNC: 25 MMOL/L (ref 20–33)
CREAT SERPL-MCNC: 0.98 MG/DL (ref 0.5–1.4)
EOSINOPHIL # BLD AUTO: 0.19 K/UL (ref 0–0.51)
EOSINOPHIL NFR BLD: 3.1 % (ref 0–6.9)
ERYTHROCYTE [DISTWIDTH] IN BLOOD BY AUTOMATED COUNT: 50.5 FL (ref 35.9–50)
EST. AVERAGE GLUCOSE BLD GHB EST-MCNC: 108 MG/DL
GFR SERPLBLD CREATININE-BSD FMLA CKD-EPI: 57 ML/MIN/1.73 M 2
GLOBULIN SER CALC-MCNC: 2.6 G/DL (ref 1.9–3.5)
GLUCOSE SERPL-MCNC: 88 MG/DL (ref 65–99)
HBA1C MFR BLD: 5.4 % (ref 4–5.6)
HCT VFR BLD AUTO: 29.3 % (ref 37–47)
HGB BLD-MCNC: 9.1 G/DL (ref 12–16)
IMM GRANULOCYTES # BLD AUTO: 0.03 K/UL (ref 0–0.11)
IMM GRANULOCYTES NFR BLD AUTO: 0.5 % (ref 0–0.9)
LYMPHOCYTES # BLD AUTO: 1.24 K/UL (ref 1–4.8)
LYMPHOCYTES NFR BLD: 20.3 % (ref 22–41)
MCH RBC QN AUTO: 27.7 PG (ref 27–33)
MCHC RBC AUTO-ENTMCNC: 31.1 G/DL (ref 32.2–35.5)
MCV RBC AUTO: 89.3 FL (ref 81.4–97.8)
MONOCYTES # BLD AUTO: 0.6 K/UL (ref 0–0.85)
MONOCYTES NFR BLD AUTO: 9.8 % (ref 0–13.4)
NEUTROPHILS # BLD AUTO: 4.02 K/UL (ref 1.82–7.42)
NEUTROPHILS NFR BLD: 66 % (ref 44–72)
NRBC # BLD AUTO: 0 K/UL
NRBC BLD-RTO: 0 /100 WBC (ref 0–0.2)
PLATELET # BLD AUTO: 154 K/UL (ref 164–446)
PMV BLD AUTO: 10.6 FL (ref 9–12.9)
POTASSIUM SERPL-SCNC: 3.9 MMOL/L (ref 3.6–5.5)
PROT SERPL-MCNC: 5.8 G/DL (ref 6–8.2)
RBC # BLD AUTO: 3.28 M/UL (ref 4.2–5.4)
SODIUM SERPL-SCNC: 142 MMOL/L (ref 135–145)
TSH SERPL DL<=0.005 MIU/L-ACNC: 2.75 UIU/ML (ref 0.38–5.33)
WBC # BLD AUTO: 6.1 K/UL (ref 4.8–10.8)

## 2024-07-24 PROCEDURE — 97530 THERAPEUTIC ACTIVITIES: CPT

## 2024-07-24 PROCEDURE — 84443 ASSAY THYROID STIM HORMONE: CPT

## 2024-07-24 PROCEDURE — 94640 AIRWAY INHALATION TREATMENT: CPT

## 2024-07-24 PROCEDURE — 770010 HCHG ROOM/CARE - REHAB SEMI PRIVAT*

## 2024-07-24 PROCEDURE — 82306 VITAMIN D 25 HYDROXY: CPT

## 2024-07-24 PROCEDURE — 97162 PT EVAL MOD COMPLEX 30 MIN: CPT

## 2024-07-24 PROCEDURE — 80053 COMPREHEN METABOLIC PANEL: CPT

## 2024-07-24 PROCEDURE — 700101 HCHG RX REV CODE 250: Performed by: PHYSICAL MEDICINE & REHABILITATION

## 2024-07-24 PROCEDURE — 85025 COMPLETE CBC W/AUTO DIFF WBC: CPT

## 2024-07-24 PROCEDURE — 99233 SBSQ HOSP IP/OBS HIGH 50: CPT | Performed by: PHYSICAL MEDICINE & REHABILITATION

## 2024-07-24 PROCEDURE — 97110 THERAPEUTIC EXERCISES: CPT

## 2024-07-24 PROCEDURE — 94760 N-INVAS EAR/PLS OXIMETRY 1: CPT

## 2024-07-24 PROCEDURE — A9270 NON-COVERED ITEM OR SERVICE: HCPCS | Performed by: PHYSICAL MEDICINE & REHABILITATION

## 2024-07-24 PROCEDURE — 97535 SELF CARE MNGMENT TRAINING: CPT

## 2024-07-24 PROCEDURE — 83036 HEMOGLOBIN GLYCOSYLATED A1C: CPT

## 2024-07-24 PROCEDURE — 36415 COLL VENOUS BLD VENIPUNCTURE: CPT

## 2024-07-24 PROCEDURE — 700102 HCHG RX REV CODE 250 W/ 637 OVERRIDE(OP): Performed by: PHYSICAL MEDICINE & REHABILITATION

## 2024-07-24 PROCEDURE — 97165 OT EVAL LOW COMPLEX 30 MIN: CPT

## 2024-07-24 RX ORDER — ALBUTEROL SULFATE 0.83 MG/ML
SOLUTION RESPIRATORY (INHALATION)
Status: ACTIVE
Start: 2024-07-24 | End: 2024-07-24

## 2024-07-24 RX ORDER — ALBUTEROL SULFATE 0.83 MG/ML
2.5 SOLUTION RESPIRATORY (INHALATION)
Status: DISCONTINUED | OUTPATIENT
Start: 2024-07-25 | End: 2024-07-24 | Stop reason: CLARIF

## 2024-07-24 RX ADMIN — ROSUVASTATIN CALCIUM 20 MG: 10 TABLET, FILM COATED ORAL at 16:39

## 2024-07-24 RX ADMIN — ALBUTEROL SULFATE 2.5 MG: 2.5 SOLUTION RESPIRATORY (INHALATION) at 06:42

## 2024-07-24 RX ADMIN — SPIRONOLACTONE 25 MG: 25 TABLET ORAL at 05:37

## 2024-07-24 RX ADMIN — AMIODARONE HYDROCHLORIDE 400 MG: 200 TABLET ORAL at 16:39

## 2024-07-24 RX ADMIN — ALUMINUM HYDROXIDE, MAGNESIUM HYDROXIDE, AND DIMETHICONE 20 ML: 400; 400; 40 SUSPENSION ORAL at 12:53

## 2024-07-24 RX ADMIN — ACETAMINOPHEN 650 MG: 325 TABLET ORAL at 10:50

## 2024-07-24 RX ADMIN — OMEPRAZOLE 20 MG: 20 CAPSULE, DELAYED RELEASE ORAL at 08:12

## 2024-07-24 RX ADMIN — TRAZODONE HYDROCHLORIDE 50 MG: 50 TABLET ORAL at 22:07

## 2024-07-24 RX ADMIN — AMIODARONE HYDROCHLORIDE 400 MG: 200 TABLET ORAL at 05:37

## 2024-07-24 RX ADMIN — PAROXETINE HYDROCHLORIDE 10 MG: 20 TABLET, FILM COATED ORAL at 08:09

## 2024-07-24 RX ADMIN — ACETAMINOPHEN 650 MG: 325 TABLET ORAL at 22:07

## 2024-07-24 RX ADMIN — ASPIRIN 81 MG: 81 TABLET, COATED ORAL at 08:10

## 2024-07-24 RX ADMIN — HYDROCHLOROTHIAZIDE 6.25 MG: 25 TABLET ORAL at 05:37

## 2024-07-24 RX ADMIN — VALSARTAN 40 MG: 80 TABLET, FILM COATED ORAL at 05:37

## 2024-07-24 RX ADMIN — ANASTROZOLE 1 MG: 1 TABLET, COATED ORAL at 08:09

## 2024-07-24 RX ADMIN — ACETAMINOPHEN 650 MG: 325 TABLET ORAL at 16:39

## 2024-07-24 RX ADMIN — FLUTICASONE FUROATE, UMECLIDINIUM BROMIDE AND VILANTEROL TRIFENATATE 1 PUFF: 200; 62.5; 25 POWDER RESPIRATORY (INHALATION) at 08:22

## 2024-07-24 ASSESSMENT — GAIT ASSESSMENTS
ASSISTIVE DEVICE: FRONT WHEEL WALKER
DISTANCE (FEET): 80
DEVIATION: BRADYKINETIC;OTHER (COMMENT)
GAIT LEVEL OF ASSIST: CONTACT GUARD ASSIST

## 2024-07-24 ASSESSMENT — BRIEF INTERVIEW FOR MENTAL STATUS (BIMS)
WHAT DAY OF THE WEEK IS IT: CORRECT
ASKED TO RECALL BED: YES, AFTER CUEING (A PIECE OF FURNITURE")"
WHAT MONTH IS IT: ACCURATE WITHIN 5 DAYS
BIMS SUMMARY SCORE: 14
ASKED TO RECALL BLUE: YES, NO CUE REQUIRED
ASKED TO RECALL SOCK: YES, NO CUE REQUIRED
INITIAL REPETITION OF BED BLUE SOCK - FIRST ATTEMPT: 3
WHAT YEAR IS IT: CORRECT

## 2024-07-24 ASSESSMENT — ACTIVITIES OF DAILY LIVING (ADL)
BED_CHAIR_WHEELCHAIR_TRANSFER_DESCRIPTION: ADAPTIVE EQUIPMENT;INCREASED TIME;SET-UP OF EQUIPMENT
BED_CHAIR_WHEELCHAIR_TRANSFER_DESCRIPTION: ADAPTIVE EQUIPMENT;INCREASED TIME;SET-UP OF EQUIPMENT;SUPERVISION FOR SAFETY;VERBAL CUEING
BED_CHAIR_WHEELCHAIR_TRANSFER_DESCRIPTION: ADAPTIVE EQUIPMENT;INCREASED TIME;VERBAL CUEING;SET-UP OF EQUIPMENT
TOILETING_LEVEL_OF_ASSIST_DESCRIPTION: GRAB BAR;INCREASED TIME;SET-UP OF EQUIPMENT;SUPERVISION FOR SAFETY;VERBAL CUEING
TOILET_TRANSFER_DESCRIPTION: GRAB BAR;INCREASED TIME;VERBAL CUEING
BED_CHAIR_WHEELCHAIR_TRANSFER_DESCRIPTION: ADAPTIVE EQUIPMENT;SET-UP OF EQUIPMENT;VERBAL CUEING
TOILETING: INDEPENDENT
TOILET_TRANSFER_DESCRIPTION: GRAB BAR;INCREASED TIME;SET-UP OF EQUIPMENT;VERBAL CUEING
TUB_SHOWER_TRANSFER_DESCRIPTION: GRAB BAR;SHOWER BENCH;SET-UP OF EQUIPMENT;SUPERVISION FOR SAFETY;VERBAL CUEING
TOILET_TRANSFER_DESCRIPTION: GRAB BAR;ADAPTIVE EQUIPMENT;INCREASED TIME;SET-UP OF EQUIPMENT;SUPERVISION FOR SAFETY

## 2024-07-24 ASSESSMENT — PAIN DESCRIPTION - PAIN TYPE: TYPE: ACUTE PAIN

## 2024-07-24 ASSESSMENT — PATIENT HEALTH QUESTIONNAIRE - PHQ9
1. LITTLE INTEREST OR PLEASURE IN DOING THINGS: NOT AT ALL
SUM OF ALL RESPONSES TO PHQ9 QUESTIONS 1 AND 2: 0
2. FEELING DOWN, DEPRESSED, IRRITABLE, OR HOPELESS: NOT AT ALL

## 2024-07-24 NOTE — PROGRESS NOTES
"  Physical Medicine & Rehabilitation Progress Note    Encounter Date: 7/24/2024    Chief Complaint: As tolerated    Interval Events (Subjective):  Patient sitting up in room. She reports therapy is going OK this morning. She reports poor sleep. Reviewed labs and ongoing anemia and thrombocytopenia. HR and SBP have been very labile, will monitor.    Objective:  VITAL SIGNS: /57   Pulse 62   Temp 37.2 °C (99 °F) (Oral)   Resp 18   Ht 1.626 m (5' 4\")   Wt 83.7 kg (184 lb 8 oz)   SpO2 93%   BMI 31.67 kg/m²   Gen: NAD  Psych: Mood and affect appropriate  CV: RRR, 0 edema  Resp: CTAB, no upper airway sounds  Abd: NTND  Neuro: AOx4, following commands    Laboratory Values:  Recent Results (from the past 72 hour(s))   CBC WITH DIFFERENTIAL    Collection Time: 07/22/24  1:33 AM   Result Value Ref Range    WBC 5.8 4.8 - 10.8 K/uL    RBC 2.89 (L) 4.20 - 5.40 M/uL    Hemoglobin 8.2 (L) 12.0 - 16.0 g/dL    Hematocrit 25.8 (L) 37.0 - 47.0 %    MCV 89.3 81.4 - 97.8 fL    MCH 28.4 27.0 - 33.0 pg    MCHC 31.8 (L) 32.2 - 35.5 g/dL    RDW 49.4 35.9 - 50.0 fL    Platelet Count 118 (L) 164 - 446 K/uL    MPV 10.7 9.0 - 12.9 fL    Neutrophils-Polys 64.90 44.00 - 72.00 %    Lymphocytes 19.50 (L) 22.00 - 41.00 %    Monocytes 11.70 0.00 - 13.40 %    Eosinophils 2.90 0.00 - 6.90 %    Basophils 0.50 0.00 - 1.80 %    Immature Granulocytes 0.50 0.00 - 0.90 %    Nucleated RBC 0.00 0.00 - 0.20 /100 WBC    Neutrophils (Absolute) 3.75 1.82 - 7.42 K/uL    Lymphs (Absolute) 1.13 1.00 - 4.80 K/uL    Monos (Absolute) 0.68 0.00 - 0.85 K/uL    Eos (Absolute) 0.17 0.00 - 0.51 K/uL    Baso (Absolute) 0.03 0.00 - 0.12 K/uL    Immature Granulocytes (abs) 0.03 0.00 - 0.11 K/uL    NRBC (Absolute) 0.00 K/uL   Basic Metabolic Panel    Collection Time: 07/22/24  1:33 AM   Result Value Ref Range    Sodium 140 135 - 145 mmol/L    Potassium 3.9 3.6 - 5.5 mmol/L    Chloride 103 96 - 112 mmol/L    Co2 28 20 - 33 mmol/L    Glucose 116 (H) 65 - 99 mg/dL    " Bun 15 8 - 22 mg/dL    Creatinine 1.02 0.50 - 1.40 mg/dL    Calcium 9.1 8.5 - 10.5 mg/dL    Anion Gap 9.0 7.0 - 16.0   ESTIMATED GFR    Collection Time: 07/22/24  1:33 AM   Result Value Ref Range    GFR (CKD-EPI) 55 (A) >60 mL/min/1.73 m 2   CBC WITH DIFFERENTIAL    Collection Time: 07/23/24  8:44 AM   Result Value Ref Range    WBC 6.0 4.8 - 10.8 K/uL    RBC 3.12 (L) 4.20 - 5.40 M/uL    Hemoglobin 8.8 (L) 12.0 - 16.0 g/dL    Hematocrit 28.2 (L) 37.0 - 47.0 %    MCV 90.4 81.4 - 97.8 fL    MCH 28.2 27.0 - 33.0 pg    MCHC 31.2 (L) 32.2 - 35.5 g/dL    RDW 50.3 (H) 35.9 - 50.0 fL    Platelet Count 143 (L) 164 - 446 K/uL    MPV 10.7 9.0 - 12.9 fL    Neutrophils-Polys 68.20 44.00 - 72.00 %    Lymphocytes 17.60 (L) 22.00 - 41.00 %    Monocytes 9.10 0.00 - 13.40 %    Eosinophils 3.90 0.00 - 6.90 %    Basophils 0.70 0.00 - 1.80 %    Immature Granulocytes 0.50 0.00 - 0.90 %    Nucleated RBC 0.00 0.00 - 0.20 /100 WBC    Neutrophils (Absolute) 4.06 1.82 - 7.42 K/uL    Lymphs (Absolute) 1.05 1.00 - 4.80 K/uL    Monos (Absolute) 0.54 0.00 - 0.85 K/uL    Eos (Absolute) 0.23 0.00 - 0.51 K/uL    Baso (Absolute) 0.04 0.00 - 0.12 K/uL    Immature Granulocytes (abs) 0.03 0.00 - 0.11 K/uL    NRBC (Absolute) 0.00 K/uL   Basic Metabolic Panel    Collection Time: 07/23/24  8:44 AM   Result Value Ref Range    Sodium 140 135 - 145 mmol/L    Potassium 3.9 3.6 - 5.5 mmol/L    Chloride 104 96 - 112 mmol/L    Co2 26 20 - 33 mmol/L    Glucose 167 (H) 65 - 99 mg/dL    Bun 11 8 - 22 mg/dL    Creatinine 0.87 0.50 - 1.40 mg/dL    Calcium 9.2 8.5 - 10.5 mg/dL    Anion Gap 10.0 7.0 - 16.0   ESTIMATED GFR    Collection Time: 07/23/24  8:44 AM   Result Value Ref Range    GFR (CKD-EPI) 66 >60 mL/min/1.73 m 2   CBC with Differential    Collection Time: 07/24/24  5:27 AM   Result Value Ref Range    WBC 6.1 4.8 - 10.8 K/uL    RBC 3.28 (L) 4.20 - 5.40 M/uL    Hemoglobin 9.1 (L) 12.0 - 16.0 g/dL    Hematocrit 29.3 (L) 37.0 - 47.0 %    MCV 89.3 81.4 - 97.8 fL     MCH 27.7 27.0 - 33.0 pg    MCHC 31.1 (L) 32.2 - 35.5 g/dL    RDW 50.5 (H) 35.9 - 50.0 fL    Platelet Count 154 (L) 164 - 446 K/uL    MPV 10.6 9.0 - 12.9 fL    Neutrophils-Polys 66.00 44.00 - 72.00 %    Lymphocytes 20.30 (L) 22.00 - 41.00 %    Monocytes 9.80 0.00 - 13.40 %    Eosinophils 3.10 0.00 - 6.90 %    Basophils 0.30 0.00 - 1.80 %    Immature Granulocytes 0.50 0.00 - 0.90 %    Nucleated RBC 0.00 0.00 - 0.20 /100 WBC    Neutrophils (Absolute) 4.02 1.82 - 7.42 K/uL    Lymphs (Absolute) 1.24 1.00 - 4.80 K/uL    Monos (Absolute) 0.60 0.00 - 0.85 K/uL    Eos (Absolute) 0.19 0.00 - 0.51 K/uL    Baso (Absolute) 0.02 0.00 - 0.12 K/uL    Immature Granulocytes (abs) 0.03 0.00 - 0.11 K/uL    NRBC (Absolute) 0.00 K/uL   Comp Metabolic Panel (CMP)    Collection Time: 07/24/24  5:27 AM   Result Value Ref Range    Sodium 142 135 - 145 mmol/L    Potassium 3.9 3.6 - 5.5 mmol/L    Chloride 108 96 - 112 mmol/L    Co2 25 20 - 33 mmol/L    Anion Gap 9.0 7.0 - 16.0    Glucose 88 65 - 99 mg/dL    Bun 12 8 - 22 mg/dL    Creatinine 0.98 0.50 - 1.40 mg/dL    Calcium 9.3 8.5 - 10.5 mg/dL    Correct Calcium 9.9 8.5 - 10.5 mg/dL    AST(SGOT) 14 12 - 45 U/L    ALT(SGPT) 8 2 - 50 U/L    Alkaline Phosphatase 55 30 - 99 U/L    Total Bilirubin 0.6 0.1 - 1.5 mg/dL    Albumin 3.2 3.2 - 4.9 g/dL    Total Protein 5.8 (L) 6.0 - 8.2 g/dL    Globulin 2.6 1.9 - 3.5 g/dL    A-G Ratio 1.2 g/dL   HEMOGLOBIN A1C    Collection Time: 07/24/24  5:27 AM   Result Value Ref Range    Glycohemoglobin 5.4 4.0 - 5.6 %    Est Avg Glucose 108 mg/dL   TSH with Reflex to FT4    Collection Time: 07/24/24  5:27 AM   Result Value Ref Range    TSH 2.750 0.380 - 5.330 uIU/mL   Vitamin D, 25-hydroxy (blood)    Collection Time: 07/24/24  5:27 AM   Result Value Ref Range    25-Hydroxy   Vitamin D 25 47 30 - 100 ng/mL   ESTIMATED GFR    Collection Time: 07/24/24  5:27 AM   Result Value Ref Range    GFR (CKD-EPI) 57 (A) >60 mL/min/1.73 m 2       Medications:  Scheduled  Medications   Medication Dose Frequency    albuterol       fluticasone-umeclidinium-vilanterol  1 Puff DAILY    senna-docusate  2 Tablet BID    [START ON 7/27/2024] amiodarone  200 mg Q DAY    amiodarone  400 mg TWICE DAILY    anastrozole  1 mg QAM    aspirin  81 mg DAILY    hydroCHLOROthiazide  6.25 mg Q DAY    omeprazole  20 mg DAILY    PARoxetine  10 mg DAILY    rosuvastatin  20 mg PM MEAL    spironolactone  25 mg QDAY    valsartan  40 mg Q DAY    albuterol  2.5 mg Daily-0800     PRN medications: albuterol, Respiratory Therapy Consult, hydrALAZINE, acetaminophen, senna-docusate **AND** polyethylene glycol/lytes, docusate sodium, magnesium hydroxide, carboxymethylcellulose, mag hydrox-al hydrox-simeth, ondansetron **OR** ondansetron, traZODone, sodium chloride, traMADol    Diet:  Current Diet Order   Procedures    Diet Order Diet: Cardiac       Medical Decision Making and Plan:  TAVR - Patient with severe aortic stenosis s/p TAVR on 7/15/24 with Dr. Oconnor which was complicated by right femoral artery injury requiring repair.  -PT and OT for mobility and ADLs. Per guidelines, 15 hours per week between PT, OT and/or SLP.  -Follow-up Cardiology. Continue ASA     HTN/A fib/sCHF - Patient on Amiodarone 400 mg BID with plan to transition to 200 mg daily on 7/27 as well as HCTZ 6.25 mg, Spironolactone 25 mg and Valsartan 40 mg daily.  Very labile, will monitor another day     HLD - Patient on Rosuvastatin 20 mg QHS     COPD/Asthma - Patient on Albuterol daily and Trelegy      Anemia - Check AM CBC - 9.1, will monitor     R groin hematoma - Wound care for right groin incision site.      Thrombocytopenia - Check AM CBC - 154, will monitor     Hyperglycemia - Check A 1c - 5.4, no need to monitor sugars     Depression - Patient on Paxil 10 m daily     Obesity - On admission Body mass index is 31.67 kg/m². Meets medical criteria. Dietitian to consult.      Pain - Patient on PRN Tylenol and Tramadol     Skin - Patient at risk  for skin breakdown due to debility in areas including sacrum, achilles, elbows and head in addition to other sites. Nursing to assess skin daily.      GI Ppx - Patient on Prilosec for GERD prophylaxis. Patient on Senna-docusate for constipation prophylaxis.      DVT Ppx - Patient is not cleared for AC due to recent hematoma.    ____________________________________    T. Lv Solano MD/PhD  [facility] - Physical Medicine & Rehabilitation   [facility] - Brain Injury Medicine   ____________________________________    Total time:  51 minutes. Time spent included pre-rounding review of vitals and tests, unit/floor time, face-to-face time with the patient including physical examination, care coordination, counseling of patient and/or family, ordering medications/procedures/tests, discussion with CM, PT, OT, SLP and/or other healthcare providers, and documentation in the electronic medical record. Topics discussed included admission labs, anemia, thrombocytopenia, dehydration, and labile HR/SBP.

## 2024-07-24 NOTE — CARE PLAN
"  Problem: Fall Risk - Rehab  Goal: Patient will remain free from falls  Outcome: Progressing   Christina Elizondo Fall risk Assessment Score: 18    High fall risk Interventions   - Alarming seatbelt  - Wander guard  - Bed and strip alarm   - Yellow sign by the door   - Yellow wrist band \"Fall risk\"  - Room near to the nurse station  - Do not leave patient unattended in the bathroom  - Fall risk education provided         Problem: Respiratory  Goal: Patient will achieve/maintain optimum respiratory ventilation and gas exchange  Outcome: Progressing   2L O2 at night.   "

## 2024-07-24 NOTE — FLOWSHEET NOTE
07/24/24 0820   Events/Summary/Plan   Events/Summary/Plan MDI   Vital Signs   Pulse 83   Respiration 16   Pulse Oximetry (!) 80 %   $ Pulse Oximetry (Spot Check) Yes   Respiratory Assessment   Level of Consciousness Alert   Chest Exam   Work Of Breathing / Effort Within Normal Limits   Oxygen   O2 Delivery Device Room air w/o2 available  (placed on 3L)

## 2024-07-24 NOTE — DISCHARGE PLANNING
CASE MANAGEMENT INITIAL ASSESSMENT    Admit Date:  7/23/2024     I met with patient to discuss role of case management / discharge planning / team conference.   Patient is a  84 y.o. female transferred from [facility].    Diagnosis: S/P TAVR (transcatheter aortic valve replacement) [Z95.2]    Co-morbidities:   Patient Active Problem List    Diagnosis Date Noted    Atrial fibrillation with rapid ventricular response (HCC) 07/19/2024    Anemia due to acute blood loss 07/19/2024    S/P TAVR (transcatheter aortic valve replacement) 07/15/2024    Acute diastolic HF (heart failure) (HCC) 07/15/2024    Common femoral artery injury, right, initial encounter 07/15/2024    Postprocedural hypotension 07/15/2024    Hiatal hernia 07/08/2024    Hepatic steatosis 07/08/2024    Gall stones 07/08/2024    Aortic atherosclerosis (HCC) 07/08/2024    Severe aortic stenosis 06/25/2024    Upper airway cough syndrome 06/03/2024    Dependence on supplemental oxygen 03/05/2024    Chronic respiratory failure with hypoxia ([facility]) 10/20/2023    Post-nasal drip 02/09/2023    Numbness in feet 05/25/2022    Chronic lower back pain 05/25/2022    Osteopenia of left thigh 05/25/2022    Elevated glycohemoglobin 05/25/2022    Generalized anxiety disorder 03/10/2022    Stage 3b chronic kidney disease 03/10/2022    H/O gastric sleeve 03/10/2022    GERD (gastroesophageal reflux disease) 03/10/2022    History of breast cancer 03/10/2022    Advance directive discussed with patient 03/10/2022    Mitral annular calcification     Complex renal cyst 01/10/2018    Chronic rhinitis 11/07/2017    Dyslipidemia 07/13/2017    Fatigue 04/11/2017    Asthma-COPD overlap syndrome (HCC) 07/08/2016    ROBYN (obstructive sleep apnea) 07/08/2016    Obesity (BMI 30.0-34.9) 05/18/2016    Sciatica 05/09/2013    Primary hypertension 03/12/2013    Ulcerative pancolitis without complication (HCC) 03/12/2013     Prior Living Situation:  Housing / Facility: 1 Story House  Lives with -  Patient's Self Care Capacity: Unrelated Adult    Prior Level of Function:  Medication Management: Independent  Finances: Independent  Home Management: Independent  Shopping: Independent  Prior Level Of Mobility: Independent Without Device in Community, Independent Without Device in Home  Driving / Transportation: Driving Independent    Support Systems:  Primary : Len-son: [phone] or sister-Cyn: [phone].       Previous Services Utilized:   Equipment Owned: Oxygen, Grab Bar(s) In Tub / Shower, Tub / Shower Seat  Prior Services: Home-Independent    Other Information:  Occupation (Pre-Hospital Vocational): Retired Due To Age     Primary Payor Source: [employer]  Primary Care Practitioner : Terra Zambrano MD    Patient / Family Goal:  Patient / Family Goal: Return home    Plan:  1. Continue to follow patient through hospitalization and provide discharge planning in collaboration with patient, family, physicians and ancillary services.     2. Utilize community resources to ensure a safe discharge.

## 2024-07-24 NOTE — THERAPY
Physical Therapy   Initial Evaluation     Patient Name: Erin Hess  Age:  84 y.o., Sex:  female  Medical Record #: [id number]  Today's Date: 7/24/2024     Subjective    Session 1 (7883-4272): Pt received in bed and willing to participate in therapy.  Session 2 ([id number]): Pt received in w/c following session w/ OT and willing to participate in therapy. Pt reports high level of pain and fatigue.     Objective       07/24/24 1030   Precautions   Precautions Fall Risk;Other (See Comments)   Comments Avoid heavy lifting (>5 pounds), excessive bending, stretching, pushing, or pulling for two weeks (sx: 07/15) after your procedure, per post-op protocol. 02 2L PM, 2L 02 PRN AM   Vitals   Pulse 69   Patient BP Position Sitting   Blood Pressure  (!) 86/55   Pulse Oximetry 95 %   O2 (LPM) 2   O2 Delivery Device Silicone Nasal Cannula   Vitals Comments collected following ambulation, pt symptomatic w/ reported dizziness following walk   Pain   Intervention Rest   Pain 0 - 10 Group   Location Groin   Location Orientation Right   Therapist Pain Assessment During Activity;Post Activity Pain Same as Prior to Activity   Gait Functional Level of Assist    Gait Level Of Assist Contact Guard Assist   Assistive Device Front Wheel Walker   Distance (Feet) 80   # of Times Distance was Traveled 1   Deviation Bradykinetic;Other (Comment)  (forward trunk posture)   Stairs Functional Level of Assist   Level of Assist with Stairs Minimal Assist   # of Stairs Climbed 1  (curb step w/ FWW)   Stairs Description Extra time;Oxygen;Verbal cueing;Walker   Transfer Functional Level of Assist   Bed, Chair, Wheelchair Transfer Contact Guard Assist   Bed Chair Wheelchair Transfer Description Adaptive equipment;Increased time;Verbal cueing;Set-up of equipment   Toilet Transfers Contact Guard Assist   Toilet Transfer Description Grab bar;Increased time;Set-up of equipment;Verbal cueing   Bed Mobility    Supine to Sit Standby Assist   Sit to Supine  Standby Assist   Sit to Stand Contact Guard Assist   Scooting Standby Assist   Rolling Standby Assist   Interdisciplinary Plan of Care Collaboration   IDT Collaboration with  Family / Caregiver   Patient Position at End of Therapy In Bed;Bed Alarm On;Call Light within Reach;Tray Table within Reach;Phone within Reach;Family / Friend in Room   Collaboration Comments pt's son present at end of session   Strengths & Barriers   Strengths Able to follow instructions;Alert and oriented;Effective communication skills;Independent prior level of function;Motivated for self care and independence;Pleasant and cooperative;Supportive family;Willingly participates in therapeutic activities   Barriers Decreased endurance;Fatigue;Generalized weakness;Pain   Roll Left and Right   Assistance Needed Supervision   Physical Assistance Level No physical assistance   CARE Score - Roll Left and Right 4   Sit to Lying   Assistance Needed Supervision   Physical Assistance Level No physical assistance   CARE Score - Sit to Lying 4   Lying to Sitting on Side of Bed   Assistance Needed Supervision   Physical Assistance Level No physical assistance   CARE Score - Lying to Sitting on Side of Bed 4   Sit to Stand   Assistance Needed Incidental touching;Adaptive equipment   Physical Assistance Level 25% or less   CARE Score - Sit to Stand 3   Chair/Bed-to-Chair Transfer   Assistance Needed Incidental touching;Adaptive equipment   Physical Assistance Level No physical assistance   CARE Score - Chair/Bed-to-Chair Transfer 4   Car Transfer   Assistance Needed Incidental touching;Verbal cues;Adaptive equipment   Physical Assistance Level 25% or less   CARE Score - Car Transfer 3   Walk 10 Feet   Assistance Needed Incidental touching;Adaptive equipment   Physical Assistance Level 25% or less   CARE Score - Walk 10 Feet 3   Walk 50 Feet with Two Turns   Assistance Needed Incidental touching;Verbal cues;Adaptive equipment   Physical Assistance Level 25% or  less   CARE Score - Walk 50 Feet with Two Turns 3   Walk 150 Feet   Assistance Needed Physical assistance   Physical Assistance Level Total assistance   CARE Score - Walk 150 Feet 1   Walking 10 Feet on Uneven Surfaces   Assistance Needed Physical assistance   Physical Assistance Level Total assistance   CARE Score - Walking 10 Feet on Uneven Surfaces 1   1 Step (Curb)   Assistance Needed Physical assistance;Verbal cues;Adaptive equipment   Physical Assistance Level 25% or less   CARE Score - 1 Step (Curb) 3   4 Steps   Assistance Needed Physical assistance   Physical Assistance Level Total assistance   CARE Score - 4 Steps 1   12 Steps   Assistance Needed Physical assistance   Physical Assistance Level Total assistance   CARE Score - 12 Steps 1   Picking Up Object   Assistance Needed Incidental touching;Adaptive equipment   Physical Assistance Level 25% or less   CARE Score - Picking Up Object 3   Wheel 50 Feet with Two Turns   Reason if not Attempted Activity not applicable   CARE Score - Wheel 50 Feet with Two Turns 9   Wheel 150 Feet   Reason if not Attempted Activity not applicable   CARE Score - Wheel 150 Feet 9        07/24/24 1501   PT Charge Group   PT Therapeutic Exercise (Units) 1   PT Therapeutic Activities (Units) 1   PT Total Time Spent   PT Individual Total Time Spent (Mins) 30   Precautions   Precautions Fall Risk;Other (See Comments)   Comments Avoid heavy lifting (>5 pounds), excessive bending, stretching, pushing, or pulling for two weeks (sx: 07/15) after your procedure, per post-op protocol. 02 2L PM, 2L 02 PRN AM   Vitals   O2 (LPM) 2   O2 Delivery Device Silicone Nasal Cannula   Pain   Intervention Rest   Pain 0 - 10 Group   Location Groin   Location Orientation Right   Therapist Pain Assessment Prior to Activity;During Activity;Post Activity   Transfer Functional Level of Assist   Bed, Chair, Wheelchair Transfer Contact Guard Assist   Bed Chair Wheelchair Transfer Description Adaptive  equipment;Set-up of equipment;Verbal cueing  (stand step w/ FWW)   Toilet Transfers Contact Guard Assist   Toilet Transfer Description Grab bar;Increased time;Verbal cueing   Sitting Lower Body Exercises   Comments   (seated HEP provided, was unable to review prior to end of session)   Bed Mobility    Sit to Supine Minimal Assist  (RLE management)   Sit to Stand Contact Guard Assist   Scooting Standby Assist   Interdisciplinary Plan of Care Collaboration   IDT Collaboration with  Occupational Therapist   Patient Position at End of Therapy In Bed;Bed Alarm On;Call Light within Reach;Tray Table within Reach;Phone within Reach   Collaboration Comments hand off of care   Strengths & Barriers   Strengths Able to follow instructions;Alert and oriented;Effective communication skills;Independent prior level of function;Motivated for self care and independence;Pleasant and cooperative;Supportive family;Willingly participates in therapeutic activities   Barriers Decreased endurance;Fatigue;Generalized weakness;Pain       Assessment  Patient is 84 y.o. female presents following a planned TAVR on 7/15/24. Post-operative course complicated by R femoral artery injury requiring a R femoral artery repair.  Additional factors influencing patient status / progress (ie: cognitive factors, co-morbidities, social support, etc): HTN, HLD, CKD3, ROBYN, COPD, and aortic stenosis.    Pt limited during ambulation tasks due to fatigue and c/o dizziness and lightheadedness. Pt demonstrates good potential for progression including appropriate insight and well managed pain. Pt has a supportive family that lives in the area and lives w/ a roommate, however her roommate will be unable to provide assistance upon d/c. Pt is motivated to return to her PLOF.    Plan  Recommend Physical Therapy  minutes per day 5-7 days per week for 12-14 days for the following treatments:  PT Gait Training, PT Self Care/Home Eval, PT Therapeutic Exercises, PT TENS  Application, PT Neuro Re-Ed/Balance, PT Aquatic Therapy, PT Therapeutic Activity, PT Manual Therapy, and PT Evaluation.    Passport items to be completed:  Get in/out of bed safely, in/out of a vehicle, safely use mobility device, walk or wheel around home/community, navigate up and down stairs, show how to get up/down from the ground, ensure home is accessible, demonstrate HEP, complete caregiver training    Goals:  Long term and short term goals have been discussed with patient and they are in agreement.    Physical Therapy Problems (Active)       Problem: Mobility       Dates: Start:  07/24/24         Goal: STG-Within one week, patient will ambulate 100ft w/ FWW and SBA       Dates: Start:  07/24/24            Goal: STG-Within one week, patient will ambulate up/down a curb w/ FWW and SPV       Dates: Start:  07/24/24               Problem: Mobility Transfers       Dates: Start:  07/24/24         Goal: STG-Within one week, patient will transfer bed to chair w/ FWW and SPV       Dates: Start:  07/24/24               Problem: PT-Long Term Goals       Dates: Start:  07/24/24         Goal: LTG-By discharge, patient will ambulate 250ft w/ LRAD and Jose G       Dates: Start:  07/24/24            Goal: LTG-By discharge, patient will independently perform home exercise program       Dates: Start:  07/24/24            Goal: LTG-By discharge, patient will transfer in/out of a car w/ FWW and Jose G       Dates: Start:  07/24/24

## 2024-07-24 NOTE — THERAPY
"Occupational Therapy  Daily Treatment     Patient Name: Erin Hess  Age:  84 y.o., Sex:  female  Medical Record #: [id number]  Today's Date: 7/24/2024     Precautions  Precautions: Fall Risk, Other (See Comments)  Comments: Avoid heavy lifting (>5 pounds), excessive bending, stretching, pushing, or pulling for two weeks (sx: 07/15) after your procedure, per post-op protocol. 02 2L PM, 2L 02 PRN AM         Subjective  Pt supine in bed upon arrival, agreeable to OT session.      Objective     07/24/24 1431   OT Charge Group   OT Self Care / ADL (Units) 2   OT Total Time Spent   OT Individual Total Time Spent (Mins) 30   Functional Level of Assist   Grooming Seated;Standby Assist  (to wash hands, w/c level)   Upper Body Dressing Stand by Assist  (donned/doffed bra and shirt with set-up assist from EOB)   Toileting Contact Guard Assist  (BM; pt completed hygiene in standing; CGA for balance)   Toileting Description Grab bar;Increased time;Set-up of equipment;Supervision for safety;Verbal cueing   Bed, Chair, Wheelchair Transfer Contact Guard Assist  (EOB > w/c via FWW)   Bed Chair Wheelchair Transfer Description Adaptive equipment;Increased time;Set-up of equipment   Toilet Transfers Contact Guard Assist  (w/c <> commode via GB)   Toilet Transfer Description Grab bar;Adaptive equipment;Increased time;Set-up of equipment;Supervision for safety   Bed Mobility    Supine to Sit Standby Assist   Interdisciplinary Plan of Care Collaboration   IDT Collaboration with  Physical Therapist   Collaboration Comments txfr of care to PT     Reviewed post-op protocol precautions with pt, she endorsed that she has never heard of said precautions. Reviewed that she should avoid any sharp pain as indicator of \"excessive\" movement. Pt verbalized understanding.       Assessment  Pt tolerated session well with improvements in fxl level of independence for UB dressing compared to last OT assessment (min A --> SBA). She completed transfer to " toilet with GB on this date but demo'd readiness to progress to FWW level txfrs.   Strengths: Able to follow instructions, Adequate strength, Alert and oriented, Good insight into deficits/needs, Independent prior level of function, Manages pain appropriately, Motivated for self care and independence, Supportive family, Pleasant and cooperative, Willingly participates in therapeutic activities  Barriers: Fatigue, Impaired activity tolerance, Impaired balance, Limited mobility    Plan  I/ADLs with standing as tolerated    DME  OT DME Recommendations  Bathroom Equipment:  (shower chair, GB)  Additional Equipment:  (TBD based on pt progress)    Passport items to be completed:  Perform bathroom transfers, complete dressing, complete feeding, get ready for the day, prepare a simple meal, participate in household tasks, adapt home for safety needs, demonstrate home exercise program, complete caregiver training     Occupational Therapy Goals (Active)       Problem: Bathing       Dates: Start:  07/24/24         Goal: STG-Within one week, patient will bathe at SPV level using LRD       Dates: Start:  07/24/24               Problem: Dressing       Dates: Start:  07/24/24         Goal: STG-Within one week, patient will dress UB at SBA using LRD       Dates: Start:  07/24/24            Goal: STG-Within one week, patient will dress LB at SBA using LRD       Dates: Start:  07/24/24               Problem: Functional Transfers       Dates: Start:  07/24/24         Goal: STG-Within one week, patient will transfer to toilet at SPV using LRD       Dates: Start:  07/24/24            Goal: STG-Within one week, patient will transfer to step in shower at SPV using LRD       Dates: Start:  07/24/24               Problem: IADL's       Dates: Start:  07/24/24         Goal: STG-Within one week, patient will access kitchen area at SBA using LRD       Dates: Start:  07/24/24               Problem: OT Long Term Goals       Dates: Start:   07/24/24         Goal: LTG-By discharge, patient will complete basic self care tasks at \[facility]\"" to [facility] using LRD       Dates: Start:  07/24/24            Goal: LTG-By discharge, patient will perform bathroom transfers at \[facility]\"" to [facility] using LRD       Dates: Start:  07/24/24            Goal: LTG-By discharge, patient will complete basic home management at \[facility]\"" to [facility] using LRD       Dates: Start:  07/24/24

## 2024-07-24 NOTE — PROGRESS NOTES
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  Allergies: Sulfa drugs, Codeine, Oxycodone, and Vancomycin    Safety  Patient Assist   Min  Patient Precautions     Precaution Comments     Bed Transfer Status     Toilet Transfer Status      Assistive Devices     Oxygen  None - Room Air, Nasal Cannula  Diet/Therapeutic Dining  Current Diet Order   Procedures    Diet Order Diet: Cardiac     Pill Administration  whole  Agitated Behavioral Scale     ABS Level of Severity       Fall Risk  Has the patient had a fall this admission?   No  Christina Elizondo Fall Risk Scoring  20, HIGH RISK  Fall Risk Safety Measures  bed alarm and chair alarm    Vitals  Temperature: 36.6 °C (97.9 °F)  Temp src: Oral  Pulse: 60  Respiration: 16  Blood Pressure : 105/75  Blood Pressure MAP (Calculated): 85 MM HG  BP Location: Right, Upper Arm  Patient BP Position: Sitting     Oxygen  Pulse Oximetry: 91 %  O2 (LPM): 2  O2 Delivery Device: None - Room Air, Nasal Cannula    Bowel and Bladder  Last Bowel Movement  07/23/24  Stool Type  Type 4: Like a sausage or snake, smooth and soft  Bowel Device  Bathroom  Continent  Bladder: Continent void   Bowel: Continent movement  Bladder Function  Number of Times Voided: 1  Urine Color: Unable To Evaluate  Genitourinary Assessment   Bladder Assessment (WDL):  Within Defined Limits  Kennedy Catheter: Not Applicable  Urine Color: Unable To Evaluate  Bladder Device: Bathroom  Bladder Scan: Post Void  $ Bladder Scan Results (mL): 5    Skin  Delfino Score   17  Sensory Interventions   Bed Types: Standard/Trauma Mattress with Overlay  Skin Preventative Measures: Waffle Overlay, Pillows in Use for Support / Positioning  Moisture Interventions  Moisturizers/Barriers: Moisturizer       Pain  Pain Rating Scale  0 - No  Pain  Pain Location     Pain Location Orientation     Pain Interventions   Declines    ADLs    Bathing      Linen Change      Personal Hygiene     Chlorhexidine Bath      Oral Care     Teeth/Dentures     Shave     Nutrition Percentage Eaten  *  * Meal *  *, Lunch, Between 50-75% Consumed (0%)  Environmental Precautions     Patient Turns/Positioning  Patient Turns Self from Side to Side  Patient Turns Assistance/Tolerance     Bed Positions     Head of Bed Elevated         Psychosocial/Neurologic Assessment  Psychosocial Assessment  Psychosocial (WDL):  Within Defined Limits  Neurologic Assessment  Neuro (WDL): Exceptions to WDL  Level of Consciousness: Alert  Orientation Level: Oriented X4  Cognition: Follows commands, Appropriate attention/concentration  Speech: Clear  EENT (WDL):  WDL Except    Cardio/Pulmonary Assessment  Edema   RLE Edema: 1+  LLE Edema: 1+  Respiratory Breath Sounds  RUL Breath Sounds: Diminished, Clear  RML Breath Sounds: Diminished, Clear  RLL Breath Sounds: Diminished  KUNAL Breath Sounds: Diminished, Clear  LLL Breath Sounds: Diminished, Fine Crackles  Cardiac Assessment   Cardiac (WDL):  WDL Except (TAVR, HF, Afib)

## 2024-07-24 NOTE — PROGRESS NOTES
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  Allergies: Sulfa drugs, Codeine, Oxycodone, and Vancomycin    Safety  Patient Assist     Patient Precautions     Precaution Comments     Bed Transfer Status     Toilet Transfer Status      Assistive Devices     Oxygen  Nasal Cannula  Diet/Therapeutic Dining  Current Diet Order   Procedures    Diet Order Diet: Cardiac     Pill Administration  whole  Agitated Behavioral Scale     ABS Level of Severity       Fall Risk  Has the patient had a fall this admission?   No  Christina Elizondo Fall Risk Scoring  18, HIGH RISK  Fall Risk Safety Measures  bed alarm and chair alarm    Vitals  Temperature: 36.6 °C (97.9 °F)  Temp src: Oral  Pulse: 60  Respiration: 16  Blood Pressure : 105/75  Blood Pressure MAP (Calculated): 85 MM HG  BP Location: Right, Upper Arm  Patient BP Position: Sitting     Oxygen  Pulse Oximetry: 91 %  O2 (LPM): 2  O2 Delivery Device: Nasal Cannula    Bowel and Bladder  Last Bowel Movement  07/23/24  Stool Type  Type 6: Fluffy pieces with ragged edges, a mushy stool  Bowel Device  Bathroom  Continent  Bladder: Continent void   Bowel: Continent movement  Bladder Function  Number of Times Voided: 1  Urine Color: Yellow  Genitourinary Assessment   Bladder Assessment (WDL):  Within Defined Limits  Kennedy Catheter: Not Applicable  Urine Color: Yellow  Bladder Device: Bathroom  Time Void: No  Bladder Scan: Post Void  $ Bladder Scan Results (mL): 1    Skin  Delfino Score   17  Sensory Interventions   Bed Types: Standard/Trauma Mattress with Overlay  Skin Preventative Measures: Waffle Overlay, Pillows in Use for Support / Positioning  Moisture Interventions  Moisturizers/Barriers: Barrier Wipes      Pain  Pain Rating Scale  3 - Sometimes distracts me  Pain Location      Pain Location Orientation     Pain Interventions   Medication (see MAR)    ADLs    Bathing      Linen Change      Personal Hygiene     Chlorhexidine Bath      Oral Care     Teeth/Dentures     Shave     Nutrition Percentage Eaten  *  * Meal *  *, Dinner  Environmental Precautions     Patient Turns/Positioning  Patient Turns Self from Side to Side  Patient Turns Assistance/Tolerance     Bed Positions  Bed Controls On, Bed Locked  Head of Bed Elevated  Self regulated      Psychosocial/Neurologic Assessment  Psychosocial Assessment  Psychosocial (WDL):  Within Defined Limits  Neurologic Assessment  Neuro (WDL): Exceptions to WDL  Level of Consciousness: Alert  Orientation Level: Oriented X4  Cognition: Follows commands, Appropriate attention/concentration  Speech: Clear  EENT (WDL):  WDL Except    Cardio/Pulmonary Assessment  Edema   RLE Edema: 1+  LLE Edema: 1+  Respiratory Breath Sounds  RUL Breath Sounds: Diminished  RML Breath Sounds: Diminished  RLL Breath Sounds: Diminished  KUNAL Breath Sounds: Diminished  LLL Breath Sounds: Diminished  Cardiac Assessment   Cardiac (WDL):  WDL Except (TAVR, HF, Afib)

## 2024-07-24 NOTE — FLOWSHEET NOTE
07/24/24 0641   Events/Summary/Plan   Events/Summary/Plan SVN   Vital Signs   Pulse (!) 57  (post 55)   Respiration 16  (post 16)   Pulse Oximetry (!) 86 %   $ Pulse Oximetry (Spot Check) Yes   Respiratory Assessment   Level of Consciousness Alert   Chest Exam   Work Of Breathing / Effort Within Normal Limits   Breath Sounds   RUL Breath Sounds Diminished  (increased aeration T/O)   RML Breath Sounds Diminished   RLL Breath Sounds Diminished   KUNAL Breath Sounds Diminished   LLL Breath Sounds Diminished   Secretions   Cough Productive   How Sputum Obtained Expectorated;Spontaneous   Sputum Amount Unable to Evaluate   Sputum Color Unable to Evaluate   Sputum Consistency Unable to Evaluate   Oxygen   O2 Delivery Device Room air w/o2 available  (placed on 2L after svn)

## 2024-07-24 NOTE — THERAPY
"Occupational Therapy   Initial Evaluation     Patient Name: Erin Hess  Age:  84 y.o., Sex:  female  Medical Record #: [id number]  Today's Date: 7/24/2024     Subjective    Pt encountered for OT supine in bed, awake, pleasant and agreeable to participate. Pt indicates, \"I am ready to do whatever I need to do to get back home.\"     Objective       07/24/24 0701   OT Charge Group   Charges Yes   OT Self Care / ADL (Units) 1   OT Evaluation OT Evaluation Low   OT Total Time Spent   OT Individual Total Time Spent (Mins) 60   Prior Living Situation   Prior Services Home-Independent   Housing / Facility 1 Story House   Bathroom Set up Walk In Shower;Shower Glass Doors;Grab Bars;Shower Chair   Equipment Owned Oxygen;Grab Bar(s) In Tub / Shower;Tub / Shower Seat   Lives with - Patient's Self Care Capacity Unrelated Adult   Prior Level of ADL Function   Self Feeding Independent   Grooming / Hygiene Independent   Bathing Independent   Dressing Independent   Toileting Independent   Prior Level of IADL Function   Medication Management Independent   Laundry Independent   Kitchen Mobility Independent   Finances Independent   Home Management Independent   Shopping Independent   Prior Level Of Mobility Independent Without Device in Community;Independent Without Device in Home   Driving / Transportation Driving Independent   Occupation (Pre-Hospital Vocational) Retired Due To Age   Prior Functioning: Everyday Activities   Self Care Independent   Indoor Mobility (Ambulation) Independent   Stairs Independent   Functional Cognition Independent   Cognitive Pattern Assessment   Cognitive Pattern Assessment Used BIMS   Brief Interview for Mental Status (BIMS)   Repetition of Three Words (First Attempt) 3   Temporal Orientation: Year Correct   Temporal Orientation: Month Accurate within 5 days   Temporal Orientation: Day Correct   Recall: \"Sock\" Yes, no cue required   Recall: \"Blue\" Yes, no cue required   Recall: \"Bed\" Yes, after cueing " "(\"a piece of furniture\")   BIMS Summary Score 14   Confusion Assessment Method (CAM)   Is there evidence of an acute change in mental status from the patient's baseline? No   Inattention Behavior not present   Disorganized thinking Behavior not present   Altered level of consciousness Behavior not present   Bed Mobility    Supine to Sit Standby Assist   Sit to Stand Contact Guard Assist   Scooting Standby Assist   Hearing, Speech, and Vision   Ability to Hear Adequate   Ability to See in Adequate Light Adequate   Expression of Ideas and Wants Without difficulty   Understanding Verbal and Non-Verbal Content Understands   Functional Level of Assist   Bathing Minimal Assist   Bathing Description Adaptive equipment;Assit with back;Assit with perineal;Increased time;Supervision for safety  (assistance for standing balance during perineal hygiene)   Upper Body Dressing Minimal Assist   Upper Body Dressing Description Assist with closures;Increased time;Set-up of equipment;Supervision for safety  (Assistance to fasten bra, only)   Lower Body Dressing Minimal Assist   Lower Body Dressing Description Grab bar;Increased time;Set-up of equipment;Supervision for safety;Verbal cueing  (Assistance to don R sock d/t limited mobility and 50% assistance to pull up tighly fitted underwear in standing)   Bed, Chair, Wheelchair Transfer Contact Guard Assist   Bed Chair Wheelchair Transfer Description Adaptive equipment;Increased time;Set-up of equipment;Supervision for safety;Verbal cueing  (stand step from bed <> WC using armrests)   Tub / Shower Transfers Contact Guard Assist   Tub Shower Transfer Description Grab bar;Shower bench;Set-up of equipment;Supervision for safety;Verbal cueing  (Stand step from WC <> shower bench using GB)   Eating   Assistance Needed Independent   Physical Assistance Level No physical assistance   CARE Score - Eating 6   Eating Discharge Goal   Discharge Goal 6   Oral Hygiene   Assistance Needed " Independent   Physical Assistance Level No physical assistance   CARE Score - Oral Hygiene 6   Oral Hygiene Discharge Goal   Discharge Goal 6   Shower/Bathe Self   Assistance Needed Physical assistance   Physical Assistance Level 25% or less   CARE Score - Shower/Bathe Self 3   Shower/Bathe Self Discharge Goal   Discharge Goal 6   Upper Body Dressing   Assistance Needed Physical assistance   Physical Assistance Level 25% or less   CARE Score - Upper Body Dressing 3   Upper Body Dressing Discharge Goal   Discharge Goal 6   Lower Body Dressing   Assistance Needed Physical assistance   Physical Assistance Level 25% or less   CARE Score - Lower Body Dressing 3   Lower Body Dressing Discharge Goal   Discharge Goal 6   Putting On/Taking Off Footwear   Assistance Needed Physical assistance   Physical Assistance Level 25% or less   CARE Score - Putting On/Taking Off Footwear 3   Putting On/Taking Off Footwear Discharge Goal   Discharge Goal 6   Toileting Hygiene   Assistance Needed Physical assistance   Physical Assistance Level 25% or less   CARE Score - Toileting Hygiene 3   Toileting Hygiene Discharge Goal   Discharge Goal 6   Toilet Transfer   Assistance Needed Incidental touching;Verbal cues;Supervision   Physical Assistance Level No physical assistance   CARE Score - Toilet Transfer 4   Toilet Transfer Discharge Goal   Discharge Goal 6   Precautions   Precautions Fall Risk;Other (See Comments)   Comments Avoid heavy lifting (>5 pounds), excessive bending, stretching, pushing, or pulling for two weeks (sx: 07/15) after your procedure, per post-op protocol   Current Discharge Plan   Current Discharge Plan Return to Prior Living Situation   Interdisciplinary Plan of Care Collaboration   IDT Collaboration with  Nursing;Certified Nursing Assistant   Patient Position at End of Therapy Seated;Chair Alarm On;Other (Comments)  (in cafeteria for breakfast)   Collaboration Comments CLOF/POC   OT DME Recommendations   Bathroom  "Equipment   (shower chair, GB)   Additional Equipment   (TBD based on pt progress)   Strengths & Barriers   Strengths Able to follow instructions;Adequate strength;Alert and oriented;Good insight into deficits/needs;Independent prior level of function;Manages pain appropriately;Motivated for self care and independence;Supportive family;Pleasant and cooperative;Willingly participates in therapeutic activities   Barriers Fatigue;Impaired activity tolerance;Impaired balance;Limited mobility   Occupational Therapist Assigned   Assigned OT / Treatment Time / Comments JS /  min     OTR educated pt on: \"Passport to [place]\" program; use of call light; and expectations for rehab and the role of OT. OTR answered any questions pt had about pt's stay and educated pt on expectations at rehab.     Assessment  Patient is 84 y.o. female with a diagnosis of s/p TAVR (transcatheter aortic valve replacement) on 7/15/24 with Dr. Oconnor.  Post-operative course was complicated by right femoral artery injury and Vascular surgery was consulted. Patient underwent right femoral artery repair with Dr. Swan on 7/15/24 and patient was admitted to ICU. Patient did require transfusion for anemia and midodrine for pressure support.     Additional factors influencing patient status / progress (ie: cognitive factors, co-morbidities, social support, etc): HTN, HLD, CKD3, ROBYN, COPD, and aortic stenosis.      Pt seen for OT eval. Patient with fair tolerance to self-care tasks at this time limited by impaired activity tolerance/endurance, generalized weakness/deconditioning, impaired balance, and impaired mobility which is below her baseline. Pt lives in a 1 story home with roommate. She was previously independent in all I/ADLs, with intermittent assistance for cooking tasks via roomate. At the time of this eval, pt presents overall at [facility] for ADLs. Pt would benefit from ongoing skilled OT services to address the above areas of concern and " to increase independence with ADLs and functional mobility to progress towards a functional baseline for safe DC home.      Plan  Recommend Occupational Therapy  minutes per day 5-7 days per week for 2 weeks for the following treatments:  OT Self Care/ADL, OT Neuro Re-Ed/Balance, OT Therapeutic Activity, and OT Therapeutic Exercise.    Passport items to be completed:  Perform bathroom transfers, complete dressing, complete feeding, get ready for the day, prepare a simple meal, participate in household tasks, adapt home for safety needs, demonstrate home exercise program, complete caregiver training     Goals:  Long term and short term goals have been discussed with patient and they are in agreement.    Occupational Therapy Goals (Active)       Problem: Bathing       Dates: Start:  07/24/24         Goal: STG-Within one week, patient will bathe       Dates: Start:  07/24/24               Problem: Dressing       Dates: Start:  07/24/24         Goal: STG-Within one week, patient will dress UB       Dates: Start:  07/24/24            Goal: STG-Within one week, patient will dress LB       Dates: Start:  07/24/24               Problem: Functional Transfers       Dates: Start:  07/24/24         Goal: STG-Within one week, patient will transfer to toilet       Dates: Start:  07/24/24            Goal: STG-Within one week, patient will transfer to step in shower       Dates: Start:  07/24/24               Problem: IADL's       Dates: Start:  07/24/24         Goal: STG-Within one week, patient will access kitchen area       Dates: Start:  07/24/24               Problem: OT Long Term Goals       Dates: Start:  07/24/24         Goal: LTG-By discharge, patient will complete basic self care tasks       Dates: Start:  07/24/24            Goal: LTG-By discharge, patient will perform bathroom transfers       Dates: Start:  07/24/24            Goal: LTG-By discharge, patient will complete basic home management       Dates: Start:   07/24/24

## 2024-07-24 NOTE — CARE PLAN
Problem: Pain - Standard  Goal: Alleviation of pain or a reduction in pain to the patient’s comfort goal  Outcome: Progressing     Problem: Fall Risk - Rehab  Goal: Patient will remain free from falls  Outcome: Progressing     The patient is Stable - Low risk of patient condition declining or worsening    Shift Goals  Clinical Goals: Safety  Patient Goals: safety; participate w/ therapy

## 2024-07-25 ENCOUNTER — DOCUMENTATION (OUTPATIENT)
Dept: CARDIOLOGY | Facility: MEDICAL CENTER | Age: 84
End: 2024-07-25
Payer: MEDICARE

## 2024-07-25 ENCOUNTER — APPOINTMENT (OUTPATIENT)
Dept: PHYSICAL THERAPY | Facility: REHABILITATION | Age: 84
DRG: 949 | End: 2024-07-25
Attending: PHYSICAL MEDICINE & REHABILITATION
Payer: MEDICARE

## 2024-07-25 ENCOUNTER — APPOINTMENT (OUTPATIENT)
Dept: OCCUPATIONAL THERAPY | Facility: REHABILITATION | Age: 84
DRG: 949 | End: 2024-07-25
Attending: PHYSICAL MEDICINE & REHABILITATION
Payer: MEDICARE

## 2024-07-25 PROBLEM — D64.9 ANEMIA: Status: ACTIVE | Noted: 2024-07-25

## 2024-07-25 PROCEDURE — 770010 HCHG ROOM/CARE - REHAB SEMI PRIVAT*

## 2024-07-25 PROCEDURE — 700101 HCHG RX REV CODE 250: Performed by: PHYSICAL MEDICINE & REHABILITATION

## 2024-07-25 PROCEDURE — 97530 THERAPEUTIC ACTIVITIES: CPT

## 2024-07-25 PROCEDURE — 700102 HCHG RX REV CODE 250 W/ 637 OVERRIDE(OP): Performed by: PHYSICAL MEDICINE & REHABILITATION

## 2024-07-25 PROCEDURE — 97116 GAIT TRAINING THERAPY: CPT | Mod: CQ

## 2024-07-25 PROCEDURE — A9270 NON-COVERED ITEM OR SERVICE: HCPCS | Performed by: PHYSICAL MEDICINE & REHABILITATION

## 2024-07-25 PROCEDURE — 97535 SELF CARE MNGMENT TRAINING: CPT

## 2024-07-25 PROCEDURE — 94760 N-INVAS EAR/PLS OXIMETRY 1: CPT

## 2024-07-25 PROCEDURE — 99223 1ST HOSP IP/OBS HIGH 75: CPT | Mod: AI | Performed by: HOSPITALIST

## 2024-07-25 PROCEDURE — 94640 AIRWAY INHALATION TREATMENT: CPT

## 2024-07-25 PROCEDURE — 99233 SBSQ HOSP IP/OBS HIGH 50: CPT | Performed by: PHYSICAL MEDICINE & REHABILITATION

## 2024-07-25 PROCEDURE — 97110 THERAPEUTIC EXERCISES: CPT

## 2024-07-25 PROCEDURE — 97530 THERAPEUTIC ACTIVITIES: CPT | Mod: CQ

## 2024-07-25 RX ORDER — BENZOCAINE/MENTHOL 6 MG-10 MG
LOZENGE MUCOUS MEMBRANE 2 TIMES DAILY PRN
Status: DISCONTINUED | OUTPATIENT
Start: 2024-07-25 | End: 2024-08-02 | Stop reason: HOSPADM

## 2024-07-25 RX ADMIN — SPIRONOLACTONE 25 MG: 25 TABLET ORAL at 06:23

## 2024-07-25 RX ADMIN — ROSUVASTATIN CALCIUM 20 MG: 10 TABLET, FILM COATED ORAL at 18:16

## 2024-07-25 RX ADMIN — AMIODARONE HYDROCHLORIDE 400 MG: 200 TABLET ORAL at 06:22

## 2024-07-25 RX ADMIN — ASPIRIN 81 MG: 81 TABLET, COATED ORAL at 09:16

## 2024-07-25 RX ADMIN — VALSARTAN 40 MG: 80 TABLET, FILM COATED ORAL at 06:23

## 2024-07-25 RX ADMIN — ALBUTEROL SULFATE 2.5 MG: 2.5 SOLUTION RESPIRATORY (INHALATION) at 06:30

## 2024-07-25 RX ADMIN — TRAMADOL HYDROCHLORIDE 50 MG: 50 TABLET ORAL at 20:08

## 2024-07-25 RX ADMIN — TRAZODONE HYDROCHLORIDE 50 MG: 50 TABLET ORAL at 20:08

## 2024-07-25 RX ADMIN — HYDROCHLOROTHIAZIDE 6.25 MG: 25 TABLET ORAL at 06:23

## 2024-07-25 RX ADMIN — FLUTICASONE FUROATE, UMECLIDINIUM BROMIDE AND VILANTEROL TRIFENATATE 1 PUFF: 200; 62.5; 25 POWDER RESPIRATORY (INHALATION) at 06:36

## 2024-07-25 RX ADMIN — PAROXETINE HYDROCHLORIDE 10 MG: 20 TABLET, FILM COATED ORAL at 09:16

## 2024-07-25 RX ADMIN — OMEPRAZOLE 20 MG: 20 CAPSULE, DELAYED RELEASE ORAL at 09:17

## 2024-07-25 RX ADMIN — ANASTROZOLE 1 MG: 1 TABLET, COATED ORAL at 09:20

## 2024-07-25 RX ADMIN — AMIODARONE HYDROCHLORIDE 400 MG: 200 TABLET ORAL at 18:16

## 2024-07-25 ASSESSMENT — PATIENT HEALTH QUESTIONNAIRE - PHQ9
SUM OF ALL RESPONSES TO PHQ9 QUESTIONS 1 AND 2: 0
1. LITTLE INTEREST OR PLEASURE IN DOING THINGS: NOT AT ALL
2. FEELING DOWN, DEPRESSED, IRRITABLE, OR HOPELESS: NOT AT ALL

## 2024-07-25 ASSESSMENT — GAIT ASSESSMENTS
DEVIATION: BRADYKINETIC;DECREASED HEEL STRIKE
DEVIATION: BRADYKINETIC
GAIT LEVEL OF ASSIST: CONTACT GUARD ASSIST
DISTANCE (FEET): 95
ASSISTIVE DEVICE: FRONT WHEEL WALKER
GAIT LEVEL OF ASSIST: CONTACT GUARD ASSIST
ASSISTIVE DEVICE: FRONT WHEEL WALKER
DISTANCE (FEET): 100

## 2024-07-25 ASSESSMENT — ACTIVITIES OF DAILY LIVING (ADL)
TUB_SHOWER_TRANSFER_DESCRIPTION: GRAB BAR;SHOWER BENCH;INCREASED TIME;SET-UP OF EQUIPMENT;SUPERVISION FOR SAFETY
BED_CHAIR_WHEELCHAIR_TRANSFER_DESCRIPTION: ADAPTIVE EQUIPMENT
TOILET_TRANSFER_DESCRIPTION: GRAB BAR;INCREASED TIME;VERBAL CUEING
BED_CHAIR_WHEELCHAIR_TRANSFER_DESCRIPTION: ADAPTIVE EQUIPMENT;SET-UP OF EQUIPMENT;INCREASED TIME;SUPERVISION FOR SAFETY

## 2024-07-25 ASSESSMENT — PAIN DESCRIPTION - PAIN TYPE: TYPE: ACUTE PAIN

## 2024-07-25 NOTE — PROGRESS NOTES
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  Allergies: Sulfa drugs, Codeine, Oxycodone, and Vancomycin    Safety  Patient Assist  min  Patient Precautions  Fall Risk, Other (See Comments)  Precaution Comments  Avoid heavy lifting (>5 pounds), excessive bending, stretching, pushing, or pulling for two weeks (sx: 07/15) after your procedure, per post-op protocol. 02 2L PM, 2L 02 PRN AM  Bed Transfer Status  Contact Guard Assist  Toilet Transfer Status   Standby Assist  Assistive Devices  Rails, Wheelchair  Oxygen  Nasal Cannula  Diet/Therapeutic Dining  Current Diet Order   Procedures    Diet Order Diet: Cardiac     Pill Administration  whole  Agitated Behavioral Scale     ABS Level of Severity       Fall Risk  Has the patient had a fall this admission?   No  Christina Elizondo Fall Risk Scoring  18, HIGH RISK  Fall Risk Safety Measures  bed alarm, chair alarm, and poor balance    Vitals  Temperature: 37 °C (98.6 °F)  Temp src: Oral  Pulse: 64  Respiration: 18  Blood Pressure : 122/49  Blood Pressure MAP (Calculated): 73 MM HG  BP Location: Left, Upper Arm  Patient BP Position: Supine     Oxygen  Pulse Oximetry: 94 %  O2 (LPM): 2  O2 Delivery Device: Nasal Cannula    Bowel and Bladder  Last Bowel Movement  07/24/24  Stool Type  Type 6: Fluffy pieces with ragged edges, a mushy stool  Bowel Device  Bathroom  Continent  Bladder: Continent void   Bowel: Continent movement  Bladder Function  Urine Void (mL):  (large)  Number of Times Voided: 1  Urine Color: Yellow  Genitourinary Assessment   Bladder Assessment (WDL):  Within Defined Limits  Kennedy Catheter: Not Applicable  Urine Color: Yellow  Bladder Device: Bathroom  Time Void: Yes  Bladder Scan: Post Void  $ Bladder Scan Results (mL): 1    Skin  Delfino Score    17  Sensory Interventions   Bed Types: Standard/Trauma Mattress  Skin Preventative Measures: Pillows in Use for Support / Positioning, Waffle Overlay  Moisture Interventions  Moisturizers/Barriers: Barrier Wipes      Pain  Pain Rating Scale  0 - No Pain  Pain Location  Groin  Pain Location Orientation  Right  Pain Interventions   Rest    ADLs    Bathing      Linen Change      Personal Hygiene  Perineal Care  Chlorhexidine Bath      Oral Care  Brushed Teeth  Teeth/Dentures     Shave     Nutrition Percentage Eaten  *  * Meal *  *, Between 50-75% Consumed  Environmental Precautions     Patient Turns/Positioning  Patient Turns Self from Side to Side  Patient Turns Assistance/Tolerance     Bed Positions  Bed Controls On, Bed Locked  Head of Bed Elevated  Self regulated      Psychosocial/Neurologic Assessment  Psychosocial Assessment  Psychosocial (WDL):  Within Defined Limits  Neurologic Assessment  Neuro (WDL): Exceptions to WDL  Level of Consciousness: Alert  Orientation Level: Oriented X4  Cognition: Follows commands, Appropriate attention/concentration  Speech: Clear  EENT (WDL):  WDL Except    Cardio/Pulmonary Assessment  Edema   RLE Edema: 1+  LLE Edema: 1+  Respiratory Breath Sounds  RUL Breath Sounds: Diminished (increased aeration T/O)  RML Breath Sounds: Diminished  RLL Breath Sounds: Diminished  KUNAL Breath Sounds: Diminished  LLL Breath Sounds: Diminished  Cardiac Assessment   Cardiac (WDL):  WDL Except (TAVR, HF, Afib)

## 2024-07-25 NOTE — CARE PLAN
Problem: Dressing  Goal: STG-Within one week, patient will dress UB at [facility] using LRD  Outcome: Not Met  Goal: STG-Within one week, patient will dress LB at [facility] using LRD  Outcome: Not Met     Problem: Functional Transfers  Goal: STG-Within one week, patient will transfer to toilet at [facility] using LRD  Outcome: Not Met     Problem: Functional Transfers  Goal: STG-Within one week, patient will transfer to step in shower at [facility] using LRD  Outcome: Progressing     Problem: IADL's  Goal: STG-Within one week, patient will access kitchen area at [facility] using LRD  Outcome: Progressing     Problem: Bathing  Goal: STG-Within one week, patient will bathe at [facility] level using LRD  Outcome: Met

## 2024-07-25 NOTE — ASSESSMENT & PLAN NOTE
H&H slowly improving with Hb: 9.1 --> 9.4 (7/29)  2nd to surgery (H&H was wnl before surgery)  Monitor

## 2024-07-25 NOTE — PROGRESS NOTES
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  Allergies: Sulfa drugs, Codeine, Oxycodone, and Vancomycin    Safety  Patient Assist  sba  Patient Precautions  Fall Risk, Other (See Comments)  Precaution Comments  Avoid heavy lifting (>5 pounds), excessive bending, stretching, pushing, or pulling for two weeks (sx: 07/15) after your procedure, per post-op protocol. 02 2L PM, 2L 02 PRN AM  Bed Transfer Status  Contact Guard Assist  Toilet Transfer Status   Contact Guard Assist  Assistive Devices  Rails, Wheelchair  Oxygen  Silicone Nasal Cannula  Diet/Therapeutic Dining  Current Diet Order   Procedures    Diet Order Diet: Cardiac     Pill Administration  whole  Agitated Behavioral Scale     ABS Level of Severity       Fall Risk  Has the patient had a fall this admission?   No  Christina Elizondo Fall Risk Scoring  18, HIGH RISK  Fall Risk Safety Measures  bed alarm, chair alarm, and poor balance    Vitals  Temperature: 36.5 °C (97.7 °F)  Temp src: Oral  Pulse: 64  Respiration: 18  Blood Pressure : 125/56  Blood Pressure MAP (Calculated): 79 MM HG  BP Location: Left, Upper Arm  Patient BP Position: Supine     Oxygen  Pulse Oximetry: 94 %  O2 (LPM): 2  O2 Delivery Device: Silicone Nasal Cannula    Bowel and Bladder  Last Bowel Movement  07/24/24  Stool Type  Type 6: Fluffy pieces with ragged edges, a mushy stool  Bowel Device  Bathroom  Continent  Bladder: Continent void   Bowel: Continent movement  Bladder Function  Urine Void (mL):  (large)  Number of Times Voided: 1  Urine Color: Yellow  Genitourinary Assessment   Bladder Assessment (WDL):  Within Defined Limits  Kennedy Catheter: Not Applicable  Urine Color: Yellow  Bladder Device: Bathroom  Time Void: No  Bladder Scan: Post Void  $ Bladder Scan Results (mL):  1    Skin  Delfino Score   17  Sensory Interventions   Bed Types: Standard/Trauma Mattress  Skin Preventative Measures: Waffle Overlay, Pillows in Use for Support / Positioning  Moisture Interventions  Moisturizers/Barriers: Barrier Wipes      Pain  Pain Rating Scale  3 - Sometimes distracts me  Pain Location  Groin  Pain Location Orientation  Right  Pain Interventions   Rest    ADLs    Bathing      Linen Change      Personal Hygiene     Chlorhexidine Bath      Oral Care     Teeth/Dentures     Shave     Nutrition Percentage Eaten  *  * Meal *  *, Between 50-75% Consumed  Environmental Precautions     Patient Turns/Positioning  Patient Turns Self from Side to Side  Patient Turns Assistance/Tolerance     Bed Positions  Bed Controls On, Bed Locked  Head of Bed Elevated  Self regulated      Psychosocial/Neurologic Assessment  Psychosocial Assessment  Psychosocial (WDL):  Within Defined Limits  Neurologic Assessment  Neuro (WDL): Exceptions to WDL  Level of Consciousness: Alert  Orientation Level: Oriented X4  Cognition: Follows commands, Appropriate attention/concentration  Speech: Clear  EENT (WDL):  WDL Except    Cardio/Pulmonary Assessment  Edema   RLE Edema: 1+  LLE Edema: 1+  Respiratory Breath Sounds  RUL Breath Sounds: Diminished  RML Breath Sounds: Diminished  RLL Breath Sounds: Diminished  KUNAL Breath Sounds: Diminished  LLL Breath Sounds: Diminished  Cardiac Assessment   Cardiac (WDL):  WDL Except (TAVR, HF, Afib)

## 2024-07-25 NOTE — FLOWSHEET NOTE
07/25/24 0628   Events/Summary/Plan   Events/Summary/Plan svn and mdi   Vital Signs   Pulse (!) 52  (post 68)   Respiration 16  (post 16)   Pulse Oximetry 96 %   $ Pulse Oximetry (Spot Check) Yes   Respiratory Assessment   Level of Consciousness Alert   Chest Exam   Work Of Breathing / Effort Within Normal Limits   Breath Sounds   RUL Breath Sounds Diminished  (increased aeration T/O)   RML Breath Sounds Diminished   RLL Breath Sounds Diminished   KUNAL Breath Sounds Diminished   LLL Breath Sounds Diminished   Secretions   Cough Productive   How Sputum Obtained Expectorated;Spontaneous  (suctioned her mouth with yaunkauer)   Sputum Amount Small   Sputum Color White   Sputum Consistency Thick   Oxygen   O2 (LPM) 2   O2 Delivery Device Silicone Nasal Cannula

## 2024-07-25 NOTE — THERAPY
Recreational Therapy   Initial Evaluation     Patient Name: Erin Hess  Age:  84 y.o., Sex:  female  Medical Record #: [id number]  Today's Date: 7/25/2024     Subjective    Patient was willing to meet for an unscheduled session to participate in the assessment interview.     Objective       07/25/24 1448   Procedural Tracking   Procedural Tracking Community Re-Integration;Leisure Skills Awareness   Treatment Time   Total Time Spent (mins) 15   Leisure History   Leisure Interests Reading;Television;Other (Comments);Family  (word games, scrabble, daniela, etc.)   Pre-Morbid Leisure Lifestyle Individual;Sedentary;Group   Functional Ability Status - Physical   Endurance Low   Functional Ability Status - Cognitive   Attention Span Remains on Task   Comprehension Follows Three Step Commands   Judgment Able to Make Independent Decisions   Functional Ability Status - Emotional    Affect Appropriate   Mood Appropriate   Behavior Appropriate   Leisure Competence Measure   Leisure Awareness Independent   Leisure Attitude Independent   Leisure Skills Independent   Cultural / Social Behaviors Independent   Interpersonal Skills Independent   Community Integration Skills Independent   Social Contact Independent   Community Participation Independent   Clinical Impression   Clinical Impression Impaired Gross Motor Leisure Functioning;Impaired Endurance   Current Discharge Plan   Current Discharge Plan Return to Prior Living Situation   Benefit    Benefit Patient would Benefit from Inpatient Recreational Therapy to Maximize Independent Leisure Functioning    Interdisciplinary Plan of Care Collaboration   IDT Collaboration with  Family / Caregiver   Patient Position at End of Therapy In Bed   Collaboration Comments sister in session   Strengths & Barriers   Strengths Able to follow instructions;Alert and oriented;Good insight into deficits/needs;Independent prior level of function;Motivated for self care and independence;Supportive  "family;Pleasant and cooperative;Willingly participates in therapeutic activities   Barriers Limited mobility;Generalized weakness;Decreased endurance         Assessment  Patient is 84 y.o. female with a diagnosis of S/P TAVR (transcatheter aortic valve replacement) .  Additional factors influencing patient status / progress (ie: cognitive factors, co-morbidities, social support, etc): Patient has a supportive sister and family.      Per H&P, patient has a past medical history significant for HTN, HLD, CKD3, ROBYN, COPD, and aortic stenosis admitted to [facility] on 7/15/2024 5:18 AM with planned TAVR. Patient underwent TAVR on 7/15/24 with Dr. Oconnor. Post-operative course was complicated by right femoral artery injury and Vascular surgery was consulted. Patient underwent right femoral artery repair with Dr. Swan on 7/15/24 and patient was admitted to ICU. Patient did require transfusion for anemia and midodrine for pressure support. Patient has since been stabilized and moved to the floor.       Patient enjoys watching TV, is \"an avid reader\", spends time at her cabin and rides her Quad over to her sister's cabin for dinner once a week, plays word games, does embroidery and daniela and spends time with her family. She admits that she does not do any physical activities . When asked how she [place], she said she has Gin at 5:00. Encouraged to stay away from alcohol while she is healing and asked if she had other coping skills. She said she doesn't get upset often but did say that prayer is helpful.  She reports that she is working on getting stronger and her endurance. She was agreeable to work on standing endurance while participating in some leisure activities.     Plan  Recommend Recreational Therapy 30-60 minutes per day  2-3  days per week for 1 weeks for the following treatments:  Gross Motor Functional Leisure Skills    Passport items to be completed:  Verbalize two positive leisure activities, " discuss returning to work, hobbies, community groups or volunteer activities, explore community resources     Goals:  Long term and short term goals have been discussed with patient and they are in agreement.    Recreation Therapy Problems (Active)       Problem: Recreation Therapy       Dates: Start:  07/25/24         Goal: STG-Within one week, patient will demonstrate a standing tolerance, while participating in a leisure activity of her choice, for 15 minutes with one break or less.        Dates: Start:  07/25/24            Goal: LTG-By discharge, patient will demonstrate a standing tolerance, while participating in a leisure activity of her choice, for 25 minutes with one break or less.        Dates: Start:  07/25/24

## 2024-07-25 NOTE — CARE PLAN
"  Problem: Fall Risk - Rehab  Goal: Patient will remain free from falls  Outcome: Progressing   Christina Mikhail Fall risk Assessment Score: 18    High fall risk Interventions   - Bed and strip alarm   - Yellow sign by the door   - Yellow wrist band \"Fall risk\"  - Room near to the nurse station  - Do not leave patient unattended in the bathroom  - Fall risk education provided      Problem: Pain - Standard  Goal: Alleviation of pain or a reduction in pain to the patient’s comfort goal  Outcome: Progressing   Patient is able to rate pain on 0-10 scale. Complaining of pain in right foot. Medicated with PRN tylenol and ice pack. Will continue to monitor.   "

## 2024-07-25 NOTE — PROGRESS NOTES
"  Physical Medicine & Rehabilitation Progress Note    Encounter Date: 7/25/2024    Chief Complaint: As tolerated    Interval Events (Subjective):  Patient sitting up in therapy gym. She reports some hemorrhoids. Discussed PRN preparation H. Otherwise discussed will have IDT later today.     _____________________________________  Interdisciplinary Team Conference   Most recent IDT on 7/25/2024    IAndrea M.D./Ph.D., was present and led the interdisciplinary team conference on 7/25/2024.  I led the IDT conference and agree with the IDT conference documentation and plan of care as noted below.     Nursing:  Diet Current Diet Order   Procedures    Diet Order Diet: Cardiac       Eating ADL        % of Last Meal  Oral Nutrition: *  * Meal *  *, Between 50-75% Consumed   Sleep    Bowel Last BM: 07/24/24   Bladder Continent   Barriers to Discharge Home:  Continent    Physical Therapy:  Bed Mobility CGA   Transfers Contact Guard Assist  Adaptive equipment, Set-up of equipment, Verbal cueing (stand step w/ FWW)   Mobility Contact Guard Assist   Stairs    Barriers to Discharge Home:  Endurance and fatigues    Occupational Therapy:  Grooming Seated, Standby Assist (to wash hands, w/c level)   Bathing Minimal Assist   UB Dressing Stand by Assist (donned/doffed bra and shirt with set-up assist from EOB)   LB Dressing Minimal Assist   Toileting Contact Guard Assist (BM; pt completed hygiene in standing; CGA for balance)   Shower & Transfer Roseann   Barriers to Discharge Home:  Decreased endurance   Needs grabbar       Rec Therapy:  Pending    Case Management:  Continues to work on disposition and DME needs.      Discharge Date/Disposition:  8/2/24  _____________________________________    Objective:  VITAL SIGNS: BP (!) 144/74   Pulse (!) 52 Comment: post 68  Temp 36.8 °C (98.2 °F) (Oral)   Resp 16 Comment: post 16  Ht 1.626 m (5' 4\")   Wt 83.7 kg (184 lb 8 oz)   SpO2 96%   BMI 31.67 kg/m²   Gen: " NAD  Psych: Mood and affect appropriate  CV: RRR, 0 edema  Resp: CTAB, no upper airway sounds  Abd: NTND  Neuro: AOx4, following commands  Unchanged from 7/24/24    Laboratory Values:  Recent Results (from the past 72 hour(s))   CBC WITH DIFFERENTIAL    Collection Time: 07/23/24  8:44 AM   Result Value Ref Range    WBC 6.0 4.8 - 10.8 K/uL    RBC 3.12 (L) 4.20 - 5.40 M/uL    Hemoglobin 8.8 (L) 12.0 - 16.0 g/dL    Hematocrit 28.2 (L) 37.0 - 47.0 %    MCV 90.4 81.4 - 97.8 fL    MCH 28.2 27.0 - 33.0 pg    MCHC 31.2 (L) 32.2 - 35.5 g/dL    RDW 50.3 (H) 35.9 - 50.0 fL    Platelet Count 143 (L) 164 - 446 K/uL    MPV 10.7 9.0 - 12.9 fL    Neutrophils-Polys 68.20 44.00 - 72.00 %    Lymphocytes 17.60 (L) 22.00 - 41.00 %    Monocytes 9.10 0.00 - 13.40 %    Eosinophils 3.90 0.00 - 6.90 %    Basophils 0.70 0.00 - 1.80 %    Immature Granulocytes 0.50 0.00 - 0.90 %    Nucleated RBC 0.00 0.00 - 0.20 /100 WBC    Neutrophils (Absolute) 4.06 1.82 - 7.42 K/uL    Lymphs (Absolute) 1.05 1.00 - 4.80 K/uL    Monos (Absolute) 0.54 0.00 - 0.85 K/uL    Eos (Absolute) 0.23 0.00 - 0.51 K/uL    Baso (Absolute) 0.04 0.00 - 0.12 K/uL    Immature Granulocytes (abs) 0.03 0.00 - 0.11 K/uL    NRBC (Absolute) 0.00 K/uL   Basic Metabolic Panel    Collection Time: 07/23/24  8:44 AM   Result Value Ref Range    Sodium 140 135 - 145 mmol/L    Potassium 3.9 3.6 - 5.5 mmol/L    Chloride 104 96 - 112 mmol/L    Co2 26 20 - 33 mmol/L    Glucose 167 (H) 65 - 99 mg/dL    Bun 11 8 - 22 mg/dL    Creatinine 0.87 0.50 - 1.40 mg/dL    Calcium 9.2 8.5 - 10.5 mg/dL    Anion Gap 10.0 7.0 - 16.0   ESTIMATED GFR    Collection Time: 07/23/24  8:44 AM   Result Value Ref Range    GFR (CKD-EPI) 66 >60 mL/min/1.73 m 2   CBC with Differential    Collection Time: 07/24/24  5:27 AM   Result Value Ref Range    WBC 6.1 4.8 - 10.8 K/uL    RBC 3.28 (L) 4.20 - 5.40 M/uL    Hemoglobin 9.1 (L) 12.0 - 16.0 g/dL    Hematocrit 29.3 (L) 37.0 - 47.0 %    MCV 89.3 81.4 - 97.8 fL    MCH 27.7  27.0 - 33.0 pg    MCHC 31.1 (L) 32.2 - 35.5 g/dL    RDW 50.5 (H) 35.9 - 50.0 fL    Platelet Count 154 (L) 164 - 446 K/uL    MPV 10.6 9.0 - 12.9 fL    Neutrophils-Polys 66.00 44.00 - 72.00 %    Lymphocytes 20.30 (L) 22.00 - 41.00 %    Monocytes 9.80 0.00 - 13.40 %    Eosinophils 3.10 0.00 - 6.90 %    Basophils 0.30 0.00 - 1.80 %    Immature Granulocytes 0.50 0.00 - 0.90 %    Nucleated RBC 0.00 0.00 - 0.20 /100 WBC    Neutrophils (Absolute) 4.02 1.82 - 7.42 K/uL    Lymphs (Absolute) 1.24 1.00 - 4.80 K/uL    Monos (Absolute) 0.60 0.00 - 0.85 K/uL    Eos (Absolute) 0.19 0.00 - 0.51 K/uL    Baso (Absolute) 0.02 0.00 - 0.12 K/uL    Immature Granulocytes (abs) 0.03 0.00 - 0.11 K/uL    NRBC (Absolute) 0.00 K/uL   Comp Metabolic Panel (CMP)    Collection Time: 07/24/24  5:27 AM   Result Value Ref Range    Sodium 142 135 - 145 mmol/L    Potassium 3.9 3.6 - 5.5 mmol/L    Chloride 108 96 - 112 mmol/L    Co2 25 20 - 33 mmol/L    Anion Gap 9.0 7.0 - 16.0    Glucose 88 65 - 99 mg/dL    Bun 12 8 - 22 mg/dL    Creatinine 0.98 0.50 - 1.40 mg/dL    Calcium 9.3 8.5 - 10.5 mg/dL    Correct Calcium 9.9 8.5 - 10.5 mg/dL    AST(SGOT) 14 12 - 45 U/L    ALT(SGPT) 8 2 - 50 U/L    Alkaline Phosphatase 55 30 - 99 U/L    Total Bilirubin 0.6 0.1 - 1.5 mg/dL    Albumin 3.2 3.2 - 4.9 g/dL    Total Protein 5.8 (L) 6.0 - 8.2 g/dL    Globulin 2.6 1.9 - 3.5 g/dL    A-G Ratio 1.2 g/dL   HEMOGLOBIN A1C    Collection Time: 07/24/24  5:27 AM   Result Value Ref Range    Glycohemoglobin 5.4 4.0 - 5.6 %    Est Avg Glucose 108 mg/dL   TSH with Reflex to FT4    Collection Time: 07/24/24  5:27 AM   Result Value Ref Range    TSH 2.750 0.380 - 5.330 uIU/mL   Vitamin D, 25-hydroxy (blood)    Collection Time: 07/24/24  5:27 AM   Result Value Ref Range    25-Hydroxy   Vitamin D 25 47 30 - 100 ng/mL   ESTIMATED GFR    Collection Time: 07/24/24  5:27 AM   Result Value Ref Range    GFR (CKD-EPI) 57 (A) >60 mL/min/1.73 m 2       Medications:  Scheduled Medications    Medication Dose Frequency    fluticasone-umeclidinium-vilanterol  1 Puff DAILY    senna-docusate  2 Tablet BID    [START ON 7/27/2024] amiodarone  200 mg Q DAY    amiodarone  400 mg TWICE DAILY    anastrozole  1 mg QAM    aspirin  81 mg DAILY    hydroCHLOROthiazide  6.25 mg Q DAY    omeprazole  20 mg DAILY    PARoxetine  10 mg DAILY    rosuvastatin  20 mg PM MEAL    spironolactone  25 mg QDAY    valsartan  40 mg Q DAY    albuterol  2.5 mg Daily-0800     PRN medications: hydrocortisone, Respiratory Therapy Consult, hydrALAZINE, acetaminophen, senna-docusate **AND** polyethylene glycol/lytes, docusate sodium, magnesium hydroxide, carboxymethylcellulose, mag hydrox-al hydrox-simeth, ondansetron **OR** ondansetron, traZODone, sodium chloride, traMADol    Diet:  Current Diet Order   Procedures    Diet Order Diet: Cardiac       Medical Decision Making and Plan:  TAVR - Patient with severe aortic stenosis s/p TAVR on 7/15/24 with Dr. Oconnor which was complicated by right femoral artery injury requiring repair.  -PT and OT for mobility and ADLs. Per guidelines, 15 hours per week between PT, OT and/or SLP.  -Follow-up Cardiology. Continue ASA     HTN/A fib/sCHF - Patient on Amiodarone 400 mg BID with plan to transition to 200 mg daily on 7/27 as well as HCTZ 6.25 mg, Spironolactone 25 mg and Valsartan 40 mg daily.  Very labile, consult hospitalist     HLD - Patient on Rosuvastatin 20 mg QHS     COPD/Asthma - Patient on Albuterol daily and Trelegy      Anemia - Check AM CBC - 9.1, will monitor     R groin hematoma - Wound care for right groin incision site.      Thrombocytopenia - Check AM CBC - 154, will monitor     Hyperglycemia - Check A 1c - 5.4, no need to monitor sugars     Depression - Patient on Paxil 10 m daily     Hemorrhoids - start PRN preparation H    Obesity - On admission Body mass index is 31.67 kg/m². Meets medical criteria. Dietitian to consult.      Pain - Patient on PRN Tylenol and Tramadol     Skin -  Patient at risk for skin breakdown due to debility in areas including sacrum, achilles, elbows and head in addition to other sites. Nursing to assess skin daily.      GI Ppx - Patient on Prilosec for GERD prophylaxis. Patient on Senna-docusate for constipation prophylaxis.      DVT Ppx - Patient is not cleared for AC due to recent hematoma.    ____________________________________    T. Lv Solano MD/PhD  [facility] - Physical Medicine & Rehabilitation   [facility] - Brain Injury Medicine   ____________________________________    Total time:  50 minutes. Time spent included pre-rounding review of vitals and tests, unit/floor time, face-to-face time with the patient including physical examination, care coordination, counseling of patient and/or family, ordering medications/procedures/tests, discussion with CM, PT, OT, SLP and/or other healthcare providers, and documentation in the electronic medical record. Topics discussed included discharge planning, preparation H, elevated SBP, labile HR, and consult hospitalist. Patient's case was discussed face to face with Hospitalist on [facility] floor. Patient was discussed separately in IDT today; please see details above.

## 2024-07-25 NOTE — THERAPY
Physical Therapy   Daily Treatment     Patient Name: Erin Hess  Age:  84 y.o., Sex:  female  Medical Record #: [id number]  Today's Date: 7/25/2024     Precautions  Precautions: Fall Risk, Other (See Comments)  Comments: Avoid heavy lifting (>5 pounds), excessive bending, stretching, pushing, or pulling for two weeks (sx: 07/15) after your procedure, per post-op protocol. 02 2L PM, 2L 02 PRN AM    Subjective    Pt was in bed upon arrival, agreeable to session.     Objective       07/25/24 1031   PT Charge Group   PT Therapeutic Activities (Units) 2   Supervising Physical Therapist Joaquin Ochoa   PT Total Time Spent   PT Individual Total Time Spent (Mins) 30   Vitals   O2 (LPM) 2   O2 Delivery Device Silicone Nasal Cannula   Pain 0 - 10 Group   Location Groin   Cognition    Level of Consciousness Alert   Gait Functional Level of Assist    Gait Level Of Assist Contact Guard Assist  (to SBA)   Assistive Device Front Wheel Walker   Distance (Feet) 100   # of Times Distance was Traveled 1   Deviation Bradykinetic   Transfer Functional Level of Assist   Bed, Chair, Wheelchair Transfer Contact Guard Assist   Bed Chair Wheelchair Transfer Description Adaptive equipment;Initial preparation for task;Set-up of equipment;Supervision for safety;Verbal cueing  (SPT w/ FWW)   Bed Mobility    Supine to Sit Standby Assist   Sit to Stand Contact Guard Assist   Scooting Standby Assist   Interdisciplinary Plan of Care Collaboration   IDT Collaboration with  Occupational Therapist   Patient Position at End of Therapy Seated   Collaboration Comments transition care to OT   Physical Therapist Assigned   Assigned PT / Treatment Time / Comments BOLA+Vickey     Swap out pt's w/c in rehab from 22W to 18W for better fitting and positioning.  Dr.Williams navas with TEDs for edema control, totalA donning.    Assessment    Pt tolerated session well focusing on ambulation and getting pt set up for more fitted w/c while in rehab hospital. She  demonstrated w/ BLE edema which her shoes are not fitting for now, donleigh Deric.     Strengths: Able to follow instructions, Alert and oriented, Effective communication skills, Independent prior level of function, Motivated for self care and independence, Pleasant and cooperative, Supportive family, Willingly participates in therapeutic activities  Barriers: Decreased endurance, Fatigue, Generalized weakness, Pain    Plan    Ambulation w/ FWW/ LRAD  Activity tolerance  BLE strengthening  Standing balance    DME  PT DME Recommendations  Additional Equipment:  (TBD based on pt progress)    Passport items to be completed:  Get in/out of bed safely, in/out of a vehicle, safely use mobility device, walk or wheel around home/community, navigate up and down stairs, show how to get up/down from the ground, ensure home is accessible, demonstrate HEP, complete caregiver training    Physical Therapy Problems (Active)       Problem: Mobility       Dates: Start:  07/24/24         Goal: STG-Within one week, patient will ambulate 100ft w/ FWW and SBA       Dates: Start:  07/24/24            Goal: STG-Within one week, patient will ambulate up/down a curb w/ FWW and SPV       Dates: Start:  07/24/24               Problem: Mobility Transfers       Dates: Start:  07/24/24         Goal: STG-Within one week, patient will transfer bed to chair w/ FWW and SPV       Dates: Start:  07/24/24               Problem: PT-Long Term Goals       Dates: Start:  07/24/24         Goal: LTG-By discharge, patient will ambulate 250ft w/ LRAD and Jose G       Dates: Start:  07/24/24            Goal: LTG-By discharge, patient will independently perform home exercise program       Dates: Start:  07/24/24            Goal: LTG-By discharge, patient will transfer in/out of a car w/ FWW and Jose G       Dates: Start:  07/24/24

## 2024-07-25 NOTE — ASSESSMENT & PLAN NOTE
Valsartan and HCTZ discontinued for orthostatic hypotension  Remains on Aldactone per Dr. Calloway  Outpt meds include Valsartan and HCTZ

## 2024-07-25 NOTE — PROGRESS NOTES
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  Allergies: Sulfa drugs, Codeine, Oxycodone, and Vancomycin    Safety  Patient Assist  min  Patient Precautions  Fall Risk, Other (See Comments)  Precaution Comments  Avoid heavy lifting (>5 pounds), excessive bending, stretching, pushing, or pulling for two weeks (sx: 07/15) after your procedure, per post-op protocol. 02 2L PM, 2L 02 PRN AM  Bed Transfer Status  Contact Guard Assist  Toilet Transfer Status   Contact Guard Assist  Assistive Devices  Rails, Wheelchair  Oxygen  Silicone Nasal Cannula  Diet/Therapeutic Dining  Current Diet Order   Procedures    Diet Order Diet: Cardiac     Pill Administration  whole  Agitated Behavioral Scale     ABS Level of Severity       Fall Risk  Has the patient had a fall this admission?   No  Christina Elizondo Fall Risk Scoring  18, HIGH RISK  Fall Risk Safety Measures  bed alarm and chair alarm    Vitals  Temperature: 36.8 °C (98.2 °F)  Temp src: Oral  Pulse: 60  Respiration: 19  Blood Pressure : (!) 144/74  Blood Pressure MAP (Calculated): 97 MM HG  BP Location: Right, Upper Arm  Patient BP Position: Sitting     Oxygen  Pulse Oximetry: 96 %  O2 (LPM): 2  O2 Delivery Device: Silicone Nasal Cannula    Bowel and Bladder  Last Bowel Movement  07/24/24  Stool Type  Type 6: Fluffy pieces with ragged edges, a mushy stool  Bowel Device  Bathroom  Continent  Bladder: Continent void   Bowel: Continent movement  Bladder Function  Urine Void (mL):  (large)  Number of Times Voided: 1  Urine Color: Yellow  Genitourinary Assessment   Bladder Assessment (WDL):  Within Defined Limits  Kennedy Catheter: Not Applicable  Urine Color: Yellow  Bladder Device: Bathroom  Time Void: Yes  Bladder Scan: Post Void  $ Bladder Scan Results (mL): 1    Skin  Delfino  Score   17  Sensory Interventions   Bed Types: Standard/Trauma Mattress  Skin Preventative Measures: Pillows in Use for Support / Positioning, Waffle Overlay  Moisture Interventions  Moisturizers/Barriers: Barrier Wipes      Pain  Pain Rating Scale  0 - No Pain  Pain Location  Groin  Pain Location Orientation  Right  Pain Interventions   Rest    ADLs    Bathing      Linen Change      Personal Hygiene  Perineal Care  Chlorhexidine Bath      Oral Care  Brushed Teeth  Teeth/Dentures     Shave     Nutrition Percentage Eaten  *  * Meal *  *, Between 50-75% Consumed  Environmental Precautions     Patient Turns/Positioning  Patient Turns Self from Side to Side  Patient Turns Assistance/Tolerance     Bed Positions  Bed Controls On, Bed Locked  Head of Bed Elevated  Self regulated      Psychosocial/Neurologic Assessment  Psychosocial Assessment  Psychosocial (WDL):  Within Defined Limits  Neurologic Assessment  Neuro (WDL): Exceptions to WDL  Level of Consciousness: Alert  Orientation Level: Oriented X4  Cognition: Follows commands, Appropriate attention/concentration  Speech: Clear  EENT (WDL):  WDL Except    Cardio/Pulmonary Assessment  Edema   RLE Edema: 1+  LLE Edema: 1+  Respiratory Breath Sounds  RUL Breath Sounds: Diminished  RML Breath Sounds: Diminished  RLL Breath Sounds: Diminished  KUNAL Breath Sounds: Diminished  LLL Breath Sounds: Diminished  Cardiac Assessment   Cardiac (WDL):  WDL Except (TAVR, HF, Afib)

## 2024-07-25 NOTE — THERAPY
Physical Therapy   Daily Treatment     Patient Name: Erin Hess  Age:  84 y.o., Sex:  female  Medical Record #: [id number]  Today's Date: 7/25/2024     Precautions  Precautions: Fall Risk, Other (See Comments)  Comments: Avoid heavy lifting (>5 pounds), excessive bending, stretching, pushing, or pulling for two weeks (sx: 07/15) after your procedure, per post-op protocol. 02 2L PM, 2L 02 PRN AM    Subjective    Pt seated in wc and agreeable to participate in therapy services after using restroom and brushing teeth      Objective       07/25/24 0831   PT Total Time Spent   PT Individual Total Time Spent (Mins) 60   Vitals   Pulse 98   Patient BP Position Sitting   Blood Pressure  116/54   Pulse Oximetry 94 %   O2 (LPM) 2   O2 Delivery Device Silicone Nasal Cannula   Vitals Comments Taken after short bout of ambulation, pt not symptomatic   Pain 0 - 10 Group   Location Groin   Location Orientation Right   Pain Rating Scale (NPRS) 0   Gait Functional Level of Assist    Gait Level Of Assist Contact Guard Assist   Assistive Device Front Wheel Walker   Distance (Feet) 95   # of Times Distance was Traveled 2   Deviation Bradykinetic;Decreased Heel Strike   Transfer Functional Level of Assist   Bed, Chair, Wheelchair Transfer Contact Guard Assist   Bed Chair Wheelchair Transfer Description Adaptive equipment   Toilet Transfers Standby Assist   Toilet Transfer Description Grab bar;Increased time;Verbal cueing   Sitting Lower Body Exercises   Hip Abduction 2 sets of 10;Light Resistance Theraband   Long Arc Quad 2 sets of 10;Light Resistance Theraband   Bed Mobility    Sit to Stand Contact Guard Assist   Scooting Standby Assist   Rolling Standby Assist   Interdisciplinary Plan of Care Collaboration   Patient Position at End of Therapy Seated;Chair Alarm On;Call Light within Reach;Tray Table within Reach     Sidestepping in // bars with BUE support x 4 laps no seated rest break     Assessment    Pt tolerated ambulation and  standing therex on 2L and remained saturated in the 90s, but has productive cough after exertion. Pain controlled well and good gerson over for sequencing of txs.  SBA for toileting/pants management.     Strengths: Able to follow instructions, Alert and oriented, Effective communication skills, Independent prior level of function, Motivated for self care and independence, Pleasant and cooperative, Supportive family, Willingly participates in therapeutic activities  Barriers: Decreased endurance, Fatigue, Generalized weakness, Pain    Plan    Gait training with FWW or LRAD   LE strengthening   Endurance training   Stair training  TAVR precautions     DME  PT DME Recommendations  Additional Equipment:  (TBD based on pt progress)    Passport items to be completed:  Get in/out of bed safely, in/out of a vehicle, safely use mobility device, walk or wheel around home/community, navigate up and down stairs, show how to get up/down from the ground, ensure home is accessible, demonstrate HEP, complete caregiver training    Physical Therapy Problems (Active)       Problem: Mobility       Dates: Start:  07/24/24         Goal: STG-Within one week, patient will ambulate 100ft w/ FWW and SBA       Dates: Start:  07/24/24            Goal: STG-Within one week, patient will ambulate up/down a curb w/ FWW and SPV       Dates: Start:  07/24/24               Problem: Mobility Transfers       Dates: Start:  07/24/24         Goal: STG-Within one week, patient will transfer bed to chair w/ FWW and SPV       Dates: Start:  07/24/24               Problem: PT-Long Term Goals       Dates: Start:  07/24/24         Goal: LTG-By discharge, patient will ambulate 250ft w/ LRAD and Jose G       Dates: Start:  07/24/24            Goal: LTG-By discharge, patient will independently perform home exercise program       Dates: Start:  07/24/24            Goal: LTG-By discharge, patient will transfer in/out of a car w/ FWW and Jose G       Dates: Start:   07/24/24

## 2024-07-25 NOTE — CARE PLAN
Problem: Mobility  Goal: STG-Within one week, patient will ambulate 100ft w/ FWW and SBA  Outcome: Progressing  Goal: STG-Within one week, patient will ambulate up/down a curb w/ FWW and SPV  Outcome: Progressing     Problem: Mobility Transfers  Goal: STG-Within one week, patient will transfer bed to chair w/ FWW and SPV  Outcome: Progressing     Problem: PT-Long Term Goals  Goal: LTG-By discharge, patient will ambulate 250ft w/ LRAD and Jose G  Outcome: Progressing  Goal: LTG-By discharge, patient will independently perform home exercise program  Outcome: Progressing  Goal: LTG-By discharge, patient will transfer in/out of a car w/ FWW and Jose G  Outcome: Progressing

## 2024-07-25 NOTE — DISCHARGE PLANNING
CM met with patient to update on IDT and DC date of 8/2/24.  Answered questions.  CM will continue to monitor for DC needs.

## 2024-07-25 NOTE — CONSULTS
DATE OF SERVICE:  7/25/24.    REQUESTING PHYSICIAN:  Lv Solano MD    CHIEF COMPLAINT / REASON FOR CONSULTATION:   Hypertension  Afib  Anemia    HISTORY OF PRESENT ILLNESS:  This is an 85 y/o female with a PMH significant for hypertension, ROBYN, COPD, and aortic stenosis who was admitted to [facility] on 7/15/2024 for planned TAVR.  Patient underwent TAVR on 7/15/24 with Dr. Oconnor.  Post-operative course was complicated by right femoral artery injury and Vascular surgery was consulted.  Patient underwent right femoral artery repair with Dr. Swan on 7/15/24 and patient was admitted to ICU.  Patient did require transfusion for anemia and midodrine for pressure support . Patient has since been stabilized and moved to the floor.    Because of the patient's weakness and debility, Rehab was consulted, evaluated the patient, and was deemed a good Rehab candidate.  The patient was transferred over to the Rehab facility on x7/23/24.      The patient denies fever, chills, nausea, vomiting, headaches, blurry vision, or chest pain.    REVIEW OF SYSTEMS: All review of systems are negative pre AMA and CMS criteria except for that stated in the HPI.    PAST MEDICAL HISTORY:  Past Medical History:   Diagnosis Date    Anesthesia     delayed emergence    Arthritis     Osteo    Asthma     COPD    Breast cancer (HCC)     Breath shortness     2L O2 NOC, PRN During Day    Bronchitis 2011    Cancer (HCC) 12/2012    right breast    Cataract     Bilat IOL    Chronic cough     productive, improving    Cough     Delayed emergence from general anesthesia     Dizziness 03/11/2013    Heart burn     Heart murmur     Hiatus hernia syndrome     High cholesterol     HTN (hypertension) 03/12/2013    Hypercholesterolemia     Hyperlipidemia 03/12/2013    Hypertension     Hypokalemia 03/12/2013    Indigestion     Mitral annular calcification     Pain     back, hips, knees    Pneumonia     Hx of    Pre-diabetes     Psychiatric disorder      depression    Renal disorder     CKD    Severe aortic stenosis 06/25/2024    Sleep apnea     h/o gastric sleeve lost 40 lb now not on CPAP    Snoring     Sputum production     ULCERATIVE COLITIS 03/12/2013    Urinary incontinence     Stress incontinence    Vertigo 03/11/2013       PAST SURGICAL HISTORY:  Past Surgical History:   Procedure Laterality Date    TRANSCATHETER AORTIC VALVE REPLACEMENT Bilateral 7/15/2024    Procedure: TRANSCATHETER AORTIC VALVE REPLACEMENT;  Surgeon: Ciro Luis M.D.;  Location: SURGERY [facility];  Service: Cardiac    ECHOCARDIOGRAM, TRANSESOPHAGEAL, INTRAOPERATIVE N/A 7/15/2024    Procedure: ECHOCARDIOGRAM, TRANSESOPHAGEAL, INTRAOPERATIVE;  Surgeon: Ciro Luis M.D.;  Location: SURGERY [facility];  Service: Cardiac    FEMORAL ARTERY REPAIR Right 7/15/2024    Procedure: REPAIR, ARTERY, FEMORAL WITH PATCH;  Surgeon: Ciro Luis M.D.;  Location: SURGERY [facility];  Service: Cardiac    GASTRIC SLEEVE LAPAROSCOPY N/A 05/18/2016    Procedure: GASTRIC SLEEVE LAPAROSCOPY, HIATAL HERNIA AND LIVER BIOPSY;  Surgeon: Mauricio Montes M.D.;  Location: SURGERY [facility];  Service:     US-NEEDLE CORE BX-BREAST PANEL  01/01/2013    rt breast, with lumpectomy     CATARACT EXTRACTION WITH IOL      GYN SURGERY      vag hyster 1990    GYN SURGERY      vaginal cyst removal     LUMPECTOMY  left    OTHER       R breast bx X 2& lipoma removal       Allergies   Allergen Reactions    Sulfa Drugs Rash and Swelling     Rash, joint swelling  EUN=[id number]    Codeine Vomiting and Nausea    Oxycodone Vomiting and Nausea     .    Vancomycin Itching       CURRENT MEDICATIONS:    Current Facility-Administered Medications:     hydrocortisone    fluticasone-umeclidinium-vilanterol    Respiratory Therapy Consult    hydrALAZINE    acetaminophen    senna-docusate **AND** polyethylene glycol/lytes    docusate sodium    magnesium hydroxide    carboxymethylcellulose    mag hydrox-al hydrox-simeth    ondansetron  **OR** ondansetron    traZODone    sodium chloride    traMADol    [START ON 2024] amiodarone    amiodarone    anastrozole    aspirin    hydroCHLOROthiazide    omeprazole    PARoxetine    rosuvastatin    spironolactone    valsartan    albuterol    Social History     Socioeconomic History    Marital status:     Number of children: 6    Highest education level: 12th grade   Tobacco Use    Smoking status: Former     Current packs/day: 0.00     Average packs/day: 1 pack/day for 20.0 years (20.0 ttl pk-yrs)     Types: Cigarettes     Start date: 1966     Quit date: 1986     Years since quittin.2     Passive exposure: Past    Smokeless tobacco: Never    Tobacco comments:     Quit in    Vaping Use    Vaping status: Never Used   Substance and Sexual Activity    Alcohol use: Yes     Alcohol/week: 4.2 oz     Types: 7 Standard drinks or equivalent per week     Comment: 1 drink a day    Drug use: No    Sexual activity: Not Currently   Social History Narrative    She lives alone but has a female roommate who is 60, Leonora Rosa who has a brother in town she is estranged from. Leonora Rosa had Covid  and now has long-haulers and is on medicaid     She has 2 children and 4 stepchildren, her   '. She has a son near [place], [place] and another in [place]. She relies on her 2 grandkids who live in [place] and her 8y younger sister lives in [place], her older sister lives in a [facility] in [place]. 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  then  after 27y. Her ex  her cousin, met her 2nd  and was  for 25y.     Her son supports her keeping Leonora Rosa at her home. She does not feel taken advantage of despite no longer receiving rent.      Social Determinants of Health     Financial Resource Strain: Medium Risk (3/5/2024)    Overall Financial Resource Strain (CARDIA)     Difficulty of Paying Living Expenses: Somewhat hard   Food  Insecurity: No Food Insecurity (7/20/2024)    Hunger Vital Sign     Worried About Running Out of Food in the Last Year: Never true     Ran Out of Food in the Last Year: Never true   Transportation Needs: No Transportation Needs (7/24/2024)    PRAPARE - Transportation     Lack of Transportation (Medical): No     Lack of Transportation (Non-Medical): No   Physical Activity: Inactive (12/27/2022)    Exercise Vital Sign     Days of Exercise per Week: 0 days     Minutes of Exercise per Session: 0 min   Stress: Stress Concern Present (12/27/2022)    [place] [employer] - Occupational Stress Questionnaire     Feeling of Stress : To some extent   Social Connections: Moderately Isolated (12/27/2022)    Social Connection and Isolation Panel [NHANES]     Frequency of Communication with Friends and Family: More than three times a week     Frequency of Social Gatherings with Friends and Family: Once a week     Attends Druze Services: Never     Active Member of Clubs or Organizations: Yes     Attends Club or Organization Meetings: More than 4 times per year     Marital Status:    Intimate Partner Violence: Not At Risk (7/20/2024)    Humiliation, Afraid, Rape, and Kick questionnaire     Fear of Current or Ex-Partner: No     Emotionally Abused: No     Physically Abused: No     Sexually Abused: No   Housing Stability: Low Risk  (7/20/2024)    Housing Stability Vital Sign     Unable to Pay for Housing in the Last Year: No     Number of Places Lived in the Last Year: 1     Unstable Housing in the Last Year: No       FAMILY HISTORY:  was reviewed and is not pertinent to this consultation.    PHYSICAL EXAMINATION:  VITAL SIGNS:  Temp is 98.2, blood pressure is 118//79, heart rate is 52-83, respiratory rate is 16.  GENERAL:  Patient was lying in bed in no distress.  HEENT:  Pupils were equal, round and reactive to light and accomodation.  Oral mucosa was pink and moist.  NECK:  Soft.  Supple.  No  JVD.  HEART:  Regular rate and rhythm.  Normal S1 and S2.  No murmurs were appreciated.  LUNGS:  Are clear to auscultation bilaterally.  ABDOMEN:  Soft, non tender, non distended.  Bowels sound were positive in all four quadrants.  EXTREMITIES:  No clubbing, cyanosis.  There was noted BLE edema.  NEUROLOGIC:  Cranial nerves two through twelve were grossly intact.    LABS:  Lab Results   Component Value Date/Time    SODIUM 142 07/24/2024 05:27 AM    POTASSIUM 3.9 07/24/2024 05:27 AM    CHLORIDE 108 07/24/2024 05:27 AM    CO2 25 07/24/2024 05:27 AM    GLUCOSE 88 07/24/2024 05:27 AM    BUN 12 07/24/2024 05:27 AM    CREATININE 0.98 07/24/2024 05:27 AM      Lab Results   Component Value Date/Time    WBC 6.1 07/24/2024 05:27 AM    RBC 3.28 (L) 07/24/2024 05:27 AM    HEMOGLOBIN 9.1 (L) 07/24/2024 05:27 AM    HEMATOCRIT 29.3 (L) 07/24/2024 05:27 AM    MCV 89.3 07/24/2024 05:27 AM    MCH 27.7 07/24/2024 05:27 AM    MCHC 31.1 (L) 07/24/2024 05:27 AM    MPV 10.6 07/24/2024 05:27 AM    NEUTSPOLYS 66.00 07/24/2024 05:27 AM    LYMPHOCYTES 20.30 (L) 07/24/2024 05:27 AM    MONOCYTES 9.80 07/24/2024 05:27 AM    EOSINOPHILS 3.10 07/24/2024 05:27 AM    BASOPHILS 0.30 07/24/2024 05:27 AM    HYPOCHROMIA 1+ 05/06/2014 11:52 AM      Lab Results   Component Value Date/Time    PROTHROMBTM 14.7 (H) 07/15/2024 08:29 PM    INR 1.14 (H) 07/15/2024 08:29 PM        Common femoral artery injury, right, initial encounter  2nd to gen surgery  S/P vascular repair    Dyslipidemia  Cont Crestor    S/P TAVR (transcatheter aortic valve replacement)  Had AS -- s/p TAVR  Has B/L LE edema  BNP: 427 (7/16)  RLE US (7/22): no DVT  Cont HCTZ  Cont Aldactone  Not on any K+ supplements  Will check LLE US to r/o DVT  K+: 3.9 (7/24)  Cont to monitor    Essential hypertension  BP labile but generally ok  BP dipped lower on 7/24 to SBP 86  Cont Diovan  Note: on HCTZ and Aldactone  Cont to monitor    A-fib (HCC)  HR a little labile but generally ok: 52-83  HR on  initial exam showed NSR  Occurred post-op  S/P RVR at [facility]  Cont Amio  Cont HCTZ and Aldactone  Cont to monitor    Anemia  H&H stable with Hb 9.1  2nd to surgery (H&H was wnl before surgery)  Monitor      This case has been discussed with the attending Physiatrist.    Thank you for the consultation.  Will follow the patient with you.

## 2024-07-25 NOTE — THERAPY
Occupational Therapy  Daily Treatment     Patient Name: Erin Hess  Age:  84 y.o., Sex:  female  Medical Record #: [id number]  Today's Date: 7/25/2024     Precautions  Precautions: (P) Fall Risk, Other (See Comments)  Comments: (P) Avoid heavy lifting (>5 pounds), excessive bending, stretching, pushing, or pulling for two weeks (sx: 07/15) after your procedure, per post-op protocol. 02 2L PM, 2L 02 PRN AM         Subjective    Pt encountered for OT in back gym (hand off from PT). Pt pleasant and agreeable to participate and motivated to return to [facility].      Objective       07/25/24 1031   OT Charge Group   Charges Yes   OT Self Care / ADL (Units) 2   OT Therapeutic Exercise (Units) 2   OT Total Time Spent   OT Individual Total Time Spent (Mins) 60   Precautions   Precautions Fall Risk;Other (See Comments)   Comments Avoid heavy lifting (>5 pounds), excessive bending, stretching, pushing, or pulling for two weeks (sx: 07/15) after your procedure, per post-op protocol. 02 2L PM, 2L 02 PRN AM   Vitals   O2 (LPM) 2   O2 Delivery Device Silicone Nasal Cannula   Functional Level of Assist   Bed, Chair, Wheelchair Transfer Contact Guard Assist   Bed Chair Wheelchair Transfer Description Adaptive equipment;Set-up of equipment;Increased time;Supervision for safety  (SPT using FWW)   Tub / Shower Transfers Contact Guard Assist   Tub Shower Transfer Description Grab bar;Shower bench;Increased time;Set-up of equipment;Supervision for safety  (Dry WIS transfer using GB at entrance. Son present for discussion on GB placement.)   IADL Treatments   IADL Treatments Kitchen mobility education;Home management   Kitchen Mobility Education Pt able to acquire 6/6 cones from various heights/appliances at FWW level/CGA for safety. Fatigue reported at the end requiring a seated RB.   Home Management Pt able to fold 18 washclothes standing at elavated table at CGA to SBA to increase standing activity tolerance. Fatigue reported at the end  requiring a seated RB.   Balance   Standing Balance (Dynamic) Fair   Skilled Intervention Facilitation;Other (See Comments)  (see note)   Interdisciplinary Plan of Care Collaboration   IDT Collaboration with  Physical Therapist;Family / Caregiver   Patient Position at End of Therapy Seated;Chair Alarm On;Self Releasing Lap Belt Applied;Other (Comments);Family / Friend in Room  (in cafeteria for lunch with family at side)   Collaboration Comments hand off from PT; Discussed shower GB placement with son + POC/CLOF   Occupational Therapist Assigned   Assigned OT / Treatment Time / Comments JS /  min; pt prefers female therapist for ADLs     Standing at elevated table while engaged in PVC pipe tree activity to address activity tolerance in standing and balance:   - 14 piece; pt able to stand for duration of task requiring intermittent Roseann for problem solving; RB needed in between construction and disassembling     - 18 piece; pt able to stand for duration of task requiring SBA for problem solving; RB needed in between construction and disassembling      Assessment    Pt with fair tolerance during standing tolerance tasks requiring intermittent seated RB d/t reduced endurance; however, pt highly motivated to recover and participate. Pt recommended for GB in shower for safety with WIS transfers; son present and willing to install for pt prior to DC.     Strengths: Able to follow instructions, Adequate strength, Alert and oriented, Good insight into deficits/needs, Independent prior level of function, Manages pain appropriately, Motivated for self care and independence, Supportive family, Pleasant and cooperative, Willingly participates in therapeutic activities  Barriers: Fatigue, Impaired activity tolerance, Impaired balance, Limited mobility    Plan    I/ADLs in standing as tolerated; Functional transfers using FWW     Shower scheduled next session (07/26) at 08:30 with female therapist    [facility]  Recommendations  Bathroom Equipment:  (pt owns shower chair, son will be installing GB - discussed 07/25)  Additional Equipment:  (TBD based on pt progress)    Passport items to be completed:  Perform bathroom transfers, complete dressing, complete feeding, get ready for the day, prepare a simple meal, participate in household tasks, adapt home for safety needs, demonstrate home exercise program, complete caregiver training     Occupational Therapy Goals (Active)       Problem: Bathing       Dates: Start:  07/24/24         Goal: STG-Within one week, patient will bathe at V level using LRD       Dates: Start:  07/24/24               Problem: Dressing       Dates: Start:  07/24/24         Goal: STG-Within one week, patient will dress UB at SBA using LRD       Dates: Start:  07/24/24            Goal: STG-Within one week, patient will dress LB at SBA using LRD       Dates: Start:  07/24/24               Problem: Functional Transfers       Dates: Start:  07/24/24         Goal: STG-Within one week, patient will transfer to toilet at [facility] using LRD       Dates: Start:  07/24/24            Goal: STG-Within one week, patient will transfer to step in shower at [facility] using LRD       Dates: Start:  07/24/24               Problem: IADL's       Dates: Start:  07/24/24         Goal: STG-Within one week, patient will access kitchen area at [facility] using LRD       Dates: Start:  07/24/24               Problem: OT Long Term Goals       Dates: Start:  07/24/24         Goal: LTG-By discharge, patient will complete basic self care tasks at [facility] to [facility] using LRD       Dates: Start:  07/24/24            Goal: LTG-By discharge, patient will perform bathroom transfers at [facility] to [facility] using LRD       Dates: Start:  07/24/24            Goal: LTG-By discharge, patient will complete basic home management at [facility] to [facility] using LRD       Dates: Start:  07/24/24

## 2024-07-25 NOTE — ASSESSMENT & PLAN NOTE
Severe AS S/P TAVR on 7/15/24 by Dr.'s Luis ( mL) and Elvin ( mL)  Complicated by common femoral artery injury S/P surgical repair by Dr. Swan (EBL 5 mL)  Echo 7/15/24 EF 65%, normally functioning TAVR, small pericardial effusion without hemodynamic compromise  U/S BLE negative for DVT  On Aldactone  Check F/U labs in AM

## 2024-07-26 ENCOUNTER — APPOINTMENT (OUTPATIENT)
Dept: PHYSICAL THERAPY | Facility: REHABILITATION | Age: 84
DRG: 949 | End: 2024-07-26
Attending: PHYSICAL MEDICINE & REHABILITATION
Payer: MEDICARE

## 2024-07-26 ENCOUNTER — APPOINTMENT (OUTPATIENT)
Dept: OCCUPATIONAL THERAPY | Facility: REHABILITATION | Age: 84
DRG: 949 | End: 2024-07-26
Attending: PHYSICAL MEDICINE & REHABILITATION
Payer: MEDICARE

## 2024-07-26 PROCEDURE — 97535 SELF CARE MNGMENT TRAINING: CPT

## 2024-07-26 PROCEDURE — 99232 SBSQ HOSP IP/OBS MODERATE 35: CPT | Performed by: HOSPITALIST

## 2024-07-26 PROCEDURE — 97110 THERAPEUTIC EXERCISES: CPT | Mod: CQ

## 2024-07-26 PROCEDURE — 700101 HCHG RX REV CODE 250: Performed by: PHYSICAL MEDICINE & REHABILITATION

## 2024-07-26 PROCEDURE — 99232 SBSQ HOSP IP/OBS MODERATE 35: CPT | Performed by: PHYSICAL MEDICINE & REHABILITATION

## 2024-07-26 PROCEDURE — 700102 HCHG RX REV CODE 250 W/ 637 OVERRIDE(OP): Performed by: PHYSICAL MEDICINE & REHABILITATION

## 2024-07-26 PROCEDURE — 97112 NEUROMUSCULAR REEDUCATION: CPT | Mod: CQ

## 2024-07-26 PROCEDURE — 94640 AIRWAY INHALATION TREATMENT: CPT

## 2024-07-26 PROCEDURE — 97530 THERAPEUTIC ACTIVITIES: CPT | Mod: CQ

## 2024-07-26 PROCEDURE — 97116 GAIT TRAINING THERAPY: CPT | Mod: CQ

## 2024-07-26 PROCEDURE — 97530 THERAPEUTIC ACTIVITIES: CPT

## 2024-07-26 PROCEDURE — A9270 NON-COVERED ITEM OR SERVICE: HCPCS | Performed by: PHYSICAL MEDICINE & REHABILITATION

## 2024-07-26 PROCEDURE — 770010 HCHG ROOM/CARE - REHAB SEMI PRIVAT*

## 2024-07-26 PROCEDURE — 94760 N-INVAS EAR/PLS OXIMETRY 1: CPT

## 2024-07-26 RX ADMIN — ANASTROZOLE 1 MG: 1 TABLET, COATED ORAL at 08:31

## 2024-07-26 RX ADMIN — HYDROCHLOROTHIAZIDE 6.25 MG: 25 TABLET ORAL at 05:11

## 2024-07-26 RX ADMIN — TRAZODONE HYDROCHLORIDE 50 MG: 50 TABLET ORAL at 21:56

## 2024-07-26 RX ADMIN — TRAMADOL HYDROCHLORIDE 50 MG: 50 TABLET ORAL at 21:56

## 2024-07-26 RX ADMIN — ROSUVASTATIN CALCIUM 20 MG: 10 TABLET, FILM COATED ORAL at 17:39

## 2024-07-26 RX ADMIN — SENNOSIDES AND DOCUSATE SODIUM 2 TABLET: 50; 8.6 TABLET ORAL at 21:28

## 2024-07-26 RX ADMIN — AMIODARONE HYDROCHLORIDE 400 MG: 200 TABLET ORAL at 05:11

## 2024-07-26 RX ADMIN — SPIRONOLACTONE 25 MG: 25 TABLET ORAL at 05:11

## 2024-07-26 RX ADMIN — AMIODARONE HYDROCHLORIDE 400 MG: 200 TABLET ORAL at 17:39

## 2024-07-26 RX ADMIN — ALBUTEROL SULFATE 2.5 MG: 2.5 SOLUTION RESPIRATORY (INHALATION) at 06:56

## 2024-07-26 RX ADMIN — ASPIRIN 81 MG: 81 TABLET, COATED ORAL at 08:31

## 2024-07-26 RX ADMIN — VALSARTAN 40 MG: 80 TABLET, FILM COATED ORAL at 05:11

## 2024-07-26 RX ADMIN — PAROXETINE HYDROCHLORIDE 10 MG: 20 TABLET, FILM COATED ORAL at 08:31

## 2024-07-26 RX ADMIN — OMEPRAZOLE 20 MG: 20 CAPSULE, DELAYED RELEASE ORAL at 08:31

## 2024-07-26 RX ADMIN — FLUTICASONE FUROATE, UMECLIDINIUM BROMIDE AND VILANTEROL TRIFENATATE 1 PUFF: 200; 62.5; 25 POWDER RESPIRATORY (INHALATION) at 06:58

## 2024-07-26 ASSESSMENT — ENCOUNTER SYMPTOMS
DIARRHEA: 0
CHILLS: 0
FEVER: 0
ABDOMINAL PAIN: 0
NAUSEA: 0
VOMITING: 0
NERVOUS/ANXIOUS: 0
SHORTNESS OF BREATH: 0

## 2024-07-26 ASSESSMENT — BALANCE ASSESSMENTS
TURN 360 DEGREES: 2
LONG VERSION TOTAL SCORE (MAX 56): 29
REACHING FORWARD WITH OUTSTRETCHED ARM WHILE STANDING: 0
STANDING UNSUPPORTED ONE FOOT IN FRONT: 1
SITTING UNSUPPORTED: 4
PICK UP OBJECT FROM THE FLOOR FROM A STANDING POSITION: 0
LOOK OVER LEFT AND RIGHT SHOULDERS WHILE STANDING: 3
MEDICARE IMPAIRMENT PERCENTAGE: 48
STANDING TO SITTING: 3
TRANSFERS: 2
STANDING UNSUPPORTED WITH FEET TOGETHER: 2
LONG VERSION TOTAL SCORE (MAX 56): 29
STANDING UNSUPPORTED WITH EYES CLOSED: 3
PLACE ALTERNATE FOOT ON STEP OR STOOL WHILE STANDING UNSUPPORTED: 2
SITTING TO STANDING: 3
STANDING ON ONE LEG: 1
STANDING UNSUPPORTED: 3

## 2024-07-26 ASSESSMENT — GAIT ASSESSMENTS
GAIT LEVEL OF ASSIST: STANDBY ASSIST
ASSISTIVE DEVICE: FRONT WHEEL WALKER
DEVIATION: BRADYKINETIC
DEVIATION: BRADYKINETIC
DISTANCE (FEET): 100
ASSISTIVE DEVICE: 4 WHEEL WALKER
DISTANCE (FEET): 30
GAIT LEVEL OF ASSIST: STANDBY ASSIST

## 2024-07-26 ASSESSMENT — ACTIVITIES OF DAILY LIVING (ADL)
BED_CHAIR_WHEELCHAIR_TRANSFER_DESCRIPTION: ADAPTIVE EQUIPMENT;INITIAL PREPARATION FOR TASK;SET-UP OF EQUIPMENT;SUPERVISION FOR SAFETY
BED_CHAIR_WHEELCHAIR_TRANSFER_DESCRIPTION: ADAPTIVE EQUIPMENT;INITIAL PREPARATION FOR TASK;SET-UP OF EQUIPMENT;SUPERVISION FOR SAFETY
TOILET_TRANSFER_DESCRIPTION: GRAB BAR;INITIAL PREPARATION FOR TASK;SET-UP OF EQUIPMENT;SUPERVISION FOR SAFETY
TOILET_TRANSFER_DESCRIPTION: GRAB BAR;SET-UP OF EQUIPMENT;SUPERVISION FOR SAFETY
BED_CHAIR_WHEELCHAIR_TRANSFER_DESCRIPTION: ADAPTIVE EQUIPMENT;SET-UP OF EQUIPMENT;SUPERVISION FOR SAFETY
TUB_SHOWER_TRANSFER_DESCRIPTION: GRAB BAR;SHOWER BENCH;INITIAL PREPARATION FOR TASK;SET-UP OF EQUIPMENT;SUPERVISION FOR SAFETY

## 2024-07-26 NOTE — THERAPY
Occupational Therapy  Daily Treatment     Patient Name: Erin Hess  Age:  84 y.o., Sex:  female  Medical Record #: [id number]  Today's Date: 7/26/2024     Precautions  Precautions: Fall Risk, Other (See Comments)  Comments: Avoid heavy lifting (>5 pounds), excessive bending, stretching, pushing, or pulling for two weeks (sx: 07/15) after your procedure, per post-op protocol. 02 2L PM, 2L 02 PRN AM         Subjective    Pt agreeable for an OT tx session.     Objective       07/26/24 0831   OT Charge Group   OT Self Care / ADL (Units) 4   OT Total Time Spent   OT Individual Total Time Spent (Mins) 60   Pain   Intervention Declines   Functional Level of Assist   Grooming Modified Independent   Grooming Description Seated in wheelchair at sink;Increased time;Initial preparation for task   Bathing Supervision   Bathing Description Grab bar;Hand held shower;Initial preparation for task;Set-up of equipment;Supervision for safety;Increased time   Upper Body Dressing Supervision   Upper Body Dressing Description Initial preparation for task;Increased time   Lower Body Dressing Standby Assist   Lower Body Dressing Description Grab bar;Increased time;Initial preparation for task;Set-up of equipment;Supervision for safety   Tub / Shower Transfers Standby Assist   Tub Shower Transfer Description Grab bar;Shower bench;Initial preparation for task;Set-up of equipment;Supervision for safety   Interdisciplinary Plan of Care Collaboration   IDT Collaboration with  Physical Therapist Assistant (PTA);Nursing   Patient Position at End of Therapy In Bed;Bed Alarm On;Call Light within Reach;Tray Table within Reach;Phone within Reach         Assessment    Pt has made steady progress with ADL's. Pt pleasant and cooperative throughout session.  Strengths: Able to follow instructions, Adequate strength, Alert and oriented, Good insight into deficits/needs, Independent prior level of function, Manages pain appropriately, Motivated for self  care and independence, Supportive family, Pleasant and cooperative, Willingly participates in therapeutic activities  Barriers: Fatigue, Impaired activity tolerance, Impaired balance, Limited mobility    Plan    Will continue with OT POC.    DME  OT DME Recommendations  Bathroom Equipment:  (shower chair, GB)  Additional Equipment:  (TBD based on pt progress)          Occupational Therapy Goals (Active)       Problem: Dressing       Dates: Start:  07/24/24         Goal: STG-Within one week, patient will dress UB at SBA using LRD       Dates: Start:  07/24/24    Expected End:  08/02/24            Goal: STG-Within one week, patient will dress LB at SBA using LRD       Dates: Start:  07/24/24    Expected End:  08/02/24               Problem: Functional Transfers       Dates: Start:  07/24/24         Goal: STG-Within one week, patient will transfer to toilet at \[facility]\"" using LRD       Dates: Start:  07/24/24    Expected End:  08/02/24            Goal: STG-Within one week, patient will transfer to step in shower at \[facility]\"" using LRD       Dates: Start:  07/24/24    Expected End:  08/02/24               Problem: IADL's       Dates: Start:  07/24/24         Goal: STG-Within one week, patient will access kitchen area at [facility] using LRD       Dates: Start:  07/24/24    Expected End:  08/02/24               Problem: OT Long Term Goals       Dates: Start:  07/24/24         Goal: LTG-By discharge, patient will complete basic self care tasks at \[facility]\"" to [place] using LRD       Dates: Start:  07/24/24    Expected End:  08/02/24            Goal: LTG-By discharge, patient will perform bathroom transfers at \[facility]\"" to [place] using LRD       Dates: Start:  07/24/24    Expected End:  08/02/24            Goal: LTG-By discharge, patient will complete basic home management at \[facility]\"" to [place] using LRD       Dates: Start:  07/24/24    Expected End:  08/02/24

## 2024-07-26 NOTE — CARE PLAN
"The patient is Stable - Low risk of patient condition declining or worsening    Shift Goals  Clinical Goals: Safety  Patient Goals: Safety, Rest    Problem: Skin Integrity  Goal: Skin integrity is maintained or improved  Outcome: Progressing  Note:   Delfino Score: 17    Patient's skin remains intact and free from new or accidental injury this shift; no s/s of infection. RN wound protocol checked. Encouraged hydration and educated about the importance of nutrition to keep skin integrity. Will continue to monitor.      Problem: Fall Risk - Rehab  Goal: Patient will remain free from falls  Outcome: Progressing  Note: Christina Elizondo Fall risk Assessment Score: 18    High fall risk Interventions   - Bed and strip alarm   - Yellow sign by the door   - Yellow wrist band \"Fall risk\"  - Room near to the nurse station  - Do not leave patient unattended in the bathroom  - Fall risk education provided     "

## 2024-07-26 NOTE — PROGRESS NOTES
[facility] Medicine Daily Progress Note    Date of Service  7/26/2024    Chief Complaint:  Hypertension  Afib  Anemia    Interval History:  No significant events overnight.    Review of Systems  Review of Systems   Constitutional:  Negative for chills and fever.   Respiratory:  Negative for shortness of breath.    Cardiovascular:  Negative for chest pain.   Gastrointestinal:  Negative for abdominal pain, diarrhea, nausea and vomiting.   Psychiatric/Behavioral:  The patient is not nervous/anxious.         Physical Exam  Temp:  [36.8 °C (98.2 °F)-37 °C (98.6 °F)] 36.8 °C (98.2 °F)  Pulse:  [60-71] 71  Resp:  [12-18] 18  BP: (114-124)/(49-54) 114/54  SpO2:  [89 %-99 %] 89 %    Physical Exam  Vitals and nursing note reviewed.   Constitutional:       Appearance: Normal appearance.   HENT:      Head: Atraumatic.   Eyes:      Conjunctiva/sclera: Conjunctivae normal.      Pupils: Pupils are equal, round, and reactive to light.   Cardiovascular:      Rate and Rhythm: Normal rate and regular rhythm.   Pulmonary:      Effort: Pulmonary effort is normal.      Breath sounds: Normal breath sounds.   Abdominal:      General: Bowel sounds are normal.      Palpations: Abdomen is soft.   Musculoskeletal:      Cervical back: Normal range of motion and neck supple.      Right lower leg: Edema present.      Left lower leg: Edema present.   Skin:     General: Skin is warm and dry.   Neurological:      Mental Status: She is alert and oriented to person, place, and time.   Psychiatric:         Mood and Affect: Mood normal.         Behavior: Behavior normal.         Fluids    Intake/Output Summary (Last 24 hours) at 7/26/2024 1101  Last data filed at 7/25/2024 2027  Gross per 24 hour   Intake 100 ml   Output --   Net 100 ml       Laboratory  Recent Labs     07/24/24 0527   WBC 6.1   RBC 3.28*   HEMOGLOBIN 9.1*   HEMATOCRIT 29.3*   MCV 89.3   MCH 27.7   MCHC 31.1*   RDW 50.5*   PLATELETCT 154*   MPV 10.6     Recent Labs     07/24/24 0527    SODIUM 142   POTASSIUM 3.9   CHLORIDE 108   CO2 25   GLUCOSE 88   BUN 12   CREATININE 0.98   CALCIUM 9.3                   Imaging    Assessment/Plan  * S/P TAVR (transcatheter aortic valve replacement)- (present on admission)  Assessment & Plan  Had AS -- s/p TAVR  Has B/L LE edema  BNP: 427 (7/16)  RLE US (7/22): no DVT  Cont HCTZ  Cont Aldactone  Not on any K+ supplements  F/U LLE US to r/o DVT  K+: 3.9 (7/24)  Cont to monitor    Anemia  Assessment & Plan  H&H stable with Hb 9.1  2nd to surgery (H&H was wnl before surgery)  Monitor    A-fib (HCC)- (present on admission)  Assessment & Plan  HR a little labile but generally ok  HR on initial exam showed NSR  Occurred post-op  S/P RVR at [facility]  Cont Amio  Cont HCTZ and Aldactone  Cont to monitor    Common femoral artery injury, right, initial encounter- (present on admission)  Assessment & Plan  2nd to gen surgery  S/P vascular repair    Dyslipidemia- (present on admission)  Assessment & Plan  Cont Crestor    Essential hypertension  Assessment & Plan  BP ok  Cont Diovan  Note: on HCTZ and Aldactone  Cont to monitor

## 2024-07-26 NOTE — PROGRESS NOTES
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  Allergies: Sulfa drugs, Codeine, Oxycodone, and Vancomycin    Safety  Patient Assist  min  Patient Precautions  Fall Risk, Other (See Comments)  Precaution Comments  Avoid heavy lifting (>5 pounds), excessive bending, stretching, pushing, or pulling for two weeks (sx: 07/15) after your procedure, per post-op protocol. 02 2L PM, 2L 02 PRN AM  Bed Transfer Status  Contact Guard Assist  Toilet Transfer Status   Standby Assist  Assistive Devices  Rails, Wheelchair  Oxygen  Nasal Cannula  Diet/Therapeutic Dining  Current Diet Order   Procedures    Diet Order Diet: Cardiac     Pill Administration  whole  Agitated Behavioral Scale     ABS Level of Severity       Fall Risk  Has the patient had a fall this admission?   No  Christina Elizondo Fall Risk Scoring  18, HIGH RISK  Fall Risk Safety Measures  bed alarm and chair alarm    Vitals  Temperature: 36.8 °C (98.3 °F)  Temp src: Oral  Pulse: 60  Respiration: 12  Blood Pressure : 124/53  Blood Pressure MAP (Calculated): 77 MM HG  BP Location: Left, Upper Arm  Patient BP Position: Supine     Oxygen  Pulse Oximetry: 99 %  O2 (LPM): 2  O2 Delivery Device: Nasal Cannula    Bowel and Bladder  Last Bowel Movement  07/24/24  Stool Type  Type 6: Fluffy pieces with ragged edges, a mushy stool  Bowel Device  Bathroom  Continent  Bladder: Continent void   Bowel: Continent movement  Bladder Function  Urine Void (mL):  (large)  Number of Times Voided: 1  Urine Color: Yellow  Genitourinary Assessment   Bladder Assessment (WDL):  Within Defined Limits  Kennedy Catheter: Not Applicable  Urine Color: Yellow  Bladder Device: Bathroom  Time Void: Yes  Bladder Scan: Post Void  $ Bladder Scan Results (mL): 1    Skin  Delfino Score   17  Sensory  Interventions   Bed Types: Standard/Trauma Mattress  Skin Preventative Measures: Pillows in Use for Support / Positioning  Moisture Interventions  Moisturizers/Barriers: Barrier Paste      Pain  Pain Rating Scale  0 - No Pain  Pain Location  Groin  Pain Location Orientation  Right  Pain Interventions   Medication (see MAR)    ADLs    Bathing      Linen Change      Personal Hygiene  Perineal Care  Chlorhexidine Bath      Oral Care  Brushed Teeth  Teeth/Dentures     Shave     Nutrition Percentage Eaten  *  * Meal *  *, Between 50-75% Consumed  Environmental Precautions     Patient Turns/Positioning  Patient Turns Self from Side to Side  Patient Turns Assistance/Tolerance     Bed Positions  Bed Controls On, Bed Locked  Head of Bed Elevated  Self regulated      Psychosocial/Neurologic Assessment  Psychosocial Assessment  Psychosocial (WDL):  Within Defined Limits  Neurologic Assessment  Neuro (WDL): Exceptions to WDL  Level of Consciousness: Alert  Orientation Level: Oriented X4  Cognition: Follows commands, Appropriate attention/concentration  Speech: Clear  EENT (WDL):  WDL Except    Cardio/Pulmonary Assessment  Edema   RLE Edema: 1+  LLE Edema: 1+  Respiratory Breath Sounds  RUL Breath Sounds: Diminished (increased aeration T/O)  RML Breath Sounds: Diminished  RLL Breath Sounds: Diminished  KUNAL Breath Sounds: Diminished  LLL Breath Sounds: Diminished  Cardiac Assessment   Cardiac (WDL):  WDL Except (TAVR, HF, Afib)

## 2024-07-26 NOTE — PROGRESS NOTES
"  Physical Medicine & Rehabilitation Progress Note    Encounter Date: 7/26/2024    Chief Complaint: As tolerated    Interval Events (Subjective):  Patient sitting up in room. She reports she is doing well. She reports she is in agreement for another week before discharge. Denies NVD.     _____________________________________  Interdisciplinary Team Conference   Most recent IDT on 7/25/2024     Discharge Date/Disposition:  8/2/24  _____________________________________    Objective:  VITAL SIGNS: /54   Pulse 71   Temp 36.8 °C (98.2 °F) (Temporal)   Resp 18   Ht 1.626 m (5' 4\")   Wt 83.7 kg (184 lb 8 oz)   SpO2 89%   BMI 31.67 kg/m²   Gen: NAD  Psych: Mood and affect appropriate  CV: RRR, 0 edema  Resp: CTAB, no upper airway sounds  Abd: NTND  Neuro: AOx4, following commands    Laboratory Values:  Recent Results (from the past 72 hour(s))   CBC with Differential    Collection Time: 07/24/24  5:27 AM   Result Value Ref Range    WBC 6.1 4.8 - 10.8 K/uL    RBC 3.28 (L) 4.20 - 5.40 M/uL    Hemoglobin 9.1 (L) 12.0 - 16.0 g/dL    Hematocrit 29.3 (L) 37.0 - 47.0 %    MCV 89.3 81.4 - 97.8 fL    MCH 27.7 27.0 - 33.0 pg    MCHC 31.1 (L) 32.2 - 35.5 g/dL    RDW 50.5 (H) 35.9 - 50.0 fL    Platelet Count 154 (L) 164 - 446 K/uL    MPV 10.6 9.0 - 12.9 fL    Neutrophils-Polys 66.00 44.00 - 72.00 %    Lymphocytes 20.30 (L) 22.00 - 41.00 %    Monocytes 9.80 0.00 - 13.40 %    Eosinophils 3.10 0.00 - 6.90 %    Basophils 0.30 0.00 - 1.80 %    Immature Granulocytes 0.50 0.00 - 0.90 %    Nucleated RBC 0.00 0.00 - 0.20 /100 WBC    Neutrophils (Absolute) 4.02 1.82 - 7.42 K/uL    Lymphs (Absolute) 1.24 1.00 - 4.80 K/uL    Monos (Absolute) 0.60 0.00 - 0.85 K/uL    Eos (Absolute) 0.19 0.00 - 0.51 K/uL    Baso (Absolute) 0.02 0.00 - 0.12 K/uL    Immature Granulocytes (abs) 0.03 0.00 - 0.11 K/uL    NRBC (Absolute) 0.00 K/uL   Comp Metabolic Panel (CMP)    Collection Time: 07/24/24  5:27 AM   Result Value Ref Range    Sodium 142 135 - " 145 mmol/L    Potassium 3.9 3.6 - 5.5 mmol/L    Chloride 108 96 - 112 mmol/L    Co2 25 20 - 33 mmol/L    Anion Gap 9.0 7.0 - 16.0    Glucose 88 65 - 99 mg/dL    Bun 12 8 - 22 mg/dL    Creatinine 0.98 0.50 - 1.40 mg/dL    Calcium 9.3 8.5 - 10.5 mg/dL    Correct Calcium 9.9 8.5 - 10.5 mg/dL    AST(SGOT) 14 12 - 45 U/L    ALT(SGPT) 8 2 - 50 U/L    Alkaline Phosphatase 55 30 - 99 U/L    Total Bilirubin 0.6 0.1 - 1.5 mg/dL    Albumin 3.2 3.2 - 4.9 g/dL    Total Protein 5.8 (L) 6.0 - 8.2 g/dL    Globulin 2.6 1.9 - 3.5 g/dL    A-G Ratio 1.2 g/dL   HEMOGLOBIN A1C    Collection Time: 07/24/24  5:27 AM   Result Value Ref Range    Glycohemoglobin 5.4 4.0 - 5.6 %    Est Avg Glucose 108 mg/dL   TSH with Reflex to FT4    Collection Time: 07/24/24  5:27 AM   Result Value Ref Range    TSH 2.750 0.380 - 5.330 uIU/mL   Vitamin D, 25-hydroxy (blood)    Collection Time: 07/24/24  5:27 AM   Result Value Ref Range    25-Hydroxy   Vitamin D 25 47 30 - 100 ng/mL   ESTIMATED GFR    Collection Time: 07/24/24  5:27 AM   Result Value Ref Range    GFR (CKD-EPI) 57 (A) >60 mL/min/1.73 m 2       Medications:  Scheduled Medications   Medication Dose Frequency    fluticasone-umeclidinium-vilanterol  1 Puff DAILY    senna-docusate  2 Tablet BID    [START ON 7/27/2024] amiodarone  200 mg Q DAY    amiodarone  400 mg TWICE DAILY    anastrozole  1 mg QAM    aspirin  81 mg DAILY    hydroCHLOROthiazide  6.25 mg Q DAY    omeprazole  20 mg DAILY    PARoxetine  10 mg DAILY    rosuvastatin  20 mg PM MEAL    spironolactone  25 mg QDAY    valsartan  40 mg Q DAY    albuterol  2.5 mg Daily-0800     PRN medications: hydrocortisone, Respiratory Therapy Consult, hydrALAZINE, acetaminophen, senna-docusate **AND** polyethylene glycol/lytes, docusate sodium, magnesium hydroxide, carboxymethylcellulose, mag hydrox-al hydrox-simeth, ondansetron **OR** ondansetron, traZODone, sodium chloride, traMADol    Diet:  Current Diet Order   Procedures    Diet Order Diet: Cardiac        Medical Decision Making and Plan:  TAVR - Patient with severe aortic stenosis s/p TAVR on 7/15/24 with Dr. Oconnor which was complicated by right femoral artery injury requiring repair.  -PT and OT for mobility and ADLs. Per guidelines, 15 hours per week between PT, OT and/or SLP.  -Follow-up Cardiology. Continue ASA     HTN/A fib/sCHF - Patient on Amiodarone 400 mg BID with plan to transition to 200 mg daily on 7/27 as well as HCTZ 6.25 mg, Spironolactone 25 mg and Valsartan 40 mg daily.  Very labile, consult hospitalist. Continue Amiodarone 400 mg BID, HCTZ 6.25 mg, Spironolactone 25 and Valsartan 40 mg     HLD - Patient on Rosuvastatin 20 mg QHS     COPD/Asthma - Patient on Albuterol daily and Trelegy      Anemia - Check AM CBC - 9.1, will monitor     R groin hematoma - Wound care for right groin incision site.      Thrombocytopenia - Check AM CBC - 154, will monitor     Hyperglycemia - Check A 1c - 5.4, no need to monitor sugars     Depression - Patient on Paxil 10 m daily     Hemorrhoids - start PRN preparation H. Improving, continue Preparation H    Obesity - On admission Body mass index is 31.67 kg/m². Meets medical criteria. Dietitian to consult.      Pain - Patient on PRN Tylenol and Tramadol     Skin - Patient at risk for skin breakdown due to debility in areas including sacrum, achilles, elbows and head in addition to other sites. Nursing to assess skin daily.      GI Ppx - Patient on Prilosec for GERD prophylaxis. Patient on Senna-docusate for constipation prophylaxis.      DVT Ppx - Patient is not cleared for AC due to recent hematoma.    ____________________________________    T. Lv Solano MD/PhD  [facility] - Physical Medicine & Rehabilitation   [facility] - Brain Injury Medicine   ____________________________________

## 2024-07-26 NOTE — THERAPY
"Occupational Therapy  Daily Treatment     Patient Name: Erin Hess  Age:  84 y.o., Sex:  female  Medical Record #: [id number]  Today's Date: 7/25/2024     Precautions  Precautions: (P) Fall Risk, Other (See Comments)  Comments: (P) Avoid heavy lifting (>5 pounds), excessive bending, stretching, pushing, or pulling for two weeks (sx: 07/15) after your procedure, per post-op protocol. 02 2L PM, 2L 02 PRN AM         Subjective    \"I am tired\"     Objective       07/25/24 1231   OT Charge Group   OT Therapy Activity (Units) 2   OT Total Time Spent   OT Individual Total Time Spent (Mins) 30   Precautions   Precautions Fall Risk;Other (See Comments)   Comments Avoid heavy lifting (>5 pounds), excessive bending, stretching, pushing, or pulling for two weeks (sx: 07/15) after your procedure, per post-op protocol. 02 2L PM, 2L 02 PRN AM   Functional Level of Assist   Bed, Chair, Wheelchair Transfer Contact Guard Assist   Bed Chair Wheelchair Transfer Description   (stand pivot using FWW to WC)   IADL Treatments   IADL Treatments Other   Comments Patient walked around therapy gym using FWW and collected grocery items placed at various levels, including on the floor. Pt able to bend to retrieve one item on the floor. Used walker to retrieve second floor item. Pt pushed FWW while OT pushed cart. Pt needed SBA in walking   Bed Mobility    Supine to Sit Standby Assist   Sit to Supine Minimal Assist  (RLE management)     Discussed post op precautions. Pt verbalized understanding.     Assessment    Pt seen for OT treatment with focus on walking endurance during mock IADL task. Pt able to tolerate activity fair despite feeling fatigued.  Strengths: Able to follow instructions, Adequate strength, Alert and oriented, Good insight into deficits/needs, Independent prior level of function, Manages pain appropriately, Motivated for self care and independence, Supportive family, Pleasant and cooperative, Willingly participates in " therapeutic activities  Barriers: Fatigue, Impaired activity tolerance, Impaired balance, Limited mobility    Plan    I/ADLs in standing as tolerated; Functional transfers using FWW      Shower scheduled next session (07/26) at 08:30 with female therapist       DME  OT DME Recommendations  Bathroom Equipment:  (shower chair, GB)  Additional Equipment:  (TBD based on pt progress)    Passport items to be completed:  Perform bathroom transfers, complete dressing, complete feeding, get ready for the day, prepare a simple meal, participate in household tasks, adapt home for safety needs, demonstrate home exercise program, complete caregiver training     Occupational Therapy Goals (Active)       Problem: Dressing       Dates: Start:  07/24/24         Goal: STG-Within one week, patient will dress UB at A using LRD       Dates: Start:  07/24/24            Goal: STG-Within one week, patient will dress LB at [facility] using LRD       Dates: Start:  07/24/24               Problem: Functional Transfers       Dates: Start:  07/24/24         Goal: STG-Within one week, patient will transfer to toilet at [facility] using LRD       Dates: Start:  07/24/24            Goal: STG-Within one week, patient will transfer to step in shower at [facility] using LRD       Dates: Start:  07/24/24               Problem: IADL's       Dates: Start:  07/24/24         Goal: STG-Within one week, patient will access kitchen area at [facility] using LRD       Dates: Start:  07/24/24               Problem: OT Long Term Goals       Dates: Start:  07/24/24         Goal: LTG-By discharge, patient will complete basic self care tasks at [facility] to [facility] using LRD       Dates: Start:  07/24/24            Goal: LTG-By discharge, patient will perform bathroom transfers at [facility] to [facility] using LRD       Dates: Start:  07/24/24            Goal: LTG-By discharge, patient will complete basic home management at [facility] to [facility] using LRD       Dates: Start:  07/24/24

## 2024-07-26 NOTE — THERAPY
Occupational Therapy  Daily Treatment     Patient Name: Eirn Hess  Age:  84 y.o., Sex:  female  Medical Record #: [id number]  Today's Date: 7/26/2024     Precautions  Precautions: Fall Risk, Other (See Comments)  Comments: Avoid heavy lifting (>5 pounds), excessive bending, stretching, pushing, or pulling for two weeks (sx: 07/15) after your procedure, per post-op protocol. 02 2L PM, 2L 02 PRN AM         Subjective    Pt supine in bed and agreeable to OT tx. Reporting initial fatigue from previous sessions.     Objective       07/26/24 1231 07/26/24 1250 07/26/24 1255   OT Charge Group   OT Therapy Activity (Units) 2  --   --    OT Total Time Spent   OT Individual Total Time Spent (Mins) 30  --   --    Vitals   Pulse 64 70 70   Patient BP Position Sitting Sitting Sitting   Blood Pressure  128/53 115/78 125/51   Pulse Oximetry 96 % 96 % 94 %   O2 (LPM) 2 2 2   O2 Delivery Device Nasal Cannula Nasal Cannula Nasal Cannula   Vitals Comments Taken following household distance mobility. Following standing tossing task Following short distance mobility.   Pain   Intervention Declines  --   --    Cognition    Level of Consciousness Alert  --   --    Functional Level of Assist   Bed, Chair, Wheelchair Transfer Standby Assist  --   --    Bed Chair Wheelchair Transfer Description Adaptive equipment;Set-up of equipment;Supervision for safety  (Stand step with FWW)  --   --    Balance   Comments Pt participated in static standing balance and tossing task (cornhole) for 2 bouts with seated RB between. Pt using 4WW for intermittent support from behind initially then while using reacher to pickup bean bags. No LOB noted with one occurance of dizziness that resolved with seated RB.  --   --    Interdisciplinary Plan of Care Collaboration   Patient Position at End of Therapy In Bed;Call Light within Reach;Phone within Reach;Tray Table within Reach  --   --      Pt completed functional mobility household distances from room to west  gym and back without seated break using 4WW at SBA level.    Assessment    Pt tolerated session well with focus on standing balance and endurance building while closely monitoring vitals. One brief instance of dizziness reported, but resolved quickly.  Strengths: Able to follow instructions, Adequate strength, Alert and oriented, Good insight into deficits/needs, Independent prior level of function, Manages pain appropriately, Motivated for self care and independence, Supportive family, Pleasant and cooperative, Willingly participates in therapeutic activities  Barriers: Fatigue, Impaired activity tolerance, Impaired balance, Limited mobility    Plan    I/ADLs in standing as tolerated; Functional transfers using FWW      Shower scheduled next session (07/26) at 08:30 with female therapist        DME  OT DME Recommendations  Bathroom Equipment:  (shower chair, GB)  Additional Equipment:  (TBD based on pt progress)     Passport items to be completed:  Perform bathroom transfers, complete dressing, complete feeding, get ready for the day, prepare a simple meal, participate in household tasks, adapt home for safety needs, demonstrate home exercise program, complete caregiver training     Occupational Therapy Goals (Active)       Problem: Dressing       Dates: Start:  07/24/24         Goal: STG-Within one week, patient will dress UB at [facility] using LRD       Dates: Start:  07/24/24    Expected End:  08/02/24            Goal: STG-Within one week, patient will dress LB at [facility] using LRD       Dates: Start:  07/24/24    Expected End:  08/02/24               Problem: Functional Transfers       Dates: Start:  07/24/24         Goal: STG-Within one week, patient will transfer to toilet at \[facility]\"" using LRD       Dates: Start:  07/24/24    Expected End:  08/02/24            Goal: STG-Within one week, patient will transfer to step in shower at \[facility]\"" using LRD       Dates: Start:  07/24/24    Expected End:  08/02/24               Problem:  IADL's       Dates: Start:  07/24/24         Goal: STG-Within one week, patient will access kitchen area at [facility] using LRD       Dates: Start:  07/24/24    Expected End:  08/02/24               Problem: OT Long Term Goals       Dates: Start:  07/24/24         Goal: LTG-By discharge, patient will complete basic self care tasks at [facility] to [facility] using LRD       Dates: Start:  07/24/24    Expected End:  08/02/24            Goal: LTG-By discharge, patient will perform bathroom transfers at [facility] to [facility] using LRD       Dates: Start:  07/24/24    Expected End:  08/02/24            Goal: LTG-By discharge, patient will complete basic home management at [facility] to [facility] using LRD       Dates: Start:  07/24/24    Expected End:  08/02/24

## 2024-07-26 NOTE — THERAPY
"Physical Therapy   Daily Treatment     Patient Name: Erin Hess  Age:  84 y.o., Sex:  female  Medical Record #: [id number]  Today's Date: 7/26/2024     Precautions  Precautions: Fall Risk, Other (See Comments)  Comments: Avoid heavy lifting (>5 pounds), excessive bending, stretching, pushing, or pulling for two weeks (sx: 07/15) after your procedure, per post-op protocol. 02 2L PM, 2L 02 PRN AM    Subjective    0700- pt was in bed upon arrival, \"are we going to take a shower?\".  1400- pt was in bed resting, easily awoken and agreeable to session.     Objective       07/26/24 0701   PT Charge Group   PT Gait Training (Units) 2   PT Neuromuscular Re-Education / Balance (Units) 1   PT Therapeutic Activities (Units) 1   Supervising Physical Therapist Eileen Elizondo   PT Total Time Spent   PT Individual Total Time Spent (Mins) 60   Vitals   O2 (LPM) 2   O2 Delivery Device Nasal Cannula   Cognition    Level of Consciousness Alert   Gait Functional Level of Assist    Gait Level Of Assist Standby Assist   Assistive Device Front Wheel Walker   Distance (Feet) 100   # of Times Distance was Traveled 2   Deviation Bradykinetic   Transfer Functional Level of Assist   Bed, Chair, Wheelchair Transfer Standby Assist   Bed Chair Wheelchair Transfer Description Adaptive equipment;Initial preparation for task;Set-up of equipment;Supervision for safety  (SPT w/ FWW/ 4WW)   Toilet Transfers Standby Assist   Toilet Transfer Description Grab bar;Set-up of equipment;Supervision for safety   Bed Mobility    Supine to Sit Standby Assist   Sit to Stand Standby Assist   Scooting Standby Assist   Osorio Balance Scale   Sitting Unsupported (Score 0-4) 4   Change Of Positon: Sitting To Standing (Score 0-4) 3   Change Of Positon: Standing To Sitting (Score 0-4) 3   Transfers (Score 0-4) 2   Standing Unsupported (Score 0-4) 3   Standing With Eyes Closed (Score 0-4) 3   Standing With Feet Together (Score 0-4) 2   Tandem Standing (Score 0-4) 1   Standing " On One Leg (Score 0-4) 1   Turning Trunk (Feet Fixed) (Score 0-4) 3   Retrieving Objects From Floor (Score 0-4) 0   Turning 360 Degrees (Score 0-4) 2   Stool Stepping (Score 0-4) 2   Reaching Forward While Standing (Score 0-4) 0   Osorio Balance Total Score (0-56) 29   Interdisciplinary Plan of Care Collaboration   Patient Position at End of Therapy Seated;Call Light within Reach;Tray Table within Reach;Phone within Reach     Completed woo care in standing, SBA w/ FWW in front, dressing at EOB w/ set up A.  Education regarding 4WW management, completed gait training w/ 4WW for 100ft, CGA/SBA.       07/26/24 1401   PT Charge Group   PT Therapeutic Exercise (Units) 1   PT Therapeutic Activities (Units) 1   Supervising Physical Therapist Eileen Elizondo   PT Total Time Spent   PT Individual Total Time Spent (Mins) 30   Vitals   O2 (LPM) 2   O2 Delivery Device Nasal Cannula   Cognition    Level of Consciousness Alert   Gait Functional Level of Assist    Gait Level Of Assist Standby Assist   Assistive Device 4 Wheel Walker   Distance (Feet) 30   # of Times Distance was Traveled 2   Deviation Bradykinetic   Transfer Functional Level of Assist   Bed, Chair, Wheelchair Transfer Standby Assist   Bed Chair Wheelchair Transfer Description Adaptive equipment;Initial preparation for task;Set-up of equipment;Supervision for safety  (SPT w/ 4WW)   Toilet Transfers Standby Assist   Toilet Transfer Description Grab bar;Initial preparation for task;Set-up of equipment;Supervision for safety  (4WW <> toilet)   Supine Lower Body Exercise   Bridges Two Legged;1 set of 10   Ankle Pumps 1 set of 15;Bilateral   Gluteal Isometrics 1 set of 15;Bilateral   Quadriceps Isometrics 1 set of 15;Bilateral   Bed Mobility    Supine to Sit Standby Assist   Sit to Supine Standby Assist   Sit to Stand Standby Assist   Scooting Standby Assist   Rolling Standby Assist   Interdisciplinary Plan of Care Collaboration   Patient Position at End of Therapy In  Bed;Call Light within Reach;Tray Table within Reach;Phone within Reach   PT DME Recommendations   Assistive Device 4-Wheel Walker       Assessment    Pt tolerated both sessions well however was limited by fatigue in PM session. PTA introduced 4WW today and pt was able to carry over break management in PM session. Overall SBA but required assistance to manage O2 line during mobility.      Strengths: Able to follow instructions, Alert and oriented, Effective communication skills, Independent prior level of function, Motivated for self care and independence, Pleasant and cooperative, Supportive family, Willingly participates in therapeutic activities  Barriers: Decreased endurance, Fatigue, Generalized weakness, Pain    Plan    Ambulation w/ 4WW/ LRAD  Activity tolerance  BLE strengthening  Standing balance  Stair training  TAVR precautions     DME  PT DME Recommendations  Additional Equipment:  (TBD based on pt progress)    Passport items to be completed:  Get in/out of bed safely, in/out of a vehicle, safely use mobility device, walk or wheel around home/community, navigate up and down stairs, show how to get up/down from the ground, ensure home is accessible, demonstrate HEP, complete caregiver training    Physical Therapy Problems (Active)       Problem: Mobility       Dates: Start:  07/24/24         Goal: STG-Within one week, patient will ambulate 100ft w/ FWW and SBA       Dates: Start:  07/24/24    Expected End:  08/02/24            Goal: STG-Within one week, patient will ambulate up/down a curb w/ FWW and SPV       Dates: Start:  07/24/24    Expected End:  08/02/24               Problem: Mobility Transfers       Dates: Start:  07/24/24         Goal: STG-Within one week, patient will transfer bed to chair w/ FWW and SPV       Dates: Start:  07/24/24    Expected End:  08/02/24               Problem: PT-Long Term Goals       Dates: Start:  07/24/24         Goal: LTG-By discharge, patient will ambulate 250ft w/  LRAD and Jose G       Dates: Start:  07/24/24    Expected End:  08/02/24            Goal: LTG-By discharge, patient will independently perform home exercise program       Dates: Start:  07/24/24    Expected End:  08/02/24            Goal: LTG-By discharge, patient will transfer in/out of a car w/ FWW and Jose G       Dates: Start:  07/24/24    Expected End:  08/02/24

## 2024-07-27 ENCOUNTER — APPOINTMENT (OUTPATIENT)
Dept: PHYSICAL THERAPY | Facility: REHABILITATION | Age: 84
DRG: 949 | End: 2024-07-27
Attending: PHYSICAL MEDICINE & REHABILITATION
Payer: MEDICARE

## 2024-07-27 ENCOUNTER — APPOINTMENT (OUTPATIENT)
Dept: OCCUPATIONAL THERAPY | Facility: REHABILITATION | Age: 84
DRG: 949 | End: 2024-07-27
Attending: PHYSICAL MEDICINE & REHABILITATION
Payer: MEDICARE

## 2024-07-27 ENCOUNTER — ANCILLARY PROCEDURE (OUTPATIENT)
Dept: CARDIOLOGY | Facility: REHABILITATION | Age: 84
DRG: 949 | End: 2024-07-27
Attending: PHYSICAL MEDICINE & REHABILITATION
Payer: MEDICARE

## 2024-07-27 ENCOUNTER — ANCILLARY PROCEDURE (OUTPATIENT)
Dept: CARDIOLOGY | Facility: REHABILITATION | Age: 84
DRG: 949 | End: 2024-07-27
Attending: HOSPITALIST
Payer: MEDICARE

## 2024-07-27 ENCOUNTER — APPOINTMENT (OUTPATIENT)
Dept: RADIOLOGY | Facility: REHABILITATION | Age: 84
DRG: 949 | End: 2024-07-27
Attending: PHYSICAL MEDICINE & REHABILITATION
Payer: MEDICARE

## 2024-07-27 PROCEDURE — 700101 HCHG RX REV CODE 250: Performed by: PHYSICAL MEDICINE & REHABILITATION

## 2024-07-27 PROCEDURE — 700102 HCHG RX REV CODE 250 W/ 637 OVERRIDE(OP): Performed by: PHYSICAL MEDICINE & REHABILITATION

## 2024-07-27 PROCEDURE — 74018 RADEX ABDOMEN 1 VIEW: CPT

## 2024-07-27 PROCEDURE — 93970 EXTREMITY STUDY: CPT

## 2024-07-27 PROCEDURE — A9270 NON-COVERED ITEM OR SERVICE: HCPCS | Performed by: PHYSICAL MEDICINE & REHABILITATION

## 2024-07-27 PROCEDURE — 97530 THERAPEUTIC ACTIVITIES: CPT

## 2024-07-27 PROCEDURE — 94640 AIRWAY INHALATION TREATMENT: CPT

## 2024-07-27 PROCEDURE — 93970 EXTREMITY STUDY: CPT | Mod: 26 | Performed by: INTERNAL MEDICINE

## 2024-07-27 PROCEDURE — 97110 THERAPEUTIC EXERCISES: CPT

## 2024-07-27 PROCEDURE — 770010 HCHG ROOM/CARE - REHAB SEMI PRIVAT*

## 2024-07-27 PROCEDURE — 99232 SBSQ HOSP IP/OBS MODERATE 35: CPT | Performed by: HOSPITALIST

## 2024-07-27 PROCEDURE — 94760 N-INVAS EAR/PLS OXIMETRY 1: CPT

## 2024-07-27 PROCEDURE — 99231 SBSQ HOSP IP/OBS SF/LOW 25: CPT | Performed by: PHYSICAL MEDICINE & REHABILITATION

## 2024-07-27 PROCEDURE — 97535 SELF CARE MNGMENT TRAINING: CPT

## 2024-07-27 RX ADMIN — AMIODARONE HYDROCHLORIDE 200 MG: 200 TABLET ORAL at 05:50

## 2024-07-27 RX ADMIN — SENNOSIDES AND DOCUSATE SODIUM 2 TABLET: 50; 8.6 TABLET ORAL at 09:09

## 2024-07-27 RX ADMIN — SPIRONOLACTONE 25 MG: 25 TABLET ORAL at 05:49

## 2024-07-27 RX ADMIN — VALSARTAN 40 MG: 80 TABLET, FILM COATED ORAL at 05:48

## 2024-07-27 RX ADMIN — HYDROCHLOROTHIAZIDE 6.25 MG: 25 TABLET ORAL at 05:49

## 2024-07-27 RX ADMIN — TRAZODONE HYDROCHLORIDE 50 MG: 50 TABLET ORAL at 20:14

## 2024-07-27 RX ADMIN — OMEPRAZOLE 20 MG: 20 CAPSULE, DELAYED RELEASE ORAL at 09:09

## 2024-07-27 RX ADMIN — TRAMADOL HYDROCHLORIDE 50 MG: 50 TABLET ORAL at 20:14

## 2024-07-27 RX ADMIN — PAROXETINE HYDROCHLORIDE 10 MG: 20 TABLET, FILM COATED ORAL at 09:10

## 2024-07-27 RX ADMIN — FLUTICASONE FUROATE, UMECLIDINIUM BROMIDE AND VILANTEROL TRIFENATATE 1 PUFF: 200; 62.5; 25 POWDER RESPIRATORY (INHALATION) at 06:34

## 2024-07-27 RX ADMIN — ALBUTEROL SULFATE 2.5 MG: 2.5 SOLUTION RESPIRATORY (INHALATION) at 06:31

## 2024-07-27 RX ADMIN — HYDROCORTISONE: 0.01 CREAM TOPICAL at 15:49

## 2024-07-27 RX ADMIN — ANASTROZOLE 1 MG: 1 TABLET, COATED ORAL at 09:09

## 2024-07-27 RX ADMIN — ASPIRIN 81 MG: 81 TABLET, COATED ORAL at 09:10

## 2024-07-27 RX ADMIN — ROSUVASTATIN CALCIUM 20 MG: 10 TABLET, FILM COATED ORAL at 17:50

## 2024-07-27 ASSESSMENT — ACTIVITIES OF DAILY LIVING (ADL)
TOILETING_LEVEL_OF_ASSIST_DESCRIPTION: GRAB BAR;INCREASED TIME;SUPERVISION FOR SAFETY
BED_CHAIR_WHEELCHAIR_TRANSFER_DESCRIPTION: INCREASED TIME;SET-UP OF EQUIPMENT
TOILET_TRANSFER_DESCRIPTION: GRAB BAR;INCREASED TIME;INITIAL PREPARATION FOR TASK;SUPERVISION FOR SAFETY
TOILET_TRANSFER_DESCRIPTION: GRAB BAR;SUPERVISION FOR SAFETY
BED_CHAIR_WHEELCHAIR_TRANSFER_DESCRIPTION: INCREASED TIME;INITIAL PREPARATION FOR TASK;SUPERVISION FOR SAFETY;SET-UP OF EQUIPMENT
TOILET_TRANSFER_DESCRIPTION: INCREASED TIME;GRAB BAR;SUPERVISION FOR SAFETY;SET-UP OF EQUIPMENT
BED_CHAIR_WHEELCHAIR_TRANSFER_DESCRIPTION: INCREASED TIME

## 2024-07-27 ASSESSMENT — PAIN DESCRIPTION - PAIN TYPE: TYPE: ACUTE PAIN

## 2024-07-27 ASSESSMENT — PATIENT HEALTH QUESTIONNAIRE - PHQ9
2. FEELING DOWN, DEPRESSED, IRRITABLE, OR HOPELESS: NOT AT ALL
SUM OF ALL RESPONSES TO PHQ9 QUESTIONS 1 AND 2: 0
1. LITTLE INTEREST OR PLEASURE IN DOING THINGS: NOT AT ALL

## 2024-07-27 ASSESSMENT — ENCOUNTER SYMPTOMS
HEADACHES: 0
NAUSEA: 0
PALPITATIONS: 0
HALLUCINATIONS: 0
SHORTNESS OF BREATH: 0
BLURRED VISION: 0
VOMITING: 0
FEVER: 0
DIZZINESS: 0

## 2024-07-27 NOTE — FLOWSHEET NOTE
07/27/24 0631   Events/Summary/Plan   Events/Summary/Plan svn/mdi   Vital Signs   Pulse (!) 59  (post 64)   Respiration 18  (post 18)   Pulse Oximetry 96 %   $ Pulse Oximetry (Spot Check) Yes   Respiratory Assessment   Level of Consciousness Alert   Chest Exam   Work Of Breathing / Effort Within Normal Limits   Breath Sounds   RUL Breath Sounds Clear  (increased aeration T/O)   RML Breath Sounds Clear   RLL Breath Sounds Diminished   KUNAL Breath Sounds Clear   LLL Breath Sounds Diminished   Secretions   Cough Productive   How Sputum Obtained Expectorated;Spontaneous   Sputum Amount Small   Sputum Color Clear   Sputum Consistency Thick

## 2024-07-27 NOTE — PROGRESS NOTES
"  Physical Medicine & Rehabilitation Progress Note    Encounter Date: 7/27/2024    Chief Complaint: Diarrhea    Interval Events (Subjective):  Blood pressure stable, intermittent bradycardia in the 50s  Large watery stool 7/27  Voiding volitionally    Patient seen and examined in the restroom.  She is still having diarrhea.  She was given prune juice and butter this morning.  Discussed with her we will check a KUB.  She had previously been constipated per nursing.      ROS: 14 point ROS negative unless otherwise specified in the HPI    Objective:  VITAL SIGNS: /42   Pulse (!) 59   Temp 36.7 °C (98 °F) (Oral)   Resp 18   Ht 1.626 m (5' 4\")   Wt 83.7 kg (184 lb 8 oz)   SpO2 91%   BMI 31.67 kg/m²     GEN: No apparent distress  HEENT: Head normocephalic, atraumatic.  Sclera nonicteric bilaterally, no ocular discharge appreciated bilaterally.  PULMONARY: Breathing nonlabored on supplemental oxygen  SKIN: No appreciable skin breakdown on exposed areas of skin.  PSYCH: Mood and affect within normal limits.  NEURO: Awake alert.  Conversational.  Logical thought content.      Laboratory Values:  No results found for this or any previous visit (from the past 72 hour(s)).    Medications:  Scheduled Medications   Medication Dose Frequency    fluticasone-umeclidinium-vilanterol  1 Puff DAILY    senna-docusate  2 Tablet BID    amiodarone  200 mg Q DAY    anastrozole  1 mg QAM    aspirin  81 mg DAILY    omeprazole  20 mg DAILY    PARoxetine  10 mg DAILY    rosuvastatin  20 mg PM MEAL    spironolactone  25 mg QDAY    albuterol  2.5 mg Daily-0800     PRN medications: hydrocortisone, Respiratory Therapy Consult, hydrALAZINE, acetaminophen, senna-docusate **AND** polyethylene glycol/lytes, docusate sodium, magnesium hydroxide, carboxymethylcellulose, mag hydrox-al hydrox-simeth, ondansetron **OR** ondansetron, traZODone, sodium chloride, traMADol    Diet:  Current Diet Order   Procedures    Diet Order Diet: Cardiac "       Medical Decision Making and Plan:  TAVR 7/15/2024 for severe aortic stenosis Dr. Oconnor complicated by right femoral artery injury requiring repair  Continue acute inpatient rehabilitation    Hypertension/hypotension -antihypertensive discontinued today per hospitalist due to orthostasis    Diarrhea - reportedly previously had constipation.  Check KUB.    Leg Swelling + Pain - BLE US ordered per hospitalist or primary attending.      Upcoming Labs/imagin/29    DVT PROPHYLAXIS: None.  Right groin hematoma, not cleared for DVT ppx.     HOSPITALIST FOLLOWING: Yes    CODE STATUS: Full code    ___________________________________    Dr. Torrie Oconnor DO, MS  [facility] - Physical Medicine & Rehabilitation   ____________________________________

## 2024-07-27 NOTE — THERAPY
Physical Therapy   Daily Treatment     Patient Name: Erin Hess  Age:  84 y.o., Sex:  female  Medical Record #: [id number]  Today's Date: 7/27/2024     Precautions  Precautions: Fall Risk, Other (See Comments)  Comments: Avoid heavy lifting (>5 pounds), excessive bending, stretching, pushing, or pulling for two weeks (sx: 07/15) after your procedure, per post-op protocol. 02 2L PM, 2L 02 PRN AM    Subjective    Patient was found in dining area for morning session and in bed with sister and son in room for the afternoon session. In morning we attempted to walk but patient reported feeling nauseous and dizzy (see vitals). Both treatments patient reported needing to have a bowel movement and tried to go multiple times each session. Patient also reported bad cramps in afternoon session due to constipation. Patient had a successful bowel movement at the end of the afternoon session. Patient and family members also were asking why the patient has had such low blood pressure which MD spoke to them about.     Objective       07/27/24 0831 07/27/24 1231   PT Charge Group   PT Therapeutic Exercise (Units) 1 1   PT Therapeutic Activities (Units) 3 3   PT Total Time Spent   PT Individual Total Time Spent (Mins) 60 60   Transfer Functional Level of Assist   Bed, Chair, Wheelchair Transfer Standby Assist Standby Assist   Bed Chair Wheelchair Transfer Description Increased time Increased time;Initial preparation for task;Supervision for safety;Set-up of equipment   Toilet Transfers Standby Assist Standby Assist   Toilet Transfer Description Increased time;Grab bar;Supervision for safety;Set-up of equipment Grab bar;Increased time;Initial preparation for task;Supervision for safety   Supine Lower Body Exercise   Hip Abduction 1 set of 10;Bilateral;Light Resistance Theraband  --    Hip Adduction  1 set of 10;Bilateral  (ball squeeze)  --    Straight Leg Raises 1 set of 10;Bilateral  --    Short Arc Quad 1 set of 10;Bilateral  --     Ankle Pumps 1 set of 10;Bilateral  --    Gluteal Isometrics 1 set of 10  (5s hold)  --    Sitting Lower Body Exercises   Nustep  --  Resistance Level 10  (10 min)   Bed Mobility    Sit to Stand Contact Guard Assist Standby Assist   Interdisciplinary Plan of Care Collaboration   IDT Collaboration with  Nursing  --    Patient Position at End of Therapy In Bed;Bed Alarm On;Call Light within Reach;Tray Table within Reach;Phone within Reach  --    Collaboration Comments   (mentioned blood pressure to nursing and patient received her morning meds curing treatment)  --         07/27/24 0847 07/27/24 0849   Vitals   Pulse 67 74   Patient BP Position Sitting Standing 1 minute   Blood Pressure  98/41  (Yannick hose on) (!) 71/38  (Yannick hose)       Assessment    Today patient was limited by orthostatic hypotension and pain/discomfort from constipation. Gait and curb training was attempted in morning but we were unable to to patients dizziness and nausea. Majority of both sessions was spent getting on and off toilet. Overall patient is safe with transfers but requires help managing O2 tube.     Strengths: Able to follow instructions, Alert and oriented, Effective communication skills, Independent prior level of function, Motivated for self care and independence, Pleasant and cooperative, Supportive family, Willingly participates in therapeutic activities  Barriers: Decreased endurance, Fatigue, Generalized weakness, Pain    Plan    Ambulation w/ 4WW/ LRAD  Activity tolerance  BLE strengthening  Standing balance  Stair training  TAVR precautions    DME  PT DME Recommendations  Assistive Device: 4-Wheel Walker  Additional Equipment:  (TBD based on pt progress)    Passport items to be completed:  Get in/out of bed safely, in/out of a vehicle, safely use mobility device, walk or wheel around home/community, navigate up and down stairs, show how to get up/down from the ground, ensure home is accessible, demonstrate HEP, complete  caregiver training    Physical Therapy Problems (Active)       Problem: Mobility       Dates: Start:  07/24/24         Goal: STG-Within one week, patient will ambulate 100ft w/ FWW and SBA       Dates: Start:  07/24/24    Expected End:  08/02/24            Goal: STG-Within one week, patient will ambulate up/down a curb w/ FWW and SPV       Dates: Start:  07/24/24    Expected End:  08/02/24               Problem: Mobility Transfers       Dates: Start:  07/24/24         Goal: STG-Within one week, patient will transfer bed to chair w/ FWW and SPV       Dates: Start:  07/24/24    Expected End:  08/02/24               Problem: PT-Long Term Goals       Dates: Start:  07/24/24         Goal: LTG-By discharge, patient will ambulate 250ft w/ LRAD and Jose G       Dates: Start:  07/24/24    Expected End:  08/02/24            Goal: LTG-By discharge, patient will independently perform home exercise program       Dates: Start:  07/24/24    Expected End:  08/02/24            Goal: LTG-By discharge, patient will transfer in/out of a car w/ FWW and Jose G       Dates: Start:  07/24/24    Expected End:  08/02/24

## 2024-07-27 NOTE — THERAPY
"Occupational Therapy  Daily Treatment     Patient Name: Erin Hess  Age:  84 y.o., Sex:  female  Medical Record #: [id number]  Today's Date: 7/27/2024     Precautions  Precautions: Fall Risk, Other (See Comments)  Comments: Avoid heavy lifting (>5 pounds), excessive bending, stretching, pushing, or pulling for two weeks (sx: 07/15) after your procedure, per post-op protocol. 02 2L PM, 2L 02 PRN AM         Subjective    \"I don't feel very good at all. My blood pressure was really low earlier, and I'm nauseous.\"     Objective       07/27/24 1031   OT Charge Group   OT Self Care / ADL (Units) 1   OT Therapy Activity (Units) 1   OT Therapeutic Exercise (Units) 2   OT Total Time Spent   OT Individual Total Time Spent (Mins) 60   Vitals   Pulse 61   Patient BP Position Supine   Blood Pressure  136/54   Pulse Oximetry 90 %   O2 (LPM) 1   O2 Delivery Device Nasal Cannula   Pain   Intervention Declines   Cognition    Level of Consciousness Alert   Functional Level of Assist   Grooming Modified Independent   Grooming Description Seated in wheelchair at sink   Toileting Standby Assist   Toileting Description Grab bar;Increased time;Supervision for safety   Bed, Chair, Wheelchair Transfer Standby Assist   Bed Chair Wheelchair Transfer Description Increased time;Set-up of equipment   Toilet Transfers Standby Assist   Toilet Transfer Description Grab bar;Supervision for safety   Supine Upper Body Exercises   Supine Upper Body Exercises Yes   Chest Press 2 sets of 15;Bilateral;Weight (See Comments for lbs)   Front Arm Raise 2 sets of 15;Bilateral;Weight (See Comments for lbs)   Bicep Curl 2 sets of 15;Bilateral;Weight (See Comments for lbs)   Other Exercise 2 sets of 15;Bilateral;Weight (See Comments for lbs);int/ext rot   Comments 1lb hand weight with breaks between sets.   Interdisciplinary Plan of Care Collaboration   IDT Collaboration with  Nursing;Family / Caregiver;Physical Therapist   Patient Position at End of Therapy In " Bed;Bed Alarm On;Tray Table within Reach;Call Light within Reach;Family / Friend in Room;Phone within Reach   Collaboration Comments RN cleared pt for abdominal self-massage for BM assist. Son present for part of session. Pt notified OT of low BP during her AM session.       Pt education and completed external large intestine massage to assist with overall motility of BM, as pt was having cramping and has not had a BM in 3 days. RN provided warm prune juice in session as well. Educated pt on need for increased physical movement and water intake.    Assessment    Pt with fair tolerance to supine therex, ADLs, and education with limitations of nausea. Though pt's vitals were stable, her pace was slower to avoid increased nausea.  Strengths: Able to follow instructions, Adequate strength, Alert and oriented, Good insight into deficits/needs, Independent prior level of function, Manages pain appropriately, Motivated for self care and independence, Supportive family, Pleasant and cooperative, Willingly participates in therapeutic activities  Barriers: Fatigue, Impaired activity tolerance, Impaired balance, Limited mobility    Plan    I/ADLs in standing as tolerated; Functional transfers using FWW   Monitor vitals.        DME  OT DME Recommendations  Bathroom Equipment:  (shower chair, GB)  Additional Equipment:  (TBD based on pt progress)     Passport items to be completed:  Perform bathroom transfers, complete dressing, complete feeding, get ready for the day, prepare a simple meal, participate in household tasks, adapt home for safety needs, demonstrate home exercise program, complete caregiver training    Occupational Therapy Goals (Active)       Problem: Dressing       Dates: Start:  07/24/24         Goal: STG-Within one week, patient will dress UB at SBA using LRD       Dates: Start:  07/24/24    Expected End:  08/02/24            Goal: STG-Within one week, patient will dress LB at SBA using LRD       Dates: Start:   07/24/24    Expected End:  08/02/24               Problem: Functional Transfers       Dates: Start:  07/24/24         Goal: STG-Within one week, patient will transfer to toilet at [facility] using LRD       Dates: Start:  07/24/24    Expected End:  08/02/24            Goal: STG-Within one week, patient will transfer to step in shower at [facility] using LRD       Dates: Start:  07/24/24    Expected End:  08/02/24               Problem: IADL's       Dates: Start:  07/24/24         Goal: STG-Within one week, patient will access kitchen area at [facility] using LRD       Dates: Start:  07/24/24    Expected End:  08/02/24               Problem: OT Long Term Goals       Dates: Start:  07/24/24         Goal: LTG-By discharge, patient will complete basic self care tasks at [facility] to [facility] using LRD       Dates: Start:  07/24/24    Expected End:  08/02/24            Goal: LTG-By discharge, patient will perform bathroom transfers at [facility] to [facility] using LRD       Dates: Start:  07/24/24    Expected End:  08/02/24            Goal: LTG-By discharge, patient will complete basic home management at [facility] to [facility] using LRD       Dates: Start:  07/24/24    Expected End:  08/02/24

## 2024-07-27 NOTE — PROGRESS NOTES
[facility] Medicine Daily Progress Note    Date of Service  7/27/2024    Chief Complaint:  Hypertension  Afib  Anemia    Interval History:  BP dropped lower today (orthostatic) on standing during therapy and pt was lightheaded.  Will d/c Diovan and HCTZ.  Will repeat orthostatics tomorrow.      Review of Systems  Review of Systems   Constitutional:  Negative for fever.   Eyes:  Negative for blurred vision.   Respiratory:  Negative for shortness of breath.    Cardiovascular:  Negative for palpitations.   Gastrointestinal:  Negative for nausea and vomiting.   Neurological:  Negative for dizziness and headaches.   Psychiatric/Behavioral:  Negative for hallucinations.         Physical Exam  Temp:  [36.6 °C (97.9 °F)-36.8 °C (98.3 °F)] 36.7 °C (98 °F)  Pulse:  [59-79] 59  Resp:  [16-18] 18  BP: ()/(38-87) 111/42  SpO2:  [91 %-96 %] 91 %    Physical Exam  Vitals and nursing note reviewed.   Constitutional:       General: She is not in acute distress.  HENT:      Mouth/Throat:      Mouth: Mucous membranes are moist.      Pharynx: Oropharynx is clear.   Eyes:      General: No scleral icterus.  Cardiovascular:      Rate and Rhythm: Normal rate and regular rhythm.   Pulmonary:      Effort: Pulmonary effort is normal.      Breath sounds: No wheezing or rales.   Abdominal:      General: Bowel sounds are normal.      Palpations: Abdomen is soft.   Musculoskeletal:      Cervical back: No rigidity.      Right lower leg: Edema present.      Left lower leg: Edema present.   Skin:     General: Skin is warm and dry.   Neurological:      Mental Status: She is alert and oriented to person, place, and time.   Psychiatric:         Mood and Affect: Mood normal.         Behavior: Behavior normal.         Fluids    Intake/Output Summary (Last 24 hours) at 7/27/2024 1055  Last data filed at 7/27/2024 0800  Gross per 24 hour   Intake 260 ml   Output --   Net 260 ml       Laboratory                            Imaging    Assessment/Plan  *  S/P TAVR (transcatheter aortic valve replacement)- (present on admission)  Assessment & Plan  Had AS -- s/p TAVR  Has B/L LE edema  BNP: 427 (7/16)  RLE US (7/22): no DVT  Cont HCTZ --> will d/c 2nd to orthostatics  Cont Aldactone  Not on any K+ supplements  F/U BLE US to r/o DVT  K+: 3.9 (7/24)  Cont to monitor    Anemia  Assessment & Plan  H&H stable with Hb 9.1  2nd to surgery (H&H was wnl before surgery)  Monitor    A-fib (HCC)- (present on admission)  Assessment & Plan  HR a little labile but generally ok  HR on initial exam showed NSR  Occurred post-op  [place] [facility]  Cont Amio  Cont HCTZ -- will d/c 2nd to orthostatic  Cont Aldactone  Cont to monitor    Common femoral artery injury, right, initial encounter- (present on admission)  Assessment & Plan  2nd to gen surgery  S/P vascular repair    Dyslipidemia- (present on admission)  Assessment & Plan  Cont Crestor    Essential hypertension  Assessment & Plan  BP ok but appears to be trending lower recently  BP dropped lower today in PT this am -- orthostatic hypotension  Repeat orthostatics at 1:00 pm was neg  On Diovan --> will d/c 2nd to possible SE of orthostatics  On HCTZ --> will d/c 2nd to possible SE of orthostatics  Consider starting Midodrine  Will repeat orthostatics tomorrow  Note: was on Diovan and HCTZ at home  Note: on HCTZ and Aldactone  If BP rises up after meds stopped, will consider an different anti-hypertensive med  Cont to monitor

## 2024-07-27 NOTE — PROGRESS NOTES
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  Allergies: Sulfa drugs, Codeine, Oxycodone, and Vancomycin    Safety  Patient Assist  min  Patient Precautions  Fall Risk, Other (See Comments)  Precaution Comments  Avoid heavy lifting (>5 pounds), excessive bending, stretching, pushing, or pulling for two weeks (sx: 07/15) after your procedure, per post-op protocol. 02 2L PM, 2L 02 PRN AM  Bed Transfer Status  Standby Assist  Toilet Transfer Status   Standby Assist  Assistive Devices  Rails, Wheelchair  Oxygen  Nasal Cannula  Diet/Therapeutic Dining  Current Diet Order   Procedures    Diet Order Diet: Cardiac     Pill Administration  whole  Agitated Behavioral Scale     ABS Level of Severity       Fall Risk  Has the patient had a fall this admission?   No  Christina Elizondo Fall Risk Scoring  17, HIGH RISK  Fall Risk Safety Measures  bed alarm, chair alarm, and poor balance    Vitals  Temperature: 36.6 °C (97.9 °F)  Temp src: Oral  Pulse: (!) 59 (post 64)  Respiration: 18 (post 18)  Blood Pressure : 110/46  Blood Pressure MAP (Calculated): 67 MM HG  BP Location: Left, Upper Arm  Patient BP Position: Supine     Oxygen  Pulse Oximetry: 96 %  O2 (LPM): 2  O2 Delivery Device: Nasal Cannula    Bowel and Bladder  Last Bowel Movement  07/24/24  Stool Type  Type 6: Fluffy pieces with ragged edges, a mushy stool  Bowel Device  Bathroom  Continent  Bladder: Continent void   Bowel: Continent movement  Bladder Function  Urine Void (mL):  (large)  Number of Times Voided: 1  Urine Color: Yellow  Genitourinary Assessment   Bladder Assessment (WDL):  Within Defined Limits  Kennedy Catheter: Not Applicable  Urine Color: Yellow  Bladder Device: Bathroom  Time Void: Yes  Bladder Scan: Post Void  $ Bladder Scan Results (mL):  1    Skin  Delfino Score   17  Sensory Interventions   Bed Types: Standard/Trauma Mattress  Skin Preventative Measures: Pillows in Use for Support / Positioning  Moisture Interventions  Moisturizers/Barriers: Barrier Paste, Barrier Wipes      Pain  Pain Rating Scale  0 - No Pain  Pain Location  Groin  Pain Location Orientation  Right  Pain Interventions   [place]    ADLs    Bathing    (ot , will give her shower this morning)  Linen Change      Personal Hygiene  Perineal Care  Chlorhexidine Bath      Oral Care  Brushed Teeth  Teeth/Dentures     Shave     Nutrition Percentage Eaten  *  * Meal *  *, Between 50-75% Consumed  Environmental Precautions  Bed in Low Position, Treaded Slipper Socks on Patient, Personal Belongings, Wastebasket, Call Bell etc. in Easy Reach  Patient Turns/Positioning  Patient Turns Self from Side to Side  Patient Turns Assistance/Tolerance     Bed Positions  Bed Controls On, Bed Locked  Head of Bed Elevated  Self regulated      Psychosocial/Neurologic Assessment  Psychosocial Assessment  Psychosocial (WDL):  Within Defined Limits  Neurologic Assessment  Neuro (WDL): Exceptions to WDL  Level of Consciousness: Alert  Orientation Level: Oriented X4  Cognition: Follows commands, Appropriate attention/concentration  Speech: Clear  EENT (WDL):  WDL Except    Cardio/Pulmonary Assessment  Edema   RLE Edema: 1+  LLE Edema: 1+  Respiratory Breath Sounds  RUL Breath Sounds: Clear (increased aeration T/O)  RML Breath Sounds: Clear  RLL Breath Sounds: Diminished  KUNAL Breath Sounds: Clear  LLL Breath Sounds: Diminished  Cardiac Assessment   Cardiac (WDL):  WDL Except (TAVR, HF, Afib)

## 2024-07-27 NOTE — CARE PLAN
"  Problem: Fall Risk - Rehab  Goal: Patient will remain free from falls  Outcome: Progressing  Note: Christina Elizondo Fall risk Assessment Score: 17    High fall risk Interventions   - Bed and strip alarm   - Yellow sign by the door   - Yellow wrist band \"Fall risk\"  - Room near to the nurse station  - Do not leave patient unattended in the bathroom  - Fall risk education provided      Problem: Pain - Standard  Goal: Alleviation of pain or a reduction in pain to the patient’s comfort goal  Outcome: Progressing  Note: Assessed for pain and discomfort , pain under control, needs anticipated and attended.   The patient is Stable - Low risk of patient condition declining or worsening    Shift Goals  Clinical Goals: Safety  Patient Goals: Safety, Rest    Progress made toward(s) clinical / shift goals:  Pt free from fall and injury.    "

## 2024-07-27 NOTE — FLOWSHEET NOTE
07/27/24 0729   Events/Summary/Plan   Events/Summary/Plan 02 pulse ox check, 02 titrated to 1L after svn   Vital Signs   Pulse 79   Respiration 16   Pulse Oximetry 92 %   $ Pulse Oximetry (Spot Check) Yes   Respiratory Assessment   Level of Consciousness Alert   Chest Exam   Work Of Breathing / Effort Within Normal Limits   Secretions   Cough Productive   How Sputum Obtained Expectorated;Spontaneous   Sputum Amount Small   Sputum Color Clear;White;Yellow   Sputum Consistency Thick   Oxygen   O2 (LPM) 1   O2 Delivery Device Silicone Nasal Cannula

## 2024-07-27 NOTE — CARE PLAN
The patient is Stable - Low risk of patient condition declining or worsening      Problem: Nutrition  Goal: Patient's nutritional and fluid intake will be adequate or improve  Outcome: Progressing  Note: Patient free from s/s dehydration.  Oral and buccal mucosa pink and moist; conjunctiva moist; skin turgor good.  PO intake adequate.  Will continue to monitor.       Problem: Bowel Elimination  Goal: Establish and maintain regular bowel function  Outcome: Progressing  Note: Patient given another dose of prune juice and butter to encourage a BM. Pt had several watery stools this afternoon with cramping. Dr. Oconnor notified and order for abdominal X-ray, will continue to monitor.

## 2024-07-28 ENCOUNTER — APPOINTMENT (OUTPATIENT)
Dept: PHYSICAL THERAPY | Facility: REHABILITATION | Age: 84
DRG: 949 | End: 2024-07-28
Attending: PHYSICAL MEDICINE & REHABILITATION
Payer: MEDICARE

## 2024-07-28 PROCEDURE — 94760 N-INVAS EAR/PLS OXIMETRY 1: CPT

## 2024-07-28 PROCEDURE — 770010 HCHG ROOM/CARE - REHAB SEMI PRIVAT*

## 2024-07-28 PROCEDURE — 94640 AIRWAY INHALATION TREATMENT: CPT

## 2024-07-28 PROCEDURE — A9270 NON-COVERED ITEM OR SERVICE: HCPCS | Performed by: PHYSICAL MEDICINE & REHABILITATION

## 2024-07-28 PROCEDURE — 97530 THERAPEUTIC ACTIVITIES: CPT | Mod: CQ

## 2024-07-28 PROCEDURE — 700102 HCHG RX REV CODE 250 W/ 637 OVERRIDE(OP): Performed by: PHYSICAL MEDICINE & REHABILITATION

## 2024-07-28 PROCEDURE — 97110 THERAPEUTIC EXERCISES: CPT | Mod: CQ

## 2024-07-28 PROCEDURE — 99231 SBSQ HOSP IP/OBS SF/LOW 25: CPT | Performed by: HOSPITALIST

## 2024-07-28 PROCEDURE — 700101 HCHG RX REV CODE 250: Performed by: PHYSICAL MEDICINE & REHABILITATION

## 2024-07-28 PROCEDURE — 97116 GAIT TRAINING THERAPY: CPT | Mod: CQ

## 2024-07-28 RX ADMIN — PAROXETINE HYDROCHLORIDE 10 MG: 20 TABLET, FILM COATED ORAL at 08:35

## 2024-07-28 RX ADMIN — SPIRONOLACTONE 25 MG: 25 TABLET ORAL at 05:37

## 2024-07-28 RX ADMIN — AMIODARONE HYDROCHLORIDE 200 MG: 200 TABLET ORAL at 05:37

## 2024-07-28 RX ADMIN — FLUTICASONE FUROATE, UMECLIDINIUM BROMIDE AND VILANTEROL TRIFENATATE 1 PUFF: 200; 62.5; 25 POWDER RESPIRATORY (INHALATION) at 09:29

## 2024-07-28 RX ADMIN — ALBUTEROL SULFATE 2.5 MG: 2.5 SOLUTION RESPIRATORY (INHALATION) at 09:29

## 2024-07-28 RX ADMIN — ACETAMINOPHEN 650 MG: 325 TABLET ORAL at 22:13

## 2024-07-28 RX ADMIN — ANASTROZOLE 1 MG: 1 TABLET, COATED ORAL at 08:35

## 2024-07-28 RX ADMIN — TRAZODONE HYDROCHLORIDE 50 MG: 50 TABLET ORAL at 22:13

## 2024-07-28 RX ADMIN — ASPIRIN 81 MG: 81 TABLET, COATED ORAL at 08:35

## 2024-07-28 RX ADMIN — ROSUVASTATIN CALCIUM 20 MG: 10 TABLET, FILM COATED ORAL at 17:12

## 2024-07-28 RX ADMIN — OMEPRAZOLE 20 MG: 20 CAPSULE, DELAYED RELEASE ORAL at 08:36

## 2024-07-28 ASSESSMENT — PAIN DESCRIPTION - PAIN TYPE: TYPE: ACUTE PAIN

## 2024-07-28 ASSESSMENT — GAIT ASSESSMENTS
DEVIATION: BRADYKINETIC
GAIT LEVEL OF ASSIST: STANDBY ASSIST
DISTANCE (FEET): 100
ASSISTIVE DEVICE: 4 WHEEL WALKER

## 2024-07-28 ASSESSMENT — ENCOUNTER SYMPTOMS
COUGH: 0
NERVOUS/ANXIOUS: 0
DIARRHEA: 0
DIZZINESS: 0
FEVER: 0
BLURRED VISION: 0

## 2024-07-28 ASSESSMENT — ACTIVITIES OF DAILY LIVING (ADL)
BED_CHAIR_WHEELCHAIR_TRANSFER_DESCRIPTION: SUPERVISION FOR SAFETY;SET-UP OF EQUIPMENT;INCREASED TIME
TOILET_TRANSFER_DESCRIPTION: GRAB BAR;INCREASED TIME;SUPERVISION FOR SAFETY

## 2024-07-28 NOTE — PROGRESS NOTES
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  Allergies: Sulfa drugs, Codeine, Oxycodone, and Vancomycin    Safety  Patient Assist  min  Patient Precautions  Fall Risk, Other (See Comments)  Precaution Comments  Avoid heavy lifting (>5 pounds), excessive bending, stretching, pushing, or pulling for two weeks (sx: 07/15) after your procedure, per post-op protocol. 02 2L PM, 2L 02 PRN AM  Bed Transfer Status  Standby Assist  Toilet Transfer Status   Standby Assist  Assistive Devices  Rails, Wheelchair  Oxygen  Nasal Cannula  Diet/Therapeutic Dining  Current Diet Order   Procedures    Diet Order Diet: Cardiac     Pill Administration  whole  Agitated Behavioral Scale     ABS Level of Severity       Fall Risk  Has the patient had a fall this admission?   No  Christina Elizondo Fall Risk Scoring  17, HIGH RISK  Fall Risk Safety Measures  bed alarm and chair alarm    Vitals  Temperature: 37.1 °C (98.8 °F)  Temp src: Temporal  Pulse: (!) 56  Respiration: 17  Blood Pressure : 117/43  Blood Pressure MAP (Calculated): 68 MM HG  BP Location: Left, Upper Arm  Patient BP Position: Galvez's Position     Oxygen  Pulse Oximetry: 94 %  O2 (LPM): 1  O2 Delivery Device: Nasal Cannula    Bowel and Bladder  Last Bowel Movement  07/27/24  Stool Type  Type 7: Watery, no solid pieces-entirely liquid  Bowel Device  Bathroom  Continent  Bladder: Continent void   Bowel: Continent movement  Bladder Function  Urine Void (mL):  (Large)  Number of Times Voided: 1  Urine Color: Yellow  Genitourinary Assessment   Bladder Assessment (WDL):  Within Defined Limits  Kennedy Catheter: Not Applicable  Urine Color: Yellow  Bladder Device: Bathroom  Time Void: Yes  Bladder Scan: Post Void  $ Bladder Scan Results (mL): 1    Skin  Delfino Score   18  Sensory  Interventions   Bed Types: Standard/Trauma Mattress  Skin Preventative Measures: Pillows in Use for Support / Positioning  Moisture Interventions  Moisturizers/Barriers: Barrier Paste      Pain  Pain Rating Scale  0 - No Pain  Pain Location  Groin  Pain Location Orientation  Right  Pain Interventions   Declines    ADLs    Bathing    (ot , will give her shower this morning)  Linen Change      Personal Hygiene  Change Trisha Pads, Moist Trisha Wipes, Perineal Care  Chlorhexidine Bath      Oral Care  Brushed Teeth  Teeth/Dentures     Shave     Nutrition Percentage Eaten  Breakfast, Between % Consumed  Environmental Precautions  Treaded Slipper Socks on Patient, Bed in Low Position  Patient Turns/Positioning  Patient Turns Self from Side to Side  Patient Turns Assistance/Tolerance     Bed Positions  Bed Controls On, Bed Locked  Head of Bed Elevated  Self regulated      Psychosocial/Neurologic Assessment  Psychosocial Assessment  Psychosocial (WDL):  Within Defined Limits  Neurologic Assessment  Neuro (WDL): Within Defined Limits  Level of Consciousness: Alert  Orientation Level: Oriented X4  Cognition: Follows commands, Appropriate attention/concentration  Speech: Clear  EENT (WDL):  WDL Except    Cardio/Pulmonary Assessment  Edema   RLE Edema: 1+  LLE Edema: 1+  Respiratory Breath Sounds  RUL Breath Sounds: Clear  RML Breath Sounds: Clear  RLL Breath Sounds: Diminished  KUNAL Breath Sounds: Clear  LLL Breath Sounds: Diminished  Cardiac Assessment   Cardiac (WDL):  WDL Except (TAVR, HF, Afib)

## 2024-07-28 NOTE — CARE PLAN
"The patient is Stable - Low risk of patient condition declining or worsening    Shift Goals  Clinical Goals: Safety  Patient Goals: Safety, Rest    Problem: Skin Integrity  Goal: Skin integrity is maintained or improved  Outcome: Progressing  Note:   Delfino Score: 18    Patient's skin remains intact and free from new or accidental injury this shift; no s/s of infection. RN wound protocol checked. Encouraged hydration and educated about the importance of nutrition to keep skin integrity. Will continue to monitor.      Problem: Fall Risk - Rehab  Goal: Patient will remain free from falls  Outcome: Progressing  Note: Christina Elizondo Fall risk Assessment Score: 17    High fall risk Interventions   - Bed and strip alarm   - Yellow sign by the door   - Yellow wrist band \"Fall risk\"  - Room near to the nurse station  - Do not leave patient unattended in the bathroom  - Fall risk education provided     "

## 2024-07-28 NOTE — CARE PLAN
"The patient is Stable - Low risk of patient condition declining or worsening    Shift Goals  Clinical Goals: Safety  Patient Goals: Safety, Rest    Progress made toward(s) clinical / shift goals:    Problem: Fall Risk - Rehab  Goal: Patient will remain free from falls  Outcome: Progressing       Christina Elizondo Fall risk Assessment : 17    High fall risk Interventions   - Bed and strip alarm   - Yellow sign by the door   - Yellow wrist band \"Fall risk\"  - Room near to the nurse station  - Do not leave patient unattended in the bathroom  - Fall risk education provided    Pt uses call light consistently and appropriately. Waits for assistance does not attempt self transfer this shift. Able to verbalize needs.  "

## 2024-07-28 NOTE — PROGRESS NOTES
[facility] Medicine Daily Progress Note    Date of Service  7/28/2024    Chief Complaint:  Hypertension  Afib  Anemia    Interval History:  No complaints.  Doing ok.    Review of Systems  Review of Systems   Constitutional:  Negative for fever.   Eyes:  Negative for blurred vision.   Respiratory:  Negative for cough.    Cardiovascular:  Negative for chest pain.   Gastrointestinal:  Negative for diarrhea.   Musculoskeletal:  Negative for joint pain.   Neurological:  Negative for dizziness.   Psychiatric/Behavioral:  The patient is not nervous/anxious.         Physical Exam  Temp:  [36.6 °C (97.8 °F)-37.3 °C (99.1 °F)] 37.3 °C (99.1 °F)  Pulse:  [] 58  Resp:  [17-18] 18  BP: (105-133)/(35-54) 105/35  SpO2:  [91 %-94 %] 92 %    Physical Exam  Vitals and nursing note reviewed.   Constitutional:       Appearance: She is not diaphoretic.   HENT:      Mouth/Throat:      Pharynx: No oropharyngeal exudate or posterior oropharyngeal erythema.   Eyes:      Extraocular Movements: Extraocular movements intact.   Neck:      Vascular: No carotid bruit or JVD.   Cardiovascular:      Rate and Rhythm: Normal rate and regular rhythm.      Heart sounds: Normal heart sounds.   Pulmonary:      Effort: Pulmonary effort is normal.      Breath sounds: No wheezing or rales.   Abdominal:      General: There is no distension.      Palpations: Abdomen is soft.      Tenderness: There is no abdominal tenderness.   Musculoskeletal:      Right lower leg: No edema.      Left lower leg: No edema.   Skin:     General: Skin is warm and dry.   Neurological:      Mental Status: She is alert and oriented to person, place, and time.   Psychiatric:         Mood and Affect: Mood normal.         Behavior: Behavior normal.         Fluids    Intake/Output Summary (Last 24 hours) at 7/28/2024 1054  Last data filed at 7/27/2024 2100  Gross per 24 hour   Intake 100 ml   Output --   Net 100 ml       Laboratory                             Imaging    Assessment/Plan  * S/P TAVR (transcatheter aortic valve replacement)- (present on admission)  Assessment & Plan  Had AS -- s/p TAVR  Has B/L LE edema  BNP: 427 (7/16)  RLE US (7/22): no DVT  BLE US (7/27): no DVT  Off HCTZ -- 2nd to possible SE of orthostatics  Cont Aldactone  Not on any K+ supplements  K+: 3.9 (7/24)  Cont to monitor    Anemia  Assessment & Plan  H&H stable with Hb 9.1  2nd to surgery (H&H was wnl before surgery)  Monitor    A-fib (HCC)- (present on admission)  Assessment & Plan  HR a little labile but ok  HR on initial exam showed NSR  Occ[place] post[place] [facility] [place]  Off HCTZ -- 2nd to possible orthostatic SE  Cont Aldactone  Cont to monitor    Common femoral artery injury, right, initial encounter- (present on admission)  Assessment & Plan  2nd to gen surgery  S/P vascular repair    Dyslipidemia- (present on admission)  Assessment & Plan  Cont Crestor    Essential hypertension  Assessment & Plan  BP low normal  (7/27): BP dropped lower today in PT in am  Orthostatics were (+) but later at 1:00 pm was neg  Off Diovan --> 2nd to possible SE of orthostatics  Off HCTZ --> 2nd to possible SE of orthostatics  Consider starting Midodrine  Will get repeat orthostatics today  Note: was on Diovan and HCTZ at home  Note: on Aldactone  Cont to monitor

## 2024-07-28 NOTE — FLOWSHEET NOTE
07/28/24 0932   Inhalation Therapy Treatment   $ SVN Performed Yes   Given By: Mouthpiece   $ MDI/DPI Given MDI/DPI x 1

## 2024-07-28 NOTE — THERAPY
"Physical Therapy   Daily Treatment     Patient Name: Erin Hess  Age:  84 y.o., Sex:  female  Medical Record #: [id number]  Today's Date: 7/28/2024     Precautions  Precautions: Fall Risk, Other (See Comments)  Comments: Avoid heavy lifting (>5 pounds), excessive bending, stretching, pushing, or pulling for two weeks (sx: 07/15) after your procedure, per post-op protocol. 02 2L PM, 2L 02 PRN AM    Subjective    Pt in BR upon arrival agreed to therapy    \"Id like to get dressed if I have therapy\"     Objective       07/28/24 0831   PT Charge Group   PT Gait Training (Units) 1   PT Therapeutic Exercise (Units) 1   PT Therapeutic Activities (Units) 2   Supervising Physical Therapist Nichol Hart   PT Total Time Spent   PT Individual Total Time Spent (Mins) 60   Pain   Intervention Declines   Gait Functional Level of Assist    Gait Level Of Assist Standby Assist   Assistive Device 4 Wheel Walker   Distance (Feet) 100   # of Times Distance was Traveled 2   Deviation Bradykinetic   Transfer Functional Level of Assist   Bed, Chair, Wheelchair Transfer Standby Assist   Bed Chair Wheelchair Transfer Description Supervision for safety;Set-up of equipment;Increased time   Toilet Transfers Standby Assist   Toilet Transfer Description Grab bar;Increased time;Supervision for safety   Bed Mobility    Supine to Sit Standby Assist   Sit to Supine Standby Assist   Sit to Stand Standby Assist   Scooting Supervised   Rolling Supervised   Neuro-Muscular Treatments   Neuro-Muscular Treatments Sequencing;Verbal Cuing   Interdisciplinary Plan of Care Collaboration   IDT Collaboration with  Respiratory Therapist   Patient Position at End of Therapy Seated;Chair Alarm On;Self Releasing Lap Belt Applied;Call Light within Reach;Tray Table within Reach   Collaboration Comments room air check during PT        07/28/24 0903 07/28/24 0911   Vitals   Pulse 69 (!) 103   Patient BP Position Sitting Standing 3 minutes   Blood Pressure  126/46 98/45 " "  Room Air Oximetry 90 92   O2 (LPM) 0 0   O2 Delivery Device None - Room Air None - Room Air   Vitals Comments At rest, 88-90% with pursed lip breathing   --        Pt requested assistance to remove tags from her clothing that her family purchased for her    Pt completed UB/LB dressing seated at EOB with set up and SBA/SPV    Donned compression socking in supine for participation in OOB therapy    Instructed pt on diaphragmatic breathing vs pursed lip breathing and provided HEP handout for diphrag breathing for seated and supine    Edu provided on RPE and pacing activities, hand out provided      Assessment    Erin tolerated PT session on RA with sats closely monitored throughout. Pt occasionally dropped to 87-89% for approx 20-30\" and was able to recover using breathing techniques. Pt was hypotensive, relatively asymptomatic, see vitals above    Strengths: Able to follow instructions, Alert and oriented, Effective communication skills, Independent prior level of function, Motivated for self care and independence, Pleasant and cooperative, Supportive family, Willingly participates in therapeutic activities  Barriers: Decreased endurance, Fatigue, Generalized weakness, Pain    Plan  Ambulation w/ 4WW/ LRAD  Activity tolerance  BLE strengthening  Standing balance  Stair training  TAVR precautions    DME  PT DME Recommendations  Assistive Device: 4-Wheel Walker  Additional Equipment:  (TBD based on pt progress)    Passport items to be completed:  Get in/out of bed safely, in/out of a vehicle, safely use mobility device, walk or wheel around home/community, navigate up and down stairs, show how to get up/down from the ground, ensure home is accessible, demonstrate HEP, complete caregiver training    Physical Therapy Problems (Active)       Problem: Mobility       Dates: Start:  07/24/24         Goal: STG-Within one week, patient will ambulate 100ft w/ FWW and SBA       Dates: Start:  07/24/24    Expected End:  " 08/02/24            Goal: STG-Within one week, patient will ambulate up/down a curb w/ FWW and SPV       Dates: Start:  07/24/24    Expected End:  08/02/24               Problem: Mobility Transfers       Dates: Start:  07/24/24         Goal: STG-Within one week, patient will transfer bed to chair w/ FWW and SPV       Dates: Start:  07/24/24    Expected End:  08/02/24               Problem: PT-Long Term Goals       Dates: Start:  07/24/24         Goal: LTG-By discharge, patient will ambulate 250ft w/ LRAD and Jose G       Dates: Start:  07/24/24    Expected End:  08/02/24            Goal: LTG-By discharge, patient will independently perform home exercise program       Dates: Start:  07/24/24    Expected End:  08/02/24            Goal: LTG-By discharge, patient will transfer in/out of a car w/ FWW and Jose G       Dates: Start:  07/24/24    Expected End:  08/02/24

## 2024-07-28 NOTE — FLOWSHEET NOTE
07/28/24 0931   Events/Summary/Plan   Events/Summary/Plan spot check done pt on room air at this time doing well, meds given   Vital Signs   Pulse 72   Respiration 18   Pulse Oximetry 94 %   $ Pulse Oximetry (Spot Check) Yes   Respiratory Assessment   Respiratory Pattern Within Normal Limits   Level of Consciousness Alert   Chest Exam   Work Of Breathing / Effort Within Normal Limits   Breath Sounds   RUL Breath Sounds Clear;Diminished   RML Breath Sounds Clear   RLL Breath Sounds Clear   KUNAL Breath Sounds Clear   LLL Breath Sounds Clear   Secretions   Cough Strong;Productive   How Sputum Obtained Spontaneous   Sputum Amount Moderate   Sputum Color Clear   Sputum Consistency Thick   Oxygen   O2 (LPM) 0   FiO2% 21 %   O2 Delivery Device None - Room Air

## 2024-07-28 NOTE — PROGRESS NOTES
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  Allergies: Sulfa drugs, Codeine, Oxycodone, and Vancomycin    Safety  Patient Assist  min  Patient Precautions  Fall Risk, Other (See Comments)  Precaution Comments  Avoid heavy lifting (>5 pounds), excessive bending, stretching, pushing, or pulling for two weeks (sx: 07/15) after your procedure, per post-op protocol. 02 2L PM, 2L 02 PRN AM  Bed Transfer Status  Standby Assist  Toilet Transfer Status   Standby Assist  Assistive Devices  Rails, Wheelchair  Oxygen  Nasal Cannula  Diet/Therapeutic Dining  Current Diet Order   Procedures    Diet Order Diet: Cardiac     Pill Administration  whole  Agitated Behavioral Scale     ABS Level of Severity       Fall Risk  Has the patient had a fall this admission?   No  Christina Elizondo Fall Risk Scoring  17, HIGH RISK  Fall Risk Safety Measures  bed alarm, chair alarm, poor balance, and low vision/ hearing    Vitals  Temperature: 36.6 °C (97.8 °F)  Temp src: Oral  Pulse: 67  Respiration: 18  Blood Pressure : 123/43  Blood Pressure MAP (Calculated): 70 MM HG  BP Location: Left, Upper Arm  Patient BP Position: Supine     Oxygen  Pulse Oximetry: 91 %  O2 (LPM): 1  O2 Delivery Device: Nasal Cannula    Bowel and Bladder  Last Bowel Movement  07/27/24  Stool Type  Type 7: Watery, no solid pieces-entirely liquid  Bowel Device  Bathroom  Continent  Bladder: Continent void   Bowel: Continent movement  Bladder Function  Urine Void (mL):  (large)  Number of Times Voided: 1  Urine Color: Unable To Evaluate  Genitourinary Assessment   Bladder Assessment (WDL):  Within Defined Limits  Kennedy Catheter: Not Applicable  Urine Color: Unable To Evaluate  Bladder Device: Bathroom  Time Void: Yes  Bladder Scan: Post Void  $ Bladder Scan Results (mL):  1    Skin  Delfino Score   18  Sensory Interventions   Bed Types: Standard/Trauma Mattress with Overlay  Skin Preventative Measures: Pillows in Use for Support / Positioning, Seat Cushion in Use on Chair when Out of Bed  Moisture Interventions  Moisturizers/Barriers: Barrier Cream      Pain  Pain Rating Scale  0 - No Pain  Pain Location  Groin  Pain Location Orientation  Right  Pain Interventions   Declines    ADLs    Bathing    (ot , will give her shower this morning)  Linen Change      Personal Hygiene  Perineal Care  Chlorhexidine Bath      Oral Care  Brushed Teeth  Teeth/Dentures     Shave     Nutrition Percentage Eaten  Breakfast, Between % Consumed  Environmental Precautions  Bed in Low Position, Treaded Slipper Socks on Patient, Personal Belongings, Wastebasket, Call Bell etc. in Easy Reach  Patient Turns/Positioning  Patient Turns Self from Side to Side  Patient Turns Assistance/Tolerance     Bed Positions  Bed Controls On, Bed Locked  Head of Bed Elevated  Self regulated      Psychosocial/Neurologic Assessment  Psychosocial Assessment  Psychosocial (WDL):  Within Defined Limits  Neurologic Assessment  Neuro (WDL): Within Defined Limits  Level of Consciousness: Alert  Orientation Level: Oriented X4  Cognition: Follows commands, Appropriate attention/concentration  Speech: Clear  EENT (WDL):  WDL Except    Cardio/Pulmonary Assessment  Edema   RLE Edema: 1+  LLE Edema: 1+  Respiratory Breath Sounds  RUL Breath Sounds: Clear  RML Breath Sounds: Clear  RLL Breath Sounds: Diminished  KUNAL Breath Sounds: Clear  LLL Breath Sounds: Diminished  Cardiac Assessment   Cardiac (WDL):  WDL Except (TAVR, HF, Afib)

## 2024-07-29 ENCOUNTER — APPOINTMENT (OUTPATIENT)
Dept: OCCUPATIONAL THERAPY | Facility: REHABILITATION | Age: 84
DRG: 949 | End: 2024-07-29
Attending: PHYSICAL MEDICINE & REHABILITATION
Payer: MEDICARE

## 2024-07-29 ENCOUNTER — APPOINTMENT (OUTPATIENT)
Dept: PHYSICAL THERAPY | Facility: REHABILITATION | Age: 84
DRG: 949 | End: 2024-07-29
Attending: PHYSICAL MEDICINE & REHABILITATION
Payer: MEDICARE

## 2024-07-29 PROBLEM — D64.9 ANEMIA: Status: RESOLVED | Noted: 2024-07-25 | Resolved: 2024-07-29

## 2024-07-29 LAB
ANION GAP SERPL CALC-SCNC: 7 MMOL/L (ref 7–16)
BASOPHILS # BLD AUTO: 0.7 % (ref 0–1.8)
BASOPHILS # BLD: 0.05 K/UL (ref 0–0.12)
BUN SERPL-MCNC: 15 MG/DL (ref 8–22)
CALCIUM SERPL-MCNC: 9.6 MG/DL (ref 8.5–10.5)
CHLORIDE SERPL-SCNC: 105 MMOL/L (ref 96–112)
CO2 SERPL-SCNC: 26 MMOL/L (ref 20–33)
CREAT SERPL-MCNC: 0.94 MG/DL (ref 0.5–1.4)
EOSINOPHIL # BLD AUTO: 0.16 K/UL (ref 0–0.51)
EOSINOPHIL NFR BLD: 2.4 % (ref 0–6.9)
ERYTHROCYTE [DISTWIDTH] IN BLOOD BY AUTOMATED COUNT: 51.5 FL (ref 35.9–50)
GFR SERPLBLD CREATININE-BSD FMLA CKD-EPI: 60 ML/MIN/1.73 M 2
GLUCOSE SERPL-MCNC: 93 MG/DL (ref 65–99)
HCT VFR BLD AUTO: 30.2 % (ref 37–47)
HGB BLD-MCNC: 9.4 G/DL (ref 12–16)
IMM GRANULOCYTES # BLD AUTO: 0.03 K/UL (ref 0–0.11)
IMM GRANULOCYTES NFR BLD AUTO: 0.4 % (ref 0–0.9)
LYMPHOCYTES # BLD AUTO: 1.04 K/UL (ref 1–4.8)
LYMPHOCYTES NFR BLD: 15.6 % (ref 22–41)
MAGNESIUM SERPL-MCNC: 1.9 MG/DL (ref 1.5–2.5)
MCH RBC QN AUTO: 27.8 PG (ref 27–33)
MCHC RBC AUTO-ENTMCNC: 31.1 G/DL (ref 32.2–35.5)
MCV RBC AUTO: 89.3 FL (ref 81.4–97.8)
MONOCYTES # BLD AUTO: 0.51 K/UL (ref 0–0.85)
MONOCYTES NFR BLD AUTO: 7.6 % (ref 0–13.4)
NEUTROPHILS # BLD AUTO: 4.88 K/UL (ref 1.82–7.42)
NEUTROPHILS NFR BLD: 73.3 % (ref 44–72)
NRBC # BLD AUTO: 0 K/UL
NRBC BLD-RTO: 0 /100 WBC (ref 0–0.2)
PLATELET # BLD AUTO: 196 K/UL (ref 164–446)
PMV BLD AUTO: 10.3 FL (ref 9–12.9)
POTASSIUM SERPL-SCNC: 4.3 MMOL/L (ref 3.6–5.5)
RBC # BLD AUTO: 3.38 M/UL (ref 4.2–5.4)
SODIUM SERPL-SCNC: 138 MMOL/L (ref 135–145)
WBC # BLD AUTO: 6.7 K/UL (ref 4.8–10.8)

## 2024-07-29 PROCEDURE — A9270 NON-COVERED ITEM OR SERVICE: HCPCS | Performed by: PHYSICAL MEDICINE & REHABILITATION

## 2024-07-29 PROCEDURE — 97530 THERAPEUTIC ACTIVITIES: CPT | Mod: CQ

## 2024-07-29 PROCEDURE — 99232 SBSQ HOSP IP/OBS MODERATE 35: CPT | Performed by: PHYSICAL MEDICINE & REHABILITATION

## 2024-07-29 PROCEDURE — 97530 THERAPEUTIC ACTIVITIES: CPT

## 2024-07-29 PROCEDURE — 700101 HCHG RX REV CODE 250: Performed by: PHYSICAL MEDICINE & REHABILITATION

## 2024-07-29 PROCEDURE — 700102 HCHG RX REV CODE 250 W/ 637 OVERRIDE(OP): Performed by: PHYSICAL MEDICINE & REHABILITATION

## 2024-07-29 PROCEDURE — 94760 N-INVAS EAR/PLS OXIMETRY 1: CPT

## 2024-07-29 PROCEDURE — 85025 COMPLETE CBC W/AUTO DIFF WBC: CPT

## 2024-07-29 PROCEDURE — 94640 AIRWAY INHALATION TREATMENT: CPT

## 2024-07-29 PROCEDURE — 83735 ASSAY OF MAGNESIUM: CPT

## 2024-07-29 PROCEDURE — 97116 GAIT TRAINING THERAPY: CPT | Mod: CQ

## 2024-07-29 PROCEDURE — 99231 SBSQ HOSP IP/OBS SF/LOW 25: CPT | Performed by: HOSPITALIST

## 2024-07-29 PROCEDURE — 80048 BASIC METABOLIC PNL TOTAL CA: CPT

## 2024-07-29 PROCEDURE — 36415 COLL VENOUS BLD VENIPUNCTURE: CPT

## 2024-07-29 PROCEDURE — 97110 THERAPEUTIC EXERCISES: CPT

## 2024-07-29 PROCEDURE — 97110 THERAPEUTIC EXERCISES: CPT | Mod: CQ

## 2024-07-29 PROCEDURE — 770010 HCHG ROOM/CARE - REHAB SEMI PRIVAT*

## 2024-07-29 PROCEDURE — 97535 SELF CARE MNGMENT TRAINING: CPT

## 2024-07-29 RX ORDER — POLYETHYLENE GLYCOL 3350 17 G/17G
1 POWDER, FOR SOLUTION ORAL
Status: DISCONTINUED | OUTPATIENT
Start: 2024-07-29 | End: 2024-08-02 | Stop reason: HOSPADM

## 2024-07-29 RX ORDER — AMOXICILLIN 250 MG
2 CAPSULE ORAL 2 TIMES DAILY PRN
Status: DISCONTINUED | OUTPATIENT
Start: 2024-07-29 | End: 2024-08-02 | Stop reason: HOSPADM

## 2024-07-29 RX ADMIN — ACETAMINOPHEN 650 MG: 325 TABLET ORAL at 21:49

## 2024-07-29 RX ADMIN — ROSUVASTATIN CALCIUM 20 MG: 10 TABLET, FILM COATED ORAL at 17:22

## 2024-07-29 RX ADMIN — ANASTROZOLE 1 MG: 1 TABLET, COATED ORAL at 08:07

## 2024-07-29 RX ADMIN — ASPIRIN 81 MG: 81 TABLET, COATED ORAL at 08:07

## 2024-07-29 RX ADMIN — TRAZODONE HYDROCHLORIDE 50 MG: 50 TABLET ORAL at 21:49

## 2024-07-29 RX ADMIN — PAROXETINE HYDROCHLORIDE 10 MG: 20 TABLET, FILM COATED ORAL at 08:07

## 2024-07-29 RX ADMIN — SPIRONOLACTONE 25 MG: 25 TABLET ORAL at 05:46

## 2024-07-29 RX ADMIN — AMIODARONE HYDROCHLORIDE 200 MG: 200 TABLET ORAL at 05:46

## 2024-07-29 RX ADMIN — FLUTICASONE FUROATE, UMECLIDINIUM BROMIDE AND VILANTEROL TRIFENATATE 1 PUFF: 200; 62.5; 25 POWDER RESPIRATORY (INHALATION) at 11:52

## 2024-07-29 RX ADMIN — ALBUTEROL SULFATE 2.5 MG: 2.5 SOLUTION RESPIRATORY (INHALATION) at 11:42

## 2024-07-29 RX ADMIN — OMEPRAZOLE 20 MG: 20 CAPSULE, DELAYED RELEASE ORAL at 08:07

## 2024-07-29 ASSESSMENT — ACTIVITIES OF DAILY LIVING (ADL)
BED_CHAIR_WHEELCHAIR_TRANSFER_DESCRIPTION: ADAPTIVE EQUIPMENT;INCREASED TIME
TOILET_TRANSFER_DESCRIPTION: GRAB BAR;SUPERVISION FOR SAFETY
BED_CHAIR_WHEELCHAIR_TRANSFER_DESCRIPTION: ADAPTIVE EQUIPMENT;INCREASED TIME;SET-UP OF EQUIPMENT;SUPERVISION FOR SAFETY
TOILETING_LEVEL_OF_ASSIST_DESCRIPTION: GRAB BAR;SUPERVISION FOR SAFETY
TUB_SHOWER_TRANSFER_DESCRIPTION: ADAPTIVE EQUIPMENT;GRAB BAR;SHOWER BENCH;SUPERVISION FOR SAFETY
TOILET_TRANSFER_DESCRIPTION: GRAB BAR;INCREASED TIME;SUPERVISION FOR SAFETY
TOILET_TRANSFER_DESCRIPTION: GRAB BAR;INCREASED TIME
TOILETING_LEVEL_OF_ASSIST_DESCRIPTION: GRAB BAR;SUPERVISION FOR SAFETY

## 2024-07-29 ASSESSMENT — ENCOUNTER SYMPTOMS
DIARRHEA: 0
VOMITING: 0
NERVOUS/ANXIOUS: 0
FEVER: 0
NAUSEA: 0
CHILLS: 0
SHORTNESS OF BREATH: 0
ABDOMINAL PAIN: 0

## 2024-07-29 ASSESSMENT — GAIT ASSESSMENTS
DEVIATION: BRADYKINETIC
ASSISTIVE DEVICE: 4 WHEEL WALKER
GAIT LEVEL OF ASSIST: STANDBY ASSIST
DISTANCE (FEET): 100

## 2024-07-29 ASSESSMENT — PAIN DESCRIPTION - PAIN TYPE: TYPE: ACUTE PAIN

## 2024-07-29 NOTE — CARE PLAN
"The patient is Stable - Low risk of patient condition declining or worsening    Shift Goals  Clinical Goals: safety  Patient Goals: safety, sleep, pain control      Problem: Fall Risk - Rehab  Goal: Patient will remain free from falls  Outcome: Progressing  Note: Christina Elizondo Fall risk Assessment Score: 16    High fall risk Interventions   - Alarming seatbelt  - Bed and strip alarm   - Yellow sign by the door   - Yellow wrist band \"Fall risk\"  - Room near to the nurse station  - Do not leave patient unattended in the bathroom  - Fall risk education provided     Problem: Skin Integrity  Goal: Skin integrity is maintained or improved  Outcome: Progressing     Problem: Mobility  Goal: Patient's capacity to carry out activities will improve  Outcome: Progressing     Problem: Pain - Standard  Goal: Alleviation of pain or a reduction in pain to the patient’s comfort goal  Outcome: Progressing     "

## 2024-07-29 NOTE — PROGRESS NOTES
"  Physical Medicine & Rehabilitation Progress Note    Encounter Date: 7/29/2024    Chief Complaint: As tolerated    Interval Events (Subjective):  Patient sitting up in room. She reports she is fatigued. She had some loose BMs over the weekend. Otherwise denies NVD.     _____________________________________  Interdisciplinary Team Conference   Most recent IDT on 7/25/2024     Discharge Date/Disposition:  8/2/24  _____________________________________    Objective:  VITAL SIGNS: /74   Pulse 68   Temp 36.9 °C (98.5 °F) (Oral)   Resp 16   Ht 1.626 m (5' 4\")   Wt 83.7 kg (184 lb 8 oz)   SpO2 92%   BMI 31.67 kg/m²   Gen: NAD  Psych: Mood and affect appropriate  CV: RRR, 0 edema  Resp: CTAB, no upper airway sounds  Abd: NTND, BS+  Neuro: AOx4, following commands    Laboratory Values:  Recent Results (from the past 72 hour(s))   MAGNESIUM    Collection Time: 07/29/24  6:26 AM   Result Value Ref Range    Magnesium 1.9 1.5 - 2.5 mg/dL   CBC WITH DIFFERENTIAL    Collection Time: 07/29/24  6:26 AM   Result Value Ref Range    WBC 6.7 4.8 - 10.8 K/uL    RBC 3.38 (L) 4.20 - 5.40 M/uL    Hemoglobin 9.4 (L) 12.0 - 16.0 g/dL    Hematocrit 30.2 (L) 37.0 - 47.0 %    MCV 89.3 81.4 - 97.8 fL    MCH 27.8 27.0 - 33.0 pg    MCHC 31.1 (L) 32.2 - 35.5 g/dL    RDW 51.5 (H) 35.9 - 50.0 fL    Platelet Count 196 164 - 446 K/uL    MPV 10.3 9.0 - 12.9 fL    Neutrophils-Polys 73.30 (H) 44.00 - 72.00 %    Lymphocytes 15.60 (L) 22.00 - 41.00 %    Monocytes 7.60 0.00 - 13.40 %    Eosinophils 2.40 0.00 - 6.90 %    Basophils 0.70 0.00 - 1.80 %    Immature Granulocytes 0.40 0.00 - 0.90 %    Nucleated RBC 0.00 0.00 - 0.20 /100 WBC    Neutrophils (Absolute) 4.88 1.82 - 7.42 K/uL    Lymphs (Absolute) 1.04 1.00 - 4.80 K/uL    Monos (Absolute) 0.51 0.00 - 0.85 K/uL    Eos (Absolute) 0.16 0.00 - 0.51 K/uL    Baso (Absolute) 0.05 0.00 - 0.12 K/uL    Immature Granulocytes (abs) 0.03 0.00 - 0.11 K/uL    NRBC (Absolute) 0.00 K/uL   Basic Metabolic " Panel    Collection Time: 07/29/24  6:26 AM   Result Value Ref Range    Sodium 138 135 - 145 mmol/L    Potassium 4.3 3.6 - 5.5 mmol/L    Chloride 105 96 - 112 mmol/L    Co2 26 20 - 33 mmol/L    Glucose 93 65 - 99 mg/dL    Bun 15 8 - 22 mg/dL    Creatinine 0.94 0.50 - 1.40 mg/dL    Calcium 9.6 8.5 - 10.5 mg/dL    Anion Gap 7.0 7.0 - 16.0   ESTIMATED GFR    Collection Time: 07/29/24  6:26 AM   Result Value Ref Range    GFR (CKD-EPI) 60 >60 mL/min/1.73 m 2       Medications:  Scheduled Medications   Medication Dose Frequency    fluticasone-umeclidinium-vilanterol  1 Puff DAILY    [Held by provider] senna-docusate  2 Tablet BID    amiodarone  200 mg Q DAY    anastrozole  1 mg QAM    aspirin  81 mg DAILY    omeprazole  20 mg DAILY    PARoxetine  10 mg DAILY    rosuvastatin  20 mg PM MEAL    spironolactone  25 mg QDAY    albuterol  2.5 mg Daily-0800     PRN medications: hydrocortisone, Respiratory Therapy Consult, hydrALAZINE, acetaminophen, [Held by provider] senna-docusate **AND** polyethylene glycol/lytes, docusate sodium, magnesium hydroxide, carboxymethylcellulose, mag hydrox-al hydrox-simeth, ondansetron **OR** ondansetron, traZODone, sodium chloride, traMADol    Diet:  Current Diet Order   Procedures    Diet Order Diet: Cardiac       Medical Decision Making and Plan:  TAVR - Patient with severe aortic stenosis s/p TAVR on 7/15/24 with Dr. Oconnor which was complicated by right femoral artery injury requiring repair.  -PT and OT for mobility and ADLs. Per guidelines, 15 hours per week between PT, OT and/or SLP.  -Follow-up Cardiology. Continue ASA     HTN/A fib/sCHF - Patient on Amiodarone 400 mg BID with plan to transition to 200 mg daily on 7/27 as well as HCTZ 6.25 mg, Spironolactone 25 mg and Valsartan 40 mg daily.  Very labile, consult hospitalist. Reduced Amiodarone to 200 mg. Continue Amiodarone 200 mg daily, Spironolactone 25 mg and Valsartan discontinued.      HLD - Patient on Rosuvastatin 20 mg QHS      COPD/Asthma - Patient on Albuterol daily and Trelegy. Improving breathing, continue Albuterol and Trelegy     Anemia - Check AM CBC - 9.1, will monitor     R groin hematoma - Wound care for right groin incision site.      Thrombocytopenia - Check AM CBC - 154, will monitor     Hyperglycemia - Check A 1c - 5.4, no need to monitor sugars     Depression - Patient on Paxil 10 m daily     Hemorrhoids - start PRN preparation H. Improving, continue Preparation H    Obesity - On admission Body mass index is 31.67 kg/m². Meets medical criteria. Dietitian to consult.      Pain - Patient on PRN Tylenol and Tramadol     Skin - Patient at risk for skin breakdown due to debility in areas including sacrum, achilles, elbows and head in addition to other sites. Nursing to assess skin daily.      GI Ppx - Patient on Prilosec for GERD prophylaxis. Patient on Senna-docusate for constipation prophylaxis.   -Loose BMs, change senna-docusate to PRN     DVT Ppx - Patient is not cleared for AC due to recent hematoma.    ____________________________________    T. Lv Solano MD/PhD  [facility] - Physical Medicine & Rehabilitation   [facility] - [facility] Injury [facility]   ____________________________________

## 2024-07-29 NOTE — CARE PLAN
"The patient is Stable - Low risk of patient condition declining or worsening    Shift Goals  Clinical Goals: safety  Patient Goals: safety, sleep, pain control    Progress made toward(s) clinical / shift goals:    Problem: Fall Risk - Rehab  Goal: Patient will remain free from falls  Outcome: Progressing       Christina Elizondo Fall risk Assessment : 17    High fall risk Interventions   - Bed and strip alarm   - Yellow sign by the door   - Yellow wrist band \"Fall risk\"  - Room near to the nurse station  - Do not leave patient unattended in the bathroom  - Fall risk education provided    Pt uses call light consistently and appropriately. Waits for assistance does not attempt self transfer this shift. Able to verbalize needs.        "

## 2024-07-29 NOTE — PROGRESS NOTES
NURSING DAILY NOTE    Name: Erin Hess   Date of Admission: 7/23/2024   Admitting Diagnosis: S/P TAVR (transcatheter aortic valve replacement)  Attending Physician: Adnrea Solano M.d.  Allergies: Sulfa drugs, Codeine, Oxycodone, and Vancomycin    Safety  Patient Assist  Min assist  Patient Precautions  Fall Risk, Other (See Comments)  Precaution Comments  Avoid heavy lifting (>5 pounds), excessive bending, stretching, pushing, or pulling for two weeks (sx: 07/15) after your procedure, per post-op protocol. 02 2L PM, 2L 02 PRN AM  Bed Transfer Status  Standby Assist  Toilet Transfer Status   Standby Assist  Assistive Devices  Rails, Wheelchair  Oxygen  None - Room Air  Diet/Therapeutic Dining  Current Diet Order   Procedures    Diet Order Diet: Cardiac     Pill Administration  whole  Agitated Behavioral Scale     ABS Level of Severity       Fall Risk  Has the patient had a fall this admission?   No  Christina Elizondo Fall Risk Scoring  17, HIGH RISK  Fall Risk Safety Measures  bed alarm and chair alarm    Vitals  Temperature: 37.3 °C (99.1 °F)  Temp src: Oral  Pulse: (!) 56  Respiration: 18  Blood Pressure : 116/44  Blood Pressure MAP (Calculated): 68 MM HG  BP Location: Left, Upper Arm  Patient BP Position: Supine     Oxygen  Pulse Oximetry: 96 %  O2 (LPM): 0  FiO2%: 21 %  O2 Delivery Device: None - Room Air    Bowel and Bladder  Last Bowel Movement  07/27/24  Stool Type  Type 7: Watery, no solid pieces-entirely liquid  Bowel Device  Bathroom  Continent  Bladder: Continent void   Bowel: Continent movement  Bladder Function  Urine Void (mL):  (Large)  Number of Times Voided: 1  Urine Color: Yellow  Genitourinary Assessment   Bladder Assessment (WDL):  Within Defined Limits  Kennedy Catheter: Not Applicable  Urine Color: Yellow  Bladder Device: Bathroom  Time Void: Yes  Bladder Scan: Post Void  $ Bladder Scan Results (mL): 1    Skin  Delfino Score    18  Sensory Interventions   Bed Types: Standard/Trauma Mattress  Skin Preventative Measures: Pillows in Use for Support / Positioning  Moisture Interventions  Moisturizers/Barriers: Barrier Paste      Pain  Pain Rating Scale  0 - No Pain  Pain Location  Groin  Pain Location Orientation  Right  Pain Interventions   Declines    ADLs    Bathing    (ot , will give her shower this morning)  Linen Change      Personal Hygiene  Change Trisha Pads, Moist Trisha Wipes, Perineal Care  Chlorhexidine Bath      Oral Care  Brushed Teeth  Teeth/Dentures     Shave     Nutrition Percentage Eaten  Breakfast, Between % Consumed  Environmental Precautions  Treaded Slipper Socks on Patient, Bed in Low Position  Patient Turns/Positioning  Patient Turns Self from Side to Side  Patient Turns Assistance/Tolerance     Bed Positions  Bed Controls On, Bed Locked  Head of Bed Elevated  Self regulated      Psychosocial/Neurologic Assessment  Psychosocial Assessment  Psychosocial (WDL):  Within Defined Limits  Neurologic Assessment  Neuro (WDL): Within Defined Limits  Level of Consciousness: Alert  Orientation Level: Oriented X4  Cognition: Follows commands, Appropriate attention/concentration  Speech: Clear  EENT (WDL):  WDL Except    Cardio/Pulmonary Assessment  Edema   RLE Edema: 1+  LLE Edema: 1+  Respiratory Breath Sounds  RUL Breath Sounds: Clear, Diminished  RML Breath Sounds: Clear  RLL Breath Sounds: Clear  KUNAL Breath Sounds: Clear  LLL Breath Sounds: Clear  Cardiac Assessment   Cardiac (WDL):  WDL Except (TAVR, HF, Afib)

## 2024-07-29 NOTE — PROGRESS NOTES
[facility] Medicine Daily Progress Note    Date of Service  7/29/2024    Chief Complaint:  Hypertension  Afib  Anemia    Interval History:  No dizziness recently.  Doing ok.    Review of Systems  Review of Systems   Constitutional:  Negative for chills and fever.   Respiratory:  Negative for shortness of breath.    Cardiovascular:  Negative for chest pain.   Gastrointestinal:  Negative for abdominal pain, diarrhea, nausea and vomiting.   Psychiatric/Behavioral:  The patient is not nervous/anxious.         Physical Exam  Temp:  [37 °C (98.6 °F)-37.3 °C (99.1 °F)] 37 °C (98.6 °F)  Pulse:  [56-80] 71  Resp:  [17-19] 19  BP: ()/(42-56) 94/49  SpO2:  [95 %-96 %] 95 %    Physical Exam  Vitals and nursing note reviewed.   Constitutional:       Appearance: Normal appearance.   HENT:      Head: Atraumatic.   Eyes:      Conjunctiva/sclera: Conjunctivae normal.      Pupils: Pupils are equal, round, and reactive to light.   Cardiovascular:      Rate and Rhythm: Normal rate and regular rhythm.      Heart sounds: No murmur heard.  Pulmonary:      Effort: Pulmonary effort is normal.      Breath sounds: No stridor. No wheezing or rales.   Abdominal:      General: There is no distension.      Palpations: Abdomen is soft.      Tenderness: There is no abdominal tenderness.   Musculoskeletal:      Cervical back: Normal range of motion and neck supple.      Right lower leg: No edema.      Left lower leg: No edema.   Skin:     General: Skin is warm and dry.      Findings: No rash.   Neurological:      Mental Status: She is alert and oriented to person, place, and time.   Psychiatric:         Mood and Affect: Mood normal.         Behavior: Behavior normal.         Fluids    Intake/Output Summary (Last 24 hours) at 7/29/2024 1030  Last data filed at 7/29/2024 0546  Gross per 24 hour   Intake 240 ml   Output --   Net 240 ml       Laboratory  Recent Labs     07/29/24  0626   WBC 6.7   RBC 3.38*   HEMOGLOBIN 9.4*   HEMATOCRIT 30.2*   MCV  89.3   MCH 27.8   MCHC 31.1*   RDW 51.5*   PLATELETCT 196   MPV 10.3       Recent Labs     07/29/24  0626   SODIUM 138   POTASSIUM 4.3   CHLORIDE 105   CO2 26   GLUCOSE 93   BUN 15   CREATININE 0.94   CALCIUM 9.6                     Imaging    Assessment/Plan  * S/P TAVR (transcatheter aortic valve replacement)- (present on admission)  Assessment & Plan  Had AS -- s/p TAVR  Has B/L LE edema  BNP: 427 (7/16)  RLE US (7/22): no DVT  BLE US (7/27): no DVT  Off HCTZ -- 2nd to possible SE of orthostatics  Cont Aldactone  Not on any K+ supplements  K+: 4.3 (7/29)  Cont to monitor    Anemia  Assessment & Plan  H&H slowly improving with Hb: 9.1 --> 9.4 (7/29)  2nd to surgery (H&H was wnl before surgery)  Monitor    A-fib (HCC)- (present on admission)  Assessment & Plan  HR ok  HR on initial exam showed NSR  Occurred post-op  S/P RVR at [facility]  Cont Amio  Cont Aldactone  Off HCTZ -- 2nd to possible orthostatic SE  Cont to monitor    Common femoral artery injury, right, initial encounter- (present on admission)  Assessment & Plan  2nd to gen surgery  S/P vascular repair    Dyslipidemia- (present on admission)  Assessment & Plan  Cont Crestor    Essential hypertension  Assessment & Plan  BP recently better and ok  Orthostatics (7/27): were (+) in am but was neg later at 1:00 pm   Orthostatics (7/28): neg  No dizziness recently  Off Diovan --> 2nd to possible SE of orthostatics  Off HCTZ --> 2nd to possible SE of orthostatics  Note: was on Diovan and HCTZ at home  Note: on Aldactone  Cont to monitor

## 2024-07-29 NOTE — FLOWSHEET NOTE
07/29/24 1143   Events/Summary/Plan   Events/Summary/Plan SpO2 check, DPI   Vital Signs   Pulse 68   Respiration 16   Pulse Oximetry 92 %   $ Pulse Oximetry (Spot Check) Yes   Respiratory Assessment   Respiratory Pattern Within Normal Limits   Level of Consciousness Alert   Chest Exam   Work Of Breathing / Effort Within Normal Limits   Breath Sounds   RUL Breath Sounds Clear   RML Breath Sounds Clear   RLL Breath Sounds Expiratory Wheezes   KUNAL Breath Sounds Clear   LLL Breath Sounds Clear   Secretions   Cough Productive   How Sputum Obtained Spontaneous   Sputum Amount Moderate   Sputum Color Yellow   Sputum Consistency Thick   Oxygen   O2 Delivery Device Nasal Cannula   Room Air Challenge Fail  (Room air SpO2=88)

## 2024-07-29 NOTE — THERAPY
Physical Therapy   Daily Treatment     Patient Name: Erin Hess  Age:  84 y.o., Sex:  female  Medical Record #: [id number]  Today's Date: 7/29/2024     Precautions  Precautions: Fall Risk, Other (See Comments)  Comments: 02 2L PM, 2L 02 PRN AM    Subjective    Pt resting in bed, agreeable to PT session    Pt appeared to have self transferred from wc to the bed following breakfast, wc was unlocked and foot rest were no moved out of the way     Objective       07/29/24 0831   PT Charge Group   PT Gait Training (Units) 1   PT Therapeutic Exercise (Units) 1   PT Therapeutic Activities (Units) 2   Supervising Physical Therapist Ila Mejia   PT Total Time Spent   PT Individual Total Time Spent (Mins) 60   Gait Functional Level of Assist    Gait Level Of Assist Standby Assist   Assistive Device 4 Wheel Walker   Distance (Feet) 100   # of Times Distance was Traveled 2   Deviation Bradykinetic  (vc for posture and walker proximity)   Stairs Functional Level of Assist   Level of Assist with Stairs Contact Guard Assist   # of Stairs Climbed 4   Stairs Description Extra time;Hand rails;Limited by fatigue;Supervision for safety;Verbal cueing   Transfer Functional Level of Assist   Bed, Chair, Wheelchair Transfer Standby Assist   Bed Chair Wheelchair Transfer Description Adaptive equipment;Increased time   Toilet Transfers Supervised   Toilet Transfer Description Grab bar;Increased time;Supervision for safety   Supine Lower Body Exercise   Bridges 2 sets of 15;Two Legged   Straight Leg Raises Front;3 sets of 10;Bilateral   Quadriceps Isometrics 1 set of 10;Bilateral;Other Resistance (See Comments)   Bed Mobility    Supine to Sit Supervised   Sit to Supine Supervised   Sit to Stand Supervised   Scooting Supervised   Rolling Supervised   Neuro-Muscular Treatments   Neuro-Muscular Treatments Biofeedback;Sequencing;Verbal Cuing;Tactile Cuing   Comments review of diaphragmatic breathing in supine and seated with hand on  "chest/belly to wat for rise and fall of abdomen vs using chest and accessory mm   Interdisciplinary Plan of Care Collaboration   IDT Collaboration with  Family / Caregiver   Patient Position at End of Therapy Seated;Chair Alarm On;Call Light within Reach;Tray Table within Reach   Collaboration Comments son present in room at end of therapy, discussed CLOF          07/29/24 0908   Vitals   Pulse 71   Patient BP Position Sitting   Blood Pressure  94/49   Room Air Oximetry 98   O2 (LPM) 0   O2 Delivery Device None - Room Air       Reinforced use of call light and not self transferring, waiting for staff before transferring    6\" platform curb step with 4WW SBA vc for sequencing and technique    Assessment    Pt tolerated tx session on RA only with Sp02 94-98%, pt with good carryover of learning with diaphragmatic breathing from previous session. BP in seated slightly low, with compression hose donned, pt is asymptomatic. Pt is SPV for toileting and tranfers from 4WW level. Primary barriers include generalized weakness, decreased endurance, impaired standing balance   Strengths: Able to follow instructions, Alert and oriented, Effective communication skills, Independent prior level of function, Motivated for self care and independence, Pleasant and cooperative, Supportive family, Willingly participates in therapeutic activities  Barriers: Decreased endurance, Fatigue, Generalized weakness, Pain    Plan    Ambulation w/ 4WW/ LRAD  Encourage fluid intake  Activity tolerance  BLE strengthening  Standing balance-components of GELLER  Stair training  TAVR precautions    DME  PT DME Recommendations  Assistive Device: 4-Wheel Walker  Additional Equipment:  (TBD based on pt progress)    Passport items to be completed:  Get in/out of bed safely, in/out of a vehicle, safely use mobility device, walk or wheel around home/community, navigate up and down stairs, show how to get up/down from the ground, ensure home is accessible, " demonstrate HEP, complete caregiver training    Physical Therapy Problems (Active)       Problem: Mobility       Dates: Start:  07/24/24         Goal: STG-Within one week, patient will ambulate 100ft w/ FWW and SBA       Dates: Start:  07/24/24    Expected End:  08/02/24            Goal: STG-Within one week, patient will ambulate up/down a curb w/ FWW and SPV       Dates: Start:  07/24/24    Expected End:  08/02/24               Problem: Mobility Transfers       Dates: Start:  07/24/24         Goal: STG-Within one week, patient will transfer bed to chair w/ FWW and SPV       Dates: Start:  07/24/24    Expected End:  08/02/24               Problem: PT-Long Term Goals       Dates: Start:  07/24/24         Goal: LTG-By discharge, patient will ambulate 250ft w/ LRAD and Jose G       Dates: Start:  07/24/24    Expected End:  08/02/24            Goal: LTG-By discharge, patient will independently perform home exercise program       Dates: Start:  07/24/24    Expected End:  08/02/24            Goal: LTG-By discharge, patient will transfer in/out of a car w/ FWW and Jose G       Dates: Start:  07/24/24    Expected End:  08/02/24

## 2024-07-29 NOTE — PROGRESS NOTES
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  Allergies: Sulfa drugs, Codeine, Oxycodone, and Vancomycin    Safety  Patient Assist  CGA  Patient Precautions  Fall Risk, Other (See Comments)  Precaution Comments  Avoid heavy lifting (>5 pounds), excessive bending, stretching, pushing, or pulling for two weeks (sx: 07/15) after your procedure, per post-op protocol. 02 2L PM, 2L 02 PRN AM  Bed Transfer Status  Standby Assist  Toilet Transfer Status   Standby Assist  Assistive Devices  Rails, Walker - four wheel  Oxygen  None - Room Air  Diet/Therapeutic Dining  Current Diet Order   Procedures    Diet Order Diet: Cardiac     Pill Administration  whole  Agitated Behavioral Scale     ABS Level of Severity       Fall Risk  Has the patient had a fall this admission?   No  Christina Elizondo Fall Risk Scoring  16, HIGH RISK  Fall Risk Safety Measures  bed alarm and chair alarm    Vitals  Temperature: 37 °C (98.6 °F)  Temp src: Oral  Pulse: 80  Respiration: 19  Blood Pressure : 132/56  Blood Pressure MAP (Calculated): 81 MM HG  BP Location: Left, Upper Arm  Patient BP Position: Galvez's Position     Oxygen  Pulse Oximetry: 95 %  O2 (LPM): 2  FiO2%: 21 %  O2 Delivery Device: None - Room Air    Bowel and Bladder  Last Bowel Movement  07/27/24  Stool Type  Type 5: Soft blob with clear cut edges (passed easily)  Bowel Device  Bathroom  Continent  Bladder: Continent void   Bowel: Continent movement  Bladder Function  Urine Void (mL):  (Large)  Number of Times Voided: 1  Urine Color: Yellow  Genitourinary Assessment   Bladder Assessment (WDL):  Within Defined Limits  Kennedy Catheter: Not Applicable  Urine Color: Yellow  Bladder Device: Bathroom  Time Void: Yes  Bladder Scan: Post Void  $ Bladder Scan Results (mL): 1    Skin  Delfino  Score   18  Sensory Interventions   Bed Types: Standard/Trauma Mattress  Skin Preventative Measures: Pillows in Use for Support / Positioning  Moisture Interventions  Moisturizers/Barriers: Barrier Wipes, Barrier Paste      Pain  Pain Rating Scale  3 - Sometimes distracts me  Pain Location  Groin  Pain Location Orientation  Right  Pain Interventions   Declines    ADLs    Bathing    (ot , will give her shower this morning)  Linen Change      Personal Hygiene  Moist Trisha Wipes  Chlorhexidine Bath      Oral Care  Brushed Teeth  Teeth/Dentures     Shave     Nutrition Percentage Eaten  Breakfast, Between % Consumed  Environmental Precautions  Treaded Slipper Socks on Patient, Bed in Low Position  Patient Turns/Positioning  Patient Turns Self from Side to Side  Patient Turns Assistance/Tolerance     Bed Positions  Bed Controls On, Bed Locked  Head of Bed Elevated  Self regulated      Psychosocial/Neurologic Assessment  Psychosocial Assessment  Psychosocial (WDL):  Within Defined Limits  Neurologic Assessment  Neuro (WDL): Within Defined Limits  Level of Consciousness: Alert  Orientation Level: Oriented X4  Cognition: Follows commands, Appropriate attention/concentration  Speech: Clear  EENT (WDL):  WDL Except    Cardio/Pulmonary Assessment  Edema   RLE Edema: 1+  LLE Edema: 1+  Respiratory Breath Sounds  RUL Breath Sounds: Clear, Diminished  RML Breath Sounds: Clear  RLL Breath Sounds: Clear  KUNAL Breath Sounds: Clear  LLL Breath Sounds: Clear  Cardiac Assessment   Cardiac (WDL):  WDL Except (TAVR, HF, Afib)

## 2024-07-29 NOTE — THERAPY
Occupational Therapy  Daily Treatment     Patient Name: Erin Hess  Age:  84 y.o., Sex:  female  Medical Record #: [id number]  Today's Date: 7/29/2024     Precautions  Precautions: (P) Fall Risk, Other (See Comments)  Comments: (P) 02 2L PM, 2L 02 PRN AM         Subjective    Pt encountered for OT supine in bed, awake. Pt requesting a shower. Pleasant and agreeable to participate throughout session.      Objective       07/29/24 0701   OT Charge Group   OT Self Care / ADL (Units) 3   OT Therapeutic Exercise (Units) 1   OT Total Time Spent   OT Individual Total Time Spent (Mins) 60   Precautions   Precautions Fall Risk;Other (See Comments)   Comments 02 2L PM, 2L 02 PRN AM   Functional Level of Assist   Grooming Modified Independent;Seated   Grooming Description Seated in wheelchair at sink  (seated at 4WW for oral hygiene and hair styling)   Bathing Supervision   Bathing Description Grab bar;Hand held shower;Increased time;Set-up of equipment;Supervision for safety   Upper Body Dressing Minimal Assist   Upper Body Dressing Description Assist with closures  (Roseann to manage bra fasteners. Discussed trialing fastening in the front. Pt able to gather clothing from closet at 4WW with cueing in prep for shower)   Lower Body Dressing Supervision   Lower Body Dressing Description Grab bar;Increased time;Supervision for safety  (Pt able to gather clothing from closet at 4WW with cueing in prep for shower)   Toileting Supervision   Toileting Description Grab bar;Supervision for safety   Bed, Chair, Wheelchair Transfer Standby Assist  (SBA to SPV)   Bed Chair Wheelchair Transfer Description Adaptive equipment;Increased time;Set-up of equipment;Supervision for safety  (Stand step from bed using FWW)   Toilet Transfers Standby Assist  (SBA to SPV)   Toilet Transfer Description Grab bar;Supervision for safety  (4WW approach)   Tub / Shower Transfers Standby Assist   Tub Shower Transfer Description Adaptive equipment;Grab  bar;Shower bench;Supervision for safety  (4WW approach)   Sitting Lower Body Exercises   Nustep Resistance Level 3  (5 min; 187 steps)   Bed Mobility    Supine to Sit Supervised   Sit to Supine Supervised   Scooting Supervised   Interdisciplinary Plan of Care Collaboration   Patient Position at End of Therapy Seated;Chair Alarm On;Self Releasing Lap Belt Applied;Other (Comments)  (in cafeteria for breakfast with portable 02)         Assessment    Pt emerging towards SPV level for ADLs using 4WW. Pt requires intermittent seated RB d/t low activity tolerance. Good carryover of 4WW safety during short distance functional (bed <> bathroom).     Strengths: Able to follow instructions, Adequate strength, Alert and oriented, Good insight into deficits/needs, Independent prior level of function, Manages pain appropriately, Motivated for self care and independence, Supportive family, Pleasant and cooperative, Willingly participates in therapeutic activities  Barriers: Fatigue, Impaired activity tolerance, Impaired balance, Limited mobility    Plan    I/ADLs in standing as tolerated; Functional transfers using FWW   Monitor vitals.    DC 08/02 home with family support initially.     DME  OT DME Recommendations  Bathroom Equipment:  (shower chair, GB)  Additional Equipment:  (TBD based on pt progress)    Passport items to be completed:  Perform bathroom transfers, complete dressing, complete feeding, get ready for the day, prepare a simple meal, participate in household tasks, adapt home for safety needs, demonstrate home exercise program, complete caregiver training     Occupational Therapy Goals (Active)       Problem: Dressing       Dates: Start:  07/24/24         Goal: STG-Within one week, patient will dress UB at SBA using LRD       Dates: Start:  07/24/24    Expected End:  08/02/24            Goal: STG-Within one week, patient will dress LB at SBA using LRD       Dates: Start:  07/24/24    Expected End:  08/02/24                Problem: Functional Transfers       Dates: Start:  07/24/24         Goal: STG-Within one week, patient will transfer to toilet at [facility] using LRD       Dates: Start:  07/24/24    Expected End:  08/02/24            Goal: STG-Within one week, patient will transfer to step in shower at [facility] using LRD       Dates: Start:  07/24/24    Expected End:  08/02/24               Problem: IADL's       Dates: Start:  07/24/24         Goal: STG-Within one week, patient will access kitchen area at [facility] using LRD       Dates: Start:  07/24/24    Expected End:  08/02/24               Problem: OT Long Term Goals       Dates: Start:  07/24/24         Goal: LTG-By discharge, patient will complete basic self care tasks at [facility] to [facility] using LRD       Dates: Start:  07/24/24    Expected End:  08/02/24            Goal: LTG-By discharge, patient will perform bathroom transfers at [facility] to [facility] using LRD       Dates: Start:  07/24/24    Expected End:  08/02/24            Goal: LTG-By discharge, patient will complete basic home management at [facility] to [facility] using LRD       Dates: Start:  07/24/24    Expected End:  08/02/24

## 2024-07-29 NOTE — THERAPY
Occupational Therapy  Daily Treatment     Patient Name: Erin Hess  Age:  84 y.o., Sex:  female  Medical Record #: [id number]  Today's Date: 7/29/2024     Precautions  Precautions: Fall Risk  Comments: 02 2L PM, 2L 02 PRN AM         Subjective    Pt's granddaughter and great-granddaughter present throughout session.     Objective       07/29/24 1231   OT Charge Group   OT Therapy Activity (Units) 3   OT Therapeutic Exercise (Units) 1   OT Total Time Spent   OT Individual Total Time Spent (Mins) 60   Precautions   Precautions Fall Risk   Comments 02 2L PM, 2L 02 PRN AM   Functional Level of Assist   Toileting Supervision   Toileting Description Grab bar;Supervision for safety   Bed, Chair, Wheelchair Transfer Standby Assist   Bed Chair Wheelchair Transfer Description Set-up of equipment;Increased time;Adaptive equipment;Supervision for safety   Toilet Transfers Supervised   Toilet Transfer Description Grab bar;Increased time   Sitting Upper Body Exercises   Sitting Upper Body Exercises Yes   Upper Extremity Bike Level 3 Resistance  (started at level 5; decreased to level 3 at about 8 min brittney)   IADL Treatments   IADL Treatments Meal preparation   Meal Preparation Pt prepared brownies with standby assist in standing. She was able to move around the kitchen by holding onto the counter. Assist to setup and supervision for safety.         Assessment    Pt seen for OT tx session, focusing on IADLs and UE strengthening. She was able to tolerate arm exercise well and able to stand for duration of baking activity. Reported feeling weak in the legs towards the end of the session.   Strengths: Able to follow instructions, Adequate strength, Alert and oriented, Good insight into deficits/needs, Independent prior level of function, Manages pain appropriately, Motivated for self care and independence, Supportive family, Pleasant and cooperative, Willingly participates in therapeutic activities  Barriers: Fatigue, Impaired  activity tolerance, Impaired balance, Limited mobility    Plan    I/ADLs in standing as tolerated; Functional transfers using FWW   Monitor vitals.     DC 08/02 home with family support initially.     DME  OT DME Recommendations  Bathroom Equipment:  (shower chair, GB)  Additional Equipment:  (TBD based on pt progress)    Passport items to be completed:  Perform bathroom transfers, complete dressing, complete feeding, get ready for the day, prepare a simple meal, participate in household tasks, adapt home for safety needs, demonstrate home exercise program, complete caregiver training     Occupational Therapy Goals (Active)       Problem: Dressing       Dates: Start:  07/24/24         Goal: STG-Within one week, patient will dress UB at SBA using LRD       Dates: Start:  07/24/24    Expected End:  08/02/24            Goal: STG-Within one week, patient will dress LB at SBA using LRD       Dates: Start:  07/24/24    Expected End:  08/02/24               Problem: Functional Transfers       Dates: Start:  07/24/24         Goal: STG-Within one week, patient will transfer to toilet at [facility] using LRD       Dates: Start:  07/24/24    Expected End:  08/02/24            Goal: STG-Within one week, patient will transfer to step in shower at [facility] using LRD       Dates: Start:  07/24/24    Expected End:  08/02/24               Problem: IADL's       Dates: Start:  07/24/24         Goal: STG-Within one week, patient will access kitchen area at [facility] using LRD       Dates: Start:  07/24/24    Expected End:  08/02/24               Problem: OT Long Term Goals       Dates: Start:  07/24/24         Goal: LTG-By discharge, patient will complete basic self care tasks at [facility] to [facility] using LRD       Dates: Start:  07/24/24    Expected End:  08/02/24            Goal: LTG-By discharge, patient will perform bathroom transfers at [facility] to [facility] using LRD       Dates: Start:  07/24/24    Expected End:  08/02/24            Goal: LTG-By discharge, patient  will complete basic home management at [facility] to [facility] using LRD       Dates: Start:  07/24/24    Expected End:  08/02/24

## 2024-07-29 NOTE — THERAPY
Recreational Therapy  Daily Treatment     Patient Name: Erin Hess  AGE:  84 y.o., SEX:  female  Medical Record #: [id number]  Today's Date: 7/29/2024       Subjective    Patient was resting and said she was cold and not feeling well today. She also reported that she worked a lot in therapy already today and was not up for standing. At the end of the session, reported that she was wonderful.      Objective       07/29/24 1101   Procedural Tracking   Procedural Tracking Leisure Skills Development   Treatment Time   Total Time Spent (mins) 30   Functional Ability Status - Cognitive   Attention Span Remains on Task   Comprehension Follows Three Step Commands   Judgment Able to Make Independent Decisions   Functional Ability Status - Emotional    Affect Appropriate   Mood Appropriate   Behavior Cooperative;Appropriate   Skilled Intervention    Skilled Intervention Leisure Education    Interdisciplinary Plan of Care Collaboration   IDT Collaboration with  Family / Caregiver   Patient Position at End of Therapy Seated;Family / Friend in Room   Collaboration Comments Sister came in at the end of session   Strengths & Barriers   Strengths Able to follow instructions;Effective communication skills;Independent prior level of function;Motivated for self care and independence;Pleasant and cooperative;Willingly participates in therapeutic activities   Barriers Limited mobility     Since patient reported feeling cold, asked her if she wanted to do session outside and she did. Outside she sat in the sun, played a round of banana grams and completed the Recreation Therapy portion of the passport. Patient given information on meals on wheels and the senior newsletter after session.     Assessment    Patient did not want to stand today but received benefit from going outside.     Strengths: (P) Able to follow instructions, Effective communication skills, Independent prior level of function, Motivated for self care and independence,  Pleasant and cooperative, Willingly participates in therapeutic activities  Barriers: (P) Limited mobility    Plan    Will work on standing tolerance next session.

## 2024-07-30 ENCOUNTER — APPOINTMENT (OUTPATIENT)
Dept: INPATIENT REHAB | Facility: REHABILITATION | Age: 84
DRG: 949 | End: 2024-07-30
Payer: MEDICARE

## 2024-07-30 ENCOUNTER — APPOINTMENT (OUTPATIENT)
Dept: OCCUPATIONAL THERAPY | Facility: REHABILITATION | Age: 84
DRG: 949 | End: 2024-07-30
Attending: PHYSICAL MEDICINE & REHABILITATION
Payer: MEDICARE

## 2024-07-30 ENCOUNTER — APPOINTMENT (OUTPATIENT)
Dept: PHYSICAL THERAPY | Facility: REHABILITATION | Age: 84
DRG: 949 | End: 2024-07-30
Attending: PHYSICAL MEDICINE & REHABILITATION
Payer: MEDICARE

## 2024-07-30 PROBLEM — F32.A DEPRESSION: Status: ACTIVE | Noted: 2024-07-30

## 2024-07-30 PROCEDURE — 97112 NEUROMUSCULAR REEDUCATION: CPT

## 2024-07-30 PROCEDURE — A9270 NON-COVERED ITEM OR SERVICE: HCPCS | Performed by: PHYSICAL MEDICINE & REHABILITATION

## 2024-07-30 PROCEDURE — 700102 HCHG RX REV CODE 250 W/ 637 OVERRIDE(OP): Performed by: PHYSICAL MEDICINE & REHABILITATION

## 2024-07-30 PROCEDURE — 97116 GAIT TRAINING THERAPY: CPT

## 2024-07-30 PROCEDURE — 94640 AIRWAY INHALATION TREATMENT: CPT

## 2024-07-30 PROCEDURE — 99232 SBSQ HOSP IP/OBS MODERATE 35: CPT | Performed by: PHYSICAL MEDICINE & REHABILITATION

## 2024-07-30 PROCEDURE — 97530 THERAPEUTIC ACTIVITIES: CPT

## 2024-07-30 PROCEDURE — 94760 N-INVAS EAR/PLS OXIMETRY 1: CPT

## 2024-07-30 PROCEDURE — 700101 HCHG RX REV CODE 250: Performed by: PHYSICAL MEDICINE & REHABILITATION

## 2024-07-30 PROCEDURE — 99232 SBSQ HOSP IP/OBS MODERATE 35: CPT | Performed by: HOSPITALIST

## 2024-07-30 PROCEDURE — 770010 HCHG ROOM/CARE - REHAB SEMI PRIVAT*

## 2024-07-30 PROCEDURE — 97110 THERAPEUTIC EXERCISES: CPT

## 2024-07-30 RX ORDER — DOCUSATE SODIUM 50 MG/5ML
10 LIQUID ORAL 2 TIMES DAILY
Status: DISPENSED | OUTPATIENT
Start: 2024-07-30 | End: 2024-08-01

## 2024-07-30 RX ORDER — DOCUSATE SODIUM 50 MG/5ML
10 LIQUID ORAL 2 TIMES DAILY
Status: DISCONTINUED | OUTPATIENT
Start: 2024-07-30 | End: 2024-07-30

## 2024-07-30 RX ADMIN — DOCUSATE SODIUM 10 MG: 50 LIQUID ORAL at 14:16

## 2024-07-30 RX ADMIN — ROSUVASTATIN CALCIUM 20 MG: 10 TABLET, FILM COATED ORAL at 17:34

## 2024-07-30 RX ADMIN — ASPIRIN 81 MG: 81 TABLET, COATED ORAL at 08:19

## 2024-07-30 RX ADMIN — AMIODARONE HYDROCHLORIDE 200 MG: 200 TABLET ORAL at 05:31

## 2024-07-30 RX ADMIN — DOCUSATE SODIUM 10 MG: 50 LIQUID ORAL at 19:33

## 2024-07-30 RX ADMIN — PAROXETINE HYDROCHLORIDE 10 MG: 20 TABLET, FILM COATED ORAL at 08:19

## 2024-07-30 RX ADMIN — POLYETHYLENE GLYCOL 3350 1 PACKET: 17 POWDER, FOR SOLUTION ORAL at 08:19

## 2024-07-30 RX ADMIN — ALUMINUM HYDROXIDE, MAGNESIUM HYDROXIDE, AND DIMETHICONE 20 ML: 400; 400; 40 SUSPENSION ORAL at 19:27

## 2024-07-30 RX ADMIN — SPIRONOLACTONE 25 MG: 25 TABLET ORAL at 05:31

## 2024-07-30 RX ADMIN — ALBUTEROL SULFATE 2.5 MG: 2.5 SOLUTION RESPIRATORY (INHALATION) at 07:47

## 2024-07-30 RX ADMIN — ANASTROZOLE 1 MG: 1 TABLET, COATED ORAL at 08:19

## 2024-07-30 RX ADMIN — TRAMADOL HYDROCHLORIDE 50 MG: 50 TABLET ORAL at 23:07

## 2024-07-30 RX ADMIN — OMEPRAZOLE 20 MG: 20 CAPSULE, DELAYED RELEASE ORAL at 08:19

## 2024-07-30 RX ADMIN — FLUTICASONE FUROATE, UMECLIDINIUM BROMIDE AND VILANTEROL TRIFENATATE 1 PUFF: 200; 62.5; 25 POWDER RESPIRATORY (INHALATION) at 07:47

## 2024-07-30 RX ADMIN — TRAZODONE HYDROCHLORIDE 50 MG: 50 TABLET ORAL at 23:07

## 2024-07-30 ASSESSMENT — GAIT ASSESSMENTS
ASSISTIVE DEVICE: 4 WHEEL WALKER
ASSISTIVE DEVICE: 4 WHEEL WALKER
DEVIATION: DECREASED BASE OF SUPPORT
DISTANCE (FEET): 200
GAIT LEVEL OF ASSIST: STANDBY ASSIST
DEVIATION: DECREASED BASE OF SUPPORT
GAIT LEVEL OF ASSIST: STANDBY ASSIST

## 2024-07-30 ASSESSMENT — PAIN DESCRIPTION - PAIN TYPE: TYPE: ACUTE PAIN

## 2024-07-30 ASSESSMENT — PATIENT HEALTH QUESTIONNAIRE - PHQ9
1. LITTLE INTEREST OR PLEASURE IN DOING THINGS: NOT AT ALL
2. FEELING DOWN, DEPRESSED, IRRITABLE, OR HOPELESS: NOT AT ALL
SUM OF ALL RESPONSES TO PHQ9 QUESTIONS 1 AND 2: 0

## 2024-07-30 ASSESSMENT — ENCOUNTER SYMPTOMS
ABDOMINAL PAIN: 0
PALPITATIONS: 0
COUGH: 0
VOMITING: 0
SHORTNESS OF BREATH: 0
CHILLS: 0
EYES NEGATIVE: 1
FEVER: 0
POLYDIPSIA: 0
BRUISES/BLEEDS EASILY: 0
NAUSEA: 0

## 2024-07-30 ASSESSMENT — ACTIVITIES OF DAILY LIVING (ADL)
BED_CHAIR_WHEELCHAIR_TRANSFER_DESCRIPTION: INCREASED TIME;INITIAL PREPARATION FOR TASK;SET-UP OF EQUIPMENT;SUPERVISION FOR SAFETY
BED_CHAIR_WHEELCHAIR_TRANSFER_DESCRIPTION: INCREASED TIME;SUPERVISION FOR SAFETY

## 2024-07-30 NOTE — PROGRESS NOTES
Hospital Medicine Daily Progress Note      Chief Complaint:  Hypertension  Anemia    Interval History:  Pt c/o constipation--defer to Physiatry.    Review of Systems  Review of Systems   Constitutional:  Negative for chills and fever.   HENT: Negative.     Eyes: Negative.    Respiratory:  Negative for cough and shortness of breath.    Cardiovascular:  Negative for chest pain and palpitations.   Gastrointestinal:  Negative for abdominal pain, nausea and vomiting.   Musculoskeletal:         Wound pain   Skin:  Negative for itching and rash.   Endo/Heme/Allergies:  Negative for polydipsia. Does not bruise/bleed easily.        Physical Exam  Temp:  [36.6 °C (97.8 °F)-37.2 °C (99 °F)] 36.7 °C (98.1 °F)  Pulse:  [63-76] 70  Resp:  [17-18] 18  BP: (110-136)/(45-58) 136/54  SpO2:  [90 %-96 %] 90 %    Physical Exam  Vitals reviewed.   Constitutional:       General: She is not in acute distress.     Appearance: Normal appearance. She is not ill-appearing.   HENT:      Head: Normocephalic and atraumatic.      Right Ear: External ear normal.      Left Ear: External ear normal.      Nose: Nose normal.      Mouth/Throat:      Pharynx: Oropharynx is clear.   Eyes:      General:         Right eye: No discharge.         Left eye: No discharge.      Extraocular Movements: Extraocular movements intact.      Conjunctiva/sclera: Conjunctivae normal.   Cardiovascular:      Rate and Rhythm: Normal rate and regular rhythm.   Pulmonary:      Effort: No respiratory distress.      Breath sounds: No wheezing.      Comments: Decreased BS  Abdominal:      General: Bowel sounds are normal. There is no distension.      Palpations: Abdomen is soft.      Tenderness: There is no abdominal tenderness.   Musculoskeletal:      Cervical back: Normal range of motion and neck supple.      Right lower leg: No edema.      Left lower leg: No edema.      Comments: R inguinal incision C/D/I   Skin:     General: Skin is warm and dry.   Neurological:      Mental  Status: She is alert and oriented to person, place, and time.         Fluids    Intake/Output Summary (Last 24 hours) at 7/30/2024 1514  Last data filed at 7/30/2024 1030  Gross per 24 hour   Intake 480 ml   Output --   Net 480 ml       Laboratory  Recent Labs     07/29/24  0626   WBC 6.7   RBC 3.38*   HEMOGLOBIN 9.4*   HEMATOCRIT 30.2*   MCV 89.3   MCH 27.8   MCHC 31.1*   RDW 51.5*   PLATELETCT 196   MPV 10.3       Recent Labs     07/29/24  0626   SODIUM 138   POTASSIUM 4.3   CHLORIDE 105   CO2 26   GLUCOSE 93   BUN 15   CREATININE 0.94   CALCIUM 9.6                   Assessment/Plan  * S/P TAVR (transcatheter aortic valve replacement)- (present on admission)  Assessment & Plan  Severe AS S/P TAVR on 7/15/24 by Dr.'s Luis ( mL) and Elvin ( mL)  Complicated by common femoral artery injury S/P surgical repair by Dr. Swan (EBL 5 mL)  Echo 7/15/24 EF 65%, normally functioning TAVR, small pericardial effusion without hemodynamic compromise  U/S BLE negative for DVT  On Aldactone    Depression  Assessment & Plan  On Paxil    Anemia due to acute blood loss- (present on admission)  Assessment & Plan  , 250, and 5 mL  Fe and Ferritin levels normal  Follow H/H    A-fib (HCC)- (present on admission)  Assessment & Plan  S/P new post-op AFib w/ RVR at [facility]  On ASA and Amiodarone    History of breast cancer- (present on admission)  Assessment & Plan  On Arimidex  Outpt F/U    Dyslipidemia- (present on admission)  Assessment & Plan  On Crestor    Asthma-COPD overlap syndrome (HCC)- (present on admission)  Assessment & Plan  On Albuterol and Trelegy  RT protocol    Essential hypertension  Assessment & Plan  Valsartan and HCTZ discontinued for orthostatic hypotension  Remains on Aldactone per Dr. Calloway  Outpt meds include Valsartan and HCTZ    Full Code

## 2024-07-30 NOTE — CARE PLAN
Problem: Bowel Elimination  Goal: Establish and maintain regular bowel function  Outcome: Progressing     Problem: Fall Risk - Rehab  Goal: Patient will remain free from falls  Outcome: Progressing     The patient is Stable - Low risk of patient condition declining or worsening    Shift Goals  Clinical Goals: safety  Patient Goals: safety; pain control

## 2024-07-30 NOTE — PROGRESS NOTES
"  Physical Medicine & Rehabilitation Progress Note    Encounter Date: 7/30/2024    Chief Complaint: As tolerated    Interval Events (Subjective):  Patient sitting up in therapy gym. She reports her right ear hurts and is popping. Examined ear and large amount of wax. Will start docusate to right ear.     _____________________________________  Interdisciplinary Team Conference   Most recent IDT on 7/25/2024     Discharge Date/Disposition:  8/2/24  _____________________________________    Objective:  VITAL SIGNS: /54   Pulse 70   Temp 36.7 °C (98.1 °F) (Oral)   Resp 18   Ht 1.626 m (5' 4\")   Wt 83.7 kg (184 lb 8 oz)   SpO2 96%   BMI 31.67 kg/m²   Gen: NAD  Psych: Mood and affect appropriate  CV: RRR, 0 edema  Resp: CTAB, no upper airway sounds  Abd: NTND, BS+  Neuro: AOx4, following commands  Unchanged from 7/29/24, in addition R Ear with ear wax, no erythema    Laboratory Values:  Recent Results (from the past 72 hour(s))   MAGNESIUM    Collection Time: 07/29/24  6:26 AM   Result Value Ref Range    Magnesium 1.9 1.5 - 2.5 mg/dL   CBC WITH DIFFERENTIAL    Collection Time: 07/29/24  6:26 AM   Result Value Ref Range    WBC 6.7 4.8 - 10.8 K/uL    RBC 3.38 (L) 4.20 - 5.40 M/uL    Hemoglobin 9.4 (L) 12.0 - 16.0 g/dL    Hematocrit 30.2 (L) 37.0 - 47.0 %    MCV 89.3 81.4 - 97.8 fL    MCH 27.8 27.0 - 33.0 pg    MCHC 31.1 (L) 32.2 - 35.5 g/dL    RDW 51.5 (H) 35.9 - 50.0 fL    Platelet Count 196 164 - 446 K/uL    MPV 10.3 9.0 - 12.9 fL    Neutrophils-Polys 73.30 (H) 44.00 - 72.00 %    Lymphocytes 15.60 (L) 22.00 - 41.00 %    Monocytes 7.60 0.00 - 13.40 %    Eosinophils 2.40 0.00 - 6.90 %    Basophils 0.70 0.00 - 1.80 %    Immature Granulocytes 0.40 0.00 - 0.90 %    Nucleated RBC 0.00 0.00 - 0.20 /100 WBC    Neutrophils (Absolute) 4.88 1.82 - 7.42 K/uL    Lymphs (Absolute) 1.04 1.00 - 4.80 K/uL    Monos (Absolute) 0.51 0.00 - 0.85 K/uL    Eos (Absolute) 0.16 0.00 - 0.51 K/uL    Baso (Absolute) 0.05 0.00 - 0.12 K/uL "    Immature Granulocytes (abs) 0.03 0.00 - 0.11 K/uL    NRBC (Absolute) 0.00 K/uL   Basic Metabolic Panel    Collection Time: 07/29/24  6:26 AM   Result Value Ref Range    Sodium 138 135 - 145 mmol/L    Potassium 4.3 3.6 - 5.5 mmol/L    Chloride 105 96 - 112 mmol/L    Co2 26 20 - 33 mmol/L    Glucose 93 65 - 99 mg/dL    Bun 15 8 - 22 mg/dL    Creatinine 0.94 0.50 - 1.40 mg/dL    Calcium 9.6 8.5 - 10.5 mg/dL    Anion Gap 7.0 7.0 - 16.0   ESTIMATED GFR    Collection Time: 07/29/24  6:26 AM   Result Value Ref Range    GFR (CKD-EPI) 60 >60 mL/min/1.73 m 2       Medications:  Scheduled Medications   Medication Dose Frequency    fluticasone-umeclidinium-vilanterol  1 Puff DAILY    amiodarone  200 mg Q DAY    anastrozole  1 mg QAM    aspirin  81 mg DAILY    omeprazole  20 mg DAILY    PARoxetine  10 mg DAILY    rosuvastatin  20 mg PM MEAL    spironolactone  25 mg QDAY    albuterol  2.5 mg Daily-0800     PRN medications: [Held by provider] senna-docusate **AND** polyethylene glycol/lytes, hydrocortisone, Respiratory Therapy Consult, hydrALAZINE, acetaminophen, docusate sodium, magnesium hydroxide, carboxymethylcellulose, mag hydrox-al hydrox-simeth, ondansetron **OR** ondansetron, traZODone, sodium chloride, traMADol    Diet:  Current Diet Order   Procedures    Diet Order Diet: Cardiac       Medical Decision Making and Plan:  TAVR - Patient with severe aortic stenosis s/p TAVR on 7/15/24 with Dr. Oconnor which was complicated by right femoral artery injury requiring repair.  -PT and OT for mobility and ADLs. Per guidelines, 15 hours per week between PT, OT and/or SLP.  -Follow-up Cardiology. Continue ASA     HTN/A fib/sCHF - Patient on Amiodarone 400 mg BID with plan to transition to 200 mg daily on 7/27 as well as HCTZ 6.25 mg, Spironolactone 25 mg and Valsartan 40 mg daily.  Very labile, consult hospitalist. Reduced Amiodarone to 200 mg. Valsartan discontinued. Continue Amiodarone 200 mg daily, Spironolactone 25 mg daily      HLD - Patient on Rosuvastatin 20 mg QHS     COPD/Asthma - Patient on Albuterol daily and Trelegy. Improving breathing, continue Albuterol and Trelegy     Anemia - Check AM CBC - 9.1, will monitor     R groin hematoma - Wound care for right groin incision site.      Thrombocytopenia - Check AM CBC - 154, will monitor     Hyperglycemia - Check A 1c - 5.4, no need to monitor sugars     Depression - Patient on Paxil 10 m daily     R ear hearing loss - ear wax, start Docusate 100 mg BID    Hemorrhoids - start PRN preparation H. Improving, continue Preparation H    Obesity - On admission Body mass index is 31.67 kg/m². Meets medical criteria. Dietitian to consult.      Pain - Patient on PRN Tylenol and Tramadol     Skin - Patient at risk for skin breakdown due to debility in areas including sacrum, achilles, elbows and head in addition to other sites. Nursing to assess skin daily.      GI Ppx - Patient on Prilosec for GERD prophylaxis. Patient on Senna-docusate for constipation prophylaxis.   -Loose BMs, change senna-docusate to PRN     DVT Ppx - Patient is not cleared for AC due to recent hematoma.    ____________________________________    T. Lv Solano MD/PhD  [facility] - Physical Medicine & Rehabilitation   [facility] - Brain Injury Medicine   ____________________________________

## 2024-07-30 NOTE — PROGRESS NOTES
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  Allergies: Sulfa drugs, Codeine, Oxycodone, and Vancomycin    Safety  Patient Assist  CGA  Patient Precautions  Fall Risk  Precaution Comments  02 2L PM, 2L 02 PRN AM  Bed Transfer Status  Standby Assist  Toilet Transfer Status   Supervised  Assistive Devices  Rails, Wheelchair  Oxygen  Nasal Cannula  Diet/Therapeutic Dining  Current Diet Order   Procedures    Diet Order Diet: Cardiac     Pill Administration  whole  Agitated Behavioral Scale     ABS Level of Severity       Fall Risk  Has the patient had a fall this admission?   No  Christina Elizondo Fall Risk Scoring  16, HIGH RISK  Fall Risk Safety Measures  bed alarm and chair alarm    Vitals  Temperature: 36.7 °C (98 °F)  Temp src: Oral  Pulse: 70  Respiration: 18  Blood Pressure : 110/58  Blood Pressure MAP (Calculated): 75 MM HG  BP Location: Left, Upper Arm  Patient BP Position: Supine     Oxygen  Pulse Oximetry: 90 %  O2 (LPM): 0  FiO2%: 21 %  O2 Delivery Device: Nasal Cannula    Bowel and Bladder  Last Bowel Movement  07/27/24  Stool Type  Type 5: Soft blob with clear cut edges (passed easily)  Bowel Device  Bathroom  Continent  Bladder: Continent void   Bowel: Continent movement  Bladder Function  Urine Void (mL):  (Large)  Number of Times Voided: 1  Urine Color: Lynette  Genitourinary Assessment   Bladder Assessment (WDL):  Within Defined Limits  Kennedy Catheter: Not Applicable  Urine Color: Lynette  Bladder Device: Bathroom  Time Void: Yes  Bladder Scan: Post Void  $ Bladder Scan Results (mL): 1    Skin  Delfino Score   18  Sensory Interventions   Bed Types: Standard/Trauma Mattress  Skin Preventative Measures: Pillows in Use for Support / Positioning  Moisture Interventions  Moisturizers/Barriers: Barrier  Wipes      Pain  Pain Rating Scale  0 - No Pain  Pain Location  Groin  Pain Location Orientation  Right  Pain Interventions   Declines    ADLs    Bathing    (OT Shower)  Linen Change      Personal Hygiene  Moist Trisha Wipes  Chlorhexidine Bath      Oral Care  Brushed Teeth  Teeth/Dentures     Shave     Nutrition Percentage Eaten  *  * Meal *  *, Between 50-75% Consumed  Environmental Precautions  Treaded Slipper Socks on Patient, Bed in Low Position  Patient Turns/Positioning  Patient Turns Self from Side to Side  Patient Turns Assistance/Tolerance     Bed Positions  Bed Controls On, Bed Locked  Head of Bed Elevated  Self regulated      Psychosocial/Neurologic Assessment  Psychosocial Assessment  Psychosocial (WDL):  Within Defined Limits  Neurologic Assessment  Neuro (WDL): Within Defined Limits  Level of Consciousness: Alert  Orientation Level: Oriented X4  Cognition: Follows commands, Appropriate attention/concentration  Speech: Clear  EENT (WDL):  WDL Except    Cardio/Pulmonary Assessment  Edema   RLE Edema: 1+  LLE Edema: 1+  Respiratory Breath Sounds  RUL Breath Sounds: Clear  RML Breath Sounds: Clear  RLL Breath Sounds: Expiratory Wheezes  KUNAL Breath Sounds: Clear  LLL Breath Sounds: Clear  Cardiac Assessment   Cardiac (WDL):  WDL Except (TAVR, HF, Afib)

## 2024-07-30 NOTE — FLOWSHEET NOTE
07/30/24 0748   Events/Summary/Plan   Events/Summary/Plan SpO2 check, SVN   Vital Signs   Pulse 64   Respiration 18   Pulse Oximetry 94 %   $ Pulse Oximetry (Spot Check) Yes   Respiratory Assessment   Respiratory Pattern Within Normal Limits   Level of Consciousness Alert   Chest Exam   Work Of Breathing / Effort Within Normal Limits   Secretions   Cough Productive   How Sputum Obtained Spontaneous   Sputum Amount Unable to Evaluate   Oxygen   O2 (LPM) 2   O2 Delivery Device Nasal Cannula   Room Air Challenge Fail  (Room air SpO2=86)

## 2024-07-30 NOTE — THERAPY
Physical Therapy   Daily Treatment     Patient Name: Erin Hess  Age:  84 y.o., Sex:  female  Medical Record #: [id number]  Today's Date: 7/30/2024     Precautions  Precautions: Fall Risk  Comments: 02 2L PM, 2L 02 PRN AM    Subjective    Patient in bed, agreeable to therapy.     Objective       07/30/24 0901   Precautions   Precautions Fall Risk   Comments 02 2L PM, 2L 02 PRN AM, TAVR precautions   Vitals   Pulse 76  (82 bpm)   Blood Pressure  120/50  (137/46 mmHg)   Pulse Oximetry 94 %  (93%)   O2 (LPM) 2   O2 Delivery Device Nasal Cannula   Gait Functional Level of Assist    Gait Level Of Assist Standby Assist  (to CGA)   Assistive Device 4 Wheel Walker   Distance (Feet)   (110 ft x 2; 140 ft x1)   Deviation Decreased Base Of Support  (slow fuentes; verbal cues to watch corners when turning; verbal cues for forward trunk posture with fatigue)   Transfer Functional Level of Assist   Bed, Chair, Wheelchair Transfer Standby Assist   Bed Chair Wheelchair Transfer Description Increased time;Initial preparation for task;Set-up of equipment;Supervision for safety  (stand step from w/c<>therapy mat with 4WW)   Bed Mobility    Sit to Stand Supervised   Interdisciplinary Plan of Care Collaboration   IDT Collaboration with  Occupational Therapist   Patient Position at End of Therapy Seated;Self Releasing Lap Belt Applied;Phone within Reach;Tray Table within Reach;Call Light within Reach   Collaboration Comments CLOF/POC     Assist with JIMMY hose donning and LB dressing supine. Patient was able to stand up at EOB to pull pants up with no physical assistance.    Discussion with patient regarding any concerns for d/c on Friday. Patient reports concerns with waking up to use bathroom at night by herself as she does not want to wake son. Reports she has gotten used to calling for help when needed and is concerned about d/c home with more independence. Also discussed home set-up. Will follow up with OT regarding  concerns    Standing superior UE presses with BUE holding 2# dumbbell for balance and standing tolerance with SBA-CGA 2 x15 .     Standing dynamic balance with toe taps anterior and laterally to target with R HHA during first set and no UE support during second. 2x10 ea foot. Patient had mild LOBs and reported more difficulty with supporting herself on her RLE with SBA-CGA.    Patient required seated rest breaks between exercises and bouts of ambulation. Patient requires verbal cues to scan periphery as to avoid obstacles.     Assessment    Patient tolerated session well with focus on gait training with 4WW and balance. Patient reported RLE feeling weaker today compared to previous days. May benefit from obstacle course with 4WW due to difficulties with environment negotiation. Patient is more conscious about upright posture, however continues to exhibit forward flexed posture with 4WW out in front with fatigue. Patient would benefit from continued work on balance and activity tolerance.     Strengths: Able to follow instructions, Alert and oriented, Effective communication skills, Independent prior level of function, Motivated for self care and independence, Pleasant and cooperative, Supportive family, Willingly participates in therapeutic activities  Barriers: Decreased endurance, Fatigue, Generalized weakness, Pain    Plan    Troubleshooting home entry (front door vs back door with steps)  Ambulation w/ 4WW/ LRAD  Encourage fluid intake  Activity tolerance  BLE strengthening  Standing balance-components of GELLER  Stair training  TAVR precautions  Obstacle course with 4WW    DME  PT DME Recommendations  Assistive Device: 4-Wheel Walker  Additional Equipment:  (TBD based on pt progress)    Passport items to be completed:  Get in/out of bed safely, in/out of a vehicle, safely use mobility device, walk or wheel around home/community, navigate up and down stairs, show how to get up/down from the ground, ensure home is  accessible, demonstrate HEP, complete caregiver training    Physical Therapy Problems (Active)       Problem: Mobility       Dates: Start:  07/24/24         Goal: STG-Within one week, patient will ambulate 100ft w/ FWW and SBA       Dates: Start:  07/24/24    Expected End:  08/02/24            Goal: STG-Within one week, patient will ambulate up/down a curb w/ FWW and SPV       Dates: Start:  07/24/24    Expected End:  08/02/24               Problem: Mobility Transfers       Dates: Start:  07/24/24         Goal: STG-Within one week, patient will transfer bed to chair w/ FWW and SPV       Dates: Start:  07/24/24    Expected End:  08/02/24               Problem: PT-Long Term Goals       Dates: Start:  07/24/24         Goal: LTG-By discharge, patient will ambulate 250ft w/ LRAD and Jose G       Dates: Start:  07/24/24    Expected End:  08/02/24            Goal: LTG-By discharge, patient will independently perform home exercise program       Dates: Start:  07/24/24    Expected End:  08/02/24            Goal: LTG-By discharge, patient will transfer in/out of a car w/ FWW and Jose G       Dates: Start:  07/24/24    Expected End:  08/02/24

## 2024-07-30 NOTE — PROGRESS NOTES
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  Allergies: Sulfa drugs, Codeine, Oxycodone, and Vancomycin    Safety  Patient Assist  CGA  Patient Precautions  Fall Risk  Precaution Comments  02 2L PM, 2L 02 PRN AM  Bed Transfer Status  Standby Assist  Toilet Transfer Status   Supervised  Assistive Devices  Rails, Wheelchair  Oxygen  Nasal Cannula  Diet/Therapeutic Dining  Current Diet Order   Procedures    Diet Order Diet: Cardiac     Pill Administration  whole  Agitated Behavioral Scale     ABS Level of Severity       Fall Risk  Has the patient had a fall this admission?   No  Christina Elizondo Fall Risk Scoring  16, HIGH RISK  Fall Risk Safety Measures  bed alarm and chair alarm    Vitals  Temperature: 36.9 °C (98.5 °F)  Temp src: Oral  Pulse: 63  Respiration: 18  Blood Pressure : 119/50  Blood Pressure MAP (Calculated): 73 MM HG  BP Location: Left, Upper Arm  Patient BP Position: Supine     Oxygen  Pulse Oximetry: 96 %  O2 (LPM): 2  FiO2%: 21 %  O2 Delivery Device: Nasal Cannula    Bowel and Bladder  Last Bowel Movement  07/27/24  Stool Type  Type 5: Soft blob with clear cut edges (passed easily)  Bowel Device  Bathroom  Continent  Bladder: Continent void   Bowel: Continent movement  Bladder Function  Urine Void (mL):  (Large)  Number of Times Voided: 1  Urine Color: Yellow  Genitourinary Assessment   Bladder Assessment (WDL):  Within Defined Limits  Kennedy Catheter: Not Applicable  Urine Color: Yellow  Bladder Device: Bathroom  Time Void: Yes  Bladder Scan: Post Void  $ Bladder Scan Results (mL): 1    Skin  Delfino Score   18  Sensory Interventions   Bed Types: Standard/Trauma Mattress  Skin Preventative Measures: Pillows in Use for Support / Positioning  Moisture Interventions  Moisturizers/Barriers: Barrier  Wipes      Pain  Pain Rating Scale  5 - Interrupts some activities  Pain Location  Groin  Pain Location Orientation  Right  Pain Interventions   Declines    ADLs    Bathing    (OT Shower)  Linen Change      Personal Hygiene  Moist Trisha Wipes  Chlorhexidine Bath      Oral Care  Brushed Teeth  Teeth/Dentures     Shave     Nutrition Percentage Eaten  *  * Meal *  *, Between 50-75% Consumed  Environmental Precautions  Treaded Slipper Socks on Patient, Bed in Low Position  Patient Turns/Positioning  Patient Turns Self from Side to Side  Patient Turns Assistance/Tolerance     Bed Positions  Bed Controls On, Bed Locked  Head of Bed Elevated  Self regulated      Psychosocial/Neurologic Assessment  Psychosocial Assessment  Psychosocial (WDL):  Within Defined Limits  Neurologic Assessment  Neuro (WDL): Within Defined Limits  Level of Consciousness: Alert  Orientation Level: Oriented X4  Cognition: Follows commands, Appropriate attention/concentration  Speech: Clear  EENT (WDL):  WDL Except    Cardio/Pulmonary Assessment  Edema   RLE Edema: 1+  LLE Edema: 1+  Respiratory Breath Sounds  RUL Breath Sounds: Clear  RML Breath Sounds: Clear  RLL Breath Sounds: Expiratory Wheezes  KUNAL Breath Sounds: Clear  LLL Breath Sounds: Clear  Cardiac Assessment   Cardiac (WDL):  WDL Except (TAVR, HF, Afib)

## 2024-07-30 NOTE — THERAPY
"Occupational Therapy  Daily Treatment     Patient Name: Erin Hess  Age:  84 y.o., Sex:  female  Medical Record #: [id number]  Today's Date: 7/30/2024     Precautions  Precautions: (P) Fall Risk  Comments: (P) 02 2L PM, 2L 02 PRN AM         Subjective    Pt encountered for OT sitting in WC in main gym, hand off from PT. Pt reports feeling \"tired\" but agreeable to participate. Pt with portable 02 donned.      Objective     07/30/24 1301   OT Charge Group   OT Therapeutic Exercise (Units) 2   OT Total Time Spent   OT Individual Total Time Spent (Mins) 30   Precautions   Precautions Fall Risk   Comments 02 2L PM, 2L 02 PRN AM   Vitals   Pulse Oximetry 90 %   O2 Delivery Device None - Room Air   Vitals Comments increased to 96% with cues to incorporate breathing tech during thera ex   Supine Lower Body Exercise   Comments shuttle: 3 bands (R), 2 bands (L), 3 bands (B), 2x10 each   Interdisciplinary Plan of Care Collaboration   IDT Collaboration with  Occupational Therapist;Physical Therapist   Patient Position at End of Therapy In Bed;Bed Alarm On;Call Light within Reach;Tray Table within Reach;Phone within Reach   Collaboration Comments CLOF/POC     Stand at EOM while engaged in a crossword puzzle to increase standing tolerance/endurance. Pt able to tolerated up to 15 min in standing prior to requesting a seated RB d/t LBP.    INT with PT at end of session, revealed pt expressed concern regarding safety w/ nighttime toileting needs.     Assessment    Pt with good tolerance during therapy focused on LB strengthening and standing tolerance to progress towards goals. Pt tolerated thera ex well w/o 02 donned while incorporating breathing tech. Would benefit to cont to wean from daytime 02 to progress towards baseline.     Strengths: Able to follow instructions, Adequate strength, Alert and oriented, Good insight into deficits/needs, Independent prior level of function, Manages pain appropriately, Motivated for self care " and independence, Supportive family, Pleasant and cooperative, Willingly participates in therapeutic activities  Barriers: Fatigue, Impaired activity tolerance, Impaired balance, Limited mobility    Plan    I/ADLs in standing as tolerated  Functional transfers using FWW   Discuss nighttime toileting options (e.g. BSC?)  Monitor vitals.     DC 08/02 home with family support initially.     DME  OT DME Recommendations  Bathroom Equipment:  (shower chair, GB)  Additional Equipment:  (TBD based on pt progress)    Passport items to be completed:  Perform bathroom transfers, complete dressing, complete feeding, get ready for the day, prepare a simple meal, participate in household tasks, adapt home for safety needs, demonstrate home exercise program, complete caregiver training     Occupational Therapy Goals (Active)       Problem: Dressing       Dates: Start:  07/24/24         Goal: STG-Within one week, patient will dress UB at SBA using LRD       Dates: Start:  07/24/24    Expected End:  08/02/24            Goal: STG-Within one week, patient will dress LB at SBA using LRD       Dates: Start:  07/24/24    Expected End:  08/02/24               Problem: Functional Transfers       Dates: Start:  07/24/24         Goal: STG-Within one week, patient will transfer to toilet at [facility] using LRD       Dates: Start:  07/24/24    Expected End:  08/02/24            Goal: STG-Within one week, patient will transfer to step in shower at [facility] using LRD       Dates: Start:  07/24/24    Expected End:  08/02/24               Problem: IADL's       Dates: Start:  07/24/24         Goal: STG-Within one week, patient will access kitchen area at S[facility] using LRD       Dates: Start:  07/24/24    Expected End:  08/02/24               Problem: OT Long Term Goals       Dates: Start:  07/24/24         Goal: LTG-By discharge, patient will complete basic self care tasks at [facility] to [facility] using LRD       Dates: Start:  07/24/24    Expected End:  08/02/24             Goal: LTG-By discharge, patient will perform bathroom transfers at [facility] to [facility] using LRD       Dates: Start:  07/24/24    Expected End:  08/02/24            Goal: LTG-By discharge, patient will complete basic home management at [facility] to [facility] using LRD       Dates: Start:  07/24/24    Expected End:  08/02/24

## 2024-07-30 NOTE — CARE PLAN
"The patient is Watcher - Medium risk of patient condition declining or worsening    Shift Goals  Clinical Goals: safety  Patient Goals: safety, pain control, sleep    Problem: Fall Risk - Rehab  Goal: Patient will remain free from falls  Outcome: Progressing  Note: Christina Elizondo Fall risk Assessment Score: 16    High fall risk Interventions   - Alarming seatbelt  - Bed and strip alarm   - Yellow sign by the door   - Yellow wrist band \"Fall risk\"  - Room near to the nurse station  - Do not leave patient unattended in the bathroom  - Fall risk education provided     Problem: Skin Integrity  Goal: Skin integrity is maintained or improved  Outcome: Progressing     Problem: Infection - Standard  Goal: Patient will remain free from infection  Outcome: Progressing     Problem: Respiratory  Goal: Patient will achieve/maintain optimum respiratory ventilation and gas exchange  Outcome: Progressing     Problem: Pain - Standard  Goal: Alleviation of pain or a reduction in pain to the patient’s comfort goal  Outcome: Progressing     "

## 2024-07-30 NOTE — THERAPY
Physical Therapy   Daily Treatment     Patient Name: Erin Hess  Age:  84 y.o., Sex:  female  Medical Record #: [id number]  Today's Date: 7/30/2024     Precautions  Precautions: Fall Risk  Comments: 02 2L PM, 2L 02 PRN AM    Subjective    Pt received in w/c and willing to participate in therapy. Pt states she is nervous about walking to the bathroom at night - pt's son has removed all rug and is installing night lights. Pt states she is fatigued following morning session.     Objective     07/30/24 1031   PT Charge Group   PT Gait Training (Units) 2   PT Therapeutic Exercise (Units) 1   PT Therapeutic Activities (Units) 1   PT Total Time Spent   PT Individual Total Time Spent (Mins) 60   Precautions   Precautions Fall Risk   Comments 02 2L PM, 2L 02 PRN AM   Vitals   O2 (LPM) 2   O2 Delivery Device Nasal Cannula   Pain   Intervention Rest   Pain 0 - 10 Group   Location Knee   Location Orientation Right   Therapist Pain Assessment During Activity;Post Activity Pain Same as Prior to Activity   Gait Functional Level of Assist    Gait Level Of Assist Standby Assist   Assistive Device 4 Wheel Walker   Distance (Feet) 200   # of Times Distance was Traveled 3  (2 bouts completed outside w/ ramp navigation; additional 100ft completed throughout session and 1 x 8ft of retro walking)   Deviation Decreased Base Of Support  (slow fuentes; frequent v/c for appropriate distance between AD and self)   Transfer Functional Level of Assist   Bed, Chair, Wheelchair Transfer Standby Assist   Bed Chair Wheelchair Transfer Description Increased time;Supervision for safety  (pt compelted 2 stand>sit transfers in 4WW following ambulation)   Sitting Lower Body Exercises   Sitting Lower Body Exercises Yes   Ankle Pumps 2 sets of 10;Bilateral;Weight (See Comments for lbs)  (1.5# ankle weights)   Hip Abduction 2 sets of 10;Bilateral;Light Resistance Theraband  (pink theraband)   Long Arc Quad 2 sets of 10;Right;Left;Weight (See Comments for  lbs)  (1.5# ankle weights)   Marching Reciprocal;2 sets of 10  (1.5# ankle weights)   Hamstring Curl 2 sets of 10;Right;Left;Light Resistance Theraband  (pink theraband)   Standing Lower Body Exercises   Other Exercises Completed in // bars w/o UE support:   Static standing w/ eyes closed, normal VICTORINA, 2 x 30 seconds  Static standing w/ eyes closed, narrow VICTORINA, 2 x 30 seconds   Bed Mobility    Sit to Stand Supervised   Neuro-Muscular Treatments   Neuro-Muscular Treatments Sensory Stimuli;Other (See Comments);Postural Changes   Comments see note - balance activities   Interdisciplinary Plan of Care Collaboration   IDT Collaboration with  Physician   Patient Position at End of Therapy Seated;Chair Alarm On;Call Light within Reach;Tray Table within Reach;Phone within Reach   Collaboration Comments physician completing rounds   PT DME Recommendations   Assistive Device 4-Wheel Walker   Physical Therapist Assigned   Assigned PT / Treatment Time / Comments EL+Vickey   Strengths & Barriers   Strengths Able to follow instructions;Alert and oriented;Effective communication skills;Independent prior level of function;Motivated for self care and independence;Pleasant and cooperative;Supportive family;Willingly participates in therapeutic activities   Barriers Decreased endurance;Fatigue;Generalized weakness;Pain     Completed car transfer to 's side x1 and discussed options 4WW management if driving w/o additional assistance    Discussion about home set-up and how balance is affected at night w/o visual input. Also discussed importance of appropriate gait speed w/ urinary urgency.    Assessment    Pt tolerated ambulation outside w/ appropriate use of 4WW seat when needed; however, pt requires cues to maintain the 4WW close to her COM. Pt receptive to education provided concerning walking at night and has taken steps to adapt her home in order decrease fall risk.    Strengths: Able to follow instructions, Alert and oriented,  Effective communication skills, Independent prior level of function, Motivated for self care and independence, Pleasant and cooperative, Supportive family, Willingly participates in therapeutic activities  Barriers: Decreased endurance, Fatigue, Generalized weakness, Pain    Plan    Troubleshooting home entry (front door vs back door with steps)  Ambulation w/ 4WW/ LRAD  Encourage fluid intake  Activity tolerance  BLE strengthening  Standing balance-components of GELLER  Stair training  TAVR precautions  Obstacle course with 4WW    DME  PT DME Recommendations  Assistive Device: 4-Wheel Walker  Additional Equipment:  (TBD based on pt progress)    Passport items to be completed:  Get in/out of bed safely, in/out of a vehicle, safely use mobility device, walk or wheel around home/community, navigate up and down stairs, show how to get up/down from the ground, ensure home is accessible, demonstrate HEP, complete caregiver training    Physical Therapy Problems (Active)       Problem: Mobility       Dates: Start:  07/24/24         Goal: STG-Within one week, patient will ambulate 100ft w/ FWW and SBA       Dates: Start:  07/24/24    Expected End:  08/02/24            Goal: STG-Within one week, patient will ambulate up/down a curb w/ FWW and SPV       Dates: Start:  07/24/24    Expected End:  08/02/24               Problem: Mobility Transfers       Dates: Start:  07/24/24         Goal: STG-Within one week, patient will transfer bed to chair w/ FWW and SPV       Dates: Start:  07/24/24    Expected End:  08/02/24               Problem: PT-Long Term Goals       Dates: Start:  07/24/24         Goal: LTG-By discharge, patient will ambulate 250ft w/ LRAD and Jose G       Dates: Start:  07/24/24    Expected End:  08/02/24            Goal: LTG-By discharge, patient will independently perform home exercise program       Dates: Start:  07/24/24    Expected End:  08/02/24            Goal: LTG-By discharge, patient will transfer in/out of  a car w/ FWW and Jose G       Dates: Start:  07/24/24    Expected End:  08/02/24

## 2024-07-31 ENCOUNTER — APPOINTMENT (OUTPATIENT)
Dept: PHYSICAL THERAPY | Facility: REHABILITATION | Age: 84
DRG: 949 | End: 2024-07-31
Attending: PHYSICAL MEDICINE & REHABILITATION
Payer: MEDICARE

## 2024-07-31 ENCOUNTER — APPOINTMENT (OUTPATIENT)
Dept: OCCUPATIONAL THERAPY | Facility: REHABILITATION | Age: 84
DRG: 949 | End: 2024-07-31
Attending: PHYSICAL MEDICINE & REHABILITATION
Payer: MEDICARE

## 2024-07-31 ENCOUNTER — APPOINTMENT (OUTPATIENT)
Dept: INPATIENT REHAB | Facility: REHABILITATION | Age: 84
DRG: 949 | End: 2024-07-31
Payer: MEDICARE

## 2024-07-31 VITALS
BODY MASS INDEX: 31.5 KG/M2 | WEIGHT: 184.5 LBS | HEART RATE: 61 BPM | TEMPERATURE: 98.2 F | SYSTOLIC BLOOD PRESSURE: 134 MMHG | DIASTOLIC BLOOD PRESSURE: 60 MMHG | OXYGEN SATURATION: 94 % | HEIGHT: 64 IN | RESPIRATION RATE: 18 BRPM

## 2024-07-31 PROCEDURE — 99232 SBSQ HOSP IP/OBS MODERATE 35: CPT | Performed by: HOSPITALIST

## 2024-07-31 PROCEDURE — 700101 HCHG RX REV CODE 250: Performed by: PHYSICAL MEDICINE & REHABILITATION

## 2024-07-31 PROCEDURE — 700102 HCHG RX REV CODE 250 W/ 637 OVERRIDE(OP): Performed by: PHYSICAL MEDICINE & REHABILITATION

## 2024-07-31 PROCEDURE — 700112 HCHG RX REV CODE 229: Mod: JZ | Performed by: PHYSICAL MEDICINE & REHABILITATION

## 2024-07-31 PROCEDURE — 94760 N-INVAS EAR/PLS OXIMETRY 1: CPT

## 2024-07-31 PROCEDURE — A9270 NON-COVERED ITEM OR SERVICE: HCPCS | Mod: JZ | Performed by: PHYSICAL MEDICINE & REHABILITATION

## 2024-07-31 PROCEDURE — A9270 NON-COVERED ITEM OR SERVICE: HCPCS | Performed by: PHYSICAL MEDICINE & REHABILITATION

## 2024-07-31 PROCEDURE — 97110 THERAPEUTIC EXERCISES: CPT

## 2024-07-31 PROCEDURE — 97535 SELF CARE MNGMENT TRAINING: CPT

## 2024-07-31 PROCEDURE — 97116 GAIT TRAINING THERAPY: CPT

## 2024-07-31 PROCEDURE — 94640 AIRWAY INHALATION TREATMENT: CPT

## 2024-07-31 PROCEDURE — 99232 SBSQ HOSP IP/OBS MODERATE 35: CPT | Performed by: PHYSICAL MEDICINE & REHABILITATION

## 2024-07-31 PROCEDURE — 770010 HCHG ROOM/CARE - REHAB SEMI PRIVAT*

## 2024-07-31 PROCEDURE — 97530 THERAPEUTIC ACTIVITIES: CPT

## 2024-07-31 RX ADMIN — DOCUSATE SODIUM 283 MG: 283 LIQUID RECTAL at 08:41

## 2024-07-31 RX ADMIN — ANASTROZOLE 1 MG: 1 TABLET, COATED ORAL at 08:43

## 2024-07-31 RX ADMIN — OMEPRAZOLE 20 MG: 20 CAPSULE, DELAYED RELEASE ORAL at 08:39

## 2024-07-31 RX ADMIN — ASPIRIN 81 MG: 81 TABLET, COATED ORAL at 08:39

## 2024-07-31 RX ADMIN — SPIRONOLACTONE 25 MG: 25 TABLET ORAL at 05:44

## 2024-07-31 RX ADMIN — PAROXETINE HYDROCHLORIDE 10 MG: 20 TABLET, FILM COATED ORAL at 08:39

## 2024-07-31 RX ADMIN — ROSUVASTATIN CALCIUM 20 MG: 10 TABLET, FILM COATED ORAL at 17:51

## 2024-07-31 RX ADMIN — ALBUTEROL SULFATE 2.5 MG: 2.5 SOLUTION RESPIRATORY (INHALATION) at 06:29

## 2024-07-31 RX ADMIN — AMIODARONE HYDROCHLORIDE 200 MG: 200 TABLET ORAL at 05:44

## 2024-07-31 RX ADMIN — FLUTICASONE FUROATE, UMECLIDINIUM BROMIDE AND VILANTEROL TRIFENATATE 1 PUFF: 200; 62.5; 25 POWDER RESPIRATORY (INHALATION) at 06:29

## 2024-07-31 ASSESSMENT — GAIT ASSESSMENTS
DEVIATION: DECREASED BASE OF SUPPORT
GAIT LEVEL OF ASSIST: MODIFIED INDEPENDENT
ASSISTIVE DEVICE: 4 WHEEL WALKER
ASSISTIVE DEVICE: 4 WHEEL WALKER
GAIT LEVEL OF ASSIST: MODIFIED INDEPENDENT
DISTANCE (FEET): 150

## 2024-07-31 ASSESSMENT — ENCOUNTER SYMPTOMS
VOMITING: 0
CHILLS: 0
COUGH: 0
ABDOMINAL PAIN: 0
BRUISES/BLEEDS EASILY: 0
NAUSEA: 0
SHORTNESS OF BREATH: 0
POLYDIPSIA: 0
FEVER: 0
EYES NEGATIVE: 1
PALPITATIONS: 0

## 2024-07-31 ASSESSMENT — ACTIVITIES OF DAILY LIVING (ADL)
BED_CHAIR_WHEELCHAIR_TRANSFER_DESCRIPTION: ADAPTIVE EQUIPMENT;INCREASED TIME
TOILETING_LEVEL_OF_ASSIST_DESCRIPTION: GRAB BAR
TOILET_TRANSFER_DESCRIPTION: GRAB BAR;ADAPTIVE EQUIPMENT
BED_CHAIR_WHEELCHAIR_TRANSFER_DESCRIPTION: ADAPTIVE EQUIPMENT;INCREASED TIME
TOILETING_LEVEL_OF_ASSIST_DESCRIPTION: GRAB BAR;INCREASED TIME
TOILET_TRANSFER_DESCRIPTION: GRAB BAR;ADAPTIVE EQUIPMENT;INCREASED TIME

## 2024-07-31 NOTE — THERAPY
Physical Therapy   Daily Treatment     Patient Name: Erin Hess  Age:  84 y.o., Sex:  female  Medical Record #: [id number]  Today's Date: 7/31/2024     Precautions  Precautions: (P) Fall Risk  Comments: (P) 02 2L PM, 2L 02 PRN AM    Subjective    Pt is agreeable to participate in therapy.     Objective       07/31/24 1431   PT Charge Group   PT Gait Training (Units) 1   PT Therapeutic Exercise (Units) 2   PT Therapeutic Activities (Units) 1   PT Total Time Spent   PT Individual Total Time Spent (Mins) 60   Precautions   Precautions Fall Risk   Comments 02 2L PM, 2L 02 PRN AM   Vitals   Pulse 67   Patient BP Position Sitting   Blood Pressure  130/58   Respiration 18   Room Air Oximetry 95   O2 (LPM) 2   O2 Delivery Device Nasal Cannula   Vitals Comments during activity.   Gait Functional Level of Assist    Gait Level Of Assist Modified Independent   Assistive Device 4 Wheel Walker   Distance (Feet) 150   # of Times Distance was Traveled 2   Transfer Functional Level of Assist   Bed, Chair, Wheelchair Transfer Modified Independent   Bed Chair Wheelchair Transfer Description Adaptive equipment;Increased time   Sitting Lower Body Exercises   Ankle Pumps 2 sets of 15;Bilateral   Hip Abduction 2 sets of 15;Bilateral;Medium Resistance Theraband   Long Arc Quad 2 sets of 15;Bilateral;Weight (See Comments for lbs)  (2.5 lbs AW)   Marching Reciprocal;2 sets of 15;Other Resistance (See Comments)  (2.5 lbs AW)   Hamstring Curl 2 sets of 15;Bilateral;Medium Resistance Theraband   Nustep Resistance Level 3;Time (See Comments)  (x 10 mins for B UE and LE)   Bed Mobility    Sit to Supine Modified Independent   Sit to Stand Modified Independent   Scooting Modified Independent   Rolling Modified Independent   Interdisciplinary Plan of Care Collaboration   IDT Collaboration with  Family / Caregiver   Patient Position at End of Therapy In Bed;Call Light within Reach;Tray Table within Reach;Phone within Reach   Collaboration Comments  son is present in initial part of session         Assessment    Patient instructed in Nu steps for B UE and LE x 10 mins in L3 resistance and seated resistance exercises with cueing for breathing techniques. Pt reported slight dizziness post Nu steps but recovered immediately after drinking water and rest. Gait training using 4WW, Mod I. She demonstrated steady dynamic balance during gait training.  Strengths: Able to follow instructions, Alert and oriented, Effective communication skills, Independent prior level of function, Motivated for self care and independence, Pleasant and cooperative, Supportive family, Willingly participates in therapeutic activities  Barriers: Decreased endurance, Fatigue, Generalized weakness, Pain    Plan    Troubleshooting home entry (front door vs back door with steps)  Gait training w/ 4WW/ LRAD  Encourage fluid intake  Activity tolerance  BLE strengthening  Standing balance-components of GELLER  Stair training  TAVR precautions       DME  PT DME Recommendations  Assistive Device: 4-Wheel Walker  Additional Equipment:  (TBD based on pt progress)    Passport items to be completed:  Get in/out of bed safely, in/out of a vehicle, safely use mobility device, walk or wheel around home/community, navigate up and down stairs, show how to get up/down from the ground, ensure home is accessible, demonstrate HEP, complete caregiver training    Physical Therapy Problems (Active)       Problem: Mobility       Dates: Start:  07/24/24         Goal: STG-Within one week, patient will ambulate 100ft w/ FWW and SBA       Dates: Start:  07/24/24    Expected End:  08/02/24            Goal: STG-Within one week, patient will ambulate up/down a curb w/ FWW and SPV       Dates: Start:  07/24/24    Expected End:  08/02/24               Problem: Mobility Transfers       Dates: Start:  07/24/24         Goal: STG-Within one week, patient will transfer bed to chair w/ FWW and SPV       Dates: Start:  07/24/24     Expected End:  08/02/24               Problem: PT-Long Term Goals       Dates: Start:  07/24/24         Goal: LTG-By discharge, patient will ambulate 250ft w/ LRAD and Jose G       Dates: Start:  07/24/24    Expected End:  08/02/24            Goal: LTG-By discharge, patient will independently perform home exercise program       Dates: Start:  07/24/24    Expected End:  08/02/24            Goal: LTG-By discharge, patient will transfer in/out of a car w/ FWW and Jose G       Dates: Start:  07/24/24    Expected End:  08/02/24

## 2024-07-31 NOTE — PROGRESS NOTES
Hospital Medicine Daily Progress Note      Chief Complaint:  Hypertension  Anemia    Interval History:  No chest pain, shortness of breath, or palpitations.    Review of Systems  Review of Systems   Constitutional:  Negative for chills and fever.   HENT: Negative.     Eyes: Negative.    Respiratory:  Negative for cough and shortness of breath.    Cardiovascular:  Negative for chest pain and palpitations.   Gastrointestinal:  Negative for abdominal pain, nausea and vomiting.   Musculoskeletal:         Wound pain   Skin:  Negative for itching and rash.   Endo/Heme/Allergies:  Negative for polydipsia. Does not bruise/bleed easily.        Physical Exam  Temp:  [36.7 °C (98.1 °F)-36.9 °C (98.4 °F)] 36.7 °C (98.1 °F)  Pulse:  [58-74] 59  Resp:  [17-18] 18  BP: (101-124)/(50-83) 101/83  SpO2:  [86 %-98 %] 91 %    Physical Exam  Vitals reviewed.   Constitutional:       General: She is not in acute distress.     Appearance: Normal appearance. She is not ill-appearing.   HENT:      Head: Normocephalic and atraumatic.      Right Ear: External ear normal.      Left Ear: External ear normal.      Nose: Nose normal.      Mouth/Throat:      Pharynx: Oropharynx is clear.   Eyes:      General:         Right eye: No discharge.         Left eye: No discharge.      Extraocular Movements: Extraocular movements intact.      Conjunctiva/sclera: Conjunctivae normal.   Cardiovascular:      Rate and Rhythm: Normal rate and regular rhythm.   Pulmonary:      Effort: No respiratory distress.      Breath sounds: No wheezing.      Comments: Decreased BS  Abdominal:      General: Bowel sounds are normal. There is no distension.      Palpations: Abdomen is soft.      Tenderness: There is no abdominal tenderness.   Musculoskeletal:      Cervical back: Normal range of motion and neck supple.      Right lower leg: No edema.      Left lower leg: No edema.      Comments: R inguinal incision C/D/I   Skin:     General: Skin is warm and dry.    Neurological:      Mental Status: She is alert and oriented to person, place, and time.         Fluids    Intake/Output Summary (Last 24 hours) at 7/31/2024 1222  Last data filed at 7/31/2024 0547  Gross per 24 hour   Intake 600 ml   Output --   Net 600 ml       Laboratory  Recent Labs     07/29/24  0626   WBC 6.7   RBC 3.38*   HEMOGLOBIN 9.4*   HEMATOCRIT 30.2*   MCV 89.3   MCH 27.8   MCHC 31.1*   RDW 51.5*   PLATELETCT 196   MPV 10.3       Recent Labs     07/29/24  0626   SODIUM 138   POTASSIUM 4.3   CHLORIDE 105   CO2 26   GLUCOSE 93   BUN 15   CREATININE 0.94   CALCIUM 9.6                   Assessment/Plan  * S/P TAVR (transcatheter aortic valve replacement)- (present on admission)  Assessment & Plan  Severe AS S/P TAVR on 7/15/24 by Dr.'s Luis ( mL) and Elvin ( mL)  Complicated by common femoral artery injury S/P surgical repair by Dr. Swan (EBL 5 mL)  Echo 7/15/24 EF 65%, normally functioning TAVR, small pericardial effusion without hemodynamic compromise  U/S BLE negative for DVT  On Aldactone  Check F/U labs in AM     Depression  Assessment & Plan  On Paxil    Anemia due to acute blood loss- (present on admission)  Assessment & Plan  , 250, and 5 mL  Fe and Ferritin levels normal  Follow H/H  Check F/U labs in AM     A-fib (HCC)- (present on admission)  Assessment & Plan  S/P new post-op AFib w/ RVR at [facility]  On ASA and Amiodarone    History of breast cancer- (present on admission)  Assessment & Plan  On Arimidex  Outpt F/U    Dyslipidemia- (present on admission)  Assessment & Plan  On Crestor    Asthma-COPD overlap syndrome (HCC)- (present on admission)  Assessment & Plan  On Albuterol and Trelegy  RT protocol    Essential hypertension  Assessment & Plan  Valsartan and HCTZ discontinued for orthostatic hypotension  Remains on Aldactone per Dr. Calloway  Outpt meds include Valsartan and HCTZ    Full Code

## 2024-07-31 NOTE — PROGRESS NOTES
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  Allergies: Sulfa drugs, Codeine, Oxycodone, and Vancomycin    Safety  Patient Assist  CGA  Patient Precautions  Fall Risk  Precaution Comments  02 2L PM, 2L 02 PRN AM  Bed Transfer Status  Standby Assist  Toilet Transfer Status   Supervised  Assistive Devices  Rails, Wheelchair  Oxygen  None - Room Air  Diet/Therapeutic Dining  Current Diet Order   Procedures    Diet Order Diet: Cardiac     Pill Administration  whole  Agitated Behavioral Scale     ABS Level of Severity       Fall Risk  Has the patient had a fall this admission?   No  Christina Elizondo Fall Risk Scoring  16, HIGH RISK  Fall Risk Safety Measures  bed alarm, chair alarm, and poor balance    Vitals  Temperature: 36.9 °C (98.4 °F)  Temp src: Oral  Pulse: 74  Respiration: 18  Blood Pressure : 121/50  Blood Pressure MAP (Calculated): 74 MM HG  BP Location: Left, Upper Arm  Patient BP Position: Supine     Oxygen  Pulse Oximetry: 98 %  O2 (LPM): 2  FiO2%: 21 %  O2 Delivery Device: None - Room Air    Bowel and Bladder  Last Bowel Movement  07/27/24  Stool Type  Type 5: Soft blob with clear cut edges (passed easily)  Bowel Device  Bathroom  Continent  Bladder: Continent void   Bowel: Continent movement  Bladder Function  Urine Void (mL):  (Large)  Number of Times Voided: 1  Urine Color: Yellow  Genitourinary Assessment   Bladder Assessment (WDL):  Within Defined Limits  Kennedy Catheter: Not Applicable  Urine Color: Yellow  Bladder Device: Bathroom  Time Void: Yes  Bladder Scan: Post Void  $ Bladder Scan Results (mL): 1    Skin  Delfino Score   18  Sensory Interventions   Bed Types: Standard/Trauma Mattress  Skin Preventative Measures: Pillows in Use for Support / Positioning  Moisture  Interventions  Moisturizers/Barriers: Barrier Wipes      Pain  Pain Rating Scale  4 - Distracts me, can do usual activities  Pain Location  Knee  Pain Location Orientation  Right  Pain Interventions   Rest    ADLs    Bathing    (OT Shower)  Linen Change      Personal Hygiene  Moist Trisha Wipes  Chlorhexidine Bath      Oral Care  Brushed Teeth  Teeth/Dentures     Shave     Nutrition Percentage Eaten  Lunch, Between % Consumed  Environmental Precautions  Treaded Slipper Socks on Patient  Patient Turns/Positioning  Patient Turns Self from Side to Side  Patient Turns Assistance/Tolerance     Bed Positions  Bed Controls On, Bed Locked  Head of Bed Elevated  Self regulated      Psychosocial/Neurologic Assessment  Psychosocial Assessment  Psychosocial (WDL):  Within Defined Limits  Neurologic Assessment  Neuro (WDL): Within Defined Limits  Level of Consciousness: Alert  Orientation Level: Oriented X4  Cognition: Follows commands, Appropriate attention/concentration  Speech: Clear  EENT (WDL):  WDL Except    Cardio/Pulmonary Assessment  Edema   RLE Edema: 1+  LLE Edema: 1+  Respiratory Breath Sounds  RUL Breath Sounds: Clear  RML Breath Sounds: Clear  RLL Breath Sounds: Expiratory Wheezes  KUNAL Breath Sounds: Clear  LLL Breath Sounds: Clear  Cardiac Assessment   Cardiac (WDL):  WDL Except (TAVR, HF, Afib)

## 2024-07-31 NOTE — FLOWSHEET NOTE
07/31/24 0628   Events/Summary/Plan   Events/Summary/Plan SVN and mdi   Vital Signs   Pulse (!) 59  (post 64)   Respiration 18  (post 18  Simultaneous filing. User may not have seen previous data.)   Pulse Oximetry 92 %   $ Pulse Oximetry (Spot Check) Yes   Respiratory Assessment   Level of Consciousness Alert   Chest Exam   Work Of Breathing / Effort Within Normal Limits   Breath Sounds   RUL Breath Sounds Clear  (increased aeration T/O)   RML Breath Sounds Clear   RLL Breath Sounds Diminished   KUNAL Breath Sounds Clear   LLL Breath Sounds Diminished   Secretions   Cough Non Productive   Oxygen   O2 (LPM) 2   O2 Delivery Device Silicone Nasal Cannula

## 2024-07-31 NOTE — PROGRESS NOTES
..                                                         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  Allergies: Sulfa drugs, Codeine, Oxycodone, and Vancomycin    Safety  Patient Assist  CGA  Patient Precautions  Fall Risk  Precaution Comments  02 2L PM, 2L 02 PRN AM  Bed Transfer Status  Standby Assist  Toilet Transfer Status   Supervised  Assistive Devices  Rails, Wheelchair  Oxygen  None - Room Air  Diet/Therapeutic Dining  Current Diet Order   Procedures    Diet Order Diet: Cardiac     Pill Administration  whole  Agitated Behavioral Scale     ABS Level of Severity       Fall Risk  Has the patient had a fall this admission?   No  Christina Elizondo Fall Risk Scoring  13, MODERATE RISK  Fall Risk Safety Measures  bed alarm, chair alarm, low vision/ hearing, and ok to leave pt in bathroom    Vitals  Temperature: 36.9 °C (98.4 °F)  Temp src: Oral  Pulse: 74  Respiration: 18  Blood Pressure : 121/50  Blood Pressure MAP (Calculated): 74 MM HG  BP Location: Left, Upper Arm  Patient BP Position: Supine     Oxygen  Pulse Oximetry: 98 %  O2 (LPM): 2  FiO2%: 21 %  O2 Delivery Device: None - Room Air    Bowel and Bladder  Last Bowel Movement  07/27/24  Stool Type  Type 5: Soft blob with clear cut edges (passed easily)  Bowel Device  Bathroom  Continent  Bladder: Continent void   Bowel: Continent movement  Bladder Function  Urine Void (mL):  (Large)  Number of Times Voided: 1  Urine Color: Yellow  Genitourinary Assessment   Bladder Assessment (WDL):  Within Defined Limits  Kennedy Catheter: Not Applicable  Urine Color: Yellow  Bladder Device: Bathroom  Time Void: Yes  Bladder Scan: Post Void  $ Bladder Scan Results (mL): 1    Skin  Delfino Score   19  Sensory Interventions   Bed Types: Standard/Trauma Mattress  Skin Preventative Measures: Pillows in Use for Support /  Positioning  Moisture Interventions  Moisturizers/Barriers: Barrier Wipes      Pain  Pain Rating Scale  4 - Distracts me, can do usual activities  Pain Location  Back  Pain Location Orientation  Lower  Pain Interventions   Medication (see MAR)    ADLs    Bathing    (OT Shower)  Linen Change      Personal Hygiene  Moist Trisha Wipes  Chlorhexidine Bath      Oral Care  Brushed Teeth  Teeth/Dentures     Shave     Nutrition Percentage Eaten  Lunch, Between % Consumed  Environmental Precautions  Treaded Slipper Socks on Patient, Bed in Low Position  Patient Turns/Positioning  Patient Turns Self from Side to Side  Patient Turns Assistance/Tolerance     Bed Positions  Bed Controls On, Bed Locked  Head of Bed Elevated  Self regulated      Psychosocial/Neurologic Assessment  Psychosocial Assessment  Psychosocial (WDL):  Within Defined Limits  Neurologic Assessment  Neuro (WDL): Within Defined Limits  Level of Consciousness: Alert  Orientation Level: Oriented X4  Cognition: Follows commands, Appropriate attention/concentration  Speech: Clear  EENT (WDL):  WDL Except    Cardio/Pulmonary Assessment  Edema   RLE Edema: 1+  LLE Edema: 1+  Respiratory Breath Sounds  RUL Breath Sounds: Clear  RML Breath Sounds: Clear  RLL Breath Sounds: Diminished  KUNAL Breath Sounds: Clear  LLL Breath Sounds: Diminished  Cardiac Assessment   Cardiac (WDL):  WDL Except (TAVR, HF, Afib)

## 2024-07-31 NOTE — THERAPY
Occupational Therapy  Daily Treatment     Patient Name: Erin Hess  Age:  84 y.o., Sex:  female  Medical Record #: [id number]  Today's Date: 7/31/2024     Precautions  Precautions: Fall Risk  Comments: 02 2L PM, 2L 02 PRN AM         Subjective    Pt awake in bed, willing to work with OT.     Objective       07/30/24 1231   OT Charge Group   OT Therapeutic Exercise (Units) 2   OT Total Time Spent   OT Individual Total Time Spent (Mins) 30   Precautions   Precautions Fall Risk   Comments 02 2L PM, 2L 02 PRN AM   Vitals   O2 Delivery Device Nasal Cannula   Functional Level of Assist   Bed, Chair, Wheelchair Transfer Standby Assist   Sitting Upper Body Exercises   Chest Press 3 sets of 10;Bilateral   Shoulder Press 3 sets of 10;Bilateral   Bicep Curls 3 sets of 10;Bilateral   Comments rest breaks in between exercises         Assessment    Pt seen for OT tx session with focus on arm strengthening. She was able to tolerate exercise with rest breaks.   Strengths: Able to follow instructions, Adequate strength, Alert and oriented, Good insight into deficits/needs, Independent prior level of function, Manages pain appropriately, Motivated for self care and independence, Supportive family, Pleasant and cooperative, Willingly participates in therapeutic activities  Barriers: Fatigue, Impaired activity tolerance, Impaired balance, Limited mobility    Plan    I/ADLs in standing as tolerated  Functional transfers using FWW   Discuss nighttime toileting options (e.g. BSC?)  Monitor vitals.     DC 08/02 home with family support initially.     DME  OT DME Recommendations  Bathroom Equipment:  (shower chair, GB)  Additional Equipment:  (TBD based on pt progress)    Passport items to be completed:  Perform bathroom transfers, complete dressing, complete feeding, get ready for the day, prepare a simple meal, participate in household tasks, adapt home for safety needs, demonstrate home exercise program, complete caregiver training      Occupational Therapy Goals (Active)       Problem: Dressing       Dates: Start:  07/24/24         Goal: STG-Within one week, patient will dress UB at [facility] using LRD       Dates: Start:  07/24/24    Expected End:  08/02/24            Goal: STG-Within one week, patient will dress LB at [facility] using LRD       Dates: Start:  07/24/24    Expected End:  08/02/24               Problem: Functional Transfers       Dates: Start:  07/24/24         Goal: STG-Within one week, patient will transfer to toilet at [facility] using LRD       Dates: Start:  07/24/24    Expected End:  08/02/24            Goal: STG-Within one week, patient will transfer to step in shower at [facility] using LRD       Dates: Start:  07/24/24    Expected End:  08/02/24               Problem: IADL's       Dates: Start:  07/24/24         Goal: STG-Within one week, patient will access kitchen area at [facility] using LRD       Dates: Start:  07/24/24    Expected End:  08/02/24               Problem: OT Long Term Goals       Dates: Start:  07/24/24         Goal: LTG-By discharge, patient will complete basic self care tasks at [facility] to [facility] using LRD       Dates: Start:  07/24/24    Expected End:  08/02/24            Goal: LTG-By discharge, patient will perform bathroom transfers at [facility] to [facility] using LRD       Dates: Start:  07/24/24    Expected End:  08/02/24            Goal: LTG-By discharge, patient will complete basic home management at [facility] to [facility] using LRD       Dates: Start:  07/24/24    Expected End:  08/02/24

## 2024-07-31 NOTE — THERAPY
"Recreational Therapy  Daily Treatment     Patient Name: Erin Hess  AGE:  84 y.o., SEX:  female  Medical Record #: [id number]  Today's Date: 7/31/2024       Subjective    Patient reported excitement to play scrabble.      Objective       07/31/24 1031   Procedural Tracking   Procedural Tracking Leisure Skills Development   Treatment Time   Total Time Spent (mins) 60   Functional Ability Status - Cognitive   Attention Span Remains on Task   Comprehension Follows Three Step Commands   Judgment Able to Make Independent Decisions   Functional Ability Status - Emotional    Affect Appropriate   Mood Appropriate   Behavior Appropriate;Cooperative   Skilled Intervention    Skilled Intervention Leisure Education    Interdisciplinary Plan of Care Collaboration   Patient Position at End of Therapy Seated   Strengths & Barriers   Strengths Alert and oriented;Willingly participates in therapeutic activities;Supportive family;Pleasant and cooperative;Motivated for self care and independence   Barriers Limited mobility;Decreased endurance;Generalized weakness     Patient did not want to stand today but did walk with walker to and from the gym. Once in the gym, played scrabble. Towards the end, patient said she was tired so we cleaned up and walked back to her room. She reported that she is \"lazy\" and is not sure how she is going to keep up the exercises when she goes home. She identified being with her dog and reading as activities she is looking forward to at home. When asked about physical activities she can do, she said walk to her mail box.     Assessment    Patient did not meet her standing tolerance goal because she did not want to.     Strengths: (P) Alert and oriented, Willingly participates in therapeutic activities, Supportive family, Pleasant and cooperative, Motivated for self care and independence  Barriers: (P) Limited mobility, Decreased endurance, Generalized weakness    Plan    Patient is expected to discharge " later this week.

## 2024-07-31 NOTE — THERAPY
"Occupational Therapy  Daily Treatment     Patient Name: Erin Hess  Age:  84 y.o., Sex:  female  Medical Record #: [id number]  Today's Date: 7/31/2024     Precautions  Precautions: Fall Risk  Comments: 02 2L PM, 2L 02 PRN AM         Subjective    Pt encountered for OT just having finished PT.      Objective       07/31/24 0931   OT Charge Group   OT Self Care / ADL (Units) 1   OT Therapeutic Exercise (Units) 1   OT Total Time Spent   OT Individual Total Time Spent (Mins) 30   Precautions   Precautions Fall Risk   Comments 02 2L PM, 2L 02 PRN AM   Functional Level of Assist   Toileting Modified Independent   Toileting Description Grab bar;Increased time   Bed, Chair, Wheelchair Transfer Modified Independent   Bed Chair Wheelchair Transfer Description Adaptive equipment;Increased time  (4WW)   Toilet Transfers Modified Independent   Toilet Transfer Description Grab bar;Adaptive equipment  (4WW appoach)   Sitting Upper Body Exercises   Sitting Upper Body Exercises Yes   Front Arm Raise 1 set of 10;Bilateral;Medium Resistance Theraband   Bicep Curls 1 set of 10;Bilateral;Medium Resistance Theraband   Elbow Extension 1 set of 10;Bilateral;Medium Resistance Theraband   Interdisciplinary Plan of Care Collaboration   IDT Collaboration with  Nursing;Physical Therapist   Patient Position at End of Therapy In Bed;Call Light within Reach;Tray Table within Reach;Phone within Reach   Collaboration Comments INT RE: clearing pt for Krystle in room.     OTR discussed with pt criteria for being cleared Krystle and what is entailed. Discussed with pt that she is only cleared while in her room during daytime hours and only when using 4WW. Pt verbalized understanding. OTR updated WB/fall card to reflect this change.     Discussed option for nighttime toileting needs as pt expressed concern during PT the previous day. Pt reports that her son just installed nightlight throughout her home and removed throw rugs, \"I feel ok about it now.\" OTR " discussed with pt alternative options such as BSC and voiding just prior to bedtime to prevent nighttime urgency. Pt verbalized understanding.      Assessment    Pt demonstrating at Krystle for short distances and bathroom transfers using her 4WW and has been cleared this session for Krystle in her room (see sticky note). Pt with low activity tolerance as she reported fatigue by the end of seated thera ex, but demo's good carryover of pacing strat    Strengths: Able to follow instructions, Adequate strength, Alert and oriented, Good insight into deficits/needs, Independent prior level of function, Manages pain appropriately, Motivated for self care and independence, Supportive family, Pleasant and cooperative, Willingly participates in therapeutic activities  Barriers: Fatigue, Impaired activity tolerance, Impaired balance, Limited mobility    Plan    I/ADLs in standing as tolerated  Functional transfers using FWW   Monitor vitals.     DC 08/02 home with family support initially.     DME  OT DME Recommendations  Bathroom Equipment:  (shower chair, GB)  Additional Equipment:  (TBD based on pt progress)    Passport items to be completed:  Perform bathroom transfers, complete dressing, complete feeding, get ready for the day, prepare a simple meal, participate in household tasks, adapt home for safety needs, demonstrate home exercise program, complete caregiver training     Occupational Therapy Goals (Active)       Problem: Dressing       Dates: Start:  07/24/24         Goal: STG-Within one week, patient will dress UB at [facility] using LRD       Dates: Start:  07/24/24    Expected End:  08/02/24            Goal: STG-Within one week, patient will dress LB at [facility] using LRD       Dates: Start:  07/24/24    Expected End:  08/02/24               Problem: Functional Transfers       Dates: Start:  07/24/24         Goal: STG-Within one week, patient will transfer to toilet at [facility] using LRD       Dates: Start:  07/24/24    Expected End:   08/02/24            Goal: STG-Within one week, patient will transfer to step in shower at \[facility]\"" using LRD       Dates: Start:  07/24/24    Expected End:  08/02/24               Problem: IADL's       Dates: Start:  07/24/24         Goal: STG-Within one week, patient will access kitchen area at [facility] using LRD       Dates: Start:  07/24/24    Expected End:  08/02/24               Problem: OT Long Term Goals       Dates: Start:  07/24/24         Goal: LTG-By discharge, patient will complete basic self care tasks at \[facility]\"" to [facility] using LRD       Dates: Start:  07/24/24    Expected End:  08/02/24            Goal: LTG-By discharge, patient will perform bathroom transfers at \[facility]\"" to [facility] using LRD       Dates: Start:  07/24/24    Expected End:  08/02/24            Goal: LTG-By discharge, patient will complete basic home management at \[facility]\"" to [facility] using LRD       Dates: Start:  07/24/24    Expected End:  08/02/24

## 2024-07-31 NOTE — THERAPY
Physical Therapy   Daily Treatment     Patient Name: Erin Hess  Age:  84 y.o., Sex:  female  Medical Record #: [id number]  Today's Date: 7/31/2024     Precautions  Precautions: (P) Fall Risk  Comments: (P) 02 2L PM, 2L 02 PRN AM    Subjective    Patient in bed, agreeable to therapy.     Objective     07/31/24 0831   Precautions   Precautions Fall Risk   Comments 02 2L PM, 2L 02 PRN AM   Vitals   Patient BP Position Sitting   Blood Pressure  101/83  (post ambulation)   Pulse Oximetry (!) 86 %  (post ambulation)   Gait Functional Level of Assist    Gait Level Of Assist Modified Independent   Assistive Device 4 Wheel Walker   Distance (Feet)   (500ft throughout session)   Deviation Decreased Base Of Support  (slow fuentes; verbal cues for appropriate distance between AD and self)   Transfer Functional Level of Assist   Toilet Transfers Modified Independent   Toilet Transfer Description Grab bar;Adaptive equipment;Increased time  (4WW approach with grab bar)   Bed Mobility    Sit to Supine Modified Independent   Sit to Stand Modified Independent   Scooting Modified Independent   Rolling Modified Independent   Interdisciplinary Plan of Care Collaboration   IDT Collaboration with  Nursing;Occupational Therapist   Patient Position at End of Therapy Seated  (handoff in room to OT for session)   Collaboration Comments CLOF/POC     Patient education on potential clearance for Mod I in room. Patient receptive to education and trial with PT and OT during sessions.     Patient required no physical assist for UB and LB dressing with multiple sit to stands throughout.     4WW approach to toilet with grab bars and no physical assistance required for LB dressing or pericare.    Standing hand hygiene with no physical assistance required.     Small cones (4) and bowling pins (3) set up in a line for navigation weaving in and out of cones for walker mobility and navigation with SBA to Supervision. X3 down and backs with seated rest  break in between. Patient hit one small cone and one bowling pin with back wheels of walker during two separate trials.    Vitals checked throughout session.   101/83 mmHg following bout of ambulation. Recovery to 126/47 mmHg following seated rest break, water, pursed lip breathing, and ankle pumps in sitting. SpO2 86% with recovery to 92% with seated rest break and pursed lip breathing  133/47 mmHg following next bout of ambulation with SpO2 of 92%        Assessment    Patient tolerated session fairly with one report of mild dizziness post ambulation that improved with a seated rest break. Patient requires verbal cues to keep walker in close, especially when turning, as patient has a tendency to push walker further away from body when turning. No safety concerns observed with mobility in room with 4WW; will confirm with OT to clear for Mod I in room with 4WW.    Strengths: Able to follow instructions, Alert and oriented, Effective communication skills, Independent prior level of function, Motivated for self care and independence, Pleasant and cooperative, Supportive family, Willingly participates in therapeutic activities  Barriers: Decreased endurance, Fatigue, Generalized weakness, Pain    Plan    Troubleshooting home entry (front door vs back door with steps)  Gait training w/ 4WW/ LRAD  Encourage fluid intake  Activity tolerance  BLE strengthening  Standing balance-components of GELLER  Stair training  TAVR precautions      DME  PT DME Recommendations  Assistive Device: 4-Wheel Walker  Additional Equipment:  (TBD based on pt progress)    Passport items to be completed:  Get in/out of bed safely, in/out of a vehicle, safely use mobility device, walk or wheel around home/community, navigate up and down stairs, show how to get up/down from the ground, ensure home is accessible, demonstrate HEP, complete caregiver training    Physical Therapy Problems (Active)       Problem: Mobility       Dates: Start:  07/24/24          Goal: STG-Within one week, patient will ambulate 100ft w/ FWW and SBA       Dates: Start:  07/24/24    Expected End:  08/02/24            Goal: STG-Within one week, patient will ambulate up/down a curb w/ FWW and SPV       Dates: Start:  07/24/24    Expected End:  08/02/24               Problem: Mobility Transfers       Dates: Start:  07/24/24         Goal: STG-Within one week, patient will transfer bed to chair w/ FWW and SPV       Dates: Start:  07/24/24    Expected End:  08/02/24               Problem: PT-Long Term Goals       Dates: Start:  07/24/24         Goal: LTG-By discharge, patient will ambulate 250ft w/ LRAD and Jose G       Dates: Start:  07/24/24    Expected End:  08/02/24            Goal: LTG-By discharge, patient will independently perform home exercise program       Dates: Start:  07/24/24    Expected End:  08/02/24            Goal: LTG-By discharge, patient will transfer in/out of a car w/ FWW and Jose G       Dates: Start:  07/24/24    Expected End:  08/02/24

## 2024-07-31 NOTE — PROGRESS NOTES
"  Physical Medicine & Rehabilitation Progress Note    Encounter Date: 7/31/2024    Chief Complaint: As tolerated    Interval Events (Subjective):  Patient sitting up in room. She reports she is doing well. Denies pain at rest. She reports she thinks she will be ready to go on Friday.     _____________________________________  Interdisciplinary Team Conference   Most recent IDT on 7/25/2024     Discharge Date/Disposition:  8/2/24  _____________________________________    Objective:  VITAL SIGNS: /83 Comment: post ambulation  Pulse (!) 59 Comment: post 64  Temp 36.7 °C (98.1 °F) (Temporal)   Resp 18 Comment: post 18    Ht 1.626 m (5' 4\")   Wt 83.7 kg (184 lb 8 oz)   SpO2 91%   BMI 31.67 kg/m²   Gen: NAD  Psych: Mood and affect appropriate  CV: RRR, 0 edema  Resp: CTAB, no upper airway sounds  Abd: NTND  Neuro: AOx4, following commands    Laboratory Values:  Recent Results (from the past 72 hour(s))   MAGNESIUM    Collection Time: 07/29/24  6:26 AM   Result Value Ref Range    Magnesium 1.9 1.5 - 2.5 mg/dL   CBC WITH DIFFERENTIAL    Collection Time: 07/29/24  6:26 AM   Result Value Ref Range    WBC 6.7 4.8 - 10.8 K/uL    RBC 3.38 (L) 4.20 - 5.40 M/uL    Hemoglobin 9.4 (L) 12.0 - 16.0 g/dL    Hematocrit 30.2 (L) 37.0 - 47.0 %    MCV 89.3 81.4 - 97.8 fL    MCH 27.8 27.0 - 33.0 pg    MCHC 31.1 (L) 32.2 - 35.5 g/dL    RDW 51.5 (H) 35.9 - 50.0 fL    Platelet Count 196 164 - 446 K/uL    MPV 10.3 9.0 - 12.9 fL    Neutrophils-Polys 73.30 (H) 44.00 - 72.00 %    Lymphocytes 15.60 (L) 22.00 - 41.00 %    Monocytes 7.60 0.00 - 13.40 %    Eosinophils 2.40 0.00 - 6.90 %    Basophils 0.70 0.00 - 1.80 %    Immature Granulocytes 0.40 0.00 - 0.90 %    Nucleated RBC 0.00 0.00 - 0.20 /100 WBC    Neutrophils (Absolute) 4.88 1.82 - 7.42 K/uL    Lymphs (Absolute) 1.04 1.00 - 4.80 K/uL    Monos (Absolute) 0.51 0.00 - 0.85 K/uL    Eos (Absolute) 0.16 0.00 - 0.51 K/uL    Baso (Absolute) 0.05 0.00 - 0.12 K/uL    Immature Granulocytes " (abs) 0.03 0.00 - 0.11 K/uL    NRBC (Absolute) 0.00 K/uL   Basic Metabolic Panel    Collection Time: 07/29/24  6:26 AM   Result Value Ref Range    Sodium 138 135 - 145 mmol/L    Potassium 4.3 3.6 - 5.5 mmol/L    Chloride 105 96 - 112 mmol/L    Co2 26 20 - 33 mmol/L    Glucose 93 65 - 99 mg/dL    Bun 15 8 - 22 mg/dL    Creatinine 0.94 0.50 - 1.40 mg/dL    Calcium 9.6 8.5 - 10.5 mg/dL    Anion Gap 7.0 7.0 - 16.0   ESTIMATED GFR    Collection Time: 07/29/24  6:26 AM   Result Value Ref Range    GFR (CKD-EPI) 60 >60 mL/min/1.73 m 2       Medications:  Scheduled Medications   Medication Dose Frequency    docusate sodium  10 mg BID    fluticasone-umeclidinium-vilanterol  1 Puff DAILY    amiodarone  200 mg Q DAY    anastrozole  1 mg QAM    aspirin  81 mg DAILY    omeprazole  20 mg DAILY    PARoxetine  10 mg DAILY    rosuvastatin  20 mg PM MEAL    spironolactone  25 mg QDAY    albuterol  2.5 mg Daily-0800     PRN medications: [Held by provider] senna-docusate **AND** polyethylene glycol/lytes, hydrocortisone, Respiratory Therapy Consult, hydrALAZINE, acetaminophen, docusate sodium, magnesium hydroxide, carboxymethylcellulose, mag hydrox-al hydrox-simeth, ondansetron **OR** ondansetron, traZODone, sodium chloride, traMADol    Diet:  Current Diet Order   Procedures    Diet Order Diet: Cardiac       Medical Decision Making and Plan:  TAVR - Patient with severe aortic stenosis s/p TAVR on 7/15/24 with Dr. Oconnor which was complicated by right femoral artery injury requiring repair.  -PT and OT for mobility and ADLs. Per guidelines, 15 hours per week between PT, OT and/or SLP.  -Follow-up Cardiology. Continue ASA     HTN/A fib/sCHF - Patient on Amiodarone 400 mg BID with plan to transition to 200 mg daily on 7/27 as well as HCTZ 6.25 mg, Spironolactone 25 mg and Valsartan 40 mg daily.  Very labile, consult hospitalist. Reduced Amiodarone to 200 mg. Valsartan discontinued. SBP borderline low - continue Amiodarone 200 mg daily and  Spironolactone 25 mg     HLD - Patient on Rosuvastatin 20 mg QHS     COPD/Asthma - Patient on Albuterol daily and Trelegy. Improving breathing, continue Albuterol and Trelegy     Anemia - Check AM CBC - 9.1, will monitor     R groin hematoma - Wound care for right groin incision site.      Thrombocytopenia - Check AM CBC - 154, will monitor     Hyperglycemia - Check A 1c - 5.4, no need to monitor sugars     Depression - Patient on Paxil 10 m daily     R ear hearing loss - ear wax, start Docusate 100 mg BID. Improved with ear cleaning, will continue Docusate BID until discharge    Hemorrhoids - start PRN preparation H. Improving, continue Preparation H    Obesity - On admission Body mass index is 31.67 kg/m². Meets medical criteria. Dietitian to consult.      Pain - Patient on PRN Tylenol and Tramadol     Skin - Patient at risk for skin breakdown due to debility in areas including sacrum, achilles, elbows and head in addition to other sites. Nursing to assess skin daily.      GI Ppx - Patient on Prilosec for GERD prophylaxis. Patient on Senna-docusate for constipation prophylaxis.   -Loose BMs, change senna-docusate to PRN     DVT Ppx - Patient is not cleared for AC due to recent hematoma.    ____________________________________    T. Lv Solano MD/PhD  [facility] - Physical Medicine & Rehabilitation   [facility] - Brain Injury Medicine   ____________________________________

## 2024-07-31 NOTE — THERAPY
"Occupational Therapy  Daily Treatment     Patient Name: Erin Hess  Age:  84 y.o., Sex:  female  Medical Record #: [id number]  Today's Date: 7/31/2024     Precautions  Precautions: Fall Risk  Comments: 02 2L PM, 2L 02 PRN AM         Subjective    \"I have not been using it much\" referring to supplemental O2     Objective       07/31/24 1331   OT Charge Group   OT Self Care / ADL (Units) 1   OT Therapeutic Exercise (Units) 1   OT Total Time Spent   OT Individual Total Time Spent (Mins) 30   Vitals   Room Air Oximetry 87  (increased to 94 with deep breathing, decreased back to 87 when walking)   Functional Level of Assist   Grooming Supervision;Standing  (washing hands, brushing hair)   Toileting Modified Independent   Toileting Description Grab bar   Bed, Chair, Wheelchair Transfer Modified Independent   Bed Mobility    Supine to Sit Independent   Sit to Supine Independent   Skilled Intervention   (trialed bed mobility with verbal cueing at therapy mat table. Pt able to return demonstration.)         Assessment    Pt tolerated OT tx fair, did desaturate when walking on room air. Pt was concerned about getting out of bed when she goes home. Trialed sit to supine and supine to sit on therapy mat table, and pt was able to do both safely without physical assistance.   Strengths: Able to follow instructions, Adequate strength, Alert and oriented, Good insight into deficits/needs, Independent prior level of function, Manages pain appropriately, Motivated for self care and independence, Supportive family, Pleasant and cooperative, Willingly participates in therapeutic activities  Barriers: Fatigue, Impaired activity tolerance, Impaired balance, Limited mobility    Plan    I/ADLs in standing as tolerated  Functional transfers using FWW   Monitor vitals.     DC 08/02 home with family support initially.        DME  OT DME Recommendations  Bathroom Equipment:  (shower chair, GB)  Additional Equipment:  (TBD based on pt " progress)    Passport items to be completed:  Perform bathroom transfers, complete dressing, complete feeding, get ready for the day, prepare a simple meal, participate in household tasks, adapt home for safety needs, demonstrate home exercise program, complete caregiver training     Occupational Therapy Goals (Active)       Problem: Dressing       Dates: Start:  07/24/24         Goal: STG-Within one week, patient will dress UB at [facility] using LRD       Dates: Start:  07/24/24    Expected End:  08/02/24            Goal: STG-Within one week, patient will dress LB at [facility] using LRD       Dates: Start:  07/24/24    Expected End:  08/02/24               Problem: Functional Transfers       Dates: Start:  07/24/24         Goal: STG-Within one week, patient will transfer to toilet at [facility] using LRD       Dates: Start:  07/24/24    Expected End:  08/02/24            Goal: STG-Within one week, patient will transfer to step in shower at [facility] using LRD       Dates: Start:  07/24/24    Expected End:  08/02/24               Problem: IADL's       Dates: Start:  07/24/24         Goal: STG-Within one week, patient will access kitchen area at [facility] using LRD       Dates: Start:  07/24/24    Expected End:  08/02/24               Problem: OT Long Term Goals       Dates: Start:  07/24/24         Goal: LTG-By discharge, patient will complete basic self care tasks at [facility] to [facility] using LRD       Dates: Start:  07/24/24    Expected End:  08/02/24            Goal: LTG-By discharge, patient will perform bathroom transfers at [facility] to [facility] using LRD       Dates: Start:  07/24/24    Expected End:  08/02/24            Goal: LTG-By discharge, patient will complete basic home management at [facility] to [facility] using LRD       Dates: Start:  07/24/24    Expected End:  08/02/24

## 2024-07-31 NOTE — CARE PLAN
Problem: Pain - Standard  Goal: Alleviation of pain or a reduction in pain to the patient’s comfort goal  Outcome: Progressing     Problem: Bowel Elimination  Goal: Establish and maintain regular bowel function  Outcome: Progressing     Problem: Fall Risk - Rehab  Goal: Patient will remain free from falls  Outcome: Progressing     The patient is Stable - Low risk of patient condition declining or worsening    Shift Goals  Clinical Goals: safety  Patient Goals: safety; pain control

## 2024-08-01 ENCOUNTER — APPOINTMENT (OUTPATIENT)
Dept: PHYSICAL THERAPY | Facility: REHABILITATION | Age: 84
DRG: 949 | End: 2024-08-01
Attending: PHYSICAL MEDICINE & REHABILITATION
Payer: MEDICARE

## 2024-08-01 ENCOUNTER — APPOINTMENT (OUTPATIENT)
Dept: OCCUPATIONAL THERAPY | Facility: REHABILITATION | Age: 84
DRG: 949 | End: 2024-08-01
Attending: PHYSICAL MEDICINE & REHABILITATION
Payer: MEDICARE

## 2024-08-01 ENCOUNTER — APPOINTMENT (OUTPATIENT)
Dept: INPATIENT REHAB | Facility: REHABILITATION | Age: 84
DRG: 949 | End: 2024-08-01
Payer: MEDICARE

## 2024-08-01 ENCOUNTER — PHARMACY VISIT (OUTPATIENT)
Dept: PHARMACY | Facility: MEDICAL CENTER | Age: 84
End: 2024-08-01
Payer: COMMERCIAL

## 2024-08-01 LAB
ANION GAP SERPL CALC-SCNC: 9 MMOL/L (ref 7–16)
BUN SERPL-MCNC: 17 MG/DL (ref 8–22)
CALCIUM SERPL-MCNC: 9.6 MG/DL (ref 8.5–10.5)
CHLORIDE SERPL-SCNC: 104 MMOL/L (ref 96–112)
CO2 SERPL-SCNC: 25 MMOL/L (ref 20–33)
CREAT SERPL-MCNC: 0.85 MG/DL (ref 0.5–1.4)
ERYTHROCYTE [DISTWIDTH] IN BLOOD BY AUTOMATED COUNT: 51.2 FL (ref 35.9–50)
GFR SERPLBLD CREATININE-BSD FMLA CKD-EPI: 68 ML/MIN/1.73 M 2
GLUCOSE SERPL-MCNC: 95 MG/DL (ref 65–99)
HCT VFR BLD AUTO: 29.7 % (ref 37–47)
HGB BLD-MCNC: 9.1 G/DL (ref 12–16)
MCH RBC QN AUTO: 27.2 PG (ref 27–33)
MCHC RBC AUTO-ENTMCNC: 30.6 G/DL (ref 32.2–35.5)
MCV RBC AUTO: 88.9 FL (ref 81.4–97.8)
PLATELET # BLD AUTO: 206 K/UL (ref 164–446)
PMV BLD AUTO: 10.2 FL (ref 9–12.9)
POTASSIUM SERPL-SCNC: 4.5 MMOL/L (ref 3.6–5.5)
RBC # BLD AUTO: 3.34 M/UL (ref 4.2–5.4)
SODIUM SERPL-SCNC: 138 MMOL/L (ref 135–145)
WBC # BLD AUTO: 5.4 K/UL (ref 4.8–10.8)

## 2024-08-01 PROCEDURE — 770010 HCHG ROOM/CARE - REHAB SEMI PRIVAT*

## 2024-08-01 PROCEDURE — 97530 THERAPEUTIC ACTIVITIES: CPT

## 2024-08-01 PROCEDURE — 99232 SBSQ HOSP IP/OBS MODERATE 35: CPT | Performed by: PHYSICAL MEDICINE & REHABILITATION

## 2024-08-01 PROCEDURE — A9270 NON-COVERED ITEM OR SERVICE: HCPCS | Performed by: PHYSICAL MEDICINE & REHABILITATION

## 2024-08-01 PROCEDURE — 36415 COLL VENOUS BLD VENIPUNCTURE: CPT

## 2024-08-01 PROCEDURE — 700112 HCHG RX REV CODE 229: Mod: JZ | Performed by: PHYSICAL MEDICINE & REHABILITATION

## 2024-08-01 PROCEDURE — A9270 NON-COVERED ITEM OR SERVICE: HCPCS | Mod: JZ | Performed by: PHYSICAL MEDICINE & REHABILITATION

## 2024-08-01 PROCEDURE — 94640 AIRWAY INHALATION TREATMENT: CPT

## 2024-08-01 PROCEDURE — 80048 BASIC METABOLIC PNL TOTAL CA: CPT

## 2024-08-01 PROCEDURE — 97110 THERAPEUTIC EXERCISES: CPT

## 2024-08-01 PROCEDURE — 97116 GAIT TRAINING THERAPY: CPT

## 2024-08-01 PROCEDURE — RXMED WILLOW AMBULATORY MEDICATION CHARGE: Performed by: PHYSICAL MEDICINE & REHABILITATION

## 2024-08-01 PROCEDURE — 85027 COMPLETE CBC AUTOMATED: CPT

## 2024-08-01 PROCEDURE — 700102 HCHG RX REV CODE 250 W/ 637 OVERRIDE(OP): Performed by: PHYSICAL MEDICINE & REHABILITATION

## 2024-08-01 PROCEDURE — 99232 SBSQ HOSP IP/OBS MODERATE 35: CPT | Performed by: HOSPITALIST

## 2024-08-01 PROCEDURE — 97535 SELF CARE MNGMENT TRAINING: CPT

## 2024-08-01 PROCEDURE — 94760 N-INVAS EAR/PLS OXIMETRY 1: CPT

## 2024-08-01 PROCEDURE — 700101 HCHG RX REV CODE 250: Performed by: PHYSICAL MEDICINE & REHABILITATION

## 2024-08-01 RX ORDER — AMIODARONE HYDROCHLORIDE 200 MG/1
200 TABLET ORAL DAILY
Qty: 90 TABLET | Refills: 2 | Status: SHIPPED | OUTPATIENT
Start: 2024-08-02 | End: 2024-08-22

## 2024-08-01 RX ORDER — FLUTICASONE FUROATE, UMECLIDINIUM BROMIDE AND VILANTEROL TRIFENATATE 200; 62.5; 25 UG/1; UG/1; UG/1
1 POWDER RESPIRATORY (INHALATION)
Qty: 60 EACH | Refills: 2 | Status: SHIPPED | OUTPATIENT
Start: 2024-08-01

## 2024-08-01 RX ORDER — TRAZODONE HYDROCHLORIDE 50 MG/1
50 TABLET, FILM COATED ORAL
Qty: 30 TABLET | Refills: 3 | Status: SHIPPED | OUTPATIENT
Start: 2024-08-01

## 2024-08-01 RX ORDER — PAROXETINE 10 MG/1
10 TABLET, FILM COATED ORAL DAILY
Qty: 100 TABLET | Refills: 2 | Status: SHIPPED | OUTPATIENT
Start: 2024-08-01

## 2024-08-01 RX ORDER — ASPIRIN 81 MG/1
81 TABLET ORAL DAILY
Qty: 100 TABLET | Refills: 2 | Status: SHIPPED | OUTPATIENT
Start: 2024-08-01 | End: 2024-08-22

## 2024-08-01 RX ORDER — ANASTROZOLE 1 MG/1
1 TABLET ORAL EVERY MORNING
Qty: 100 TABLET | Refills: 2 | Status: SHIPPED | OUTPATIENT
Start: 2024-08-01

## 2024-08-01 RX ORDER — PANTOPRAZOLE SODIUM 40 MG/1
40 TABLET, DELAYED RELEASE ORAL DAILY
Qty: 30 TABLET | Refills: 2 | Status: SHIPPED | OUTPATIENT
Start: 2024-08-01

## 2024-08-01 RX ORDER — TRAMADOL HYDROCHLORIDE 50 MG/1
50 TABLET ORAL EVERY 4 HOURS PRN
Qty: 30 TABLET | Refills: 0 | Status: SHIPPED | OUTPATIENT
Start: 2024-08-01 | End: 2024-08-15

## 2024-08-01 RX ORDER — ROSUVASTATIN CALCIUM 20 MG/1
20 TABLET, COATED ORAL EVERY EVENING
Qty: 100 TABLET | Refills: 2 | Status: SHIPPED | OUTPATIENT
Start: 2024-08-01

## 2024-08-01 RX ORDER — SPIRONOLACTONE 25 MG/1
25 TABLET ORAL
Qty: 30 TABLET | Refills: 2 | Status: SHIPPED | OUTPATIENT
Start: 2024-08-01

## 2024-08-01 RX ADMIN — AMIODARONE HYDROCHLORIDE 200 MG: 200 TABLET ORAL at 05:57

## 2024-08-01 RX ADMIN — TRAMADOL HYDROCHLORIDE 50 MG: 50 TABLET ORAL at 00:47

## 2024-08-01 RX ADMIN — ASPIRIN 81 MG: 81 TABLET, COATED ORAL at 07:58

## 2024-08-01 RX ADMIN — OMEPRAZOLE 20 MG: 20 CAPSULE, DELAYED RELEASE ORAL at 07:58

## 2024-08-01 RX ADMIN — SPIRONOLACTONE 25 MG: 25 TABLET ORAL at 05:57

## 2024-08-01 RX ADMIN — ROSUVASTATIN CALCIUM 20 MG: 10 TABLET, FILM COATED ORAL at 17:36

## 2024-08-01 RX ADMIN — ANASTROZOLE 1 MG: 1 TABLET, COATED ORAL at 07:58

## 2024-08-01 RX ADMIN — DOCUSATE SODIUM 283 MG: 283 LIQUID RECTAL at 18:34

## 2024-08-01 RX ADMIN — PAROXETINE HYDROCHLORIDE 10 MG: 20 TABLET, FILM COATED ORAL at 07:58

## 2024-08-01 RX ADMIN — ALBUTEROL SULFATE 2.5 MG: 2.5 SOLUTION RESPIRATORY (INHALATION) at 06:27

## 2024-08-01 RX ADMIN — TRAZODONE HYDROCHLORIDE 50 MG: 50 TABLET ORAL at 22:04

## 2024-08-01 RX ADMIN — TRAZODONE HYDROCHLORIDE 50 MG: 50 TABLET ORAL at 00:47

## 2024-08-01 RX ADMIN — TRAMADOL HYDROCHLORIDE 50 MG: 50 TABLET ORAL at 22:04

## 2024-08-01 RX ADMIN — FLUTICASONE FUROATE, UMECLIDINIUM BROMIDE AND VILANTEROL TRIFENATATE 1 PUFF: 200; 62.5; 25 POWDER RESPIRATORY (INHALATION) at 06:30

## 2024-08-01 ASSESSMENT — BRIEF INTERVIEW FOR MENTAL STATUS (BIMS)
ASKED TO RECALL SOCK: YES, NO CUE REQUIRED
WHAT YEAR IS IT: CORRECT
ASKED TO RECALL BED: YES, NO CUE REQUIRED
WHAT DAY OF THE WEEK IS IT: CORRECT
ASKED TO RECALL BLUE: YES, NO CUE REQUIRED
BIMS SUMMARY SCORE: 15
INITIAL REPETITION OF BED BLUE SOCK - FIRST ATTEMPT: 3
WHAT MONTH IS IT: ACCURATE WITHIN 5 DAYS

## 2024-08-01 ASSESSMENT — ENCOUNTER SYMPTOMS
EYES NEGATIVE: 1
POLYDIPSIA: 0
CHILLS: 0
ABDOMINAL PAIN: 0
COUGH: 0
SHORTNESS OF BREATH: 0
NAUSEA: 0
FEVER: 0
VOMITING: 0
PALPITATIONS: 0
BRUISES/BLEEDS EASILY: 0

## 2024-08-01 ASSESSMENT — GAIT ASSESSMENTS
DEVIATION: DECREASED BASE OF SUPPORT
GAIT LEVEL OF ASSIST: MODIFIED INDEPENDENT
DISTANCE (FEET): 125
ASSISTIVE DEVICE: 4 WHEEL WALKER
ASSISTIVE DEVICE: 4 WHEEL WALKER
DEVIATION: DECREASED BASE OF SUPPORT;BRADYKINETIC
GAIT LEVEL OF ASSIST: MODIFIED INDEPENDENT

## 2024-08-01 ASSESSMENT — ACTIVITIES OF DAILY LIVING (ADL)
BED_CHAIR_WHEELCHAIR_TRANSFER_DESCRIPTION: ADAPTIVE EQUIPMENT;INCREASED TIME
TUB_SHOWER_TRANSFER_DESCRIPTION: ADAPTIVE EQUIPMENT;GRAB BAR;SHOWER BENCH;SUPERVISION FOR SAFETY
BED_CHAIR_WHEELCHAIR_TRANSFER_DESCRIPTION: ADAPTIVE EQUIPMENT;INCREASED TIME
BED_CHAIR_WHEELCHAIR_TRANSFER_DESCRIPTION: ADAPTIVE EQUIPMENT

## 2024-08-01 ASSESSMENT — PAIN DESCRIPTION - PAIN TYPE: TYPE: ACUTE PAIN

## 2024-08-01 NOTE — CARE PLAN
Problem: Dressing  Goal: STG-Within one week, patient will dress UB at [facility] using LRD  Outcome: Met  Goal: STG-Within one week, patient will dress LB at [facility] using LRD  Outcome: Met     Problem: Functional Transfers  Goal: STG-Within one week, patient will transfer to toilet at [facility] using LRD  Outcome: Met  Goal: STG-Within one week, patient will transfer to step in shower at [facility] using LRD  Outcome: Met     Problem: IADL's  Goal: STG-Within one week, patient will access kitchen area at [facility] using LRD  Outcome: Met     Problem: OT Long Term Goals  Goal: LTG-By discharge, patient will complete basic self care tasks at [facility] to [facility] using LRD  Outcome: Met  Goal: LTG-By discharge, patient will perform bathroom transfers at [facility] to [facility] using LRD  Outcome: Met  Goal: LTG-By discharge, patient will complete basic home management at [facility] to [facility] using LRD  Outcome: Met

## 2024-08-01 NOTE — DISCHARGE INSTRUCTIONS
[facility] NURSING DISCHARGE INSTRUCTIONS    Blood Pressure : 108/52  Weight: 83.7 kg (184 lb 8 oz)  Nursing recommendations for Erin Hess at time of discharge are as follows:  Client and Family Member verbalized understanding of all discharge instructions and prescriptions.     Review all your home medications and newly ordered medications with your doctor and/or pharmacist. Follow medication instructions as directed by your doctor and/or pharmacist.    Pain Management:   Discharge Pain Medication Instructions:  Comfort Goal: Sleep Comfortably  Notify your primary care provider if pain is unrelieved with these measures, if the pain is new, or increased in intensity.    Discharge Skin Characteristics: Warm, Dry  Discharge Skin Exam: Other (Comments) (see wound documentation)  Wound 07/15/24 Incision Groin Right Mastisol, prevena (Active)   Wound Image   07/23/24 1130   Site Assessment Clean;Dry;Intact 08/01/24 1840   Periwound Assessment Dry;Pink 08/01/24 1840   Margins Attached edges 08/01/24 1840   Closure Approximated 08/01/24 1840   Drainage Amount None 08/01/24 1840   Drainage Description Serous 07/28/24 0835   Dressing Status Open to Air 08/01/24 1840   Dressing Changed Observed 08/01/24 1840   Dressing Options Open to Air 08/01/24 1840   NEXT Weekly Photo (Inpatient Only) 08/04/24 07/29/24 0851       Wound 07/15/24 Incision Groin Left Angioseal, tegaderm (Active)     Skin / Wound Care Instructions: Please contact your primary care physician for any change in skin integrity.     If You Have Surgical Incisions / Wounds:  Monitor surgical site(s) for signs of increased swelling, redness or symptoms of drainage from the site or fever as this could indicate signs and symptoms of infection. If these symptoms are noted, notifiy your primary care provider.      Discharge Safety Instructions: No Supervision Needed     Discharge Safety Concerns: Weakness  The interdisciplinary team has made  recommendation that you do not require supervision in the house due to demonstration of safety with ADL's and IADLS and problem solving skills  Anti-embolic stockings are not required to increase circulation to the lower extremities.    Discharge Diet: Cardiac     Discharge Liquids: Thin  Discharge Bowel Function: Continent  Please contact your primary care physician for any changes in bowel habits.  Discharge Bowel Program:    Discharge Bladder Function: Continent  Discharge Urinary Devices: None      Nursing Discharge Plan:        Case Management Discharge Instructions:   Discharge Location:    Agency Name/Address/Phone:    Home Health:    Outpatient Services:    DME Provider/Phone:    Medical Equipment Ordered:    Prescription Faxed to:        Discharge Medication Instructions:  Below are the medications your physician expects you to take upon discharge:    Transcatheter Aortic Valve Replacement, Care After  The following information offers guidance on how to care for yourself after your procedure. Your health care provider may also give you more specific instructions. If you have problems or questions, contact your health care provider.  What can I expect after the procedure?  After the procedure, it is common to have:  Pain around your incision areas.  Bruising and a small lump near your catheter insertion areas.  Tiredness (fatigue).  Follow these instructions at home:  Medicines  Take over-the-counter and prescription medicines only as told by your health care provider.  If you were prescribed antibiotics, take them as told by your health care provider. Do not stop using the antibiotic even if you start to feel better.  Ask your health care provider if the medicine prescribed to you can cause constipation. You may need to take these actions to prevent or treat constipation:  Take over-the-counter or prescription medicines.  Eat foods that are high in fiber, such as beans, whole grains, and fresh fruits and  vegetables.  Limit foods that are high in fat and processed sugars, such as fried or sweet foods.  Bleeding precautions  If you are taking blood thinners:  Talk with your health care provider before you take any medicines that contain aspirin or NSAIDs, such as ibuprofen. These medicines increase your risk for dangerous bleeding.  Take your medicine exactly as told, at the same time every day.  Avoid activities that could cause injury or bruising. Follow instructions about how to prevent falls.  Wear a medical alert bracelet or carry a card that lists what medicines you take.  Eating and drinking         Follow instructions from your health care provider about eating or drinking restrictions.  Eat a heart-healthy diet. This includes lean proteins, whole grains, and plenty of fresh fruits and vegetables.  Avoid foods that are high in salt (sodium), saturated fat, or sugar. Check ingredients and nutrition facts on packaged foods and beverages.  Avoid canned or highly processed foods.  Avoid fried foods.  Ask your health care provider if your fluid intake is restricted. If not, drink enough fluid to keep your urine pale yellow.  Alcohol use  If you drink alcohol:  Limit how much you have to:  0-1 drink a day for women who are not pregnant.  0-2 drinks a day for men.  Know how much alcohol is in your drink. In the [place] one drink equals one 12 oz bottle of beer (355 mL), one 5 oz glass of wine (148 mL), or one 1½ oz glass of hard liquor (44 mL).  Incision care    Follow instructions from your health care provider about how to take care of your incisions. Make sure you:  Wash your hands with soap and water for at least 20 seconds before and after you change your bandage (dressing). If soap and water are not available, use hand .  Change your dressing as told by your health care provider.  Leave stitches (sutures), glue, or adhesive strips in place. These skin closures may need to stay in place for 2 weeks or  longer. If adhesive strip edges start to loosen and curl up, you may trim the loose edges. Do not remove adhesive strips completely unless your health care provider tells you to do that.  Check your incisions or puncture areas every day for signs of infection. Check for:  More redness, swelling, or pain.  Fluid or blood.  Warmth.  Pus or a bad smell.  Activity  Rest as told by your health care provider. Take naps as needed throughout the day.  Do not sit for a long time without moving. Get up to take short walks every 1-2 hours. This will improve blood flow and breathing. Ask for help if you feel weak or unsteady.  Avoid climbing stairs for about one week.  You may have to avoid lifting. Ask your health care provider how much you can safely lift.  Ask your health care provider when it is safe for you to drive or use machinery. Do not drive until your health care provider approves.  Exercise regularly, as told by your health care provider.  Ask your health care provider if you will be doing a formal cardiac exercise program (cardiac rehabilitation).  Return to your normal activities as told by your health care provider. Ask your health care provider what activities are safe for you.  Lifestyle  Do not use any products that contain nicotine or tobacco. These products include cigarettes, chewing tobacco, and vaping devices, such as e-cigarettes. If you need help quitting, ask your health care provider.  Resume sexual activity as told by your health care provider. If you have problems getting or keeping an erection (erectile dysfunction), do not use medicines to treat it unless your health care provider approves.  Work with your health care provider to manage your blood pressure and any other heart conditions that you have.  Maintain a healthy weight.  General instructions  Do not take baths, swim, or use a hot tub until your health care provider approves. You may take showers.  Do not strain to have a bowel  movement.  Wear compression stockings as told by your health care provider. These stockings help to prevent blood clots and reduce swelling in your legs.  Avoid airplane travel for as long as you are told.  When you travel, bring along a list of your medicines and a record of your medical history. Carry your medicines with you.  Future health care visits  Keep all follow-up visits. Tell all your health care providers, including your dentists:  That you have an artificial aortic valve.  That you have or have had heart disease.  To prevent infection, you may need to take antibiotics for dental procedures.  Contact a health care provider if:  You have sudden, unexplained weight changes.  You have any signs of infection at your incision or puncture area.  You have a fever.  You have fast or irregular heartbeats (palpitations).  You are light-headed or you faint.  Get help right away if:  An incision area swells very quickly.  An incision is bleeding, and the bleeding does not stop after pressure is applied.  The area near or just beyond an incision tingles or becomes pale, cool, or numb.  You develop chest pain.  You develop shortness of breath or have trouble breathing.  These symptoms may be an emergency. Get help right away. Call 911.  Do not wait to see if the symptoms will go away.  Do not drive yourself to the hospital.  Summary  After your procedure, it is common to have bruising and a small lump around your catheter insertion site.  Check your incisions or puncture areas every day for signs of infection.  You may have to avoid lifting. Ask your health care provider how much you can safely lift.  Do not drive until your health care provider approves.  Tell all your health care providers that you have an artificial aortic valve.  This information is not intended to replace advice given to you by your health care provider. Make sure you discuss any questions you have with your health care provider.  Document  Revised: 2023 Document Reviewed: 2023  [employer] Patient Education ©  [employer]    Helping Someone Who Is Suicidal  Suicide is the act of ending, or taking, one's own life. Someone who is thinking about suicide needs help right away. Listen to the person. Even if you do not know what to say or do to help, you can start by letting the person know that you care.  Talk to the person about how to get help. Help is available through suicide hotlines and through therapy and other treatments.  What are the risk factors for suicide?  Risk factors for suicide include:  Having a friend or family member who has  by suicide.  A history of attempted suicide.  Depression or other mental health problems.  Being exposed to graphic stories of suicide in the media.  Alcohol or drug misuse, especially when combined with a mental illness.  A serious physical problem, such as long-term (chronic) pain.  Stressful life events, now or in the past. These may include:  Divorce or social rejection.  Childhood abuse or neglect.  Sudden life changes, such as a financial crisis or going to residential.  What are warning signs to watch for?  Most people who are thinking about suicide show warning signs. Signs may include:  Expressing thoughts about, or a preoccupation with, ending one's own life.  Making threats or comments about ending one's own life.  Withdrawing from normal activities or avoiding friends, family, coworkers, or classmates.  Dramatic mood swings.  Impulsive or reckless behavior.  An increase in drug or alcohol use.  Follow these instructions at home:  If you think someone may be thinking about or planning suicide:  Ask the person directly whether he or she is thinking about suicide or about hurting himself or herself.  Asking about thoughts of suicide or self-harm does not make someone more likely to attempt suicide.  Avoid giving advice or arguing with the person about the value of his or her life.  If a person  confides in you that he or she is considering suicide:  Take the person seriously. Do not ever ignore comments about suicide.  Listen to the person's thoughts and concerns with compassion.  Let the person know that you will stay with him or her.  Offer to help the person get to a mental health professional or other health care provider.  Remove all weapons and medicines from the person's living area.  Do not promise to keep the person's thoughts of suicide a secret.  Contact a suicide crisis helpline, such as:  The National Suicide Prevention Lifeline at 1-765.325.2045 or 572 in the [place]  The Crisis Text Line by texting HOME to 956956.  Get help right away if:  You ever feel like someone may hurt himself or herself or others, or if he or she shares thoughts about taking his or her own life. You can go to your nearest emergency department or:  Call a crisis center or a local suicide prevention center. These are often located at hospitals, clinics, community service organizations, social service providers, or health departments.  Call your local emergency services (701 in the [place]  Call a suicide crisis helpline, such as the National Suicide Prevention Lifeline at 1-203.728.5704 or 850 in the [place] This is open 24 hours a day in the [place]  Text HOME to the Crisis Text Line at 251467 (in the [place]  Call the [facility] health and human services helpline (685 in the [place]  Summary  Suicide is the act of ending, or taking, one's own life.  Suicide can be prevented by knowing the risk factors and the signs, and by taking action.  If you know someone who has or is showing any risk factors for suicide, ask if he or she is thinking about hurting himself or herself. Take all concerns about suicide seriously, and get support from experts in mental illness or suicide.  Get help right away if you believe that a person may hurt himself or herself or others, or may be having thoughts of taking his or her own life.  This  "information is not intended to replace advice given to you by your health care provider. Make sure you discuss any questions you have with your health care provider.  Document Revised: 07/13/2022 Document Reviewed: 04/13/2022  [employer] Patient Education © 2023 [employer]      Prevent Falls in Your Home    \"Falling once doubles your chance of falling again\"        -Center for Disease Control and Prevention    Falls in the home can lead to serious injury (fractures, brain injuries), hospitalizations, increased medical costs, and could even be fatal.  The good news is, there are many precautions you can take to avoid falls in your home and help keep you safe:     If prescribed an assistive device (walker, crutches), use as instructed by the healthcare provider\"   Remove any tripping hazards from your home, including loose cords, throw rugs and clutter  Keep a nightlight on in dark (hallways, bathrooms, etc)   Get up slowly, to make sure you feel okay before getting up  Be aware of any side effects of your medications: some medications may make you dizzy  Place a non-skid rubber mat in your shower or tub-consider a shower bench or chair if unsteady on your feet  Wear supportive shoes or non-skid socks when moving around  Start an exercise program once approved by your provider.  If you are feeling weak following a hospital stay, talk to your doctor about home health or outpatient therapy programs designed to help rebuild your strength and endurance        Physical Therapy Discharge Instructions for Erin Hess    8/1/2024    Level of Assist Required for Ambulation: No Assist on Flat Surfaces, No Assist on Curbs, No Assist on Stairs  Distance Patient May Ambulate: As tolerated  Device Recommended for Ambulation: 4-Wheeled Walker  Level of Assist Required for Transfers: Requires No Assist  Device Recommended for Transfers: 4-Wheeled Walker  Home Exercise Program: Refer to Home Exercise Program Handout for " Details  Prosthesis / Orthosis Recommendation / Location: No Prosthesis  or Orthosis Recommended    It was wonderful working with you!  - Xuan ([email or web address]) and Vickey

## 2024-08-01 NOTE — PROGRESS NOTES
Hospital Medicine Daily Progress Note      Chief Complaint:  Hypertension  Anemia    Interval History:  Pt reports feeling very well.  Labs reviewed; results stable.    Review of Systems  Review of Systems   Constitutional:  Negative for chills and fever.   HENT: Negative.     Eyes: Negative.    Respiratory:  Negative for cough and shortness of breath.    Cardiovascular:  Negative for chest pain and palpitations.   Gastrointestinal:  Negative for abdominal pain, nausea and vomiting.   Musculoskeletal:         Wound pain   Skin:  Negative for itching and rash.   Endo/Heme/Allergies:  Negative for polydipsia. Does not bruise/bleed easily.        Physical Exam  Temp:  [36.7 °C (98.1 °F)-36.8 °C (98.2 °F)] 36.7 °C (98.1 °F)  Pulse:  [53-67] 53  Resp:  [16-18] 16  BP: (130-134)/(53-60) 134/53  SpO2:  [94 %-98 %] 97 %    Physical Exam  Vitals reviewed.   Constitutional:       General: She is not in acute distress.     Appearance: Normal appearance. She is not ill-appearing.   HENT:      Head: Normocephalic and atraumatic.      Right Ear: External ear normal.      Left Ear: External ear normal.      Nose: Nose normal.      Mouth/Throat:      Pharynx: Oropharynx is clear.   Eyes:      General:         Right eye: No discharge.         Left eye: No discharge.      Extraocular Movements: Extraocular movements intact.      Conjunctiva/sclera: Conjunctivae normal.   Cardiovascular:      Rate and Rhythm: Normal rate and regular rhythm.   Pulmonary:      Effort: No respiratory distress.      Breath sounds: No wheezing.      Comments: Decreased BS  Abdominal:      General: Bowel sounds are normal. There is no distension.      Palpations: Abdomen is soft.      Tenderness: There is no abdominal tenderness.   Musculoskeletal:      Cervical back: Normal range of motion and neck supple.      Right lower leg: No edema.      Left lower leg: No edema.      Comments: R inguinal incision C/D/I   Skin:     General: Skin is warm and dry.    Neurological:      Mental Status: She is alert and oriented to person, place, and time.         Fluids    Intake/Output Summary (Last 24 hours) at 8/1/2024 1220  Last data filed at 8/1/2024 0800  Gross per 24 hour   Intake 1160 ml   Output --   Net 1160 ml       Laboratory  Recent Labs     08/01/24  0601   WBC 5.4   RBC 3.34*   HEMOGLOBIN 9.1*   HEMATOCRIT 29.7*   MCV 88.9   MCH 27.2   MCHC 30.6*   RDW 51.2*   PLATELETCT 206   MPV 10.2       Recent Labs     08/01/24  0601   SODIUM 138   POTASSIUM 4.5   CHLORIDE 104   CO2 25   GLUCOSE 95   BUN 17   CREATININE 0.85   CALCIUM 9.6                   Assessment/Plan  * S/P TAVR (transcatheter aortic valve replacement)- (present on admission)  Assessment & Plan  Severe AS S/P TAVR on 7/15/24 by Dr.'s Luis ( mL) and Elvin ( mL)  Complicated by common femoral artery injury S/P surgical repair by Dr. Swan (EBL 5 mL)  Echo 7/15/24 EF 65%, normally functioning TAVR, small pericardial effusion without hemodynamic compromise  U/S BLE negative for DVT  Continue Aldactone  Needs Cardiology F/U    Depression  Assessment & Plan  On Paxil    Anemia due to acute blood loss- (present on admission)  Assessment & Plan  , 250, and 5 mL  Fe and Ferritin levels normal  Follow H/H    A-fib (HCC)- (present on admission)  Assessment & Plan  S/P new post-op AFib w/ RVR at [facility]  On ASA and Amiodarone  Needs Cardiology F/U    History of breast cancer- (present on admission)  Assessment & Plan  On Arimidex  Outpt F/U    Dyslipidemia- (present on admission)  Assessment & Plan  On Crestor    Asthma-COPD overlap syndrome (HCC)- (present on admission)  Assessment & Plan  On Albuterol and Trelegy  RT protocol    Essential hypertension  Assessment & Plan  Valsartan and HCTZ discontinued for orthostatic hypotension  Remains on Aldactone per Dr. Calloway  Outpt meds include Valsartan and HCTZ    Full Code

## 2024-08-01 NOTE — THERAPY
Physical Therapy   Daily Treatment     Patient Name: Erin Hess  Age:  84 y.o., Sex:  female  Medical Record #: [id number]  Today's Date: 8/1/2024     Precautions  Precautions: Fall Risk  Comments: 02 2L PM, 2L 02 PRN AM    Subjective    Patient pleasant and motivated to participate. Patient's grandson and great-grandson present at beginning of session and very supportive.      Objective       08/01/24 1431   PT Charge Group   PT Gait Training (Units) 1   PT Therapeutic Exercise (Units) 2   PT Therapeutic Activities (Units) 1   PT Total Time Spent   PT Individual Total Time Spent (Mins) 60   Gait Functional Level of Assist    Gait Level Of Assist Modified Independent   Assistive Device 4 Wheel Walker   Distance (Feet) 125   # of Times Distance was Traveled 4   Deviation Decreased Base Of Support;Bradykinetic   Stairs Functional Level of Assist   Level of Assist with Stairs Standby Assist   # of Stairs Climbed 12   Stairs Description Extra time;Hand rails;Limited by fatigue;Supervision for safety   Transfer Functional Level of Assist   Bed, Chair, Wheelchair Transfer Modified Independent   Bed Chair Wheelchair Transfer Description Adaptive equipment  (4WW)   Toilet Transfers Modified Independent   Toilet Transfer Description   (4WW)   Sitting Lower Body Exercises   Ankle Pumps 3 sets of 15;Bilateral  (closed chain)   Hip Abduction 3 sets of 15;Bilateral;Light Resistance Theraband   Long Arc Quad 3 sets of 15;Bilateral   Marching 3 sets of 15;Reciprocal   Hamstring Curl 3 sets of 15;Bilateral;Light Resistance Theraband   Bed Mobility    Supine to Sit Independent   Sit to Supine Independent   Sit to Stand Modified Independent   Rolling Modified Independent   Interdisciplinary Plan of Care Collaboration   IDT Collaboration with  Physical Therapist   Patient Position at End of Therapy In Bed;Call Light within Reach;Tray Table within Reach;Phone within Reach   Collaboration Comments Collaborated with primary PT re: IGOR  and discharge       Assessment    Patient with increased fatigue this afternoon, requiring frequent rest breaks during ambulation, but demonstrates independence with all basic functional mobility and with good safety awareness with 4WW. She required CGA-SBA for stairs, but reports she will not need to negotiate stairs when home initially. Patient ready to discharge home with support of family as needed.     Strengths: Able to follow instructions, Alert and oriented, Effective communication skills, Independent prior level of function, Motivated for self care and independence, Pleasant and cooperative, Supportive family, Willingly participates in therapeutic activities  Barriers: Decreased endurance, Fatigue, Generalized weakness, Pain    Plan    Discharge home tomorrow (Friday, 8/2)    DME  PT DME Recommendations  Assistive Device: 4-Wheel Walker  Additional Equipment:  (TBD based on pt progress)         Physical Therapy Problems (Active)       There are no active problems.

## 2024-08-01 NOTE — PROGRESS NOTES
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  Allergies: Sulfa drugs, Codeine, Oxycodone, and Vancomycin    Safety  Patient Assist  CGA  Patient Precautions  Fall Risk  Precaution Comments  02 2L PM, 2L 02 PRN AM  Bed Transfer Status  Modified Independent  Toilet Transfer Status   Modified Independent  Assistive Devices  Rails, Wheelchair  Oxygen  Nasal Cannula  Diet/Therapeutic Dining  Current Diet Order   Procedures    Diet Order Diet: Cardiac     Pill Administration  whole  Agitated Behavioral Scale     ABS Level of Severity       Fall Risk  Has the patient had a fall this admission?   No  Christina Elizondo Fall Risk Scoring  13, MODERATE RISK  Fall Risk Safety Measures  bed alarm    Vitals  Temperature: 36.8 °C (98.2 °F)  Temp src: Oral  Pulse: 61  Respiration: 18  Blood Pressure : 134/60  Blood Pressure MAP (Calculated): 85 MM HG  BP Location: Left, Upper Arm  Patient BP Position: Supine     Oxygen  Pulse Oximetry: 94 %  O2 (LPM): 2  FiO2%: 21 %  O2 Delivery Device: Nasal Cannula    Bowel and Bladder  Last Bowel Movement  07/27/24  Stool Type  Type 5: Soft blob with clear cut edges (passed easily)  Bowel Device  Bathroom  Continent  Bladder: Continent void   Bowel: Continent movement  Bladder Function  Urine Void (mL):  (Large)  Number of Times Voided: 1  Urine Color: Yellow  Genitourinary Assessment   Bladder Assessment (WDL):  Within Defined Limits  Kennedy Catheter: Not Applicable  Urine Color: Yellow  Bladder Device: Bathroom  Time Void: Yes  Bladder Scan: Post Void  $ Bladder Scan Results (mL): 1    Skin  Delfino Score   19  Sensory Interventions   Bed Types: Standard/Trauma Mattress  Skin Preventative Measures: Pillows in Use for Support / Positioning  Moisture Interventions  Moisturizers/Barriers: Barrier  Wipes      Pain  Pain Rating Scale  0 - No Pain  Pain Location  Back  Pain Location Orientation  Lower  Pain Interventions   Declines    ADLs    Bathing    (OT Shower)  Linen Change      Personal Hygiene  Moist Trisha Wipes  Chlorhexidine Bath      Oral Care  Brushed Teeth  Teeth/Dentures     Shave     Nutrition Percentage Eaten  Lunch, Between % Consumed  Environmental Precautions  Treaded Slipper Socks on Patient  Patient Turns/Positioning  Patient Turns Self from Side to Side  Patient Turns Assistance/Tolerance     Bed Positions  Bed Controls On, Bed Locked  Head of Bed Elevated  Self regulated      Psychosocial/Neurologic Assessment  Psychosocial Assessment  Psychosocial (WDL):  Within Defined Limits  Neurologic Assessment  Neuro (WDL): Within Defined Limits  Level of Consciousness: Alert  Orientation Level: Oriented X4  Cognition: Follows commands, Appropriate attention/concentration  Speech: Clear  EENT (WDL):  WDL Except    Cardio/Pulmonary Assessment  Edema   RLE Edema: 1+  LLE Edema: 1+  Respiratory Breath Sounds  RUL Breath Sounds: Clear  RML Breath Sounds: Clear  RLL Breath Sounds: Expiratory Wheezes  KUNAL Breath Sounds: Clear  LLL Breath Sounds: Clear  Cardiac Assessment   Cardiac (WDL):  WDL Except (TAVR, HF, Afib)

## 2024-08-01 NOTE — PROGRESS NOTES
..                                                         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  Allergies: Sulfa drugs, Codeine, Oxycodone, and Vancomycin    Safety  Patient Assist  Mod I days  Patient Precautions  Fall Risk  Precaution Comments  02 2L PM, 2L 02 PRN AM  Bed Transfer Status  Modified Independent  Toilet Transfer Status   Modified Independent  Assistive Devices  Rails, Wheelchair  Oxygen  Nasal Cannula  Diet/Therapeutic Dining  Current Diet Order   Procedures    Diet Order Diet: Cardiac     Pill Administration  whole  Agitated Behavioral Scale     ABS Level of Severity       Fall Risk  Has the patient had a fall this admission?   No  Christina Elizondo Fall Risk Scoring  13, MODERATE RISK  Fall Risk Safety Measures  bed alarm, low vision/ hearing, and ok to leave pt in bathroom    Vitals  Temperature: 36.7 °C (98.1 °F)  Temp src: Oral  Pulse: 63  Respiration: 18  Blood Pressure : 134/53 (notified RN Osito)  Blood Pressure MAP (Calculated): 80 MM HG  BP Location: Left, Upper Arm  Patient BP Position: Supine     Oxygen  Pulse Oximetry: 98 %  O2 (LPM): 2  FiO2%: 21 %  O2 Delivery Device: Nasal Cannula    Bowel and Bladder  Last Bowel Movement  07/27/24  Stool Type  Type 5: Soft blob with clear cut edges (passed easily)  Bowel Device  Bathroom  Continent  Bladder: Continent void   Bowel: Continent movement  Bladder Function  Urine Void (mL):  (Large)  Number of Times Voided: 1  Urine Color: Yellow  Genitourinary Assessment   Bladder Assessment (WDL):  Within Defined Limits  Kennedy Catheter: Not Applicable  Urine Color: Yellow  Bladder Device: Bathroom  Time Void: Yes  Bladder Scan: Post Void  $ Bladder Scan Results (mL): 1    Skin  Delfino Score   20  Sensory Interventions   Bed Types: Standard/Trauma Mattress  Skin Preventative Measures: Pillows in  Use for Support / Positioning  Moisture Interventions  Moisturizers/Barriers: Barrier Wipes      Pain  Pain Rating Scale  4 - Distracts me, can do usual activities  Pain Location  Groin  Pain Location Orientation  Right, Left  Pain Interventions   Medication (see MAR)    ADLs    Bathing    (OT Shower)  Linen Change      Personal Hygiene  Moist Trisha Wipes  Chlorhexidine Bath      Oral Care  Brushed Teeth  Teeth/Dentures     Shave     Nutrition Percentage Eaten  Lunch, Between % Consumed  Environmental Precautions  Treaded Slipper Socks on Patient  Patient Turns/Positioning  Patient Turns Self from Side to Side  Patient Turns Assistance/Tolerance     Bed Positions  Bed Controls On, Bed Locked  Head of Bed Elevated  Self regulated      Psychosocial/Neurologic Assessment  Psychosocial Assessment  Psychosocial (WDL):  Within Defined Limits  Neurologic Assessment  Neuro (WDL): Within Defined Limits  Level of Consciousness: Alert  Orientation Level: Oriented X4  Cognition: Follows commands, Appropriate attention/concentration  Speech: Clear  EENT (WDL):  WDL Except    Cardio/Pulmonary Assessment  Edema   RLE Edema: 1+  LLE Edema: 1+  Respiratory Breath Sounds  RUL Breath Sounds: Clear  RML Breath Sounds: Clear  RLL Breath Sounds: Diminished  KUNAL Breath Sounds: Clear  LLL Breath Sounds: Diminished  Cardiac Assessment   Cardiac (WDL):  WDL Except (TAVR, HF, Afib)

## 2024-08-01 NOTE — DISCHARGE SUMMARY
Physical Medicine & Rehabilitation Discharge Summary    Admission Date: 7/23/2024    Discharge Date: 8/2/2024    Attending Provider: Andrea Solano MD/PhD    Admission Diagnosis:   Active Hospital Problems    Diagnosis     *S/P TAVR (transcatheter aortic valve replacement)     Depression     Anemia     A-fib (HCC)     Anemia due to acute blood loss     Common femoral artery injury, right, initial encounter     History of breast cancer     Dyslipidemia     Asthma-COPD overlap syndrome (HCC)     Essential hypertension        Discharge Diagnosis:  Active Hospital Problems    Diagnosis     *S/P TAVR (transcatheter aortic valve replacement)     Depression     Anemia     A-fib (HCC)     Anemia due to acute blood loss     Common femoral artery injury, right, initial encounter     History of breast cancer     Dyslipidemia     Asthma-COPD overlap syndrome (HCC)     Essential hypertension        HPI per Admission History & Physical:  Patient is a 84 y.o. female with a past medical history significant for HTN, HLD, CKD3, ROBYN, COPD, and aortic stenosis admitted to [facility] on 7/15/2024 5:18 AM with planned TAVR. Patient underwent TAVR on 7/15/24 with Dr. Oconnor. Post-operative course was complicated by right femoral artery injury and Vascular surgery was consulted. Patient underwent right femoral artery repair with Dr. Swan on 7/15/24 and patient was admitted to ICU. Patient did require transfusion for anemia and midodrine for pressure support. Patient has since been stabilized and moved to the floor. Patient has prevena wound vac per most recent note.     Patient was admitted to [facility] on 7/23/2024.     Hospital Course by Problem List:  TAVR - Patient with severe aortic stenosis s/p TAVR on 7/15/24 with Dr. Oconnor which was complicated by right femoral artery injury requiring repair.  -PT and OT for mobility and ADLs. Per guidelines, 15 hours per week between PT, OT and/or  SLP.  -Follow-up Cardiology. Continue ASA     HTN/A fib/sCHF - Patient on Amiodarone 400 mg BID with plan to transition to 200 mg daily on 7/27 as well as HCTZ 6.25 mg, Spironolactone 25 mg and Valsartan 40 mg daily.  Very labile, consult hospitalist. Reduced Amiodarone to 200 mg. Valsartan discontinued. SBP borderline low - continue Amiodarone 200 mg daily and Spironolactone 25 mg     HLD - Patient on Rosuvastatin 20 mg QHS     COPD/Asthma - Patient on Albuterol daily and Trelegy. Improving breathing, continue Albuterol and Trelegy     Anemia - Check AM CBC - 9.1, will monitor     R groin hematoma - Wound care for right groin incision site.      Thrombocytopenia - Check AM CBC - 154, will monitor     Hyperglycemia - Check A 1c - 5.4, no need to monitor sugars     Depression - Patient on Paxil 10 m daily     R ear hearing loss - ear wax, start Docusate 100 mg BID. Improved with ear cleaning, will continue Docusate BID until discharge     Hemorrhoids - start PRN preparation H. Improving, continue Preparation H     Obesity - On admission Body mass index is 31.67 kg/m². Meets medical criteria. Dietitian to consult.      Pain - Patient on PRN Tylenol and Tramadol     Skin - Patient at risk for skin breakdown due to debility in areas including sacrum, achilles, elbows and head in addition to other sites. Nursing to assess skin daily.      GI Ppx - Patient on Prilosec for GERD prophylaxis. Patient on Senna-docusate for constipation prophylaxis.   -Loose BMs, change senna-docusate to PRN     DVT Ppx - Patient is not cleared for AC due to recent hematoma.     Functional Status at Discharge  Eating:     Eating Description:     Grooming:  Modified Independent, Seated  Grooming Description:  Other (comment) (seated at 4WW for grooming)  Bathing:  Supervision (distant SPV)  Bathing Description:  Grab bar, Hand held shower, Increased time, Set-up of equipment, Supervision for safety  Upper Body Dressing:  Modified  Independent  Upper Body Dressing Description:  Increased time, Other (comment) (pt able to set up clothing, placing on 4WW for retrieval post-shower)  Lower Body Dressing:  Modified Independent  Lower Body Dressing Description:  Grab bar, Increased time, Supervision for safety (pt able to set up clothing, placing on 4WW for retrieval post-shower)  Discharge Location : Home (w/ grandson staying w/ pt initially)  Patient Discharging with Assist of: Family   Level of Supervision Required: Intermittent Supervision  Recommended Equipment for Discharge: 4-Wheeled Walker;Shower Chair;Grab Bars in Tub / Shower  Recommended Services Upon Discharge: Home Health Occupational Therapy  Long Term Goals Met: 3  Long Term Goals Not Met: 0  Criteria for Termination of Services: Maximum Function Achieved for Inpatient Rehabilitation  Walk:  Modified Independent  Distance Walked:   (~500 throughout session, indoors and outdoors)  Number of Times Distance Was Traveled:  2  Assistive Device:  4 Wheel Walker  Gait Deviation:  Decreased Base Of Support (slow fuentes; verbal cues for appropriate distance between AD and self)  Wheelchair:     Distance Propelled:      Wheelchair Description:     Stairs Contact Guard Assist  Stairs Description Extra time, Hand rails, Limited by fatigue, Supervision for safety, Verbal cueing  Discharge Location: Home  Patient Discharging with Assist of: Family  Level of Supervision Required Upon Discharge: Intermittent Supervision  Recommended Equipment for Discharge: 4-Wheeled Walker  Recommeded Services Upon Discharge: Home Health Physical Therapy  Long Term Goals Met: 3  Long Term Goals Not Met: 0  Criteria for Termination of Services: Maximum Function Achieved for Inpatient Rehabilitation         Andrea DOUGHERTY M.D., personally performed a complete drug regimen review and no potential clinically significant medication issues were identified.   Discharge Medication:     Medication List         START taking these medications        Instructions   traMADol 50 MG Tabs  Commonly known as: Ultram   Take 1 Tablet by mouth every four hours as needed (Moderate Pain (NRS Pain Scale 4-6) if opiates not tolerated or not ordered) for up to 14 days.  Dose: 50 mg     traZODone 50 MG Tabs  Commonly known as: Desyrel   Take 1 Tablet by mouth at bedtime as needed for Sleep.  Dose: 50 mg            CHANGE how you take these medications        Instructions   amiodarone 200 MG Tabs  What changed:   medication strength  See the new instructions.  Commonly known as: Cordarone   Take 1 Tablet by mouth every day.  Dose: 200 mg            CONTINUE taking these medications        Instructions   anastrozole 1 MG Tabs  Commonly known as: Arimidex   Take 1 Tablet by mouth every morning.  Dose: 1 mg     aspirin 81 MG EC tablet   Take 1 Tablet by mouth every day.  Dose: 81 mg     MAGNESIUM PO   Take 420 mg by mouth every evening.  Dose: 420 mg     melatonin 5 mg Tabs   Take 5 mg by mouth every evening as needed.  Dose: 5 mg     pantoprazole 40 MG Tbec  Commonly known as: Protonix   Take 1 Tablet by mouth every day.  Dose: 40 mg     PARoxetine 10 MG Tabs  Commonly known as: Paxil   Take 1 Tablet by mouth every day.  Dose: 10 mg     PRESERVISION AREDS 2 PO   Take 1 Tablet by mouth every evening.  Dose: 1 Tablet     rosuvastatin 20 MG Tabs  Commonly known as: Crestor   Take 1 Tablet by mouth every evening.  Dose: 20 mg     spironolactone 25 MG Tabs  Commonly known as: Aldactone   Take 1 Tablet by mouth every day.  Dose: 25 mg     Trelegy Ellipta 200-62.5-25 MCG/ACT inhaler  Generic drug: fluticasone-umeclidinium-vilanterol   Inhale 1 Puff every day.  Dose: 1 Puff            STOP taking these medications      diphenhydrAMINE 25 MG Tabs  Commonly known as: Benadryl     ipratropium-albuterol 0.5-2.5 (3) MG/3ML nebulizer solution  Commonly known as: Duoneb     valsartan-hydrochlorothiazide 80-12.5 MG per tablet  Commonly known as:  Diovan-HCT              Discharge Diet:  Current Diet Order   Procedures    Diet Order Diet: Cardiac       Discharge Activity:  As tolerated     Disposition:  Patient to discharge home with family support and community resources.    Equipment:  FWW    Follow-up & Discharge Instructions:  Follow up with your primary care provider (PCP) within 7-10 days of discharge to review your medications and take over your care.     If you develop chest pain, fever, chills, change in neurologic function (weakness, sensation changes, vision changes), or other concerning sxs, seek immediate medical attention or call 911.      Future Appointments   Date Time Provider Department Center   8/1/2024  2:30 PM Phuong Mercado PT [facility] None   8/2/2024  7:00 AM REHAB NON-THERAPY PROVIDER [facility]1 None   8/3/2024  7:00 AM REHAB NON-THERAPY PROVIDER [facility]1 None   8/4/2024  7:00 AM REHAB NON-THERAPY PROVIDER [facility]1 None   8/5/2024  7:00 AM REHAB NON-THERAPY PROVIDER [facility]1 None   8/6/2024  7:00 AM REHAB NON-THERAPY PROVIDER [facility]1 None   8/7/2024  7:00 AM REHAB NON-THERAPY PROVIDER [facility]1 None   8/8/2024  7:00 AM REHAB NON-THERAPY PROVIDER RH1 None   8/9/2024  7:00 AM REHAB NON-THERAPY PROVIDER RH1 None   8/14/2024  1:40 PM Sophia Clark M.D. [facility] None   8/15/2024  1:15 PM [facility] EXAM 10 [facility]   8/22/2024  9:15 AM ESTHER Rivera [facility] None   12/19/2024  1:30 PM Sharan Tran M.D. [facility] None   7/15/2025  1:15 PM [facility] EXAM 9 [facility]       Condition on Discharge:  Good    More than 31 minutes was spent on discharging this patient, including face-to-face time, prescription management, and the dictation of this note.    Andrea Solano M.D.    Date of Service: 8/2/2024

## 2024-08-01 NOTE — CARE PLAN
Problem: OT Long Term Goals  Goal: LTG-By discharge, patient will complete basic self care tasks at [facility] to Krystle using LRD  Outcome: Met  Goal: LTG-By discharge, patient will perform bathroom transfers at [facility] to [facility]le using LRD  Outcome: Met  Goal: LTG-By discharge, patient will complete basic home management at [facility] to [facility]le using LRD  Outcome: Met

## 2024-08-01 NOTE — THERAPY
Occupational Therapy  Daily Treatment     Patient Name: Erin Hess  Age:  84 y.o., Sex:  female  Medical Record #: [id number]  Today's Date: 8/1/2024     Precautions  Precautions: Fall Risk  Comments: 02 2L PM, 2L 02 PRN AM         Subjective    Pt was agreeable for an OT tx session.     Objective       08/01/24 1001   OT Charge Group   OT Therapeutic Exercise (Units) 2   OT Total Time Spent   OT Individual Total Time Spent (Mins) 30   Pain   Intervention Declines   Sitting Upper Body Exercises   Chest Press 3 sets of 10;Bilateral;Medium Resistance Theraband   Front Arm Raise 3 sets of 10;Bilateral;Medium Resistance Theraband   Tricep Press 3 sets of 10;Bilateral;Medium Resistance Theraband   Upper Extremity Bike Level 3 Resistance  (15 minutes.)   Interdisciplinary Plan of Care Collaboration   IDT Collaboration with  Nursing   Patient Position at End of Therapy Seated;Chair Alarm On;Self Releasing Lap Belt Applied   Collaboration Comments Pt in gym waiting for next Physical therapy session.         Assessment    Pt made steady progress during OT sessions. Pt pleasant and cooperative. Fully participates in OT tx sessions.  Strengths: Able to follow instructions, Adequate strength, Alert and oriented, Good insight into deficits/needs, Independent prior level of function, Manages pain appropriately, Motivated for self care and independence, Supportive family, Pleasant and cooperative, Willingly participates in therapeutic activities  Barriers: Fatigue, Impaired activity tolerance, Impaired balance, Limited mobility    Plan    Will continue with OT POC until discharge.    DME  OT DME Recommendations  Bathroom Equipment:  (shower chair, GB)  Additional Equipment:  (TBD based on pt progress)          Occupational Therapy Goals (Active)       There are no active problems.

## 2024-08-01 NOTE — CARE PLAN
The patient is Stable - Low risk of patient condition declining or worsening    Shift Goals  Clinical Goals: safety  Patient Goals: safety; pain control    Patient is not progressing towards the following goals:    Problem: Bowel Elimination  Goal: Establish and maintain regular bowel function  Outcome: Not Progressing  Note: Patient is constipated. Received Enemeez during day shift. Patient received double warmed prune juice with butter at HS. No results as of yet. Patient did not want any PRN intervention this AM.

## 2024-08-01 NOTE — CARE PLAN
Problem: Mobility  Goal: STG-Within one week, patient will ambulate 100ft w/ FWW and SBA  Outcome: Met  Goal: STG-Within one week, patient will ambulate up/down a curb w/ FWW and SPV  Outcome: Met     Problem: Mobility Transfers  Goal: STG-Within one week, patient will transfer bed to chair w/ FWW and SPV  Outcome: Met     Problem: PT-Long Term Goals  Goal: LTG-By discharge, patient will ambulate 250ft w/ LRAD and Jose G  Outcome: Met  Goal: LTG-By discharge, patient will independently perform home exercise program  Outcome: Met  Goal: LTG-By discharge, patient will transfer in/out of a car w/ FWW and Jose G  Outcome: Met

## 2024-08-01 NOTE — CARE PLAN
The patient is Stable - Low risk of patient condition declining or worsening    Shift Goals  Clinical Goals: safety  Patient Goals: safety; pain control    Progress made toward(s) clinical / shift goals:    Problem: Knowledge Deficit - Standard  Goal: Patient and family/care givers will demonstrate understanding of plan of care, disease process/condition, diagnostic tests and medications  Outcome: Progressing   Patient educated on the POC and medications administered. All questions and concerns addressed at this time.   Problem: Pain - Standard  Goal: Alleviation of pain or a reduction in pain to the patient’s comfort goal  Outcome: Progressing   Use of 0-10 pain scale. Patient is able to verbalize pain and discomfort. Patient denies any pain or discomfort at this time   Problem: Skin Integrity  Goal: Skin integrity is maintained or improved  Outcome: Progressing   Problem: Fall Risk - Rehab  Goal: Patient will remain free from falls  Outcome: Progressing  Patient is BEAU during the day and has been able to complete transfers safely.

## 2024-08-01 NOTE — THERAPY
Occupational Therapy   Discharge Summary     Patient Name: Erin Hess  Age:  84 y.o., Sex:  female  Medical Record #: [id number]  Today's Date: 8/1/2024     Precautions  Precautions: Fall Risk  Comments: 02 2L PM, 2L 02 PRN AM         Subjective    Pt encountered for OT sitting in WC in room finishing breakfast. Agreeable to complete shower this session prior to next day DC.      Objective       08/01/24 0831   OT Charge Group   OT Self Care / ADL (Units) 3   OT Therapeutic Exercise (Units) 1   OT Total Time Spent   OT Individual Total Time Spent (Mins) 60   Precautions   Precautions Fall Risk   Comments 02 2L PM, 2L 02 PRN AM   Vitals   Room Air Oximetry 88   Vitals Comments increased to 95 using diaphramatic breathing   Functional Level of Assist   Grooming Modified Independent;Seated   Grooming Description Other (comment)  (seated at 4WW for grooming)   Bathing Supervision  (distant SPV)   Bathing Description Grab bar;Hand held shower;Increased time;Set-up of equipment;Supervision for safety   Upper Body Dressing Modified Independent   Upper Body Dressing Description Increased time;Other (comment)  (pt able to set up clothing, placing on 4WW for retrieval post-shower)   Lower Body Dressing Modified Independent   Lower Body Dressing Description Grab bar;Increased time;Supervision for safety  (pt able to set up clothing, placing on 4WW for retrieval post-shower)   Bed, Chair, Wheelchair Transfer Modified Independent   Bed Chair Wheelchair Transfer Description Adaptive equipment;Increased time   Tub / Shower Transfers Supervised   Tub Shower Transfer Description Adaptive equipment;Grab bar;Shower bench;Supervision for safety  (4WW approach)   Sitting Upper Body Exercises   Sitting Upper Body Exercises Yes   Chest Fly 1 set of 10;Bilateral;Medium Resistance Theraband   Tricep Press 1 set of 10;Bilateral;Other Resistance (See Comments)  (seated in armed chair)   Eating   Assistance Needed Independent   Physical  "Assistance Level No physical assistance   CARE Score - Eating 6   Oral Hygiene   Assistance Needed Independent   Physical Assistance Level No physical assistance   CARE Score - Oral Hygiene 6   Toileting Hygiene   Assistance Needed Independent   Physical Assistance Level No physical assistance   CARE Score - Toileting Hygiene 6   Shower/Bathe Self   Assistance Needed Adaptive equipment   Physical Assistance Level No physical assistance   CARE Score - Shower/Bathe Self 6   Upper Body Dressing   Assistance Needed Independent   Physical Assistance Level No physical assistance   CARE Score - Upper Body Dressing 6   Lower Body Dressing   Assistance Needed Independent;Adaptive equipment   Physical Assistance Level No physical assistance   CARE Score - Lower Body Dressing 6   Putting On/Taking Off Footwear   Assistance Needed Independent   Physical Assistance Level No physical assistance   CARE Score - Putting On/Taking Off Footwear 6   Toilet Transfer   Assistance Needed Adaptive equipment   Physical Assistance Level No physical assistance   CARE Score - Toilet Transfer 6   Cognitive Pattern Assessment   Cognitive Pattern Assessment Used BIMS   Brief Interview for Mental Status (BIMS)   Repetition of Three Words (First Attempt) 3   Temporal Orientation: Year Correct   Temporal Orientation: Month Accurate within 5 days   Temporal Orientation: Day Correct   Recall: \"Sock\" Yes, no cue required   Recall: \"Blue\" Yes, no cue required   Recall: \"Bed\" Yes, no cue required   BIMS Summary Score 15   Confusion Assessment Method (CAM)   Is there evidence of an acute change in mental status from the patient's baseline? No   Inattention Behavior not present   Disorganized thinking Behavior not present   Altered level of consciousness Behavior not present   Discharge Summary    Discharge Location  Home  (w/ grandson staying w/ pt initially)   Patient Discharging with Assist of Family    Level of Supervision Required Intermittent " Supervision   Recommended Equipment for Discharge 4-Wheeled Walker;Shower Chair;Grab Bars in Tub / Shower   Recommended Services Upon Discharge Home Health Occupational Therapy   Long Term Goals Met 3   Long Term Goals Not Met 0   Criteria for Termination of Services Maximum Function Achieved for Inpatient Rehabilitation       Assessment    Pt overall presenting at Krystle level for ADLs at the 4WW. Pt was able to gathering clothing and place on 4WW for easy gathering post shower. Pt demo good carryover and safety using of 4WW during ADLs. Pt with some low activity tolerance requiring a seated RB post ADLs. Pt met all LTG and has maximized her functional potential for safe DC home.     Strengths: Able to follow instructions, Adequate strength, Alert and oriented, Good insight into deficits/needs, Independent prior level of function, Manages pain appropriately, Motivated for self care and independence, Supportive family, Pleasant and cooperative, Willingly participates in therapeutic activities  Barriers: Fatigue, Impaired activity tolerance, Impaired balance, Limited mobility    Plan    DC home with family support (grandson will stay with pt initially), HHOT.    Passport items completed:  Perform bathroom transfers, complete dressing, complete feeding, get ready for the day, prepare a simple meal, participate in household tasks, adapt home for safety needs, demonstrate home exercise program, complete caregiver training     Occupational Therapy Goals (Active)       Problem: Dressing       Dates: Start:  07/24/24         Goal: STG-Within one week, patient will dress UB at [facility] using LRD       Dates: Start:  07/24/24    Expected End:  08/02/24            Goal: STG-Within one week, patient will dress LB at [facility] using LRD       Dates: Start:  07/24/24    Expected End:  08/02/24               Problem: Functional Transfers       Dates: Start:  07/24/24         Goal: STG-Within one week, patient will transfer to toilet at [facility]  using LRD       Dates: Start:  07/24/24    Expected End:  08/02/24            Goal: STG-Within one week, patient will transfer to step in shower at [facility] using LRD       Dates: Start:  07/24/24    Expected End:  08/02/24               Problem: IADL's       Dates: Start:  07/24/24         Goal: STG-Within one week, patient will access kitchen area at [facility] using LRD       Dates: Start:  07/24/24    Expected End:  08/02/24

## 2024-08-01 NOTE — THERAPY
Physical Therapy   Discharge Summary     Patient Name: Erin Hess  Age:  84 y.o., Sex:  female  Medical Record #: [id number]  Today's Date: 8/1/2024     Precautions  Precautions: Fall Risk  Comments: 02 2L PM, 2L 02 PRN AM    Subjective    Patient seated in w/c, agreeable to therapy.     Objective       08/01/24 1031   Precautions   Precautions Fall Risk   Comments 02 2L PM, 2L 02 PRN AM   Gait Functional Level of Assist    Gait Level Of Assist Modified Independent   Assistive Device 4 Wheel Walker   Distance (Feet)   (~500 throughout session, indoors and outdoors)   Deviation Decreased Base Of Support  (slow fuentes; verbal cues for appropriate distance between AD and self)   Transfer Functional Level of Assist   Bed, Chair, Wheelchair Transfer Modified Independent   Bed Chair Wheelchair Transfer Description Adaptive equipment;Increased time   Bed Mobility    Sit to Supine Modified Independent   Sit to Stand Modified Independent   Scooting Modified Independent   Interdisciplinary Plan of Care Collaboration   IDT Collaboration with  Physical Therapist   Patient Position at End of Therapy In Bed;Tray Table within Reach;Phone within Reach;Call Light within Reach   Collaboration Comments CLOF/POC   Sit to Lying   Assistance Needed Independent   Physical Assistance Level No physical assistance   CARE Score - Sit to Lying 6   Sit to Stand   Assistance Needed Adaptive equipment   Physical Assistance Level No physical assistance   CARE Score - Sit to Stand 6   Chair/Bed-to-Chair Transfer   Assistance Needed Adaptive equipment   Physical Assistance Level No physical assistance   CARE Score - Chair/Bed-to-Chair Transfer 6   Car Transfer   Assistance Needed Adaptive equipment   Physical Assistance Level No physical assistance   CARE Score - Car Transfer 6   Walk 10 Feet   Assistance Needed Adaptive equipment   Physical Assistance Level No physical assistance   CARE Score - Walk 10 Feet 6   Walk 50 Feet with Two Turns    Assistance Needed Adaptive equipment   Physical Assistance Level No physical assistance   CARE Score - Walk 50 Feet with Two Turns 6   Walk 150 Feet   Assistance Needed Adaptive equipment   Physical Assistance Level No physical assistance   CARE Score - Walk 150 Feet 6   Walking 10 Feet on Uneven Surfaces   Assistance Needed Adaptive equipment   Physical Assistance Level No physical assistance   CARE Score - Walking 10 Feet on Uneven Surfaces 6   Picking Up Object   Assistance Needed Adaptive equipment   Physical Assistance Level No physical assistance   CARE Score - Picking Up Object 6   Wheel 50 Feet with Two Turns   Reason if not Attempted Activity not applicable   CARE Score - Wheel 50 Feet with Two Turns 9   Wheel 150 Feet   Reason if not Attempted Activity not applicable   CARE Score - Wheel 150 Feet 9   P.T. Discharge Summary   Discharge Location Home   Patient Discharging with Assist of Family   Level of Supervision Required Upon Discharge Intermittent Supervision   Recommended Equipment for Discharge 4-Wheeled Walker   Recommeded Services Upon Discharge Home Health Physical Therapy   Long Term Goals Met 3   Long Term Goals Not Met 0   Criteria for Termination of Services Maximum Function Achieved for Inpatient Rehabilitation   Discharge Instructions to Patient   Level of Assist Required for Ambulation No Assist on Flat Surfaces;No Assist on Curbs;No Assist on Stairs   Distance Patient May Ambulate As tolerated   Device Recommended for Ambulation 4-Wheeled Walker   Level of Assist Required for Transfers Requires No Assist   Device Recommended for Transfers 4-Wheeled Walker   Home Exercise Program Refer to Home Exercise Program Handout for Details   Prosthesis / Orthosis Recommendation / Location No Prosthesis  or Orthosis Recommended         Assessment    Patient tolerated session well with focus on completion of d/c IRF-Vanessa. Patient continues to require intermittent verbal cues for appropriate distance  between walker and self and requires seated rest breaks throughout session due to decreased activity tolerance. Patient reports feeling more confident for home independence since clearance for Mod I in room.      Strengths: Able to follow instructions, Alert and oriented, Effective communication skills, Independent prior level of function, Motivated for self care and independence, Pleasant and cooperative, Supportive family, Willingly participates in therapeutic activities  Barriers: Decreased endurance, Fatigue, Generalized weakness, Pain    Plan    Expected d/c 8/2 home with help of son. Recommend initial supervision and 4WW. Recommend HHPT.         Physical Therapy Problems (Active)       There are no active problems.

## 2024-08-01 NOTE — FLOWSHEET NOTE
08/01/24 0627   Events/Summary/Plan   Events/Summary/Plan svn/mdi   Vital Signs   Pulse (!) 53  (post 60)   Respiration 16  (post 16)   Pulse Oximetry 97 %   $ Pulse Oximetry (Spot Check) Yes   Respiratory Assessment   Level of Consciousness Alert   Chest Exam   Work Of Breathing / Effort Within Normal Limits   Breath Sounds   RUL Breath Sounds Clear  (increased aeration T/O)   RML Breath Sounds Clear   RLL Breath Sounds Diminished   KUNAL Breath Sounds Clear   LLL Breath Sounds Diminished   Secretions   Cough Non Productive   Oxygen   O2 (LPM) 2   O2 Delivery Device Silicone Nasal Cannula

## 2024-08-01 NOTE — PROGRESS NOTES
"  Physical Medicine & Rehabilitation Progress Note    Encounter Date: 8/1/2024    Chief Complaint: As tolerated    Interval Events (Subjective):  Patient sitting up in room. She reports therapy is going well. She denies pain. Denies NVD. She thinks she will be ready to go home tomorrow. Discussed would have IDT later today.     _____________________________________  Interdisciplinary Team Conference   Most recent IDT on 8/1/2024    IAndrea M.D./Ph.D., was present and led the interdisciplinary team conference on 8/1/2024.  I led the IDT conference and agree with the IDT conference documentation and plan of care as noted below.     Nursing:  Diet Current Diet Order   Procedures    Diet Order Diet: Cardiac       Eating ADL        % of Last Meal  Oral Nutrition: Lunch, Between % Consumed   Sleep    Bowel Last BM: 07/27/24   Bladder Continent   Barriers to Discharge Home:  None    Physical Therapy:  Bed Mobility    Transfers Modified Independent  Adaptive equipment, Increased time   Mobility Modified Independent   Stairs    Barriers to Discharge Home:  Cues at times    4WW    Occupational Therapy:  Grooming Modified Independent, Seated   Bathing Supervision (distant SPV)   UB Dressing Modified Independent   LB Dressing Modified Independent   Toileting Modified Independent   Shower & Transfer    Barriers to Discharge Home:  None    Respiratory Therapy:  O2 (LPM): 2  O2 Delivery Device: Silicone Nasal Cannula    Case Management:  Continues to work on disposition and DME needs.      Discharge Date/Disposition:  8/2/24  _____________________________________    Objective:  VITAL SIGNS: /53 Comment: notified RN Osito  Pulse (!) 53 Comment: post 60  Temp 36.7 °C (98.1 °F) (Oral)   Resp 16 Comment: post 16  Ht 1.626 m (5' 4\")   Wt 83.7 kg (184 lb 8 oz)   SpO2 97%   BMI 31.67 kg/m²   Gen: NAD  Psych: Mood and affect appropriate  CV: RRR, 0 edema  Resp: CTAB, no upper airway sounds  Abd: " NTND  Neuro: AOx4, following commands  Unchanged from 7/31/24    Laboratory Values:  Recent Results (from the past 72 hour(s))   CBC WITHOUT DIFFERENTIAL    Collection Time: 08/01/24  6:01 AM   Result Value Ref Range    WBC 5.4 4.8 - 10.8 K/uL    RBC 3.34 (L) 4.20 - 5.40 M/uL    Hemoglobin 9.1 (L) 12.0 - 16.0 g/dL    Hematocrit 29.7 (L) 37.0 - 47.0 %    MCV 88.9 81.4 - 97.8 fL    MCH 27.2 27.0 - 33.0 pg    MCHC 30.6 (L) 32.2 - 35.5 g/dL    RDW 51.2 (H) 35.9 - 50.0 fL    Platelet Count 206 164 - 446 K/uL    MPV 10.2 9.0 - 12.9 fL   Basic Metabolic Panel    Collection Time: 08/01/24  6:01 AM   Result Value Ref Range    Sodium 138 135 - 145 mmol/L    Potassium 4.5 3.6 - 5.5 mmol/L    Chloride 104 96 - 112 mmol/L    Co2 25 20 - 33 mmol/L    Glucose 95 65 - 99 mg/dL    Bun 17 8 - 22 mg/dL    Creatinine 0.85 0.50 - 1.40 mg/dL    Calcium 9.6 8.5 - 10.5 mg/dL    Anion Gap 9.0 7.0 - 16.0   ESTIMATED GFR    Collection Time: 08/01/24  6:01 AM   Result Value Ref Range    GFR (CKD-EPI) 68 >60 mL/min/1.73 m 2       Medications:  Scheduled Medications   Medication Dose Frequency    fluticasone-umeclidinium-vilanterol  1 Puff DAILY    amiodarone  200 mg Q DAY    anastrozole  1 mg QAM    aspirin  81 mg DAILY    omeprazole  20 mg DAILY    PARoxetine  10 mg DAILY    rosuvastatin  20 mg PM MEAL    spironolactone  25 mg QDAY    albuterol  2.5 mg Daily-0800     PRN medications: [Held by provider] senna-docusate **AND** polyethylene glycol/lytes, hydrocortisone, Respiratory Therapy Consult, hydrALAZINE, acetaminophen, docusate sodium, magnesium hydroxide, carboxymethylcellulose, mag hydrox-al hydrox-simeth, ondansetron **OR** ondansetron, traZODone, sodium chloride, traMADol    Diet:  Current Diet Order   Procedures    Diet Order Diet: Cardiac       Medical Decision Making and Plan:  TAVR - Patient with severe aortic stenosis s/p TAVR on 7/15/24 with Dr. Oconnor which was complicated by right femoral artery injury requiring repair.  -PT and  OT for mobility and ADLs. Per guidelines, 15 hours per week between PT, OT and/or SLP.  -Follow-up Cardiology. Continue ASA     HTN/A fib/sCHF - Patient on Amiodarone 400 mg BID with plan to transition to 200 mg daily on 7/27 as well as HCTZ 6.25 mg, Spironolactone 25 mg and Valsartan 40 mg daily.  Very labile, consult hospitalist. Reduced Amiodarone to 200 mg. Valsartan discontinued. HCTZ discontinued for low SBP.  SBP into 130s, continue Amiodarone 200 mg and Spironolactone 25     HLD - Patient on Rosuvastatin 20 mg QHS     COPD/Asthma - Patient on Albuterol daily and Trelegy. Improving breathing, continue Albuterol and Trelegy     Anemia - Check AM CBC - 9.1, will monitor     R groin hematoma - Wound care for right groin incision site.      Thrombocytopenia - Check AM CBC - 154, will monitor     Hyperglycemia - Check A 1c - 5.4, no need to monitor sugars     Depression - Patient on Paxil 10 m daily     R ear hearing loss - ear wax, start Docusate 100 mg BID. Improved with ear cleaning, will continue Docusate BID. Discontinue Docusate, hearing improved.     Hemorrhoids - start PRN preparation H. Improving, continue Preparation H    Obesity - On admission Body mass index is 31.67 kg/m². Meets medical criteria. Dietitian to consult.      Pain - Patient on PRN Tylenol and Tramadol     Skin - Patient at risk for skin breakdown due to debility in areas including sacrum, achilles, elbows and head in addition to other sites. Nursing to assess skin daily.      GI Ppx - Patient on Prilosec for GERD prophylaxis. Patient on Senna-docusate for constipation prophylaxis.   -Loose BMs, change senna-docusate to PRN     DVT Ppx - Patient is not cleared for AC due to recent hematoma.    ____________________________________    T. Lv Solano MD/PhD  [facility] - Physical Medicine & Rehabilitation   [facility] - Brain Injury Medicine   ____________________________________

## 2024-08-02 ENCOUNTER — HOME HEALTH ADMISSION (OUTPATIENT)
Dept: HOME HEALTH SERVICES | Facility: HOME HEALTHCARE | Age: 84
End: 2024-08-02
Payer: MEDICARE

## 2024-08-02 ENCOUNTER — APPOINTMENT (OUTPATIENT)
Dept: INPATIENT REHAB | Facility: REHABILITATION | Age: 84
End: 2024-08-02
Payer: MEDICARE

## 2024-08-02 VITALS
HEART RATE: 68 BPM | RESPIRATION RATE: 14 BRPM | BODY MASS INDEX: 31.5 KG/M2 | OXYGEN SATURATION: 98 % | TEMPERATURE: 98.1 F | DIASTOLIC BLOOD PRESSURE: 56 MMHG | SYSTOLIC BLOOD PRESSURE: 122 MMHG | HEIGHT: 64 IN | WEIGHT: 184.5 LBS

## 2024-08-02 PROCEDURE — 700102 HCHG RX REV CODE 250 W/ 637 OVERRIDE(OP): Performed by: PHYSICAL MEDICINE & REHABILITATION

## 2024-08-02 PROCEDURE — 99239 HOSP IP/OBS DSCHRG MGMT >30: CPT | Performed by: PHYSICAL MEDICINE & REHABILITATION

## 2024-08-02 PROCEDURE — 94640 AIRWAY INHALATION TREATMENT: CPT

## 2024-08-02 PROCEDURE — 700101 HCHG RX REV CODE 250: Performed by: PHYSICAL MEDICINE & REHABILITATION

## 2024-08-02 PROCEDURE — 94760 N-INVAS EAR/PLS OXIMETRY 1: CPT

## 2024-08-02 PROCEDURE — 99231 SBSQ HOSP IP/OBS SF/LOW 25: CPT | Performed by: HOSPITALIST

## 2024-08-02 PROCEDURE — A9270 NON-COVERED ITEM OR SERVICE: HCPCS | Performed by: PHYSICAL MEDICINE & REHABILITATION

## 2024-08-02 RX ADMIN — SPIRONOLACTONE 25 MG: 25 TABLET ORAL at 06:04

## 2024-08-02 RX ADMIN — PAROXETINE HYDROCHLORIDE 10 MG: 20 TABLET, FILM COATED ORAL at 08:57

## 2024-08-02 RX ADMIN — OMEPRAZOLE 20 MG: 20 CAPSULE, DELAYED RELEASE ORAL at 08:57

## 2024-08-02 RX ADMIN — AMIODARONE HYDROCHLORIDE 200 MG: 200 TABLET ORAL at 06:04

## 2024-08-02 RX ADMIN — ALBUTEROL SULFATE 2.5 MG: 2.5 SOLUTION RESPIRATORY (INHALATION) at 07:45

## 2024-08-02 RX ADMIN — ANASTROZOLE 1 MG: 1 TABLET, COATED ORAL at 08:57

## 2024-08-02 RX ADMIN — ASPIRIN 81 MG: 81 TABLET, COATED ORAL at 08:57

## 2024-08-02 ASSESSMENT — PATIENT HEALTH QUESTIONNAIRE - PHQ9
2. FEELING DOWN, DEPRESSED, IRRITABLE, OR HOPELESS: NOT AT ALL
SUM OF ALL RESPONSES TO PHQ9 QUESTIONS 1 AND 2: 0
1. LITTLE INTEREST OR PLEASURE IN DOING THINGS: NOT AT ALL
1. LITTLE INTEREST OR PLEASURE IN DOING THINGS: NOT AT ALL
2. FEELING DOWN, DEPRESSED, IRRITABLE, OR HOPELESS: NOT AT ALL
SUM OF ALL RESPONSES TO PHQ9 QUESTIONS 1 AND 2: 0

## 2024-08-02 ASSESSMENT — ENCOUNTER SYMPTOMS
POLYDIPSIA: 0
SHORTNESS OF BREATH: 0
FEVER: 0
VOMITING: 0
BRUISES/BLEEDS EASILY: 0
ABDOMINAL PAIN: 0
NAUSEA: 0
CHILLS: 0
COUGH: 0
PALPITATIONS: 0
EYES NEGATIVE: 1

## 2024-08-02 ASSESSMENT — PAIN DESCRIPTION - PAIN TYPE: TYPE: ACUTE PAIN

## 2024-08-02 NOTE — FLOWSHEET NOTE
08/02/24 0746   Events/Summary/Plan   Events/Summary/Plan SpO2 check   Vital Signs   Pulse (!) 56   Respiration 16   Pulse Oximetry 95 %   $ Pulse Oximetry (Spot Check) Yes   Respiratory Assessment   Respiratory Pattern Within Normal Limits   Level of Consciousness Alert   Oxygen   O2 (LPM) 2   O2 Delivery Device Nasal Cannula   Room Air Challenge Fail  (Room air SpO2=86%)

## 2024-08-02 NOTE — PROGRESS NOTES
Hospital Medicine Daily Progress Note      Chief Complaint:  Hypertension  Anemia    Interval History:  Pt doing well, excited for discharge.    Review of Systems  Review of Systems   Constitutional:  Negative for chills and fever.   HENT: Negative.     Eyes: Negative.    Respiratory:  Negative for cough and shortness of breath.    Cardiovascular:  Negative for chest pain and palpitations.   Gastrointestinal:  Negative for abdominal pain, nausea and vomiting.   Musculoskeletal:         Wound pain   Skin:  Negative for itching and rash.   Endo/Heme/Allergies:  Negative for polydipsia. Does not bruise/bleed easily.        Physical Exam  Temp:  [36.6 °C (97.9 °F)-36.7 °C (98 °F)] 36.6 °C (97.9 °F)  Pulse:  [52-56] 56  Resp:  [16-18] 16  BP: (108-119)/(51-52) 108/52  SpO2:  [95 %-97 %] 95 %    Physical Exam  Vitals reviewed.   Constitutional:       General: She is not in acute distress.     Appearance: Normal appearance. She is not ill-appearing.   HENT:      Head: Normocephalic and atraumatic.      Right Ear: External ear normal.      Left Ear: External ear normal.      Nose: Nose normal.      Mouth/Throat:      Pharynx: Oropharynx is clear.   Eyes:      General:         Right eye: No discharge.         Left eye: No discharge.      Extraocular Movements: Extraocular movements intact.      Conjunctiva/sclera: Conjunctivae normal.   Cardiovascular:      Rate and Rhythm: Normal rate and regular rhythm.   Pulmonary:      Effort: No respiratory distress.      Breath sounds: No wheezing.      Comments: Decreased BS  Abdominal:      General: Bowel sounds are normal. There is no distension.      Palpations: Abdomen is soft.      Tenderness: There is no abdominal tenderness.   Musculoskeletal:      Cervical back: Normal range of motion and neck supple.      Right lower leg: No edema.      Left lower leg: No edema.      Comments: R inguinal incision C/D/I   Skin:     General: Skin is warm and dry.   Neurological:      Mental  Status: She is alert and oriented to person, place, and time.         Fluids    Intake/Output Summary (Last 24 hours) at 8/2/2024 0937  Last data filed at 8/2/2024 0900  Gross per 24 hour   Intake 480 ml   Output --   Net 480 ml       Laboratory  Recent Labs     08/01/24  0601   WBC 5.4   RBC 3.34*   HEMOGLOBIN 9.1*   HEMATOCRIT 29.7*   MCV 88.9   MCH 27.2   MCHC 30.6*   RDW 51.2*   PLATELETCT 206   MPV 10.2       Recent Labs     08/01/24  0601   SODIUM 138   POTASSIUM 4.5   CHLORIDE 104   CO2 25   GLUCOSE 95   BUN 17   CREATININE 0.85   CALCIUM 9.6                   Assessment/Plan  * S/P TAVR (transcatheter aortic valve replacement)- (present on admission)  Assessment & Plan  Severe AS S/P TAVR on 7/15/24 by Dr.'s Luis ( mL) and Elvin ( mL)  Complicated by common femoral artery injury S/P surgical repair by Dr. Swan (EBL 5 mL)  Echo 7/15/24 EF 65%, normally functioning TAVR, small pericardial effusion without hemodynamic compromise  U/S BLE negative for DVT  Continue Aldactone  Needs Cardiology F/U    Depression  Assessment & Plan  On Paxil    Anemia due to acute blood loss- (present on admission)  Assessment & Plan  , 250, and 5 mL  Fe and Ferritin levels normal  Follow H/H    A-fib (HCC)- (present on admission)  Assessment & Plan  S/P new post-op AFib w/ RVR at [facility]  On ASA and Amiodarone  Needs Cardiology F/U    History of breast cancer- (present on admission)  Assessment & Plan  On Arimidex  Outpt F/U    Dyslipidemia- (present on admission)  Assessment & Plan  On Crestor    Asthma-COPD overlap syndrome (HCC)- (present on admission)  Assessment & Plan  On Albuterol and Trelegy  RT protocol    Essential hypertension  Assessment & Plan  Valsartan and HCTZ discontinued for orthostatic hypotension  Remains on Aldactone per Dr. Calloway  Outpt meds include Valsartan and HCTZ    Full Code    Patient seen and examined.  Chart reviewed.  Assessment and Plan as above.  No changes today from  yesterday's medical decision making.

## 2024-08-02 NOTE — DISCHARGE PLANNING
CM//Discharge :    The following has been ordered:   Home health:     [facility]                  Disciplines ordered: RN, PT, OT, Home Health Aid  Status: Sent

## 2024-08-02 NOTE — PROGRESS NOTES
Patient discharged to home per order.  Discharge instructions reviewed with patient and relative; they verbalize understanding and signed copies placed in chart.  Patient has all belongings; signed copy of form in chart.  Patient left facility at 1230 via wheelchair. Meds to beds given. All questions and concerns addressed at this time

## 2024-08-02 NOTE — DISCHARGE PLANNING
Referral sent to [facility] for 4WW and O2.  CM called them to have them look out for it.

## 2024-08-02 NOTE — DISCHARGE PLANNING
Case management  Reviewed signed copy of IMM and answered all questions.    Dc date /disposition:8/2/24 with home health.

## 2024-08-02 NOTE — PROGRESS NOTES
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  Allergies: Sulfa drugs, Codeine, Oxycodone, and Vancomycin    Safety  Patient Assist  Mod I days  Patient Precautions  Fall Risk  Precaution Comments  02 2L PM, 2L 02 PRN AM  Bed Transfer Status  Modified Independent  Toilet Transfer Status   Modified Independent  Assistive Devices  Rails, Walker - four wheel  Oxygen  Silicone Nasal Cannula  Diet/Therapeutic Dining  Current Diet Order   Procedures    Diet Order Diet: Cardiac     Pill Administration  whole  Agitated Behavioral Scale     ABS Level of Severity       Fall Risk  Has the patient had a fall this admission?   No  Christina Elizondo Fall Risk Scoring  13, MODERATE RISK  Fall Risk Safety Measures  bed alarm, chair alarm, poor balance, and low vision/ hearing    Vitals  Temperature: 36.7 °C (98 °F)  Temp src: Oral  Pulse: (!) 55  Respiration: 16  Blood Pressure : 119/51  Blood Pressure MAP (Calculated): 74 MM HG  BP Location: Left, Upper Arm  Patient BP Position: Supine     Oxygen  Pulse Oximetry: 97 %  O2 (LPM): 2  FiO2%: 21 %  O2 Delivery Device: Silicone Nasal Cannula    Bowel and Bladder  Last Bowel Movement  07/27/24  Stool Type  Type 5: Soft blob with clear cut edges (passed easily)  Bowel Device  Bathroom  Continent  Bladder: Continent void   Bowel: Continent movement  Bladder Function  Urine Void (mL):  (Large)  Number of Times Voided: 1  Urine Color: Unable To Evaluate  Genitourinary Assessment   Bladder Assessment (WDL):  Within Defined Limits  Kennedy Catheter: Not Applicable  Urine Color: Unable To Evaluate  Bladder Device: Bathroom  Time Void: Yes  Bladder Scan: Post Void  $ Bladder Scan Results (mL): 1    Skin  Delfino Score   20  Sensory Interventions   Bed Types: Standard/Trauma Mattress  Skin  Preventative Measures: Pillows in Use for Support / Positioning  Moisture Interventions  Moisturizers/Barriers: Barrier Wipes      Pain  Pain Rating Scale  0 - No Pain  Pain Location  Groin  Pain Location Orientation  Right, Left  Pain Interventions   Declines    ADLs    Bathing    (OT Shower)  Linen Change      Personal Hygiene  Moist Trisha Wipes  Chlorhexidine Bath      Oral Care  Brushed Teeth  Teeth/Dentures     Shave     Nutrition Percentage Eaten  Lunch, Between % Consumed  Environmental Precautions  Personal Belongings, Wastebasket, Call Bell etc. in Easy Reach, Transferred to Stronger Side, Treaded Slipper Socks on Patient, Report Given to Other Health Care Providers Regarding Fall Risk, Bed in Low Position, Communication Sign for Patients & Families, Mobility Assessed & Appropriate Sign Placed  Patient Turns/Positioning  Patient Turns Self from Side to Side  Patient Turns Assistance/Tolerance     Bed Positions  Bed Locked, Bed Controls On  Head of Bed Elevated  Self regulated      Psychosocial/Neurologic Assessment  Psychosocial Assessment  Psychosocial (WDL):  Within Defined Limits  Neurologic Assessment  Neuro (WDL): Within Defined Limits  Level of Consciousness: Alert  Orientation Level: Oriented X4  Cognition: Follows commands, Appropriate attention/concentration  Speech: Clear  EENT (WDL):  WDL Except    Cardio/Pulmonary Assessment  Edema   RLE Edema: 1+  LLE Edema: 1+  Respiratory Breath Sounds  RUL Breath Sounds: Clear (increased aeration T/O)  RML Breath Sounds: Clear  RLL Breath Sounds: Diminished  KUNAL Breath Sounds: Clear  LLL Breath Sounds: Diminished  Cardiac Assessment   Cardiac (WDL):  WDL Except (TAVR, HF, Afib)

## 2024-08-02 NOTE — DISCHARGE PLANNING
ATTN: Case Management  RE: Referral for Home Health    As of 8/2/24, we have accepted the Home Health referral for the patient listed above.    A [facility] clinician will be out to see the patient within 48 hours. If you have any questions or concerns regarding the patient’s transition to Home Health, please do not hesitate to contact us at [phone].      We look forward to collaborating with you,  [facility] Health Team

## 2024-08-02 NOTE — PROGRESS NOTES
..                                                         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  Allergies: Sulfa drugs, Codeine, Oxycodone, and Vancomycin    Safety  Patient Assist  Mod I days  Patient Precautions  Fall Risk  Precaution Comments  02 2L PM, 2L 02 PRN AM  Bed Transfer Status  Modified Independent  Toilet Transfer Status   Modified Independent  Assistive Devices  Rails, Walker - four wheel  Oxygen  Silicone Nasal Cannula  Diet/Therapeutic Dining  Current Diet Order   Procedures    Diet Order Diet: Cardiac     Pill Administration  whole  Agitated Behavioral Scale     ABS Level of Severity       Fall Risk  Has the patient had a fall this admission?   No  Christina Elizondo Fall Risk Scoring  13, MODERATE RISK  Fall Risk Safety Measures  bed alarm and chair alarm    Vitals  Temperature: 36.6 °C (97.9 °F)  Temp src: Temporal  Pulse: (!) 52  Respiration: 18  Blood Pressure : 108/52  Blood Pressure MAP (Calculated): 71 MM HG  BP Location: Left, Upper Arm  Patient BP Position: Supine     Oxygen  Pulse Oximetry: 95 %  O2 (LPM): 2  FiO2%: 21 %  O2 Delivery Device: Silicone Nasal Cannula    Bowel and Bladder  Last Bowel Movement  08/01/24  Stool Type  Type 3: Like a sausage, but with cracks on its surface, Type 4: Like a sausage or snake, smooth and soft  Bowel Device  Bathroom  Continent  Bladder: Continent void   Bowel: Continent movement  Bladder Function  Urine Void (mL):  (Large)  Number of Times Voided: 1  Urine Color: Unable To Evaluate  Genitourinary Assessment   Bladder Assessment (WDL):  Within Defined Limits  Kennedy Catheter: Not Applicable  Urine Color: Unable To Evaluate  Bladder Device: Bathroom  Time Void: Yes  Bladder Scan: Post Void  $ Bladder Scan Results (mL): 1    Skin  Delfino Score   20  Sensory Interventions   Bed Types: Standard/Trauma  Mattress  Skin Preventative Measures: Pillows in Use for Support / Positioning  Moisture Interventions  Moisturizers/Barriers: Barrier Wipes      Pain  Pain Rating Scale  4 - Distracts me, can do usual activities  Pain Location  Groin  Pain Location Orientation  Right, Left  Pain Interventions   Medication (see MAR)    ADLs    Bathing    (OT Shower)  Linen Change      Personal Hygiene  Moist Trisha Wipes  Chlorhexidine Bath      Oral Care  Brushed Teeth  Teeth/Dentures     Shave     Nutrition Percentage Eaten  Lunch, Between % Consumed  Environmental Precautions  Personal Belongings, Wastebasket, Call Bell etc. in Easy Reach, Transferred to Stronger Side, Treaded Slipper Socks on Patient, Report Given to Other Health Care Providers Regarding Fall Risk, Bed in Low Position, Communication Sign for Patients & Families, Mobility Assessed & Appropriate Sign Placed  Patient Turns/Positioning  Patient Turns Self from Side to Side  Patient Turns Assistance/Tolerance     Bed Positions  Bed Locked, Bed Controls On  Head of Bed Elevated  Self regulated      Psychosocial/Neurologic Assessment  Psychosocial Assessment  Psychosocial (WDL):  Within Defined Limits  Neurologic Assessment  Neuro (WDL): Within Defined Limits  Level of Consciousness: Alert  Orientation Level: Oriented X4  Cognition: Follows commands, Appropriate attention/concentration  Speech: Clear  EENT (WDL):  WDL Except    Cardio/Pulmonary Assessment  Edema   RLE Edema: 1+  LLE Edema: 1+  Respiratory Breath Sounds  RUL Breath Sounds: Clear  RML Breath Sounds: Clear  RLL Breath Sounds: Diminished  KUNAL Breath Sounds: Clear  LLL Breath Sounds: Diminished  Cardiac Assessment   Cardiac (WDL):  WDL Except (TAVR, HF, Afib)

## 2024-08-03 ENCOUNTER — APPOINTMENT (OUTPATIENT)
Dept: INPATIENT REHAB | Facility: REHABILITATION | Age: 84
End: 2024-08-03
Payer: MEDICARE

## 2024-08-04 ENCOUNTER — APPOINTMENT (OUTPATIENT)
Dept: INPATIENT REHAB | Facility: REHABILITATION | Age: 84
End: 2024-08-04
Payer: MEDICARE

## 2024-08-07 ENCOUNTER — HOME CARE VISIT (OUTPATIENT)
Dept: HOME HEALTH SERVICES | Facility: HOME HEALTHCARE | Age: 84
End: 2024-08-07
Payer: MEDICARE

## 2024-08-07 VITALS
TEMPERATURE: 98.1 F | OXYGEN SATURATION: 93 % | WEIGHT: 171.6 LBS | SYSTOLIC BLOOD PRESSURE: 120 MMHG | RESPIRATION RATE: 16 BRPM | BODY MASS INDEX: 29.3 KG/M2 | HEIGHT: 64 IN | HEART RATE: 60 BPM | DIASTOLIC BLOOD PRESSURE: 60 MMHG

## 2024-08-07 PROCEDURE — 665005 NO-PAY RAP - HOME HEALTH

## 2024-08-07 PROCEDURE — G0495 RN CARE TRAIN/EDU IN HH: HCPCS

## 2024-08-07 SDOH — ECONOMIC STABILITY: HOUSING INSECURITY: EVIDENCE OF SMOKING MATERIAL: 0

## 2024-08-07 ASSESSMENT — ENCOUNTER SYMPTOMS
STOOL FREQUENCY: LESS THAN DAILY
DENIES PAIN: 1
LAST BOWEL MOVEMENT: 67058
VOMITING: PT DENIES EMESIS AT THIS TIME.
NAUSEA: PT DENIES NAUSEA AT THIS TIME.
FATIGUES EASILY: 1
DYSPNEA ON EXERTION: 1
BOWEL PATTERN NORMAL: 1
SHORTNESS OF BREATH: 1
PERSON REPORTING PAIN: PATIENT
SKIN LESIONS: 1
DYSPNEA ACTIVITY LEVEL: AFTER AMBULATING MORE THAN 20 FT

## 2024-08-07 ASSESSMENT — FIBROSIS 4 INDEX: FIB4 SCORE: 1.970287827189700383

## 2024-08-07 NOTE — CASE COMMUNICATION
Please send to Terra Zambrano MD.    PRIMARY DIAGNOSIS: Presence of prosthetic heart valve     SKILLED NEED: Health assessment, Medication education, education and monitoring of disease processes           NURSING FREQUENCY: 2w4            ZIP CODE: [id number]          DISCIPLINES ORDERED:  SN, PT, OT, (pt refused HHA)            INSURANCE: [facility]            CERTIFICATION PERIOD: 8/7/24 - 10/5/24            SPECIAL CONSIDERATION: none

## 2024-08-07 NOTE — CASE COMMUNICATION
[facility] reconciliation process done.  See MAR for drug allergy interactions. Major drug interactions include.  PARoxetine and traMADol   Selective serotonin reuptake inhibitors that are strong CYP2D6 inhibitors may enhance the adverse/toxic effects of tramadol. Specifically, the risk for serotonin syndrome/toxicity and seizures may be increased. The therapeutic effect of tramadol may also be diminis hed.              The best contact phone number [phone] and patient manages the medications

## 2024-08-08 ENCOUNTER — DOCUMENTATION (OUTPATIENT)
Dept: MEDICAL GROUP | Facility: PHYSICIAN GROUP | Age: 84
End: 2024-08-08
Payer: MEDICARE

## 2024-08-08 NOTE — PROGRESS NOTES
Medication chart review for [facility] services    Received referral from [facility].   Medications reviewed  compared with discharge summary if available.  Discharge summary date, if applicable:   8/2    Current medication list per [facility]     Medication list one, patient is currently taking    Current Outpatient Medications:     valsartan, 80 mg, Oral, DAILY    budesonide, 500 mcg, Nebulization, DAILY    Home Care Oxygen, 2 L/min, Inhalation, PRN    amiodarone, 200 mg, Oral, DAILY    anastrozole, 1 mg, Oral, QAM    aspirin, 81 mg, Oral, DAILY    pantoprazole, 40 mg, Oral, DAILY    PARoxetine, 10 mg, Oral, DAILY    rosuvastatin, 20 mg, Oral, Q EVENING    spironolactone, 25 mg, Oral, QDAY    Trelegy Ellipta, 1 Puff, Inhalation, QDAY    traMADol, 50 mg, Oral, Q4HRS PRN    traZODone, 50 mg, Oral, QHS PRN    MAGNESIUM PO, 420 mg, Oral, Q EVENING    Multiple Vitamins-Minerals (PRESERVISION AREDS 2 PO), 1 Tablet, Oral, Q EVENING    melatonin, 5 mg, Oral, QPM PRN      Medication list two, drugs that the patient has been prescribed or recommended to take by their healthcare provider on discharge summary        START taking these medications         Instructions   traMADol 50 MG Tabs  Commonly known as: Ultram    Take 1 Tablet by mouth every four hours as needed (Moderate Pain (NRS Pain Scale 4-6) if opiates not tolerated or not ordered) for up to 14 days.  Dose: 50 mg      traZODone 50 MG Tabs  Commonly known as: Desyrel    Take 1 Tablet by mouth at bedtime as needed for Sleep.  Dose: 50 mg                CHANGE how you take these medications         Instructions   amiodarone 200 MG Tabs  What changed:   medication strength  See the new instructions.  Commonly known as: Cordarone    Take 1 Tablet by mouth every day.  Dose: 200 mg                CONTINUE taking these medications         Instructions   anastrozole 1 MG Tabs  Commonly known as: Arimidex    Take 1 Tablet by mouth every morning.  Dose: 1 mg       aspirin 81 MG EC tablet    Take 1 Tablet by mouth every day.  Dose: 81 mg      MAGNESIUM PO    Take 420 mg by mouth every evening.  Dose: 420 mg      melatonin 5 mg Tabs    Take 5 mg by mouth every evening as needed.  Dose: 5 mg      pantoprazole 40 MG Tbec  Commonly known as: Protonix    Take 1 Tablet by mouth every day.  Dose: 40 mg      PARoxetine 10 MG Tabs  Commonly known as: Paxil    Take 1 Tablet by mouth every day.  Dose: 10 mg      PRESERVISION AREDS 2 PO    Take 1 Tablet by mouth every evening.  Dose: 1 Tablet      rosuvastatin 20 MG Tabs  Commonly known as: Crestor    Take 1 Tablet by mouth every evening.  Dose: 20 mg      spironolactone 25 MG Tabs  Commonly known as: Aldactone    Take 1 Tablet by mouth every day.  Dose: 25 mg      Trelegy Ellipta 200-62.5-25 MCG/ACT inhaler  Generic drug: fluticasone-umeclidinium-vilanterol    Inhale 1 Puff every day.  Dose: 1 Puff                STOP taking these medications       diphenhydrAMINE 25 MG Tabs  Commonly known as: Benadryl      ipratropium-albuterol 0.5-2.5 (3) MG/3ML nebulizer solution  Commonly known as: Duoneb      valsartan-hydrochlorothiazide 80-12.5 MG per tablet  Commonly known as: Diovan-HCT          Allergies   Allergen Reactions    Sulfa Drugs Rash and Swelling     Rash, joint swelling  EPO=6103    Codeine Vomiting and Nausea    Oxycodone Vomiting and Nausea     .    Vancomycin Itching       Labs     Lab Results   Component Value Date/Time    SODIUM 138 08/01/2024 06:01 AM    POTASSIUM 4.5 08/01/2024 06:01 AM    CHLORIDE 104 08/01/2024 06:01 AM    CO2 25 08/01/2024 06:01 AM    GLUCOSE 95 08/01/2024 06:01 AM    BUN 17 08/01/2024 06:01 AM    CREATININE 0.85 08/01/2024 06:01 AM     Lab Results   Component Value Date/Time    ALKPHOSPHAT 55 07/24/2024 05:27 AM    ASTSGOT 14 07/24/2024 05:27 AM    ALTSGPT 8 07/24/2024 05:27 AM    TBILIRUBIN 0.6 07/24/2024 05:27 AM    INR 1.14 (H) 07/15/2024 08:29 PM    ALBUMIN 3.2 07/24/2024 05:27 AM        Assessment  for clinically significant drug interactions, drug omissions/additions, duplicative therapies.        High  Allergy/Contraindication: traMADolReactions: Vomiting, Nausea. Reaction type: Intolerance/Side Effect. User documented allergy severity: Medium.  Cross-Sensitive Class Match with CODEINE (Class: CODEINE).  Last overridden by: Shelley Swan PharmD on Aug 1, 2024 2:56 PM   Reason: Unverified     DetailstraMADol (ULTRAM) 50 MG TabPrescription. Active.  High  Drug-Drug: spironolactone and valsartanThe risk of hyperkalemia may be increased when potassium-sparing diuretics are co-administered with angiotensin II receptor antagonists.  DetailsDon't Show This Warning Again  valsartan (DIOVAN) 80 MG TabPatient reported medication. Active.  spironolactone (ALDACTONE) 25 MG TabPrescription. Active.  High  Drug-Drug: PARoxetine and traMADolSelective serotonin reuptake inhibitors that are strong CYP2D6 inhibitors may enhance the adverse/toxic effects of tramadol. Specifically, the risk for serotonin syndrome/toxicity and seizures may be increased. The therapeutic effect of tramadol may also be diminished.  Last overridden by: Shelley Swan PharmD on Aug 1, 2024 2:56 PM   Reason: Unverified     DetailsDon't Show This Warning Again  traMADol (ULTRAM) 50 MG TabPrescription. Active.  PARoxetine (PAXIL) 10 MG TabPrescription. Active.  High  Drug-Drug: traMADol and traZODoneSerotonergic effects of this combination may be additive. The risk for serotonin syndrome/toxicity may be increased. Additionally, additive CNS depression may occur.  Last overridden by: Shelley Swan PharmD on Aug 1, 2024 2:56 PM   Reason: Unverified     DetailsDon't Show This Warning Again  traMADol (ULTRAM) 50 MG TabPrescription. Active.  traZODone (DESYREL) 50 MG TabPrescription. Active.  High  Drug-Drug: PARoxetine and traZODoneAdditive serotonergic effects may occur during coadministration of selective serotonin reuptake inhibitors (SSRIs) and  Serotonergic Non-Opioid CNS Depressants, and the risk of developing serotonin syndrome may be increased.  Last overridden by: Shelley Swan, PharmD on Aug 1, 2024 2:56 PM   Reason: Unverified     DetailsDon't Show This Warning Again  PARoxetine (PAXIL) 10 MG TabPrescription. Active.  traZODone (DESYREL) 50 MG TabPrescription. Active.      CC   Terra Zambrano M.D.  [id number]  [place] [address]  Fax: [phone]    [facility]  Phone [phone] fax [phone]    This note was created using voice recognition software (Dragon). The accuracy of the dictation is limited by the abilities of the software. I have reviewed the note prior to signing, however some errors in grammar and context are still possible. If you have any questions related to this note please do not hesitate to contact our office.

## 2024-08-08 NOTE — CASE COMMUNICATION
noted  ----- Message -----  From: Saw Aranda R.N.  Sent: 8/7/2024   4:27 PM PDT  To: Gay Mena R.N.; Geno Ward, OT; *      Please send to Terra Zambrano MD.    PRIMARY DIAGNOSIS: Presence of prosthetic heart valve     SKILLED NEED: Health assessment, Medication education, education and monitoring of disease processes           NURSING FREQUENCY: 2w4            ZIP CODE: [id number]          DISCIPLINES ORDERED:  SN, PT, OT, (pt r efused HHA)            INSURANCE: [facility]            CERTIFICATION PERIOD: 8/7/24 - 10/5/24            SPECIAL CONSIDERATION: none

## 2024-08-09 ENCOUNTER — HOME CARE VISIT (OUTPATIENT)
Dept: HOME HEALTH SERVICES | Facility: HOME HEALTHCARE | Age: 84
End: 2024-08-09
Payer: MEDICARE

## 2024-08-09 VITALS
HEART RATE: 68 BPM | DIASTOLIC BLOOD PRESSURE: 65 MMHG | RESPIRATION RATE: 16 BRPM | SYSTOLIC BLOOD PRESSURE: 100 MMHG | TEMPERATURE: 98.2 F | OXYGEN SATURATION: 94 %

## 2024-08-09 PROCEDURE — G0299 HHS/HOSPICE OF RN EA 15 MIN: HCPCS

## 2024-08-09 SDOH — ECONOMIC STABILITY: HOUSING INSECURITY: EVIDENCE OF SMOKING MATERIAL: 0

## 2024-08-09 ASSESSMENT — ENCOUNTER SYMPTOMS
HIGHEST PAIN SEVERITY IN PAST 24 HOURS: 0/10
LOWEST PAIN SEVERITY IN PAST 24 HOURS: 0/10
STOOL FREQUENCY: LESS THAN DAILY
DENIES PAIN: 1
CONSTIPATION: 1
VOMITING: DENIES
SUBJECTIVE PAIN PROGRESSION: UNCHANGED
MUSCLE WEAKNESS: 1
PERSON REPORTING PAIN: PATIENT
LAST BOWEL MOVEMENT: 67060
NAUSEA: DENES
PAIN SEVERITY GOAL: 0/10

## 2024-08-09 ASSESSMENT — PATIENT HEALTH QUESTIONNAIRE - PHQ9: CLINICAL INTERPRETATION OF PHQ2 SCORE: 0

## 2024-08-09 NOTE — Clinical Note
At this visit patient is S/P TAVR.  Patient was in the hospital recovered sent to rehab then sent home.  Patient afebrile at this time.  /65- 68-16-98.2-94%    On this assessment patient presents with continual cough and is expectorating clear secretions when coughing.  Breath sounds with pronounced crackles to the right and left lower bases.  R/O PNA or COVID 19 infection.  Patient referred to Urgent care for chest x Ray as she refuses Dr. Myrick at this time.  Patient grand son will take patient to the Urgent care near them.

## 2024-08-11 NOTE — CASE COMMUNICATION
noted  ----- Message -----  From: Katerin Zavala R.N.  Sent: 8/9/2024   4:53 PM PDT  To: Gay Mena R.N.; Geno Ward, OT; *      At this visit patient is S/P TAVR.  Patient was in the hospital recovered sent to rehab then sent home.  Patient afebrile at this time.  /65- 68-16-98.2-94%    On this assessment patient presents with continual cough and is expectorating clear secretions when coughing.  Breath sounds wit h pronounced crackles to the right and left lower bases.  R/O PNA or COVID 19 infection.  Patient referred to Urgent care for chest x Ray as she refuses Dr. Myrick at this time.  Patient grand son will take patient to the Urgent care near them.

## 2024-08-12 ENCOUNTER — HOME CARE VISIT (OUTPATIENT)
Dept: HOME HEALTH SERVICES | Facility: HOME HEALTHCARE | Age: 84
End: 2024-08-12
Payer: MEDICARE

## 2024-08-12 VITALS
DIASTOLIC BLOOD PRESSURE: 61 MMHG | TEMPERATURE: 97.6 F | BODY MASS INDEX: 29.2 KG/M2 | WEIGHT: 170.1 LBS | HEART RATE: 55 BPM | RESPIRATION RATE: 18 BRPM | OXYGEN SATURATION: 96 % | SYSTOLIC BLOOD PRESSURE: 118 MMHG

## 2024-08-12 PROCEDURE — G0151 HHCP-SERV OF PT,EA 15 MIN: HCPCS

## 2024-08-12 ASSESSMENT — ENCOUNTER SYMPTOMS
DENIES PAIN: 1
PERSON REPORTING PAIN: PATIENT

## 2024-08-12 ASSESSMENT — FIBROSIS 4 INDEX: FIB4 SCORE: 1.970287827189700383

## 2024-08-12 NOTE — CASE COMMUNICATION
Patient's resting heartrate at 55 bpm is outside of normal parameters. Patient denies any dizziness, light headedness, chest pain.  All other vitals are within HH parameters.     Pao, please fax to PCP.  Terra Zambrano MD is listed.

## 2024-08-12 NOTE — CASE COMMUNICATION
PT evaluation completed on 8/12/24.  Frequency 1 week 4 effective 8/12/24.  Please assist with authorization as needed.

## 2024-08-13 ENCOUNTER — HOME CARE VISIT (OUTPATIENT)
Dept: HOME HEALTH SERVICES | Facility: HOME HEALTHCARE | Age: 84
End: 2024-08-13
Payer: MEDICARE

## 2024-08-13 VITALS — HEART RATE: 71 BPM

## 2024-08-13 PROCEDURE — G0152 HHCP-SERV OF OT,EA 15 MIN: HCPCS

## 2024-08-13 PROCEDURE — G0495 RN CARE TRAIN/EDU IN HH: HCPCS

## 2024-08-13 SDOH — ECONOMIC STABILITY: HOUSING INSECURITY
HOME SAFETY: . PATIENT ABLE TO SPEAKBACK VERBAL UNDERSTANDING    PT TRACED TUBING TO CONCENTRATOR AND DID NOT NOTE ANY LEAKS OR PROBLEMS. CONCENTRATOR SET PER MD ORDER. NOTED NO OPEN FLAMES. PT EDUCATED IN O2 SAFETY WITH USE OF O2 VIA NASAL CANULA AT ALL TIMES, K

## 2024-08-13 SDOH — ECONOMIC STABILITY: HOUSING INSECURITY: EVIDENCE OF SMOKING MATERIAL: 0

## 2024-08-13 SDOH — ECONOMIC STABILITY: HOUSING INSECURITY
HOME SAFETY: PT RECOMMENDED REMOVAL OF THROW RUG IN FRONT OF SHOWER AND TO ONLY PUT DOWN FOR SHOWERS.  PATIENT REPORTS THAT SHE USUALLY DOES REMOVE AFTER HER SHOWER BUT FORGOT TODAY AFTER HER SHOWER.  PT ALSO EDUCATED TO CHECK THE SUCTION GRAB BARS PRIOR TO USING

## 2024-08-13 SDOH — ECONOMIC STABILITY: HOUSING INSECURITY
HOME SAFETY: EEPING O2 PER MD ORDERS, NO OPEN FLAMES/SMOKING, MANAGEMENT OF O2 TUBING. PATIENT ABLE TO SPEAKBACK VERBAL UNDERSTANDING.

## 2024-08-13 SDOH — ECONOMIC STABILITY: HOUSING INSECURITY: HOME SAFETY: OT CONTACT INFO AND FUTURE APPOINTMENTS PLACED ON PT BINDER.

## 2024-08-13 ASSESSMENT — ACTIVITIES OF DAILY LIVING (ADL)
USING THE TELPHONE: INDEPENDENT
AMBULATION ASSISTANCE ON FLAT SURFACES: 1
SHOPPING: DEPENDENT
ORAL_CARE_CURRENT_FUNCTION: INDEPENDENT
BATHING ASSESSED: 1
PHYSICAL TRANSFERS ASSESSED: 1
PREPARING MEALS: NEEDS ASSISTANCE
AMBULATION ASSISTANCE: SUPERVISION
TOILETING: INDEPENDENT
TELEPHONE USE ASSESSED: 1
AMBULATION_DISTANCE/DURATION_TOLERATED: 75 FEET
LAUNDRY ASSESSED: 1
CURRENT_FUNCTION: STAND BY ASSIST
BATHING_CURRENT_FUNCTION: SUPERVISION
FEEDING: INDEPENDENT
GROOMING_CURRENT_FUNCTION: INDEPENDENT
FEEDING ASSESSED: 1
TRANSPORTATION: DEPENDENT
ORAL_CARE_ASSESSED: 1
LIGHT HOUSEKEEPING: NEEDS ASSISTANCE
GROOMING ASSESSED: 1
TRANSPORTATION ASSESSED: 1
CURRENT_FUNCTION: SUPERVISION
TOILETING: 1
DRESSING_LB_CURRENT_FUNCTION: SUPERVISION
SHOPPING ASSESSED: 1
DRESSING_UB_CURRENT_FUNCTION: INDEPENDENT
LAUNDRY: DEPENDENT
AMBULATION ASSISTANCE: 1
HOUSEKEEPING ASSESSED: 1

## 2024-08-13 ASSESSMENT — ENCOUNTER SYMPTOMS
MUSCLE WEAKNESS: 1
DENIES PAIN: 1
PERSON REPORTING PAIN: PATIENT

## 2024-08-13 ASSESSMENT — FIBROSIS 4 INDEX: FIB4 SCORE: 1.970287827189700383

## 2024-08-13 NOTE — CASE COMMUNICATION
noted  ----- Message -----  From: Beverley Barnett, PT  Sent: 8/12/2024   2:11 PM PDT  To: Gay Mena R.N.; Katharina Zamora R.N.; *      Patient's resting heartrate at 55 bpm is outside of normal parameters. Patient denies any dizziness, light headedness, chest pain.  All other vitals are within HH parameters.     Pao, please fax to PCP.  Terra Zambrano MD is listed.

## 2024-08-13 NOTE — CASE COMMUNICATION
Occupational Therapy jesus manuel completed 8/13/2024. Requesting 2w1 effective 8/18/2024 for continued skilled OT.

## 2024-08-13 NOTE — CASE COMMUNICATION
noted  ----- Message -----  From: Beverley Barnett, PT  Sent: 8/12/2024   2:11 PM PDT  To: Gay Mena R.N.; Katharina Zamora R.N.; *      PT evaluation completed on 8/12/24.  Frequency 1 week 4 effective 8/12/24.  Please assist with authorization as needed.

## 2024-08-14 VITALS
WEIGHT: 171.8 LBS | SYSTOLIC BLOOD PRESSURE: 90 MMHG | OXYGEN SATURATION: 94 % | BODY MASS INDEX: 29.49 KG/M2 | TEMPERATURE: 97.4 F | RESPIRATION RATE: 17 BRPM | DIASTOLIC BLOOD PRESSURE: 56 MMHG | HEART RATE: 71 BPM

## 2024-08-14 ASSESSMENT — ENCOUNTER SYMPTOMS
LAST BOWEL MOVEMENT: 67064
PERSON REPORTING PAIN: PATIENT
STOOL FREQUENCY: LESS THAN DAILY
MUSCLE WEAKNESS: 1
DYSPNEA ON EXERTION: 1
FATIGUES EASILY: 1
CONSTIPATION: 1
HYPERTENSION: 1
DENIES PAIN: 1
DRY SKIN: 1
LIMITED RANGE OF MOTION: 1

## 2024-08-14 ASSESSMENT — PATIENT HEALTH QUESTIONNAIRE - PHQ9: CLINICAL INTERPRETATION OF PHQ2 SCORE: 0

## 2024-08-14 NOTE — CASE COMMUNICATION
noted  ----- Message -----  From: Carmelita Yang, OT  Sent: 8/13/2024   3:47 PM PDT  To: Gay Mena R.N.; Bimal Botaeng; *      Occupational Therapy jesus manuel completed 8/13/2024. Requesting 2w1 effective 8/18/2024 for continued skilled OT.

## 2024-08-15 ENCOUNTER — HOSPITAL ENCOUNTER (OUTPATIENT)
Dept: CARDIOLOGY | Facility: MEDICAL CENTER | Age: 84
End: 2024-08-15
Attending: INTERNAL MEDICINE
Payer: MEDICARE

## 2024-08-15 DIAGNOSIS — I35.0 NONRHEUMATIC AORTIC (VALVE) STENOSIS: ICD-10-CM

## 2024-08-15 PROCEDURE — 93306 TTE W/DOPPLER COMPLETE: CPT

## 2024-08-16 ENCOUNTER — HOME CARE VISIT (OUTPATIENT)
Dept: HOME HEALTH SERVICES | Facility: HOME HEALTHCARE | Age: 84
End: 2024-08-16
Payer: MEDICARE

## 2024-08-16 LAB
LV EJECT FRACT  99904: 75
LV EJECT FRACT MOD 2C 99903: 80.63
LV EJECT FRACT MOD 4C 99902: 79.47
LV EJECT FRACT MOD BP 99901: 80.05

## 2024-08-16 PROCEDURE — G0495 RN CARE TRAIN/EDU IN HH: HCPCS

## 2024-08-16 PROCEDURE — 93306 TTE W/DOPPLER COMPLETE: CPT | Mod: 26 | Performed by: INTERNAL MEDICINE

## 2024-08-16 ASSESSMENT — FIBROSIS 4 INDEX: FIB4 SCORE: 1.970287827189700383

## 2024-08-17 VITALS
WEIGHT: 173 LBS | HEART RATE: 70 BPM | DIASTOLIC BLOOD PRESSURE: 60 MMHG | OXYGEN SATURATION: 91 % | TEMPERATURE: 97.6 F | SYSTOLIC BLOOD PRESSURE: 110 MMHG | BODY MASS INDEX: 29.7 KG/M2 | RESPIRATION RATE: 18 BRPM

## 2024-08-17 SDOH — ECONOMIC STABILITY: HOUSING INSECURITY: EVIDENCE OF SMOKING MATERIAL: 0

## 2024-08-17 ASSESSMENT — ENCOUNTER SYMPTOMS
STOOL FREQUENCY: LESS THAN DAILY
LAST BOWEL MOVEMENT: 67066
VOMITING: PT DENIES EMESIS AT THIS TIME.
MUSCLE WEAKNESS: 1
FATIGUES EASILY: 1
LIMITED RANGE OF MOTION: 1
BOWEL PATTERN NORMAL: 1
DENIES PAIN: 1
SKIN LESIONS: 1
PERSON REPORTING PAIN: PATIENT
NAUSEA: PT DENIES NAUSEA AT THIS TIME.

## 2024-08-17 ASSESSMENT — ACTIVITIES OF DAILY LIVING (ADL)
AMBULATION ASSISTANCE: STAND BY ASSIST
CURRENT_FUNCTION: STAND BY ASSIST

## 2024-08-20 ENCOUNTER — HOME CARE VISIT (OUTPATIENT)
Dept: HOME HEALTH SERVICES | Facility: HOME HEALTHCARE | Age: 84
End: 2024-08-20
Payer: MEDICARE

## 2024-08-20 VITALS
BODY MASS INDEX: 29.85 KG/M2 | DIASTOLIC BLOOD PRESSURE: 55 MMHG | HEART RATE: 68 BPM | TEMPERATURE: 97.8 F | WEIGHT: 173.9 LBS | SYSTOLIC BLOOD PRESSURE: 124 MMHG | RESPIRATION RATE: 18 BRPM | OXYGEN SATURATION: 91 %

## 2024-08-20 VITALS — HEART RATE: 56 BPM | OXYGEN SATURATION: 94 %

## 2024-08-20 PROCEDURE — G0152 HHCP-SERV OF OT,EA 15 MIN: HCPCS

## 2024-08-20 PROCEDURE — G0151 HHCP-SERV OF PT,EA 15 MIN: HCPCS

## 2024-08-20 ASSESSMENT — ACTIVITIES OF DAILY LIVING (ADL): TRANSPORTATION COMMENTS: REQUIRES ASSIST OF ANOTHER PERSON AND 4WW OUT OF HOME ON UNEVEN SURFACES

## 2024-08-20 ASSESSMENT — ENCOUNTER SYMPTOMS
DENIES PAIN: 1
PERSON REPORTING PAIN: PATIENT

## 2024-08-20 ASSESSMENT — FIBROSIS 4 INDEX: FIB4 SCORE: 1.970287827189700383

## 2024-08-21 ASSESSMENT — ENCOUNTER SYMPTOMS
PERSON REPORTING PAIN: PATIENT
DENIES PAIN: 1

## 2024-08-22 ENCOUNTER — OFFICE VISIT (OUTPATIENT)
Dept: CARDIOLOGY | Facility: MEDICAL CENTER | Age: 84
End: 2024-08-22
Payer: MEDICARE

## 2024-08-22 ENCOUNTER — DOCUMENTATION (OUTPATIENT)
Dept: CARDIOLOGY | Facility: MEDICAL CENTER | Age: 84
End: 2024-08-22

## 2024-08-22 ENCOUNTER — HOME CARE VISIT (OUTPATIENT)
Dept: HOME HEALTH SERVICES | Facility: HOME HEALTHCARE | Age: 84
End: 2024-08-22
Payer: MEDICARE

## 2024-08-22 VITALS
HEART RATE: 76 BPM | WEIGHT: 176 LBS | SYSTOLIC BLOOD PRESSURE: 116 MMHG | RESPIRATION RATE: 14 BRPM | HEIGHT: 64 IN | OXYGEN SATURATION: 95 % | BODY MASS INDEX: 30.05 KG/M2 | DIASTOLIC BLOOD PRESSURE: 54 MMHG

## 2024-08-22 DIAGNOSIS — I50.31 ACUTE DIASTOLIC HF (HEART FAILURE) (HCC): ICD-10-CM

## 2024-08-22 DIAGNOSIS — Z95.2 S/P TAVR (TRANSCATHETER AORTIC VALVE REPLACEMENT): ICD-10-CM

## 2024-08-22 DIAGNOSIS — I10 ESSENTIAL HYPERTENSION: ICD-10-CM

## 2024-08-22 DIAGNOSIS — E78.5 DYSLIPIDEMIA: ICD-10-CM

## 2024-08-22 DIAGNOSIS — I48.91 ATRIAL FIBRILLATION, UNSPECIFIED TYPE (HCC): ICD-10-CM

## 2024-08-22 DIAGNOSIS — D62 ANEMIA DUE TO ACUTE BLOOD LOSS: ICD-10-CM

## 2024-08-22 DIAGNOSIS — S75.001A: ICD-10-CM

## 2024-08-22 PROBLEM — I35.0 SEVERE AORTIC STENOSIS: Chronic | Status: RESOLVED | Noted: 2024-06-25 | Resolved: 2024-08-22

## 2024-08-22 LAB — EKG IMPRESSION: NORMAL

## 2024-08-22 PROCEDURE — 99213 OFFICE O/P EST LOW 20 MIN: CPT

## 2024-08-22 PROCEDURE — 93005 ELECTROCARDIOGRAM TRACING: CPT

## 2024-08-22 PROCEDURE — 93010 ELECTROCARDIOGRAM REPORT: CPT | Performed by: INTERNAL MEDICINE

## 2024-08-22 ASSESSMENT — ENCOUNTER SYMPTOMS
PND: 0
DEPRESSION: 0
GASTROINTESTINAL NEGATIVE: 1
NERVOUS/ANXIOUS: 0
SHORTNESS OF BREATH: 0
EYES NEGATIVE: 1
CONSTITUTIONAL NEGATIVE: 1
NEUROLOGICAL NEGATIVE: 1
ORTHOPNEA: 0
PALPITATIONS: 0

## 2024-08-22 ASSESSMENT — FIBROSIS 4 INDEX: FIB4 SCORE: 1.970287827189700383

## 2024-08-22 NOTE — PROGRESS NOTES
Valve Program Functional Assessment:     KCCQ12   1a) Showering/bathin  1b) Walking 1 block on ground: 1  1c) Hurrying or joggin  2) Swellin  3) Fatigue: 3  4) Shortness of breath: 4  5) Sleep sitting up: 4  6) Limited enjoyment of life: 3  7) Spend the rest of your life with HF: 1  8a) Hobbies, recreational activities:1  8b) Working or doing household chores:3  8c) Visiting family or friends: 3

## 2024-08-22 NOTE — PROGRESS NOTES
Chief Complaint   Patient presents with    Follow-Up     F/v Dx: Severe aortic stenosis       Jesus Hess is a 84 y.o. female who presents today for 1 month post-TAVR visit.  She is history of severe symptomatic aortic stenosis status post TAVR on 7/15/2024 (complicated by right common femoral artery injury requiring cutdown and surgical repair by Dr. Swan, and postoperative hypotension requiring pressor support).  Additional history of hypertension, asthma-COPD overlap, HLD, stage IIIb CKD, obesity, ROBYN.    She was admitted from 7/15/2024 to 7/23/2024 for scheduled TAVR as described above, discharged to inpatient rehab where she remained until 8/2/2024.    Today 8/22/2024 she has been doing well, she has been followed by physical therapy.  NYHA class II symptoms.  No acute cardiovascular complaints    Past Medical History:   Diagnosis Date    Anesthesia     delayed emergence    Arthritis     Osteo    Asthma     COPD    Breast cancer (HCC)     Breath shortness     2L O2 NOC, PRN During Day    Bronchitis 2011    Cancer (HCC) 12/2012    right breast    Cataract     Bilat IOL    Chronic cough     productive, improving    Cough     Delayed emergence from general anesthesia     Dizziness 03/11/2013    Heart burn     Heart murmur     Hiatus hernia syndrome     High cholesterol     HTN (hypertension) 03/12/2013    Hypercholesterolemia     Hyperlipidemia 03/12/2013    Hypertension     Hypokalemia 03/12/2013    Indigestion     Mitral annular calcification     Pain     back, hips, knees    Pneumonia     Hx of    Pre-diabetes     Psychiatric disorder     depression    Renal disorder     CKD    Severe aortic stenosis 06/25/2024    Sleep apnea     h/o gastric sleeve lost 40 lb now not on CPAP    Snoring     Sputum production     ULCERATIVE COLITIS 03/12/2013    Urinary incontinence     Stress incontinence    Vertigo 03/11/2013     Past Surgical History:   Procedure Laterality Date    TRANSCATHETER AORTIC VALVE  REPLACEMENT Bilateral 7/15/2024    Procedure: TRANSCATHETER AORTIC VALVE REPLACEMENT;  Surgeon: Ciro Luis M.D.;  Location: SURGERY [facility];  Service: Cardiac    ECHOCARDIOGRAM, TRANSESOPHAGEAL, INTRAOPERATIVE N/A 7/15/2024    Procedure: ECHOCARDIOGRAM, TRANSESOPHAGEAL, INTRAOPERATIVE;  Surgeon: Ciro Luis M.D.;  Location: SURGERY [facility];  Service: Cardiac    FEMORAL ARTERY REPAIR Right 7/15/2024    Procedure: REPAIR, ARTERY, FEMORAL WITH PATCH;  Surgeon: Ciro Luis M.D.;  Location: SURGERY [facility];  Service: Cardiac    GASTRIC SLEEVE LAPAROSCOPY N/A 2016    Procedure: GASTRIC SLEEVE LAPAROSCOPY, HIATAL HERNIA AND LIVER BIOPSY;  Surgeon: Mauricio Montes M.D.;  Location: SURGERY [facility];  Service:     US-NEEDLE CORE BX-BREAST PANEL  2013    rt breast, with lumpectomy     CATARACT EXTRACTION WITH IOL      GYN SURGERY      vag hyster     GYN SURGERY      vaginal cyst removal     LUMPECTOMY  left    OTHER       R breast bx X 2& lipoma removal     Family History   Problem Relation Age of Onset    Heart Disease Mother         CAD MI at age 72    Cancer Mother         breast cancer  at age 83    Cancer Sister     Lung Disease Father 75        Lung cancer    Cancer Maternal Grandmother         Pancreatic cancer    Heart Attack Maternal Grandfather         MI at age 70    Heart Disease Sister         Rhuemati cfever- herat murmur     Social History     Socioeconomic History    Marital status:      Spouse name: Not on file    Number of children: 6    Years of education: Not on file    Highest education level: 12th grade   Occupational History    Not on file   Tobacco Use    Smoking status: Former     Current packs/day: 0.00     Average packs/day: 1 pack/day for 20.0 years (20.0 ttl pk-yrs)     Types: Cigarettes     Start date: 1966     Quit date: 1986     Years since quittin.2     Passive exposure: Past    Smokeless tobacco: Never    Tobacco comments:      Quit in    Vaping Use    Vaping status: Never Used   Substance and Sexual Activity    Alcohol use: Yes     Alcohol/week: 4.2 oz     Types: 7 Standard drinks or equivalent per week     Comment: 1 drink a day    Drug use: No    Sexual activity: Not Currently   Other Topics Concern    Not on file   Social History Narrative    She lives alone but has a female roommate who is 60, Leonora Rosa who has a brother in town she is estranged from. Leonora Rosa had Covid  and now has long-haulers and is on medicaid     She has 2 children and 4 stepchildren, her   '. She has a son near [place], [place] and another in [place]. She relies on her 2 grandkids who live in [place] and her 8y younger sister lives in [facility], her older sister lives in a [place] in [place]. 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  then  after 27y. Her ex  her cousin, met her 2nd  and was  for 25y.     Her son supports her keeping Leonora Rosa at her home. She does not feel taken advantage of despite no longer receiving rent.      Social Determinants of Health     Financial Resource Strain: Medium Risk (3/5/2024)    Overall Financial Resource Strain (CARDIA)     Difficulty of Paying Living Expenses: Somewhat hard   Food Insecurity: No Food Insecurity (2024)    Hunger Vital Sign     Worried About Running Out of Food in the Last Year: Never true     Ran Out of Food in the Last Year: Never true   Transportation Needs: No Transportation Needs (2024)    PRAPARE - Transportation     Lack of Transportation (Medical): No     Lack of Transportation (Non-Medical): No   Physical Activity: Inactive (2022)    Exercise Vital Sign     Days of Exercise per Week: 0 days     Minutes of Exercise per Session: 0 min   Stress: Stress Concern Present (2022)    [employer] - Occupational Stress Questionnaire     Feeling of Stress : To some extent    Social Connections: Feeling Socially Integrated (8/7/2024)    OASIS : Social Isolation     Frequency of experiencing loneliness or isolation: Never   Intimate Partner Violence: Not At Risk (7/20/2024)    Humiliation, Afraid, Rape, and Kick questionnaire     Fear of Current or Ex-Partner: No     Emotionally Abused: No     Physically Abused: No     Sexually Abused: No   Housing Stability: Low Risk  (7/20/2024)    Housing Stability Vital Sign     Unable to Pay for Housing in the Last Year: No     Number of Places Lived in the Last Year: 1     Unstable Housing in the Last Year: No     Allergies   Allergen Reactions    Sulfa Drugs Rash and Swelling     Rash, joint swelling  RRT=[id number]    Codeine Vomiting and Nausea    Oxycodone Vomiting and Nausea     .    Vancomycin Itching     Outpatient Encounter Medications as of 8/22/2024   Medication Sig Dispense Refill    apixaban (ELIQUIS) 5mg Tab Take 1 Tablet by mouth 2 times a day. 60 Tablet 11    valsartan (DIOVAN) 80 MG Tab Take 80 mg by mouth every day. Indications: High Blood Pressure Disorder      budesonide (PULMICORT) 0.5 MG/2ML Suspension Take 500 mcg by nebulization every day. Indications: Asthma      Home Care Oxygen Inhale 2 L/min as needed for Shortness of Breath (shortness of breath). Indications: SOB      anastrozole (ARIMIDEX) 1 MG Tab Take 1 Tablet by mouth every morning. 100 Tablet 2    pantoprazole (PROTONIX) 40 MG Tablet Delayed Response Take 1 Tablet by mouth every day. 30 Tablet 2    PARoxetine (PAXIL) 10 MG Tab Take 1 Tablet by mouth every day. 100 Tablet 2    rosuvastatin (CRESTOR) 20 MG Tab Take 1 Tablet by mouth every evening. 100 Tablet 2    spironolactone (ALDACTONE) 25 MG Tab Take 1 Tablet by mouth every day. 30 Tablet 2    TRELEGY ELLIPTA 200-62.5-25 MCG/ACT inhaler Inhale 1 Puff every day. 60 Each 2    traZODone (DESYREL) 50 MG Tab Take 1 Tablet by mouth at bedtime as needed for Sleep. 30 Tablet 3    MAGNESIUM PO Take 420 mg by mouth every  "evening. Indications: supplement      Multiple Vitamins-Minerals (PRESERVISION AREDS 2 PO) Take 1 Tablet by mouth every evening. Indications: supplement      melatonin 5 mg Tab Take 5 mg by mouth every evening as needed. Indications: Trouble Sleeping      [DISCONTINUED] amiodarone (CORDARONE) 200 MG Tab Take 1 Tablet by mouth every day. 90 Tablet 2    [DISCONTINUED] aspirin 81 MG EC tablet Take 1 Tablet by mouth every day. 100 Tablet 2     No facility-administered encounter medications on file as of 8/22/2024.     Review of Systems   Constitutional: Negative.    HENT: Negative.     Eyes: Negative.    Respiratory:  Negative for shortness of breath.    Cardiovascular:  Negative for chest pain, palpitations, orthopnea, leg swelling and PND.   Gastrointestinal: Negative.    Genitourinary: Negative.    Musculoskeletal:  Positive for joint pain.   Skin: Negative.    Neurological: Negative.    Endo/Heme/Allergies: Negative.    Psychiatric/Behavioral:  Negative for depression. The patient is not nervous/anxious.               Objective     /54 (BP Location: Left arm, Patient Position: Sitting, BP Cuff Size: Adult)   Pulse 76   Resp 14   Ht 1.626 m (5' 4\")   Wt 79.8 kg (176 lb)   SpO2 95%   BMI 30.21 kg/m²     Physical Exam  Constitutional:       Appearance: Normal appearance.   HENT:      Head: Normocephalic and atraumatic.   Neck:      Vascular: No JVD.   Cardiovascular:      Rate and Rhythm: Normal rate and regular rhythm.      Pulses: Normal pulses.      Heart sounds: Normal heart sounds. No murmur heard.     No friction rub.      Comments: Groin site is CDI, no bruising, some firmness/scar tissue present  Pulmonary:      Effort: Pulmonary effort is normal. No respiratory distress.      Breath sounds: Normal breath sounds.   Abdominal:      General: There is no distension.      Palpations: Abdomen is soft.   Musculoskeletal:      Right lower leg: No edema.      Left lower leg: No edema.   Skin:     General: " Skin is warm and dry.   Neurological:      General: No focal deficit present.      Mental Status: She is alert and oriented to person, place, and time.   Psychiatric:         Mood and Affect: Mood normal.         Behavior: Behavior normal.            Lab Results   Component Value Date/Time    WBC 5.4 08/01/2024 06:01 AM    RBC 3.34 (L) 08/01/2024 06:01 AM    HEMOGLOBIN 9.1 (L) 08/01/2024 06:01 AM    HEMATOCRIT 29.7 (L) 08/01/2024 06:01 AM    MCV 88.9 08/01/2024 06:01 AM    MCH 27.2 08/01/2024 06:01 AM    MCHC 30.6 (L) 08/01/2024 06:01 AM    MPV 10.2 08/01/2024 06:01 AM    NEUTSPOLYS 73.30 (H) 07/29/2024 06:26 AM    LYMPHOCYTES 15.60 (L) 07/29/2024 06:26 AM    MONOCYTES 7.60 07/29/2024 06:26 AM    EOSINOPHILS 2.40 07/29/2024 06:26 AM    BASOPHILS 0.70 07/29/2024 06:26 AM    HYPOCHROMIA 1+ 05/06/2014 11:52 AM      Lab Results   Component Value Date/Time    CHOLSTRLTOT 175 06/24/2024 12:53 PM    LDL 79 06/24/2024 12:53 PM    HDL 75 06/24/2024 12:53 PM    TRIGLYCERIDE 104 06/24/2024 12:53 PM       Lab Results   Component Value Date/Time    SODIUM 138 08/01/2024 06:01 AM    POTASSIUM 4.5 08/01/2024 06:01 AM    CHLORIDE 104 08/01/2024 06:01 AM    CO2 25 08/01/2024 06:01 AM    GLUCOSE 95 08/01/2024 06:01 AM    BUN 17 08/01/2024 06:01 AM    CREATININE 0.85 08/01/2024 06:01 AM     Lab Results   Component Value Date/Time    ALKPHOSPHAT 55 07/24/2024 05:27 AM    ASTSGOT 14 07/24/2024 05:27 AM    ALTSGPT 8 07/24/2024 05:27 AM    TBILIRUBIN 0.6 07/24/2024 05:27 AM      EKG:  My personal interpretation of the EKG dated 7/18/2024 is   SR LA enlargement, rate 72, 0.160/0.094/0.420     Intraoperative Echocardiogram:    CONCLUSIONS  Intraoperative HEATHER for TAVR procedure  Severe aortic stenosis. Transvalvular gradient and DANIKA difficult to   measure due to poor transgastric views from previous gastric sleeve   procedure. Replaced with 23 Ivan S3 Ultra Resilia valve. Good   prosthetic valvular function.  LV EF 65%. Grade 1 diastolic  dysfunction. Asymetrical septal   hypertrophy with flow acceleration but no notable ANGIE.  Moderate MS and mild MR. Mild TR.  Severe left atrial enlargement.     LTD echocardiogram 7/15/2024  CONCLUSIONS  Limited echocardiogram to assess for pericardial effusion.     The left ventricular ejection fraction is visually estimated to be 65%.  Known TAVR aortic valve that is functioning normally with normal   transvalvular gradients.  Small pericardial effusion without evidence of hemodynamic compromise.      Compared to the prior study on 6/18/2024, there is now interval   placement of bioprosthetic aortic valve.  There is also presence of   small pericardial effusion.     Chest X-Ray 7/16/2024   1.  Mild pulmonary edema and/or infiltrates.  2.  Cardiomegaly  3.  Atherosclerosis    Echocardiogram 8/15/2024  CONCLUSIONS  Hyperdynamic left ventricular systolic function.  Known TAVR aortic valve that is functioning normally with normal   transvalvular gradients.  AV peak velocity 2.6 m/s, AV mean gradient 13.9 mmHg  No evidence of perivalvular leak.  Moderate mitral stenosis.    EKG 8/22/2024  Sinus rhythm at rate of 67, 0.168/0.089/0.381    Assessment & Plan     1. Acute diastolic HF (heart failure) (HCC)        2. Atrial fibrillation, unspecified type (HCC)  apixaban (ELIQUIS) 5mg Tab    Holter Monitor Study      3. S/P TAVR (transcatheter aortic valve replacement)  EKG      4. Common femoral artery injury, right, initial encounter        5. Dyslipidemia        6. Essential hypertension            Medical Decision Making: Today's Assessment/Status/Plan:        S/P TAVR NYHA II  -stable post-op  -Stop aspirin, start Eliquis  -groin site CDI  -cardiac rehab referral placed, patient plans to attend  -EKG shows sinus rhythm     Paroxysmal atrial fibrillation  -She was never started on anticoagulation  -[id number] at least 4  -Start Eliquis 5 mg twice daily   -Stop amiodarone  -Check cardiac event monitor in 1 month    Acute  diastolic HF  -EF 65%  - Diuretic: lasix discontinued, pressures did not tolerate  - valsartan, HCTZ, and spironolactone held     Common Femoral artery injury   -Recheck CBC  -Groin site is CDI appears to have healed nicely    Hypertension  - good control  - continue current regimen  - goal < 130/80     Hyperlipidemia  -Most recent LDL 79  -Continue rosuvastatin 20 mg daily  -Goal of less than 100  -Check lipid panel annually       Follow up with SHARON Naik in 2 months    This note was dictated using Dragon speech recognition software.

## 2024-08-22 NOTE — PATIENT INSTRUCTIONS
Stop amiodarone and aspirin  Start eliquis twice daily  Get heart monitor in 1 month  Get CBC drawn

## 2024-08-23 ENCOUNTER — TELEPHONE (OUTPATIENT)
Dept: CARDIOLOGY | Facility: MEDICAL CENTER | Age: 84
End: 2024-08-23
Payer: MEDICARE

## 2024-08-23 ENCOUNTER — HOME CARE VISIT (OUTPATIENT)
Dept: HOME HEALTH SERVICES | Facility: HOME HEALTHCARE | Age: 84
End: 2024-08-23
Payer: MEDICARE

## 2024-08-23 PROCEDURE — G0495 RN CARE TRAIN/EDU IN HH: HCPCS

## 2024-08-23 ASSESSMENT — FIBROSIS 4 INDEX: FIB4 SCORE: 1.970287827189700383

## 2024-08-23 NOTE — TELEPHONE ENCOUNTER
Caller: Erin Hess    Topic/issue: Patient called to check on the prescription that was sent in for apixaban (ELIQUIS) 5mg Tab.     Please advise.     Callback Number: [phone]    Thank you,     Tala MURPHY

## 2024-08-23 NOTE — TELEPHONE ENCOUNTER
To RX Coordinators: Please assist. Pt waiting for Eliquis to be released to his pharmacy. Thank you.

## 2024-08-23 NOTE — TELEPHONE ENCOUNTER
Caller: Erin Hess    Topic/issue: Patient returning call.    Callback Number: [phone]    Thank you,  Yenni STEEN

## 2024-08-23 NOTE — TELEPHONE ENCOUNTER
Received Renewal PA request via [employer]  for Eliquis 5 mg tablet . (Quantity:180, Day Supply:90)     Insurance: optum  Member ID:  [id number]  BIN: [id number]  PCN: [id number]  Group: [facility]     Ran Test claim via [place] & medication pt can fill 30 days without going into the gap. $47 / 30 days. if pt fills 90 days she will be in the gap. $386.83 / 90 days. Called pt to look into lower copay. LVM . Releasing rx to pharmacy on file

## 2024-08-25 VITALS
OXYGEN SATURATION: 91 % | HEART RATE: 62 BPM | DIASTOLIC BLOOD PRESSURE: 64 MMHG | TEMPERATURE: 97.6 F | BODY MASS INDEX: 30.21 KG/M2 | WEIGHT: 176 LBS | RESPIRATION RATE: 18 BRPM | SYSTOLIC BLOOD PRESSURE: 118 MMHG

## 2024-08-25 VITALS
SYSTOLIC BLOOD PRESSURE: 118 MMHG | RESPIRATION RATE: 18 BRPM | DIASTOLIC BLOOD PRESSURE: 64 MMHG | OXYGEN SATURATION: 91 %

## 2024-08-25 SDOH — ECONOMIC STABILITY: HOUSING INSECURITY: EVIDENCE OF SMOKING MATERIAL: 0

## 2024-08-25 ASSESSMENT — ENCOUNTER SYMPTOMS
FATIGUE: 1
PERSON REPORTING PAIN: PATIENT
LAST BOWEL MOVEMENT: 67074
STOOL FREQUENCY: LESS THAN DAILY
FATIGUES EASILY: 1
DYSPNEA ON EXERTION: 1
CONSTIPATION: 1
DENIES PAIN: 1

## 2024-08-25 ASSESSMENT — ACTIVITIES OF DAILY LIVING (ADL)
BATHING_WITHIN_DEFINED_LIMITS: 1
FEEDING_WITHIN_DEFINED_LIMITS: 1
GROOMING_WITHIN_DEFINED_LIMITS: 1

## 2024-08-25 ASSESSMENT — PATIENT HEALTH QUESTIONNAIRE - PHQ9: CLINICAL INTERPRETATION OF PHQ2 SCORE: 0

## 2024-08-27 ENCOUNTER — HOME CARE VISIT (OUTPATIENT)
Dept: HOME HEALTH SERVICES | Facility: HOME HEALTHCARE | Age: 84
End: 2024-08-27
Payer: MEDICARE

## 2024-08-27 VITALS
OXYGEN SATURATION: 92 % | TEMPERATURE: 98 F | SYSTOLIC BLOOD PRESSURE: 110 MMHG | HEART RATE: 71 BPM | DIASTOLIC BLOOD PRESSURE: 50 MMHG | RESPIRATION RATE: 17 BRPM

## 2024-08-27 PROCEDURE — G0152 HHCP-SERV OF OT,EA 15 MIN: HCPCS

## 2024-08-28 ENCOUNTER — HOME CARE VISIT (OUTPATIENT)
Dept: HOME HEALTH SERVICES | Facility: HOME HEALTHCARE | Age: 84
End: 2024-08-28
Payer: MEDICARE

## 2024-08-28 SDOH — ECONOMIC STABILITY: HOUSING INSECURITY: EVIDENCE OF SMOKING MATERIAL: 0

## 2024-08-28 ASSESSMENT — ENCOUNTER SYMPTOMS
PERSON REPORTING PAIN: PATIENT
DENIES PAIN: 1

## 2024-08-28 ASSESSMENT — ACTIVITIES OF DAILY LIVING (ADL): OASIS_M1830: 03

## 2024-08-28 NOTE — CASE COMMUNICATION
Quality Review Completed for 8/7 OASIS by SVETA Rocha RN on 8/28/2024:     Edits completed by SVETA Rocha RN:     1.  and  diagnosis coding updated per chart review  2.  changed to 8/12 for PT collaboration for  changed to #2  3. Per narrative min/sup level of assist and per EMR uses walker, changed , 1810, 1820, 1845 to 2.  is 3  4.  E is taking Eliquis with indication  5.  changed to 3 per dominick fuller when ambulation is supervised  6. Checked exercises prescribed, walker to Activities Permitted on 485 form.  Checked incontinence to 485 functional limitations.   7.  changed to 6 per therapy  8. Added o2 to the triage code

## 2024-08-29 NOTE — CASE COMMUNICATION
I agree with these changes.    Thank you,  MAJOR Aranda RN.  ----- Message -----  From: Audra Rocha R.N.  Sent: 8/28/2024   3:55 PM PDT  To: Saw Aranda R.N.      Quality Review Completed for 8/7 OASIS by SVETA Rocha RN on 8/28/2024:     Edits completed by SVETA Rocha RN:     1.  and  diagnosis coding updated per chart review  2.  changed to 8/12 for PT collaboration for  changed to #2  3. Per narrative min/sup le aj of assist and per EMR uses walker, changed , 1810, 1820, 1845 to 2.  is 3  4.  E is taking Eliquis with indication  5.  changed to 3 per guidelines when ambulation is supervised  6. Checked exercises prescribed, walker to Activities Permitted on 485 form.  Checked incontinence to 485 functional limitations.   7.  changed to 6 per therapy  8. Added o2 to the triage code

## 2024-09-05 ENCOUNTER — HOME CARE VISIT (OUTPATIENT)
Dept: HOME HEALTH SERVICES | Facility: HOME HEALTHCARE | Age: 84
End: 2024-09-05
Payer: MEDICARE

## 2024-09-05 PROCEDURE — G0151 HHCP-SERV OF PT,EA 15 MIN: HCPCS

## 2024-09-05 ASSESSMENT — FIBROSIS 4 INDEX: FIB4 SCORE: 1.970287827189700383

## 2024-09-08 VITALS
SYSTOLIC BLOOD PRESSURE: 115 MMHG | HEART RATE: 82 BPM | DIASTOLIC BLOOD PRESSURE: 62 MMHG | TEMPERATURE: 97.5 F | HEIGHT: 64 IN | WEIGHT: 175 LBS | BODY MASS INDEX: 29.88 KG/M2 | RESPIRATION RATE: 16 BRPM | OXYGEN SATURATION: 96 %

## 2024-09-08 ASSESSMENT — ENCOUNTER SYMPTOMS
PERSON REPORTING PAIN: PATIENT
DENIES PAIN: 1

## 2024-09-08 ASSESSMENT — ACTIVITIES OF DAILY LIVING (ADL)
OASIS_M1830: 00
HOME_HEALTH_OASIS: 00

## 2024-09-08 ASSESSMENT — PATIENT HEALTH QUESTIONNAIRE - PHQ9: CLINICAL INTERPRETATION OF PHQ2 SCORE: 0

## 2024-09-09 NOTE — CASE COMMUNICATION
noted  ----- Message -----  From: Dimitri Cooper, PT  Sent: 9/8/2024   6:19 PM PDT  To: Gay Mena R.N.; Katharina Zamora R.N.; *      Patient discharged from home health agency effective 09/05/2024 with all goals met.

## 2024-09-16 ENCOUNTER — TELEPHONE (OUTPATIENT)
Dept: CARDIOLOGY | Facility: MEDICAL CENTER | Age: 84
End: 2024-09-16
Payer: MEDICARE

## 2024-09-16 NOTE — TELEPHONE ENCOUNTER
Caller: Erin Hess    Topic/issue: Patient was asking for a call back to see if a form could be filled out for her to allow her to get a handicap placard. Please advise        Callback Number: [phone]      Thank you    -Dejan VIDAL

## 2024-09-16 NOTE — TELEPHONE ENCOUNTER
Phone Number Called: [phone]      Call outcome: Left detailed message for patient. Informed to call back with any additional questions. Advised pt to f/u with PCP.

## 2024-09-23 ENCOUNTER — APPOINTMENT (OUTPATIENT)
Dept: CARDIOLOGY | Facility: MEDICAL CENTER | Age: 84
End: 2024-09-23
Payer: MEDICARE

## 2024-09-23 ENCOUNTER — TELEPHONE (OUTPATIENT)
Dept: CARDIOLOGY | Facility: MEDICAL CENTER | Age: 84
End: 2024-09-23
Payer: MEDICARE

## 2024-09-23 NOTE — TELEPHONE ENCOUNTER
Caller: Erin CHAUHAN Jimena    Topic/issue: MEDICATION MANAGEMENT     Erin states that she needs some adjusting done to her BP medications. She states that she recently had a heart procedure done and now none of her BP meds are working. Please advise.    Thank you,  Dorian VIDAL    Callback Number: [phone] (home)

## 2024-09-24 ENCOUNTER — NON-PROVIDER VISIT (OUTPATIENT)
Dept: CARDIOLOGY | Facility: MEDICAL CENTER | Age: 84
End: 2024-09-24
Payer: MEDICARE

## 2024-09-24 DIAGNOSIS — I48.91 ATRIAL FIBRILLATION, UNSPECIFIED TYPE (HCC): ICD-10-CM

## 2024-09-24 PROCEDURE — 93246 EXT ECG>7D<15D RECORDING: CPT

## 2024-09-24 NOTE — PROGRESS NOTES
Patient enrolled in the 14 day Zio Holter monitor per SHARON Jolley. In clinic hook up, monitor s/n [id number]. Pending EOS.

## 2024-09-25 DIAGNOSIS — J44.89 ASTHMA-COPD OVERLAP SYNDROME (HCC): ICD-10-CM

## 2024-09-25 RX ORDER — ALBUTEROL SULFATE 0.83 MG/ML
2.5 SOLUTION RESPIRATORY (INHALATION) EVERY 4 HOURS PRN
Qty: 450 ML | Refills: 9 | Status: SHIPPED | OUTPATIENT
Start: 2024-09-25

## 2024-09-25 NOTE — TELEPHONE ENCOUNTER
Caller Name: Erin Hess                 Call Back Number: [phone] (home)         Patient approves a detailed voicemail message:     Have we ever prescribed this med? .  If yes, what date?     Last OV: 6/3/24 w/ Dr. Pablo     Next OV: f/u until 12/2024    DX:     Medications:ALBUTEROL SUL 2.5 MG/3 ML SOLN

## 2024-09-26 ENCOUNTER — PATIENT MESSAGE (OUTPATIENT)
Dept: CARDIOLOGY | Facility: MEDICAL CENTER | Age: 84
End: 2024-09-26
Payer: MEDICARE

## 2024-09-26 NOTE — PATIENT COMMUNICATION
Phone number called: [phone]     Call outcome: Voicemail reached. Message left with number provided to return call.

## 2024-09-27 ENCOUNTER — TELEPHONE (OUTPATIENT)
Dept: CARDIOLOGY | Facility: MEDICAL CENTER | Age: 84
End: 2024-09-27
Payer: MEDICARE

## 2024-09-27 NOTE — TELEPHONE ENCOUNTER
Caller: Erin Hess      Topic/issue: Please see patient message from 09/26/24.  Patient returning call, advised that Arin is out of office today.  Informed patient that Arin had also sent message to her thru [employer]. Patient requesting a call back.  Please advise.    Callback Number: [phone]    Thank you,  Pao HENRY

## 2024-09-27 NOTE — TELEPHONE ENCOUNTER
Phone Number Called: [phone]      Call outcome: Spoke to patient regarding message below.    Message: Called to discuss pt's prescription questions. Pt reports taking valsartan-HCTZ 80-12.5. Only Valsartan 80mg prescribed.     This /68. Pt reported taking a quarter of her valsartan-HCTZ. Pt did not check BP after taking. Pt took BP on phone with RN and was 147/69. Pt reports when her BP is in the 130's she feels like she cannot function. Pt thought 130 was low.     She asked to have the medication titrated down due to her low blood pressures. Educated pt on low bp and that her BP is not at goal. Goal <130/80      CW, please advise on medications. She is taking a quarter of valsartan-HCTZ 80-12.5 but it was discontinued during rehab hospitalization 8/2/24. Recent  OV 8/22 medications being held but now BP high. Please advise on medications. ~thank you

## 2024-09-28 NOTE — TELEPHONE ENCOUNTER
I agree she can take a lower dose of the medicine, quarter tablet with a target heart rate of 140 systolic

## 2024-09-30 NOTE — TELEPHONE ENCOUNTER
Caller: Erin Hess      Topic/issue: Patient was returning our call and asked for a call back      Callback Number: [phone]      Thank you    -Dejan VIDAL

## 2024-09-30 NOTE — TELEPHONE ENCOUNTER
Phone Number Called: [phone]      Call outcome: Spoke to patient regarding message below.    Message: Spoke with pt and advised of CW's recommendations. Pt to see  10/24.

## 2024-09-30 NOTE — TELEPHONE ENCOUNTER
Phone Number Called: [phone]      Call outcome: Did not leave a detailed message. Requested patient to call back.

## 2024-10-07 ENCOUNTER — PATIENT MESSAGE (OUTPATIENT)
Dept: SLEEP MEDICINE | Facility: MEDICAL CENTER | Age: 84
End: 2024-10-07
Payer: MEDICARE

## 2024-10-14 ENCOUNTER — TELEPHONE (OUTPATIENT)
Dept: CARDIOLOGY | Facility: MEDICAL CENTER | Age: 84
End: 2024-10-14
Payer: MEDICARE

## 2024-10-24 ENCOUNTER — OFFICE VISIT (OUTPATIENT)
Dept: CARDIOLOGY | Facility: MEDICAL CENTER | Age: 84
End: 2024-10-24
Payer: MEDICARE

## 2024-10-24 VITALS
OXYGEN SATURATION: 93 % | WEIGHT: 176 LBS | BODY MASS INDEX: 30.05 KG/M2 | SYSTOLIC BLOOD PRESSURE: 126 MMHG | RESPIRATION RATE: 16 BRPM | DIASTOLIC BLOOD PRESSURE: 64 MMHG | HEIGHT: 64 IN | HEART RATE: 72 BPM

## 2024-10-24 DIAGNOSIS — I48.91 POSTOPERATIVE ATRIAL FIBRILLATION (HCC): ICD-10-CM

## 2024-10-24 DIAGNOSIS — I70.0 AORTIC ATHEROSCLEROSIS (HCC): ICD-10-CM

## 2024-10-24 DIAGNOSIS — D62 ANEMIA DUE TO ACUTE BLOOD LOSS: ICD-10-CM

## 2024-10-24 DIAGNOSIS — I97.89 POSTOPERATIVE ATRIAL FIBRILLATION (HCC): ICD-10-CM

## 2024-10-24 DIAGNOSIS — I10 ESSENTIAL HYPERTENSION: ICD-10-CM

## 2024-10-24 DIAGNOSIS — Z95.2 S/P TAVR (TRANSCATHETER AORTIC VALVE REPLACEMENT): ICD-10-CM

## 2024-10-24 DIAGNOSIS — E78.5 DYSLIPIDEMIA: ICD-10-CM

## 2024-10-24 DIAGNOSIS — S75.001A: ICD-10-CM

## 2024-10-24 PROBLEM — R53.83 FATIGUE: Status: RESOLVED | Noted: 2017-04-11 | Resolved: 2024-10-24

## 2024-10-24 PROBLEM — I95.81 POSTPROCEDURAL HYPOTENSION: Status: RESOLVED | Noted: 2024-07-15 | Resolved: 2024-10-24

## 2024-10-24 PROBLEM — J96.11 CHRONIC RESPIRATORY FAILURE WITH HYPOXIA (HCC): Status: RESOLVED | Noted: 2023-10-20 | Resolved: 2024-10-24

## 2024-10-24 PROBLEM — I50.31 ACUTE DIASTOLIC HF (HEART FAILURE) (HCC): Status: RESOLVED | Noted: 2024-07-15 | Resolved: 2024-10-24

## 2024-10-24 PROBLEM — Z99.81 DEPENDENCE ON SUPPLEMENTAL OXYGEN: Status: RESOLVED | Noted: 2024-03-05 | Resolved: 2024-10-24

## 2024-10-24 PROCEDURE — 99214 OFFICE O/P EST MOD 30 MIN: CPT

## 2024-10-24 PROCEDURE — 99213 OFFICE O/P EST LOW 20 MIN: CPT

## 2024-10-24 PROCEDURE — 3074F SYST BP LT 130 MM HG: CPT

## 2024-10-24 PROCEDURE — 3078F DIAST BP <80 MM HG: CPT

## 2024-10-24 RX ORDER — ASPIRIN 81 MG/1
81 TABLET, CHEWABLE ORAL DAILY
Qty: 100 TABLET | Refills: 3 | Status: SHIPPED | OUTPATIENT
Start: 2024-10-24

## 2024-10-24 ASSESSMENT — ENCOUNTER SYMPTOMS
PALPITATIONS: 0
DEPRESSION: 0
NERVOUS/ANXIOUS: 0
BACK PAIN: 1
NEUROLOGICAL NEGATIVE: 1
SHORTNESS OF BREATH: 0
GASTROINTESTINAL NEGATIVE: 1
EYES NEGATIVE: 1
ORTHOPNEA: 0
CONSTITUTIONAL NEGATIVE: 1
PND: 0

## 2024-10-24 ASSESSMENT — FIBROSIS 4 INDEX: FIB4 SCORE: 1.970287827189700383

## 2024-10-30 ENCOUNTER — TELEPHONE (OUTPATIENT)
Dept: CARDIOLOGY | Facility: MEDICAL CENTER | Age: 84
End: 2024-10-30
Payer: MEDICARE

## 2024-10-30 ENCOUNTER — HOSPITAL ENCOUNTER (OUTPATIENT)
Dept: LAB | Facility: MEDICAL CENTER | Age: 84
End: 2024-10-30
Payer: MEDICARE

## 2024-10-30 DIAGNOSIS — D62 ANEMIA DUE TO ACUTE BLOOD LOSS: ICD-10-CM

## 2024-10-30 LAB
ERYTHROCYTE [DISTWIDTH] IN BLOOD BY AUTOMATED COUNT: 43.8 FL (ref 35.9–50)
HCT VFR BLD AUTO: 35.1 % (ref 37–47)
HGB BLD-MCNC: 10.6 G/DL (ref 12–16)
MCH RBC QN AUTO: 23.8 PG (ref 27–33)
MCHC RBC AUTO-ENTMCNC: 30.2 G/DL (ref 32.2–35.5)
MCV RBC AUTO: 78.9 FL (ref 81.4–97.8)
PLATELET # BLD AUTO: 176 K/UL (ref 164–446)
PMV BLD AUTO: 10.5 FL (ref 9–12.9)
RBC # BLD AUTO: 4.45 M/UL (ref 4.2–5.4)
WBC # BLD AUTO: 5.6 K/UL (ref 4.8–10.8)

## 2024-10-30 PROCEDURE — 85027 COMPLETE CBC AUTOMATED: CPT

## 2024-10-30 PROCEDURE — 36415 COLL VENOUS BLD VENIPUNCTURE: CPT

## 2024-12-18 ENCOUNTER — OFFICE VISIT (OUTPATIENT)
Dept: SLEEP MEDICINE | Facility: MEDICAL CENTER | Age: 84
End: 2024-12-18
Attending: INTERNAL MEDICINE
Payer: MEDICARE

## 2024-12-18 VITALS
DIASTOLIC BLOOD PRESSURE: 60 MMHG | SYSTOLIC BLOOD PRESSURE: 122 MMHG | HEIGHT: 64 IN | WEIGHT: 175 LBS | BODY MASS INDEX: 29.88 KG/M2 | HEART RATE: 76 BPM | OXYGEN SATURATION: 91 %

## 2024-12-18 DIAGNOSIS — J44.89 ASTHMA-COPD OVERLAP SYNDROME (HCC): ICD-10-CM

## 2024-12-18 DIAGNOSIS — J47.9 BRONCHIECTASIS WITHOUT COMPLICATION (HCC): ICD-10-CM

## 2024-12-18 DIAGNOSIS — R05.3 CHRONIC COUGH: ICD-10-CM

## 2024-12-18 PROCEDURE — 99213 OFFICE O/P EST LOW 20 MIN: CPT | Performed by: INTERNAL MEDICINE

## 2024-12-18 PROCEDURE — 3074F SYST BP LT 130 MM HG: CPT | Performed by: INTERNAL MEDICINE

## 2024-12-18 PROCEDURE — 99215 OFFICE O/P EST HI 40 MIN: CPT | Performed by: INTERNAL MEDICINE

## 2024-12-18 PROCEDURE — 3078F DIAST BP <80 MM HG: CPT | Performed by: INTERNAL MEDICINE

## 2024-12-18 ASSESSMENT — ENCOUNTER SYMPTOMS
SPUTUM PRODUCTION: 1
VOMITING: 0
EYE DISCHARGE: 0
NAUSEA: 0
FALLS: 0
SHORTNESS OF BREATH: 0
COUGH: 1
HEMOPTYSIS: 0
FEVER: 0
FOCAL WEAKNESS: 0
CHILLS: 0

## 2024-12-18 ASSESSMENT — FIBROSIS 4 INDEX: FIB4 SCORE: 2.31

## 2024-12-18 NOTE — PROGRESS NOTES
Pulmonary Clinic Note    Chief Complaint:  Chief Complaint   Patient presents with    Follow-Up     Asthma-COPD overlap syndrome. Last seen 6/03/24       HPI:     Erin Hess is a very pleasant 84 y.o. 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     2023: FVC 2.97 L, 114% predicted ; FEV1 1.88L, 95% predicted; ratio 63%.  , no BD response. DLCO 95%  2016: FVC 2.11 L, 96% predicted; FEV1 2.11 L, 96% predicted; ratio 74% ++ BD response    HRCT 2023  reticular/fibrotic opacification with mild bronchiectasis in the RLL; sputum cultures demonstrated no growth. Large hiatial hernia noted.  Repeat CT chest in 7/2024 shows no interval change.    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    PMH:   S/p gastric sleeve, GERD, large hiatal hernia  Hypertension- spironolactone, HCTZ, losartan  Atrial fibrillation- amiodarone  TAVR/HFrEF- last EF following TAVR = 75%    Interval events since last clinic visit in 6/2024:  She underwent a TAVR in 7/2024, postoperative course complicated by right femoral injury requiring vascular repair and recovery at [facility].    On last clinic visit her chief complaint was a persistent cough productive of yellow sputum.  This was felt to be multifactorial due to underlying COPD/asthma, bronchiectasis, and severe sinus issues.  She started a daily sinus hygiene regimen with NeilMed sinus rinse which has helped her sinus congestion significantly however her cough has persisted.  She has continued with Trelegy 200, DuoNebs, OTC antihistamine.    Today on further questioning she endorses food getting stuck in her throat and uncontrolled GERD.  In the context of her large hiatal hernia and right lower lobe bronchiectasis, significant concern for chronic silent aspiration.    Past Medical History:   Diagnosis Date     Anesthesia     delayed emergence    Arthritis     Osteo    Asthma     COPD    Breast cancer (HCC)     Breath shortness     2L O2 NOC, PRN During Day    Bronchitis 2011    Cancer (HCC) 12/2012    right breast    Cataract     Bilat IOL    Chronic cough     productive, improving    Cough     Delayed emergence from general anesthesia     Dizziness 03/11/2013    Heart burn     Heart murmur     Hiatus hernia syndrome     High cholesterol     HTN (hypertension) 03/12/2013    Hypercholesterolemia     Hyperlipidemia 03/12/2013    Hypertension     Hypokalemia 03/12/2013    Indigestion     Mitral annular calcification     Pain     back, hips, knees    Pneumonia     Hx of    Pre-diabetes     Psychiatric disorder     depression    Renal disorder     CKD    Severe aortic stenosis 06/25/2024    Sleep apnea     h/o gastric sleeve lost 40 lb now not on CPAP    Snoring     Sputum production     ULCERATIVE COLITIS 03/12/2013    Urinary incontinence     Stress incontinence    Vertigo 03/11/2013       Past Surgical History:   Procedure Laterality Date    TRANSCATHETER AORTIC VALVE REPLACEMENT Bilateral 7/15/2024    Procedure: TRANSCATHETER AORTIC VALVE REPLACEMENT;  Surgeon: Ciro Luis M.D.;  Location: [facility];  Service: Cardiac    ECHOCARDIOGRAM, TRANSESOPHAGEAL, INTRAOPERATIVE N/A 7/15/2024    Procedure: ECHOCARDIOGRAM, TRANSESOPHAGEAL, INTRAOPERATIVE;  Surgeon: Ciro Luis M.D.;  Location: SURGERY [facility];  Service: Cardiac    FEMORAL ARTERY REPAIR Right 7/15/2024    Procedure: REPAIR, ARTERY, FEMORAL WITH PATCH;  Surgeon: Ciro Luis M.D.;  Location: SURGERY [facility];  Service: Cardiac    GASTRIC SLEEVE LAPAROSCOPY N/A 05/18/2016    Procedure: GASTRIC SLEEVE LAPAROSCOPY, HIATAL HERNIA AND LIVER BIOPSY;  Surgeon: Mauricio Montes M.D.;  Location: [facility];  Service:     US-NEEDLE CORE BX-BREAST PANEL  01/01/2013    rt breast, with lumpectomy     CATARACT EXTRACTION WITH IOL      GYN SURGERY       vag hyster     GYN SURGERY      vaginal cyst removal     LUMPECTOMY  left    OTHER       R breast bx X 2& lipoma removal       Social History     Socioeconomic History    Marital status:      Spouse name: Not on file    Number of children: 6    Years of education: Not on file    Highest education level: 12th grade   Occupational History    Not on file   Tobacco Use    Smoking status: Former     Current packs/day: 0.00     Average packs/day: 1 pack/day for 20.0 years (20.0 ttl pk-yrs)     Types: Cigarettes     Start date: 1966     Quit date: 1986     Years since quittin.6     Passive exposure: Past    Smokeless tobacco: Never    Tobacco comments:     Quit in    Vaping Use    Vaping status: Never Used   Substance and Sexual Activity    Alcohol use: Yes     Alcohol/week: 4.2 oz     Types: 7 Standard drinks or equivalent per week     Comment: 1 drink a day    Drug use: No    Sexual activity: Not Currently   Other Topics Concern    Not on file   Social History Narrative    She lives alone but has a female roommate who is 60, Leonora Rosa who has a brother in town she is estranged from. Leonora Rosa had Covid  and now has long-haulers and is on medicaid     She has 2 children and 4 stepchildren, her   '. She has a son near [place], [place] and another in [place]. She relies on her 2 grandkids who live in [place] and her 8y younger sister lives in [facility], her older sister lives in a prison in [place]. 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  then  after 27y. Her ex  her cousin, met her 2nd  and was  for 25y.     Her son supports her keeping Leonora Rosa at her home. She does not feel taken advantage of despite no longer receiving rent.      Social Drivers of Health     Financial Resource Strain: Medium Risk (3/5/2024)    Overall Financial Resource Strain (CARDIA)     Difficulty of Paying Living Expenses: Somewhat  hard   Food Insecurity: No Food Insecurity (2024)    Hunger Vital Sign     Worried About Running Out of Food in the Last Year: Never true     Ran Out of Food in the Last Year: Never true   Transportation Needs: No Transportation Needs (2024)    PRAPARE - Transportation     Lack of Transportation (Medical): No     Lack of Transportation (Non-Medical): No   Physical Activity: Inactive (2022)    Exercise Vital Sign     Days of Exercise per Week: 0 days     Minutes of Exercise per Session: 0 min   Stress: Stress Concern Present (2022)    [employer] - Occupational Stress Questionnaire     Feeling of Stress : To some extent   Social Connections: Feeling Socially Integrated (2024)    OASIS : Social Isolation     Frequency of experiencing loneliness or isolation: Never   Intimate Partner Violence: Not At Risk (2024)    Humiliation, Afraid, Rape, and Kick questionnaire     Fear of Current or Ex-Partner: No     Emotionally Abused: No     Physically Abused: No     Sexually Abused: No   Housing Stability: Low Risk  (2024)    Housing Stability Vital Sign     Unable to Pay for Housing in the Last Year: No     Number of Places Lived in the Last Year: 1     Unstable Housing in the Last Year: No          Family History   Problem Relation Age of Onset    Heart Disease Mother         CAD MI at age 72    Cancer Mother         breast cancer  at age 83    Cancer Sister     Lung Disease Father 75        Lung cancer    Cancer Maternal Grandmother         Pancreatic cancer    Heart Attack Maternal Grandfather         MI at age 70    Heart Disease Sister         Rhuemati cfever- herat murmur       Current Outpatient Medications on File Prior to Visit   Medication Sig Dispense Refill    aspirin (ASA) 81 MG Chew Tab chewable tablet Chew 1 Tablet every day. 100 Tablet 3    albuterol (PROVENTIL) 2.5mg/3ml Nebu Soln solution for nebulization TAKE 3 ML BY NEBULIZATION EVERY  FOUR HOURS AS NEEDED FOR SHORTNESS OF BREATH. 450 mL 9    valsartan (DIOVAN) 80 MG Tab Take 80 mg by mouth every day. Indications: High Blood Pressure Disorder      budesonide (PULMICORT) 0.5 MG/2ML Suspension Take 500 mcg by nebulization every day. Indications: Asthma      Home Care Oxygen Inhale 2 L/min as needed for Shortness of Breath (shortness of breath). Indications: SOB      anastrozole (ARIMIDEX) 1 MG Tab Take 1 Tablet by mouth every morning. 100 Tablet 2    pantoprazole (PROTONIX) 40 MG Tablet Delayed Response Take 1 Tablet by mouth every day. 30 Tablet 2    PARoxetine (PAXIL) 10 MG Tab Take 1 Tablet by mouth every day. 100 Tablet 2    rosuvastatin (CRESTOR) 20 MG Tab Take 1 Tablet by mouth every evening. 100 Tablet 2    spironolactone (ALDACTONE) 25 MG Tab Take 1 Tablet by mouth every day. 30 Tablet 2    TRELEGY ELLIPTA 200-62.5-25 MCG/ACT inhaler Inhale 1 Puff every day. 60 Each 2    MAGNESIUM PO Take 420 mg by mouth every evening. Indications: supplement      Multiple Vitamins-Minerals (PRESERVISION AREDS 2 PO) Take 1 Tablet by mouth every evening. Indications: supplement      melatonin 5 mg Tab Take 5 mg by mouth every evening as needed. Indications: Trouble Sleeping      traZODone (DESYREL) 50 MG Tab Take 1 Tablet by mouth at bedtime as needed for Sleep. 30 Tablet 3     No current facility-administered medications on file prior to visit.       Allergies: Sulfa drugs, Vancomycin, and Codeine      ROS:   Review of Systems   Constitutional:  Negative for chills and fever.   HENT:  Positive for congestion. Negative for hearing loss.    Eyes:  Negative for discharge.   Respiratory:  Positive for cough and sputum production. Negative for hemoptysis and shortness of breath.    Cardiovascular:  Negative for chest pain.   Gastrointestinal:  Negative for nausea and vomiting.   Musculoskeletal:  Negative for falls.   Skin:  Negative for rash.   Neurological:  Negative for focal weakness.       Vitals:  /60  "(BP Location: Left arm, Patient Position: Sitting, BP Cuff Size: Adult)   Pulse 76   Ht 1.626 m (5' 4\")   Wt 79.4 kg (175 lb)   SpO2 91%     Physical Exam:  Physical Exam  Vitals and nursing note reviewed.   Constitutional:       General: She is not in acute distress.     Appearance: Normal appearance. She is not ill-appearing or toxic-appearing.   HENT:      Head: Normocephalic and atraumatic.      Nose: Nose normal.      Mouth/Throat:      Mouth: Mucous membranes are moist.   Eyes:      General: No scleral icterus.     Conjunctiva/sclera: Conjunctivae normal.   Cardiovascular:      Rate and Rhythm: Normal rate and regular rhythm.   Pulmonary:      Effort: Pulmonary effort is normal. No respiratory distress.      Breath sounds: No wheezing, rhonchi or rales.   Abdominal:      Palpations: Abdomen is soft.   Musculoskeletal:         General: No deformity or signs of injury. Normal range of motion.      Cervical back: Normal range of motion.   Skin:     General: Skin is warm and dry.   Neurological:      General: No focal deficit present.      Mental Status: She is alert. Mental status is at baseline.   Psychiatric:         Mood and Affect: Mood normal.         Behavior: Behavior normal.       Data:    PFTs as reviewed by me personally show:  See HPI    Imaging as reviewed by me personally show:    See HPI    Assessment/Plan:    1. Chronic cough  Referral to Speech Therapy    Clinical Swallow Evaluation and Treat    DX-ESOPHAGUS - [id number]      2. Bronchiectasis without complication (HCC)  Referral to Speech Therapy    Clinical Swallow Evaluation and Treat    DX-ESOPHAGUS - [id number]      3. Asthma-COPD overlap syndrome (HCC)          Suspect due to both anterograde and retrograde aspiration as she endorses a sensation of food getting stuck in her throat as well as uncontrolled reflux despite being on PPI.  CT also demonstrates large hiatal hernia (history of gastric sleeve) and primarily right lower lobe " bronchiectasis, which is also suggestive of chronic aspiration.  Referral for SLP for evaluation and MBSS.  Patient to follow-up with gastroenterologist at [facility] with regards to her large hiatal hernia.  Continue PPI.  Otherwise continue daily sinus hygiene regimen.  Due to problem #1.  Plan of care as outlined above.  Mild obstruction on PFTs.  Stable.  Continue Trelegy 200.  Rinse mouth after use.    Return in about 3 months (around 3/18/2025).     This note was generated using voice recognition software which has a chance of producing errors of grammar and possibly content.  I have made every reasonable attempt to find and correct any obvious errors, but it should be expected that some may not be found prior to finalization of this note.    Time spent in record review prior to patient arrival, reviewing results, and in face-to-face encounter totaled 45 min, excluding any procedures if performed.  __________  Markell Pablo MD  Pulmonary and Critical Care Medicine  [facility]

## 2024-12-19 ENCOUNTER — OFFICE VISIT (OUTPATIENT)
Dept: CARDIOLOGY | Facility: MEDICAL CENTER | Age: 84
End: 2024-12-19
Attending: INTERNAL MEDICINE
Payer: MEDICARE

## 2024-12-19 VITALS
SYSTOLIC BLOOD PRESSURE: 120 MMHG | BODY MASS INDEX: 30.05 KG/M2 | HEIGHT: 64 IN | OXYGEN SATURATION: 93 % | WEIGHT: 176 LBS | DIASTOLIC BLOOD PRESSURE: 56 MMHG | HEART RATE: 76 BPM

## 2024-12-19 DIAGNOSIS — Z95.2 S/P TAVR (TRANSCATHETER AORTIC VALVE REPLACEMENT): ICD-10-CM

## 2024-12-19 DIAGNOSIS — I48.91 POSTOPERATIVE ATRIAL FIBRILLATION (HCC): ICD-10-CM

## 2024-12-19 DIAGNOSIS — I97.89 POSTOPERATIVE ATRIAL FIBRILLATION (HCC): ICD-10-CM

## 2024-12-19 DIAGNOSIS — I10 ESSENTIAL HYPERTENSION: ICD-10-CM

## 2024-12-19 DIAGNOSIS — I70.0 AORTIC ATHEROSCLEROSIS (HCC): ICD-10-CM

## 2024-12-19 DIAGNOSIS — E78.5 DYSLIPIDEMIA: ICD-10-CM

## 2024-12-19 PROCEDURE — 3078F DIAST BP <80 MM HG: CPT | Performed by: INTERNAL MEDICINE

## 2024-12-19 PROCEDURE — 99213 OFFICE O/P EST LOW 20 MIN: CPT | Performed by: INTERNAL MEDICINE

## 2024-12-19 PROCEDURE — 99214 OFFICE O/P EST MOD 30 MIN: CPT | Performed by: INTERNAL MEDICINE

## 2024-12-19 PROCEDURE — 3074F SYST BP LT 130 MM HG: CPT | Performed by: INTERNAL MEDICINE

## 2024-12-19 PROCEDURE — G2211 COMPLEX E/M VISIT ADD ON: HCPCS | Performed by: INTERNAL MEDICINE

## 2024-12-19 RX ORDER — VALSARTAN 40 MG/1
40 TABLET ORAL 2 TIMES DAILY
Qty: 180 TABLET | Refills: 3 | Status: SHIPPED | OUTPATIENT
Start: 2024-12-19

## 2024-12-19 ASSESSMENT — FIBROSIS 4 INDEX: FIB4 SCORE: 2.31

## 2024-12-19 NOTE — PROGRESS NOTES
Chief Complaint   Patient presents with    Hypertension    Coronary Artery Disease     F/V Dx: Coronary artery disease due to lipid rich plaque      Aortic Stenosis     F/V Dx: Moderate aortic stenosis       Subjective     Erin Hess is a 84 y.o. female who presents today with severe AS s/p TAVR complicated byu femoral artery injury has some right leg wekaness    Doing okay doesn't feel up      Past Medical History:   Diagnosis Date    Anesthesia     delayed emergence    Arthritis     Osteo    Asthma     COPD    Breast cancer (HCC)     Breath shortness     2L O2 NOC, PRN During Day    Bronchitis 2011    Cancer (HCC) 12/2012    right breast    Cataract     Bilat IOL    Chronic cough     productive, improving    Cough     Delayed emergence from general anesthesia     Dizziness 03/11/2013    Heart burn     Heart murmur     Hiatus hernia syndrome     High cholesterol     HTN (hypertension) 03/12/2013    Hypercholesterolemia     Hyperlipidemia 03/12/2013    Hypertension     Hypokalemia 03/12/2013    Indigestion     Mitral annular calcification     Pain     back, hips, knees    Pneumonia     Hx of    Pre-diabetes     Psychiatric disorder     depression    Renal disorder     CKD    Severe aortic stenosis 06/25/2024    Sleep apnea     h/o gastric sleeve lost 40 lb now not on CPAP    Snoring     Sputum production     ULCERATIVE COLITIS 03/12/2013    Urinary incontinence     Stress incontinence    Vertigo 03/11/2013     Past Surgical History:   Procedure Laterality Date    TRANSCATHETER AORTIC VALVE REPLACEMENT Bilateral 7/15/2024    Procedure: TRANSCATHETER AORTIC VALVE REPLACEMENT;  Surgeon: Ciro Luis M.D.;  Location: SURGERY [facility];  Service: Cardiac    ECHOCARDIOGRAM, TRANSESOPHAGEAL, INTRAOPERATIVE N/A 7/15/2024    Procedure: ECHOCARDIOGRAM, TRANSESOPHAGEAL, INTRAOPERATIVE;  Surgeon: Ciro Luis M.D.;  Location: SURGERY [facility];  Service: Cardiac    FEMORAL ARTERY REPAIR Right 7/15/2024     Procedure: REPAIR, ARTERY, FEMORAL WITH PATCH;  Surgeon: Ciro Luis M.D.;  Location: SURGERY [facility];  Service: Cardiac    GASTRIC SLEEVE LAPAROSCOPY N/A 2016    Procedure: GASTRIC SLEEVE LAPAROSCOPY, HIATAL HERNIA AND LIVER BIOPSY;  Surgeon: Mauricio Montes M.D.;  Location: SURGERY [facility];  Service:     US-NEEDLE CORE BX-BREAST PANEL  2013    rt breast, with lumpectomy     CATARACT EXTRACTION WITH IOL      GYN SURGERY      vag hyster     GYN SURGERY      vaginal cyst removal     LUMPECTOMY  left    OTHER       R breast bx X 2& lipoma removal     Family History   Problem Relation Age of Onset    Heart Disease Mother         CAD MI at age 72    Cancer Mother         breast cancer  at age 83    Cancer Sister     Lung Disease Father 75        Lung cancer    Cancer Maternal Grandmother         Pancreatic cancer    Heart Attack Maternal Grandfather         MI at age 70    Heart Disease Sister         Rhuemati cfever- herat murmur     Social History     Socioeconomic History    Marital status:      Spouse name: Not on file    Number of children: 6    Years of education: Not on file    Highest education level: 12th grade   Occupational History    Not on file   Tobacco Use    Smoking status: Former     Current packs/day: 0.00     Average packs/day: 1 pack/day for 20.0 years (20.0 ttl pk-yrs)     Types: Cigarettes     Start date: 1966     Quit date: 1986     Years since quittin.6     Passive exposure: Past    Smokeless tobacco: Never    Tobacco comments:     Quit in    Vaping Use    Vaping status: Never Used   Substance and Sexual Activity    Alcohol use: Yes     Alcohol/week: 4.2 oz     Types: 7 Shots of liquor per week     Comment: 1 drink a day    Drug use: No    Sexual activity: Not Currently   Other Topics Concern    Not on file   Social History Narrative    She lives alone but has a female roommate who is 60, Leonora Rosa who has a brother in town she is  estranged from. Leonora Rosa had Covid  and now has long-haulers and is on medicaid     She has 2 children and 4 stepchildren, her   '. She has a son near [place], [place] and another in [place]. She relies on her 2 grandkids who live in [place] and her 8y younger sister lives in [facility], her older sister lives in a longterm in [place]. 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  then  after 27y. Her ex  her cousin, met her 2nd  and was  for 25y.     Her son supports her keeping Leonora Rosa at her home. She does not feel taken advantage of despite no longer receiving rent.      Social Drivers of Health     Financial Resource Strain: Medium Risk (3/5/2024)    Overall Financial Resource Strain (CARDIA)     Difficulty of Paying Living Expenses: Somewhat hard   Food Insecurity: No Food Insecurity (2024)    Hunger Vital Sign     Worried About Running Out of Food in the Last Year: Never true     Ran Out of Food in the Last Year: Never true   Transportation Needs: No Transportation Needs (2024)    PRAPARE - Transportation     Lack of Transportation (Medical): No     Lack of Transportation (Non-Medical): No   Physical Activity: Inactive (2022)    Exercise Vital Sign     Days of Exercise per Week: 0 days     Minutes of Exercise per Session: 0 min   Stress: Stress Concern Present (2022)    [facility] - Occupational Stress Questionnaire     Feeling of Stress : To some extent   Social Connections: Feeling Socially Integrated (2024)    OASIS : Social Isolation     Frequency of experiencing loneliness or isolation: Never   Intimate Partner Violence: Not At Risk (2024)    Humiliation, Afraid, Rape, and Kick questionnaire     Fear of Current or Ex-Partner: No     Emotionally Abused: No     Physically Abused: No     Sexually Abused: No   Housing Stability: Low Risk  (2024)    Housing  Stability Vital Sign     Unable to Pay for Housing in the Last Year: No     Number of Places Lived in the Last Year: 1     Unstable Housing in the Last Year: No     Allergies   Allergen Reactions    Sulfa Drugs Rash and Swelling     Rash, joint swelling  [id number]    Codeine Vomiting and Nausea    Vancomycin Itching     Outpatient Encounter Medications as of 12/19/2024   Medication Sig Dispense Refill    aspirin (ASA) 81 MG Chew Tab chewable tablet Chew 1 Tablet every day. 100 Tablet 3    albuterol (PROVENTIL) 2.5mg/3ml Nebu Soln solution for nebulization TAKE 3 ML BY NEBULIZATION EVERY FOUR HOURS AS NEEDED FOR SHORTNESS OF BREATH. 450 mL 9    valsartan (DIOVAN) 80 MG Tab Take 80 mg by mouth every day. Indications: High Blood Pressure Disorder      Home Care Oxygen Inhale 2 L/min as needed for Shortness of Breath (shortness of breath). Indications: SOB      anastrozole (ARIMIDEX) 1 MG Tab Take 1 Tablet by mouth every morning. 100 Tablet 2    pantoprazole (PROTONIX) 40 MG Tablet Delayed Response Take 1 Tablet by mouth every day. 30 Tablet 2    PARoxetine (PAXIL) 10 MG Tab Take 1 Tablet by mouth every day. 100 Tablet 2    rosuvastatin (CRESTOR) 20 MG Tab Take 1 Tablet by mouth every evening. 100 Tablet 2    spironolactone (ALDACTONE) 25 MG Tab Take 1 Tablet by mouth every day. 30 Tablet 2    TRELEGY ELLIPTA 200-62.5-25 MCG/ACT inhaler Inhale 1 Puff every day. 60 Each 2    MAGNESIUM PO Take 420 mg by mouth every evening. Indications: supplement      Multiple Vitamins-Minerals (PRESERVISION AREDS 2 PO) Take 1 Tablet by mouth every evening. Indications: supplement      melatonin 5 mg Tab Take 5 mg by mouth every evening as needed. Indications: Trouble Sleeping      [DISCONTINUED] budesonide (PULMICORT) 0.5 MG/2ML Suspension Take 500 mcg by nebulization every day. Indications: Asthma      traZODone (DESYREL) 50 MG Tab Take 1 Tablet by mouth at bedtime as needed for Sleep. 30 Tablet 3     No facility-administered encounter  "medications on file as of 12/19/2024.     ROS           Objective     /56 (BP Location: Left arm, Patient Position: Sitting, BP Cuff Size: Adult)   Pulse 76   Ht 1.626 m (5' 4\")   Wt 79.8 kg (176 lb)   SpO2 93%   BMI 30.21 kg/m²     Physical Exam  Constitutional:       General: She is not in acute distress.     Appearance: She is not diaphoretic.   Eyes:      General: No scleral icterus.  Neck:      Vascular: No JVD.   Cardiovascular:      Rate and Rhythm: Normal rate.      Heart sounds: Normal heart sounds. No murmur heard.     No friction rub. No gallop.   Pulmonary:      Effort: No respiratory distress.      Breath sounds: No wheezing or rales.   Abdominal:      General: Bowel sounds are normal.      Palpations: Abdomen is soft.   Musculoskeletal:      Right lower leg: No edema.      Left lower leg: No edema.   Skin:     Findings: No rash.   Neurological:      Mental Status: She is alert. Mental status is at baseline.   Psychiatric:         Mood and Affect: Mood normal.              We reviewed in person the most recent labs  Recent Results (from the past 30 weeks)   EC-ECHOCARDIOGRAM COMPLETE W/O CONT    Collection Time: 06/18/24  2:32 PM   Result Value Ref Range    Eject.Frac. MOD BP 56.87     Eject.Frac. MOD 4C 55.59     Eject.Frac. MOD 2C 60.26     Left Ventrical Ejection Fraction 60    CBC WITH DIFFERENTIAL    Collection Time: 06/24/24 12:53 PM   Result Value Ref Range    WBC 5.5 4.8 - 10.8 K/uL    RBC 5.06 4.20 - 5.40 M/uL    Hemoglobin 13.9 12.0 - 16.0 g/dL    Hematocrit 43.8 37.0 - 47.0 %    MCV 86.6 81.4 - 97.8 fL    MCH 27.5 27.0 - 33.0 pg    MCHC 31.7 (L) 32.2 - 35.5 g/dL    RDW 43.5 35.9 - 50.0 fL    Platelet Count 197 164 - 446 K/uL    MPV 10.5 9.0 - 12.9 fL    Neutrophils-Polys 63.30 44.00 - 72.00 %    Lymphocytes 24.60 22.00 - 41.00 %    Monocytes 9.00 0.00 - 13.40 %    Eosinophils 2.00 0.00 - 6.90 %    Basophils 0.70 0.00 - 1.80 %    Immature Granulocytes 0.40 0.00 - 0.90 %    Nucleated " RBC 0.00 0.00 - 0.20 /100 WBC    Neutrophils (Absolute) 3.50 1.82 - 7.42 K/uL    Lymphs (Absolute) 1.36 1.00 - 4.80 K/uL    Monos (Absolute) 0.50 0.00 - 0.85 K/uL    Eos (Absolute) 0.11 0.00 - 0.51 K/uL    Baso (Absolute) 0.04 0.00 - 0.12 K/uL    Immature Granulocytes (abs) 0.02 0.00 - 0.11 K/uL    NRBC (Absolute) 0.00 K/uL   Comp Metabolic Panel    Collection Time: 06/24/24 12:53 PM   Result Value Ref Range    Sodium 141 135 - 145 mmol/L    Potassium 4.3 3.6 - 5.5 mmol/L    Chloride 105 96 - 112 mmol/L    Co2 26 20 - 33 mmol/L    Anion Gap 10.0 7.0 - 16.0    Glucose 100 (H) 65 - 99 mg/dL    Bun 19 8 - 22 mg/dL    Creatinine 0.97 0.50 - 1.40 mg/dL    Calcium 10.1 8.4 - 10.2 mg/dL    Correct Calcium 10.1 8.5 - 10.5 mg/dL    AST(SGOT) 14 12 - 45 U/L    ALT(SGPT) 10 2 - 50 U/L    Alkaline Phosphatase 71 30 - 99 U/L    Total Bilirubin 0.6 0.1 - 1.5 mg/dL    Albumin 4.0 3.2 - 4.9 g/dL    Total Protein 7.4 6.0 - 8.2 g/dL    Globulin 3.4 1.9 - 3.5 g/dL    A-G Ratio 1.2 g/dL   HEMOGLOBIN A1C    Collection Time: 06/24/24 12:53 PM   Result Value Ref Range    Glycohemoglobin 5.9 (H) 4.0 - 5.6 %    Est Avg Glucose 123 mg/dL   Lipid Profile    Collection Time: 06/24/24 12:53 PM   Result Value Ref Range    Cholesterol,Tot 175 100 - 199 mg/dL    Triglycerides 104 0 - 149 mg/dL    HDL 75 >=40 mg/dL    LDL 79 <100 mg/dL   URINALYSIS    Collection Time: 06/24/24 12:53 PM   Result Value Ref Range    Color Yellow     Character Clear     Specific Gravity 1.015 <1.035    Ph 6.5 5.0 - 8.0    Glucose Negative Negative mg/dL    Ketones Negative Negative mg/dL    Protein Negative Negative mg/dL    Bilirubin Negative Negative    Nitrite Negative Negative    Leukocyte Esterase Trace (A) Negative    Occult Blood Trace (A) Negative    Micro Urine Req Microscopic    FASTING STATUS    Collection Time: 06/24/24 12:53 PM   Result Value Ref Range    Fasting Status Fasting    ESTIMATED GFR    Collection Time: 06/24/24 12:53 PM   Result Value Ref Range     GFR (CKD-EPI) 58 (A) >60 mL/min/1.73 m 2   URINE MICROSCOPIC (W/UA)    Collection Time: 24 12:53 PM   Result Value Ref Range    WBC 2-5 /hpf    RBC 0-2 /hpf    Bacteria Few (A) None /hpf    Epithelial Cells Few Few /hpf   EKG    Collection Time: 24 10:26 AM   Result Value Ref Range    Report       [facility]    Test Date:  2024  Pt Name:    ROSIO SHAFER                  Department: [facility]  MRN:        [id number]                      Room:  Gender:     Female                       Technician:   :        1940                   Requested By:JESSICA PAREKH  Order #:    [id number]                    Reading MD: Jessica Parekh MD    Measurements  Intervals                                Axis  Rate:       68                           P:          80  TX:         162                          QRS:        68  QRSD:       95                           T:          34  QT:         395  QTc:        421    Interpretive Statements  Sinus rhythm  Biatrial enlargement  Electronically Signed On 2024 12:03:28 PDT by Jessica Parekh MD     COMP METABOLIC PANEL    Collection Time: 24  6:39 AM   Result Value Ref Range    Sodium 139 135 - 145 mmol/L    Potassium 3.7 3.6 - 5.5 mmol/L    Chloride 106 96 - 112 mmol/L    Co2 26 20 - 33 mmol/L    Anion Gap 7.0 7.0 - 16.0    Glucose 95 65 - 99 mg/dL    Bun 18 8 - 22 mg/dL    Creatinine 0.93 0.50 - 1.40 mg/dL    Calcium 10.1 8.5 - 10.5 mg/dL    Correct Calcium 10.1 8.5 - 10.5 mg/dL    AST(SGOT) 10 (L) 12 - 45 U/L    ALT(SGPT) 8 2 - 50 U/L    Alkaline Phosphatase 65 30 - 99 U/L    Total Bilirubin 0.5 0.1 - 1.5 mg/dL    Albumin 4.0 3.2 - 4.9 g/dL    Total Protein 7.1 6.0 - 8.2 g/dL    Globulin 3.1 1.9 - 3.5 g/dL    A-G Ratio 1.3 g/dL   CBC WITH DIFFERENTIAL    Collection Time: 24  6:39 AM   Result Value Ref Range    WBC 5.6 4.8 - 10.8 K/uL    RBC 4.90 4.20 - 5.40 M/uL    Hemoglobin 13.7 12.0 - 16.0 g/dL    Hematocrit 42.5 37.0 - 47.0 %     MCV 86.7 81.4 - 97.8 fL    MCH 28.0 27.0 - 33.0 pg    MCHC 32.2 32.2 - 35.5 g/dL    RDW 45.6 35.9 - 50.0 fL    Platelet Count 188 164 - 446 K/uL    MPV 10.3 9.0 - 12.9 fL    Neutrophils-Polys 67.90 44.00 - 72.00 %    Lymphocytes 20.20 (L) 22.00 - 41.00 %    Monocytes 9.10 0.00 - 13.40 %    Eosinophils 2.10 0.00 - 6.90 %    Basophils 0.50 0.00 - 1.80 %    Immature Granulocytes 0.20 0.00 - 0.90 %    Nucleated RBC 0.00 0.00 - 0.20 /100 WBC    Neutrophils (Absolute) 3.82 1.82 - 7.42 K/uL    Lymphs (Absolute) 1.14 1.00 - 4.80 K/uL    Monos (Absolute) 0.51 0.00 - 0.85 K/uL    Eos (Absolute) 0.12 0.00 - 0.51 K/uL    Baso (Absolute) 0.03 0.00 - 0.12 K/uL    Immature Granulocytes (abs) 0.01 0.00 - 0.11 K/uL    NRBC (Absolute) 0.00 K/uL   APTT    Collection Time: 07/08/24  6:39 AM   Result Value Ref Range    APTT 27.5 24.7 - 36.0 sec   PT    Collection Time: 07/08/24  6:39 AM   Result Value Ref Range    PT 13.3 12.0 - 14.6 sec    INR 1.00 0.87 - 1.13   ESTIMATED GFR    Collection Time: 07/08/24  6:39 AM   Result Value Ref Range    GFR (CKD-EPI) 61 >60 mL/min/1.73 m 2   proBrain Natriuretic Peptide, NT    Collection Time: 07/12/24  3:06 PM   Result Value Ref Range    NT-proBNP 557 (H) 0 - 125 pg/mL   PreAdmit COD    Collection Time: 07/12/24  3:08 PM   Result Value Ref Range    ABO Grouping Only A     Rh Grouping Only POS     Antibody Screen Scrn NEG    COMPONENT CELLULAR    Collection Time: 07/12/24  3:08 PM   Result Value Ref Range    Component R       Red Cells, LR       [id number]   issued       07/15/24 08:55    Product Type R99     Dispense Status issued     Unit Number (Barcoded) [id number]     Product Code (Barcoded) Q3897F78     Blood Type (Barcoded) 0600     Component R       Red Blood Cells     [id number]   issued       07/15/24 08:55    Product Type Red Blood Cells  LR Pheresis     Dispense Status issued     Unit Number (Barcoded) [id number]     Product Code (Barcoded) P1152N42     Blood Type (Barcoded)  0600    ABO Rh Confirm    Collection Time: 07/15/24  6:40 AM   Result Value Ref Range    ABO Rh Confirm A POS    POCT activated clotting time device results    Collection Time: 07/15/24  8:18 AM   Result Value Ref Range    Istat Activated Clotting Time 256 (H) 74 - 137 sec   POCT activated clotting time device results    Collection Time: 07/15/24  8:29 AM   Result Value Ref Range    Istat Activated Clotting Time 299 (H) 74 - 137 sec   POCT arterial blood gas device results    Collection Time: 07/15/24  8:42 AM   Result Value Ref Range    Ph 7.311 (L) 7.400 - 7.500    Pco2 45.6 (H) 26.0 - 37.0 mmHg    Po2 134 (H) 64 - 87 mmHg    Tco2 24 20 - 33 mmol/L    S02 99 93 - 99 %    Hco3 23.0 17.0 - 25.0 mmol/L    BE -3 -4 - 3 mmol/L    Body Temp 37.0 C degrees    Ph Temp Yolanda 7.311 (L) 7.400 - 7.500    Pco2 Temp Co 45.6 (H) 26.0 - 37.0 mmHg    Po2 Temp Cor 134 (H) 64 - 87 mmHg    Specimen Arterial    POCT sodium device results    Collection Time: 07/15/24  8:42 AM   Result Value Ref Range    Istat Sodium 137 135 - 145 mmol/L   POCT potassium device results    Collection Time: 07/15/24  8:42 AM   Result Value Ref Range    Istat Potassium 3.8 3.6 - 5.5 mmol/L   POCT ionized CA device results    Collection Time: 07/15/24  8:42 AM   Result Value Ref Range    Istat Ionized Calcium 1.35 (H) 1.10 - 1.30 mmol/L   POCT hematocrit and hemoglobin device results    Collection Time: 07/15/24  8:42 AM   Result Value Ref Range    Istat Hematocrit 34 (L) 37 - 47 %    Istat Hemoglobin 11.6 (L) 12.0 - 16.0 g/dL   POCT activated clotting time device results    Collection Time: 07/15/24  8:53 AM   Result Value Ref Range    Istat Activated Clotting Time 134 74 - 137 sec   POCT activated clotting time device results    Collection Time: 07/15/24  8:58 AM   Result Value Ref Range    Istat Activated Clotting Time 281 (H) 74 - 137 sec   POCT activated clotting time device results    Collection Time: 07/15/24  9:32 AM   Result Value Ref Range    Istat  Activated Clotting Time 250 (H) 74 - 137 sec   POCT arterial blood gas device results    Collection Time: 07/15/24  9:33 AM   Result Value Ref Range    Ph 7.300 (L) 7.400 - 7.500    Pco2 44.0 (H) 26.0 - 37.0 mmHg    Po2 279 (H) 64 - 87 mmHg    Tco2 23 20 - 33 mmol/L    S02 100 (H) 93 - 99 %    Hco3 21.7 17.0 - 25.0 mmol/L    BE -5 (L) -4 - 3 mmol/L    Body Temp 37.0 C degrees    Ph Temp Yolanda 7.300 (L) 7.400 - 7.500    Pco2 Temp Co 44.0 (H) 26.0 - 37.0 mmHg    Po2 Temp Cor 279 (H) 64 - 87 mmHg    Specimen Arterial    POCT sodium device results    Collection Time: 07/15/24  9:33 AM   Result Value Ref Range    Istat Sodium 136 135 - 145 mmol/L   POCT potassium device results    Collection Time: 07/15/24  9:33 AM   Result Value Ref Range    Istat Potassium 4.5 3.6 - 5.5 mmol/L   POCT ionized CA device results    Collection Time: 07/15/24  9:33 AM   Result Value Ref Range    Istat Ionized Calcium 1.32 (H) 1.10 - 1.30 mmol/L   POCT hematocrit and hemoglobin device results    Collection Time: 07/15/24  9:33 AM   Result Value Ref Range    Istat Hematocrit 33 (L) 37 - 47 %    Istat Hemoglobin 11.2 (L) 12.0 - 16.0 g/dL   POCT activated clotting time device results    Collection Time: 07/15/24  9:43 AM   Result Value Ref Range    Istat Activated Clotting Time 281 (H) 74 - 137 sec   POCT activated clotting time device results    Collection Time: 07/15/24 10:18 AM   Result Value Ref Range    Istat Activated Clotting Time 128 74 - 137 sec   POCT arterial blood gas device results    Collection Time: 07/15/24 10:19 AM   Result Value Ref Range    Ph 7.279 (LL) 7.400 - 7.500    Pco2 45.2 (H) 26.0 - 37.0 mmHg    Po2 306 (H) 64 - 87 mmHg    Tco2 23 20 - 33 mmol/L    S02 100 (H) 93 - 99 %    Hco3 21.2 17.0 - 25.0 mmol/L    BE -5 (L) -4 - 3 mmol/L    Body Temp 37.0 C degrees    Ph Temp Yolanda 7.279 (LL) 7.400 - 7.500    Pco2 Temp Co 45.2 (H) 26.0 - 37.0 mmHg    Po2 Temp Cor 306 (H) 64 - 87 mmHg    Specimen Arterial    POCT sodium device  results    Collection Time: 07/15/24 10:19 AM   Result Value Ref Range    Istat Sodium 137 135 - 145 mmol/L   POCT potassium device results    Collection Time: 07/15/24 10:19 AM   Result Value Ref Range    Istat Potassium 3.8 3.6 - 5.5 mmol/L   POCT ionized CA device results    Collection Time: 07/15/24 10:19 AM   Result Value Ref Range    Istat Ionized Calcium 1.25 1.10 - 1.30 mmol/L   POCT hematocrit and hemoglobin device results    Collection Time: 07/15/24 10:19 AM   Result Value Ref Range    Istat Hematocrit 32 (L) 37 - 47 %    Istat Hemoglobin 10.9 (L) 12.0 - 16.0 g/dL   CBC Without Differential    Collection Time: 07/15/24 11:00 AM   Result Value Ref Range    WBC 11.0 (H) 4.8 - 10.8 K/uL    RBC 3.39 (L) 4.20 - 5.40 M/uL    Hemoglobin 9.8 (L) 12.0 - 16.0 g/dL    Hematocrit 30.1 (L) 37.0 - 47.0 %    MCV 88.8 81.4 - 97.8 fL    MCH 28.9 27.0 - 33.0 pg    MCHC 32.6 32.2 - 35.5 g/dL    RDW 46.9 35.9 - 50.0 fL    Platelet Count 144 (L) 164 - 446 K/uL    MPV 10.4 9.0 - 12.9 fL   Comp Metabolic Panel (CMP)    Collection Time: 07/15/24 11:00 AM   Result Value Ref Range    Sodium 137 135 - 145 mmol/L    Potassium 4.1 3.6 - 5.5 mmol/L    Chloride 107 96 - 112 mmol/L    Co2 20 20 - 33 mmol/L    Anion Gap 10.0 7.0 - 16.0    Glucose 190 (H) 65 - 99 mg/dL    Bun 19 8 - 22 mg/dL    Creatinine 0.93 0.50 - 1.40 mg/dL    Calcium 8.2 (L) 8.5 - 10.5 mg/dL    Correct Calcium 9.3 8.5 - 10.5 mg/dL    AST(SGOT) 14 12 - 45 U/L    ALT(SGPT) 9 2 - 50 U/L    Alkaline Phosphatase 44 30 - 99 U/L    Total Bilirubin 0.3 0.1 - 1.5 mg/dL    Albumin 2.6 (L) 3.2 - 4.9 g/dL    Total Protein 4.5 (L) 6.0 - 8.2 g/dL    Globulin 1.9 1.9 - 3.5 g/dL    A-G Ratio 1.4 g/dL   proBrain Natriuretic Peptide, NT    Collection Time: 07/15/24 11:00 AM   Result Value Ref Range    NT-proBNP 331 (H) 0 - 125 pg/mL   ESTIMATED GFR    Collection Time: 07/15/24 11:00 AM   Result Value Ref Range    GFR (CKD-EPI) 61 >60 mL/min/1.73 m 2   EKG STAT POST-OP    Collection  Time: 07/15/24 11:10 AM   Result Value Ref Range    Report       [facility]    Test Date:  2024-07-15  Pt Name:    ROSIO SHAFER                  Department: [facility]  MRN:        [id number]                      Room:       [facility]  Gender:     Female                       Technician: MILVIA  :        [id number]                   Requested By:MARTY JARAMILLO  Order #:    [id number]                    Reading MD: Dustin Pennington MD    Measurements  Intervals                                Axis  Rate:       50                           P:          75  OK:         165                          QRS:        20  QRSD:       88                           T:          41  QT:         483  QTc:        441    Interpretive Statements  Sinus bradycardia  ST ELEV, PROBABLE NORMAL EARLY REPOL PATTERN    Electronically Signed On 07- 11:51:28 PDT by Dustin Pennington MD     EC-HEATHER W/O CONT    Collection Time: 07/15/24 12:11 PM   Result Value Ref Range    Left Ventrical Ejection Fraction 65    IONIZED CALCIUM    Collection Time: 07/15/24 12:30 PM   Result Value Ref Range    Ionized Calcium 1.2 1.1 - 1.3 mmol/L   HEMOGLOBIN AND HEMATOCRIT    Collection Time: 07/15/24 12:30 PM   Result Value Ref Range    Hemoglobin 9.8 (L) 12.0 - 16.0 g/dL    Hematocrit 30.6 (L) 37.0 - 47.0 %   EC-ECHOCARDIOGRAM LTD W/O CONT    Collection Time: 07/15/24  2:32 PM   Result Value Ref Range    Eject.Frac. MOD BP 57.44     Eject.Frac. MOD 4C 56.39     Eject.Frac. MOD 2C 62.5    CBC WITH DIFFERENTIAL    Collection Time: 07/15/24  6:14 PM   Result Value Ref Range    WBC 15.6 (H) 4.8 - 10.8 K/uL    RBC 3.88 (L) 4.20 - 5.40 M/uL    Hemoglobin 10.7 (L) 12.0 - 16.0 g/dL    Hematocrit 33.4 (L) 37.0 - 47.0 %    MCV 86.1 81.4 - 97.8 fL    MCH 27.6 27.0 - 33.0 pg    MCHC 32.0 (L) 32.2 - 35.5 g/dL    RDW 46.5 35.9 - 50.0 fL    Platelet Count 145 (L) 164 - 446 K/uL    MPV 11.0 9.0 - 12.9 fL    Neutrophils-Polys 87.70 (H) 44.00 - 72.00 %    Lymphocytes 4.50 (L) 22.00 -  41.00 %    Monocytes 7.20 0.00 - 13.40 %    Eosinophils 0.00 0.00 - 6.90 %    Basophils 0.10 0.00 - 1.80 %    Immature Granulocytes 0.50 0.00 - 0.90 %    Nucleated RBC 0.00 0.00 - 0.20 /100 WBC    Neutrophils (Absolute) 13.66 (H) 1.82 - 7.42 K/uL    Lymphs (Absolute) 0.70 (L) 1.00 - 4.80 K/uL    Monos (Absolute) 1.12 (H) 0.00 - 0.85 K/uL    Eos (Absolute) 0.00 0.00 - 0.51 K/uL    Baso (Absolute) 0.02 0.00 - 0.12 K/uL    Immature Granulocytes (abs) 0.08 0.00 - 0.11 K/uL    NRBC (Absolute) 0.00 K/uL   PLATELET MAPPING WITH BASIC TEG    Collection Time: 07/15/24  6:14 PM   Result Value Ref Range    Reaction Time Initial-R 5.5 4.6 - 9.1 min    React Time Initial Hep 5.2 4.3 - 8.3 min    Clot Kinetics-K 1.3 0.8 - 2.1 min    Clot Angle-Angle 73.5 63.0 - 78.0 degrees    Maximum Clot Strength-MA 59.9 52.0 - 69.0 mm    TEG Functional Fibrinogen(MA) 19.3 15.0 - 32.0 mm    Lysis 30 minutes-LY30 0.0 0.0 - 2.6 %    % Inhibition ADP 27.3 (H) 0.0 - 17.0 %    % Inhibition AA 7.2 0.0 - 11.0 %    TEG Algorithm Link Algorithm    COD - Adult (Type and Screen)    Collection Time: 07/15/24  6:14 PM   Result Value Ref Range    ABO Grouping Only A     Rh Grouping Only POS     Antibody Screen-Cod NEG    Prothrombin Time    Collection Time: 07/15/24  8:29 PM   Result Value Ref Range    PT 14.7 (H) 12.0 - 14.6 sec    INR 1.14 (H) 0.87 - 1.13   CBC WITHOUT DIFFERENTIAL    Collection Time: 07/16/24  3:27 AM   Result Value Ref Range    WBC 8.8 4.8 - 10.8 K/uL    RBC 3.26 (L) 4.20 - 5.40 M/uL    Hemoglobin 8.9 (L) 12.0 - 16.0 g/dL    Hematocrit 28.4 (L) 37.0 - 47.0 %    MCV 87.1 81.4 - 97.8 fL    MCH 27.3 27.0 - 33.0 pg    MCHC 31.3 (L) 32.2 - 35.5 g/dL    RDW 47.1 35.9 - 50.0 fL    Platelet Count 102 (L) 164 - 446 K/uL    MPV 10.7 9.0 - 12.9 fL   Comp Metabolic Panel    Collection Time: 07/16/24  3:27 AM   Result Value Ref Range    Sodium 137 135 - 145 mmol/L    Potassium 4.7 3.6 - 5.5 mmol/L    Chloride 108 96 - 112 mmol/L    Co2 21 20 - 33  mmol/L    Anion Gap 8.0 7.0 - 16.0    Glucose 122 (H) 65 - 99 mg/dL    Bun 19 8 - 22 mg/dL    Creatinine 0.83 0.50 - 1.40 mg/dL    Calcium 8.7 8.5 - 10.5 mg/dL    Correct Calcium 9.5 8.5 - 10.5 mg/dL    AST(SGOT) 14 12 - 45 U/L    ALT(SGPT) 9 2 - 50 U/L    Alkaline Phosphatase 47 30 - 99 U/L    Total Bilirubin 0.4 0.1 - 1.5 mg/dL    Albumin 3.0 (L) 3.2 - 4.9 g/dL    Total Protein 5.0 (L) 6.0 - 8.2 g/dL    Globulin 2.0 1.9 - 3.5 g/dL    A-G Ratio 1.5 g/dL   MAGNESIUM    Collection Time: 24  3:27 AM   Result Value Ref Range    Magnesium 1.8 1.5 - 2.5 mg/dL   PHOSPHORUS    Collection Time: 24  3:27 AM   Result Value Ref Range    Phosphorus 4.0 2.5 - 4.5 mg/dL   proBrain Natriuretic Peptide, NT    Collection Time: 24  3:27 AM   Result Value Ref Range    NT-proBNP 427 (H) 0 - 125 pg/mL   ESTIMATED GFR    Collection Time: 24  3:27 AM   Result Value Ref Range    GFR (CKD-EPI) 70 >60 mL/min/1.73 m 2   EKG Every Morning    Collection Time: 24  6:59 AM   Result Value Ref Range    Report       [facility]    Test Date:  2024  Pt Name:    ROSIO SHAFER                  Department: 161  MRN:        [id number]                      Room:       16  Gender:     Female                       Technician: CFR  :        1940                   Requested By:MARTY JARAMILLO  Order #:    [id number]                    Reading MD: Keith Hernandez MD    Measurements  Intervals                                Axis  Rate:       78                           P:          67  NJ:         152                          QRS:        12  QRSD:       94                           T:          65  QT:         390  QTc:        445    Interpretive Statements  Sinus rhythm  Compared to ECG 07/15/2024 11:10:23  Less anterior ST elevation  Electronically Signed On 2024 08:15:05 PDT by Keith Hernandez MD     CBC WITHOUT DIFFERENTIAL    Collection Time: 24  9:16 AM   Result Value Ref Range    WBC  10.5 4.8 - 10.8 K/uL    RBC 3.03 (L) 4.20 - 5.40 M/uL    Hemoglobin 8.6 (L) 12.0 - 16.0 g/dL    Hematocrit 26.8 (L) 37.0 - 47.0 %    MCV 88.4 81.4 - 97.8 fL    MCH 28.4 27.0 - 33.0 pg    MCHC 32.1 (L) 32.2 - 35.5 g/dL    RDW 48.1 35.9 - 50.0 fL    Platelet Count 96 (L) 164 - 446 K/uL    MPV 10.6 9.0 - 12.9 fL   IMMATURE PLT FRACTION    Collection Time: 07/16/24  9:16 AM   Result Value Ref Range    Imm. Plt Fraction 5.1 0.6 - 13.1 %   FERRITIN    Collection Time: 07/16/24  3:26 PM   Result Value Ref Range    Ferritin 47.1 10.0 - 291.0 ng/mL   IRON/TOTAL IRON BIND    Collection Time: 07/16/24  3:26 PM   Result Value Ref Range    Iron 63 40 - 170 ug/dL    Total Iron Binding 290 250 - 450 ug/dL    Unsat Iron Binding 227 110 - 370 ug/dL    % Saturation 22 15 - 55 %   CBC WITHOUT DIFFERENTIAL    Collection Time: 07/16/24  3:26 PM   Result Value Ref Range    WBC 11.7 (H) 4.8 - 10.8 K/uL    RBC 3.13 (L) 4.20 - 5.40 M/uL    Hemoglobin 8.9 (L) 12.0 - 16.0 g/dL    Hematocrit 27.4 (L) 37.0 - 47.0 %    MCV 87.5 81.4 - 97.8 fL    MCH 28.4 27.0 - 33.0 pg    MCHC 32.5 32.2 - 35.5 g/dL    RDW 47.8 35.9 - 50.0 fL    Platelet Count 108 (L) 164 - 446 K/uL    MPV 10.8 9.0 - 12.9 fL   HEMOGLOBIN AND HEMATOCRIT    Collection Time: 07/16/24 11:51 PM   Result Value Ref Range    Hemoglobin 7.9 (L) 12.0 - 16.0 g/dL    Hematocrit 24.8 (L) 37.0 - 47.0 %   CBC WITHOUT DIFFERENTIAL    Collection Time: 07/17/24  4:45 AM   Result Value Ref Range    WBC 10.0 4.8 - 10.8 K/uL    RBC 2.83 (L) 4.20 - 5.40 M/uL    Hemoglobin 8.0 (L) 12.0 - 16.0 g/dL    Hematocrit 24.8 (L) 37.0 - 47.0 %    MCV 87.6 81.4 - 97.8 fL    MCH 28.3 27.0 - 33.0 pg    MCHC 32.3 32.2 - 35.5 g/dL    RDW 47.8 35.9 - 50.0 fL    Platelet Count 91 (L) 164 - 446 K/uL    MPV 11.2 9.0 - 12.9 fL   Comp Metabolic Panel    Collection Time: 07/17/24  4:45 AM   Result Value Ref Range    Sodium 137 135 - 145 mmol/L    Potassium 4.2 3.6 - 5.5 mmol/L    Chloride 107 96 - 112 mmol/L    Co2 23 20 -  33 mmol/L    Anion Gap 7.0 7.0 - 16.0    Glucose 119 (H) 65 - 99 mg/dL    Bun 15 8 - 22 mg/dL    Creatinine 0.64 0.50 - 1.40 mg/dL    Calcium 8.9 8.5 - 10.5 mg/dL    Correct Calcium 9.9 8.5 - 10.5 mg/dL    AST(SGOT) 11 (L) 12 - 45 U/L    ALT(SGPT) 9 2 - 50 U/L    Alkaline Phosphatase 44 30 - 99 U/L    Total Bilirubin 0.4 0.1 - 1.5 mg/dL    Albumin 2.8 (L) 3.2 - 4.9 g/dL    Total Protein 4.9 (L) 6.0 - 8.2 g/dL    Globulin 2.1 1.9 - 3.5 g/dL    A-G Ratio 1.3 g/dL   MAGNESIUM    Collection Time: 24  4:45 AM   Result Value Ref Range    Magnesium 2.0 1.5 - 2.5 mg/dL   PHOSPHORUS    Collection Time: 24  4:45 AM   Result Value Ref Range    Phosphorus 2.3 (L) 2.5 - 4.5 mg/dL   IMMATURE PLT FRACTION    Collection Time: 24  4:45 AM   Result Value Ref Range    Imm. Plt Fraction 5.6 0.6 - 13.1 %   ESTIMATED GFR    Collection Time: 24  4:45 AM   Result Value Ref Range    GFR (CKD-EPI) 88 >60 mL/min/1.73 m 2   EKG Every Morning    Collection Time: 24  7:17 AM   Result Value Ref Range    Report       [facility]    Test Date:  2024  Pt Name:    ROSIO SHAFER                  Department: 161  MRN:        [id number]                      Room:       T616  Gender:     Female                       Technician: [facility]  :        1940                   Requested By:MARTY JARAMILLO  Order #:    [id number]                    Reading MD: Max Aquino MD    Measurements  Intervals                                Axis  Rate:       82                           P:          72  OH:         159                          QRS:        9  QRSD:       91                           T:          42  QT:         374  QTc:        437    Interpretive Statements  Sinus rhythm  Consider left atrial enlargement  Minimal ST elevation, anterior leads, probably normal early repolarization  Electronically Signed On 2024 20:42:52 PDT by Max Aquino MD     HEMOGLOBIN AND HEMATOCRIT    Collection Time: 24   9:03 PM   Result Value Ref Range    Hemoglobin 7.6 (L) 12.0 - 16.0 g/dL    Hematocrit 23.8 (L) 37.0 - 47.0 %   CBC WITHOUT DIFFERENTIAL    Collection Time: 07/18/24  4:15 AM   Result Value Ref Range    WBC 8.0 4.8 - 10.8 K/uL    RBC 2.68 (L) 4.20 - 5.40 M/uL    Hemoglobin 7.5 (L) 12.0 - 16.0 g/dL    Hematocrit 23.4 (L) 37.0 - 47.0 %    MCV 87.3 81.4 - 97.8 fL    MCH 28.0 27.0 - 33.0 pg    MCHC 32.1 (L) 32.2 - 35.5 g/dL    RDW 47.1 35.9 - 50.0 fL    Platelet Count 79 (L) 164 - 446 K/uL    MPV 11.4 9.0 - 12.9 fL   Comp Metabolic Panel    Collection Time: 07/18/24  4:15 AM   Result Value Ref Range    Sodium 139 135 - 145 mmol/L    Potassium 4.0 3.6 - 5.5 mmol/L    Chloride 105 96 - 112 mmol/L    Co2 26 20 - 33 mmol/L    Anion Gap 8.0 7.0 - 16.0    Glucose 114 (H) 65 - 99 mg/dL    Bun 11 8 - 22 mg/dL    Creatinine 0.65 0.50 - 1.40 mg/dL    Calcium 9.3 8.5 - 10.5 mg/dL    Correct Calcium 10.2 8.5 - 10.5 mg/dL    AST(SGOT) 11 (L) 12 - 45 U/L    ALT(SGPT) 8 2 - 50 U/L    Alkaline Phosphatase 44 30 - 99 U/L    Total Bilirubin 0.5 0.1 - 1.5 mg/dL    Albumin 2.9 (L) 3.2 - 4.9 g/dL    Total Protein 5.0 (L) 6.0 - 8.2 g/dL    Globulin 2.1 1.9 - 3.5 g/dL    A-G Ratio 1.4 g/dL   MAGNESIUM    Collection Time: 07/18/24  4:15 AM   Result Value Ref Range    Magnesium 1.9 1.5 - 2.5 mg/dL   PHOSPHORUS    Collection Time: 07/18/24  4:15 AM   Result Value Ref Range    Phosphorus 2.2 (L) 2.5 - 4.5 mg/dL   IMMATURE PLT FRACTION    Collection Time: 07/18/24  4:15 AM   Result Value Ref Range    Imm. Plt Fraction 5.2 0.6 - 13.1 %   ESTIMATED GFR    Collection Time: 07/18/24  4:15 AM   Result Value Ref Range    GFR (CKD-EPI) 88 >60 mL/min/1.73 m 2   EKG Every Morning    Collection Time: 07/18/24  7:49 AM   Result Value Ref Range    Report       [facility]    Test Date:  2024-07-18  Pt Name:    ROSIO SHAFER                  Department: 161  MRN:        [id number]                      Room:       16  Gender:     Female                        Technician: [facility]  :        1940                   Requested By:MARTY JARAMILLO  Order #:    [id number]                    Reading MD: Darren Quinonez MD    Measurements  Intervals                                Axis  Rate:       72                           P:          62  WA:         160                          QRS:        4  QRSD:       94                           T:          40  QT:         383  QTc:        420    Interpretive Statements  Sinus rhythm  Probable left atrial enlargement  ST elevation, consider anterolateral injury  Electronically Signed On 2024 13:30:58 PDT by Darren Quinonez MD     HEMOGLOBIN AND HEMATOCRIT    Collection Time: 24  9:56 AM   Result Value Ref Range    Hemoglobin 8.5 (L) 12.0 - 16.0 g/dL    Hematocrit 27.7 (L) 37.0 - 47.0 %   POCT glucose device results    Collection Time: 24  2:29 AM   Result Value Ref Range    POC Glucose, Blood 112 (H) 65 - 99 mg/dL   EKG    Collection Time: 24  2:29 AM   Result Value Ref Range    Report       [facility]    Test Date:  2024  Pt Name:    ROSIO SHAFER                  Department: [facility]  MRN:        [id number]                      Room:       16  Gender:     Female                       Technician: DG  :        1940                   Requested By:ION DELGADO  Order #:    [id number]                    Reading MD: Darren Quinonez MD    Measurements  Intervals                                Axis  Rate:       104                          P:          0  WA:         0                            QRS:        23  QRSD:       88                           T:          73  QT:         362  QTc:        477    Interpretive Statements  Atrial fibrillation  Minimal ST depression, anterolateral leads  Electronically Signed On 2024 03:55:31 PDT by Darren Quinonez MD     CBC WITHOUT DIFFERENTIAL    Collection Time: 24  2:40 AM   Result Value Ref Range    WBC 6.5 4.8 - 10.8 K/uL    RBC 2.80 (L) 4.20 - 5.40 M/uL     Hemoglobin 7.8 (L) 12.0 - 16.0 g/dL    Hematocrit 24.4 (L) 37.0 - 47.0 %    MCV 87.1 81.4 - 97.8 fL    MCH 27.9 27.0 - 33.0 pg    MCHC 32.0 (L) 32.2 - 35.5 g/dL    RDW 47.8 35.9 - 50.0 fL    Platelet Count 95 (L) 164 - 446 K/uL    MPV 11.6 9.0 - 12.9 fL   Comp Metabolic Panel    Collection Time: 07/19/24  2:40 AM   Result Value Ref Range    Sodium 140 135 - 145 mmol/L    Potassium 3.8 3.6 - 5.5 mmol/L    Chloride 104 96 - 112 mmol/L    Co2 27 20 - 33 mmol/L    Anion Gap 9.0 7.0 - 16.0    Glucose 115 (H) 65 - 99 mg/dL    Bun 14 8 - 22 mg/dL    Creatinine 0.73 0.50 - 1.40 mg/dL    Calcium 9.1 8.5 - 10.5 mg/dL    Correct Calcium 10.1 8.5 - 10.5 mg/dL    AST(SGOT) 9 (L) 12 - 45 U/L    ALT(SGPT) 6 2 - 50 U/L    Alkaline Phosphatase 46 30 - 99 U/L    Total Bilirubin 0.6 0.1 - 1.5 mg/dL    Albumin 2.8 (L) 3.2 - 4.9 g/dL    Total Protein 5.5 (L) 6.0 - 8.2 g/dL    Globulin 2.7 1.9 - 3.5 g/dL    A-G Ratio 1.0 g/dL   MAGNESIUM    Collection Time: 07/19/24  2:40 AM   Result Value Ref Range    Magnesium 2.1 1.5 - 2.5 mg/dL   PHOSPHORUS    Collection Time: 07/19/24  2:40 AM   Result Value Ref Range    Phosphorus 3.3 2.5 - 4.5 mg/dL   CORTISOL    Collection Time: 07/19/24  2:40 AM   Result Value Ref Range    Cortisol 23.1 (H) 0.0 - 23.0 ug/dL   TSH WITH REFLEX TO FT4    Collection Time: 07/19/24  2:40 AM   Result Value Ref Range    TSH 1.490 0.380 - 5.330 uIU/mL   LACTIC ACID    Collection Time: 07/19/24  2:40 AM   Result Value Ref Range    Lactic Acid 0.9 0.5 - 2.0 mmol/L   COD - Adult (Type and Screen)    Collection Time: 07/19/24  2:40 AM   Result Value Ref Range    ABO Grouping Only A     Rh Grouping Only POS     Antibody Screen-Cod NEG    COMPONENT CELLULAR    Collection Time: 07/19/24  2:40 AM   Result Value Ref Range    Component R       Red Cells, LR       [id number]   transfused   07/19/24 10:20    Product Type R99     Dispense Status transfused     Unit Number (Barcoded) [id number]     Product Code (Barcoded)  O3959V83     Blood Type (Barcoded) 6200    IMMATURE PLT FRACTION    Collection Time: 24  2:40 AM   Result Value Ref Range    Imm. Plt Fraction 6.0 0.6 - 13.1 %   ESTIMATED GFR    Collection Time: 24  2:40 AM   Result Value Ref Range    GFR (CKD-EPI) 82 >60 mL/min/1.73 m 2   TROPONIN    Collection Time: 24  2:40 AM   Result Value Ref Range    Troponin T 116 (H) 6 - 19 ng/L   URINALYSIS    Collection Time: 24  2:56 AM   Result Value Ref Range    Color Yellow     Character Clear     Specific Gravity 1.010 <1.035    Ph 5.5 5.0 - 8.0    Glucose Negative Negative mg/dL    Ketones Negative Negative mg/dL    Protein Negative Negative mg/dL    Bilirubin Negative Negative    Urobilinogen, Urine 1.0 Negative    Nitrite Negative Negative    Leukocyte Esterase Negative Negative    Occult Blood Negative Negative    Micro Urine Req see below    EKG    Collection Time: 24  3:59 AM   Result Value Ref Range    Report       [facility]    Test Date:  2024  Pt Name:    ROSIO SHAFER                  Department: 161  MRN:        [id number]                      Room:       [facility]  Gender:     Female                       Technician: TREMAYNE  :        1940                   Requested By:ION DELGADO  Order #:    [id number]                    Reading MD: Darren Quinonez MD    Measurements  Intervals                                Axis  Rate:       104                          P:          0  OK:         0                            QRS:        29  QRSD:       90                           T:          17  QT:         351  QTc:        462    Interpretive Statements  Atrial fibrillation  Minimal ST depression  Compared to ECG 2024 02:29:44  No significant changes  Electronically Signed On 2024 04:02:07 PDT by Darren Quinonez MD     EC-ECHOCARDIOGRAM LTD W/O CONT    Collection Time: 24  5:02 AM   Result Value Ref Range    Eject.Frac. MOD 4C 55.36    EKG    Collection Time: 24  8:27 AM    Result Value Ref Range    Report       [facility]    Test Date:  2024  Pt Name:    ROSIO SHAFER                  Department: 161  MRN:        [id number]                      Room:       T616  Gender:     Female                       Technician: KIET  :        1940                   Requested By:ANISH TOMLIN  Order #:    [id number]                    Reading MD: Max Aquino MD    Measurements  Intervals                                Axis  Rate:       68                           P:          73  TN:         170                          QRS:        20  QRSD:       85                           T:          47  QT:         391  QTc:        416    Interpretive Statements  Sinus rhythm  Atrial premature complex  Minimal anterior ST elevation, probably normal early repolarization, consider  ischemia  Electronically Signed On 2024 08:51:32 PDT by Max Aquino MD     HEMOGLOBIN AND HEMATOCRIT    Collection Time: 24  4:48 PM   Result Value Ref Range    Hemoglobin 8.1 (L) 12.0 - 16.0 g/dL    Hematocrit 25.1 (L) 37.0 - 47.0 %   CBC WITHOUT DIFFERENTIAL    Collection Time: 24  2:34 AM   Result Value Ref Range    WBC 7.2 4.8 - 10.8 K/uL    RBC 2.98 (L) 4.20 - 5.40 M/uL    Hemoglobin 8.6 (L) 12.0 - 16.0 g/dL    Hematocrit 26.4 (L) 37.0 - 47.0 %    MCV 88.6 81.4 - 97.8 fL    MCH 28.9 27.0 - 33.0 pg    MCHC 32.6 32.2 - 35.5 g/dL    RDW 49.0 35.9 - 50.0 fL    Platelet Count 108 (L) 164 - 446 K/uL    MPV 10.8 9.0 - 12.9 fL   Comp Metabolic Panel    Collection Time: 24  2:34 AM   Result Value Ref Range    Sodium 138 135 - 145 mmol/L    Potassium 3.9 3.6 - 5.5 mmol/L    Chloride 103 96 - 112 mmol/L    Co2 26 20 - 33 mmol/L    Anion Gap 9.0 7.0 - 16.0    Glucose 124 (H) 65 - 99 mg/dL    Bun 14 8 - 22 mg/dL    Creatinine 0.83 0.50 - 1.40 mg/dL    Calcium 9.4 8.5 - 10.5 mg/dL    Correct Calcium 10.4 8.5 - 10.5 mg/dL    AST(SGOT) 13 12 - 45 U/L    ALT(SGPT) 9 2 - 50 U/L    Alkaline  Phosphatase 46 30 - 99 U/L    Total Bilirubin 0.6 0.1 - 1.5 mg/dL    Albumin 2.8 (L) 3.2 - 4.9 g/dL    Total Protein 5.2 (L) 6.0 - 8.2 g/dL    Globulin 2.4 1.9 - 3.5 g/dL    A-G Ratio 1.2 g/dL   MAGNESIUM    Collection Time: 07/20/24  2:34 AM   Result Value Ref Range    Magnesium 2.0 1.5 - 2.5 mg/dL   PHOSPHORUS    Collection Time: 07/20/24  2:34 AM   Result Value Ref Range    Phosphorus 3.2 2.5 - 4.5 mg/dL   ESTIMATED GFR    Collection Time: 07/20/24  2:34 AM   Result Value Ref Range    GFR (CKD-EPI) 70 >60 mL/min/1.73 m 2   HEMOGLOBIN AND HEMATOCRIT    Collection Time: 07/20/24  7:46 PM   Result Value Ref Range    Hemoglobin 8.5 (L) 12.0 - 16.0 g/dL    Hematocrit 26.4 (L) 37.0 - 47.0 %   CBC WITH DIFFERENTIAL    Collection Time: 07/21/24  1:55 AM   Result Value Ref Range    WBC 5.5 4.8 - 10.8 K/uL    RBC 2.93 (L) 4.20 - 5.40 M/uL    Hemoglobin 8.2 (L) 12.0 - 16.0 g/dL    Hematocrit 25.4 (L) 37.0 - 47.0 %    MCV 86.7 81.4 - 97.8 fL    MCH 28.0 27.0 - 33.0 pg    MCHC 32.3 32.2 - 35.5 g/dL    RDW 47.9 35.9 - 50.0 fL    Platelet Count 105 (L) 164 - 446 K/uL    MPV 11.1 9.0 - 12.9 fL    Neutrophils-Polys 63.60 44.00 - 72.00 %    Lymphocytes 19.40 (L) 22.00 - 41.00 %    Monocytes 12.80 0.00 - 13.40 %    Eosinophils 3.30 0.00 - 6.90 %    Basophils 0.50 0.00 - 1.80 %    Immature Granulocytes 0.40 0.00 - 0.90 %    Nucleated RBC 0.00 0.00 - 0.20 /100 WBC    Neutrophils (Absolute) 3.48 1.82 - 7.42 K/uL    Lymphs (Absolute) 1.06 1.00 - 4.80 K/uL    Monos (Absolute) 0.70 0.00 - 0.85 K/uL    Eos (Absolute) 0.18 0.00 - 0.51 K/uL    Baso (Absolute) 0.03 0.00 - 0.12 K/uL    Immature Granulocytes (abs) 0.02 0.00 - 0.11 K/uL    NRBC (Absolute) 0.00 K/uL   Basic Metabolic Panel    Collection Time: 07/21/24  1:55 AM   Result Value Ref Range    Sodium 140 135 - 145 mmol/L    Potassium 4.3 3.6 - 5.5 mmol/L    Chloride 105 96 - 112 mmol/L    Co2 30 20 - 33 mmol/L    Glucose 103 (H) 65 - 99 mg/dL    Bun 13 8 - 22 mg/dL    Creatinine 0.80  0.50 - 1.40 mg/dL    Calcium 9.2 8.5 - 10.5 mg/dL    Anion Gap 5.0 (L) 7.0 - 16.0   MAGNESIUM    Collection Time: 07/21/24  1:55 AM   Result Value Ref Range    Magnesium 2.4 1.5 - 2.5 mg/dL   ESTIMATED GFR    Collection Time: 07/21/24  1:55 AM   Result Value Ref Range    GFR (CKD-EPI) 73 >60 mL/min/1.73 m 2   CBC WITH DIFFERENTIAL    Collection Time: 07/22/24  1:33 AM   Result Value Ref Range    WBC 5.8 4.8 - 10.8 K/uL    RBC 2.89 (L) 4.20 - 5.40 M/uL    Hemoglobin 8.2 (L) 12.0 - 16.0 g/dL    Hematocrit 25.8 (L) 37.0 - 47.0 %    MCV 89.3 81.4 - 97.8 fL    MCH 28.4 27.0 - 33.0 pg    MCHC 31.8 (L) 32.2 - 35.5 g/dL    RDW 49.4 35.9 - 50.0 fL    Platelet Count 118 (L) 164 - 446 K/uL    MPV 10.7 9.0 - 12.9 fL    Neutrophils-Polys 64.90 44.00 - 72.00 %    Lymphocytes 19.50 (L) 22.00 - 41.00 %    Monocytes 11.70 0.00 - 13.40 %    Eosinophils 2.90 0.00 - 6.90 %    Basophils 0.50 0.00 - 1.80 %    Immature Granulocytes 0.50 0.00 - 0.90 %    Nucleated RBC 0.00 0.00 - 0.20 /100 WBC    Neutrophils (Absolute) 3.75 1.82 - 7.42 K/uL    Lymphs (Absolute) 1.13 1.00 - 4.80 K/uL    Monos (Absolute) 0.68 0.00 - 0.85 K/uL    Eos (Absolute) 0.17 0.00 - 0.51 K/uL    Baso (Absolute) 0.03 0.00 - 0.12 K/uL    Immature Granulocytes (abs) 0.03 0.00 - 0.11 K/uL    NRBC (Absolute) 0.00 K/uL   Basic Metabolic Panel    Collection Time: 07/22/24  1:33 AM   Result Value Ref Range    Sodium 140 135 - 145 mmol/L    Potassium 3.9 3.6 - 5.5 mmol/L    Chloride 103 96 - 112 mmol/L    Co2 28 20 - 33 mmol/L    Glucose 116 (H) 65 - 99 mg/dL    Bun 15 8 - 22 mg/dL    Creatinine 1.02 0.50 - 1.40 mg/dL    Calcium 9.1 8.5 - 10.5 mg/dL    Anion Gap 9.0 7.0 - 16.0   ESTIMATED GFR    Collection Time: 07/22/24  1:33 AM   Result Value Ref Range    GFR (CKD-EPI) 55 (A) >60 mL/min/1.73 m 2   CBC WITH DIFFERENTIAL    Collection Time: 07/23/24  8:44 AM   Result Value Ref Range    WBC 6.0 4.8 - 10.8 K/uL    RBC 3.12 (L) 4.20 - 5.40 M/uL    Hemoglobin 8.8 (L) 12.0 - 16.0  g/dL    Hematocrit 28.2 (L) 37.0 - 47.0 %    MCV 90.4 81.4 - 97.8 fL    MCH 28.2 27.0 - 33.0 pg    MCHC 31.2 (L) 32.2 - 35.5 g/dL    RDW 50.3 (H) 35.9 - 50.0 fL    Platelet Count 143 (L) 164 - 446 K/uL    MPV 10.7 9.0 - 12.9 fL    Neutrophils-Polys 68.20 44.00 - 72.00 %    Lymphocytes 17.60 (L) 22.00 - 41.00 %    Monocytes 9.10 0.00 - 13.40 %    Eosinophils 3.90 0.00 - 6.90 %    Basophils 0.70 0.00 - 1.80 %    Immature Granulocytes 0.50 0.00 - 0.90 %    Nucleated RBC 0.00 0.00 - 0.20 /100 WBC    Neutrophils (Absolute) 4.06 1.82 - 7.42 K/uL    Lymphs (Absolute) 1.05 1.00 - 4.80 K/uL    Monos (Absolute) 0.54 0.00 - 0.85 K/uL    Eos (Absolute) 0.23 0.00 - 0.51 K/uL    Baso (Absolute) 0.04 0.00 - 0.12 K/uL    Immature Granulocytes (abs) 0.03 0.00 - 0.11 K/uL    NRBC (Absolute) 0.00 K/uL   Basic Metabolic Panel    Collection Time: 07/23/24  8:44 AM   Result Value Ref Range    Sodium 140 135 - 145 mmol/L    Potassium 3.9 3.6 - 5.5 mmol/L    Chloride 104 96 - 112 mmol/L    Co2 26 20 - 33 mmol/L    Glucose 167 (H) 65 - 99 mg/dL    Bun 11 8 - 22 mg/dL    Creatinine 0.87 0.50 - 1.40 mg/dL    Calcium 9.2 8.5 - 10.5 mg/dL    Anion Gap 10.0 7.0 - 16.0   ESTIMATED GFR    Collection Time: 07/23/24  8:44 AM   Result Value Ref Range    GFR (CKD-EPI) 66 >60 mL/min/1.73 m 2   CBC with Differential    Collection Time: 07/24/24  5:27 AM   Result Value Ref Range    WBC 6.1 4.8 - 10.8 K/uL    RBC 3.28 (L) 4.20 - 5.40 M/uL    Hemoglobin 9.1 (L) 12.0 - 16.0 g/dL    Hematocrit 29.3 (L) 37.0 - 47.0 %    MCV 89.3 81.4 - 97.8 fL    MCH 27.7 27.0 - 33.0 pg    MCHC 31.1 (L) 32.2 - 35.5 g/dL    RDW 50.5 (H) 35.9 - 50.0 fL    Platelet Count 154 (L) 164 - 446 K/uL    MPV 10.6 9.0 - 12.9 fL    Neutrophils-Polys 66.00 44.00 - 72.00 %    Lymphocytes 20.30 (L) 22.00 - 41.00 %    Monocytes 9.80 0.00 - 13.40 %    Eosinophils 3.10 0.00 - 6.90 %    Basophils 0.30 0.00 - 1.80 %    Immature Granulocytes 0.50 0.00 - 0.90 %    Nucleated RBC 0.00 0.00 - 0.20  /100 WBC    Neutrophils (Absolute) 4.02 1.82 - 7.42 K/uL    Lymphs (Absolute) 1.24 1.00 - 4.80 K/uL    Monos (Absolute) 0.60 0.00 - 0.85 K/uL    Eos (Absolute) 0.19 0.00 - 0.51 K/uL    Baso (Absolute) 0.02 0.00 - 0.12 K/uL    Immature Granulocytes (abs) 0.03 0.00 - 0.11 K/uL    NRBC (Absolute) 0.00 K/uL   Comp Metabolic Panel (CMP)    Collection Time: 07/24/24  5:27 AM   Result Value Ref Range    Sodium 142 135 - 145 mmol/L    Potassium 3.9 3.6 - 5.5 mmol/L    Chloride 108 96 - 112 mmol/L    Co2 25 20 - 33 mmol/L    Anion Gap 9.0 7.0 - 16.0    Glucose 88 65 - 99 mg/dL    Bun 12 8 - 22 mg/dL    Creatinine 0.98 0.50 - 1.40 mg/dL    Calcium 9.3 8.5 - 10.5 mg/dL    Correct Calcium 9.9 8.5 - 10.5 mg/dL    AST(SGOT) 14 12 - 45 U/L    ALT(SGPT) 8 2 - 50 U/L    Alkaline Phosphatase 55 30 - 99 U/L    Total Bilirubin 0.6 0.1 - 1.5 mg/dL    Albumin 3.2 3.2 - 4.9 g/dL    Total Protein 5.8 (L) 6.0 - 8.2 g/dL    Globulin 2.6 1.9 - 3.5 g/dL    A-G Ratio 1.2 g/dL   HEMOGLOBIN A1C    Collection Time: 07/24/24  5:27 AM   Result Value Ref Range    Glycohemoglobin 5.4 4.0 - 5.6 %    Est Avg Glucose 108 mg/dL   TSH with Reflex to FT4    Collection Time: 07/24/24  5:27 AM   Result Value Ref Range    TSH 2.750 0.380 - 5.330 uIU/mL   Vitamin D, 25-hydroxy (blood)    Collection Time: 07/24/24  5:27 AM   Result Value Ref Range    25-Hydroxy   Vitamin D 25 47 30 - 100 ng/mL   ESTIMATED GFR    Collection Time: 07/24/24  5:27 AM   Result Value Ref Range    GFR (CKD-EPI) 57 (A) >60 mL/min/1.73 m 2   MAGNESIUM    Collection Time: 07/29/24  6:26 AM   Result Value Ref Range    Magnesium 1.9 1.5 - 2.5 mg/dL   CBC WITH DIFFERENTIAL    Collection Time: 07/29/24  6:26 AM   Result Value Ref Range    WBC 6.7 4.8 - 10.8 K/uL    RBC 3.38 (L) 4.20 - 5.40 M/uL    Hemoglobin 9.4 (L) 12.0 - 16.0 g/dL    Hematocrit 30.2 (L) 37.0 - 47.0 %    MCV 89.3 81.4 - 97.8 fL    MCH 27.8 27.0 - 33.0 pg    MCHC 31.1 (L) 32.2 - 35.5 g/dL    RDW 51.5 (H) 35.9 - 50.0 fL     Platelet Count 196 164 - 446 K/uL    MPV 10.3 9.0 - 12.9 fL    Neutrophils-Polys 73.30 (H) 44.00 - 72.00 %    Lymphocytes 15.60 (L) 22.00 - 41.00 %    Monocytes 7.60 0.00 - 13.40 %    Eosinophils 2.40 0.00 - 6.90 %    Basophils 0.70 0.00 - 1.80 %    Immature Granulocytes 0.40 0.00 - 0.90 %    Nucleated RBC 0.00 0.00 - 0.20 /100 WBC    Neutrophils (Absolute) 4.88 1.82 - 7.42 K/uL    Lymphs (Absolute) 1.04 1.00 - 4.80 K/uL    Monos (Absolute) 0.51 0.00 - 0.85 K/uL    Eos (Absolute) 0.16 0.00 - 0.51 K/uL    Baso (Absolute) 0.05 0.00 - 0.12 K/uL    Immature Granulocytes (abs) 0.03 0.00 - 0.11 K/uL    NRBC (Absolute) 0.00 K/uL   Basic Metabolic Panel    Collection Time: 07/29/24  6:26 AM   Result Value Ref Range    Sodium 138 135 - 145 mmol/L    Potassium 4.3 3.6 - 5.5 mmol/L    Chloride 105 96 - 112 mmol/L    Co2 26 20 - 33 mmol/L    Glucose 93 65 - 99 mg/dL    Bun 15 8 - 22 mg/dL    Creatinine 0.94 0.50 - 1.40 mg/dL    Calcium 9.6 8.5 - 10.5 mg/dL    Anion Gap 7.0 7.0 - 16.0   ESTIMATED GFR    Collection Time: 07/29/24  6:26 AM   Result Value Ref Range    GFR (CKD-EPI) 60 >60 mL/min/1.73 m 2   CBC WITHOUT DIFFERENTIAL    Collection Time: 08/01/24  6:01 AM   Result Value Ref Range    WBC 5.4 4.8 - 10.8 K/uL    RBC 3.34 (L) 4.20 - 5.40 M/uL    Hemoglobin 9.1 (L) 12.0 - 16.0 g/dL    Hematocrit 29.7 (L) 37.0 - 47.0 %    MCV 88.9 81.4 - 97.8 fL    MCH 27.2 27.0 - 33.0 pg    MCHC 30.6 (L) 32.2 - 35.5 g/dL    RDW 51.2 (H) 35.9 - 50.0 fL    Platelet Count 206 164 - 446 K/uL    MPV 10.2 9.0 - 12.9 fL   Basic Metabolic Panel    Collection Time: 08/01/24  6:01 AM   Result Value Ref Range    Sodium 138 135 - 145 mmol/L    Potassium 4.5 3.6 - 5.5 mmol/L    Chloride 104 96 - 112 mmol/L    Co2 25 20 - 33 mmol/L    Glucose 95 65 - 99 mg/dL    Bun 17 8 - 22 mg/dL    Creatinine 0.85 0.50 - 1.40 mg/dL    Calcium 9.6 8.5 - 10.5 mg/dL    Anion Gap 9.0 7.0 - 16.0   ESTIMATED GFR    Collection Time: 08/01/24  6:01 AM   Result Value Ref Range     GFR (CKD-EPI) 68 >60 mL/min/1.73 m 2   EC-ECHOCARDIOGRAM COMPLETE W/O CONT    Collection Time: 08/15/24  2:38 PM   Result Value Ref Range    Eject.Frac. MOD BP 80.05     Eject.Frac. MOD 4C 79.47     Eject.Frac. MOD 2C 80.63     Left Ventrical Ejection Fraction 75    EKG    Collection Time: 24  9:28 AM   Result Value Ref Range    Report       [facility]    Test Date:  2024  Pt Name:    ROSIO SHAFER                  Department: [facility]  MRN:        [id number]                      Room:  Gender:     Female                       Technician:   :        [id number]                   Requested By:MARTY JARAMILLO  Order #:    [id number]                    Reading MD: Max Aquino MD    Measurements  Intervals                                Axis  Rate:       67                           P:          -21  KY:         168                          QRS:        38  QRSD:       89                           T:          11  QT:         361  QTc:        381    Interpretive Statements  Sinus rhythm  Consider left atrial enlargement  Electronically Signed On 2024 21:31:36 PDT by Max Aquino MD     CBC WITHOUT DIFFERENTIAL    Collection Time: 10/30/24  2:36 PM   Result Value Ref Range    WBC 5.6 4.8 - 10.8 K/uL    RBC 4.45 4.20 - 5.40 M/uL    Hemoglobin 10.6 (L) 12.0 - 16.0 g/dL    Hematocrit 35.1 (L) 37.0 - 47.0 %    MCV 78.9 (L) 81.4 - 97.8 fL    MCH 23.8 (L) 27.0 - 33.0 pg    MCHC 30.2 (L) 32.2 - 35.5 g/dL    RDW 43.8 35.9 - 50.0 fL    Platelet Count 176 164 - 446 K/uL    MPV 10.5 9.0 - 12.9 fL       Assessment & Plan     1. S/P TAVR (transcatheter aortic valve replacement)        2. Essential hypertension  valsartan (DIOVAN) 40 MG Tab      3. Dyslipidemia        4. Aortic atherosclerosis (HCC)        5. Postoperative atrial fibrillation (HCC)            Medical Decision Making: Today's Assessment/Status/Plan:        It was my pleasure to meet with Ms. Shafer.    We addressed the management of  hypertension at today's visit. Blood pressure is well controlled.  We specifically assessed the labs on hypertension treatment  SWITCH TO VALSARTAN BID    We addressed the management of dyslipidemia at today's visit. She is on appropriate lipid lowering medication.    We addressed the management of aortic valve replacement at today's visit. She understands the importance of antiplatelet therapy as well as dental prophylaxis for the health of their aortic valve replacement.  We have also ensured that she has appropriate echocardiogram follow-up.    I will see Ms. Hess back in 1 year time and encouraged her to follow up with us over the phone or electronically using my MyChart as issues arise.    It is my pleasure to participate in the care of Ms. Hess.  Please do not hesitate to contact me with questions or concerns.    Sharan Tran MD PhD [facility]  Cardiologist [facility]    Please note that this dictation was created using voice recognition software. There may be errors I did not discover before finalizing the note.       () Today's E/M visit is associated with medical care services that serve as the continuing focal point for all needed health care services and/or with medical care services that  are part of ongoing care related to a patient's single, serious condition, or a complex condition: This includes  furnishing services to patients on an ongoing basis that result in care that is personalized  to the patient. The services result in a comprehensive, longitudinal, and continuous  relationship with the patient and involve delivery of team-based care that is accessible, coordinated with other practitioners and providers, and integrated with the broader health  care landscape.

## 2024-12-19 NOTE — PATIENT INSTRUCTIONS
We addressed the management of aortic valve replacement at today's visit. She understands the importance of antiplatelet therapy as well as dental prophylaxis for the health of their aortic valve replacement.  We have also ensured that she has appropriate echocardiogram follow-up.

## 2024-12-20 ENCOUNTER — TELEPHONE (OUTPATIENT)
Dept: SLEEP MEDICINE | Facility: MEDICAL CENTER | Age: 84
End: 2024-12-20
Payer: MEDICARE

## 2024-12-20 DIAGNOSIS — R05.3 CHRONIC COUGH: ICD-10-CM

## 2024-12-20 NOTE — TELEPHONE ENCOUNTER
Pt called needing a referral to [facility]. Saw Dr. Pablo 12/19/24 at [facility].   Please contact pt once referral placed.

## 2024-12-20 NOTE — TELEPHONE ENCOUNTER
Markell Pablo M.D.  You; Terra Sullivan7 minutes ago (1:16 PM)       Done    Orders Placed This Encounter      Referral to Gastroenterology

## 2024-12-20 NOTE — TELEPHONE ENCOUNTER
Called pt and lm, notifying pt her referral was placed and if she has any questions to return my call.

## 2025-01-09 NOTE — DISCHARGE PLANNING
[facility]                                  Skilled Nursing [facility] Rehabilitation Transitional Care Coordination    Physiatry consult complete.  Dr. Solano recommending good candidate for IPR.  Message to [facility] TCN team seeking authorization for acute rehab level care.  Will follow for auth determination.    1102 - Telephone call to patients son Len Kellogg to discuss IRF referral.  Len stated familiarity with [facility] ([facility]) from family member prior admission.  Reviewed specifics of inpatient rehab, answered questions/concerns.  Discussed ongoing medical management [facility], nursing care and plan for 3hrs therapy/5 days per week with goal of safe transition home.  Len endorses IRF for [place], tells he/nephew CJ (and spouse) will provide return to community support at patients residenc - [facility], threshold to enter, walk-in shower.  Len reports patient has supplemental insurance policy that provides 35 hrs/week caregiver support (daytime), CJ/spouse moving into Erin's home to provide night-time assist.  Reviewed [facility] CM team to assist through discharge process.  Discussed [facility] insurance, including copay, for program - prior auth required.  Discussed [facility] visitor policy, clothing requirements and requisite family training prior to discharge.  Len voiced understanding, stated he/CJ would attend family training.  Provided TCC contact information.  Len aware T[facility] requested [facility] auth for IRF.      Will follow for updates.         [facility]     Juan Coffey MD [facility] ARRHYTH [facility]   1/27/2025 10:45 AM Vidal Gore MD [facility] UROLOGY [facility]     I certify that SNF services are required to be given on an inpatient basis because Telly Montoya needs for skilled nursing 

## 2025-01-21 ENCOUNTER — HOSPITAL ENCOUNTER (OUTPATIENT)
Dept: LAB | Facility: MEDICAL CENTER | Age: 85
End: 2025-01-21
Attending: FAMILY MEDICINE
Payer: MEDICARE

## 2025-01-21 LAB
BASOPHILS # BLD AUTO: 0.5 % (ref 0–1.8)
BASOPHILS # BLD: 0.04 K/UL (ref 0–0.12)
EOSINOPHIL # BLD AUTO: 0.17 K/UL (ref 0–0.51)
EOSINOPHIL NFR BLD: 2 % (ref 0–6.9)
ERYTHROCYTE [DISTWIDTH] IN BLOOD BY AUTOMATED COUNT: 57.5 FL (ref 35.9–50)
FERRITIN SERPL-MCNC: 15.3 NG/ML (ref 10–291)
HCT VFR BLD AUTO: 40.9 % (ref 37–47)
HGB BLD-MCNC: 13 G/DL (ref 12–16)
IMM GRANULOCYTES # BLD AUTO: 0.02 K/UL (ref 0–0.11)
IMM GRANULOCYTES NFR BLD AUTO: 0.2 % (ref 0–0.9)
IRON SATN MFR SERPL: 8 % (ref 15–55)
IRON SERPL-MCNC: 30 UG/DL (ref 40–170)
LYMPHOCYTES # BLD AUTO: 1.19 K/UL (ref 1–4.8)
LYMPHOCYTES NFR BLD: 14.3 % (ref 22–41)
MCH RBC QN AUTO: 28.2 PG (ref 27–33)
MCHC RBC AUTO-ENTMCNC: 31.8 G/DL (ref 32.2–35.5)
MCV RBC AUTO: 88.7 FL (ref 81.4–97.8)
MONOCYTES # BLD AUTO: 0.63 K/UL (ref 0–0.85)
MONOCYTES NFR BLD AUTO: 7.6 % (ref 0–13.4)
NEUTROPHILS # BLD AUTO: 6.25 K/UL (ref 1.82–7.42)
NEUTROPHILS NFR BLD: 75.4 % (ref 44–72)
NRBC # BLD AUTO: 0 K/UL
NRBC BLD-RTO: 0 /100 WBC (ref 0–0.2)
PLATELET # BLD AUTO: 147 K/UL (ref 164–446)
PMV BLD AUTO: 11.1 FL (ref 9–12.9)
RBC # BLD AUTO: 4.61 M/UL (ref 4.2–5.4)
TIBC SERPL-MCNC: 360 UG/DL (ref 250–450)
UIBC SERPL-MCNC: 330 UG/DL (ref 110–370)
VIT B12 SERPL-MCNC: 417 PG/ML (ref 211–911)
WBC # BLD AUTO: 8.3 K/UL (ref 4.8–10.8)

## 2025-01-21 PROCEDURE — 82728 ASSAY OF FERRITIN: CPT

## 2025-01-21 PROCEDURE — 36415 COLL VENOUS BLD VENIPUNCTURE: CPT

## 2025-01-21 PROCEDURE — 83550 IRON BINDING TEST: CPT

## 2025-01-21 PROCEDURE — 83540 ASSAY OF IRON: CPT

## 2025-01-21 PROCEDURE — 82607 VITAMIN B-12: CPT

## 2025-01-21 PROCEDURE — 85025 COMPLETE CBC W/AUTO DIFF WBC: CPT

## 2025-02-11 ENCOUNTER — SPEECH THERAPY (OUTPATIENT)
Dept: SPEECH THERAPY | Facility: REHABILITATION | Age: 85
End: 2025-02-11
Attending: INTERNAL MEDICINE
Payer: MEDICARE

## 2025-02-11 DIAGNOSIS — R05.3 CHRONIC COUGH: ICD-10-CM

## 2025-02-11 DIAGNOSIS — J47.9 BRONCHIECTASIS WITHOUT COMPLICATION (HCC): ICD-10-CM

## 2025-02-11 PROCEDURE — 92610 EVALUATE SWALLOWING FUNCTION: CPT

## 2025-02-11 ASSESSMENT — ENCOUNTER SYMPTOMS: NO PATIENT REPORTED PAIN: 1

## 2025-02-11 NOTE — OP THERAPY EVALUATION
Outpatient Speech Therapy  INITIAL EVALUATION    [facility] Speech Therapy [facility]  [address]  [address]  [place] [address]  Phone:  [phone]  Fax:  [phone]    Date of Evaluation: 02/11/2025    Patient: Erin Hess  YOB: 1940  MRN: [id number]     Referring Provider: Markell Pablo M.D.  [address]  [address]  [place] [address]   Referring Diagnosis Bronchiectasis without complication (HCC) [J47.9];Chronic cough [R05.3]     Time Calculation    Start time: 1245  Stop time: 1330 Time Calculation (min): 45 minutes           Chief Complaint: No chief complaint on file.    Visit Diagnoses     ICD-10-CM   1. Bronchiectasis without complication (HCC)  J47.9   2. Chronic cough  R05.3     Subjective:   Reason for Therapy:     Reason For Evaluation:  Dysphagia    Onset Date:  2/11/2023    Onset Description:  Pt reported swallowing difficulties beginning 1-2 years ago.   Pt reported that foods become 'stuck' gesturing to the mid chest area.   This occurs 1-2x per week.  Pt suspects that food gets stuck near her hiatal hernia.  Social Support:     Social support system: alone; lives with roommate.  Progress Factors:     Progression:  Getting worse  Pain:     no pain reported    Therapy History:     Previous Diet:  Normal Diet and Thin Liquids    Current Diet:  Regular Diet and Thin Liquids    Dentition:  Complete Dentition    Past Medical History:   Diagnosis Date    Anesthesia     delayed emergence    Arthritis     Osteo    Asthma     COPD    Breast cancer (HCC)     Breath shortness     2L O2 NOC, PRN During Day    Bronchitis 2011    Cancer (HCC) 12/2012    right breast    Cataract     Bilat IOL    Chronic cough     productive, improving    Cough     Delayed emergence from general anesthesia     Dizziness 03/11/2013    Heart burn     Heart murmur     Hiatus hernia syndrome     High cholesterol     HTN (hypertension) 03/12/2013    Hypercholesterolemia     Hyperlipidemia 03/12/2013     Hypertension     Hypokalemia 03/12/2013    Indigestion     Mitral annular calcification     Pain     back, hips, knees    Pneumonia     Hx of    Pre-diabetes     Psychiatric disorder     depression    Renal disorder     CKD    Severe aortic stenosis 06/25/2024    Sleep apnea     h/o gastric sleeve lost 40 lb now not on CPAP    Snoring     Sputum production     ULCERATIVE COLITIS 03/12/2013    Urinary incontinence     Stress incontinence    Vertigo 03/11/2013     Past Surgical History:   Procedure Laterality Date    TRANSCATHETER AORTIC VALVE REPLACEMENT Bilateral 7/15/2024    Procedure: TRANSCATHETER AORTIC VALVE REPLACEMENT;  Surgeon: Ciro Luis M.D.;  Location: SURGERY [facility];  Service: Cardiac    ECHOCARDIOGRAM, TRANSESOPHAGEAL, INTRAOPERATIVE N/A 7/15/2024    Procedure: ECHOCARDIOGRAM, TRANSESOPHAGEAL, INTRAOPERATIVE;  Surgeon: Ciro Luis M.D.;  Location: SURGERY [facility];  Service: Cardiac    FEMORAL ARTERY REPAIR Right 7/15/2024    Procedure: REPAIR, ARTERY, FEMORAL WITH PATCH;  Surgeon: Ciro Luis M.D.;  Location: SURGERY [facility];  Service: Cardiac    GASTRIC SLEEVE LAPAROSCOPY N/A 05/18/2016    Procedure: GASTRIC SLEEVE LAPAROSCOPY, HIATAL HERNIA AND LIVER BIOPSY;  Surgeon: Mauricio Montes M.D.;  Location: SURGERY [facility] ORS;  Service:     US-NEEDLE CORE BX-BREAST PANEL  01/01/2013    rt breast, with lumpectomy     CATARACT EXTRACTION WITH IOL      GYN SURGERY      vag hyster 1990    GYN SURGERY      vaginal cyst removal     LUMPECTOMY  left    OTHER       R breast bx X 2& lipoma removal     Objective:   Treatments/Interventions Performed:  Dysphagia treatment  Objective Details:  The Eating Assessment Tool (EAT-10) was administered this day.  Pt was required to rate 10 aspects of swallow on a 0-4 scale (0 = no problem; 4 = severe problem).  Pt scored 18 on the EAT-10.      The Swallowing Ability and Function Evaluation was administered this day with the following  results:    Physical Evaluation  Raw Score: 78  Percentile: 90  Stanine: 9 (WNL)    Oral Phase  Raw Score: 20  Percentile: 78  Stanine: 8 (WNL)    Pharyngeal Phase  Raw Score: 20  Percentile: 78  Stanine: 8 (WNL)           Speech Therapy Assessment:     Oral Motor Status:     Labial strength and control for patient: Good    Lingual strength and control for patient: Good    Patient saliva management: GoodPatient oral sensation and awareness: Good    Patient awareness of swallow problem: Good    Previous modified barium swallow: No    Oral motor status comments: Pt scheduled for and MBS this April.      Oral Phase Assessment:     Types of food within functional limits: Puree, Mechanical Soft and Thin Liquid    Patient stasis and residue: Regular    Liquid wash to clear residue: Regular    Pharyngeal Phase Assessment:     Delayed Swallow    Food types within functional limits: Puree, Mechanical Soft, Regular and Thin Liquid    Pharyngeal phase comments: No overt s/s of aspiration observed following thin liquids, pureed, soft bite sized, or regular textures.  Pt reported history of pneumonia.  Pt unsure if it was community acquired, or due to aspiration.  Pt stated that a physician suspected aspiration of reflux.  Pt stated that she does experience heartburn.     Speech Therapy Plan :   Prognosis & Recommendations  Impression Summary:  Pt presented with typical orophayrngeal swallow.  Suspect mild esophageal dysphagia secondary to hiatal hernia.  Pt is scheduled for MBS to fully assess swallow function.   Prognosis:  Good  Prognosis Details:  Limited deficit; highly motivated; follows directives.   Compensatory swallow strategies:  Upright position 90 degrees during meal and 45 minutes after meal, Reduce bolus size, Multiple swallows, Alternate liquids/solids and No talking while eating  Diet Recommendation:  Normal Consistency  Liquid Recommendation:  Thin  Medication Administration:  Whole with thin  Goals  Short Term  "Goals:  Pt verbalized understanding of swallow strategies, effortful swallow, and shaker exercises.   Short Term Goal Duration (Weeks):  1-2 weeks  Long Term Goals:  Pt verbalized understanding of swallow strategies, effortful swallow, and shaker exercises.     Long Term Goal Duration (Weeks):  1-2 weeks  Patient Stated Goal:  \"To swallow all food without it getting stuck.\"   Therapy Recommendations  Recommendation:  Modified Barium Swallow Study,  Frequency:  1x week  Duration (in visits):  1  Duration (in weeks):  1  Plan Comments  Additional comments:  Skilled dysphagia therapy was not recommended at this time.  Exercises, and swallow strategies were provided.  Pt verbalized understanding.  Recommend MBS to fully assess swallow function, and to determine if pt has silent aspiration.  Pt may be referred back to this service as deemed appropriate, pending results of MBS.       Functional Assessment Used   EAT-10; SAFE    Referring provider co-signature:  I have reviewed this plan of care and my co-signature certifies the need for services.    Certification Period: 02/11/2025 to  03/11/25    Physician Signature: ________________________________ Date: ______________            "

## 2025-02-13 ENCOUNTER — APPOINTMENT (OUTPATIENT)
Dept: SPEECH THERAPY | Facility: REHABILITATION | Age: 85
End: 2025-02-13
Attending: INTERNAL MEDICINE
Payer: MEDICARE

## 2025-02-13 ENCOUNTER — PATIENT MESSAGE (OUTPATIENT)
Dept: CARDIOLOGY | Facility: MEDICAL CENTER | Age: 85
End: 2025-02-13
Payer: MEDICARE

## 2025-02-13 DIAGNOSIS — I10 ESSENTIAL HYPERTENSION: ICD-10-CM

## 2025-02-13 RX ORDER — VALSARTAN 40 MG/1
40 TABLET ORAL 2 TIMES DAILY
Qty: 200 TABLET | Refills: 2 | Status: SHIPPED | OUTPATIENT
Start: 2025-02-13

## 2025-02-13 NOTE — PATIENT COMMUNICATION
Last OV reviewed. Medication RX qty updated to 100 day supply and sent to preferred pharmacy. Mychart to patient.

## 2025-02-19 ENCOUNTER — APPOINTMENT (OUTPATIENT)
Dept: SPEECH THERAPY | Facility: REHABILITATION | Age: 85
End: 2025-02-19
Payer: MEDICARE

## 2025-02-26 ENCOUNTER — APPOINTMENT (OUTPATIENT)
Dept: SPEECH THERAPY | Facility: REHABILITATION | Age: 85
End: 2025-02-26
Attending: INTERNAL MEDICINE
Payer: MEDICARE

## 2025-03-05 ENCOUNTER — APPOINTMENT (OUTPATIENT)
Dept: SPEECH THERAPY | Facility: REHABILITATION | Age: 85
End: 2025-03-05
Attending: INTERNAL MEDICINE
Payer: MEDICARE

## 2025-03-06 ENCOUNTER — TELEPHONE (OUTPATIENT)
Dept: FAMILY PLANNING/WOMEN'S HEALTH CLINIC | Facility: PHYSICIAN GROUP | Age: 85
End: 2025-03-06
Payer: MEDICARE

## 2025-03-06 NOTE — TELEPHONE ENCOUNTER
Called Erin Hess and left message for patient to schedule annual [facility]TE Visit with [facility] Program. Left phone number for patient to call [phone] to schedule.

## 2025-03-10 ENCOUNTER — APPOINTMENT (OUTPATIENT)
Dept: SPEECH THERAPY | Facility: REHABILITATION | Age: 85
End: 2025-03-10
Attending: INTERNAL MEDICINE
Payer: MEDICARE

## 2025-03-12 ENCOUNTER — APPOINTMENT (OUTPATIENT)
Dept: SPEECH THERAPY | Facility: REHABILITATION | Age: 85
End: 2025-03-12
Attending: INTERNAL MEDICINE
Payer: MEDICARE

## 2025-03-17 ENCOUNTER — APPOINTMENT (OUTPATIENT)
Dept: SPEECH THERAPY | Facility: REHABILITATION | Age: 85
End: 2025-03-17
Attending: INTERNAL MEDICINE
Payer: MEDICARE

## 2025-03-19 ENCOUNTER — APPOINTMENT (OUTPATIENT)
Dept: SPEECH THERAPY | Facility: REHABILITATION | Age: 85
End: 2025-03-19
Attending: INTERNAL MEDICINE
Payer: MEDICARE

## 2025-03-20 ENCOUNTER — TELEPHONE (OUTPATIENT)
Dept: CARDIOLOGY | Facility: MEDICAL CENTER | Age: 85
End: 2025-03-20
Payer: MEDICARE

## 2025-03-21 ENCOUNTER — HOSPITAL ENCOUNTER (OUTPATIENT)
Dept: RADIOLOGY | Facility: MEDICAL CENTER | Age: 85
End: 2025-03-21
Attending: FAMILY MEDICINE
Payer: MEDICARE

## 2025-03-21 PROCEDURE — 77067 SCR MAMMO BI INCL CAD: CPT

## 2025-03-21 NOTE — TELEPHONE ENCOUNTER
Message: Called and left message for patient to schedule annual LIZETTE Visit with [facility] Program. Left phone number for patient to call [facility] at [phone] to schedule.

## 2025-03-24 ENCOUNTER — APPOINTMENT (OUTPATIENT)
Dept: SPEECH THERAPY | Facility: REHABILITATION | Age: 85
End: 2025-03-24
Attending: INTERNAL MEDICINE
Payer: MEDICARE

## 2025-04-11 ENCOUNTER — OFFICE VISIT (OUTPATIENT)
Dept: SLEEP MEDICINE | Facility: MEDICAL CENTER | Age: 85
End: 2025-04-11
Attending: INTERNAL MEDICINE
Payer: MEDICARE

## 2025-04-11 VITALS
WEIGHT: 174 LBS | SYSTOLIC BLOOD PRESSURE: 116 MMHG | HEART RATE: 86 BPM | HEIGHT: 64 IN | DIASTOLIC BLOOD PRESSURE: 58 MMHG | BODY MASS INDEX: 29.71 KG/M2

## 2025-04-11 DIAGNOSIS — J44.89 ASTHMA-COPD OVERLAP SYNDROME (HCC): ICD-10-CM

## 2025-04-11 DIAGNOSIS — J47.9 BRONCHIECTASIS WITHOUT COMPLICATION (HCC): ICD-10-CM

## 2025-04-11 DIAGNOSIS — R05.3 CHRONIC COUGH: ICD-10-CM

## 2025-04-11 PROCEDURE — 3074F SYST BP LT 130 MM HG: CPT | Performed by: INTERNAL MEDICINE

## 2025-04-11 PROCEDURE — 99214 OFFICE O/P EST MOD 30 MIN: CPT | Performed by: INTERNAL MEDICINE

## 2025-04-11 PROCEDURE — 99213 OFFICE O/P EST LOW 20 MIN: CPT | Performed by: INTERNAL MEDICINE

## 2025-04-11 PROCEDURE — 3078F DIAST BP <80 MM HG: CPT | Performed by: INTERNAL MEDICINE

## 2025-04-11 ASSESSMENT — ENCOUNTER SYMPTOMS
HEMOPTYSIS: 0
VOMITING: 0
FEVER: 0
EYE DISCHARGE: 0
CHILLS: 0
FALLS: 0
SHORTNESS OF BREATH: 0
NAUSEA: 0
FOCAL WEAKNESS: 0
COUGH: 1
SPUTUM PRODUCTION: 1

## 2025-04-11 ASSESSMENT — FIBROSIS 4 INDEX: FIB4 SCORE: 2.79

## 2025-04-11 NOTE — PROGRESS NOTES
Pulmonary Clinic Note    Chief Complaint:  Chief Complaint   Patient presents with    Follow-Up     Bronchiectasis. Last seen 12/18/24      Other     Speech Thereapy 02/11/25, MBSS scheduled 04/16/25     HPI:     Erin Hess is a very pleasant 85 y.o. 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     2023: FVC 2.97 L, 114% predicted ; FEV1 1.88L, 95% predicted; ratio 63%.  , no BD response. DLCO 95%  2016: FVC 2.11 L, 96% predicted; FEV1 2.11 L, 96% predicted; ratio 74% ++ BD response    HRCT 2023  reticular/fibrotic opacification with mild bronchiectasis in the RLL; sputum cultures demonstrated no growth. Large hiatial hernia noted.  Repeat CT chest in 7/2024 shows no interval change.    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    PMH:   S/p gastric sleeve, GERD, large hiatal hernia  Hypertension- spironolactone, HCTZ, losartan  Atrial fibrillation- amiodarone  HFrEF- last EF following TAVR in 7/2024 was 75%    Interval events since last clinic visit in 12/2024:  On last clinic visit her chief complaint was a persistent cough productive of yellow sputum.  This was felt to be multifactorial due to underlying COPD/asthma, bronchiectasis, and severe sinus issues.   She also endorsed food getting stuck in her throat and uncontrolled GERD. CT demonstrates large hiatal hernia (history of gastric sleeve) and primarily right lower lobe bronchiectasis, which is also suggestive of chronic aspiration.  She was referred for SLP for evaluation which per patient was reassuring. An MBSS is shceduled for next week and EGD with [facility] is scheduled for 5/2025    Today she reports a continued cough. She got COVID in Dec 2024 which she felt set her back significantly for some time but cough is now back to baseline. Sadly she had to put two of her dogs down but now  has two more schnauzers. She has continued with Trelegy 200, DuoNebs, OTC antihistamine.     Past Medical History:   Diagnosis Date    Anesthesia     delayed emergence    Arthritis     Osteo    Asthma     COPD    Breast cancer (HCC)     Breath shortness     2L O2 NOC, PRN During Day    Bronchitis 2011    Cancer (HCC) 12/2012    right breast    Cataract     Bilat IOL    Chronic cough     productive, improving    Cough     Delayed emergence from general anesthesia     Dizziness 03/11/2013    Heart burn     Heart murmur     Hiatus hernia syndrome     High cholesterol     HTN (hypertension) 03/12/2013    Hypercholesterolemia     Hyperlipidemia 03/12/2013    Hypertension     Hypokalemia 03/12/2013    Indigestion     Mitral annular calcification     Pain     back, hips, knees    Pneumonia     Hx of    Pre-diabetes     Psychiatric disorder     depression    Renal disorder     CKD    Severe aortic stenosis 06/25/2024    Sleep apnea     h/o gastric sleeve lost 40 lb now not on CPAP    Snoring     Sputum production     ULCERATIVE COLITIS 03/12/2013    Urinary incontinence     Stress incontinence    Vertigo 03/11/2013       Past Surgical History:   Procedure Laterality Date    TRANSCATHETER AORTIC VALVE REPLACEMENT Bilateral 7/15/2024    Procedure: TRANSCATHETER AORTIC VALVE REPLACEMENT;  Surgeon: Ciro Luis M.D.;  Location: [facility];  Service: Cardiac    ECHOCARDIOGRAM, TRANSESOPHAGEAL, INTRAOPERATIVE N/A 7/15/2024    Procedure: ECHOCARDIOGRAM, TRANSESOPHAGEAL, INTRAOPERATIVE;  Surgeon: Ciro Luis M.D.;  Location: [facility];  Service: Cardiac    FEMORAL ARTERY REPAIR Right 7/15/2024    Procedure: REPAIR, ARTERY, FEMORAL WITH PATCH;  Surgeon: Ciro Luis M.D.;  Location: [facility];  Service: Cardiac    GASTRIC SLEEVE LAPAROSCOPY N/A 05/18/2016    Procedure: GASTRIC SLEEVE LAPAROSCOPY, HIATAL HERNIA AND LIVER BIOPSY;  Surgeon: Mauricio Montes M.D.;  Location: [facility] ORS;   Service:     US-NEEDLE CORE BX-BREAST PANEL  2013    rt breast, with lumpectomy     CATARACT EXTRACTION WITH IOL      GYN SURGERY      vag hyster     GYN SURGERY      vaginal cyst removal     LUMPECTOMY  left    OTHER       R breast bx X 2& lipoma removal       Social History     Socioeconomic History    Marital status:      Spouse name: Not on file    Number of children: 6    Years of education: Not on file    Highest education level: 12th grade   Occupational History    Not on file   Tobacco Use    Smoking status: Former     Current packs/day: 0.00     Average packs/day: 1 pack/day for 20.0 years (20.0 ttl pk-yrs)     Types: Cigarettes     Start date: 1966     Quit date: 1986     Years since quittin.9     Passive exposure: Past    Smokeless tobacco: Never    Tobacco comments:     Quit in    Vaping Use    Vaping status: Never Used   Substance and Sexual Activity    Alcohol use: Yes     Alcohol/week: 4.2 oz     Types: 7 Shots of liquor per week     Comment: 1 drink a day    Drug use: No    Sexual activity: Not Currently   Other Topics Concern    Not on file   Social History Narrative    She lives alone but has a female roommate who is 60, Leonora Rosa who has a brother in town she is estranged from. Leonora Rosa had Covid  and now has long-haulers and is on medicaid     She has 2 children and 4 stepchildren, her   '. She has a son near [place], [place] and another in [place]. She relies on her 2 grandkids who live in [place] and her 8y younger sister lives in [place], her older sister lives in a [place] in [place]. 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  then  after 27y. Her ex  her cousin, met her 2nd  and was  for 25y.     Her son supports her keeping Leonora Rosa at her home. She does not feel taken advantage of despite no longer receiving rent.      Social Drivers of Health     Financial Resource Strain:  Medium Risk (3/5/2024)    Overall Financial Resource Strain (CARDIA)     Difficulty of Paying Living Expenses: Somewhat hard   Food Insecurity: No Food Insecurity (2024)    Hunger Vital Sign     Worried About Running Out of Food in the Last Year: Never true     Ran Out of Food in the Last Year: Never true   Transportation Needs: No Transportation Needs (2024)    PRAPARE - Transportation     Lack of Transportation (Medical): No     Lack of Transportation (Non-Medical): No   Physical Activity: Inactive (2022)    Exercise Vital Sign     Days of Exercise per Week: 0 days     Minutes of Exercise per Session: 0 min   Stress: Stress Concern Present (2022)    [employer] - Occupational Stress Questionnaire     Feeling of Stress : To some extent   Social Connections: Feeling Socially Integrated (2024)    OASIS : Social Isolation     Frequency of experiencing loneliness or isolation: Never   Intimate Partner Violence: Not At Risk (2024)    Humiliation, Afraid, Rape, and Kick questionnaire     Fear of Current or Ex-Partner: No     Emotionally Abused: No     Physically Abused: No     Sexually Abused: No   Housing Stability: Low Risk  (2024)    Housing Stability Vital Sign     Unable to Pay for Housing in the Last Year: No     Number of Places Lived in the Last Year: 1     Unstable Housing in the Last Year: No          Family History   Problem Relation Age of Onset    Heart Disease Mother         CAD MI at age 72    Cancer Mother         breast cancer  at age 83    Cancer Sister     Lung Disease Father 75        Lung cancer    Cancer Maternal Grandmother         Pancreatic cancer    Heart Attack Maternal Grandfather         MI at age 70    Heart Disease Sister         Rhuemati cfever- herat murmur       Current Outpatient Medications on File Prior to Visit   Medication Sig Dispense Refill    valsartan (DIOVAN) 40 MG Tab Take 1 Tablet by mouth 2 times a day.  Indications: High Blood Pressure 200 Tablet 2    aspirin (ASA) 81 MG Chew Tab chewable tablet Chew 1 Tablet every day. 100 Tablet 3    albuterol (PROVENTIL) 2.5mg/3ml Nebu Soln solution for nebulization TAKE 3 ML BY NEBULIZATION EVERY FOUR HOURS AS NEEDED FOR SHORTNESS OF BREATH. 450 mL 9    pantoprazole (PROTONIX) 40 MG Tablet Delayed Response Take 1 Tablet by mouth every day. 30 Tablet 2    PARoxetine (PAXIL) 10 MG Tab Take 1 Tablet by mouth every day. 100 Tablet 2    rosuvastatin (CRESTOR) 20 MG Tab Take 1 Tablet by mouth every evening. 100 Tablet 2    spironolactone (ALDACTONE) 25 MG Tab Take 1 Tablet by mouth every day. 30 Tablet 2    TRELEGY ELLIPTA 200-62.5-25 MCG/ACT inhaler Inhale 1 Puff every day. 60 Each 2    MAGNESIUM PO Take 420 mg by mouth every evening. Indications: supplement      Multiple Vitamins-Minerals (PRESERVISION AREDS 2 PO) Take 1 Tablet by mouth every evening. Indications: supplement      melatonin 5 mg Tab Take 5 mg by mouth every evening as needed. Indications: Trouble Sleeping      Home Care Oxygen Inhale 2 L/min as needed for Shortness of Breath (shortness of breath). Indications: SOB      anastrozole (ARIMIDEX) 1 MG Tab Take 1 Tablet by mouth every morning. 100 Tablet 2    traZODone (DESYREL) 50 MG Tab Take 1 Tablet by mouth at bedtime as needed for Sleep. 30 Tablet 3     No current facility-administered medications on file prior to visit.       Allergies: Sulfa drugs, Vancomycin, and Codeine      ROS:   Review of Systems   Constitutional:  Negative for chills and fever.   HENT:  Positive for congestion. Negative for hearing loss.    Eyes:  Negative for discharge.   Respiratory:  Positive for cough and sputum production. Negative for hemoptysis and shortness of breath.    Cardiovascular:  Negative for chest pain.   Gastrointestinal:  Negative for nausea and vomiting.   Musculoskeletal:  Negative for falls.   Skin:  Negative for rash.   Neurological:  Negative for focal weakness.  "      Vitals:  /58 (BP Location: Left arm, Patient Position: Sitting, BP Cuff Size: Adult)   Pulse 86   Ht 1.626 m (5' 4\")   Wt 78.9 kg (174 lb)   PF 90 L/min     Physical Exam:  Physical Exam  Vitals and nursing note reviewed.   Constitutional:       General: She is not in acute distress.     Appearance: Normal appearance. She is not ill-appearing or toxic-appearing.   HENT:      Head: Normocephalic and atraumatic.      Nose: Nose normal.      Mouth/Throat:      Mouth: Mucous membranes are moist.   Eyes:      General: No scleral icterus.     Conjunctiva/sclera: Conjunctivae normal.   Cardiovascular:      Rate and Rhythm: Normal rate and regular rhythm.   Pulmonary:      Effort: Pulmonary effort is normal. No respiratory distress.      Breath sounds: No wheezing, rhonchi or rales.   Abdominal:      Palpations: Abdomen is soft.   Musculoskeletal:         General: No deformity or signs of injury. Normal range of motion.      Cervical back: Normal range of motion.   Skin:     General: Skin is warm and dry.   Neurological:      General: No focal deficit present.      Mental Status: She is alert. Mental status is at baseline.   Psychiatric:         Mood and Affect: Mood normal.         Behavior: Behavior normal.       Data:    PFTs as reviewed by me personally show:  See HPI    Imaging as reviewed by me personally show:    See HPI    Assessment/Plan:    1. Chronic cough        2. Bronchiectasis without complication (HCC)        3. Asthma-COPD overlap syndrome (HCC)  Referral to Pulmonary Rehab    PULMONARY FUNCTION TESTS -Test requested: Complete Pulmonary Function Test        Suspect due to both anterograde and retrograde aspiration as she endorses a sensation of food getting stuck in her throat as well as uncontrolled reflux despite being on PPI.  CT also demonstrates large hiatal hernia (history of gastric sleeve) and primarily right lower lobe bronchiectasis, which is also suggestive of chronic aspiration.  " Referral for SLP for evaluation was unrevealing per patient. MBSS scheduled for 4/2025, EGD scheduled for 5/2025.   Due to problem #1.  Plan of care as outlined above.  Update PFTs.  Continue Trelegy 200.  Rinse mouth after use. Referral to pulmonary rehab placed.    Return in about 2 months (around 6/11/2025).     This note was generated using voice recognition software which has a chance of producing errors of grammar and possibly content.  I have made every reasonable attempt to find and correct any obvious errors, but it should be expected that some may not be found prior to finalization of this note.    Time spent in record review prior to patient arrival, reviewing results, and in face-to-face encounter totaled 37 min, excluding any procedures if performed.  __________  Markell Pablo MD  Pulmonary and Critical Care Medicine  [facility]

## 2025-04-14 ENCOUNTER — TELEPHONE (OUTPATIENT)
Dept: CARDIOLOGY | Facility: MEDICAL CENTER | Age: 85
End: 2025-04-14
Payer: MEDICARE

## 2025-04-14 NOTE — TELEPHONE ENCOUNTER
Received call from centralized scheduling indicating patient had called and would like to schedule 1 year post TAVR follow up. Call transferred to RN.    Spoke with patient. Office visit with Dolly HERRERA scheduled on 7/22/2025. All questions answered.   
oral

## 2025-04-16 ENCOUNTER — HOSPITAL ENCOUNTER (OUTPATIENT)
Dept: RADIOLOGY | Facility: MEDICAL CENTER | Age: 85
End: 2025-04-16
Attending: INTERNAL MEDICINE
Payer: MEDICARE

## 2025-04-16 DIAGNOSIS — R13.10 DYSPHAGIA, UNSPECIFIED TYPE: ICD-10-CM

## 2025-04-16 PROCEDURE — 92611 MOTION FLUOROSCOPY/SWALLOW: CPT

## 2025-04-16 PROCEDURE — 74230 X-RAY XM SWLNG FUNCJ C+: CPT

## 2025-04-16 NOTE — OP THERAPY EVALUATION
Modified Barium Swallow Study      PMHx:   Pt reported swallowing difficulties beginning 1-2 years ago.   Pt reported that foods become 'stuck' gesturing to the mid chest area.   This occurs 1-2x per week.  Pt suspects that food gets stuck near her hiatal hernia.   Pt previously evaluated by this therapist for outpt bedside swallow evaluation.  Pt reported no change in swallow function since bedside swallow evaluation on 2/11/2025.   Pt stated that she consumes regular textures and thin liquids, but uses small bites, and sauces/gravy to aid in consumption of meats.      Current Method of Nutrition   Oral diet (regular/thin)    Pertinent Information  Affect/Behavior: Appropriate, Calm  Oxygen Requirements: Room Air  Secretion Management: WNL  Feeding Tube: None  Dentition: Good  Factor(s) Affecting Performance: None      Discussed with the risks, benefits, and alternatives of the VFSS procedure. Patient/family acknowledged and agreed to proceed.    Assessment  Videofluoroscopic Swallow Study was conducted in the lateral and AP projection(s) to evaluate oropharyngeal swallow function. A radiology tech was present to assist with the procedure.       Positioning: seated upright in fluoroscopy chair, sitting (AP view)  Anatomic View: WNL  Bolus Administration: SLP and Patient  PO barium contrast trials: Varibar thin liquid, Varibar pudding, solid coated in Varibar pudding, mixed consistency coated in Varibar powder      Consistency PAS Score Timing Comments   Thin Liquid 1 N/A    Mildly Thick Liquid      Liquidized      Pudding 1 N/A    Soft & Bite Sized 1 N/A    Solid 1 N/A    Mixed 1 N/A        Penetration-Aspiration Scale  1     No contrast enters airway  2     Contrast enters the airway, remains above the vocal folds, and is ejected from the airway (not seen in the airway at the end of the swallow).  3     Contrast enters the airway, remains above the vocal folds, and is not ejected from the airway (is seen in the  airway after the swallow).  4     Contrast enters the airway, contacts the vocal folds, and is ejected from the airway.  5     Contrast enters the airway, contacts the vocal folds, and is not ejected from the airway  6     Contrast enters the airway, crosses the plane of the vocal folds, and is ejected from the airway.  7     Contrast enters the airway, crosses the plane of the vocal folds, and is not ejected from the airway despite effort.  8     Contrast enters the airway, crosses the plane of the vocal folds, is not ejected from the airway and there is no response to aspiration.        Oral phase:    Oral motor examination revealed adequate lingual, labial, and soft palate function.  No oral residue observed following textures, or liquids.      Pharyngeal phase:    Minimal pharyngeal dysphagia characterized by trace vallecular residue due to mildly decreased tongue base retraction.  Residue cleared with multiple swallows.  No aspiration, or penetration, observed during the study.     Esophageal phase:    Suspect esophageal dysphagia.  Backflow observed near mid esophagus.  Recommend GI consult.     Compensatory Strategies:    Multiple swallows cleared vallecular residue.     Clinical Impressions  The pt presents with mild pharyngeal dysphagia, and suspected esophageal dysphagia.  Pharyngeal dysphagia was characterized by decrease tongue base retraction resulting in vallecular residue.  This cleared with thin additional swallows.  Recommend GI to assess possible esophageal dysphagia.        Recommendations  Regular textures/thin liquids, meds whole with thin liquids.   2.  Swallowing Instructions & Precautions:   Supervision: Independent  Positioning: Fully upright and midline during oral intake, Remain upright for 30 minutes after oral intake  Medication: Whole with liquid  Strategies: Small bites/sips, Alternate bites and sips, Slow rate of intake, Multiple swallows (x 2) per bite/sip   Oral Care: BID  3.   Skilled dysphagia treatment is not warranted at this time.  Pt may be referred back to this service as deemed appropriate.

## 2025-04-21 ENCOUNTER — TELEPHONE (OUTPATIENT)
Dept: CARDIOLOGY | Facility: MEDICAL CENTER | Age: 85
End: 2025-04-21
Payer: MEDICARE

## 2025-04-21 NOTE — LETTER
PROCEDURE/SURGERY CLEARANCE FORM      Encounter Date: 4/21/2025    Patient: Erin Hess  YOB: 1940    CARDIOLOGIST:  Sharan Tran M.D.    REFERRING DOCTOR:  No ref. provider found    The following procedure/surgery: Endoscopy                                            Additional comments: She can proceed with the proposed procedure or surgery from a cardiac standpoint, no modifiable cardiovascular risk, no further cardiac testing required, hold antiplatelet as necessary, resume as soon as possible typically when patient is able to take oral medications.     It is my pleasure to participate in the care of Ms. Hess.  Please do not hesitate to contact me with questions or concerns. [facility]iology is available 24/7 for consultative services at [phone] in the perioperative period.    PROCEDURE/SURGERY CLEARANCE FORM    Date: 4/22/2025   Patient Name: Erin Hess    Dear Surgeon or Proceduralist,      Thank you for your request for cardiac stratification of our mutual patient Erin Hess 1940. We have reviewed their [facility] records; and to the best of our understanding this patient has not had stenting, ablation, watchman, cardiothoracic surgery or hospitalization for cardiovascular reasons in the past 6 months.  Erin Hess has been seen within the past 15 months and is considered to have non-modifiable cardiac risk for this low-risk procedure/surgery. They may proceed from a cardiovascular standpoint and may hold their antiplatelet/anticoagulation as briefly as possible. Please have patient resume this medication when hemodynamically stable to do so.     Aspirin or Prasugrel   - hold 7 days prior to procedure/surgery, resume when hemodynamically stable      Clopidrogrel or Ticagrelor  - hold 7 days for all neurological procedures, hold 5 days prior to all other procedure/surgery,  resume when hemodynamically stable     Warfarin - hold 7 days for all neurological procedures,  hold 5 days prior to all other procedure/surgery and coordinate with [facility] ([phone]) INR testing and dose management.      Pradaxa/Xarelto/Eliquis/Savesya - hold 1 day prior to procedure for low bleeding risk procedure, 2 days for high bleeding risk procedure, or consider holding 3 days or longer for patients with reduced kidney function (CrCl <30mL/min) or spinal/cranial surgeries/procedures.      If they have a mechanical heart valve, please coordinate with [facility] ([phone]) the proper management of their anticoagulant in the periprocedural or perioperative period.      Some patients have higher risk for cardiovascular complications or holding medication. If our patient has had prior complications of holding antiplatelet or anticoagulants in the past and we have seen them after these events, we have addressed these concerns with the patient. They are at an unknown degree of increased risk for recurrent complication.  You may hold anticoagulation/antiplatelets for the procedure or surgery if the benefits of the procedure or surgery outweigh this nonmodifiable risk.      If Erin Hess 1940 has new symptoms of heart failure decompensation, unstable arrythmia, or angina please reach out and we will assess the patient.      If you have other patient-specific concerns, please feel free to reach out to the patient's cardiologist directly at [phone].     Thank you,       [facility]                     Electronically signed       MD Signature  Sharan Tran M.D.

## 2025-04-21 NOTE — TELEPHONE ENCOUNTER
Last OV: 12.19.2024  Proposed Surgery: Endoscopy  Surgery Date: 05.08.2025  Requesting Office Name: [facility]   Fax Number: [phone]  Preference of Location (default is surgery center unless specified by Cardiologist or [facility])  Prior Clearance Addressed: No    Is this a general clearance? YES   Anticoags/Antiplatelets: Aspirin  Anticoags/Antiplatelet managed by Cardiology? YES    Outstanding Cardiac Imaging : Yes  Echo.   Clearance to provider to review and Other CL-TRANSCATHETER AORTIC VALVE REPLACEMENT  Clearance to provider to review  Ablation, Cardioversion, Stent, Cardiac Devices, Catheterization, Watchman: Yes  Date : 07.08.2024   TAVR/Valve, Mitral Clip, Watchman (including open heart),: Completed over 6 months post procedure- Send clearance letter  07.15  2024  Recent Cardiac Hospitalization: No            When: Greater than 3 months since hospitalization   History (cardiac history):   Past Medical History:   Diagnosis Date    Anesthesia     delayed emergence    Arthritis     Osteo    Asthma     COPD    Breast cancer (HCC)     Breath shortness     2L O2 NOC, PRN During Day    Bronchitis 2011    Cancer (HCC) 12/2012    right breast    Cataract     Bilat IOL    Chronic cough     productive, improving    Cough     Delayed emergence from general anesthesia     Dizziness 03/11/2013    Heart burn     Heart murmur     Hiatus hernia syndrome     High cholesterol     HTN (hypertension) 03/12/2013    Hypercholesterolemia     Hyperlipidemia 03/12/2013    Hypertension     Hypokalemia 03/12/2013    Indigestion     Mitral annular calcification     Pain     back, hips, knees    Pneumonia     Hx of    Pre-diabetes     Psychiatric disorder     depression    Renal disorder     CKD    Severe aortic stenosis 06/25/2024    Sleep apnea     h/o gastric sleeve lost 40 lb now not on CPAP    Snoring     Sputum production     ULCERATIVE COLITIS 03/12/2013    Urinary incontinence     Stress incontinence    Vertigo 03/11/2013           Is  this a dental clearance? NO  Ablation, Cardioversion, Watchman, Stents, Cath, Devices within the last 3 months? No   If yes- Send dental wait letter, do not forward to provider for review.     TAVR / Valve, Mitral clip within the last 6 months? No  If yes- Send dental wait letter, do not forward to provider for review.     If completing a general clearance, continue per protocol.           Surgical Clearance Letter Sent: No Provider to advise.   **Scan clearance request letter into [facility].**

## 2025-04-22 NOTE — TELEPHONE ENCOUNTER
She can proceed with the proposed procedure or surgery from a cardiac standpoint, no modifiable cardiovascular risk, no further cardiac testing required, hold antiplatelet as necessary, resume as soon as possible typically when patient is able to take oral medications.    It is my pleasure to participate in the care of Ms. Hess.  Please do not hesitate to contact me with questions or concerns. [facility] Cardiology is available 24/7 for consultative services at [phone] in the perioperative period.    Electronically Signed    Sharan Tran MD PhD FACC  Cardiologist [facility]

## 2025-04-28 ENCOUNTER — HOSPITAL ENCOUNTER (OUTPATIENT)
Dept: PULMONOLOGY | Facility: MEDICAL CENTER | Age: 85
End: 2025-04-28
Attending: INTERNAL MEDICINE
Payer: MEDICARE

## 2025-04-28 PROCEDURE — 94625 PHY/QHP OP PULM RHB W/O MNTR: CPT

## 2025-05-05 ENCOUNTER — APPOINTMENT (OUTPATIENT)
Dept: URBAN - METROPOLITAN AREA CLINIC 6 | Facility: CLINIC | Age: 85
Setting detail: DERMATOLOGY
End: 2025-05-05

## 2025-05-05 DIAGNOSIS — L57.0 ACTINIC KERATOSIS: ICD-10-CM

## 2025-05-05 DIAGNOSIS — L73.8 OTHER SPECIFIED FOLLICULAR DISORDERS: ICD-10-CM

## 2025-05-05 DIAGNOSIS — D18.0 HEMANGIOMA: ICD-10-CM

## 2025-05-05 DIAGNOSIS — L82.1 OTHER SEBORRHEIC KERATOSIS: ICD-10-CM

## 2025-05-05 DIAGNOSIS — D22 MELANOCYTIC NEVI: ICD-10-CM

## 2025-05-05 DIAGNOSIS — Z71.89 OTHER SPECIFIED COUNSELING: ICD-10-CM

## 2025-05-05 DIAGNOSIS — L81.4 OTHER MELANIN HYPERPIGMENTATION: ICD-10-CM

## 2025-05-05 PROBLEM — D22.5 MELANOCYTIC NEVI OF TRUNK: Status: ACTIVE | Noted: 2025-05-05

## 2025-05-05 PROBLEM — D18.01 HEMANGIOMA OF SKIN AND SUBCUTANEOUS TISSUE: Status: ACTIVE | Noted: 2025-05-05

## 2025-05-05 PROCEDURE — ? COUNSELING

## 2025-05-05 PROCEDURE — 17000 DESTRUCT PREMALG LESION: CPT

## 2025-05-05 PROCEDURE — ? ADDITIONAL NOTES

## 2025-05-05 PROCEDURE — 99213 OFFICE O/P EST LOW 20 MIN: CPT | Mod: 25

## 2025-05-05 PROCEDURE — ? LIQUID NITROGEN

## 2025-05-05 PROCEDURE — ? SUNSCREEN RECOMMENDATIONS

## 2025-05-05 ASSESSMENT — LOCATION SIMPLE DESCRIPTION DERM
LOCATION SIMPLE: LEFT UPPER BACK
LOCATION SIMPLE: LEFT CHEEK
LOCATION SIMPLE: NOSE
LOCATION SIMPLE: RIGHT UPPER BACK
LOCATION SIMPLE: RIGHT LOWER BACK

## 2025-05-05 ASSESSMENT — LOCATION DETAILED DESCRIPTION DERM
LOCATION DETAILED: RIGHT INFERIOR UPPER BACK
LOCATION DETAILED: LEFT MEDIAL UPPER BACK
LOCATION DETAILED: LEFT MEDIAL MALAR CHEEK
LOCATION DETAILED: RIGHT SUPERIOR MEDIAL MIDBACK
LOCATION DETAILED: LEFT INFERIOR UPPER BACK
LOCATION DETAILED: NASAL TIP

## 2025-05-05 ASSESSMENT — LOCATION ZONE DERM
LOCATION ZONE: FACE
LOCATION ZONE: NOSE
LOCATION ZONE: TRUNK

## 2025-05-05 NOTE — PROCEDURE: ADDITIONAL NOTES
Render Risk Assessment In Note?: no
Detail Level: Detailed
Additional Notes: Treated with electrodessication today as a courtesy.

## 2025-05-05 NOTE — PROCEDURE: LIQUID NITROGEN
Consent: The patient's verbal consent was obtained including but not limited to risks of crusting, scabbing, blistering, scarring, darker or lighter pigmentary change, recurrence, incomplete removal and infection.
Show Aperture Variable?: Yes
Render Note In Bullet Format When Appropriate: No
Duration Of Freeze Thaw-Cycle (Seconds): 8
Post-Care Instructions: I reviewed with the patient in detail post-care instructions. Patient is to wear sunprotection, and avoid picking at any of the treated lesions. Pt may apply Vaseline to crusted or scabbing areas.
Detail Level: Zone
Number Of Freeze-Thaw Cycles: 2 freeze-thaw cycles

## 2025-05-07 ENCOUNTER — HOSPITAL ENCOUNTER (OUTPATIENT)
Dept: PULMONOLOGY | Facility: MEDICAL CENTER | Age: 85
End: 2025-05-07
Attending: INTERNAL MEDICINE
Payer: MEDICARE

## 2025-05-07 PROCEDURE — 94626 PHY/QHP OP PULM RHB W/MNTR: CPT

## 2025-05-07 PROCEDURE — 94625 PHY/QHP OP PULM RHB W/O MNTR: CPT

## 2025-05-12 ENCOUNTER — HOSPITAL ENCOUNTER (OUTPATIENT)
Dept: PULMONOLOGY | Facility: MEDICAL CENTER | Age: 85
End: 2025-05-12
Attending: INTERNAL MEDICINE
Payer: MEDICARE

## 2025-05-12 PROCEDURE — 94626 PHY/QHP OP PULM RHB W/MNTR: CPT

## 2025-05-12 PROCEDURE — 94625 PHY/QHP OP PULM RHB W/O MNTR: CPT

## 2025-05-14 ENCOUNTER — HOSPITAL ENCOUNTER (OUTPATIENT)
Dept: PULMONOLOGY | Facility: MEDICAL CENTER | Age: 85
End: 2025-05-14
Attending: INTERNAL MEDICINE
Payer: MEDICARE

## 2025-05-14 PROCEDURE — 94625 PHY/QHP OP PULM RHB W/O MNTR: CPT

## 2025-05-14 PROCEDURE — 94626 PHY/QHP OP PULM RHB W/MNTR: CPT

## 2025-05-16 ENCOUNTER — HOSPITAL ENCOUNTER (OUTPATIENT)
Dept: PULMONOLOGY | Facility: MEDICAL CENTER | Age: 85
End: 2025-05-16
Attending: INTERNAL MEDICINE
Payer: MEDICARE

## 2025-05-16 PROCEDURE — 94729 DIFFUSING CAPACITY: CPT

## 2025-05-16 PROCEDURE — 94726 PLETHYSMOGRAPHY LUNG VOLUMES: CPT

## 2025-05-16 PROCEDURE — 94060 EVALUATION OF WHEEZING: CPT

## 2025-05-16 RX ORDER — ALBUTEROL SULFATE 5 MG/ML
2.5 SOLUTION RESPIRATORY (INHALATION)
Status: DISCONTINUED | OUTPATIENT
Start: 2025-05-16 | End: 2025-05-17 | Stop reason: HOSPADM

## 2025-05-16 RX ADMIN — ALBUTEROL SULFATE 2.5 MG: 5 SOLUTION RESPIRATORY (INHALATION) at 12:15

## 2025-05-19 ENCOUNTER — HOSPITAL ENCOUNTER (OUTPATIENT)
Dept: PULMONOLOGY | Facility: MEDICAL CENTER | Age: 85
End: 2025-05-19
Attending: INTERNAL MEDICINE
Payer: MEDICARE

## 2025-05-19 PROCEDURE — 94625 PHY/QHP OP PULM RHB W/O MNTR: CPT

## 2025-05-19 PROCEDURE — 94626 PHY/QHP OP PULM RHB W/MNTR: CPT

## 2025-05-21 ENCOUNTER — HOSPITAL ENCOUNTER (OUTPATIENT)
Dept: PULMONOLOGY | Facility: MEDICAL CENTER | Age: 85
End: 2025-05-21
Attending: INTERNAL MEDICINE
Payer: MEDICARE

## 2025-05-21 PROCEDURE — 94626 PHY/QHP OP PULM RHB W/MNTR: CPT

## 2025-05-21 PROCEDURE — 94625 PHY/QHP OP PULM RHB W/O MNTR: CPT

## 2025-05-28 ENCOUNTER — HOSPITAL ENCOUNTER (OUTPATIENT)
Dept: PULMONOLOGY | Facility: MEDICAL CENTER | Age: 85
End: 2025-05-28
Attending: INTERNAL MEDICINE
Payer: MEDICARE

## 2025-05-28 PROCEDURE — 94625 PHY/QHP OP PULM RHB W/O MNTR: CPT

## 2025-05-28 PROCEDURE — 94626 PHY/QHP OP PULM RHB W/MNTR: CPT

## 2025-06-02 ENCOUNTER — HOSPITAL ENCOUNTER (OUTPATIENT)
Dept: PULMONOLOGY | Facility: MEDICAL CENTER | Age: 85
End: 2025-06-02
Attending: INTERNAL MEDICINE
Payer: MEDICARE

## 2025-06-02 PROCEDURE — 94625 PHY/QHP OP PULM RHB W/O MNTR: CPT

## 2025-06-02 PROCEDURE — 94626 PHY/QHP OP PULM RHB W/MNTR: CPT

## 2025-06-04 ENCOUNTER — APPOINTMENT (OUTPATIENT)
Dept: PULMONOLOGY | Facility: MEDICAL CENTER | Age: 85
End: 2025-06-04
Attending: INTERNAL MEDICINE
Payer: MEDICARE

## 2025-06-09 ENCOUNTER — HOSPITAL ENCOUNTER (OUTPATIENT)
Dept: PULMONOLOGY | Facility: MEDICAL CENTER | Age: 85
End: 2025-06-09
Attending: INTERNAL MEDICINE
Payer: MEDICARE

## 2025-06-09 PROCEDURE — 94625 PHY/QHP OP PULM RHB W/O MNTR: CPT

## 2025-06-09 PROCEDURE — 94626 PHY/QHP OP PULM RHB W/MNTR: CPT

## 2025-06-11 ENCOUNTER — HOSPITAL ENCOUNTER (OUTPATIENT)
Dept: PULMONOLOGY | Facility: MEDICAL CENTER | Age: 85
End: 2025-06-11
Attending: INTERNAL MEDICINE
Payer: MEDICARE

## 2025-06-11 PROCEDURE — 94626 PHY/QHP OP PULM RHB W/MNTR: CPT

## 2025-06-11 PROCEDURE — 94625 PHY/QHP OP PULM RHB W/O MNTR: CPT

## 2025-06-16 ENCOUNTER — HOSPITAL ENCOUNTER (OUTPATIENT)
Dept: PULMONOLOGY | Facility: MEDICAL CENTER | Age: 85
End: 2025-06-16
Attending: INTERNAL MEDICINE
Payer: MEDICARE

## 2025-06-16 PROCEDURE — 94626 PHY/QHP OP PULM RHB W/MNTR: CPT

## 2025-06-16 PROCEDURE — 94625 PHY/QHP OP PULM RHB W/O MNTR: CPT

## 2025-06-18 ENCOUNTER — HOSPITAL ENCOUNTER (OUTPATIENT)
Dept: PULMONOLOGY | Facility: MEDICAL CENTER | Age: 85
End: 2025-06-18
Attending: INTERNAL MEDICINE
Payer: MEDICARE

## 2025-06-18 PROCEDURE — 94626 PHY/QHP OP PULM RHB W/MNTR: CPT

## 2025-06-18 PROCEDURE — 94625 PHY/QHP OP PULM RHB W/O MNTR: CPT

## 2025-06-23 ENCOUNTER — HOSPITAL ENCOUNTER (OUTPATIENT)
Dept: PULMONOLOGY | Facility: MEDICAL CENTER | Age: 85
End: 2025-06-23
Attending: INTERNAL MEDICINE
Payer: MEDICARE

## 2025-06-23 PROCEDURE — 94626 PHY/QHP OP PULM RHB W/MNTR: CPT

## 2025-06-23 PROCEDURE — 94625 PHY/QHP OP PULM RHB W/O MNTR: CPT

## 2025-06-25 ENCOUNTER — APPOINTMENT (OUTPATIENT)
Dept: PULMONOLOGY | Facility: MEDICAL CENTER | Age: 85
End: 2025-06-25
Attending: INTERNAL MEDICINE
Payer: MEDICARE

## 2025-06-30 ENCOUNTER — APPOINTMENT (OUTPATIENT)
Dept: PULMONOLOGY | Facility: MEDICAL CENTER | Age: 85
End: 2025-06-30
Attending: INTERNAL MEDICINE
Payer: MEDICARE

## 2025-07-01 NOTE — Clinical Note
[facility]  [address] [place] [address]    [id number]    Erin Hess  [address]  [place] [address]    July 1, 2025    Member Name: Erin Hess   Member Number: [id number]   Reference Number: [id number]   Approved Services: Echos and EKG   Approved Service Dates: 07/01/2025 - 10/29/2025   Requesting Provider: Ciro Luis   Requested Provider: [facility]     Dear Erin Hess:    The following medical service(s) requested by Ciro Luis have been approved:    Procedure Code Procedure Code Name Requested Quantity Approved Quantity Status   39775 (CPT®) IN ECHO HEART XTHORACIC,COMPLETE W DOPPLER 1 1 Authorized       Approved Quantity means the number of visits approved for medication treatments and/or medical services.    The services should be provided by [facility] no later than 10/29/2025. Please contact the provider listed below with any questions.     Provider Information:  [facility]  [phone]    Your plan benefit may require a deductible, co-payment or coinsurance for these services. This authorization does not guarantee [facility] will pay the claim for services that you receive. Payment by [facility] for these services is subject to the terms of your Evidence of Coverage, your eligibility at the time of service, and confirmation of benefit coverage.    For any questions or additional information, please contact Customer Service:    [facility] Toll Free: [phone]  TTY users dial: [phone]   Call Center Hours:  Oct 1 - Mar 31, Mon - Fri 7 AM to 8 PM PST  Oct 1 - Mar 31, Sat - Sun 8 AM to 8 PM PST  Apr 1 - Sep 30, Mon - Fri 7 AM to 8 PM PST   Office Hours: Mon - Fri 8 AM to 5 PM PST   E-mail: [email or web address]   Website:  [email or web address]      This information is available for free in other languages. Please contact Customer Service at the phone number  above for more information. [facility] complies with applicable Federal civil rights laws and does not discriminate on the basis of race, color, national origin, age, disability or sex.    Sincerely,     Healthcare Utilization Management Department     Cc: [facility]   Ciro Luis    Multi-Language Insert  Multi- Services  English: We have free  services to answer any questions you may have about our health or drug plan.  To get an , just call us at [phone].  Someone who speaks English/Language can help you.  This is a free service.  Lao: Tenemos servicios de intérprete sin costo alguno  para responder cualquier pregunta que pueda tener sobre nuestro plan de heidi o medicamentos. Para hablar con un intérprete, por favor llame al [phone]. Alguien que hable español le podrá ayudar. Melinda es un servicio gratuito.  Chinese Mandarin: ?????????????????????????????? ???????????????? [phone]????????????????? ?????????  Chinese Cantonese: ?????????????????????????????? ????????????? [phone]???????????????????? ????????  Tagalog:  Abril templeton serbisyo sa tijerinasasaling-karen mcraea denisha hill hinggil sa garrick coppolaong judithgkalusutim o panggamot.  Mikey heath tagasaling-franky jones  [phone]. Ten heath Tagalog.  Abe wilkins.  Romansh:  Nous proposons bay services gratuits d'interprétation pour répondre à toutes gamal questions relatives à notre régime de santé ou d'assurance-médicaments. Pour accéder au service d'interprétation, il vous suffit de nous appeler au [phone]. Un interlocuteur parSan [facility] Community Hospitals pourra vous aider. Ce service est gratuit.  German:  Leigh Ann james có d?ch v? thông d?ch mi?n phí ð? tr? l?i các câu h?i v? chýõng s?c kh?e và chcharleneg trình thu?c men. N?u quí v? c?n thông d?ch viên ibrahima g?i  [phone] s? có nhân viên nói ti?ng Vi?t giúp ð? quí v?. Ðây là d?ch v? mi?n phí .  Gambian:  Unser kostenser Dolmetscherservice beantwortet Ihren Fragen zu unserem Gesundheits- und Arzneimittelplan. Unsere Dolmetscher erreichen Sie [phone]. Man wird Ihnen deidre auf [facility]. Dieser Service ist [facility].  Greenlandic:  ??? ?? ?? ?? ?? ??? ?? ??? ?? ???? ?? ?? ???? ???? ????. ?? ???? ????? ?? [phone] ??? ??? ????.  ???? ?? ???? ?? ?? ????. ? ???? ??? ?????.   Bermudian: Åñëè ó âàñ âîçíèêíóò âîïðîñû îòíîñèòåëüíî ñòðàõîâîãî èëè ìåäèêàìåíòíîãî ïëàíà, âû ìîæåòå âîñïîëüçîâàòüñÿ íàøèìè áåñïëàòíûìè óñëóãàìè ïåðåâîä÷èêîâ. ×òîáû âîñïîëüçîâàòüñÿ óñëóãàìè ïåðåâîä÷èêà, ïîçâîíèòå íàì ïî òåëåôîíó [phone]. Âàì îêàæåò ïîìîùü ñîòðóäíèê, êîòîðûé ãîâîðèò ïî-póññêè. Äàííàÿ óñëóãà áåñïëàòíàÿ.  Estonian: ÅääÇ äÞÏã ÎÏãÇÊ ÇáãÊÑÌã ÇáÝæÑí ÇáãÌÇäíÉ ááÅÌÇÈÉ Úä Ãí ÃÓÆáÉ ÊÊÚáÞ ÈÇáÕÍÉ Ãæ ÌÏæá ÇáÃÏæíÉ áÏíäÇ. ááÍÕæá Úáì ãÊÑÌã ÝæÑí¡ áíÓ Úáíß Óæì ÇáÇÊÕÇá ÈäÇ Úáì [phone] . ÓíÞæã ÔÎÕ ãÇ íÊÍÏË ÇáÚÑÈíÉ ÈãÓÇÚÏÊß. åÐå ÎÏãÉ ãÌÇäíÉ.  Maciej: ????? ????????? ?? ??? ?? ????? ?? ???? ??? ???? ???? ?? ?????? ?? ???? ???? ?? ??? ????? ??? ????? ???????? ?????? ?????? ???. ?? ???????? ??????? ???? ?? ???, ?? ???? [phone] ?? ??? ????. ??? ??????? ?? ?????? ????? ?? ???? ??? ?? ???? ??. ?? ?? ????? ???? ??.   Citizen of [place]:  È disponibile un servizio di interpretariato gratuito per rispondere a eventuali domande sul nostro piano sanitario e farmaceutico. Per un interprete, contattare il shayna [phone]. Un nostro incaricato soo parla Italianovi fornirà l'assistenza necessaria. È un servizio gratuito.  Portugués:  Dispomos de serviços de interpretação gratuitos para responder a qualquer questão que tenha acerca do nosso plano de saúde ou de medicação. Para obter um intérprete, contacte-nos através do número [phone]. Irá encontrar alguém que fale o idioma  Português para o ajudar. Melinda serviço é gratuito.  Russian  Creole:  Nou genyen sèvis entèprèt gratis michael reponn tout kesyon ou ta genyen konsènan plan medikal oswa dwòg nou an.  Michael jwenn yon entèprèt, jis rele nou nan [phone]. Yon moun ki pale Kreyòl kapab cristian w.  Sa a se yon sèvis ki gratis.  Polish:  Umo¿liwiamy bezp³atne skorzystanie z us³ug t³umacza ustnego, który pomo¿e w uzyskaniu odpowiedzi na temat planu zdrowotnego lub dawkowania leków. Nano skorzystaæ z pomocy t³umacza znaj¹cego bala thorne¿y zadzwoniæ pod numer [phone]. Ta us³uga jest bezp³atna.  Serbian: ????? ??????? ????????????????????? ??????????????????????????????????[phone] ???????????????? ? ????????????????? ?????

## 2025-07-02 ENCOUNTER — APPOINTMENT (OUTPATIENT)
Dept: PULMONOLOGY | Facility: MEDICAL CENTER | Age: 85
End: 2025-07-02
Attending: INTERNAL MEDICINE
Payer: MEDICARE

## 2025-07-07 ENCOUNTER — HOSPITAL ENCOUNTER (OUTPATIENT)
Dept: PULMONOLOGY | Facility: MEDICAL CENTER | Age: 85
End: 2025-07-07
Attending: FAMILY MEDICINE
Payer: MEDICARE

## 2025-07-07 PROCEDURE — 94626 PHY/QHP OP PULM RHB W/MNTR: CPT

## 2025-07-07 PROCEDURE — 94625 PHY/QHP OP PULM RHB W/O MNTR: CPT

## 2025-07-09 ENCOUNTER — HOSPITAL ENCOUNTER (OUTPATIENT)
Dept: PULMONOLOGY | Facility: MEDICAL CENTER | Age: 85
End: 2025-07-09
Attending: FAMILY MEDICINE
Payer: MEDICARE

## 2025-07-09 PROCEDURE — 94626 PHY/QHP OP PULM RHB W/MNTR: CPT

## 2025-07-09 PROCEDURE — 94625 PHY/QHP OP PULM RHB W/O MNTR: CPT

## 2025-07-14 ENCOUNTER — HOSPITAL ENCOUNTER (OUTPATIENT)
Dept: PULMONOLOGY | Facility: MEDICAL CENTER | Age: 85
End: 2025-07-14
Attending: FAMILY MEDICINE
Payer: MEDICARE

## 2025-07-14 PROCEDURE — 94626 PHY/QHP OP PULM RHB W/MNTR: CPT

## 2025-07-14 PROCEDURE — 94625 PHY/QHP OP PULM RHB W/O MNTR: CPT

## 2025-07-15 ENCOUNTER — HOSPITAL ENCOUNTER (OUTPATIENT)
Dept: CARDIOLOGY | Facility: MEDICAL CENTER | Age: 85
End: 2025-07-15
Attending: INTERNAL MEDICINE
Payer: MEDICARE

## 2025-07-15 DIAGNOSIS — I35.0 NONRHEUMATIC AORTIC (VALVE) STENOSIS: ICD-10-CM

## 2025-07-15 LAB
LV EJECT FRACT  99904: 75
LV EJECT FRACT MOD 2C 99903: 49.62
LV EJECT FRACT MOD 4C 99902: 59.14
LV EJECT FRACT MOD BP 99901: 53.74

## 2025-07-15 PROCEDURE — 93306 TTE W/DOPPLER COMPLETE: CPT

## 2025-07-15 PROCEDURE — 93306 TTE W/DOPPLER COMPLETE: CPT | Mod: 26 | Performed by: INTERNAL MEDICINE

## 2025-07-16 ENCOUNTER — HOSPITAL ENCOUNTER (OUTPATIENT)
Dept: PULMONOLOGY | Facility: MEDICAL CENTER | Age: 85
End: 2025-07-16
Attending: FAMILY MEDICINE
Payer: MEDICARE

## 2025-07-16 PROCEDURE — 94626 PHY/QHP OP PULM RHB W/MNTR: CPT

## 2025-07-16 PROCEDURE — 94625 PHY/QHP OP PULM RHB W/O MNTR: CPT

## 2025-07-21 ENCOUNTER — HOSPITAL ENCOUNTER (OUTPATIENT)
Dept: PULMONOLOGY | Facility: MEDICAL CENTER | Age: 85
End: 2025-07-21
Attending: INTERNAL MEDICINE
Payer: MEDICARE

## 2025-07-21 PROCEDURE — 94625 PHY/QHP OP PULM RHB W/O MNTR: CPT | Mod: XU

## 2025-07-21 PROCEDURE — 94626 PHY/QHP OP PULM RHB W/MNTR: CPT

## 2025-07-22 ENCOUNTER — OFFICE VISIT (OUTPATIENT)
Dept: CARDIOLOGY | Facility: MEDICAL CENTER | Age: 85
End: 2025-07-22
Payer: MEDICARE

## 2025-07-22 ENCOUNTER — DOCUMENTATION (OUTPATIENT)
Dept: CARDIOLOGY | Facility: MEDICAL CENTER | Age: 85
End: 2025-07-22

## 2025-07-22 VITALS
HEIGHT: 64 IN | RESPIRATION RATE: 16 BRPM | DIASTOLIC BLOOD PRESSURE: 48 MMHG | BODY MASS INDEX: 30.39 KG/M2 | OXYGEN SATURATION: 92 % | HEART RATE: 72 BPM | SYSTOLIC BLOOD PRESSURE: 108 MMHG | WEIGHT: 178 LBS

## 2025-07-22 DIAGNOSIS — E78.5 DYSLIPIDEMIA: ICD-10-CM

## 2025-07-22 DIAGNOSIS — I10 ESSENTIAL HYPERTENSION: ICD-10-CM

## 2025-07-22 DIAGNOSIS — I70.0 AORTIC ATHEROSCLEROSIS (HCC): ICD-10-CM

## 2025-07-22 DIAGNOSIS — Z95.2 S/P TAVR (TRANSCATHETER AORTIC VALVE REPLACEMENT): Primary | ICD-10-CM

## 2025-07-22 PROCEDURE — 99212 OFFICE O/P EST SF 10 MIN: CPT

## 2025-07-22 RX ORDER — ASPIRIN 81 MG/1
81 TABLET, CHEWABLE ORAL DAILY
COMMUNITY

## 2025-07-22 ASSESSMENT — ENCOUNTER SYMPTOMS
GASTROINTESTINAL NEGATIVE: 1
DEPRESSION: 0
NERVOUS/ANXIOUS: 0
PND: 0
PALPITATIONS: 0
NEUROLOGICAL NEGATIVE: 1
SHORTNESS OF BREATH: 0
EYES NEGATIVE: 1
MUSCULOSKELETAL NEGATIVE: 1
CONSTITUTIONAL NEGATIVE: 1
ORTHOPNEA: 0

## 2025-07-22 ASSESSMENT — FIBROSIS 4 INDEX: FIB4 SCORE: 2.79

## 2025-07-22 NOTE — PROGRESS NOTES
Chief Complaint   Patient presents with    Other     F/V DX: S/P TAVR (transcatheter aortic valve replacement)       Subjective     Erin Hess is a 85 y.o. female who presents today for 1 year post TAVR follow-up. She is history of severe symptomatic aortic stenosis status post TAVR on 7/15/2024 (complicated by right common femoral artery injury requiring cutdown and surgical repair by Dr. Swan, and postoperative hypotension requiring pressor support).  Additional history of hypertension, asthma-COPD overlap, HLD, stage IIIb CKD, obesity, ROBYN.     She was admitted from 7/15/2024 to 2024 for scheduled TAVR as described above, discharged to inpatient rehab where she remained until 2024.     2024 she has been doing well, she has been followed by physical therapy.  NYHA class II symptoms.  No acute cardiovascular complaints    2025 she has been doing well, just completed cardiac rehab, she is able to do all her house work. NYHA class I    Past Medical History[1]  Past Surgical History[2]  Family History   Problem Relation Age of Onset    Heart Disease Mother         CAD MI at age 72    Cancer Mother         breast cancer  at age 83    Cancer Sister     Lung Disease Father 75        Lung cancer    Cancer Maternal Grandmother         Pancreatic cancer    Heart Attack Maternal Grandfather         MI at age 70    Heart Disease Sister         Rhuemati cfever- herat murmur     Social History     Socioeconomic History    Marital status:      Spouse name: Not on file    Number of children: 6    Years of education: Not on file    Highest education level: 12th grade   Occupational History    Not on file   Tobacco Use    Smoking status: Former     Current packs/day: 0.00     Average packs/day: 1 pack/day for 20.0 years (20.0 ttl pk-yrs)     Types: Cigarettes     Start date: 1966     Quit date: 1986     Years since quittin.1     Passive exposure: Past    Smokeless tobacco: Never     Tobacco comments:     Quit in    Vaping Use    Vaping status: Never Used   Substance and Sexual Activity    Alcohol use: Yes     Alcohol/week: 4.2 oz     Types: 7 Shots of liquor per week     Comment: 1 drink a day    Drug use: No    Sexual activity: Not Currently   Other Topics Concern    Not on file   Social History Narrative    She lives alone but has a female roommate who is 60, Leonora Rosa who has a brother in town she is estranged from. Leonora Rosa had Covid  and now has long-haulers and is on medicaid     She has 2 children and 4 stepchildren, her   . She has a son near [place], [place] and another in [place]. She relies on her 2 grandkids who live in [place] and her 8y younger sister lives in [facility], her older sister lives in a FDC in [place]. 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  then  after 27y. Her ex  her cousin, met her 2nd  and was  for 25y.     Her son supports her keeping Leonora Rosa at her home. She does not feel taken advantage of despite no longer receiving rent.      Social Drivers of Health     Financial Resource Strain: Medium Risk (3/5/2024)    Overall Financial Resource Strain (CARDIA)     Difficulty of Paying Living Expenses: Somewhat hard   Food Insecurity: No Food Insecurity (2024)    Hunger Vital Sign     Worried About Running Out of Food in the Last Year: Never true     Ran Out of Food in the Last Year: Never true   Transportation Needs: No Transportation Needs (2024)    PRAPARE - Transportation     Lack of Transportation (Medical): No     Lack of Transportation (Non-Medical): No   Physical Activity: Inactive (2022)    Exercise Vital Sign     Days of Exercise per Week: 0 days     Minutes of Exercise per Session: 0 min   Stress: Stress Concern Present (2022)    [employer] - Occupational Stress Questionnaire     Feeling of Stress : To some extent  "  Social Connections: Feeling Socially Integrated (9/5/2024)    OASIS : Social Isolation     Frequency of experiencing loneliness or isolation: Never   Intimate Partner Violence: Not At Risk (7/20/2024)    Humiliation, Afraid, Rape, and Kick questionnaire     Fear of Current or Ex-Partner: No     Emotionally Abused: No     Physically Abused: No     Sexually Abused: No   Housing Stability: Low Risk  (7/20/2024)    Housing Stability Vital Sign     Unable to Pay for Housing in the Last Year: No     Number of Places Lived in the Last Year: 1     Unstable Housing in the Last Year: No     Allergies[3]  Encounter Medications[4]  Review of Systems   Constitutional: Negative.    HENT: Negative.     Eyes: Negative.    Respiratory:  Negative for shortness of breath.    Cardiovascular:  Negative for chest pain, palpitations, orthopnea, leg swelling and PND.   Gastrointestinal: Negative.    Genitourinary: Negative.    Musculoskeletal: Negative.    Skin: Negative.    Neurological: Negative.    Endo/Heme/Allergies: Negative.    Psychiatric/Behavioral:  Negative for depression. The patient is not nervous/anxious.               Objective     /48 (BP Location: Left arm, Patient Position: Sitting, BP Cuff Size: Adult)   Pulse 72   Resp 16   Ht 1.626 m (5' 4\")   Wt 80.7 kg (178 lb)   SpO2 92%   BMI 30.55 kg/m²     Physical Exam  Constitutional:       Appearance: Normal appearance.   HENT:      Head: Normocephalic and atraumatic.   Neck:      Vascular: No JVD.   Cardiovascular:      Rate and Rhythm: Normal rate and regular rhythm.      Pulses: Normal pulses.      Heart sounds: Normal heart sounds. No murmur heard.     No friction rub.   Pulmonary:      Effort: Pulmonary effort is normal. No respiratory distress.      Breath sounds: Normal breath sounds.   Abdominal:      General: There is no distension.      Palpations: Abdomen is soft.   Musculoskeletal:      Right lower leg: No edema.      Left lower leg: No edema. "   Skin:     General: Skin is warm and dry.   Neurological:      General: No focal deficit present.      Mental Status: She is alert and oriented to person, place, and time.   Psychiatric:         Mood and Affect: Mood normal.         Behavior: Behavior normal.            Lab Results   Component Value Date/Time    WBC 8.3 2025 02:48 PM    RBC 4.61 2025 02:48 PM    HEMOGLOBIN 13.0 2025 02:48 PM    HEMATOCRIT 40.9 2025 02:48 PM    MCV 88.7 2025 02:48 PM    MCH 28.2 2025 02:48 PM    MCHC 31.8 (L) 2025 02:48 PM    MPV 11.1 2025 02:48 PM    NEUTSPOLYS 75.40 (H) 2025 02:48 PM    LYMPHOCYTES 14.30 (L) 2025 02:48 PM    MONOCYTES 7.60 2025 02:48 PM    EOSINOPHILS 2.00 2025 02:48 PM    BASOPHILS 0.50 2025 02:48 PM    HYPOCHROMIA 1+ 2014 11:52 AM      Lab Results   Component Value Date/Time    CHOLSTRLTOT 175 2024 12:53 PM    LDL 79 2024 12:53 PM    HDL 75 2024 12:53 PM    TRIGLYCERIDE 104 2024 12:53 PM       Lab Results   Component Value Date/Time    SODIUM 138 2024 06:01 AM    POTASSIUM 4.5 2024 06:01 AM    CHLORIDE 104 2024 06:01 AM    CO2 25 2024 06:01 AM    GLUCOSE 95 2024 06:01 AM    BUN 17 2024 06:01 AM    CREATININE 0.85 2024 06:01 AM     Lab Results   Component Value Date/Time    ALKPHOSPHAT 55 2024 05:27 AM    ASTSGOT 14 2024 05:27 AM    ALTSGPT 8 2024 05:27 AM    TBILIRUBIN 0.6 2024 05:27 AM      Echocardiogram 7/15/2025  CONCLUSIONS   Compared to the prior study on 8/15/2024.   Hyperdynamic left ventricular systolic function.   Known TAVR aortic valve that is functioning normally with normal   transvalvular gradients.   Moderate mitral stenosis.   Mean transvalvular gradient is 6 mmHg at a heart rate of 66 BPM.     Cardiac event monitor  Procedures: Jongo   Dates of monitorin2024 - 10/8/2024   Duration: 13 days 17 hours     Findings:   1.  Predominant rhythm was sinus rhythm with an average heart rate of 73 bpm.   2.  There were no episodes of ventricular tachycardia.   3.  There were occasional episodes of nonsustained supraventricular tachycardia with longest episode lasting 8 beats in duration.   4.  There were no episodes of atrial fibrillation.   5.  No significant pauses or high-grade AV block.   6.  There were rare PACs and rare PVCs.   7.  Patient triggered events/symptoms related with sinus rhythm and supraventricular ectopy.       Assessment & Plan     1. S/P TAVR (transcatheter aortic valve replacement)        2. Essential hypertension        3. Dyslipidemia        4. Aortic atherosclerosis (HCC)            Medical Decision Making: Today's Assessment/Status/Plan:        HCC Gap Form    Diagnosis to address: I97.89, I48.91 - Postoperative atrial fibrillation (HCC)  Assessment and plan: Chronic, stable. Continue with current defined treatment plan: no recurrence. Follow-up at least annually.  Diagnosis: N18.32 - Stage 3b chronic kidney disease  Assessment and plan: Chronic, stable. Continue with current defined treatment plan: managed by PCP. Follow-up at least annually.  Last edited 07/22/25 14:51 PDT by Shahnaz Bee A.P.R.N.         S/P TAVR NYHA II, moderate mitral stenosis  -stable post-op  - Aspirin  - Echo shows normal transvalvular gradients and moderate mitral stenosis  -Continue with annual echocardiograms    Hypertension  - good control  - continue current regimen  - goal < 130/80     Hyperlipidemia  -Most recent LDL 79  -Continue rosuvastatin 20 mg daily  -Goal of less than 100  -Check lipid panel annually         Follow up Dr. Tran as scheduled     This note was dictated using Dragon speech recognition software.                            [1]   Past Medical History:  Diagnosis Date    Anesthesia     delayed emergence    Arthritis     Osteo    Asthma     COPD    Breast cancer (HCC)     Breath shortness     2L O2 NOC, PRN  During Day    Bronchitis 2011    Cancer (HCC) 12/2012    right breast    Cataract     Bilat IOL    Chronic cough     productive, improving    Cough     Delayed emergence from general anesthesia     Dizziness 03/11/2013    Heart burn     Heart murmur     Hiatus hernia syndrome     High cholesterol     HTN (hypertension) 03/12/2013    Hypercholesterolemia     Hyperlipidemia 03/12/2013    Hypertension     Hypokalemia 03/12/2013    Indigestion     Mitral annular calcification     Pain     back, hips, knees    Pneumonia     Hx of    Pre-diabetes     Psychiatric disorder     depression    Renal disorder     CKD    Severe aortic stenosis 06/25/2024    Sleep apnea     h/o gastric sleeve lost 40 lb now not on CPAP    Snoring     Sputum production     ULCERATIVE COLITIS 03/12/2013    Urinary incontinence     Stress incontinence    Vertigo 03/11/2013   [2]   Past Surgical History:  Procedure Laterality Date    TRANSCATHETER AORTIC VALVE REPLACEMENT Bilateral 7/15/2024    Procedure: TRANSCATHETER AORTIC VALVE REPLACEMENT;  Surgeon: Ciro Luis M.D.;  Location: SURGERY [facility];  Service: Cardiac    ECHOCARDIOGRAM, TRANSESOPHAGEAL, INTRAOPERATIVE N/A 7/15/2024    Procedure: ECHOCARDIOGRAM, TRANSESOPHAGEAL, INTRAOPERATIVE;  Surgeon: Ciro Luis M.D.;  Location: SURGERY [facility];  Service: Cardiac    FEMORAL ARTERY REPAIR Right 7/15/2024    Procedure: REPAIR, ARTERY, FEMORAL WITH PATCH;  Surgeon: Ciro Luis M.D.;  Location: SURGERY [facility];  Service: Cardiac    GASTRIC SLEEVE LAPAROSCOPY N/A 05/18/2016    Procedure: GASTRIC SLEEVE LAPAROSCOPY, HIATAL HERNIA AND LIVER BIOPSY;  Surgeon: Mauricio Montes M.D.;  Location: SURGERY [facility];  Service:     US-NEEDLE CORE BX-BREAST PANEL  01/01/2013    rt breast, with lumpectomy     CATARACT EXTRACTION WITH IOL      GYN SURGERY      vag hyster 1990    GYN SURGERY      vaginal cyst removal     LUMPECTOMY  left    OTHER       R breast bx X 2& lipoma removal   [3]    Allergies  Allergen Reactions    Sulfa Drugs Rash and Swelling     Rash, joint swelling  EFX=[id number]    Codeine Vomiting and Nausea    Vancomycin Itching   [4]   Outpatient Encounter Medications as of 7/22/2025   Medication Sig Dispense Refill    valsartan (DIOVAN) 40 MG Tab Take 1 Tablet by mouth 2 times a day. Indications: High Blood Pressure (Patient taking differently: Take 40 mg by mouth every day. Indications: High Blood Pressure) 200 Tablet 2    albuterol (PROVENTIL) 2.5mg/3ml Nebu Soln solution for nebulization TAKE 3 ML BY NEBULIZATION EVERY FOUR HOURS AS NEEDED FOR SHORTNESS OF BREATH. 450 mL 9    PARoxetine (PAXIL) 10 MG Tab Take 1 Tablet by mouth every day. 100 Tablet 2    rosuvastatin (CRESTOR) 20 MG Tab Take 1 Tablet by mouth every evening. 100 Tablet 2    spironolactone (ALDACTONE) 25 MG Tab Take 1 Tablet by mouth every day. 30 Tablet 2    TRELEGY ELLIPTA 200-62.5-25 MCG/ACT inhaler Inhale 1 Puff every day. 60 Each 2    MAGNESIUM PO Take 420 mg by mouth every evening. Indications: supplement      Multiple Vitamins-Minerals (PRESERVISION AREDS 2 PO) Take 1 Tablet by mouth every evening. Indications: supplement      melatonin 5 mg Tab Take 5 mg by mouth every evening as needed. Indications: Trouble Sleeping      aspirin (ASA) 81 MG Chew Tab chewable tablet Chew 1 Tablet every day. (Patient not taking: Reported on 7/22/2025) 100 Tablet 3    Home Care Oxygen Inhale 2 L/min as needed for Shortness of Breath (shortness of breath). Indications: SOB      anastrozole (ARIMIDEX) 1 MG Tab Take 1 Tablet by mouth every morning. 100 Tablet 2    pantoprazole (PROTONIX) 40 MG Tablet Delayed Response Take 1 Tablet by mouth every day. (Patient not taking: Reported on 7/22/2025) 30 Tablet 2    traZODone (DESYREL) 50 MG Tab Take 1 Tablet by mouth at bedtime as needed for Sleep. 30 Tablet 3     No facility-administered encounter medications on file as of 7/22/2025.

## 2025-07-22 NOTE — PROGRESS NOTES
Valve Program Functional Assessment:     KCCQ12   1a) Showering/bathin  1b) Walking 1 block on ground: 3  1c) Hurrying or joggin  2) Swellin  3) Fatigue: 6  4) Shortness of breath: 7  5) Sleep sitting up: 5  6) Limited enjoyment of life: 4  7) Spend the rest of your life with HF: 5  8a) Hobbies, recreational activities:3  8b) Working or doing household chores:4  8c) Visiting family or friends: 3

## (undated) DEVICE — PACK TAVR (3EA/CA)

## (undated) DEVICE — CATHETER PIGTAIL 6FR 145 (5EA/BX)

## (undated) DEVICE — INTRODUCER SHEATH 6FR 2.5CM - DILATOR PROTRUDING (10/BX)

## (undated) DEVICE — SPONGE PEANUT - (5/PK 50PK/CA)

## (undated) DEVICE — ELECTRODE DUAL RETURN W/ CORD - (50/PK)

## (undated) DEVICE — GUIDEWIRE STARTER STRAIGHT FIXED CORE .035 150CM 4 STRAIGHT PTFE/HEPARIN COATED (10/BX)

## (undated) DEVICE — SET INTRO MIRCROPUNCTURE - MPIS-501-SST

## (undated) DEVICE — Device

## (undated) DEVICE — SHEATH DESTINATION WITH DILATOR 6FR 45CM

## (undated) DEVICE — SYR ANGIO CNRST INJ HI-PRS 3W 65 - (10EA/CA)"

## (undated) DEVICE — WIRE GUIDE LUNDQST.035X180 - TSMG-35-180-4-LES ORDER BY BOX (5EA/BX)

## (undated) DEVICE — DRAPE MAYO STAND - (30/CA)

## (undated) DEVICE — DECANTER FLD BLS - (50/CA)

## (undated) DEVICE — GLOVESZ 8.5 BIOGEL PI MICRO - PF LF (50PR/BX)

## (undated) DEVICE — COVER FOOT UNIVERSAL DISP. - (25EA/CA)

## (undated) DEVICE — IV TUBING HI-FLO RATE W/CLAMP (50/CA)

## (undated) DEVICE — KIT RETROFIT PROBE COVERS (24EA/EA)

## (undated) DEVICE — BLADE SURGICAL #11 - (50/BX)

## (undated) DEVICE — SENSOR OXIMETER ADULT SPO2 RD SET (20EA/BX)

## (undated) DEVICE — GUIDEWIRE 25CM .35 180CM ANGLED GLIDEWIRE ADVANTAGE (1/BX)

## (undated) DEVICE — WIRE GUIDE AES .035 260CM WITH 3MM J TIP"

## (undated) DEVICE — TUBING CLEARLINK DUO-VENT - C-FLO (48EA/CA)

## (undated) DEVICE — SUCTION INSTRUMENT YANKAUER BULBOUS TIP W/O VENT (50EA/CA)

## (undated) DEVICE — STOPCOCK IV 400 PSI 3W ROT (50EA/BX)

## (undated) DEVICE — ARM BAND RADIAL TR BAND (5EA/BX)

## (undated) DEVICE — CANISTER SUCTION 3000ML MECHANICAL FILTER AUTO SHUTOFF MEDI-VAC NONSTERILE LF DISP (40EA/CA)

## (undated) DEVICE — CABLE TEMPORARY PACING

## (undated) DEVICE — VESSELOOP MAXI BLUE STERILE- SURG-I-LOOP (10EA/BX)

## (undated) DEVICE — CRIMPER CATHETER EDWARDS DISPOSABLE (1EA)

## (undated) DEVICE — CATHETER ANGIO OMNI FLUSH 4FR 65CM - (10/BX)

## (undated) DEVICE — DRAPE CLEAR W/ELASTIC BAND RAD CARM 40 X40" (20EA/CA)"

## (undated) DEVICE — DEVICE INFLATION ATRION NOVALFEX TRANSFEMORAL SYSTEM (1EA)

## (undated) DEVICE — ADHESIVE MASTISOL - (48/BX)

## (undated) DEVICE — COVER LIGHT HANDLE ALC PLUS DISP (18EA/BX)

## (undated) DEVICE — ELECTRODE RADIOLUCNT SOLID GEL DEFIB PADS (12EA/CA)

## (undated) DEVICE — CATHETER 6FR AL1 100CM (5/BX)

## (undated) DEVICE — GOWN SURGEONS X-LARGE - DISP. (30/CA)

## (undated) DEVICE — SHEATH INTRODUCER PERFORMER 12F X 30CM

## (undated) DEVICE — SET EXTENSION WITH 2 PORTS (48EA/CA) ***PART #2C8610 IS A SUBSTITUTE*****

## (undated) DEVICE — LACTATED RINGERS INJ 1000 ML - (14EA/CA 60CA/PF)

## (undated) DEVICE — DEVICE INFLATION DIGITAL BLUE DIAMOND (5EA/BX)

## (undated) DEVICE — SUTURE 0 ETHIBOND CT-1 30 IN (36PK/BX)

## (undated) DEVICE — CHLORAPREP 26 ML APPLICATOR - ORANGE TINT(25/CA)

## (undated) DEVICE — SUTURE CV

## (undated) DEVICE — COVER LIGHT HANDLE FLEXIBLE - SOFT (2EA/PK 80PK/CA)

## (undated) DEVICE — SYSTEM DELIVERY COMMANDER TAVR KIT 23MM COMPONENT (1EA)

## (undated) DEVICE — SUTURE DEVICE CLOSURE REPAIR SYSTEM PERCLOSE PROSTYLE (10EA/PK)

## (undated) DEVICE — GLIDESHEATH SLENDER NITINOL KIT .021 GW 6FR 10CM SINGLE WALL

## (undated) DEVICE — SUTURE GENERAL

## (undated) DEVICE — BALLOON 7.0X40 75CM MUSTANG

## (undated) DEVICE — SYSTEM PEEL & PLACE 13CM INCISIONS

## (undated) DEVICE — INTRODUCER CATHETER DILATOR PROTRUDING 8FR 2.5CM (10EA/BX)

## (undated) DEVICE — TOWELS CLOTH SURGICAL - (4/PK 20PK/CA)